# Patient Record
Sex: FEMALE | Race: AMERICAN INDIAN OR ALASKA NATIVE | Employment: UNEMPLOYED | URBAN - METROPOLITAN AREA
[De-identification: names, ages, dates, MRNs, and addresses within clinical notes are randomized per-mention and may not be internally consistent; named-entity substitution may affect disease eponyms.]

---

## 2019-04-05 ENCOUNTER — TRANSFERRED RECORDS (OUTPATIENT)
Dept: HEALTH INFORMATION MANAGEMENT | Facility: CLINIC | Age: 39
End: 2019-04-05

## 2019-04-05 LAB
ALT SERPL-CCNC: 27 U/L (ref 9–52)
AST SERPL-CCNC: 17 U/L (ref 14–36)
CREAT SERPL-MCNC: 0.4 MG/DL (ref 0.5–1)
GFR SERPL CREATININE-BSD FRML MDRD: >60 ML/MIN/1.73M2
GLUCOSE SERPL-MCNC: 198 MG/DL (ref 65–100)
POTASSIUM SERPL-SCNC: 4.8 MEQ/L (ref 3.6–5)
TSH SERPL-ACNC: 1.36 UIU/ML (ref 0.47–4.68)

## 2019-04-06 ENCOUNTER — HOSPITAL ENCOUNTER (INPATIENT)
Facility: HOSPITAL | Age: 39
LOS: 26 days | Discharge: HOME | DRG: 885 | End: 2019-05-02
Attending: PSYCHIATRY & NEUROLOGY | Admitting: PSYCHIATRY & NEUROLOGY
Payer: COMMERCIAL

## 2019-04-06 DIAGNOSIS — E11.9 TYPE 2 DIABETES MELLITUS WITHOUT COMPLICATION, WITHOUT LONG-TERM CURRENT USE OF INSULIN (H): Primary | ICD-10-CM

## 2019-04-06 DIAGNOSIS — E78.2 MIXED HYPERLIPIDEMIA: ICD-10-CM

## 2019-04-06 DIAGNOSIS — F20.1 DISORGANIZED SCHIZOPHRENIA (H): ICD-10-CM

## 2019-04-06 LAB
GLUCOSE BLDC GLUCOMTR-MCNC: 305 MG/DL (ref 70–99)
GLUCOSE BLDC GLUCOMTR-MCNC: 341 MG/DL (ref 70–99)

## 2019-04-06 PROCEDURE — 00000146 ZZHCL STATISTIC GLUCOSE BY METER IP

## 2019-04-06 PROCEDURE — 25000131 ZZH RX MED GY IP 250 OP 636 PS 637: Performed by: NURSE PRACTITIONER

## 2019-04-06 PROCEDURE — 25000132 ZZH RX MED GY IP 250 OP 250 PS 637: Performed by: NURSE PRACTITIONER

## 2019-04-06 PROCEDURE — 12400000 ZZH R&B MH

## 2019-04-06 PROCEDURE — 99223 1ST HOSP IP/OBS HIGH 75: CPT | Performed by: NURSE PRACTITIONER

## 2019-04-06 RX ORDER — DIVALPROEX SODIUM 500 MG/1
500 TABLET, EXTENDED RELEASE ORAL AT BEDTIME
Status: DISCONTINUED | OUTPATIENT
Start: 2019-04-06 | End: 2019-04-08

## 2019-04-06 RX ORDER — ACETAMINOPHEN 325 MG/1
650 TABLET ORAL EVERY 4 HOURS PRN
Status: DISCONTINUED | OUTPATIENT
Start: 2019-04-06 | End: 2019-04-07

## 2019-04-06 RX ORDER — BUSPIRONE HYDROCHLORIDE 10 MG/1
20 TABLET ORAL 3 TIMES DAILY
Status: ON HOLD | COMMUNITY
End: 2019-05-01

## 2019-04-06 RX ORDER — HYDROXYZINE HYDROCHLORIDE 25 MG/1
25 TABLET, FILM COATED ORAL EVERY 4 HOURS PRN
Status: DISCONTINUED | OUTPATIENT
Start: 2019-04-06 | End: 2019-04-06

## 2019-04-06 RX ORDER — PRAZOSIN HYDROCHLORIDE 1 MG/1
1 CAPSULE ORAL AT BEDTIME
Status: ON HOLD | COMMUNITY
Start: 2018-04-16 | End: 2019-04-06

## 2019-04-06 RX ORDER — LIRAGLUTIDE 6 MG/ML
1.8 INJECTION SUBCUTANEOUS DAILY
Status: ON HOLD | COMMUNITY
End: 2019-04-06

## 2019-04-06 RX ORDER — HYDROXYZINE HYDROCHLORIDE 25 MG/1
50 TABLET, FILM COATED ORAL 3 TIMES DAILY PRN
Status: DISCONTINUED | OUTPATIENT
Start: 2019-04-06 | End: 2019-05-02 | Stop reason: HOSPADM

## 2019-04-06 RX ORDER — RAMELTEON 8 MG/1
8 TABLET ORAL AT BEDTIME
Status: ON HOLD | COMMUNITY
End: 2019-04-06

## 2019-04-06 RX ORDER — DIVALPROEX SODIUM 500 MG/1
2000 TABLET, EXTENDED RELEASE ORAL AT BEDTIME
Status: ON HOLD | COMMUNITY
End: 2019-05-01

## 2019-04-06 RX ORDER — ESCITALOPRAM OXALATE 10 MG/1
20 TABLET ORAL DAILY
Status: DISCONTINUED | OUTPATIENT
Start: 2019-04-06 | End: 2019-04-11

## 2019-04-06 RX ORDER — OLANZAPINE 20 MG/1
10 TABLET ORAL AT BEDTIME
Status: ON HOLD | COMMUNITY
Start: 2016-05-18 | End: 2019-04-06

## 2019-04-06 RX ORDER — ASPIRIN 81 MG/1
81 TABLET ORAL DAILY
Status: ON HOLD | COMMUNITY
End: 2019-04-06

## 2019-04-06 RX ORDER — OLANZAPINE 10 MG/1
10 TABLET ORAL
Status: DISCONTINUED | OUTPATIENT
Start: 2019-04-06 | End: 2019-04-07

## 2019-04-06 RX ORDER — ATORVASTATIN CALCIUM 20 MG/1
20 TABLET, FILM COATED ORAL DAILY
Status: ON HOLD | COMMUNITY
Start: 2016-09-19 | End: 2019-04-06

## 2019-04-06 RX ORDER — OLANZAPINE 10 MG/1
10 TABLET ORAL AT BEDTIME
Status: ON HOLD | COMMUNITY
End: 2019-04-06

## 2019-04-06 RX ORDER — PRAZOSIN HYDROCHLORIDE 1 MG/1
1 CAPSULE ORAL AT BEDTIME
Status: ON HOLD | COMMUNITY
End: 2019-04-06

## 2019-04-06 RX ORDER — OLANZAPINE 10 MG/2ML
10 INJECTION, POWDER, FOR SOLUTION INTRAMUSCULAR
Status: DISCONTINUED | OUTPATIENT
Start: 2019-04-06 | End: 2019-04-07

## 2019-04-06 RX ORDER — ESCITALOPRAM OXALATE 20 MG/1
20 TABLET ORAL DAILY
Status: ON HOLD | COMMUNITY
End: 2019-05-01

## 2019-04-06 RX ORDER — ATORVASTATIN CALCIUM 20 MG/1
20 TABLET, FILM COATED ORAL DAILY
Status: ON HOLD | COMMUNITY
End: 2019-04-06

## 2019-04-06 RX ORDER — TRAZODONE HYDROCHLORIDE 50 MG/1
50 TABLET, FILM COATED ORAL
Status: DISCONTINUED | OUTPATIENT
Start: 2019-04-06 | End: 2019-04-07

## 2019-04-06 RX ORDER — BUSPIRONE HYDROCHLORIDE 10 MG/1
20 TABLET ORAL 3 TIMES DAILY
Status: DISCONTINUED | OUTPATIENT
Start: 2019-04-06 | End: 2019-04-29

## 2019-04-06 RX ORDER — METAXALONE 800 MG/1
800 TABLET ORAL 3 TIMES DAILY
Status: DISCONTINUED | OUTPATIENT
Start: 2019-04-06 | End: 2019-05-02 | Stop reason: HOSPADM

## 2019-04-06 RX ORDER — GABAPENTIN 400 MG/1
400 CAPSULE ORAL 3 TIMES DAILY
Status: DISCONTINUED | OUTPATIENT
Start: 2019-04-06 | End: 2019-04-07

## 2019-04-06 RX ORDER — ATORVASTATIN CALCIUM 20 MG/1
20 TABLET, FILM COATED ORAL AT BEDTIME
Status: DISCONTINUED | OUTPATIENT
Start: 2019-04-06 | End: 2019-05-02 | Stop reason: HOSPADM

## 2019-04-06 RX ORDER — DIVALPROEX SODIUM 500 MG/1
500 TABLET, EXTENDED RELEASE ORAL AT BEDTIME
Status: ON HOLD | COMMUNITY
End: 2019-04-06

## 2019-04-06 RX ORDER — DOCUSATE SODIUM 100 MG/1
100 CAPSULE, LIQUID FILLED ORAL 2 TIMES DAILY
Status: ON HOLD | COMMUNITY
End: 2019-04-06

## 2019-04-06 RX ORDER — HYDROXYZINE HYDROCHLORIDE 50 MG/1
100 TABLET, FILM COATED ORAL AT BEDTIME
Status: ON HOLD | COMMUNITY
End: 2019-05-01

## 2019-04-06 RX ORDER — BUSPIRONE HYDROCHLORIDE 10 MG/1
10 TABLET ORAL 3 TIMES DAILY
Status: ON HOLD | COMMUNITY
End: 2019-04-06

## 2019-04-06 RX ORDER — HYDROXYZINE HYDROCHLORIDE 50 MG/1
50 TABLET, FILM COATED ORAL 3 TIMES DAILY PRN
Status: ON HOLD | COMMUNITY
End: 2019-05-01

## 2019-04-06 RX ORDER — GABAPENTIN 300 MG/1
1200 CAPSULE ORAL 3 TIMES DAILY
Status: ON HOLD | COMMUNITY
End: 2019-05-01

## 2019-04-06 RX ORDER — ATORVASTATIN CALCIUM 20 MG/1
20 TABLET, FILM COATED ORAL AT BEDTIME
Status: ON HOLD | COMMUNITY
End: 2019-05-01

## 2019-04-06 RX ORDER — NICOTINE POLACRILEX 4 MG
15-30 LOZENGE BUCCAL
Status: DISCONTINUED | OUTPATIENT
Start: 2019-04-06 | End: 2019-05-02 | Stop reason: HOSPADM

## 2019-04-06 RX ORDER — DEXTROSE MONOHYDRATE 25 G/50ML
25-50 INJECTION, SOLUTION INTRAVENOUS
Status: DISCONTINUED | OUTPATIENT
Start: 2019-04-06 | End: 2019-05-02 | Stop reason: HOSPADM

## 2019-04-06 RX ORDER — HYDROXYZINE HYDROCHLORIDE 25 MG/1
100 TABLET, FILM COATED ORAL AT BEDTIME
Status: DISCONTINUED | OUTPATIENT
Start: 2019-04-06 | End: 2019-05-02 | Stop reason: HOSPADM

## 2019-04-06 RX ADMIN — TRAZODONE HYDROCHLORIDE 50 MG: 50 TABLET ORAL at 01:18

## 2019-04-06 RX ADMIN — BUSPIRONE HYDROCHLORIDE 20 MG: 10 TABLET ORAL at 20:26

## 2019-04-06 RX ADMIN — ATORVASTATIN CALCIUM 20 MG: 20 TABLET, FILM COATED ORAL at 20:26

## 2019-04-06 RX ADMIN — BUSPIRONE HYDROCHLORIDE 20 MG: 10 TABLET ORAL at 13:26

## 2019-04-06 RX ADMIN — DIVALPROEX SODIUM 500 MG: 500 TABLET, EXTENDED RELEASE ORAL at 20:26

## 2019-04-06 RX ADMIN — METAXALONE 800 MG: 800 TABLET ORAL at 13:26

## 2019-04-06 RX ADMIN — GABAPENTIN 400 MG: 400 CAPSULE ORAL at 20:26

## 2019-04-06 RX ADMIN — INSULIN ASPART 4 UNITS: 100 INJECTION, SOLUTION INTRAVENOUS; SUBCUTANEOUS at 16:54

## 2019-04-06 RX ADMIN — OLANZAPINE 10 MG: 10 TABLET, FILM COATED ORAL at 01:18

## 2019-04-06 RX ADMIN — TRAZODONE HYDROCHLORIDE 50 MG: 50 TABLET ORAL at 20:26

## 2019-04-06 RX ADMIN — METAXALONE 800 MG: 800 TABLET ORAL at 20:26

## 2019-04-06 RX ADMIN — HYDROXYZINE HYDROCHLORIDE 100 MG: 25 TABLET ORAL at 20:27

## 2019-04-06 RX ADMIN — ESCITALOPRAM OXALATE 20 MG: 10 TABLET ORAL at 13:26

## 2019-04-06 RX ADMIN — ACETAMINOPHEN 650 MG: 325 TABLET, FILM COATED ORAL at 16:55

## 2019-04-06 RX ADMIN — GABAPENTIN 400 MG: 400 CAPSULE ORAL at 13:26

## 2019-04-06 RX ADMIN — HYDROXYZINE HYDROCHLORIDE 50 MG: 25 TABLET ORAL at 16:55

## 2019-04-06 ASSESSMENT — ACTIVITIES OF DAILY LIVING (ADL)
COGNITION: 0 - NO COGNITION ISSUES REPORTED
RETIRED_EATING: 0-->INDEPENDENT
DRESS: SCRUBS (BEHAVIORAL HEALTH)
DRESS: 0-->INDEPENDENT
BATHING: 0-->INDEPENDENT
TRANSFERRING: 0-->INDEPENDENT
ORAL_HYGIENE: INDEPENDENT
AMBULATION: 0-->INDEPENDENT
LAUNDRY: UNABLE TO COMPLETE
RETIRED_COMMUNICATION: 0-->UNDERSTANDS/COMMUNICATES WITHOUT DIFFICULTY
FALL_HISTORY_WITHIN_LAST_SIX_MONTHS: NO
DRESS: SCRUBS (BEHAVIORAL HEALTH)
HYGIENE/GROOMING: PROMPTS
HYGIENE/GROOMING: INDEPENDENT
TOILETING: 0-->INDEPENDENT
SWALLOWING: 0-->SWALLOWS FOODS/LIQUIDS WITHOUT DIFFICULTY

## 2019-04-06 ASSESSMENT — MIFFLIN-ST. JEOR
SCORE: 1385.29
SCORE: 1389.37

## 2019-04-06 NOTE — PLAN OF CARE
"  Adult Behavioral Health Plan of Care  Patient-Specific Goal (Individualization)  Description  Patient will attend at least 50% of groups while on unit.   Patient will report at least 6-8 hours of sleep at night.  Patient will be compliant with treatment team recommendations.      Patient isolative and withdrawn to her bed, states she is tired and wants to sleep. Did come out to lounge for meals, then went back to bed. Affect is flat, mood is calm. Denies all criteria. States she has not slept in 5 days.   Spoke to patient after lunch regarding PTA medications, patient short and irritable, took list out of writer's hands, read list of medications and states \"I just need it all on there, whatever\". Reports she does not use insulin or monitor her blood sugars at home. Provider updated.   Patient given ordered medications, does appear somewhat calmer but still irritable, states she wants to sleep, and shut the door quickly behind writer leaving her room.   4/6/2019 1121 - No Change by Olivia Champagne RN     Suicide Risk  Absence of Self-Harm  Description  Patient will contract for safety if having thoughts of self harm while on unit.   Patient will report absence of suicide ideation prior to discharge.     Patient denies SI, has remained free from self harm.     4/6/2019 1121 - No Change by Olivia Champagne RN     "

## 2019-04-06 NOTE — PLAN OF CARE
Face to face end of shift report received from Jenny LO RN. Rounding completed. [place] in [facility].     Olivia Champagne  4/6/2019  8:05 AM
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
## 2019-04-06 NOTE — PROGRESS NOTES
04/06/19 0153   Patient Belongings   Did you bring any home meds/supplements to the hospital?  No   Patient Belongings locker   Patient Belongings Put in Hospital Secure Location (Security or Locker, etc.) other (see comments)   Belongings Search Yes   Clothing Search No clothing   Second Staff emily   Comment bag of tobacco box of filters lighter cig roller   List items sent to safe: N/A  All other belongings put in assigned cubby in belongings room.       I have reviewed my belongings list on admission and verify that it is correct.     Patient signature_______________________________    Second staff witness (if patient unable to sign) ______________________________       I have received all my belongings at discharge.    Patient signature________________________________    Hitesh   4/6/2019  1:56 AM

## 2019-04-07 LAB
GLUCOSE BLDC GLUCOMTR-MCNC: 168 MG/DL (ref 70–99)
GLUCOSE BLDC GLUCOMTR-MCNC: 193 MG/DL (ref 70–99)
GLUCOSE BLDC GLUCOMTR-MCNC: 227 MG/DL (ref 70–99)
GLUCOSE BLDC GLUCOMTR-MCNC: 242 MG/DL (ref 70–99)
GLUCOSE BLDC GLUCOMTR-MCNC: 249 MG/DL (ref 70–99)

## 2019-04-07 PROCEDURE — 25000125 ZZHC RX 250: Performed by: INTERNAL MEDICINE

## 2019-04-07 PROCEDURE — 00000146 ZZHCL STATISTIC GLUCOSE BY METER IP

## 2019-04-07 PROCEDURE — 99233 SBSQ HOSP IP/OBS HIGH 50: CPT | Performed by: NURSE PRACTITIONER

## 2019-04-07 PROCEDURE — 12400000 ZZH R&B MH

## 2019-04-07 PROCEDURE — 25000132 ZZH RX MED GY IP 250 OP 250 PS 637: Performed by: NURSE PRACTITIONER

## 2019-04-07 RX ORDER — OLANZAPINE 10 MG/1
10 TABLET ORAL AT BEDTIME
Status: DISCONTINUED | OUTPATIENT
Start: 2019-04-07 | End: 2019-04-08

## 2019-04-07 RX ORDER — GABAPENTIN 300 MG/1
600 CAPSULE ORAL 3 TIMES DAILY
Status: DISCONTINUED | OUTPATIENT
Start: 2019-04-07 | End: 2019-04-08

## 2019-04-07 RX ORDER — ACETAMINOPHEN 325 MG/1
975 TABLET ORAL 3 TIMES DAILY
Status: DISCONTINUED | OUTPATIENT
Start: 2019-04-07 | End: 2019-05-02 | Stop reason: HOSPADM

## 2019-04-07 RX ORDER — TRAZODONE HYDROCHLORIDE 100 MG/1
100 TABLET ORAL AT BEDTIME
Status: DISCONTINUED | OUTPATIENT
Start: 2019-04-07 | End: 2019-04-11

## 2019-04-07 RX ORDER — IBUPROFEN 600 MG/1
600 TABLET, FILM COATED ORAL 3 TIMES DAILY
Status: DISCONTINUED | OUTPATIENT
Start: 2019-04-07 | End: 2019-04-09

## 2019-04-07 RX ADMIN — ACETAMINOPHEN 975 MG: 325 TABLET, FILM COATED ORAL at 13:23

## 2019-04-07 RX ADMIN — INSULIN ASPART 3 UNITS: 100 INJECTION, SOLUTION INTRAVENOUS; SUBCUTANEOUS at 12:42

## 2019-04-07 RX ADMIN — BUSPIRONE HYDROCHLORIDE 20 MG: 10 TABLET ORAL at 08:35

## 2019-04-07 RX ADMIN — IBUPROFEN 600 MG: 600 TABLET ORAL at 20:22

## 2019-04-07 RX ADMIN — METFORMIN HYDROCHLORIDE 1000 MG: 1000 TABLET ORAL at 16:48

## 2019-04-07 RX ADMIN — ACETAMINOPHEN 975 MG: 325 TABLET, FILM COATED ORAL at 20:22

## 2019-04-07 RX ADMIN — IBUPROFEN 600 MG: 600 TABLET ORAL at 13:24

## 2019-04-07 RX ADMIN — ACETAMINOPHEN 650 MG: 325 TABLET, FILM COATED ORAL at 08:45

## 2019-04-07 RX ADMIN — METAXALONE 800 MG: 800 TABLET ORAL at 08:35

## 2019-04-07 RX ADMIN — GABAPENTIN 400 MG: 400 CAPSULE ORAL at 08:35

## 2019-04-07 RX ADMIN — BUSPIRONE HYDROCHLORIDE 20 MG: 10 TABLET ORAL at 20:21

## 2019-04-07 RX ADMIN — HYDROXYZINE HYDROCHLORIDE 100 MG: 25 TABLET ORAL at 20:21

## 2019-04-07 RX ADMIN — ATORVASTATIN CALCIUM 20 MG: 20 TABLET, FILM COATED ORAL at 20:21

## 2019-04-07 RX ADMIN — METAXALONE 800 MG: 800 TABLET ORAL at 13:22

## 2019-04-07 RX ADMIN — ESCITALOPRAM OXALATE 20 MG: 10 TABLET ORAL at 08:35

## 2019-04-07 RX ADMIN — GABAPENTIN 600 MG: 300 CAPSULE ORAL at 13:22

## 2019-04-07 RX ADMIN — DIVALPROEX SODIUM 500 MG: 500 TABLET, EXTENDED RELEASE ORAL at 20:21

## 2019-04-07 RX ADMIN — INSULIN ASPART 1 UNITS: 100 INJECTION, SOLUTION INTRAVENOUS; SUBCUTANEOUS at 08:36

## 2019-04-07 RX ADMIN — HYDROXYZINE HYDROCHLORIDE 50 MG: 25 TABLET ORAL at 16:48

## 2019-04-07 RX ADMIN — INSULIN ASPART 2 UNITS: 100 INJECTION, SOLUTION INTRAVENOUS; SUBCUTANEOUS at 16:46

## 2019-04-07 RX ADMIN — METFORMIN HYDROCHLORIDE 1000 MG: 1000 TABLET ORAL at 08:35

## 2019-04-07 RX ADMIN — METAXALONE 800 MG: 800 TABLET ORAL at 20:22

## 2019-04-07 RX ADMIN — TRAZODONE HYDROCHLORIDE 100 MG: 100 TABLET ORAL at 20:21

## 2019-04-07 RX ADMIN — BUSPIRONE HYDROCHLORIDE 20 MG: 10 TABLET ORAL at 13:23

## 2019-04-07 RX ADMIN — OLANZAPINE 10 MG: 10 TABLET, FILM COATED ORAL at 20:22

## 2019-04-07 RX ADMIN — GABAPENTIN 600 MG: 300 CAPSULE ORAL at 20:21

## 2019-04-07 ASSESSMENT — ACTIVITIES OF DAILY LIVING (ADL)
HYGIENE/GROOMING: PROMPTS;INDEPENDENT;SHOWER
DRESS: INDEPENDENT;SCRUBS (BEHAVIORAL HEALTH)

## 2019-04-07 NOTE — PLAN OF CARE
Face to face end of shift report received from Mandy CHOUDHURY RN. Rounding completed. Patient observed in the lounge.     Lorna Carrington  4/7/2019  9:16 AM
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
## 2019-04-07 NOTE — PLAN OF CARE
"  Adult Behavioral Health Plan of Care  Patient-Specific Goal (Individualization)  Description  Patient will attend at least 50% of groups while on unit.   Patient will report at least 6-8 hours of sleep at night.  Patient will be compliant with treatment team recommendations.    4/6/2019 2102 by Mary Bojorquez, RN  Note  Pt remains isolative to her room, refused group and only came out for meal. Does appear to be responding to internal stimuli, stated feeling as if she is \"tripping.\" Pt admitting to having AH without VH, is oriented to place, month and year. Denied SI and HI, is restless and disorganized, frequently up moving in her room. Disheveled and declined shower this evening. Pt has rambling speech, frequently discussing hopelessness of her situation and need to leave the [place] area due to dysfunctional life of residents and family. Pt taking medications and allowing accuchecks. Given PRN Tylenol for generalized pain and did sleep after taking.        "

## 2019-04-07 NOTE — PLAN OF CARE
Face to face end of shift report received from OBDULIO Baker. Rounding completed. Patient observed.     Mary Bojorquez  4/6/2019  9:36 PM
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
## 2019-04-07 NOTE — PLAN OF CARE
Face to face end of shift report received from OBDULIO Rothman. Rounding completed. Patient observed.     Mary Bojorquez  4/7/2019  4:59 PM

## 2019-04-07 NOTE — PLAN OF CARE
Face to face end of shift report received from OBDULIO Hernandez. Rounding completed. Patient observed resting in bed.     DAIANA COFFEY  4/7/2019  12:36 AM

## 2019-04-07 NOTE — PLAN OF CARE
Problem: Adult Behavioral Health Plan of Care  Goal: Patient-Specific Goal (Individualization)  Description  Patient will attend at least 50% of groups while on unit.   Patient will report at least 6-8 hours of sleep at night.  Patient will be compliant with treatment team recommendations.    4/7/2019 0624 by Mandy Montoya RN  Note:   0244- . No s/s of distress noted. Will continue to monitor.  Pt appears to be sleeping in bed with eyes closed, having regular respirations and position changes. 15 minutes and PRN safety checks completed with no noted complains. Will continue to monitor.        Problem: Suicide Risk  Goal: Absence of Self-Harm  Description  Patient will contract for safety if having thoughts of self harm while on unit.   Patient will report absence of suicide ideation prior to discharge.      Note:   No self harm noted or reported so far this shift.
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
## 2019-04-08 LAB
GLUCOSE BLDC GLUCOMTR-MCNC: 105 MG/DL (ref 70–99)
GLUCOSE BLDC GLUCOMTR-MCNC: 125 MG/DL (ref 70–99)
GLUCOSE BLDC GLUCOMTR-MCNC: 169 MG/DL (ref 70–99)
GLUCOSE BLDC GLUCOMTR-MCNC: 195 MG/DL (ref 70–99)
GLUCOSE BLDC GLUCOMTR-MCNC: 255 MG/DL (ref 70–99)

## 2019-04-08 PROCEDURE — 00000146 ZZHCL STATISTIC GLUCOSE BY METER IP

## 2019-04-08 PROCEDURE — 99233 SBSQ HOSP IP/OBS HIGH 50: CPT | Performed by: NURSE PRACTITIONER

## 2019-04-08 PROCEDURE — 25000125 ZZHC RX 250: Performed by: INTERNAL MEDICINE

## 2019-04-08 PROCEDURE — 25000132 ZZH RX MED GY IP 250 OP 250 PS 637: Performed by: NURSE PRACTITIONER

## 2019-04-08 PROCEDURE — 12400000 ZZH R&B MH

## 2019-04-08 RX ORDER — GABAPENTIN 400 MG/1
800 CAPSULE ORAL 3 TIMES DAILY
Status: DISCONTINUED | OUTPATIENT
Start: 2019-04-08 | End: 2019-04-12

## 2019-04-08 RX ORDER — OLANZAPINE 10 MG/1
20 TABLET ORAL AT BEDTIME
Status: DISCONTINUED | OUTPATIENT
Start: 2019-04-08 | End: 2019-04-09

## 2019-04-08 RX ADMIN — INSULIN ASPART 3 UNITS: 100 INJECTION, SOLUTION INTRAVENOUS; SUBCUTANEOUS at 11:33

## 2019-04-08 RX ADMIN — ESCITALOPRAM OXALATE 20 MG: 10 TABLET ORAL at 08:20

## 2019-04-08 RX ADMIN — GABAPENTIN 800 MG: 400 CAPSULE ORAL at 13:21

## 2019-04-08 RX ADMIN — HYDROXYZINE HYDROCHLORIDE 50 MG: 25 TABLET ORAL at 08:25

## 2019-04-08 RX ADMIN — BUSPIRONE HYDROCHLORIDE 20 MG: 10 TABLET ORAL at 08:20

## 2019-04-08 RX ADMIN — METAXALONE 800 MG: 800 TABLET ORAL at 08:20

## 2019-04-08 RX ADMIN — ACETAMINOPHEN 975 MG: 325 TABLET, FILM COATED ORAL at 20:19

## 2019-04-08 RX ADMIN — IBUPROFEN 600 MG: 600 TABLET ORAL at 20:19

## 2019-04-08 RX ADMIN — BUSPIRONE HYDROCHLORIDE 20 MG: 10 TABLET ORAL at 20:19

## 2019-04-08 RX ADMIN — GABAPENTIN 800 MG: 400 CAPSULE ORAL at 20:19

## 2019-04-08 RX ADMIN — METFORMIN HYDROCHLORIDE 1000 MG: 1000 TABLET ORAL at 16:50

## 2019-04-08 RX ADMIN — IBUPROFEN 600 MG: 600 TABLET ORAL at 13:18

## 2019-04-08 RX ADMIN — GABAPENTIN 600 MG: 300 CAPSULE ORAL at 08:20

## 2019-04-08 RX ADMIN — DIVALPROEX SODIUM 750 MG: 500 TABLET, EXTENDED RELEASE ORAL at 20:19

## 2019-04-08 RX ADMIN — INSULIN ASPART 1 UNITS: 100 INJECTION, SOLUTION INTRAVENOUS; SUBCUTANEOUS at 16:51

## 2019-04-08 RX ADMIN — METAXALONE 800 MG: 800 TABLET ORAL at 13:18

## 2019-04-08 RX ADMIN — IBUPROFEN 600 MG: 600 TABLET ORAL at 08:20

## 2019-04-08 RX ADMIN — HYDROXYZINE HYDROCHLORIDE 100 MG: 25 TABLET ORAL at 20:18

## 2019-04-08 RX ADMIN — METAXALONE 800 MG: 800 TABLET ORAL at 20:19

## 2019-04-08 RX ADMIN — OLANZAPINE 20 MG: 10 TABLET, FILM COATED ORAL at 20:19

## 2019-04-08 RX ADMIN — BUSPIRONE HYDROCHLORIDE 20 MG: 10 TABLET ORAL at 13:18

## 2019-04-08 RX ADMIN — METFORMIN HYDROCHLORIDE 1000 MG: 1000 TABLET ORAL at 08:20

## 2019-04-08 RX ADMIN — TRAZODONE HYDROCHLORIDE 100 MG: 100 TABLET ORAL at 20:19

## 2019-04-08 RX ADMIN — ACETAMINOPHEN 975 MG: 325 TABLET, FILM COATED ORAL at 08:20

## 2019-04-08 RX ADMIN — ACETAMINOPHEN 975 MG: 325 TABLET, FILM COATED ORAL at 13:18

## 2019-04-08 RX ADMIN — ATORVASTATIN CALCIUM 20 MG: 20 TABLET, FILM COATED ORAL at 20:19

## 2019-04-08 ASSESSMENT — ACTIVITIES OF DAILY LIVING (ADL)
ORAL_HYGIENE: INDEPENDENT
ORAL_HYGIENE: INDEPENDENT
HYGIENE/GROOMING: INDEPENDENT
DRESS: INDEPENDENT;SCRUBS (BEHAVIORAL HEALTH)
DRESS: INDEPENDENT;SCRUBS (BEHAVIORAL HEALTH)
HYGIENE/GROOMING: PROMPTS;INDEPENDENT
LAUNDRY: UNABLE TO COMPLETE
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
## 2019-04-08 NOTE — PLAN OF CARE
Face to face end of shift report received from OBDULIO Hernandez. Rounding completed. Patient observed resting in bed.     DAIANA COFFEY  4/8/2019  12:25 AM
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
## 2019-04-08 NOTE — PLAN OF CARE
Problem: Adult Behavioral Health Plan of Care  Goal: Patient-Specific Goal (Individualization)  Description  Patient will attend at least 50% of groups while on unit.   Patient will report at least 6-8 hours of sleep at night.  Patient will be compliant with treatment team recommendations.    4/8/2019 1110 by Ayah Matos, RN  Outcome: No Change   Pt. Is attending group therapy, slept 6 hours last night, is compliant with treatment team recommendations, taking prescribed medications, will continue to monitor.  Problem: Suicide Risk  Goal: Absence of Self-Harm  Description  Patient will contract for safety if having thoughts of self harm while on unit.   Patient will report absence of suicide ideation prior to discharge.      4/8/2019 1110 by Ayah Matos, RN  Outcome: Improving   Pt. Denies suicidal ideation, verbally contracts for safety.
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
## 2019-04-08 NOTE — PLAN OF CARE
Face to face end of shift report received from Ayah ZUNIGA RN. Rounding completed. Patient observed in group.     Lala Zapata  4/8/2019  4:30 PM

## 2019-04-08 NOTE — PLAN OF CARE
Problem: Adult Behavioral Health Plan of Care  Goal: Patient-Specific Goal (Individualization)  Description  Patient will attend at least 50% of groups while on unit.   Patient will report at least 6-8 hours of sleep at night.  Patient will be compliant with treatment team recommendations.      Pt is pleasant and cooperative with writer.  Pt is very disorganized in conversation and jumps from one topic to the next. Pt makes many delusional statements throughout conversation.  She dose deny pain and SI.  Pt has been attending groups this shift. Cooperative with all scheduled medications this shift.     Outcome: No Change     Problem: Suicide Risk  Goal: Absence of Self-Harm  Description  Patient will contract for safety if having thoughts of self harm while on unit.   Patient will report absence of suicide ideation prior to discharge.     Pt denies SI and agrees to be safe while on the unit.      Outcome: No Change

## 2019-04-08 NOTE — PLAN OF CARE
Problem: Adult Behavioral Health Plan of Care  Goal: Patient-Specific Goal (Individualization)  Description  Patient will attend at least 50% of groups while on unit.   Patient will report at least 6-8 hours of sleep at night.  Patient will be compliant with treatment team recommendations.    4/8/2019 0524 by Mandy Montoya RN  Note:   0212- pt sleeping. Woken up by staff to check BS without incident. . Will continue to monitor.  0525-Pt appears to be sleeping in bed with eyes closed, having regular respirations and position changes. 15 minutes and PRN safety checks completed with no noted complains. Will continue to monitor.   0643- .   Problem: Suicide Risk  Goal: Absence of Self-Harm  Description  Patient will contract for safety if having thoughts of self harm while on unit.   Patient will report absence of suicide ideation prior to discharge.      Note:   No self harm noted or reported so far this shift.

## 2019-04-08 NOTE — PLAN OF CARE
BEHAVIORAL TEAM DISCUSSION    Participants: Psychiatric Practitioner, Nursing, , Utilization Review, Pharmacy, Recreation Therapy, Medical Nurse Practitioner   Progress: New Patient  Continued Stay Criteria/Rationale: Psychotic and delusional  Medical/Physical: See problem list below. Diabetic  Precautions:   Behavioral Orders   Procedures    Code 1 - Restrict to Unit    Routine Programming     As clinically indicated    Status 15     Every 15 minutes.     Plan: To stabilize on medications, safe placement  Rationale for change in precautions or plan: NA      Current Facility-Administered Medications:     acetaminophen (TYLENOL) tablet 975 mg, 975 mg, Oral, TID, Alaspa, Loren L, NP, 975 mg at 04/08/19 0820    atorvastatin (LIPITOR) tablet 20 mg, 20 mg, Oral, At Bedtime, Alaspa, Loren L, NP, 20 mg at 04/07/19 2021    busPIRone (BUSPAR) tablet 20 mg, 20 mg, Oral, TID, Alaspa, Loren L, NP, 20 mg at 04/08/19 0820    glucose gel 15-30 g, 15-30 g, Oral, Q15 Min PRN **OR** dextrose 50 % injection 25-50 mL, 25-50 mL, Intravenous, Q15 Min PRN **OR** glucagon injection 1 mg, 1 mg, Subcutaneous, Q15 Min PRN, Alaspa, Loren L, NP    divalproex sodium extended-release (DEPAKOTE ER) 24 hr tablet 500 mg, 500 mg, Oral, At Bedtime, Alaspa, Loren L, NP, 500 mg at 04/07/19 2021    escitalopram (LEXAPRO) tablet 20 mg, 20 mg, Oral, Daily, Alaspa, Loren L, NP, 20 mg at 04/08/19 0820    gabapentin (NEURONTIN) capsule 600 mg, 600 mg, Oral, TID, Alaspa, Loren L, NP, 600 mg at 04/08/19 0820    hydrOXYzine (ATARAX) tablet 100 mg, 100 mg, Oral, At Bedtime, Alaspa, Loren L, NP, 100 mg at 04/07/19 2021    hydrOXYzine (ATARAX) tablet 50 mg, 50 mg, Oral, TID PRN, Alaspa, Loren L, NP, 50 mg at 04/08/19 0825    ibuprofen (ADVIL/MOTRIN) tablet 600 mg, 600 mg, Oral, TID, Alaspa, Loren L, NP, 600 mg at 04/08/19 0820    insulin aspart (NovoLOG) inj (RAPID ACTING), 1-7 Units, Subcutaneous, TID AC, Alaspa, Loren L, NP, 2 Units at  04/07/19 1646    insulin aspart (NovoLOG) inj (RAPID ACTING), 1-5 Units, Subcutaneous, At Bedtime, Loren Zuleta, NP, 1 Units at 04/07/19 2019    metaxalone (SKELAXIN) tablet 800 mg, 800 mg, Oral, TID, AlaspaLoren L, NP, 800 mg at 04/08/19 0820    metFORMIN (GLUCOPHAGE) tablet 1,000 mg, 1,000 mg, Oral, BID w/meals, Davi Owens MD, 1,000 mg at 04/08/19 0820    OLANZapine (zyPREXA) tablet 10 mg, 10 mg, Oral, At Bedtime, AlaspaLoren L, NP, 10 mg at 04/07/19 2022    traZODone (DESYREL) tablet 100 mg, 100 mg, Oral, At Bedtime, Loren Zuleta, NP, 100 mg at 04/07/19 2021  Active Problems:    Schizophrenia (H)

## 2019-04-08 NOTE — PLAN OF CARE
Face to face end of shift report received from Mandy SINGH RN. Rounding completed. Patient observed.     Ayah Matos  4/8/2019  7:52 AM

## 2019-04-09 LAB
GLUCOSE BLDC GLUCOMTR-MCNC: 121 MG/DL (ref 70–99)
GLUCOSE BLDC GLUCOMTR-MCNC: 130 MG/DL (ref 70–99)
GLUCOSE BLDC GLUCOMTR-MCNC: 161 MG/DL (ref 70–99)
GLUCOSE BLDC GLUCOMTR-MCNC: 181 MG/DL (ref 70–99)
GLUCOSE BLDC GLUCOMTR-MCNC: 181 MG/DL (ref 70–99)
GLUCOSE BLDC GLUCOMTR-MCNC: 195 MG/DL (ref 70–99)
GLUCOSE BLDC GLUCOMTR-MCNC: 84 MG/DL (ref 70–99)

## 2019-04-09 PROCEDURE — 99233 SBSQ HOSP IP/OBS HIGH 50: CPT | Performed by: NURSE PRACTITIONER

## 2019-04-09 PROCEDURE — 12400000 ZZH R&B MH

## 2019-04-09 PROCEDURE — 25000132 ZZH RX MED GY IP 250 OP 250 PS 637: Performed by: NURSE PRACTITIONER

## 2019-04-09 PROCEDURE — 00000146 ZZHCL STATISTIC GLUCOSE BY METER IP

## 2019-04-09 PROCEDURE — 25000125 ZZHC RX 250: Performed by: INTERNAL MEDICINE

## 2019-04-09 RX ORDER — OLANZAPINE 10 MG/1
30 TABLET ORAL AT BEDTIME
Status: DISCONTINUED | OUTPATIENT
Start: 2019-04-09 | End: 2019-04-18

## 2019-04-09 RX ORDER — IBUPROFEN 800 MG/1
800 TABLET, FILM COATED ORAL 3 TIMES DAILY
Status: DISCONTINUED | OUTPATIENT
Start: 2019-04-09 | End: 2019-04-30

## 2019-04-09 RX ADMIN — METAXALONE 800 MG: 800 TABLET ORAL at 14:03

## 2019-04-09 RX ADMIN — TRAZODONE HYDROCHLORIDE 100 MG: 100 TABLET ORAL at 20:22

## 2019-04-09 RX ADMIN — GABAPENTIN 800 MG: 400 CAPSULE ORAL at 08:17

## 2019-04-09 RX ADMIN — ACETAMINOPHEN 975 MG: 325 TABLET, FILM COATED ORAL at 20:22

## 2019-04-09 RX ADMIN — ACETAMINOPHEN 975 MG: 325 TABLET, FILM COATED ORAL at 14:03

## 2019-04-09 RX ADMIN — GABAPENTIN 800 MG: 400 CAPSULE ORAL at 20:22

## 2019-04-09 RX ADMIN — OLANZAPINE 30 MG: 10 TABLET, FILM COATED ORAL at 20:22

## 2019-04-09 RX ADMIN — GABAPENTIN 800 MG: 400 CAPSULE ORAL at 14:03

## 2019-04-09 RX ADMIN — INSULIN ASPART 1 UNITS: 100 INJECTION, SOLUTION INTRAVENOUS; SUBCUTANEOUS at 11:56

## 2019-04-09 RX ADMIN — DIVALPROEX SODIUM 750 MG: 500 TABLET, EXTENDED RELEASE ORAL at 20:21

## 2019-04-09 RX ADMIN — METAXALONE 800 MG: 800 TABLET ORAL at 08:18

## 2019-04-09 RX ADMIN — IBUPROFEN 800 MG: 800 TABLET ORAL at 14:03

## 2019-04-09 RX ADMIN — BUSPIRONE HYDROCHLORIDE 20 MG: 10 TABLET ORAL at 08:18

## 2019-04-09 RX ADMIN — METFORMIN HYDROCHLORIDE 1000 MG: 1000 TABLET ORAL at 16:52

## 2019-04-09 RX ADMIN — HYDROXYZINE HYDROCHLORIDE 100 MG: 25 TABLET ORAL at 20:22

## 2019-04-09 RX ADMIN — METFORMIN HYDROCHLORIDE 1000 MG: 1000 TABLET ORAL at 08:18

## 2019-04-09 RX ADMIN — BUSPIRONE HYDROCHLORIDE 20 MG: 10 TABLET ORAL at 20:22

## 2019-04-09 RX ADMIN — ACETAMINOPHEN 975 MG: 325 TABLET, FILM COATED ORAL at 08:17

## 2019-04-09 RX ADMIN — METAXALONE 800 MG: 800 TABLET ORAL at 20:22

## 2019-04-09 RX ADMIN — BUSPIRONE HYDROCHLORIDE 20 MG: 10 TABLET ORAL at 14:03

## 2019-04-09 RX ADMIN — ATORVASTATIN CALCIUM 20 MG: 20 TABLET, FILM COATED ORAL at 20:22

## 2019-04-09 RX ADMIN — IBUPROFEN 600 MG: 600 TABLET ORAL at 08:18

## 2019-04-09 RX ADMIN — INSULIN ASPART 1 UNITS: 100 INJECTION, SOLUTION INTRAVENOUS; SUBCUTANEOUS at 08:18

## 2019-04-09 RX ADMIN — IBUPROFEN 800 MG: 800 TABLET ORAL at 20:22

## 2019-04-09 RX ADMIN — ESCITALOPRAM OXALATE 20 MG: 10 TABLET ORAL at 08:17

## 2019-04-09 RX ADMIN — INSULIN ASPART 1 UNITS: 100 INJECTION, SOLUTION INTRAVENOUS; SUBCUTANEOUS at 16:52

## 2019-04-09 ASSESSMENT — ACTIVITIES OF DAILY LIVING (ADL)
DRESS: INDEPENDENT
DRESS: SCRUBS (BEHAVIORAL HEALTH);INDEPENDENT
HYGIENE/GROOMING: INDEPENDENT
ORAL_HYGIENE: INDEPENDENT
ORAL_HYGIENE: INDEPENDENT
HYGIENE/GROOMING: INDEPENDENT

## 2019-04-09 NOTE — PLAN OF CARE
Problem: Adult Behavioral Health Plan of Care  Goal: Patient-Specific Goal (Individualization)  Description  Patient will attend at least 50% of groups while on unit.   Patient will report at least 6-8 hours of sleep at night.  Patient will be compliant with treatment team recommendations.    4/9/2019 0108 by Ginette Sheffield, RN  Note:   0015 in bed with easy respirations, presenting sleeping. About 0145 patient awoke for accucheck. Returned to sleep.0500 continues to sleep at this time.0615 patient is awake and to the lobby. recognized this writer and asked how this writer has been. Patient cooperated  With blood sugar and she is not symptomatic, offered snack and she said she just wanted to put extra sugar in her coffee only.  She is steady on feet. Alert. 0638 wanted the blood sugar rechecked to make sure It not lowering, and it was 130. Patient said that it good.      Problem: Suicide Risk  Goal: Absence of Self-Harm  Description  Patient will contract for safety if having thoughts of self harm while on unit.   Patient will report absence of suicide ideation prior to discharge.      4/9/2019 0108 by Ginette Sheffield, RN  Note:   0015 in  bed with easy respirations presenting sleeping.
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
## 2019-04-09 NOTE — PLAN OF CARE
"  Problem: Adult Behavioral Health Plan of Care  Goal: Patient-Specific Goal (Individualization)  Description  Patient will attend at least 50% of groups while on unit.   Patient will report at least 6-8 hours of sleep at night.  Patient will be compliant with treatment team recommendations.    4/9/2019 1509 by Edgar Escalante, RN  Outcome: No Change  Pt has been up on the unit most of the shift. Kept mostly to herself. Did not attend groups. Cooperative, though guarded on assessment. Reported \"terrible\" pain \"all over.\" Scheduled Ibuprofen and Tylenol administered x 2 this shift. Denied AH and/or VH. Blood sugars 161 mg/dl and 181 mg/dl this shift. Compliant with medications as prescribed. Slept approximately 5 hours last night.  Problem: Suicide Risk  Goal: Absence of Self-Harm  Description  Patient will contract for safety if having thoughts of self harm while on unit.   Patient will report absence of suicide ideation prior to discharge.      4/9/2019 1509 by Edgar Escalante RN  Outcome: No Change  Pt denied SI. Remained free from self-harm and/or injury this shift.        "
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
## 2019-04-09 NOTE — PROGRESS NOTES
Face to face end of shift report received from lindsey bradley at 2300 report.. Rounding completed. Patient observed.     Ginette Sheffield  4/9/2019  1:10 AM

## 2019-04-09 NOTE — PLAN OF CARE
Face to face end of shift report received from PATTY Sheffield RN. Rounding completed. Patient observed, up on the unit.     Edgar Escalante  4/9/2019  7:47 AM

## 2019-04-09 NOTE — PLAN OF CARE
BEHAVIORAL TEAM DISCUSSION    Participants: [facility] Angie Laws LSW,  Becca Chery LSW, Beena Kumar SW, Opal Pillai RN, Ayah Matos RN, Ion Morales RN, Mady Holman OT, Natalia Costello OT, Gena Gomez OT  Progress: minimal   Continued Stay Criteria/Rationale: delusional, tangential, unable to discharge to lesser level of care due to current mentale state   Medical/Physical: diabetic - monitoring blood sugars, metformin, accuchecks and sliding scale   Precautions:   Behavioral Orders   Procedures    Code 1 - Restrict to Unit    Routine Programming     As clinically indicated    Status 15     Every 15 minutes.     Plan: medication adjustments and work on safe discharge plan   Rationale for change in precautions or plan: none     Active Problems:    Schizophrenia (H)      Current Facility-Administered Medications:     acetaminophen (TYLENOL) tablet 975 mg, 975 mg, Oral, TID, Loren Zuleta, NP, 975 mg at 04/09/19 0817    atorvastatin (LIPITOR) tablet 20 mg, 20 mg, Oral, At Bedtime, Loren Zuleta, NP, 20 mg at 04/08/19 2019    busPIRone (BUSPAR) tablet 20 mg, 20 mg, Oral, TID, PilaraLoren L, NP, 20 mg at 04/09/19 0818    glucose gel 15-30 g, 15-30 g, Oral, Q15 Min PRN **OR** dextrose 50 % injection 25-50 mL, 25-50 mL, Intravenous, Q15 Min PRN **OR** glucagon injection 1 mg, 1 mg, Subcutaneous, Q15 Min PRN, Loren Zuleta, NP    divalproex sodium extended-release (DEPAKOTE ER) 24 hr tablet 750 mg, 750 mg, Oral, At Bedtime, PilaraLorne L, NP, 750 mg at 04/08/19 2019    escitalopram (LEXAPRO) tablet 20 mg, 20 mg, Oral, Daily, PilaraLoren L, NP, 20 mg at 04/09/19 0817    gabapentin (NEURONTIN) capsule 800 mg, 800 mg, Oral, TID, Loren Zuleta L, NP, 800 mg at 04/09/19 0817    hydrOXYzine (ATARAX) tablet 100 mg, 100 mg, Oral, At Bedtime, Loren Zuleta NP, 100 mg at 04/08/19 2018    hydrOXYzine (ATARAX) tablet 50 mg, 50 mg, Oral, TID PRN, Loren Zuleta NP,  50 mg at 04/08/19 0825    ibuprofen (ADVIL/MOTRIN) tablet 600 mg, 600 mg, Oral, TID, Alaspa, Loren L, NP, 600 mg at 04/09/19 0818    insulin aspart (NovoLOG) inj (RAPID ACTING), 1-7 Units, Subcutaneous, TID AC, AlaspaRolandoLoren L, NP, 1 Units at 04/09/19 0818    insulin aspart (NovoLOG) inj (RAPID ACTING), 1-5 Units, Subcutaneous, At Bedtime, AlaspLoren cruz L, NP, 1 Units at 04/07/19 2019    metaxalone (SKELAXIN) tablet 800 mg, 800 mg, Oral, TID, AlaspaRolandoLoren L, NP, 800 mg at 04/09/19 0818    metFORMIN (GLUCOPHAGE) tablet 1,000 mg, 1,000 mg, Oral, BID w/meals, Davi Owens MD, 1,000 mg at 04/09/19 0818    OLANZapine (zyPREXA) tablet 20 mg, 20 mg, Oral, At Bedtime, AlaspLoren cruz L, NP, 20 mg at 04/08/19 2019    traZODone (DESYREL) tablet 100 mg, 100 mg, Oral, At Bedtime, Loren Zuleta, NP, 100 mg at 04/08/19 2019

## 2019-04-10 LAB
GLUCOSE BLDC GLUCOMTR-MCNC: 100 MG/DL (ref 70–99)
GLUCOSE BLDC GLUCOMTR-MCNC: 104 MG/DL (ref 70–99)
GLUCOSE BLDC GLUCOMTR-MCNC: 114 MG/DL (ref 70–99)
GLUCOSE BLDC GLUCOMTR-MCNC: 132 MG/DL (ref 70–99)
GLUCOSE BLDC GLUCOMTR-MCNC: 202 MG/DL (ref 70–99)

## 2019-04-10 PROCEDURE — 00000146 ZZHCL STATISTIC GLUCOSE BY METER IP

## 2019-04-10 PROCEDURE — 12400000 ZZH R&B MH

## 2019-04-10 PROCEDURE — 25000125 ZZHC RX 250: Performed by: INTERNAL MEDICINE

## 2019-04-10 PROCEDURE — 25000132 ZZH RX MED GY IP 250 OP 250 PS 637: Performed by: NURSE PRACTITIONER

## 2019-04-10 RX ADMIN — IBUPROFEN 800 MG: 800 TABLET ORAL at 13:27

## 2019-04-10 RX ADMIN — TRAZODONE HYDROCHLORIDE 100 MG: 100 TABLET ORAL at 20:25

## 2019-04-10 RX ADMIN — METAXALONE 800 MG: 800 TABLET ORAL at 08:23

## 2019-04-10 RX ADMIN — ACETAMINOPHEN 975 MG: 325 TABLET, FILM COATED ORAL at 20:26

## 2019-04-10 RX ADMIN — GABAPENTIN 800 MG: 400 CAPSULE ORAL at 08:23

## 2019-04-10 RX ADMIN — ATORVASTATIN CALCIUM 20 MG: 20 TABLET, FILM COATED ORAL at 20:26

## 2019-04-10 RX ADMIN — METAXALONE 800 MG: 800 TABLET ORAL at 20:26

## 2019-04-10 RX ADMIN — GABAPENTIN 800 MG: 400 CAPSULE ORAL at 13:26

## 2019-04-10 RX ADMIN — DIVALPROEX SODIUM 750 MG: 500 TABLET, EXTENDED RELEASE ORAL at 20:25

## 2019-04-10 RX ADMIN — ESCITALOPRAM OXALATE 20 MG: 10 TABLET ORAL at 08:23

## 2019-04-10 RX ADMIN — BUSPIRONE HYDROCHLORIDE 20 MG: 10 TABLET ORAL at 20:26

## 2019-04-10 RX ADMIN — ACETAMINOPHEN 975 MG: 325 TABLET, FILM COATED ORAL at 13:27

## 2019-04-10 RX ADMIN — METFORMIN HYDROCHLORIDE 1000 MG: 1000 TABLET ORAL at 16:52

## 2019-04-10 RX ADMIN — METAXALONE 800 MG: 800 TABLET ORAL at 13:27

## 2019-04-10 RX ADMIN — METFORMIN HYDROCHLORIDE 1000 MG: 1000 TABLET ORAL at 08:23

## 2019-04-10 RX ADMIN — OLANZAPINE 30 MG: 10 TABLET, FILM COATED ORAL at 20:26

## 2019-04-10 RX ADMIN — IBUPROFEN 800 MG: 800 TABLET ORAL at 08:23

## 2019-04-10 RX ADMIN — HYDROXYZINE HYDROCHLORIDE 100 MG: 25 TABLET ORAL at 20:25

## 2019-04-10 RX ADMIN — INSULIN ASPART 2 UNITS: 100 INJECTION, SOLUTION INTRAVENOUS; SUBCUTANEOUS at 12:10

## 2019-04-10 RX ADMIN — GABAPENTIN 800 MG: 400 CAPSULE ORAL at 20:26

## 2019-04-10 RX ADMIN — IBUPROFEN 800 MG: 800 TABLET ORAL at 20:26

## 2019-04-10 RX ADMIN — ACETAMINOPHEN 975 MG: 325 TABLET, FILM COATED ORAL at 08:23

## 2019-04-10 RX ADMIN — BUSPIRONE HYDROCHLORIDE 20 MG: 10 TABLET ORAL at 13:27

## 2019-04-10 RX ADMIN — BUSPIRONE HYDROCHLORIDE 20 MG: 10 TABLET ORAL at 08:23

## 2019-04-10 ASSESSMENT — ACTIVITIES OF DAILY LIVING (ADL)
HYGIENE/GROOMING: INDEPENDENT
ORAL_HYGIENE: INDEPENDENT
DRESS: SCRUBS (BEHAVIORAL HEALTH);INDEPENDENT
ORAL_HYGIENE: INDEPENDENT
DRESS: SCRUBS (BEHAVIORAL HEALTH)
HYGIENE/GROOMING: INDEPENDENT

## 2019-04-10 NOTE — PROGRESS NOTES
Face to face end of shift report received from thuy godoy rn at 2300 report.. Rounding completed. Patient observed.     Ginette Sheffield  4/10/2019  1:27 AM

## 2019-04-10 NOTE — PLAN OF CARE
BEHAVIORAL TEAM DISCUSSION    Participants: Airam Light NP, Dennise Herrera LICSW, Angie Laws LSW,  Becca Chery LSW, Beena Kumar LGSW, Opal Pillai RN, Mady Holman OT, Natalia Costello OT, Gena Gomez OT, Sanjuanita Arndt RN, Olivia Champagne RN  Progress: Minimal   Continued Stay Criteria/Rationale: Rationale: delusional, tangential, unable to discharge to lesser level of care   Medical/Physical: diabetic - monitoring blood sugars, metformin, accuchecks and sliding scale   Precautions:   Behavioral Orders   Procedures    Code 1 - Restrict to Unit    Routine Programming     As clinically indicated    Status 15     Every 15 minutes.     Plan: Stabilize and work on safe discharge plan   Rationale for change in precautions or plan: None     Active Problems:    Schizophrenia (H)      Current Facility-Administered Medications:     acetaminophen (TYLENOL) tablet 975 mg, 975 mg, Oral, TID, Alaspa, Loren L, NP, 975 mg at 04/10/19 0823    atorvastatin (LIPITOR) tablet 20 mg, 20 mg, Oral, At Bedtime, Alaspa, Loren L, NP, 20 mg at 04/09/19 2022    busPIRone (BUSPAR) tablet 20 mg, 20 mg, Oral, TID, Alaspa, Loren L, NP, 20 mg at 04/10/19 0823    glucose gel 15-30 g, 15-30 g, Oral, Q15 Min PRN **OR** dextrose 50 % injection 25-50 mL, 25-50 mL, Intravenous, Q15 Min PRN **OR** glucagon injection 1 mg, 1 mg, Subcutaneous, Q15 Min PRN, Alaspa, Loren L, NP    divalproex sodium extended-release (DEPAKOTE ER) 24 hr tablet 750 mg, 750 mg, Oral, At Bedtime, Alaspa, Loren L, NP, 750 mg at 04/09/19 2021    escitalopram (LEXAPRO) tablet 20 mg, 20 mg, Oral, Daily, Alaspa, Loren L, NP, 20 mg at 04/10/19 0823    gabapentin (NEURONTIN) capsule 800 mg, 800 mg, Oral, TID, Alaspa, Loren L, NP, 800 mg at 04/10/19 0823    hydrOXYzine (ATARAX) tablet 100 mg, 100 mg, Oral, At Bedtime, Loren Zuleta NP, 100 mg at 04/09/19 2022    hydrOXYzine (ATARAX) tablet 50 mg, 50 mg, Oral, TID PRN, Loren Zuleta, NP, 50 mg at 04/08/19  0825    ibuprofen (ADVIL/MOTRIN) tablet 800 mg, 800 mg, Oral, TID, Alaspa, Loren L, NP, 800 mg at 04/10/19 0823    insulin aspart (NovoLOG) inj (RAPID ACTING), 1-7 Units, Subcutaneous, TID AC, Alaspa, Loren L, NP, 1 Units at 04/09/19 1652    insulin aspart (NovoLOG) inj (RAPID ACTING), 1-5 Units, Subcutaneous, At Bedtime, Alaspa, Loren L, NP, 1 Units at 04/07/19 2019    metaxalone (SKELAXIN) tablet 800 mg, 800 mg, Oral, TID, Alaspa, Loren L, NP, 800 mg at 04/10/19 0823    metFORMIN (GLUCOPHAGE) tablet 1,000 mg, 1,000 mg, Oral, BID w/meals, Davi Owens MD, 1,000 mg at 04/10/19 0823    OLANZapine (zyPREXA) tablet 30 mg, 30 mg, Oral, At Bedtime, Alaspa, Loren L, NP, 30 mg at 04/09/19 2022    traZODone (DESYREL) tablet 100 mg, 100 mg, Oral, At Bedtime, Alaspa, Loren L, NP, 100 mg at 04/09/19 2022
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
## 2019-04-10 NOTE — PLAN OF CARE
Face to face end of shift report received from Edgar MARIA RN. Rounding completed. Patient observed.     Luz Amaya  4/10/2019  4:16 PM

## 2019-04-10 NOTE — PLAN OF CARE
Problem: Adult Behavioral Health Plan of Care  Goal: Patient-Specific Goal (Individualization)  Description  Patient will attend at least 50% of groups while on unit.   Patient will report at least 6-8 hours of sleep at night.  Patient will be compliant with treatment team recommendations.    4/10/2019 0126 by Ginette Sheffield, RN  Note:   0115 in bed with easy respirations, presenting sleeping. 0500 patient continues to sleep at this time.     Problem: Suicide Risk  Goal: Absence of Self-Harm  Description  Patient will contract for safety if having thoughts of self harm while on unit.   Patient will report absence of suicide ideation prior to discharge.      4/10/2019 0126 by Ginette Sheffield, RN  Note:   0115 in bed with easy respirations, presenting sleeping.

## 2019-04-11 LAB
GLUCOSE BLDC GLUCOMTR-MCNC: 151 MG/DL (ref 70–99)
GLUCOSE BLDC GLUCOMTR-MCNC: 204 MG/DL (ref 70–99)
GLUCOSE BLDC GLUCOMTR-MCNC: 87 MG/DL (ref 70–99)
GLUCOSE BLDC GLUCOMTR-MCNC: 91 MG/DL (ref 70–99)

## 2019-04-11 PROCEDURE — 25000132 ZZH RX MED GY IP 250 OP 250 PS 637: Performed by: NURSE PRACTITIONER

## 2019-04-11 PROCEDURE — 12400000 ZZH R&B MH

## 2019-04-11 PROCEDURE — 25000125 ZZHC RX 250: Performed by: INTERNAL MEDICINE

## 2019-04-11 PROCEDURE — 00000146 ZZHCL STATISTIC GLUCOSE BY METER IP

## 2019-04-11 PROCEDURE — 99233 SBSQ HOSP IP/OBS HIGH 50: CPT | Performed by: NURSE PRACTITIONER

## 2019-04-11 RX ORDER — DIVALPROEX SODIUM 500 MG/1
1000 TABLET, EXTENDED RELEASE ORAL AT BEDTIME
Status: DISCONTINUED | OUTPATIENT
Start: 2019-04-11 | End: 2019-04-14

## 2019-04-11 RX ORDER — CLONAZEPAM 0.5 MG/1
0.5 TABLET ORAL 2 TIMES DAILY
Status: COMPLETED | OUTPATIENT
Start: 2019-04-13 | End: 2019-04-14

## 2019-04-11 RX ORDER — CLONAZEPAM 1 MG/1
1 TABLET ORAL 2 TIMES DAILY
Status: COMPLETED | OUTPATIENT
Start: 2019-04-11 | End: 2019-04-12

## 2019-04-11 RX ADMIN — CLONAZEPAM 1 MG: 1 TABLET ORAL at 11:09

## 2019-04-11 RX ADMIN — GABAPENTIN 800 MG: 400 CAPSULE ORAL at 20:19

## 2019-04-11 RX ADMIN — IBUPROFEN 800 MG: 800 TABLET ORAL at 08:15

## 2019-04-11 RX ADMIN — HYDROXYZINE HYDROCHLORIDE 100 MG: 25 TABLET ORAL at 20:19

## 2019-04-11 RX ADMIN — METAXALONE 800 MG: 800 TABLET ORAL at 08:15

## 2019-04-11 RX ADMIN — BUSPIRONE HYDROCHLORIDE 20 MG: 10 TABLET ORAL at 13:56

## 2019-04-11 RX ADMIN — ACETAMINOPHEN 975 MG: 325 TABLET, FILM COATED ORAL at 20:19

## 2019-04-11 RX ADMIN — METFORMIN HYDROCHLORIDE 1000 MG: 1000 TABLET ORAL at 08:15

## 2019-04-11 RX ADMIN — GABAPENTIN 800 MG: 400 CAPSULE ORAL at 08:14

## 2019-04-11 RX ADMIN — ESCITALOPRAM OXALATE 20 MG: 10 TABLET ORAL at 08:14

## 2019-04-11 RX ADMIN — CLONAZEPAM 1 MG: 1 TABLET ORAL at 20:20

## 2019-04-11 RX ADMIN — IBUPROFEN 800 MG: 800 TABLET ORAL at 13:56

## 2019-04-11 RX ADMIN — METAXALONE 800 MG: 800 TABLET ORAL at 13:56

## 2019-04-11 RX ADMIN — GABAPENTIN 800 MG: 400 CAPSULE ORAL at 13:56

## 2019-04-11 RX ADMIN — BUSPIRONE HYDROCHLORIDE 20 MG: 10 TABLET ORAL at 08:14

## 2019-04-11 RX ADMIN — ACETAMINOPHEN 975 MG: 325 TABLET, FILM COATED ORAL at 13:56

## 2019-04-11 RX ADMIN — BUSPIRONE HYDROCHLORIDE 20 MG: 10 TABLET ORAL at 20:19

## 2019-04-11 RX ADMIN — ATORVASTATIN CALCIUM 20 MG: 20 TABLET, FILM COATED ORAL at 20:20

## 2019-04-11 RX ADMIN — OLANZAPINE 30 MG: 10 TABLET, FILM COATED ORAL at 20:19

## 2019-04-11 RX ADMIN — IBUPROFEN 800 MG: 800 TABLET ORAL at 20:20

## 2019-04-11 RX ADMIN — METAXALONE 800 MG: 800 TABLET ORAL at 20:20

## 2019-04-11 RX ADMIN — METFORMIN HYDROCHLORIDE 1000 MG: 1000 TABLET ORAL at 16:55

## 2019-04-11 RX ADMIN — INSULIN ASPART 2 UNITS: 100 INJECTION, SOLUTION INTRAVENOUS; SUBCUTANEOUS at 12:01

## 2019-04-11 RX ADMIN — ACETAMINOPHEN 975 MG: 325 TABLET, FILM COATED ORAL at 08:15

## 2019-04-11 RX ADMIN — DIVALPROEX SODIUM 1000 MG: 500 TABLET, EXTENDED RELEASE ORAL at 20:19

## 2019-04-11 ASSESSMENT — ACTIVITIES OF DAILY LIVING (ADL)
DRESS: SCRUBS (BEHAVIORAL HEALTH);INDEPENDENT
ORAL_HYGIENE: INDEPENDENT
DRESS: INDEPENDENT
HYGIENE/GROOMING: INDEPENDENT
HYGIENE/GROOMING: INDEPENDENT
ORAL_HYGIENE: INDEPENDENT

## 2019-04-11 NOTE — PLAN OF CARE
Problem: Adult Behavioral Health Plan of Care  Goal: Patient-Specific Goal (Individualization)  Description  Patient will attend at least 50% of groups while on unit.   Patient will report at least 6-8 hours of sleep at night.  Patient will be compliant with treatment team recommendations.    4/10/2019 1951 by Luz Amaya, RN  Outcome: Improving     Problem: Suicide Risk  Goal: Absence of Self-Harm  Description  Patient will contract for safety if having thoughts of self harm while on unit.   Patient will report absence of suicide ideation prior to discharge.      4/10/2019 1951 by Luz Amaya, RN  Outcome: Improving   Patient is up for supper meal. Patient states that her daughter is very popular. Denies having any thoughts of self harm, denies depression and current auditory and visual hallucinations but states that she does have some sometimes. Patient is cooperative with cares. Signed voluntary rights. Patient states she felt tired after dinner but able to participate in assessment. She has rambling speech that is difficult to understand. Complains of body pain.   2100 Patient states she felt her blood sugar was very low though it was just checked and was at 132. Reassessed at 104 and gave patient a small snack.

## 2019-04-11 NOTE — PLAN OF CARE
"  Problem: Adult Behavioral Health Plan of Care  Goal: Patient-Specific Goal (Individualization)  Description  Patient will attend at least 50% of groups while on unit.   Patient will report at least 6-8 hours of sleep at night.  Patient will be compliant with treatment team recommendations.    4/11/2019 1305 by Edgar Escalante RN  Outcome: No Change  Pt has been up on the unit most of the shift. Remains withdrawn and appears as if responding to internal stimuli. Minimal interaction with peers. Did attend groups this morning. Continued c/o \"all over\" pain. Scheduled Tylenol and Ibuprofen given this morning with breakfast meal. Ate 100% of both meals this shift. Blood sugar 204 mg/dl before lunch. Remains preoccupied. Reported that she slept \"okay.\" Charting indicates that pt slept approximately 5.5 hours last night. Remains compliant with treatment plan.  Problem: Suicide Risk  Goal: Absence of Self-Harm  Description  Patient will contract for safety if having thoughts of self harm while on unit.   Patient will report absence of suicide ideation prior to discharge.      4/11/2019 1305 by Edgar Escalante RN  Outcome: No Change   Denied SI. Has remained free from self-harm and/or injury this shift.     "

## 2019-04-11 NOTE — PROGRESS NOTES
"[facility]  Psychiatric Progress Note      Impression:   This is a 39 year old female with a significant history of schizoaffective disorder.     Ginette appears to have some improvement in comparison to what records from the past couple of days indicate.  Yesterday she was speaking with a gangster like accent and demeanor and today she is not.  Staff also tell me that she appears to be a bit calmer today and she reports she slept 6 hours yesterday.  She definitely has grandiose delusions and tells me that when she gave birth to her daughter here her daughter made a front page of the people [employer].  She is on Lexapro 20 mg and Depakote is currently only at 750.  I discussed stopping her Lexapro and possibly re-adding it when her Depakote level is therapeutic and she agrees to this.  I also discussed discontinuing trazodone due to increased risk of wendy.  She is quite pressured at times.  When asked if she is having hallucinations she tells me \"they are not hallucinations their past memories\" she does tell me \"I am voluntarily here this time and I do not want to go back anywhere in [facility]  I then asked her if she would consider an IRTS program if it was a possibility with her insurance and she said she would consider this though states \"I would like to get better on my medications here first\". Blood sugars are being monitored and are in control.       Educated regarding medication indications, risks, benefits, side effects, contraindications and possible interactions. Verbally expressed understanding.        DIagnoses:     Schizoaffective disorder  Marijuana use disorder-in remission  Diabetes type 2    Attestation:  Patient has been seen and evaluated by me,  Airam Light NP          Interim History:   The patient's care was discussed with the treatment team and chart notes were reviewed.          Medications:     Current " "Facility-Administered Medications Ordered in Epic   Medication Dose Route Frequency Last Rate Last Dose     acetaminophen (TYLENOL) tablet 975 mg  975 mg Oral TID   975 mg at 04/11/19 0815     atorvastatin (LIPITOR) tablet 20 mg  20 mg Oral At Bedtime   20 mg at 04/10/19 2026     busPIRone (BUSPAR) tablet 20 mg  20 mg Oral TID   20 mg at 04/11/19 0814     [START ON 4/13/2019] clonazePAM (klonoPIN) tablet 0.5 mg  0.5 mg Oral BID         clonazePAM (klonoPIN) tablet 1 mg  1 mg Oral BID         glucose gel 15-30 g  15-30 g Oral Q15 Min PRN        Or     dextrose 50 % injection 25-50 mL  25-50 mL Intravenous Q15 Min PRN        Or     glucagon injection 1 mg  1 mg Subcutaneous Q15 Min PRN         divalproex sodium extended-release (DEPAKOTE ER) 24 hr tablet 1,000 mg  1,000 mg Oral At Bedtime         gabapentin (NEURONTIN) capsule 800 mg  800 mg Oral TID   800 mg at 04/11/19 0814     hydrOXYzine (ATARAX) tablet 100 mg  100 mg Oral At Bedtime   100 mg at 04/10/19 2025     hydrOXYzine (ATARAX) tablet 50 mg  50 mg Oral TID PRN   50 mg at 04/08/19 0825     ibuprofen (ADVIL/MOTRIN) tablet 800 mg  800 mg Oral TID   800 mg at 04/11/19 0815     insulin aspart (NovoLOG) inj (RAPID ACTING)  1-7 Units Subcutaneous TID AC   2 Units at 04/10/19 1210     insulin aspart (NovoLOG) inj (RAPID ACTING)  1-5 Units Subcutaneous At Bedtime   1 Units at 04/07/19 2019     metaxalone (SKELAXIN) tablet 800 mg  800 mg Oral TID   800 mg at 04/11/19 0815     metFORMIN (GLUCOPHAGE) tablet 1,000 mg  1,000 mg Oral BID w/meals   1,000 mg at 04/11/19 0815     OLANZapine (zyPREXA) tablet 30 mg  30 mg Oral At Bedtime   30 mg at 04/10/19 2026     No current [facility]-ordered outpatient medications on file.              10 point ROS negative C/O back pain ad neuropathic pain.        Allergies:   No Known Allergies         Psychiatric Examination:   /85   Pulse 81   Temp 97.1  F (36.2  C) (Tympanic)   Resp 16   Ht 1.626 m (5' 4\")   Wt 73 kg (161 lb)   " SpO2 94%   BMI 27.64 kg/m    Weight is 161 lbs 0 oz  Body mass index is 27.64 kg/m .    Appearance:  awake, alert, adequately groomed and dressed in hospital scrubs  Attitude:  cooperative  Eye Contact:  good  Mood:  anxious  Affect:  mood congruent  Speech:  pressured speech and rambling  Psychomotor Behavior:  no evidence of tardive dyskinesia, dystonia, or tics and intact station, gait and muscle tone  Thought Process:  disorganized and tangental  Associations:  loosening of associations present  Thought Content:  no evidence of suicidal ideation or homicidal ideation and visual hallucinations present  Insight:  fair  Judgment:  fair  Oriented to:  time, person, and place  Attention Span and Concentration:  fair  Recent and Remote Memory:  fair  Fund of Knowledge: low-normal  Muscle Strength and Tone: normal  Gait and Station: Normal           Labs:     Results for orders placed or performed during the hospital encounter of 04/06/19   Glucose by meter   Result Value Ref Range    Glucose 305 (H) 70 - 99 mg/dL   Glucose by meter   Result Value Ref Range    Glucose 341 (H) 70 - 99 mg/dL   Glucose by meter   Result Value Ref Range    Glucose 193 (H) 70 - 99 mg/dL   Glucose by meter   Result Value Ref Range    Glucose 168 (H) 70 - 99 mg/dL   Glucose by meter   Result Value Ref Range    Glucose 249 (H) 70 - 99 mg/dL   Glucose by meter   Result Value Ref Range    Glucose 227 (H) 70 - 99 mg/dL   Glucose by meter   Result Value Ref Range    Glucose 242 (H) 70 - 99 mg/dL   Glucose by meter   Result Value Ref Range    Glucose 125 (H) 70 - 99 mg/dL   Glucose by meter   Result Value Ref Range    Glucose 105 (H) 70 - 99 mg/dL   Glucose by meter   Result Value Ref Range    Glucose 255 (H) 70 - 99 mg/dL   Glucose by meter   Result Value Ref Range    Glucose 169 (H) 70 - 99 mg/dL   Glucose by meter   Result Value Ref Range    Glucose 195 (H) 70 - 99 mg/dL   Glucose by meter   Result Value Ref Range    Glucose 121 (H) 70 - 99  mg/dL   Glucose by meter   Result Value Ref Range    Glucose 84 70 - 99 mg/dL   Glucose by meter   Result Value Ref Range    Glucose 130 (H) 70 - 99 mg/dL   Glucose by meter   Result Value Ref Range    Glucose 161 (H) 70 - 99 mg/dL   Glucose by meter   Result Value Ref Range    Glucose 181 (H) 70 - 99 mg/dL   Glucose by meter   Result Value Ref Range    Glucose 181 (H) 70 - 99 mg/dL   Glucose by meter   Result Value Ref Range    Glucose 195 (H) 70 - 99 mg/dL   Glucose by meter   Result Value Ref Range    Glucose 100 (H) 70 - 99 mg/dL   Glucose by meter   Result Value Ref Range    Glucose 202 (H) 70 - 99 mg/dL   Glucose by meter   Result Value Ref Range    Glucose 114 (H) 70 - 99 mg/dL   Glucose by meter   Result Value Ref Range    Glucose 132 (H) 70 - 99 mg/dL   Glucose by meter   Result Value Ref Range    Glucose 104 (H) 70 - 99 mg/dL   Glucose by meter   Result Value Ref Range    Glucose 91 70 - 99 mg/dL              Plan:   Discontinue lexapro until depakote level is therapeutico-due to wendy.  Was on 2000 mg in past   Discontinue trazadone-due to wendy.  .  Start klonopin 1 mg bid for 2 days then 0.5 mg bid for two days then stop.   Increase depakote.   Records indicate labs were unremarkable. Though will review lfts in paper chart.       ELOS: >5 days for stabilization and safe discharge planning

## 2019-04-11 NOTE — PROGRESS NOTES
Face to face end of shift report received from july castro rn at 2300 report.. Rounding completed. Patient observed.     Ginette Sheffield  4/11/2019  12:55 AM

## 2019-04-11 NOTE — PLAN OF CARE
Problem: Adult Behavioral Health Plan of Care  Goal: Patient-Specific Goal (Individualization)  Description  Patient will attend at least 50% of groups while on unit.   Patient will report at least 6-8 hours of sleep at night.  Patient will be compliant with treatment team recommendations.    4/11/2019 0054 by Ginette Sheffield, RN  Note:   0030 in bed with easy respirations, presenting sleeping. 0500 awake in bed since about 4 with  no complaints.     Problem: Suicide Risk  Goal: Absence of Self-Harm  Description  Patient will contract for safety if having thoughts of self harm while on unit.   Patient will report absence of suicide ideation prior to discharge.      4/11/2019 0054 by Ginette Sheffield, RN  Note:   0030 in bed with easy respirations, presenting sleeping.

## 2019-04-11 NOTE — PLAN OF CARE
Problem: Adult Behavioral Health Plan of Care  Goal: Patient-Specific Goal (Individualization)  Description  Patient will attend at least 50% of groups while on unit.   Patient will report at least 6-8 hours of sleep at night.  Patient will be compliant with treatment team recommendations.    4/11/2019 1759 by Radha Bills, RN  Outcome: Improving   Patient was up on the unit at the start of this shift.  Patient is cooperative with nursing assessment.  Patient states she is aware of medication changes and is feeling better.  Patient is grandiose and stating that she is famous from when she had her baby here in 2008.  Patient states that she was in [employer] [place] at that time.  Patient attending some groups; denies any issues with sleep and states her pain is well controlled.  BG 87 before evening meal.  Patient states she may want to go to CD treatment as she had been doing meth and heroin.  Patient can make her needs known to staff.    2030:  Patient awakened for HS snack and meds.  Reviewed ll medications with patient during administration.    Problem: Suicide Risk  Goal: Absence of Self-Harm  Description  Patient will contract for safety if having thoughts of self harm while on unit.   Patient will report absence of suicide ideation prior to discharge.      4/11/2019 1759 by Radha Bills, RN  Outcome: Improving   Denies suicidal ideation; no self injury.

## 2019-04-11 NOTE — PLAN OF CARE
Face to face end of shift report received from PATTY Real RN. Rounding completed. Patient observed, up on the unit.     Edgar Escalante  4/11/2019  7:41 AM
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
## 2019-04-11 NOTE — PLAN OF CARE
Face to face end of shift report received from Bianka MARIA RN. Rounding completed. Patient observed.     Radha Bills  4/11/2019  3:58 PM

## 2019-04-12 LAB
GLUCOSE BLDC GLUCOMTR-MCNC: 126 MG/DL (ref 70–99)
GLUCOSE BLDC GLUCOMTR-MCNC: 137 MG/DL (ref 70–99)
GLUCOSE BLDC GLUCOMTR-MCNC: 180 MG/DL (ref 70–99)
GLUCOSE BLDC GLUCOMTR-MCNC: 98 MG/DL (ref 70–99)

## 2019-04-12 PROCEDURE — 25000132 ZZH RX MED GY IP 250 OP 250 PS 637: Performed by: NURSE PRACTITIONER

## 2019-04-12 PROCEDURE — 12400000 ZZH R&B MH

## 2019-04-12 PROCEDURE — 25000125 ZZHC RX 250: Performed by: INTERNAL MEDICINE

## 2019-04-12 PROCEDURE — 99233 SBSQ HOSP IP/OBS HIGH 50: CPT | Performed by: NURSE PRACTITIONER

## 2019-04-12 PROCEDURE — 00000146 ZZHCL STATISTIC GLUCOSE BY METER IP

## 2019-04-12 RX ORDER — GABAPENTIN 300 MG/1
900 CAPSULE ORAL 3 TIMES DAILY
Status: DISCONTINUED | OUTPATIENT
Start: 2019-04-12 | End: 2019-04-14

## 2019-04-12 RX ORDER — PRAZOSIN HYDROCHLORIDE 1 MG/1
1 CAPSULE ORAL AT BEDTIME
Status: DISCONTINUED | OUTPATIENT
Start: 2019-04-12 | End: 2019-05-02 | Stop reason: HOSPADM

## 2019-04-12 RX ADMIN — GABAPENTIN 800 MG: 400 CAPSULE ORAL at 08:35

## 2019-04-12 RX ADMIN — BUSPIRONE HYDROCHLORIDE 20 MG: 10 TABLET ORAL at 13:53

## 2019-04-12 RX ADMIN — OLANZAPINE 30 MG: 10 TABLET, FILM COATED ORAL at 20:52

## 2019-04-12 RX ADMIN — METFORMIN HYDROCHLORIDE 1000 MG: 1000 TABLET ORAL at 16:53

## 2019-04-12 RX ADMIN — METAXALONE 800 MG: 800 TABLET ORAL at 13:54

## 2019-04-12 RX ADMIN — METAXALONE 800 MG: 800 TABLET ORAL at 20:51

## 2019-04-12 RX ADMIN — IBUPROFEN 800 MG: 800 TABLET ORAL at 08:01

## 2019-04-12 RX ADMIN — HYDROXYZINE HYDROCHLORIDE 100 MG: 25 TABLET ORAL at 20:50

## 2019-04-12 RX ADMIN — INSULIN ASPART 1 UNITS: 100 INJECTION, SOLUTION INTRAVENOUS; SUBCUTANEOUS at 11:39

## 2019-04-12 RX ADMIN — IBUPROFEN 800 MG: 800 TABLET ORAL at 13:54

## 2019-04-12 RX ADMIN — GABAPENTIN 900 MG: 300 CAPSULE ORAL at 20:52

## 2019-04-12 RX ADMIN — PRAZOSIN HYDROCHLORIDE 1 MG: 1 CAPSULE ORAL at 20:53

## 2019-04-12 RX ADMIN — METAXALONE 800 MG: 800 TABLET ORAL at 08:36

## 2019-04-12 RX ADMIN — BUSPIRONE HYDROCHLORIDE 20 MG: 10 TABLET ORAL at 08:35

## 2019-04-12 RX ADMIN — ACETAMINOPHEN 975 MG: 325 TABLET, FILM COATED ORAL at 13:54

## 2019-04-12 RX ADMIN — ACETAMINOPHEN 975 MG: 325 TABLET, FILM COATED ORAL at 08:36

## 2019-04-12 RX ADMIN — CLONAZEPAM 1 MG: 1 TABLET ORAL at 20:52

## 2019-04-12 RX ADMIN — CLONAZEPAM 1 MG: 1 TABLET ORAL at 08:02

## 2019-04-12 RX ADMIN — GABAPENTIN 900 MG: 300 CAPSULE ORAL at 13:54

## 2019-04-12 RX ADMIN — ATORVASTATIN CALCIUM 20 MG: 20 TABLET, FILM COATED ORAL at 20:52

## 2019-04-12 RX ADMIN — BUSPIRONE HYDROCHLORIDE 20 MG: 10 TABLET ORAL at 20:51

## 2019-04-12 RX ADMIN — DIVALPROEX SODIUM 1000 MG: 500 TABLET, EXTENDED RELEASE ORAL at 20:51

## 2019-04-12 RX ADMIN — IBUPROFEN 800 MG: 800 TABLET ORAL at 20:51

## 2019-04-12 RX ADMIN — METFORMIN HYDROCHLORIDE 1000 MG: 1000 TABLET ORAL at 08:01

## 2019-04-12 RX ADMIN — ACETAMINOPHEN 975 MG: 325 TABLET, FILM COATED ORAL at 20:50

## 2019-04-12 ASSESSMENT — ACTIVITIES OF DAILY LIVING (ADL)
ORAL_HYGIENE: INDEPENDENT
HYGIENE/GROOMING: INDEPENDENT
HYGIENE/GROOMING: INDEPENDENT
LAUNDRY: UNABLE TO COMPLETE
DRESS: SCRUBS (BEHAVIORAL HEALTH)
DRESS: SCRUBS (BEHAVIORAL HEALTH);INDEPENDENT
ORAL_HYGIENE: INDEPENDENT
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
## 2019-04-12 NOTE — PLAN OF CARE
Face to face end of shift report received from Shital BOWLES RN. Rounding completed. Patient observed.     Luz Amaya  4/12/2019  4:18 PM

## 2019-04-12 NOTE — PLAN OF CARE
Problem: Adult Behavioral Health Plan of Care  Goal: Patient-Specific Goal (Individualization)  Description  Patient will attend at least 50% of groups while on unit.   Patient will report at least 6-8 hours of sleep at night.  Patient will be compliant with treatment team recommendations.    4/12/2019 0051 by Ginette Sheffield, RN  Note:   0030 I n bed with easy respirations, presenting sleeping. 0551 patient continues to sleep at this time.     Problem: Suicide Risk  Goal: Absence of Self-Harm  Description  Patient will contract for safety if having thoughts of self harm while on unit.   Patient will report absence of suicide ideation prior to discharge.      4/12/2019 0051 by Ginette Sheffield, RN  Note:   0030 in bed with easy respirations, presenting sleeping.

## 2019-04-12 NOTE — PROGRESS NOTES
Face to face end of shift report received from thuy godoy rn at 2300 report.. Rounding completed. Patient observed.     Ginette Sheffield  4/12/2019  12:53 AM

## 2019-04-12 NOTE — PROGRESS NOTES
"[facility]  Psychiatric Progress Note      Impression:   This is a 39 year old female with a significant history of schizoaffective disorder.     She is about the same appearing as yesterday. She is organized at times and appears to have improvement in her insight to her mental health. She states she needs to go to a treatment program then an IRTS program though states \"I can not go until I am stable on my medications\". We discussed changes we are doing and is aware klonopin is not meant long term. She is sexually preoccupied at times talking about how many men are wanting her and she states \"I had to make a blowup doll of myself for them\"      Educated regarding medication indications, risks, benefits, side effects, contraindications and possible interactions. Verbally expressed understanding.        DIagnoses:     Schizoaffective disorder  Marijuana use disorder-in remission  Diabetes type 2    Attestation:  Patient has been seen and evaluated by me,  April Keturah Light NP          Interim History:   The patient's care was discussed with the treatment team and chart notes were reviewed.          Medications:     Current Facility-Administered Medications Ordered in Epic   Medication Dose Route Frequency Last Rate Last Dose     acetaminophen (TYLENOL) tablet 975 mg  975 mg Oral TID   975 mg at 04/12/19 0836     atorvastatin (LIPITOR) tablet 20 mg  20 mg Oral At Bedtime   20 mg at 04/11/19 2020     busPIRone (BUSPAR) tablet 20 mg  20 mg Oral TID   20 mg at 04/12/19 0835     [START ON 4/13/2019] clonazePAM (klonoPIN) tablet 0.5 mg  0.5 mg Oral BID         clonazePAM (klonoPIN) tablet 1 mg  1 mg Oral BID   1 mg at 04/12/19 0802     glucose gel 15-30 g  15-30 g Oral Q15 Min PRN        Or     dextrose 50 % injection 25-50 mL  25-50 mL Intravenous Q15 Min PRN        Or     glucagon injection 1 mg  1 mg Subcutaneous Q15 Min PRN         divalproex sodium extended-release (DEPAKOTE ER) 24 hr tablet 1,000 " "mg  1,000 mg Oral At Bedtime   1,000 mg at 04/11/19 2019     gabapentin (NEURONTIN) capsule 800 mg  800 mg Oral TID   800 mg at 04/12/19 0835     hydrOXYzine (ATARAX) tablet 100 mg  100 mg Oral At Bedtime   100 mg at 04/11/19 2019     hydrOXYzine (ATARAX) tablet 50 mg  50 mg Oral TID PRN   50 mg at 04/08/19 0825     ibuprofen (ADVIL/MOTRIN) tablet 800 mg  800 mg Oral TID   800 mg at 04/12/19 0801     insulin aspart (NovoLOG) inj (RAPID ACTING)  1-7 Units Subcutaneous TID AC   1 Units at 04/12/19 1139     insulin aspart (NovoLOG) inj (RAPID ACTING)  1-5 Units Subcutaneous At Bedtime   1 Units at 04/07/19 2019     metaxalone (SKELAXIN) tablet 800 mg  800 mg Oral TID   800 mg at 04/12/19 0836     metFORMIN (GLUCOPHAGE) tablet 1,000 mg  1,000 mg Oral BID w/meals   1,000 mg at 04/12/19 0801     OLANZapine (zyPREXA) tablet 30 mg  30 mg Oral At Bedtime   30 mg at 04/11/19 2019     No current Epic-ordered outpatient medications on file.              10 point ROS negative C/O back pain ad neuropathic pain.        Allergies:   No Known Allergies         Psychiatric Examination:   /85   Pulse 77   Temp 97.4  F (36.3  C) (Tympanic)   Resp 14   Ht 1.626 m (5' 4\")   Wt 73 kg (161 lb)   SpO2 98%   BMI 27.64 kg/m    Weight is 161 lbs 0 oz  Body mass index is 27.64 kg/m .    Appearance:  awake, alert, adequately groomed and dressed in hospital scrubs  Attitude:  cooperative  Eye Contact:  good  Mood:  anxious  Affect:  mood congruent  Speech:  pressured speech and rambling  Psychomotor Behavior:  no evidence of tardive dyskinesia, dystonia, or tics and intact station, gait and muscle tone  Thought Process:  disorganized and tangental  Associations:  loosening of associations present  Thought Content:  no evidence of suicidal ideation or homicidal ideation and visual hallucinations present  Insight:  fair  Judgment:  fair  Oriented to:  time, person, and place  Attention Span and Concentration:  fair  Recent and Remote " Memory:  fair  Fund of Knowledge: low-normal  Muscle Strength and Tone: normal  Gait and Station: Normal           Labs:     Results for orders placed or performed during the hospital encounter of 04/06/19   Glucose by meter   Result Value Ref Range    Glucose 305 (H) 70 - 99 mg/dL   Glucose by meter   Result Value Ref Range    Glucose 341 (H) 70 - 99 mg/dL   Glucose by meter   Result Value Ref Range    Glucose 193 (H) 70 - 99 mg/dL   Glucose by meter   Result Value Ref Range    Glucose 168 (H) 70 - 99 mg/dL   Glucose by meter   Result Value Ref Range    Glucose 249 (H) 70 - 99 mg/dL   Glucose by meter   Result Value Ref Range    Glucose 227 (H) 70 - 99 mg/dL   Glucose by meter   Result Value Ref Range    Glucose 242 (H) 70 - 99 mg/dL   Glucose by meter   Result Value Ref Range    Glucose 125 (H) 70 - 99 mg/dL   Glucose by meter   Result Value Ref Range    Glucose 105 (H) 70 - 99 mg/dL   Glucose by meter   Result Value Ref Range    Glucose 255 (H) 70 - 99 mg/dL   Glucose by meter   Result Value Ref Range    Glucose 169 (H) 70 - 99 mg/dL   Glucose by meter   Result Value Ref Range    Glucose 195 (H) 70 - 99 mg/dL   Glucose by meter   Result Value Ref Range    Glucose 121 (H) 70 - 99 mg/dL   Glucose by meter   Result Value Ref Range    Glucose 84 70 - 99 mg/dL   Glucose by meter   Result Value Ref Range    Glucose 130 (H) 70 - 99 mg/dL   Glucose by meter   Result Value Ref Range    Glucose 161 (H) 70 - 99 mg/dL   Glucose by meter   Result Value Ref Range    Glucose 181 (H) 70 - 99 mg/dL   Glucose by meter   Result Value Ref Range    Glucose 181 (H) 70 - 99 mg/dL   Glucose by meter   Result Value Ref Range    Glucose 195 (H) 70 - 99 mg/dL   Glucose by meter   Result Value Ref Range    Glucose 100 (H) 70 - 99 mg/dL   Glucose by meter   Result Value Ref Range    Glucose 202 (H) 70 - 99 mg/dL   Glucose by meter   Result Value Ref Range    Glucose 114 (H) 70 - 99 mg/dL   Glucose by meter   Result Value Ref Range    Glucose  132 (H) 70 - 99 mg/dL   Glucose by meter   Result Value Ref Range    Glucose 104 (H) 70 - 99 mg/dL   Glucose by meter   Result Value Ref Range    Glucose 91 70 - 99 mg/dL   Glucose by meter   Result Value Ref Range    Glucose 204 (H) 70 - 99 mg/dL   Glucose by meter   Result Value Ref Range    Glucose 87 70 - 99 mg/dL   Glucose by meter   Result Value Ref Range    Glucose 151 (H) 70 - 99 mg/dL   Glucose by meter   Result Value Ref Range    Glucose 98 70 - 99 mg/dL   Glucose by meter   Result Value Ref Range    Glucose 180 (H) 70 - 99 mg/dL              Plan:   She requested minipress for nightmares.   Will start minipress 1 mg hs  Will increase gabapentin to 900 mg tid.       ELOS: >5 days for stabilization and safe discharge planning
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
## 2019-04-12 NOTE — PLAN OF CARE
BEHAVIORAL TEAM DISCUSSION    Participants: Airam Light NP, [facility] Herrera [facility], [facility], Viridiana Foss RN, Ayah Matos RN, Evaristo Erazo RN,   Mady Holman OT  Progress: Poor  Continued Stay Criteria/Rationale: Unsafe to discharge to a lower level of care at this [place], delusional, psychotic, grandiose, rapid speech  Medical/Physical: diabetic - monitoring blood sugars, metformin, accuchecks and sliding scale   Precautions:   Behavioral Orders   Procedures    Code 1 - Restrict to Unit    Routine Programming     As clinically indicated    Status 15     Every 15 minutes.     Plan: Continue to stabilize and work on safe discharge plan   Rationale for change in precautions or plan: NONE    Active Problems:    Schizophrenia (H)       Current Facility-Administered Medications:     acetaminophen (TYLENOL) tablet 975 mg, 975 mg, Oral, TID, Loren Zuleta NP, 975 mg at 04/12/19 1354    atorvastatin (LIPITOR) tablet 20 mg, 20 mg, Oral, At Bedtime, Loren Zuleta NP, 20 mg at 04/11/19 2020    busPIRone (BUSPAR) tablet 20 mg, 20 mg, Oral, TID, Loren Zuleta NP, 20 mg at 04/12/19 1353    [START ON 4/13/2019] clonazePAM (klonoPIN) tablet 0.5 mg, 0.5 mg, Oral, BID, Airam Light NP    clonazePAM (klonoPIN) tablet 1 mg, 1 mg, Oral, BID, Airam Light NP, 1 mg at 04/12/19 0802    glucose gel 15-30 g, 15-30 g, Oral, Q15 Min PRN **OR** dextrose 50 % injection 25-50 mL, 25-50 mL, Intravenous, Q15 Min PRN **OR** glucagon injection 1 mg, 1 mg, Subcutaneous, Q15 Min PRN, Loren Zuleat NP    divalproex sodium extended-release (DEPAKOTE ER) 24 hr tablet 1,000 mg, 1,000 mg, Oral, At Bedtime, Airam Light NP, 1,000 mg at 04/11/19 2019    gabapentin (NEURONTIN) capsule 900 mg, 900 mg, Oral, TID, Airam Light NP, 900 mg at 04/12/19 1354    hydrOXYzine (ATARAX) tablet 100 mg, 100 mg, Oral, At Bedtime, Loren Zuleta NP, 100 mg at 04/11/19 2019    hydrOXYzine  (ATARAX) tablet 50 mg, 50 mg, Oral, TID PRN, Alaspa, Loren L, NP, 50 mg at 04/08/19 0825    ibuprofen (ADVIL/MOTRIN) tablet 800 mg, 800 mg, Oral, TID, Alaspa, Loren L, NP, 800 mg at 04/12/19 1354    insulin aspart (NovoLOG) inj (RAPID ACTING), 1-7 Units, Subcutaneous, TID AC, Alaspa, Loren L, NP, 1 Units at 04/12/19 1139    insulin aspart (NovoLOG) inj (RAPID ACTING), 1-5 Units, Subcutaneous, At Bedtime, Alaspa, Loren L, NP, 1 Units at 04/07/19 2019    metaxalone (SKELAXIN) tablet 800 mg, 800 mg, Oral, TID, Alaspa, Loren L, NP, 800 mg at 04/12/19 1354    metFORMIN (GLUCOPHAGE) tablet 1,000 mg, 1,000 mg, Oral, BID w/meals, Davi Owens MD, 1,000 mg at 04/12/19 0801    OLANZapine (zyPREXA) tablet 30 mg, 30 mg, Oral, At Bedtime, Alaspa, Loren L, NP, 30 mg at 04/11/19 2019    prazosin (MINIPRESS) capsule 1 mg, 1 mg, Oral, At Bedtime, Airam Light NP

## 2019-04-12 NOTE — PLAN OF CARE
Face to face end of shift report received from Genie MILLS RN. Rounding completed. Patient observed in bed with eyes closed.     Julia Franco  4/12/2019  7:49 AM

## 2019-04-13 LAB
GLUCOSE BLDC GLUCOMTR-MCNC: 108 MG/DL (ref 70–99)
GLUCOSE BLDC GLUCOMTR-MCNC: 115 MG/DL (ref 70–99)
GLUCOSE BLDC GLUCOMTR-MCNC: 123 MG/DL (ref 70–99)
GLUCOSE BLDC GLUCOMTR-MCNC: 189 MG/DL (ref 70–99)
GLUCOSE BLDC GLUCOMTR-MCNC: 97 MG/DL (ref 70–99)

## 2019-04-13 PROCEDURE — 25000132 ZZH RX MED GY IP 250 OP 250 PS 637: Performed by: NURSE PRACTITIONER

## 2019-04-13 PROCEDURE — 00000146 ZZHCL STATISTIC GLUCOSE BY METER IP

## 2019-04-13 PROCEDURE — 12400000 ZZH R&B MH

## 2019-04-13 PROCEDURE — 25000125 ZZHC RX 250: Performed by: INTERNAL MEDICINE

## 2019-04-13 RX ADMIN — ACETAMINOPHEN 975 MG: 325 TABLET, FILM COATED ORAL at 08:34

## 2019-04-13 RX ADMIN — HYDROXYZINE HYDROCHLORIDE 50 MG: 25 TABLET ORAL at 13:42

## 2019-04-13 RX ADMIN — IBUPROFEN 800 MG: 800 TABLET ORAL at 06:51

## 2019-04-13 RX ADMIN — IBUPROFEN 800 MG: 800 TABLET ORAL at 20:36

## 2019-04-13 RX ADMIN — IBUPROFEN 800 MG: 800 TABLET ORAL at 13:39

## 2019-04-13 RX ADMIN — GABAPENTIN 900 MG: 300 CAPSULE ORAL at 13:38

## 2019-04-13 RX ADMIN — METAXALONE 800 MG: 800 TABLET ORAL at 08:34

## 2019-04-13 RX ADMIN — HYDROXYZINE HYDROCHLORIDE 100 MG: 25 TABLET ORAL at 20:35

## 2019-04-13 RX ADMIN — METFORMIN HYDROCHLORIDE 1000 MG: 1000 TABLET ORAL at 08:35

## 2019-04-13 RX ADMIN — BUSPIRONE HYDROCHLORIDE 20 MG: 10 TABLET ORAL at 13:39

## 2019-04-13 RX ADMIN — ACETAMINOPHEN 975 MG: 325 TABLET, FILM COATED ORAL at 20:36

## 2019-04-13 RX ADMIN — BUSPIRONE HYDROCHLORIDE 20 MG: 10 TABLET ORAL at 20:34

## 2019-04-13 RX ADMIN — METAXALONE 800 MG: 800 TABLET ORAL at 13:38

## 2019-04-13 RX ADMIN — DIVALPROEX SODIUM 1000 MG: 500 TABLET, EXTENDED RELEASE ORAL at 20:35

## 2019-04-13 RX ADMIN — ACETAMINOPHEN 975 MG: 325 TABLET, FILM COATED ORAL at 13:38

## 2019-04-13 RX ADMIN — GABAPENTIN 900 MG: 300 CAPSULE ORAL at 08:34

## 2019-04-13 RX ADMIN — CLONAZEPAM 0.5 MG: 0.5 TABLET ORAL at 08:34

## 2019-04-13 RX ADMIN — ATORVASTATIN CALCIUM 20 MG: 20 TABLET, FILM COATED ORAL at 20:36

## 2019-04-13 RX ADMIN — INSULIN ASPART 1 UNITS: 100 INJECTION, SOLUTION INTRAVENOUS; SUBCUTANEOUS at 12:00

## 2019-04-13 RX ADMIN — METFORMIN HYDROCHLORIDE 1000 MG: 1000 TABLET ORAL at 17:02

## 2019-04-13 RX ADMIN — BUSPIRONE HYDROCHLORIDE 20 MG: 10 TABLET ORAL at 08:35

## 2019-04-13 RX ADMIN — GABAPENTIN 900 MG: 300 CAPSULE ORAL at 20:34

## 2019-04-13 RX ADMIN — PRAZOSIN HYDROCHLORIDE 1 MG: 1 CAPSULE ORAL at 20:35

## 2019-04-13 RX ADMIN — OLANZAPINE 30 MG: 10 TABLET, FILM COATED ORAL at 20:36

## 2019-04-13 RX ADMIN — METAXALONE 800 MG: 800 TABLET ORAL at 20:35

## 2019-04-13 RX ADMIN — CLONAZEPAM 0.5 MG: 0.5 TABLET ORAL at 20:35

## 2019-04-13 ASSESSMENT — ACTIVITIES OF DAILY LIVING (ADL)
HYGIENE/GROOMING: INDEPENDENT
DRESS: SCRUBS (BEHAVIORAL HEALTH)
ORAL_HYGIENE: INDEPENDENT
DRESS: SCRUBS (BEHAVIORAL HEALTH)
ORAL_HYGIENE: INDEPENDENT
HYGIENE/GROOMING: INDEPENDENT

## 2019-04-13 NOTE — PLAN OF CARE
Face to face end of shift report received from Luz SPRING Rn. Rounding completed. Patient observed.     Crow Leyva  4/13/2019  12:02 AM

## 2019-04-13 NOTE — PLAN OF CARE
Problem: Adult Behavioral Health Plan of Care  Goal: Patient-Specific Goal (Individualization)  Description  Patient will attend at least 50% of groups while on unit.   Patient will report at least 6-8 hours of sleep at night.  Patient will be compliant with treatment team recommendations.    4/13/2019 0558 by Crow Leyva RN  Outcome: No Change   Pt has been in bed with eyes closed and regular respirations observed all night. Will continue to monitor.  Pt did get up for the day at about 0500 and reported to staff that she felt well rested and asked if she could stay up in the lounge. Pt requested to have blood glucose checked at this time and it was 115. Will continue to monitor.

## 2019-04-13 NOTE — PLAN OF CARE
.Face to face end of shift report received from Luz MAK. Rounding completed. Patient observed in room.    Julia Covington  4/13/2019  3:59 PM

## 2019-04-13 NOTE — PLAN OF CARE
Problem: Adult Behavioral Health Plan of Care  Goal: Patient-Specific Goal (Individualization)  Description  Patient will attend at least 50% of groups while on unit.   Patient will report at least 6-8 hours of sleep at night.  Patient will be compliant with treatment team recommendations.    4/13/2019 1106 by Luz Amaya, RN  Outcome: Improving  4/13/2019 0558 by Crow Leyva RN  Outcome: No Change     Problem: Suicide Risk  Goal: Absence of Self-Harm  Description  Patient will contract for safety if having thoughts of self harm while on unit.   Patient will report absence of suicide ideation prior to discharge.      Outcome: Improving   Patient is up on the unit and is attending group. She is pleasant and cooperative but mumbles responses to questions. Appropriately questions and identifies medications prescribed and took them as prescribed. Denies current depression, suicidal thoughts, auditory and visual hallucinations. Patient endorses some body pain but states that it is managed by prescribed medications. Patient also endorses anxiety. Ate well for breakfast meal. Attending groups.    1342 Patient has been up on the unit, she requested and was given Hydroxyzine 50 mg po for complaints of anxiety.

## 2019-04-13 NOTE — PLAN OF CARE
"  Problem: Adult Behavioral Health Plan of Care  Goal: Patient-Specific Goal (Individualization)  Description  Patient will attend at least 50% of groups while on unit.   Patient will report at least 6-8 hours of sleep at night.  Patient will be compliant with treatment team recommendations.    4/13/2019 1722 by Julia Covington, RN  Outcome: No Change   Patient stated she went to a few groups this morning so did not want to attend any at this time. States she \"feels 1% better since getting here.\" conversation is tangential. States she is sleeping better and wants to go to treatment.   Problem: Suicide Risk  Goal: Absence of Self-Harm  Description  Patient will contract for safety if having thoughts of self harm while on unit.   Patient will report absence of suicide ideation prior to discharge.      4/13/2019 1722 by Julia Covington, RN  Outcome: Improving   Patient denies any suicidal thoughts.  "

## 2019-04-13 NOTE — PLAN OF CARE
Face to face end of shift report received from Crow MARIA RN. Rounding completed. Patient observed.     Luz Amaya  4/13/2019  9:14 AM

## 2019-04-13 NOTE — PLAN OF CARE
Problem: Adult Behavioral Health Plan of Care  Goal: Patient-Specific Goal (Individualization)  Description  Patient will attend at least 50% of groups while on unit.   Patient will report at least 6-8 hours of sleep at night.  Patient will be compliant with treatment team recommendations.    4/12/2019 1948 by Luz Amaya, RN  Outcome: Improving     Problem: Suicide Risk  Goal: Absence of Self-Harm  Description  Patient will contract for safety if having thoughts of self harm while on unit.   Patient will report absence of suicide ideation prior to discharge.      4/12/2019 1948 by Luz Amaya, RN  Outcome: Improving     Patient has been in room since dinner. She previously had been busy trying to call the social security office in [place]. Currently denies pain, depression and thoughts of suicide, denies auditory and visual hallucinations. Ate well for supper and is cooperative with cares. Patient states that she isn't tired but she is exhausted.

## 2019-04-14 LAB
GLUCOSE BLDC GLUCOMTR-MCNC: 100 MG/DL (ref 70–99)
GLUCOSE BLDC GLUCOMTR-MCNC: 134 MG/DL (ref 70–99)
GLUCOSE BLDC GLUCOMTR-MCNC: 173 MG/DL (ref 70–99)
GLUCOSE BLDC GLUCOMTR-MCNC: 240 MG/DL (ref 70–99)

## 2019-04-14 PROCEDURE — 00000146 ZZHCL STATISTIC GLUCOSE BY METER IP

## 2019-04-14 PROCEDURE — 12400000 ZZH R&B MH

## 2019-04-14 PROCEDURE — 99233 SBSQ HOSP IP/OBS HIGH 50: CPT | Performed by: NURSE PRACTITIONER

## 2019-04-14 PROCEDURE — 25000125 ZZHC RX 250: Performed by: INTERNAL MEDICINE

## 2019-04-14 PROCEDURE — 25000132 ZZH RX MED GY IP 250 OP 250 PS 637: Performed by: NURSE PRACTITIONER

## 2019-04-14 RX ORDER — GABAPENTIN 400 MG/1
1200 CAPSULE ORAL 3 TIMES DAILY
Status: DISCONTINUED | OUTPATIENT
Start: 2019-04-14 | End: 2019-05-02 | Stop reason: HOSPADM

## 2019-04-14 RX ORDER — DIVALPROEX SODIUM 500 MG/1
1500 TABLET, EXTENDED RELEASE ORAL AT BEDTIME
Status: DISCONTINUED | OUTPATIENT
Start: 2019-04-14 | End: 2019-04-20

## 2019-04-14 RX ORDER — BENZTROPINE MESYLATE 0.5 MG/1
0.5 TABLET ORAL 2 TIMES DAILY
Status: DISCONTINUED | OUTPATIENT
Start: 2019-04-14 | End: 2019-04-25

## 2019-04-14 RX ADMIN — BUSPIRONE HYDROCHLORIDE 20 MG: 10 TABLET ORAL at 13:51

## 2019-04-14 RX ADMIN — ACETAMINOPHEN 975 MG: 325 TABLET, FILM COATED ORAL at 08:49

## 2019-04-14 RX ADMIN — BUSPIRONE HYDROCHLORIDE 20 MG: 10 TABLET ORAL at 20:41

## 2019-04-14 RX ADMIN — INSULIN ASPART 3 UNITS: 100 INJECTION, SOLUTION INTRAVENOUS; SUBCUTANEOUS at 07:58

## 2019-04-14 RX ADMIN — BENZTROPINE MESYLATE 0.5 MG: 0.5 TABLET ORAL at 20:42

## 2019-04-14 RX ADMIN — CLONAZEPAM 0.5 MG: 0.5 TABLET ORAL at 08:49

## 2019-04-14 RX ADMIN — IBUPROFEN 800 MG: 800 TABLET ORAL at 20:42

## 2019-04-14 RX ADMIN — ATORVASTATIN CALCIUM 20 MG: 20 TABLET, FILM COATED ORAL at 20:41

## 2019-04-14 RX ADMIN — METFORMIN HYDROCHLORIDE 1000 MG: 1000 TABLET ORAL at 16:53

## 2019-04-14 RX ADMIN — ACETAMINOPHEN 975 MG: 325 TABLET, FILM COATED ORAL at 13:52

## 2019-04-14 RX ADMIN — ACETAMINOPHEN 975 MG: 325 TABLET, FILM COATED ORAL at 20:42

## 2019-04-14 RX ADMIN — IBUPROFEN 800 MG: 800 TABLET ORAL at 07:58

## 2019-04-14 RX ADMIN — METAXALONE 800 MG: 800 TABLET ORAL at 20:42

## 2019-04-14 RX ADMIN — PRAZOSIN HYDROCHLORIDE 1 MG: 1 CAPSULE ORAL at 20:40

## 2019-04-14 RX ADMIN — IBUPROFEN 800 MG: 800 TABLET ORAL at 13:51

## 2019-04-14 RX ADMIN — HYDROXYZINE HYDROCHLORIDE 100 MG: 25 TABLET ORAL at 20:41

## 2019-04-14 RX ADMIN — METFORMIN HYDROCHLORIDE 1000 MG: 1000 TABLET ORAL at 07:58

## 2019-04-14 RX ADMIN — DIVALPROEX SODIUM 1500 MG: 500 TABLET, EXTENDED RELEASE ORAL at 20:41

## 2019-04-14 RX ADMIN — CLONAZEPAM 0.5 MG: 0.5 TABLET ORAL at 20:42

## 2019-04-14 RX ADMIN — INSULIN ASPART 1 UNITS: 100 INJECTION, SOLUTION INTRAVENOUS; SUBCUTANEOUS at 16:50

## 2019-04-14 RX ADMIN — GABAPENTIN 1200 MG: 400 CAPSULE ORAL at 13:51

## 2019-04-14 RX ADMIN — GABAPENTIN 900 MG: 300 CAPSULE ORAL at 08:49

## 2019-04-14 RX ADMIN — BENZTROPINE MESYLATE 0.5 MG: 0.5 TABLET ORAL at 11:14

## 2019-04-14 RX ADMIN — METAXALONE 800 MG: 800 TABLET ORAL at 13:52

## 2019-04-14 RX ADMIN — OLANZAPINE 30 MG: 10 TABLET, FILM COATED ORAL at 20:41

## 2019-04-14 RX ADMIN — GABAPENTIN 1200 MG: 400 CAPSULE ORAL at 20:42

## 2019-04-14 RX ADMIN — METAXALONE 800 MG: 800 TABLET ORAL at 08:49

## 2019-04-14 RX ADMIN — BUSPIRONE HYDROCHLORIDE 20 MG: 10 TABLET ORAL at 08:48

## 2019-04-14 ASSESSMENT — MIFFLIN-ST. JEOR: SCORE: 1437.45

## 2019-04-14 ASSESSMENT — ACTIVITIES OF DAILY LIVING (ADL)
ORAL_HYGIENE: INDEPENDENT
DRESS: SCRUBS (BEHAVIORAL HEALTH)
HYGIENE/GROOMING: INDEPENDENT

## 2019-04-14 NOTE — PLAN OF CARE
Face to face end of shift report received from Julia PERDUE RN. Rounding completed. Patient observed in lounge area.    Julia Covington  4/14/2019  6:13 PM

## 2019-04-14 NOTE — PLAN OF CARE
Face to face end of shift report received from Julia DANIEL Rn. Rounding completed. Patient observed.     Crow Leyva  4/13/2019  11:35 PM

## 2019-04-14 NOTE — PLAN OF CARE
Problem: Adult Behavioral Health Plan of Care  Goal: Patient-Specific Goal (Individualization)  Description  Patient will attend at least 50% of groups while on unit.   Patient will report at least 6-8 hours of sleep at night.  Patient will be compliant with treatment team recommendations.    4/14/2019 0552 by Crow Leyva RN  Outcome: No Change   Pt has been in bed with eyes closed and regular respirations observed all night. Will continue to monitor.

## 2019-04-14 NOTE — PROGRESS NOTES
"[facility]  Psychiatric Progress Note      Impression:   This is a 39 year old female with a significant history of schizoaffective disorder.     Ginette is a bit less pressured today and her grandiose statements are not as pronounced as they were. She tells me that she slept very well. She states all of my symptoms are to a \"7 from a 10\". I asked what she meant by that and she stated \"I haven't heard a voice since we have been in here\". We had only been in the room talking for about 5 minutes and I did tell her this. She said \"that's better than what it was\". I asked her what the voices were saying earlier and she said \"they are memories and they are wanting to beat me up\". Denies command hallucinations. Affect is less intense. Denies suicidal thoughts. She has no noted side effects. We discussed increasing depakote and then when it is therapeutic, I will taper he klonopin and discontinue it.       Educated regarding medication indications, risks, benefits, side effects, contraindications and possible interactions. Verbally expressed understanding.        DIagnoses:     Schizoaffective disorder  Marijuana use disorder-in remission  Diabetes type 2    Attestation:  Patient has been seen and evaluated by me,  Airam Light NP          Interim History:   The patient's care was discussed with the treatment team and chart notes were reviewed.          Medications:     Current Facility-Administered Medications Ordered in Epic   Medication Dose Route Frequency Last Rate Last Dose     acetaminophen (TYLENOL) tablet 975 mg  975 mg Oral TID   975 mg at 04/14/19 0849     atorvastatin (LIPITOR) tablet 20 mg  20 mg Oral At Bedtime   20 mg at 04/13/19 2036     benztropine (COGENTIN) tablet 0.5 mg  0.5 mg Oral BID         busPIRone (BUSPAR) tablet 20 mg  20 mg Oral TID   20 mg at 04/14/19 0848     clonazePAM (klonoPIN) tablet 0.5 mg  0.5 mg Oral BID   0.5 mg at 04/14/19 0849     glucose gel 15-30 g  15-30 " "g Oral Q15 Min PRN        Or     dextrose 50 % injection 25-50 mL  25-50 mL Intravenous Q15 Min PRN        Or     glucagon injection 1 mg  1 mg Subcutaneous Q15 Min PRN         divalproex sodium extended-release (DEPAKOTE ER) 24 hr tablet 1,500 mg  1,500 mg Oral At Bedtime         gabapentin (NEURONTIN) capsule 1,200 mg  1,200 mg Oral TID         hydrOXYzine (ATARAX) tablet 100 mg  100 mg Oral At Bedtime   100 mg at 04/13/19 2035     hydrOXYzine (ATARAX) tablet 50 mg  50 mg Oral TID PRN   50 mg at 04/13/19 1342     ibuprofen (ADVIL/MOTRIN) tablet 800 mg  800 mg Oral TID   800 mg at 04/14/19 0758     insulin aspart (NovoLOG) inj (RAPID ACTING)  1-7 Units Subcutaneous TID AC   3 Units at 04/14/19 0758     insulin aspart (NovoLOG) inj (RAPID ACTING)  1-5 Units Subcutaneous At Bedtime   1 Units at 04/07/19 2019     metaxalone (SKELAXIN) tablet 800 mg  800 mg Oral TID   800 mg at 04/14/19 0849     metFORMIN (GLUCOPHAGE) tablet 1,000 mg  1,000 mg Oral BID w/meals   1,000 mg at 04/14/19 0758     OLANZapine (zyPREXA) tablet 30 mg  30 mg Oral At Bedtime   30 mg at 04/13/19 2036     prazosin (MINIPRESS) capsule 1 mg  1 mg Oral At Bedtime   1 mg at 04/13/19 2035     No current Epic-ordered outpatient medications on file.              10 point ROS negative C/O back pain ad neuropathic pain.        Allergies:   No Known Allergies         Psychiatric Examination:   /82   Pulse 96   Temp 97.7  F (36.5  C) (Tympanic)   Resp 16   Ht 1.626 m (5' 4\")   Wt 78.2 kg (172 lb 8 oz)   SpO2 98%   BMI 29.61 kg/m    Weight is 172 lbs 8 oz  Body mass index is 29.61 kg/m .    Appearance:  awake, alert, adequately groomed and dressed in hospital scrubs  Attitude:  cooperative  Eye Contact:  good  Mood:  anxious  Affect:  mood congruent  Speech:  pressured speech and rambling  Psychomotor Behavior:  no evidence of tardive dyskinesia, dystonia, or tics and intact station, gait and muscle tone  Thought Process:  disorganized and " tangental  Associations:  loosening of associations present  Thought Content:  no evidence of suicidal ideation or homicidal ideation and visual hallucinations present  Insight:  fair  Judgment:  fair  Oriented to:  time, person, and place  Attention Span and Concentration:  fair  Recent and Remote Memory:  fair  Fund of Knowledge: low-normal  Muscle Strength and Tone: normal  Gait and Station: Normal           Labs:     Results for orders placed or performed during the hospital encounter of 04/06/19   Glucose by meter   Result Value Ref Range    Glucose 305 (H) 70 - 99 mg/dL   Glucose by meter   Result Value Ref Range    Glucose 341 (H) 70 - 99 mg/dL   Glucose by meter   Result Value Ref Range    Glucose 193 (H) 70 - 99 mg/dL   Glucose by meter   Result Value Ref Range    Glucose 168 (H) 70 - 99 mg/dL   Glucose by meter   Result Value Ref Range    Glucose 249 (H) 70 - 99 mg/dL   Glucose by meter   Result Value Ref Range    Glucose 227 (H) 70 - 99 mg/dL   Glucose by meter   Result Value Ref Range    Glucose 242 (H) 70 - 99 mg/dL   Glucose by meter   Result Value Ref Range    Glucose 125 (H) 70 - 99 mg/dL   Glucose by meter   Result Value Ref Range    Glucose 105 (H) 70 - 99 mg/dL   Glucose by meter   Result Value Ref Range    Glucose 255 (H) 70 - 99 mg/dL   Glucose by meter   Result Value Ref Range    Glucose 169 (H) 70 - 99 mg/dL   Glucose by meter   Result Value Ref Range    Glucose 195 (H) 70 - 99 mg/dL   Glucose by meter   Result Value Ref Range    Glucose 121 (H) 70 - 99 mg/dL   Glucose by meter   Result Value Ref Range    Glucose 84 70 - 99 mg/dL   Glucose by meter   Result Value Ref Range    Glucose 130 (H) 70 - 99 mg/dL   Glucose by meter   Result Value Ref Range    Glucose 161 (H) 70 - 99 mg/dL   Glucose by meter   Result Value Ref Range    Glucose 181 (H) 70 - 99 mg/dL   Glucose by meter   Result Value Ref Range    Glucose 181 (H) 70 - 99 mg/dL   Glucose by meter   Result Value Ref Range    Glucose 195 (H)  70 - 99 mg/dL   Glucose by meter   Result Value Ref Range    Glucose 100 (H) 70 - 99 mg/dL   Glucose by meter   Result Value Ref Range    Glucose 202 (H) 70 - 99 mg/dL   Glucose by meter   Result Value Ref Range    Glucose 114 (H) 70 - 99 mg/dL   Glucose by meter   Result Value Ref Range    Glucose 132 (H) 70 - 99 mg/dL   Glucose by meter   Result Value Ref Range    Glucose 104 (H) 70 - 99 mg/dL   Glucose by meter   Result Value Ref Range    Glucose 91 70 - 99 mg/dL   Glucose by meter   Result Value Ref Range    Glucose 204 (H) 70 - 99 mg/dL   Glucose by meter   Result Value Ref Range    Glucose 87 70 - 99 mg/dL   Glucose by meter   Result Value Ref Range    Glucose 151 (H) 70 - 99 mg/dL   Glucose by meter   Result Value Ref Range    Glucose 98 70 - 99 mg/dL   Glucose by meter   Result Value Ref Range    Glucose 180 (H) 70 - 99 mg/dL   Glucose by meter   Result Value Ref Range    Glucose 126 (H) 70 - 99 mg/dL   Glucose by meter   Result Value Ref Range    Glucose 137 (H) 70 - 99 mg/dL   Glucose by meter   Result Value Ref Range    Glucose 115 (H) 70 - 99 mg/dL   Glucose by meter   Result Value Ref Range    Glucose 108 (H) 70 - 99 mg/dL   Glucose by meter   Result Value Ref Range    Glucose 189 (H) 70 - 99 mg/dL   Glucose by meter   Result Value Ref Range    Glucose 97 70 - 99 mg/dL   Glucose by meter   Result Value Ref Range    Glucose 123 (H) 70 - 99 mg/dL   Glucose by meter   Result Value Ref Range    Glucose 240 (H) 70 - 99 mg/dL              Plan:   Increase gabapentin to 1200 mg TID (previous dose was this for nerve pain)  Start cogentin for rls  Increase depakote       ELOS: >5 days for stabilization and safe discharge planning

## 2019-04-14 NOTE — PLAN OF CARE
Face to face end of shift report received from Crow MARIA RN. Rounding completed. Patient observed in bed with eyes closed.      Julia Franco  4/14/2019  7:45 AM

## 2019-04-14 NOTE — PLAN OF CARE
"  Problem: Adult Behavioral Health Plan of Care  Goal: Patient-Specific Goal (Individualization)  Description  Patient will attend at least 50% of groups while on unit.   Patient will report at least 6-8 hours of sleep at night.  Patient will be compliant with treatment team recommendations.      Pt reports pain at 8/10 all over her body especially arms and legs. Pt rates her anxiety at 7 and depression at 7 which she points out is better than it has been. Pt compliant with medications and cooperative with nursing assessment. Pt complains of foot pain in her left foot, several blisters that are healing and a \"bump\" on top of her foot. Pt toe nail on second toe of left foot is black and pt complains of pain but cant remember hurting the toe. Pt met with NP this AM, she thinks it is bruise on her toe. Med changes this shift include: adding cogentin for restless leg syndrome, increasing depakote and increasing gabapentin. [facility] and writing in her journal 4/14/2019 1205 by Julia Franco RN  Outcome: Improving     Problem: Suicide Risk  Goal: Absence of Self-Harm  Description  Patient will contract for safety if having thoughts of self harm while on unit.   Patient will report absence of suicide ideation prior to discharge.      Pt denies suicidal ideation.   Outcome: Improving     "

## 2019-04-15 LAB
GLUCOSE BLDC GLUCOMTR-MCNC: 115 MG/DL (ref 70–99)
GLUCOSE BLDC GLUCOMTR-MCNC: 143 MG/DL (ref 70–99)
GLUCOSE BLDC GLUCOMTR-MCNC: 197 MG/DL (ref 70–99)
GLUCOSE BLDC GLUCOMTR-MCNC: 230 MG/DL (ref 70–99)
HCG UR QL: NEGATIVE

## 2019-04-15 PROCEDURE — 12400000 ZZH R&B MH

## 2019-04-15 PROCEDURE — 25000132 ZZH RX MED GY IP 250 OP 250 PS 637: Performed by: NURSE PRACTITIONER

## 2019-04-15 PROCEDURE — 25000125 ZZHC RX 250: Performed by: INTERNAL MEDICINE

## 2019-04-15 PROCEDURE — 00000146 ZZHCL STATISTIC GLUCOSE BY METER IP

## 2019-04-15 PROCEDURE — 81025 URINE PREGNANCY TEST: CPT | Performed by: NURSE PRACTITIONER

## 2019-04-15 RX ORDER — CLONAZEPAM 0.5 MG/1
0.5 TABLET ORAL 2 TIMES DAILY
Status: DISCONTINUED | OUTPATIENT
Start: 2019-04-15 | End: 2019-04-16

## 2019-04-15 RX ADMIN — METAXALONE 800 MG: 800 TABLET ORAL at 14:01

## 2019-04-15 RX ADMIN — BUSPIRONE HYDROCHLORIDE 20 MG: 10 TABLET ORAL at 20:15

## 2019-04-15 RX ADMIN — HYDROXYZINE HYDROCHLORIDE 100 MG: 25 TABLET ORAL at 20:14

## 2019-04-15 RX ADMIN — IBUPROFEN 800 MG: 800 TABLET ORAL at 20:15

## 2019-04-15 RX ADMIN — METAXALONE 800 MG: 800 TABLET ORAL at 20:15

## 2019-04-15 RX ADMIN — DIVALPROEX SODIUM 1500 MG: 500 TABLET, EXTENDED RELEASE ORAL at 20:15

## 2019-04-15 RX ADMIN — IBUPROFEN 800 MG: 800 TABLET ORAL at 08:20

## 2019-04-15 RX ADMIN — GABAPENTIN 1200 MG: 400 CAPSULE ORAL at 20:14

## 2019-04-15 RX ADMIN — METFORMIN HYDROCHLORIDE 1000 MG: 1000 TABLET ORAL at 08:19

## 2019-04-15 RX ADMIN — ATORVASTATIN CALCIUM 20 MG: 20 TABLET, FILM COATED ORAL at 20:15

## 2019-04-15 RX ADMIN — INSULIN ASPART 1 UNITS: 100 INJECTION, SOLUTION INTRAVENOUS; SUBCUTANEOUS at 16:59

## 2019-04-15 RX ADMIN — CLONAZEPAM 0.5 MG: 0.5 TABLET ORAL at 20:15

## 2019-04-15 RX ADMIN — GABAPENTIN 1200 MG: 400 CAPSULE ORAL at 08:19

## 2019-04-15 RX ADMIN — BUSPIRONE HYDROCHLORIDE 20 MG: 10 TABLET ORAL at 08:20

## 2019-04-15 RX ADMIN — BUSPIRONE HYDROCHLORIDE 20 MG: 10 TABLET ORAL at 14:00

## 2019-04-15 RX ADMIN — METAXALONE 800 MG: 800 TABLET ORAL at 08:20

## 2019-04-15 RX ADMIN — HYDROXYZINE HYDROCHLORIDE 50 MG: 25 TABLET ORAL at 08:22

## 2019-04-15 RX ADMIN — IBUPROFEN 800 MG: 800 TABLET ORAL at 14:01

## 2019-04-15 RX ADMIN — BENZTROPINE MESYLATE 0.5 MG: 0.5 TABLET ORAL at 08:20

## 2019-04-15 RX ADMIN — BENZTROPINE MESYLATE 0.5 MG: 0.5 TABLET ORAL at 20:15

## 2019-04-15 RX ADMIN — ACETAMINOPHEN 975 MG: 325 TABLET, FILM COATED ORAL at 14:01

## 2019-04-15 RX ADMIN — ACETAMINOPHEN 975 MG: 325 TABLET, FILM COATED ORAL at 20:15

## 2019-04-15 RX ADMIN — ACETAMINOPHEN 975 MG: 325 TABLET, FILM COATED ORAL at 08:19

## 2019-04-15 RX ADMIN — OLANZAPINE 30 MG: 10 TABLET, FILM COATED ORAL at 20:15

## 2019-04-15 RX ADMIN — PRAZOSIN HYDROCHLORIDE 1 MG: 1 CAPSULE ORAL at 20:15

## 2019-04-15 RX ADMIN — METFORMIN HYDROCHLORIDE 1000 MG: 1000 TABLET ORAL at 16:59

## 2019-04-15 RX ADMIN — CLONAZEPAM 0.5 MG: 0.5 TABLET ORAL at 11:45

## 2019-04-15 RX ADMIN — GABAPENTIN 1200 MG: 400 CAPSULE ORAL at 14:01

## 2019-04-15 RX ADMIN — INSULIN ASPART 2 UNITS: 100 INJECTION, SOLUTION INTRAVENOUS; SUBCUTANEOUS at 11:46

## 2019-04-15 ASSESSMENT — ACTIVITIES OF DAILY LIVING (ADL)
HYGIENE/GROOMING: INDEPENDENT
DRESS: SCRUBS (BEHAVIORAL HEALTH)
ORAL_HYGIENE: INDEPENDENT
LAUNDRY: UNABLE TO COMPLETE
ORAL_HYGIENE: INDEPENDENT
DRESS: SCRUBS (BEHAVIORAL HEALTH)
HYGIENE/GROOMING: INDEPENDENT
LAUNDRY: UNABLE TO COMPLETE

## 2019-04-15 NOTE — PLAN OF CARE
"  Problem: Adult Behavioral Health Plan of Care  Goal: Patient-Specific Goal (Individualization)  Description  Patient will attend at least 50% of groups while on unit.   Patient will report at least 6-8 hours of sleep at night.  Patient will be compliant with treatment team recommendations.      Patient withdrawn, sitting in the lounge writing and listening to music via headphones. Affect is full range, mood is grandiose, speech is rambling. Denies all criteria. States several times \"why didn't they give me that fucking Klonopin sooner, I feel great!\". States she has 3 kids, gives writer 3 girl names, then states \"the rest of them are dead to me\". States she had a 16 lb and a 17 lb baby, and talks about how she called the [employer] on a staff member \"then they gave me that Ativan and I went to sleep\".  States she is \"deliciously good\". Is making a grid on her paper, like a spreadsheet, when asked what it is, states \"it's a list of things I need to buy\". States all of her friends \"like Brit, Lacie, and Wendy are dead, but at least we still have Snoop\". Also talks about having 1.5 million dollars and how she woke up one morning and it was all gone. Does have a bruise on her left second toe, states she must have bumped it on something but is not sure what, no open areas noted or signs of infection. Does have several scabs on her left foot, states they are from walking around in wet shoes, then tells a peer that she has Gangrene in her toe and it is going to fall off. States she wants an antibiotic for her toe, will continue to monitor.   Blood glucose was 143 before dinner, did receive 1 Unit of Novolog insulin per order parameters.   Blood glucose was 230 before bedtime and snack, did receive 1 Unit of Novolog insulin per order parameters. Patient rates toe pain 8/10, did receive scheduled Tylenol and  Ibuprofen.  4/15/2019 1822 by Olivia Champagne RN  Outcome: No Change     Problem: Suicide Risk  Goal: Absence of " Self-Harm  Description  Patient will contract for safety if having thoughts of self harm while on unit.   Patient will report absence of suicide ideation prior to discharge.     Patient denies SI/HI, has remained free from self harm/injury.   4/15/2019 1822 by Olivia Champagne RN  Outcome: No Change

## 2019-04-15 NOTE — PROGRESS NOTES
"Spoke with Pt today regarding CD issues. Pt was unable to stay on topic and was not redirectable with this writer. Will follow up w/Pt. Pt was speaking of being pregnant with \"100 of Trumps babies...\" and would also speak as though she was a gangster and motion as though 'spraying' people who were not there with an automatic handgun. Pt spoke of knowing \"All of the celebrities\" and was speaking as though she has gunned them down. Pt is very delusional and tangential in her conversations.   "

## 2019-04-15 NOTE — PLAN OF CARE
Problem: Adult Behavioral Health Plan of Care  Goal: Patient-Specific Goal (Individualization)  Description  Patient will attend at least 50% of groups while on unit.   Patient will report at least 6-8 hours of sleep at night.  Patient will be compliant with treatment team recommendations.    4/15/2019 0554 by Crow Leyva RN  Outcome: No Change   Pt has been in bed with eyes closed and regular respirations observed all night. Will continue to monitor.

## 2019-04-15 NOTE — PLAN OF CARE
Face to face end of shift report received from Nancy PERDUE RN. Rounding completed. Patient observed in [facility].     Olivia Champagne  4/15/2019  4:16 PM           airway patent/no rhonchi/respirations non-labored/normal/no chest wall tenderness/clear to auscultation bilaterally/breath sounds equal good air movement/clear to auscultation bilaterally/respirations non-labored/normal/airway patent/no rhonchi/breath sounds equal/no chest wall tenderness

## 2019-04-15 NOTE — PLAN OF CARE
Face to face end of shift report received from Crow HERBERT RN. Rounding completed. Patient observed.     Nancy Stern  4/15/2019  7:52 AM

## 2019-04-15 NOTE — PLAN OF CARE
Face to face end of shift report received from Julia DANIEL RN. Rounding completed. Patient observed.     Crow Leyva  4/14/2019  11:40 PM

## 2019-04-15 NOTE — PLAN OF CARE
BEHAVIORAL TEAM DISCUSSION    Participants: Airam Light NP, Dennise Herrera LICSW, Angie Laws LSW,  Becca Chery LSW, Beena Kumar SW, Viridiana Foss RN,  Ira Anaya, Karley Ron RN, Natalia Costello OT  Progress: Moderate   Continued Stay Criteria/Rationale: Delusional, grandiose, unable to discharge to a lower level of care due to high risk of relapse   Medical/Physical: diabetic - monitoring blood sugars, metformin, accuchecks and sliding scale  Precautions:   Behavioral Orders   Procedures    Code 1 - Restrict to Unit    Routine Programming     As clinically indicated    Status 15     Every 15 minutes.     Plan: Continue to stabilize, Treatment. ordering urine and blood test - ordering depo   Rationale for change in precautions or plan: NONE    Active Problems:    Schizophrenia (H)    Current Facility-Administered Medications:     acetaminophen (TYLENOL) tablet 975 mg, 975 mg, Oral, TID, Loren Zuleta NP, 975 mg at 04/15/19 0819    atorvastatin (LIPITOR) tablet 20 mg, 20 mg, Oral, At Bedtime, Loren Zuleta NP, 20 mg at 04/14/19 2041    benztropine (COGENTIN) tablet 0.5 mg, 0.5 mg, Oral, BID, Airam Light NP, 0.5 mg at 04/15/19 0820    busPIRone (BUSPAR) tablet 20 mg, 20 mg, Oral, TID, Loren Zuleta NP, 20 mg at 04/15/19 0820    glucose gel 15-30 g, 15-30 g, Oral, Q15 Min PRN **OR** dextrose 50 % injection 25-50 mL, 25-50 mL, Intravenous, Q15 Min PRN **OR** glucagon injection 1 mg, 1 mg, Subcutaneous, Q15 Min PRN, Loren Zuleta NP    divalproex sodium extended-release (DEPAKOTE ER) 24 hr tablet 1,500 mg, 1,500 mg, Oral, At Bedtime, Airam Light NP, 1,500 mg at 04/14/19 2041    gabapentin (NEURONTIN) capsule 1,200 mg, 1,200 mg, Oral, TID, Airam Light NP, 1,200 mg at 04/15/19 0819    hydrOXYzine (ATARAX) tablet 100 mg, 100 mg, Oral, At Bedtime, Loren Zuleta, NP, 100 mg at 04/14/19 2041    hydrOXYzine (ATARAX) tablet 50 mg, 50 mg, Oral, TID PRN,  Loren Zuleta, NP, 50 mg at 04/15/19 0822    ibuprofen (ADVIL/MOTRIN) tablet 800 mg, 800 mg, Oral, TID, Rolando Zuletaista L, NP, 800 mg at 04/15/19 0820    insulin aspart (NovoLOG) inj (RAPID ACTING), 1-7 Units, Subcutaneous, TID AC, PilaraRolandoLoren L, NP, 1 Units at 04/14/19 1650    insulin aspart (NovoLOG) inj (RAPID ACTING), 1-5 Units, Subcutaneous, At Bedtime, Loren Zuleta, NP, 1 Units at 04/07/19 2019    metaxalone (SKELAXIN) tablet 800 mg, 800 mg, Oral, TID, Loren Zuleta, NP, 800 mg at 04/15/19 0820    metFORMIN (GLUCOPHAGE) tablet 1,000 mg, 1,000 mg, Oral, BID w/meals, Davi Owens MD, 1,000 mg at 04/15/19 0819    OLANZapine (zyPREXA) tablet 30 mg, 30 mg, Oral, At Bedtime, Loren Zuleta NP, 30 mg at 04/14/19 2041    prazosin (MINIPRESS) capsule 1 mg, 1 mg, Oral, At Bedtime, Airam Light NP, 1 mg at 04/14/19 2040
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
## 2019-04-16 LAB
GLUCOSE BLDC GLUCOMTR-MCNC: 151 MG/DL (ref 70–99)
GLUCOSE BLDC GLUCOMTR-MCNC: 167 MG/DL (ref 70–99)
GLUCOSE BLDC GLUCOMTR-MCNC: 206 MG/DL (ref 70–99)

## 2019-04-16 PROCEDURE — 25000128 H RX IP 250 OP 636: Performed by: NURSE PRACTITIONER

## 2019-04-16 PROCEDURE — 25000132 ZZH RX MED GY IP 250 OP 250 PS 637: Performed by: NURSE PRACTITIONER

## 2019-04-16 PROCEDURE — 00000146 ZZHCL STATISTIC GLUCOSE BY METER IP

## 2019-04-16 PROCEDURE — 25000125 ZZHC RX 250: Performed by: INTERNAL MEDICINE

## 2019-04-16 PROCEDURE — 12400000 ZZH R&B MH

## 2019-04-16 PROCEDURE — 99233 SBSQ HOSP IP/OBS HIGH 50: CPT | Performed by: NURSE PRACTITIONER

## 2019-04-16 RX ORDER — MEDROXYPROGESTERONE ACETATE 150 MG/ML
150 INJECTION, SUSPENSION INTRAMUSCULAR
Status: DISCONTINUED | OUTPATIENT
Start: 2019-04-16 | End: 2019-05-02 | Stop reason: HOSPADM

## 2019-04-16 RX ORDER — CLONAZEPAM 1 MG/1
1 TABLET ORAL 2 TIMES DAILY
Status: DISCONTINUED | OUTPATIENT
Start: 2019-04-16 | End: 2019-05-01

## 2019-04-16 RX ORDER — GLIPIZIDE 5 MG/1
5 TABLET ORAL
Status: DISCONTINUED | OUTPATIENT
Start: 2019-04-17 | End: 2019-04-25

## 2019-04-16 RX ADMIN — INSULIN ASPART 1 UNITS: 100 INJECTION, SOLUTION INTRAVENOUS; SUBCUTANEOUS at 08:03

## 2019-04-16 RX ADMIN — BENZTROPINE MESYLATE 0.5 MG: 0.5 TABLET ORAL at 08:12

## 2019-04-16 RX ADMIN — GABAPENTIN 1200 MG: 400 CAPSULE ORAL at 08:12

## 2019-04-16 RX ADMIN — OLANZAPINE 30 MG: 10 TABLET, FILM COATED ORAL at 20:41

## 2019-04-16 RX ADMIN — IBUPROFEN 800 MG: 800 TABLET ORAL at 08:12

## 2019-04-16 RX ADMIN — METAXALONE 800 MG: 800 TABLET ORAL at 20:41

## 2019-04-16 RX ADMIN — HYDROXYZINE HYDROCHLORIDE 100 MG: 25 TABLET ORAL at 20:40

## 2019-04-16 RX ADMIN — PRAZOSIN HYDROCHLORIDE 1 MG: 1 CAPSULE ORAL at 20:41

## 2019-04-16 RX ADMIN — MEDROXYPROGESTERONE ACETATE 150 MG: 150 INJECTION, SUSPENSION INTRAMUSCULAR at 16:40

## 2019-04-16 RX ADMIN — IBUPROFEN 800 MG: 800 TABLET ORAL at 20:41

## 2019-04-16 RX ADMIN — METAXALONE 800 MG: 800 TABLET ORAL at 13:26

## 2019-04-16 RX ADMIN — ACETAMINOPHEN 975 MG: 325 TABLET, FILM COATED ORAL at 13:26

## 2019-04-16 RX ADMIN — GABAPENTIN 1200 MG: 400 CAPSULE ORAL at 13:26

## 2019-04-16 RX ADMIN — BENZTROPINE MESYLATE 0.5 MG: 0.5 TABLET ORAL at 20:41

## 2019-04-16 RX ADMIN — METAXALONE 800 MG: 800 TABLET ORAL at 08:12

## 2019-04-16 RX ADMIN — DIVALPROEX SODIUM 1500 MG: 500 TABLET, EXTENDED RELEASE ORAL at 20:40

## 2019-04-16 RX ADMIN — ACETAMINOPHEN 975 MG: 325 TABLET, FILM COATED ORAL at 20:39

## 2019-04-16 RX ADMIN — GABAPENTIN 1200 MG: 400 CAPSULE ORAL at 20:41

## 2019-04-16 RX ADMIN — CLONAZEPAM 0.5 MG: 0.5 TABLET ORAL at 08:12

## 2019-04-16 RX ADMIN — INSULIN ASPART 2 UNITS: 100 INJECTION, SOLUTION INTRAVENOUS; SUBCUTANEOUS at 16:56

## 2019-04-16 RX ADMIN — BUSPIRONE HYDROCHLORIDE 20 MG: 10 TABLET ORAL at 13:26

## 2019-04-16 RX ADMIN — METFORMIN HYDROCHLORIDE 1000 MG: 1000 TABLET ORAL at 08:12

## 2019-04-16 RX ADMIN — IBUPROFEN 800 MG: 800 TABLET ORAL at 13:26

## 2019-04-16 RX ADMIN — ATORVASTATIN CALCIUM 20 MG: 20 TABLET, FILM COATED ORAL at 20:40

## 2019-04-16 RX ADMIN — BUSPIRONE HYDROCHLORIDE 20 MG: 10 TABLET ORAL at 20:40

## 2019-04-16 RX ADMIN — BUSPIRONE HYDROCHLORIDE 20 MG: 10 TABLET ORAL at 08:11

## 2019-04-16 RX ADMIN — CLONAZEPAM 1 MG: 1 TABLET ORAL at 20:40

## 2019-04-16 RX ADMIN — METFORMIN HYDROCHLORIDE 1000 MG: 1000 TABLET ORAL at 16:41

## 2019-04-16 RX ADMIN — ACETAMINOPHEN 975 MG: 325 TABLET, FILM COATED ORAL at 08:11

## 2019-04-16 RX ADMIN — INSULIN ASPART 1 UNITS: 100 INJECTION, SOLUTION INTRAVENOUS; SUBCUTANEOUS at 11:55

## 2019-04-16 ASSESSMENT — ACTIVITIES OF DAILY LIVING (ADL)
HYGIENE/GROOMING: INDEPENDENT
ORAL_HYGIENE: INDEPENDENT
ORAL_HYGIENE: INDEPENDENT
LAUNDRY: UNABLE TO COMPLETE
DRESS: INDEPENDENT
HYGIENE/GROOMING: INDEPENDENT
DRESS: INDEPENDENT

## 2019-04-16 NOTE — PLAN OF CARE
Face to face end of shift report received from Eliza ALVAREZ RN. Rounding completed. Patient observed.     Radha Bills  4/16/2019  7:40 AM
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
## 2019-04-16 NOTE — PLAN OF CARE
Face to face end of shift report received from Radha RN Rounding completed. Patient observed.     Lisbet Donald  4/16/2019  1600 PM

## 2019-04-16 NOTE — PROGRESS NOTES
"[facility]  Psychiatric Progress Note      Impression:   This is a 39 year old female with a significant history of schizoaffective disorder.     The klonopin I had prescribed kemal was discontinued 2 days ago without my knowledge and she missed two dose. I believe I had unintentionally placed a stop date on it. I did restart it yesterday. Without the klonopin she was more irritable and disorganized. She was only on 0.5. She states \"my legs were going nuts when you took me off of it\". She is aware it is not long term. She states she feels much calmer now though she is still intense at times and is quite grandiose. She talks about having two babies that were 17 pounds because she had  Them with a man who is a . She then tells me that one of her children is now over 7 feet tall. She continues to hear voices though states \"they are memories but at least I am not having nightmares about them any more\".       Educated regarding medication indications, risks, benefits, side effects, contraindications and possible interactions. Verbally expressed understanding.        DIagnoses:     Schizoaffective disorder  Marijuana use disorder-in remission  Diabetes type 2    Attestation:  Patient has been seen and evaluated by me,  April Keturah Light NP          Interim History:   The patient's care was discussed with the treatment team and chart notes were reviewed.          Medications:     Current Facility-Administered Medications Ordered in Epic   Medication Dose Route Frequency Last Rate Last Dose     acetaminophen (TYLENOL) tablet 975 mg  975 mg Oral TID   975 mg at 04/16/19 0811     atorvastatin (LIPITOR) tablet 20 mg  20 mg Oral At Bedtime   20 mg at 04/15/19 2015     benztropine (COGENTIN) tablet 0.5 mg  0.5 mg Oral BID   0.5 mg at 04/16/19 0812     busPIRone (BUSPAR) tablet 20 mg  20 mg Oral TID   20 mg at 04/16/19 0811     clonazePAM (klonoPIN) tablet 0.5 mg  0.5 mg Oral BID   0.5 " "mg at 04/16/19 0812     glucose gel 15-30 g  15-30 g Oral Q15 Min PRN        Or     dextrose 50 % injection 25-50 mL  25-50 mL Intravenous Q15 Min PRN        Or     glucagon injection 1 mg  1 mg Subcutaneous Q15 Min PRN         divalproex sodium extended-release (DEPAKOTE ER) 24 hr tablet 1,500 mg  1,500 mg Oral At Bedtime   1,500 mg at 04/15/19 2015     gabapentin (NEURONTIN) capsule 1,200 mg  1,200 mg Oral TID   1,200 mg at 04/16/19 0812     hydrOXYzine (ATARAX) tablet 100 mg  100 mg Oral At Bedtime   100 mg at 04/15/19 2014     hydrOXYzine (ATARAX) tablet 50 mg  50 mg Oral TID PRN   50 mg at 04/15/19 0822     ibuprofen (ADVIL/MOTRIN) tablet 800 mg  800 mg Oral TID   800 mg at 04/16/19 0812     insulin aspart (NovoLOG) inj (RAPID ACTING)  1-7 Units Subcutaneous TID AC   1 Units at 04/16/19 0803     insulin aspart (NovoLOG) inj (RAPID ACTING)  1-5 Units Subcutaneous At Bedtime   1 Units at 04/15/19 2015     medroxyPROGESTERone (DEPO-PROVERA) injection 150 mg  150 mg Intramuscular Q90 Days         metaxalone (SKELAXIN) tablet 800 mg  800 mg Oral TID   800 mg at 04/16/19 0812     metFORMIN (GLUCOPHAGE) tablet 1,000 mg  1,000 mg Oral BID w/meals   1,000 mg at 04/16/19 0812     OLANZapine (zyPREXA) tablet 30 mg  30 mg Oral At Bedtime   30 mg at 04/15/19 2015     prazosin (MINIPRESS) capsule 1 mg  1 mg Oral At Bedtime   1 mg at 04/15/19 2015     No current Epic-ordered outpatient medications on file.              10 point ROS negative C/O back pain ad neuropathic pain.        Allergies:   No Known Allergies         Psychiatric Examination:   /75   Pulse 86   Temp 98.1  F (36.7  C) (Tympanic)   Resp 16   Ht 1.626 m (5' 4\")   Wt 78.2 kg (172 lb 8 oz)   SpO2 98%   BMI 29.61 kg/m    Weight is 172 lbs 8 oz  Body mass index is 29.61 kg/m .    Appearance:  awake, alert, adequately groomed and dressed in hospital scrubs  Attitude:  cooperative  Eye Contact:  good  Mood:  anxious  Affect:  mood congruent  Speech:  " pressured speech and rambling  Psychomotor Behavior:  no evidence of tardive dyskinesia, dystonia, or tics and intact station, gait and muscle tone  Thought Process:  disorganized and tangental  Associations:  loosening of associations present  Thought Content:  no evidence of suicidal ideation or homicidal ideation and visual hallucinations present  Insight:  fair  Judgment:  fair  Oriented to:  time, person, and place  Attention Span and Concentration:  fair  Recent and Remote Memory:  fair  Fund of Knowledge: low-normal  Muscle Strength and Tone: normal  Gait and Station: Normal           Labs:     Results for orders placed or performed during the hospital encounter of 04/06/19   Glucose by meter   Result Value Ref Range    Glucose 305 (H) 70 - 99 mg/dL   Glucose by meter   Result Value Ref Range    Glucose 341 (H) 70 - 99 mg/dL   Glucose by meter   Result Value Ref Range    Glucose 193 (H) 70 - 99 mg/dL   Glucose by meter   Result Value Ref Range    Glucose 168 (H) 70 - 99 mg/dL   Glucose by meter   Result Value Ref Range    Glucose 249 (H) 70 - 99 mg/dL   Glucose by meter   Result Value Ref Range    Glucose 227 (H) 70 - 99 mg/dL   Glucose by meter   Result Value Ref Range    Glucose 242 (H) 70 - 99 mg/dL   Glucose by meter   Result Value Ref Range    Glucose 125 (H) 70 - 99 mg/dL   Glucose by meter   Result Value Ref Range    Glucose 105 (H) 70 - 99 mg/dL   Glucose by meter   Result Value Ref Range    Glucose 255 (H) 70 - 99 mg/dL   Glucose by meter   Result Value Ref Range    Glucose 169 (H) 70 - 99 mg/dL   Glucose by meter   Result Value Ref Range    Glucose 195 (H) 70 - 99 mg/dL   Glucose by meter   Result Value Ref Range    Glucose 121 (H) 70 - 99 mg/dL   Glucose by meter   Result Value Ref Range    Glucose 84 70 - 99 mg/dL   Glucose by meter   Result Value Ref Range    Glucose 130 (H) 70 - 99 mg/dL   Glucose by meter   Result Value Ref Range    Glucose 161 (H) 70 - 99 mg/dL   Glucose by meter   Result  Value Ref Range    Glucose 181 (H) 70 - 99 mg/dL   Glucose by meter   Result Value Ref Range    Glucose 181 (H) 70 - 99 mg/dL   Glucose by meter   Result Value Ref Range    Glucose 195 (H) 70 - 99 mg/dL   Glucose by meter   Result Value Ref Range    Glucose 100 (H) 70 - 99 mg/dL   Glucose by meter   Result Value Ref Range    Glucose 202 (H) 70 - 99 mg/dL   Glucose by meter   Result Value Ref Range    Glucose 114 (H) 70 - 99 mg/dL   Glucose by meter   Result Value Ref Range    Glucose 132 (H) 70 - 99 mg/dL   Glucose by meter   Result Value Ref Range    Glucose 104 (H) 70 - 99 mg/dL   Glucose by meter   Result Value Ref Range    Glucose 91 70 - 99 mg/dL   Glucose by meter   Result Value Ref Range    Glucose 204 (H) 70 - 99 mg/dL   Glucose by meter   Result Value Ref Range    Glucose 87 70 - 99 mg/dL   Glucose by meter   Result Value Ref Range    Glucose 151 (H) 70 - 99 mg/dL   Glucose by meter   Result Value Ref Range    Glucose 98 70 - 99 mg/dL   Glucose by meter   Result Value Ref Range    Glucose 180 (H) 70 - 99 mg/dL   Glucose by meter   Result Value Ref Range    Glucose 126 (H) 70 - 99 mg/dL   Glucose by meter   Result Value Ref Range    Glucose 137 (H) 70 - 99 mg/dL   Glucose by meter   Result Value Ref Range    Glucose 115 (H) 70 - 99 mg/dL   Glucose by meter   Result Value Ref Range    Glucose 108 (H) 70 - 99 mg/dL   Glucose by meter   Result Value Ref Range    Glucose 189 (H) 70 - 99 mg/dL   Glucose by meter   Result Value Ref Range    Glucose 97 70 - 99 mg/dL   Glucose by meter   Result Value Ref Range    Glucose 123 (H) 70 - 99 mg/dL   Glucose by meter   Result Value Ref Range    Glucose 240 (H) 70 - 99 mg/dL   Glucose by meter   Result Value Ref Range    Glucose 134 (H) 70 - 99 mg/dL   Glucose by meter   Result Value Ref Range    Glucose 173 (H) 70 - 99 mg/dL   Glucose by meter   Result Value Ref Range    Glucose 100 (H) 70 - 99 mg/dL   Glucose by meter   Result Value Ref Range    Glucose 115 (H) 70 - 99  mg/dL   HCG qualitative urine   Result Value Ref Range    HCG Qual Urine Negative NEG^Negative   Glucose by meter   Result Value Ref Range    Glucose 197 (H) 70 - 99 mg/dL   Glucose by meter   Result Value Ref Range    Glucose 143 (H) 70 - 99 mg/dL   Glucose by meter   Result Value Ref Range    Glucose 230 (H) 70 - 99 mg/dL   Glucose by meter   Result Value Ref Range    Glucose 151 (H) 70 - 99 mg/dL              Plan:   Depakote level. Cbc. cmp were ordered for 4/18  Increase klonopin 1 mg BID- she is aware this is not long term.  Depo provera was started per request  hcg was negative  Plan is do go door to door to inpatient MI/cd treatment.   Did speak with hospitalist about blood sugars and recommended glyburide 5 mg daily and continuing sliding scale. Increase glyburide after one week if needed. Post hospitalist of blood sugars do not improve or if we need ot increase zyprexa as her sugars have historically been higher on it. .   She is voluntary   ELOS: >5 days for stabilization and safe discharge planning

## 2019-04-16 NOTE — PLAN OF CARE
Patient up and on the unit - continues with rapid and rambling speech - very grandiose in content - she would like a rule 25 so she can go to treatment, however she is not focused and stable enough to complete an assessment at this time.  Our LADC will attempt again in a couple days if she is able to maintain a logical conversation.

## 2019-04-16 NOTE — PLAN OF CARE
BEHAVIORAL TEAM DISCUSSION    Participants: Airam Light NP, Dennise Herrera LICSW, Angie Laws LSW,  Becca Chery LSW, Beena Kumar LGSW, Radha Bills RN, Karley Ron RN, [facility], Natalia Costello OT  Progress: Moderate   Continued Stay Criteria/Rationale: Delusional, grandiose, unable to discharge to a lower level of care due to high risk of relapse   Medical/Physical: diabetic - monitoring blood sugars, metformin, accuchecks and sliding scale  Precautions:        Behavioral Orders   Procedures    Code 1 - Restrict to Unit    Routine Programming       As clinically indicated    Status 15       Every 15 minutes.      Plan: Continue to stabilize, Treatment. ordering urine and blood test - ordering depo   Rationale for change in precautions or plan: NONE     Active Problems:    Schizophrenia (H)      Current Facility-Administered Medications:     acetaminophen (TYLENOL) tablet 975 mg, 975 mg, Oral, TID, Loren Zuleta NP, 975 mg at 04/16/19 0811    atorvastatin (LIPITOR) tablet 20 mg, 20 mg, Oral, At Bedtime, Loren Zuleta NP, 20 mg at 04/15/19 2015    benztropine (COGENTIN) tablet 0.5 mg, 0.5 mg, Oral, BID, Airam Light NP, 0.5 mg at 04/16/19 0812    busPIRone (BUSPAR) tablet 20 mg, 20 mg, Oral, TID, Loren Zuleta NP, 20 mg at 04/16/19 0811    clonazePAM (klonoPIN) tablet 0.5 mg, 0.5 mg, Oral, BID, Airam Light NP, 0.5 mg at 04/16/19 0812    glucose gel 15-30 g, 15-30 g, Oral, Q15 Min PRN **OR** dextrose 50 % injection 25-50 mL, 25-50 mL, Intravenous, Q15 Min PRN **OR** glucagon injection 1 mg, 1 mg, Subcutaneous, Q15 Min PRN, Loren Zuleta NP    divalproex sodium extended-release (DEPAKOTE ER) 24 hr tablet 1,500 mg, 1,500 mg, Oral, At Bedtime, Airam Light NP, 1,500 mg at 04/15/19 2015    gabapentin (NEURONTIN) capsule 1,200 mg, 1,200 mg, Oral, TID, Airam Light NP, 1,200 mg at 04/16/19 0812    hydrOXYzine (ATARAX)  tablet 100 mg, 100 mg, Oral, At Bedtime, Rolando Zuletaista L, NP, 100 mg at 04/15/19 2014    hydrOXYzine (ATARAX) tablet 50 mg, 50 mg, Oral, TID PRN, EllenspRolando cruzLoren L, NP, 50 mg at 04/15/19 0822    ibuprofen (ADVIL/MOTRIN) tablet 800 mg, 800 mg, Oral, TID, Alaspa, Loren L, NP, 800 mg at 04/16/19 0812    insulin aspart (NovoLOG) inj (RAPID ACTING), 1-7 Units, Subcutaneous, TID AC, AlaspaRolandoLoren L, NP, 1 Units at 04/16/19 0803    insulin aspart (NovoLOG) inj (RAPID ACTING), 1-5 Units, Subcutaneous, At Bedtime, Loren Zuleta L, NP, 1 Units at 04/15/19 2015    medroxyPROGESTERone (DEPO-PROVERA) injection 150 mg, 150 mg, Intramuscular, Q90 Days, Airam Light, NP    metaxalone (SKELAXIN) tablet 800 mg, 800 mg, Oral, TID, EllenspRolando cruzLoren L, NP, 800 mg at 04/16/19 0812    metFORMIN (GLUCOPHAGE) tablet 1,000 mg, 1,000 mg, Oral, BID w/meals, Davi Owens MD, 1,000 mg at 04/16/19 0812    OLANZapine (zyPREXA) tablet 30 mg, 30 mg, Oral, At Bedtime, Loren Zuleta, NP, 30 mg at 04/15/19 2015    prazosin (MINIPRESS) capsule 1 mg, 1 mg, Oral, At Bedtime, Airam Light NP, 1 mg at 04/15/19 2015

## 2019-04-16 NOTE — PROGRESS NOTES
"CLINICAL NUTRITION SERVICES  -  ASSESSMENT NOTE    Ginette Wood : follow up    39 yof admitted for schizophrenia. Pt has a hx of type 2 diabetes, hypercholesterolemia, hepatitis C, and is overweight. Most recent A1C 8.7 on 3/12/19. Pt's intake has been adequate since last review. Unsure if admit weight was accurate or if pt gained 11lbs in a week.     Diet Order: Consistent Carbohydrate  Intake: 100% of meals documented since last review    Height: 5' 4\"  Weight: 172 lbs 8 oz  Body mass index is 29.61 kg/m .  Weight Status:  Overweight BMI 25-29.9  IBWR: 108-144lb  Weight History:  161lb- 4/6/19  174lb - 3/12/19  169lb - 9/10/18  171lb - 8/14/18  185lb - 6/15/18  193lb - 3/22/18     Estimated Energy Needs: 1825 kcals (25 Kcal/Kg) - current weight  Estimated Protein Needs: 60-75 grams protein (0.8-1 g pro/Kg)- current weight     Malnutrition Diagnosis: None- If there was any degree of malnutrition it is improving/resolved      NUTRITION RECOMMENDATIONS  - Diet is appropriate  - Diabetes education as outpatient when mental status stabilizes     MONITORING AND EVALUATION:  RD will monitor intake, weight, labs, and appropriate time for diet education.         "

## 2019-04-16 NOTE — PLAN OF CARE
Problem: Adult Behavioral Health Plan of Care  Goal: Patient-Specific Goal (Individualization)  Description  Patient will attend at least 50% of groups while on unit.   Patient will report at least 6-8 hours of sleep at night.  Patient will be compliant with treatment team recommendations.      Pt has been in bed with eyes closed and regular respirations x 7 hours. 15 minute and PRN checks all night. No complaints offered. Will continue to monitor.      Eliza Arndt  4/16/2019  1:52 AM   4/16/2019 0151 by Eliza Arndt RN  Outcome: No Change

## 2019-04-16 NOTE — PLAN OF CARE
Face to face end of shift report received from Olivia MICHEL RN. Rounding completed. Patient observed in bed.     Eliza Arndt  4/16/2019  12:00 AM
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
## 2019-04-17 LAB
GLUCOSE BLDC GLUCOMTR-MCNC: 222 MG/DL (ref 70–99)
GLUCOSE BLDC GLUCOMTR-MCNC: 225 MG/DL (ref 70–99)
GLUCOSE BLDC GLUCOMTR-MCNC: 250 MG/DL (ref 70–99)
GLUCOSE BLDC GLUCOMTR-MCNC: 280 MG/DL (ref 70–99)

## 2019-04-17 PROCEDURE — 25000125 ZZHC RX 250: Performed by: INTERNAL MEDICINE

## 2019-04-17 PROCEDURE — 25000132 ZZH RX MED GY IP 250 OP 250 PS 637: Performed by: NURSE PRACTITIONER

## 2019-04-17 PROCEDURE — 00000146 ZZHCL STATISTIC GLUCOSE BY METER IP

## 2019-04-17 PROCEDURE — 99232 SBSQ HOSP IP/OBS MODERATE 35: CPT | Performed by: NURSE PRACTITIONER

## 2019-04-17 PROCEDURE — 12400000 ZZH R&B MH

## 2019-04-17 RX ADMIN — BENZTROPINE MESYLATE 0.5 MG: 0.5 TABLET ORAL at 21:07

## 2019-04-17 RX ADMIN — INSULIN ASPART 2 UNITS: 100 INJECTION, SOLUTION INTRAVENOUS; SUBCUTANEOUS at 12:00

## 2019-04-17 RX ADMIN — INSULIN ASPART 3 UNITS: 100 INJECTION, SOLUTION INTRAVENOUS; SUBCUTANEOUS at 16:43

## 2019-04-17 RX ADMIN — CLONAZEPAM 1 MG: 1 TABLET ORAL at 21:05

## 2019-04-17 RX ADMIN — METFORMIN HYDROCHLORIDE 1000 MG: 1000 TABLET ORAL at 16:39

## 2019-04-17 RX ADMIN — BENZTROPINE MESYLATE 0.5 MG: 0.5 TABLET ORAL at 08:24

## 2019-04-17 RX ADMIN — CLONAZEPAM 1 MG: 1 TABLET ORAL at 08:24

## 2019-04-17 RX ADMIN — GABAPENTIN 1200 MG: 400 CAPSULE ORAL at 21:08

## 2019-04-17 RX ADMIN — METAXALONE 800 MG: 800 TABLET ORAL at 08:24

## 2019-04-17 RX ADMIN — ACETAMINOPHEN 975 MG: 325 TABLET, FILM COATED ORAL at 08:24

## 2019-04-17 RX ADMIN — INSULIN ASPART 2 UNITS: 100 INJECTION, SOLUTION INTRAVENOUS; SUBCUTANEOUS at 08:06

## 2019-04-17 RX ADMIN — PRAZOSIN HYDROCHLORIDE 1 MG: 1 CAPSULE ORAL at 21:07

## 2019-04-17 RX ADMIN — BUSPIRONE HYDROCHLORIDE 20 MG: 10 TABLET ORAL at 08:23

## 2019-04-17 RX ADMIN — IBUPROFEN 800 MG: 800 TABLET ORAL at 13:15

## 2019-04-17 RX ADMIN — ACETAMINOPHEN 975 MG: 325 TABLET, FILM COATED ORAL at 21:05

## 2019-04-17 RX ADMIN — HYDROXYZINE HYDROCHLORIDE 100 MG: 25 TABLET ORAL at 21:06

## 2019-04-17 RX ADMIN — METAXALONE 800 MG: 800 TABLET ORAL at 13:15

## 2019-04-17 RX ADMIN — BUSPIRONE HYDROCHLORIDE 20 MG: 10 TABLET ORAL at 13:15

## 2019-04-17 RX ADMIN — GLIPIZIDE 5 MG: 5 TABLET ORAL at 08:08

## 2019-04-17 RX ADMIN — ATORVASTATIN CALCIUM 20 MG: 20 TABLET, FILM COATED ORAL at 21:08

## 2019-04-17 RX ADMIN — IBUPROFEN 800 MG: 800 TABLET ORAL at 08:24

## 2019-04-17 RX ADMIN — METAXALONE 800 MG: 800 TABLET ORAL at 21:05

## 2019-04-17 RX ADMIN — DIVALPROEX SODIUM 1500 MG: 500 TABLET, EXTENDED RELEASE ORAL at 21:07

## 2019-04-17 RX ADMIN — METFORMIN HYDROCHLORIDE 1000 MG: 1000 TABLET ORAL at 08:24

## 2019-04-17 RX ADMIN — ACETAMINOPHEN 975 MG: 325 TABLET, FILM COATED ORAL at 13:15

## 2019-04-17 RX ADMIN — BUSPIRONE HYDROCHLORIDE 20 MG: 10 TABLET ORAL at 21:08

## 2019-04-17 RX ADMIN — IBUPROFEN 800 MG: 800 TABLET ORAL at 21:07

## 2019-04-17 RX ADMIN — OLANZAPINE 30 MG: 10 TABLET, FILM COATED ORAL at 21:07

## 2019-04-17 RX ADMIN — GABAPENTIN 1200 MG: 400 CAPSULE ORAL at 13:15

## 2019-04-17 RX ADMIN — GABAPENTIN 1200 MG: 400 CAPSULE ORAL at 08:23

## 2019-04-17 ASSESSMENT — ACTIVITIES OF DAILY LIVING (ADL)
HYGIENE/GROOMING: INDEPENDENT
DRESS: INDEPENDENT
ORAL_HYGIENE: INDEPENDENT
HYGIENE/GROOMING: INDEPENDENT

## 2019-04-17 NOTE — PLAN OF CARE
Face to face end of shift report received from Eliza ALVAREZ RN. Rounding completed. Patient observed.     Radha Bills  4/17/2019  8:00 AM

## 2019-04-17 NOTE — PLAN OF CARE
"  Problem: Adult Behavioral Health Plan of Care  Goal: Patient-Specific Goal (Individualization)  Description  Patient will attend at least 50% of groups while on unit.   Patient will report at least 6-8 hours of sleep at night.  Patient will be compliant with treatment team recommendations.  Patient will  Have a decrease in delusional statements by discharge.  4/17/2019 0943 by Radha Bills RN  Outcome: Improving   Patient was up on the unit at the start of this shift.  Patient remains grandiose and delusional, telling staff about how famous she is but that she is willing to stay here for \"a couple weeks\".  Patient has sore feet/toes; taking scheduled pain medications.  Patient is medication compliant.  Appetite is good and denies issues with sleep, bladder or bowels.  Patient sits in the lounge and writes in between groups.  "

## 2019-04-17 NOTE — PLAN OF CARE
Face to face end of shift report received from Radha MAK. Rounding completed. Patient observed.     Jessica Bailey  4/17/2019  4:10 PM

## 2019-04-17 NOTE — PLAN OF CARE
BEHAVIORAL TEAM DISCUSSION    Participants: Loren Zuleta NP, Dennise Herrera [facility], Angie Laws LSW,  Becca Chery LSW, Beena Kumar SW, Opal Pillai RN, Viridiana Bills RN, Yaneth Ron RN, [facility], Mady Holman OT, Natalia Costello OT, Gena Gomez OT   Progress: Fair: attends some group programming.   Continued Stay Criteria/Rationale: continues with rapid and rambling speech - very grandiose in content. Delusional, unable to discharge to a lower level of care due to high risk of relapse.  Medical/Physical: diabetic - monitoring blood sugars, metformin, accuchecks and sliding scale  Precautions:   Behavioral Orders   Procedures    Code 1 - Restrict to Unit    Routine Programming     As clinically indicated    Status 15     Every 15 minutes.     Plan: Continue to stabilize and Look at treatment.   Rationale for change in precautions or plan: none    Current Facility-Administered Medications:     acetaminophen (TYLENOL) tablet 975 mg, 975 mg, Oral, TID, Loren Zuleta NP, 975 mg at 04/17/19 0824    atorvastatin (LIPITOR) tablet 20 mg, 20 mg, Oral, At Bedtime, Loren Zuleta NP, 20 mg at 04/16/19 2040    benztropine (COGENTIN) tablet 0.5 mg, 0.5 mg, Oral, BID, Kuldeep, April JACKIE Rebollar, 0.5 mg at 04/17/19 0824    busPIRone (BUSPAR) tablet 20 mg, 20 mg, Oral, TID, Loren Zuleta NP, 20 mg at 04/17/19 0823    clonazePAM (klonoPIN) tablet 1 mg, 1 mg, Oral, BID, Kuldeep April JACKIE Rebollar, 1 mg at 04/17/19 0824    glucose gel 15-30 g, 15-30 g, Oral, Q15 Min PRN **OR** dextrose 50 % injection 25-50 mL, 25-50 mL, Intravenous, Q15 Min PRN **OR** glucagon injection 1 mg, 1 mg, Subcutaneous, Q15 Min PRN, Loren Zuleta NP    divalproex sodium extended-release (DEPAKOTE ER) 24 hr tablet 1,500 mg, 1,500 mg, Oral, At Bedtime, Light, April Keturah, NP, 1,500 mg at 04/16/19 2040    gabapentin (NEURONTIN) capsule 1,200 mg, 1,200 mg, Oral, TID, Patterson, April  JACKIE Rebollar, 1,200 mg at 04/17/19 0823    glipiZIDE (GLUCOTROL) tablet 5 mg, 5 mg, Oral, QAM AC, Airam Light, NP, 5 mg at 04/17/19 0808    hydrOXYzine (ATARAX) tablet 100 mg, 100 mg, Oral, At Bedtime, Loren Zuleta, NP, 100 mg at 04/16/19 2040    hydrOXYzine (ATARAX) tablet 50 mg, 50 mg, Oral, TID PRN, Loren Zuleta, NP, 50 mg at 04/15/19 0822    ibuprofen (ADVIL/MOTRIN) tablet 800 mg, 800 mg, Oral, TID, Loren Zuleta, NP, 800 mg at 04/17/19 0824    insulin aspart (NovoLOG) inj (RAPID ACTING), 1-7 Units, Subcutaneous, TID AC, Loren Zuleta, NP, 2 Units at 04/17/19 0806    insulin aspart (NovoLOG) inj (RAPID ACTING), 1-5 Units, Subcutaneous, At Bedtime, Loren Zuleta, NP, 1 Units at 04/15/19 2015    medroxyPROGESTERone (DEPO-PROVERA) injection 150 mg, 150 mg, Intramuscular, Q90 Days, Airam Light NP, 150 mg at 04/16/19 1640    metaxalone (SKELAXIN) tablet 800 mg, 800 mg, Oral, TID, Loren Zuleta, NP, 800 mg at 04/17/19 0824    metFORMIN (GLUCOPHAGE) tablet 1,000 mg, 1,000 mg, Oral, BID w/meals, Davi Owens MD, 1,000 mg at 04/17/19 0824    OLANZapine (zyPREXA) tablet 30 mg, 30 mg, Oral, At Bedtime, Loren Zuleta NP, 30 mg at 04/16/19 2041    prazosin (MINIPRESS) capsule 1 mg, 1 mg, Oral, At Bedtime, Airam Light NP, 1 mg at 04/16/19 2041   Active Problems:    Schizophrenia (H)

## 2019-04-17 NOTE — PLAN OF CARE
"  Problem: Adult Behavioral Health Plan of Care  Goal: Patient-Specific Goal (Individualization)  Description  Patient will attend at least 50% of groups while on unit.   Patient will report at least 6-8 hours of sleep at night.  Patient will be compliant with treatment team recommendations.      Pt has been in bed with eyes closed and regular respirations x 6 hours. 15 minute and PRN checks all night. Pt. C/o painful feet at 2330. \"My feet are swollen and they hurt. I think they might fall off.\" No s/s of edema noted by this writer. Pt. 2nd toenail is black in color bilaterally. Feet have multiple scabs. Pt. Reporting scabs to bunion area of great toes hurt most. Pt. Updated she had been administered all her pain medications @ 2040 and no PRNs are available at this time. Pt. Offered cool pack and to keep feet elevated. Provider sticky noted. Will continue to monitor.      Eliza Arndt  4/17/2019  4:39 AM   4/17/2019 0438 by Eliza Arndt RN  Outcome: No Change     "

## 2019-04-17 NOTE — PROGRESS NOTES
[facility]  Psychiatric Progress Note      Impression:   This is a 39 year old female with a significant history of schizoaffective disorder.     Genie is still disorganized but does show some improvement. She is less pressured than she has been previously. However she is still grandiose and delusional. She talks about not wanting to go to any [facility] based facilities because of everything she did for her [employer] and all of the money she gave them. She also continues to have delusions about having many children. Will continue with current medications. May need to increase depakote. However, may also need to consider additional neuroleptic or alternate neuroleptic as she does not appear to be responding to heroic doses of olanzapine.       Educated regarding medication indications, risks, benefits, side effects, contraindications and possible interactions. Verbally expressed understanding.        DIagnoses:     Schizoaffective disorder  Marijuana use disorder-in remission  Diabetes type 2    Attestation:  Patient has been seen and evaluated by me,  Loren Zuleta NP          Interim History:   The patient's care was discussed with the treatment team and chart notes were reviewed.          Medications:     Current Facility-Administered Medications Ordered in Epic   Medication Dose Route Frequency Last Rate Last Dose     acetaminophen (TYLENOL) tablet 975 mg  975 mg Oral TID   975 mg at 04/17/19 0824     atorvastatin (LIPITOR) tablet 20 mg  20 mg Oral At Bedtime   20 mg at 04/16/19 2040     benztropine (COGENTIN) tablet 0.5 mg  0.5 mg Oral BID   0.5 mg at 04/17/19 0824     busPIRone (BUSPAR) tablet 20 mg  20 mg Oral TID   20 mg at 04/17/19 0823     clonazePAM (klonoPIN) tablet 1 mg  1 mg Oral BID   1 mg at 04/17/19 0824     glucose gel 15-30 g  15-30 g Oral Q15 Min PRN        Or     dextrose 50 % injection 25-50 mL  25-50 mL Intravenous Q15 Min PRN        Or     glucagon injection 1 mg  1 mg Subcutaneous  "Q15 Min PRN         divalproex sodium extended-release (DEPAKOTE ER) 24 hr tablet 1,500 mg  1,500 mg Oral At Bedtime   1,500 mg at 04/16/19 2040     gabapentin (NEURONTIN) capsule 1,200 mg  1,200 mg Oral TID   1,200 mg at 04/17/19 0823     glipiZIDE (GLUCOTROL) tablet 5 mg  5 mg Oral QAM AC   5 mg at 04/17/19 0808     hydrOXYzine (ATARAX) tablet 100 mg  100 mg Oral At Bedtime   100 mg at 04/16/19 2040     hydrOXYzine (ATARAX) tablet 50 mg  50 mg Oral TID PRN   50 mg at 04/15/19 0822     ibuprofen (ADVIL/MOTRIN) tablet 800 mg  800 mg Oral TID   800 mg at 04/17/19 0824     insulin aspart (NovoLOG) inj (RAPID ACTING)  1-7 Units Subcutaneous TID AC   2 Units at 04/17/19 0806     insulin aspart (NovoLOG) inj (RAPID ACTING)  1-5 Units Subcutaneous At Bedtime   1 Units at 04/15/19 2015     medroxyPROGESTERone (DEPO-PROVERA) injection 150 mg  150 mg Intramuscular Q90 Days   150 mg at 04/16/19 1640     metaxalone (SKELAXIN) tablet 800 mg  800 mg Oral TID   800 mg at 04/17/19 0824     metFORMIN (GLUCOPHAGE) tablet 1,000 mg  1,000 mg Oral BID w/meals   1,000 mg at 04/17/19 0824     OLANZapine (zyPREXA) tablet 30 mg  30 mg Oral At Bedtime   30 mg at 04/16/19 2041     prazosin (MINIPRESS) capsule 1 mg  1 mg Oral At Bedtime   1 mg at 04/16/19 2041     No current Epic-ordered outpatient medications on file.              10 point ROS negative C/O back pain ad neuropathic pain.        Allergies:   No Known Allergies         Psychiatric Examination:   /74   Pulse 99   Temp 98.5  F (36.9  C) (Tympanic)   Resp 12   Ht 1.626 m (5' 4\")   Wt 78.2 kg (172 lb 8 oz)   SpO2 99%   BMI 29.61 kg/m    Weight is 172 lbs 8 oz  Body mass index is 29.61 kg/m .    Appearance:  awake, alert, adequately groomed and dressed in hospital scrubs  Attitude:  cooperative  Eye Contact:  good  Mood:  anxious  Affect:  mood congruent  Speech:  pressured speech and rambling  Psychomotor Behavior:  no evidence of tardive dyskinesia, dystonia, or tics " and intact station, gait and muscle tone  Thought Process:  disorganized and tangental  Associations:  loosening of associations present  Thought Content:  no evidence of suicidal ideation or homicidal ideation and visual hallucinations present  Insight:  fair  Judgment:  fair  Oriented to:  time, person, and place  Attention Span and Concentration:  fair  Recent and Remote Memory:  fair  Fund of Knowledge: low-normal  Muscle Strength and Tone: normal  Gait and Station: Normal           Labs:     Results for orders placed or performed during the hospital encounter of 04/06/19   Glucose by meter   Result Value Ref Range    Glucose 305 (H) 70 - 99 mg/dL   Glucose by meter   Result Value Ref Range    Glucose 341 (H) 70 - 99 mg/dL   Glucose by meter   Result Value Ref Range    Glucose 193 (H) 70 - 99 mg/dL   Glucose by meter   Result Value Ref Range    Glucose 168 (H) 70 - 99 mg/dL   Glucose by meter   Result Value Ref Range    Glucose 249 (H) 70 - 99 mg/dL   Glucose by meter   Result Value Ref Range    Glucose 227 (H) 70 - 99 mg/dL   Glucose by meter   Result Value Ref Range    Glucose 242 (H) 70 - 99 mg/dL   Glucose by meter   Result Value Ref Range    Glucose 125 (H) 70 - 99 mg/dL   Glucose by meter   Result Value Ref Range    Glucose 105 (H) 70 - 99 mg/dL   Glucose by meter   Result Value Ref Range    Glucose 255 (H) 70 - 99 mg/dL   Glucose by meter   Result Value Ref Range    Glucose 169 (H) 70 - 99 mg/dL   Glucose by meter   Result Value Ref Range    Glucose 195 (H) 70 - 99 mg/dL   Glucose by meter   Result Value Ref Range    Glucose 121 (H) 70 - 99 mg/dL   Glucose by meter   Result Value Ref Range    Glucose 84 70 - 99 mg/dL   Glucose by meter   Result Value Ref Range    Glucose 130 (H) 70 - 99 mg/dL   Glucose by meter   Result Value Ref Range    Glucose 161 (H) 70 - 99 mg/dL   Glucose by meter   Result Value Ref Range    Glucose 181 (H) 70 - 99 mg/dL   Glucose by meter   Result Value Ref Range    Glucose 181 (H)  70 - 99 mg/dL   Glucose by meter   Result Value Ref Range    Glucose 195 (H) 70 - 99 mg/dL   Glucose by meter   Result Value Ref Range    Glucose 100 (H) 70 - 99 mg/dL   Glucose by meter   Result Value Ref Range    Glucose 202 (H) 70 - 99 mg/dL   Glucose by meter   Result Value Ref Range    Glucose 114 (H) 70 - 99 mg/dL   Glucose by meter   Result Value Ref Range    Glucose 132 (H) 70 - 99 mg/dL   Glucose by meter   Result Value Ref Range    Glucose 104 (H) 70 - 99 mg/dL   Glucose by meter   Result Value Ref Range    Glucose 91 70 - 99 mg/dL   Glucose by meter   Result Value Ref Range    Glucose 204 (H) 70 - 99 mg/dL   Glucose by meter   Result Value Ref Range    Glucose 87 70 - 99 mg/dL   Glucose by meter   Result Value Ref Range    Glucose 151 (H) 70 - 99 mg/dL   Glucose by meter   Result Value Ref Range    Glucose 98 70 - 99 mg/dL   Glucose by meter   Result Value Ref Range    Glucose 180 (H) 70 - 99 mg/dL   Glucose by meter   Result Value Ref Range    Glucose 126 (H) 70 - 99 mg/dL   Glucose by meter   Result Value Ref Range    Glucose 137 (H) 70 - 99 mg/dL   Glucose by meter   Result Value Ref Range    Glucose 115 (H) 70 - 99 mg/dL   Glucose by meter   Result Value Ref Range    Glucose 108 (H) 70 - 99 mg/dL   Glucose by meter   Result Value Ref Range    Glucose 189 (H) 70 - 99 mg/dL   Glucose by meter   Result Value Ref Range    Glucose 97 70 - 99 mg/dL   Glucose by meter   Result Value Ref Range    Glucose 123 (H) 70 - 99 mg/dL   Glucose by meter   Result Value Ref Range    Glucose 240 (H) 70 - 99 mg/dL   Glucose by meter   Result Value Ref Range    Glucose 134 (H) 70 - 99 mg/dL   Glucose by meter   Result Value Ref Range    Glucose 173 (H) 70 - 99 mg/dL   Glucose by meter   Result Value Ref Range    Glucose 100 (H) 70 - 99 mg/dL   Glucose by meter   Result Value Ref Range    Glucose 115 (H) 70 - 99 mg/dL   HCG qualitative urine   Result Value Ref Range    HCG Qual Urine Negative NEG^Negative   Glucose by meter    Result Value Ref Range    Glucose 197 (H) 70 - 99 mg/dL   Glucose by meter   Result Value Ref Range    Glucose 143 (H) 70 - 99 mg/dL   Glucose by meter   Result Value Ref Range    Glucose 230 (H) 70 - 99 mg/dL   Glucose by meter   Result Value Ref Range    Glucose 151 (H) 70 - 99 mg/dL   Glucose by meter   Result Value Ref Range    Glucose 167 (H) 70 - 99 mg/dL   Glucose by meter   Result Value Ref Range    Glucose 206 (H) 70 - 99 mg/dL   Glucose by meter   Result Value Ref Range    Glucose 222 (H) 70 - 99 mg/dL   Glucose by meter   Result Value Ref Range    Glucose 225 (H) 70 - 99 mg/dL              Plan:   Depakote level. Cbc. cmp were ordered for 4/18  Increase klonopin 1 mg BID- she is aware this is not long term.  Depo provera was started per request  hcg was negative  Plan is do go door to door to inpatient MI/cd treatment.   Did speak with hospitalist about blood sugars and recommended glyburide 5 mg daily and continuing sliding scale. Increase glyburide after one week if needed. Post hospitalist of blood sugars do not improve or if we need ot increase zyprexa as her sugars have historically been higher on it. .   She is voluntary   ELOS: >5 days for stabilization and safe discharge planning

## 2019-04-17 NOTE — PLAN OF CARE
Face to face end of shift report received from Lisbet ALVAREZ RN. Rounding completed. Patient observed in [facility].     Eliza Arndt  4/16/2019  11:58 PM

## 2019-04-18 ENCOUNTER — OFFICE VISIT (OUTPATIENT)
Dept: PODIATRY | Facility: OTHER | Age: 39
DRG: 885 | End: 2019-04-18
Attending: PODIATRIST
Payer: COMMERCIAL

## 2019-04-18 VITALS — SYSTOLIC BLOOD PRESSURE: 128 MMHG | TEMPERATURE: 98.6 F | DIASTOLIC BLOOD PRESSURE: 78 MMHG | HEART RATE: 93 BPM

## 2019-04-18 DIAGNOSIS — S90.222A SUBUNGUAL HEMATOMA OF FOOT, LEFT, INITIAL ENCOUNTER: ICD-10-CM

## 2019-04-18 DIAGNOSIS — E11.9 DIABETES MELLITUS TYPE 2, INSULIN DEPENDENT (H): ICD-10-CM

## 2019-04-18 DIAGNOSIS — S90.221A SUBUNGUAL HEMATOMA OF FOOT, RIGHT, INITIAL ENCOUNTER: Primary | ICD-10-CM

## 2019-04-18 DIAGNOSIS — Z79.4 DIABETES MELLITUS TYPE 2, INSULIN DEPENDENT (H): ICD-10-CM

## 2019-04-18 DIAGNOSIS — L60.3 ONYCHODYSTROPHY: ICD-10-CM

## 2019-04-18 LAB
ALBUMIN SERPL-MCNC: 3.5 G/DL (ref 3.4–5)
ALP SERPL-CCNC: 60 U/L (ref 40–150)
ALT SERPL W P-5'-P-CCNC: 17 U/L (ref 0–50)
ANION GAP SERPL CALCULATED.3IONS-SCNC: 7 MMOL/L (ref 3–14)
AST SERPL W P-5'-P-CCNC: 7 U/L (ref 0–45)
BASOPHILS # BLD AUTO: 0 10E9/L (ref 0–0.2)
BASOPHILS NFR BLD AUTO: 0.3 %
BILIRUB SERPL-MCNC: 0.1 MG/DL (ref 0.2–1.3)
BUN SERPL-MCNC: 14 MG/DL (ref 7–30)
CALCIUM SERPL-MCNC: 9.2 MG/DL (ref 8.5–10.1)
CHLORIDE SERPL-SCNC: 108 MMOL/L (ref 94–109)
CO2 SERPL-SCNC: 25 MMOL/L (ref 20–32)
CREAT SERPL-MCNC: 0.53 MG/DL (ref 0.52–1.04)
DIFFERENTIAL METHOD BLD: NORMAL
EOSINOPHIL # BLD AUTO: 0.1 10E9/L (ref 0–0.7)
EOSINOPHIL NFR BLD AUTO: 0.9 %
ERYTHROCYTE [DISTWIDTH] IN BLOOD BY AUTOMATED COUNT: 13.2 % (ref 10–15)
GFR SERPL CREATININE-BSD FRML MDRD: >90 ML/MIN/{1.73_M2}
GLUCOSE BLDC GLUCOMTR-MCNC: 245 MG/DL (ref 70–99)
GLUCOSE BLDC GLUCOMTR-MCNC: 264 MG/DL (ref 70–99)
GLUCOSE BLDC GLUCOMTR-MCNC: 271 MG/DL (ref 70–99)
GLUCOSE SERPL-MCNC: 289 MG/DL (ref 70–99)
HCT VFR BLD AUTO: 35.1 % (ref 35–47)
HGB BLD-MCNC: 11.7 G/DL (ref 11.7–15.7)
IMM GRANULOCYTES # BLD: 0.1 10E9/L (ref 0–0.4)
IMM GRANULOCYTES NFR BLD: 0.8 %
LYMPHOCYTES # BLD AUTO: 2.7 10E9/L (ref 0.8–5.3)
LYMPHOCYTES NFR BLD AUTO: 34.8 %
MCH RBC QN AUTO: 28.9 PG (ref 26.5–33)
MCHC RBC AUTO-ENTMCNC: 33.3 G/DL (ref 31.5–36.5)
MCV RBC AUTO: 87 FL (ref 78–100)
MONOCYTES # BLD AUTO: 0.6 10E9/L (ref 0–1.3)
MONOCYTES NFR BLD AUTO: 7.7 %
NEUTROPHILS # BLD AUTO: 4.4 10E9/L (ref 1.6–8.3)
NEUTROPHILS NFR BLD AUTO: 55.5 %
NRBC # BLD AUTO: 0 10*3/UL
NRBC BLD AUTO-RTO: 0 /100
PLATELET # BLD AUTO: 247 10E9/L (ref 150–450)
POTASSIUM SERPL-SCNC: 4.5 MMOL/L (ref 3.4–5.3)
PROT SERPL-MCNC: 7 G/DL (ref 6.8–8.8)
RBC # BLD AUTO: 4.05 10E12/L (ref 3.8–5.2)
SODIUM SERPL-SCNC: 140 MMOL/L (ref 133–144)
VALPROATE SERPL-MCNC: 45 MG/L (ref 50–100)
WBC # BLD AUTO: 7.8 10E9/L (ref 4–11)

## 2019-04-18 PROCEDURE — 00000146 ZZHCL STATISTIC GLUCOSE BY METER IP

## 2019-04-18 PROCEDURE — 12400000 ZZH R&B MH

## 2019-04-18 PROCEDURE — 80164 ASSAY DIPROPYLACETIC ACD TOT: CPT | Performed by: NURSE PRACTITIONER

## 2019-04-18 PROCEDURE — 36415 COLL VENOUS BLD VENIPUNCTURE: CPT | Performed by: NURSE PRACTITIONER

## 2019-04-18 PROCEDURE — 25000125 ZZHC RX 250: Performed by: INTERNAL MEDICINE

## 2019-04-18 PROCEDURE — 85025 COMPLETE CBC W/AUTO DIFF WBC: CPT | Performed by: NURSE PRACTITIONER

## 2019-04-18 PROCEDURE — 00000146 ZZHCL STATISTIC GLUCOSE BY METER IP: Performed by: PSYCHIATRY & NEUROLOGY

## 2019-04-18 PROCEDURE — 99232 SBSQ HOSP IP/OBS MODERATE 35: CPT | Performed by: NURSE PRACTITIONER

## 2019-04-18 PROCEDURE — 99222 1ST HOSP IP/OBS MODERATE 55: CPT | Performed by: PODIATRIST

## 2019-04-18 PROCEDURE — 80053 COMPREHEN METABOLIC PANEL: CPT | Performed by: NURSE PRACTITIONER

## 2019-04-18 PROCEDURE — 36415 COLL VENOUS BLD VENIPUNCTURE: CPT | Mod: ZL

## 2019-04-18 PROCEDURE — G0463 HOSPITAL OUTPT CLINIC VISIT: HCPCS

## 2019-04-18 PROCEDURE — 82962 GLUCOSE BLOOD TEST: CPT | Mod: ZL

## 2019-04-18 PROCEDURE — 25000132 ZZH RX MED GY IP 250 OP 250 PS 637: Performed by: NURSE PRACTITIONER

## 2019-04-18 RX ORDER — PALIPERIDONE 3 MG/1
3 TABLET, EXTENDED RELEASE ORAL DAILY
Status: DISCONTINUED | OUTPATIENT
Start: 2019-04-18 | End: 2019-04-22

## 2019-04-18 RX ADMIN — CLONAZEPAM 1 MG: 1 TABLET ORAL at 20:34

## 2019-04-18 RX ADMIN — METAXALONE 800 MG: 800 TABLET ORAL at 08:10

## 2019-04-18 RX ADMIN — GABAPENTIN 1200 MG: 400 CAPSULE ORAL at 20:34

## 2019-04-18 RX ADMIN — HYDROXYZINE HYDROCHLORIDE 100 MG: 25 TABLET ORAL at 20:33

## 2019-04-18 RX ADMIN — METFORMIN HYDROCHLORIDE 1000 MG: 1000 TABLET ORAL at 16:25

## 2019-04-18 RX ADMIN — METAXALONE 800 MG: 800 TABLET ORAL at 20:33

## 2019-04-18 RX ADMIN — ACETAMINOPHEN 975 MG: 325 TABLET, FILM COATED ORAL at 08:10

## 2019-04-18 RX ADMIN — GABAPENTIN 1200 MG: 400 CAPSULE ORAL at 08:13

## 2019-04-18 RX ADMIN — BENZTROPINE MESYLATE 0.5 MG: 0.5 TABLET ORAL at 08:11

## 2019-04-18 RX ADMIN — INSULIN ASPART 4 UNITS: 100 INJECTION, SOLUTION INTRAVENOUS; SUBCUTANEOUS at 11:48

## 2019-04-18 RX ADMIN — IBUPROFEN 800 MG: 800 TABLET ORAL at 08:09

## 2019-04-18 RX ADMIN — PALIPERIDONE 3 MG: 3 TABLET, EXTENDED RELEASE ORAL at 12:05

## 2019-04-18 RX ADMIN — GABAPENTIN 1200 MG: 400 CAPSULE ORAL at 13:29

## 2019-04-18 RX ADMIN — IBUPROFEN 800 MG: 800 TABLET ORAL at 13:28

## 2019-04-18 RX ADMIN — CLONAZEPAM 1 MG: 1 TABLET ORAL at 08:13

## 2019-04-18 RX ADMIN — BUSPIRONE HYDROCHLORIDE 20 MG: 10 TABLET ORAL at 13:27

## 2019-04-18 RX ADMIN — ACETAMINOPHEN 975 MG: 325 TABLET, FILM COATED ORAL at 20:33

## 2019-04-18 RX ADMIN — BENZTROPINE MESYLATE 0.5 MG: 0.5 TABLET ORAL at 20:34

## 2019-04-18 RX ADMIN — BUSPIRONE HYDROCHLORIDE 20 MG: 10 TABLET ORAL at 20:33

## 2019-04-18 RX ADMIN — METAXALONE 800 MG: 800 TABLET ORAL at 13:29

## 2019-04-18 RX ADMIN — PRAZOSIN HYDROCHLORIDE 1 MG: 1 CAPSULE ORAL at 20:34

## 2019-04-18 RX ADMIN — METFORMIN HYDROCHLORIDE 1000 MG: 1000 TABLET ORAL at 08:10

## 2019-04-18 RX ADMIN — GLIPIZIDE 5 MG: 5 TABLET ORAL at 08:08

## 2019-04-18 RX ADMIN — ACETAMINOPHEN 975 MG: 325 TABLET, FILM COATED ORAL at 13:27

## 2019-04-18 RX ADMIN — IBUPROFEN 800 MG: 800 TABLET ORAL at 20:33

## 2019-04-18 RX ADMIN — INSULIN ASPART 3 UNITS: 100 INJECTION, SOLUTION INTRAVENOUS; SUBCUTANEOUS at 16:30

## 2019-04-18 RX ADMIN — ATORVASTATIN CALCIUM 20 MG: 20 TABLET, FILM COATED ORAL at 20:34

## 2019-04-18 RX ADMIN — INSULIN ASPART 3 UNITS: 100 INJECTION, SOLUTION INTRAVENOUS; SUBCUTANEOUS at 08:06

## 2019-04-18 RX ADMIN — DIVALPROEX SODIUM 1500 MG: 500 TABLET, EXTENDED RELEASE ORAL at 20:34

## 2019-04-18 RX ADMIN — BUSPIRONE HYDROCHLORIDE 20 MG: 10 TABLET ORAL at 08:12

## 2019-04-18 ASSESSMENT — ACTIVITIES OF DAILY LIVING (ADL)
LAUNDRY: UNABLE TO COMPLETE
HYGIENE/GROOMING: INDEPENDENT
DRESS: INDEPENDENT
DRESS: INDEPENDENT;SCRUBS (BEHAVIORAL HEALTH)
LAUNDRY: UNABLE TO COMPLETE
HYGIENE/GROOMING: INDEPENDENT
ORAL_HYGIENE: INDEPENDENT
ORAL_HYGIENE: INDEPENDENT

## 2019-04-18 ASSESSMENT — PAIN SCALES - GENERAL: PAINLEVEL: EXTREME PAIN (8)

## 2019-04-18 NOTE — DISCHARGE INSTRUCTIONS
Behavioral Discharge Planning and Instructions    Summary: Genie came into the ED with increased hallucinations and has been off her meds.    Main Diagnosis: Schizoaffective disorder, Marijuana use disorder-in remission    Major Treatments, Procedures and Findings: Stabilize with medications, connect with community programs.    Symptoms to Report: feeling more aggressive, increased confusion, losing more sleep, mood getting worse or thoughts of suicide    Lifestyle Adjustment: Take all medications as prescribed, meet with doctor/ medication provider as needed. Abstain from alcohol or any unprescribed drugs.  We recommend you complete a chemical health assessment and follow the recommendations.     Psychiatry Follow-up:     [facility]   Medication Management- [address]  [address] [address]  Phone: [phone]  Fax: [phone]    [facility] [address]  [address]  [place]  [address]  Phone [phone]  Fax - [phone] or [phone]  Transportation - [phone]    Resources:   Crisis Intervention: 295.451.2025 or 864-040-1693 (TTY: 662.125.8916).  Call anytime for help.  National [place] on Mental Illness (www.[place].paramjit.org): 148.134.6085 or 447-683-7896.  Alcoholics Anonymous (www.alcoholics-anonymous.org): Check your phone book for your local chapter.  Suicide Awareness Voices of Education (SAVE) (www.save.org): 681-728-WBUO (5814)  National Suicide Prevention Line (www.mentalhealthmn.org): 687-781-RNAT (7058)  Mental Health Consumer/Survivor Network of [place] (www.mhcsn.net): 297.214.8505 or 387-034-0944  Mental Health Association of [place] (www.mentalhealth.org): 670.352.4378 or 020-269-1064    General Medication Instructions:   See your medication sheet(s) for instructions.   Take all medicines as directed.  Make no changes unless your doctor suggests them.   Go to all your doctor visits.  Be sure to have all your required lab tests. This way, your medicines can be  "refilled on time.  Do not use any drugs not prescribed by your doctor.  Avoid alcohol.    [place] Area:  [facility], Crisis stabilization [facility]- [phone]  [facility] Crisis Line: 1-695.852.3159  Advocates For Family Peace: 058-5096  Sexual Assault Program of [facility]: 682.717.2347 or 1-815.120.2437  [place] [facility]attered Women[facility] Program: 1-249.105.1563 Ext: 279       Calls answered Mon-Fri-8:00 am--4:30 pm    [place]:  Adv[facility]ates for Family Peace: 1-161.229.7876  [facility] first call for help: 9-854-449-8542  [facility] Crisis Center:  (593) [phone]      [place] Area:  Warm Line: 1-525.470.4544       Calls answered Tuesday--Saturday 4:00 pm--10:00 pm  [facility] Crisis Line - [phone]  [facility] Crisis Stabilization 108-437-5212    [place] Statewide:  [place] Crisis and Referral Services: 1-381.413.3194  National Suicide Prevention Lifeline: 9-428-098-TALK (8906)   - dig8ilpw- Text \"Life\" to 29758  First Call for Help: 2-1-1  [facility] Helpline- 5-309-KLJY-HELP      "

## 2019-04-18 NOTE — PLAN OF CARE
Face to face end of shift report received from Crow Hernández completed. Patient observed.     Sulma Hirsch  4/18/2019  10:25 AM

## 2019-04-18 NOTE — PLAN OF CARE
Problem: Adult Behavioral Health Plan of Care  Goal: Patient-Specific Goal (Individualization)  Description  Patient will attend at least 50% of groups while on unit.   Patient will report at least 6-8 hours of sleep at night.  Patient will be compliant with treatment team recommendations.    Patient will have a decrease in delusional statements by discharge.   4/18/2019 0555 by Crow Leyva RN  Outcome: No Change   Pt has been in bed with eyes closed and regular respirations observed all night. Will continue to monitor.

## 2019-04-18 NOTE — PLAN OF CARE
"Patient down to Wound Care via wheelchair with  for consult of 2nd digits of feet bilaterally r/t pain and discolored nails.  Patient became anxious and rambling delusional statements.  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  Patient rambled about being shot with 13 bullets and 12 arrows and she had lived.  Patient stated she has had multiple children with [employer], Obama, Trump, [place], and Denilson Escabar.  Patient states she is \"royalty\" and had once been skiing in [place] where she fed her pet Siberian Tiger named Tonie.  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  Patient eventually calmed, less cussing and less delusional when podiatrist started to examine and assure patient that there was no infection.  Toe nails were trimmed and patient stated relief from pain.  Podiatrist suggested epsom salt foot soaks at HS.  Informed Loren JENNINGS NP of recommendation for foot soaks at HS per telephone.  "

## 2019-04-18 NOTE — PLAN OF CARE
Problem: Adult Behavioral Health Plan of Care  Goal: Patient-Specific Goal (Individualization)  Description  Patient will attend at least 50% of groups while on unit.   Patient will report at least 6-8 hours of sleep at night.  Patient will be compliant with treatment team recommendations.    Patient will have a decrease in delusional statements by discharge.   4/17/2019 2244 by Jessica Bailey RN  Outcome: No Change     Problem: Adult Behavioral Health Plan of Care  Goal: Adheres to Safety Considerations for Self and Others  Outcome: Improving     Problem: Adult Behavioral Health Plan of Care  Goal: Optimized Coping Skills in Response to Life Stressors  Outcome: Improving   Patient is seen in lounge at start of shift, her affect is flat, she does answer questions, but is short with answers, does not give much detail. Patient is in lounge for the majority of the shift and writing in her journal. Patient does interact with others on the unit, but does not seek out others. Patient states she is still having trouble with her feet, states her feet are turning black, this was assessed and skin is intact, however the second toe on each foot has a thick black toenail, patient feels as though she may have worn shoes that were too small for her and caused the nails to be bruised. Medications are gone over with patient and she seems to know and understand her medication regime. With HS medication pass, patient is seen in her room she is much more talkative and joking with her room mate. Patient is compliant with medication regime.

## 2019-04-18 NOTE — PLAN OF CARE
Problem: Adult Behavioral Health Plan of Care  Goal: Patient-Specific Goal (Individualization)  Description  Patient will attend at least 50% of groups while on unit.   Patient will report at least 6-8 hours of sleep at night.  Patient will be compliant with treatment team recommendations.    Patient will have a decrease in delusional statements by discharge.   4/18/2019 1432 by Sulma Hirsch RN  Outcome: No Change  Patient denies suicidal, homicidal ideation, Denies hallucinations, depression, or anxiety. Delusional statements when allowed to speak and direct questions are not asked. Describes   bruised toenails as pain a 10 out of 10 with no change when asked later in day, then many delusional comments intertwined , not sure if can do accurate appraisal. Did have consult today regarding toes, and had nails assessed and trimmed. Initially cited relief than made a comment it made it worse, with bizarre cosmos comments. Attempted to teach regarding new medication, said she can speed read and grabbed papers.
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
## 2019-04-18 NOTE — PLAN OF CARE
Face to face end of shift report received from Sulma MAK. Rounding completed. Patient observed.     Jessica Bailey  4/18/2019  4:09 PM

## 2019-04-18 NOTE — PLAN OF CARE
Face to face end of shift report received from Jessica MICHEL RN. Rounding completed. Patient observed.     Crow Leyva  4/17/2019  11:54 PM

## 2019-04-18 NOTE — PROGRESS NOTES
Chief complaint: Patient presents with:  Foot Problems: right foot        History of Present Illness: This 39 year old IDDM II female with Schizoaffective disorder, bipolar type, is currently an inpatient on the fifth floor being seen for concerns regarding a bilateral black toe. She says she has been an inpatient for about a week. She has been wearing socks and she was also wearing socks when she was at home. She is very concerned she may lose her toes because she says they turned black. The bilateral 2nd digits are very painful and causing pain radiating up her midfoot. She says she previously had a fracture repair of her 1st and/or 2nd metatarsal of her LEFT foot with ORIF. She denies tingling, burning or numbness in her feet. No further pedal complaints today.       /78   Pulse 93   Temp 98.6  F (37  C)     Patient Active Problem List   Diagnosis     Mixed hyperlipidemia     Diabetes mellitus, type 2     Schizoaffective disorder, bipolar type (H)     Schizophrenia (H)     Depression     Schizoaffective disorder (H)       Past Surgical History:   Procedure Laterality Date      SECTION       x 3     FOOT SURGERY      Right and left foot fracture repair       No current facility-administered medications for this visit.      No current outpatient medications on file.     Facility-Administered Medications Ordered in Other Visits   Medication     acetaminophen (TYLENOL) tablet 975 mg     atorvastatin (LIPITOR) tablet 20 mg     benztropine (COGENTIN) tablet 0.5 mg     busPIRone (BUSPAR) tablet 20 mg     clonazePAM (klonoPIN) tablet 1 mg     glucose gel 15-30 g    Or     dextrose 50 % injection 25-50 mL    Or     glucagon injection 1 mg     divalproex sodium extended-release (DEPAKOTE ER) 24 hr tablet 1,500 mg     gabapentin (NEURONTIN) capsule 1,200 mg     glipiZIDE (GLUCOTROL) tablet 5 mg     hydrOXYzine (ATARAX) tablet 100 mg     hydrOXYzine (ATARAX) tablet 50 mg     ibuprofen (ADVIL/MOTRIN) tablet  800 mg     insulin aspart (NovoLOG) inj (RAPID ACTING)     insulin aspart (NovoLOG) inj (RAPID ACTING)     medroxyPROGESTERone (DEPO-PROVERA) injection 150 mg     metaxalone (SKELAXIN) tablet 800 mg     metFORMIN (GLUCOPHAGE) tablet 1,000 mg     paliperidone ER (INVEGA) 24 hr tablet 3 mg     prazosin (MINIPRESS) capsule 1 mg        No Known Allergies    Family History   Problem Relation Age of Onset     C.A.D. Mother      Diabetes Mother      C.A.D. Father      Diabetes Father        Social History     Socioeconomic History     Marital status:      Spouse name: None     Number of children: 3     Years of education: None     Highest education level: None   Occupational History     Employer: NONE    Social Needs     Financial resource strain: None     Food insecurity:     Worry: None     Inability: None     Transportation needs:     Medical: None     Non-medical: None   Tobacco Use     Smoking status: Current Every Day Smoker     Packs/day: 0.50     Years: 15.00     Pack years: 7.50     Types: Cigarettes     Smokeless tobacco: Never Used   Substance and Sexual Activity     Alcohol use: No     Drug use: Yes     Types: Marijuana     Sexual activity: Not Currently     Partners: Male     Comment: Depo injections   Lifestyle     Physical activity:     Days per week: None     Minutes per session: None     Stress: None   Relationships     Social connections:     Talks on phone: None     Gets together: None     Attends Oriental orthodox service: None     Active member of club or organization: None     Attends meetings of clubs or organizations: None     Relationship status: None     Intimate partner violence:     Fear of current or ex partner: None     Emotionally abused: None     Physically abused: None     Forced sexual activity: None   Other Topics Concern     None   Social History Narrative    November 14, 2014: Currently homeless. Living in her mother's car. Lost her children a month ago to foster care with supervision.         ROS: 10 point ROS neg other than the symptoms noted above in the HPI.  EXAM  Constitutional: Alert, mild distress, able to answer questions appropriately    Psychiatric: mentation appears anxious and agitated.     VASCULAR:  -Dorsalis pedis pulse +1/4 b/l  -Posterior tibial pulse +0/4 b/l  -Capillary refill time < 3 seconds to b/l hallux    NEURO:  -Light touch sensation intact to b/l plantar forefoot  DERM:  -Black toenail to the proximal 3/4 of the RIGHT 2nd digit nail bed and the LEFT proximal 2/3 of the digit nail bed  ---No discoloration of the roxanne-nail skin medially, laterally, proximally or distally (post debridement of the toenails)  ---Bilateral 2nd digit toenails well adhered where the blood is under the nail  ---No severe erythema, no ascending erythema, no calor, no purulence, no malodor, no other SOI.   -Mild incurvation to bilateral borders of bilateral hallux (medial > lateral)   ---No severe erythema, no ascending erythema, no calor, no purulence, no malodor, no other SOI.  -Toenails elongated, dystrophic and discolored x 10  -Skin temperature, texture and turgor WNL b/l  -Cicatirx to LEFT dorsal 1st ray  -Mild, diffuse hyperkeratotic lesion with minimal roxanne callus hyperkeratotic lesion to medial RIGHT 1st metatarsal head  MSK:  -Pain on palpation to bilateral dorsal 2nd digital toenails  -Muscle strength of ankles +5/5 for dorsiflexion, plantarflexion, ABDUction and ADDuction b/l  -Decreased ROM of 1st MTPJ with forefoot loading   -Ankle joint passive ROM within normal limits except for dorsiflexion:    Dorsiflexion, RIGHT Straight knee 0 degrees    Dorsiflexion, RIGHT Bent knee +5 degrees past vertical    Dorsiflexion, LEFT Straight knee 0 degrees    Dorsiflexion, LEFT Bent knee +5 degrees past vertical    ============================================================    ASSESSMENT:  (D37.221A) Subungual hematoma of foot, right, initial encounter  (primary encounter diagnosis)    (S91.887X)  Subungual hematoma of foot, left, initial encounter    (L60.3) Onychodystrophy    (E11.9,  Z79.4) Diabetes mellitus type 2, insulin dependent (H)      PLAN:  -Patient evaluated and examined. Treatment options discussed. Patient was assisted by a nurse and a  (only the nurse was in the exam room).  -Nails debrided x 10 without incident  ---Debrided distal end of bilateral 2nd digits to the level of the subungual hematoma. Patient had great pain relief post debridement of the toenails.  ---Mild slant back to bilateral hallux bilateral borders  -Assured patient she has no infection of her toes today. She is encouraged to do a daily 20-minute epsom salt soak in lukewarm water. Her nurse said this can be done.  -Patient in agreement with the above treatment plan and all of patient's questions were answered.      RTC as needed        Iris Shook DPM

## 2019-04-18 NOTE — PLAN OF CARE
BEHAVIORAL TEAM DISCUSSION    Participants: Loren Zuleta NP,Dennise Herrera [facility] Hospital[facility], Angie Laws LSW,  Becca Chery LSW, Beena Kumar SW,  Opal Pillai RN, Bianka Escalante RN, [facility], Natalia Costello OT, Gena Gomez OT    Progress: None  Continued Stay Criteria/Rationale: Patient cannot discharge to a lesser level of care.   Medical/Physical: Diabetes  Precautions:   Behavioral Orders   Procedures    Code 1 - Restrict to Unit    Routine Programming     As clinically indicated    Status 15     Every 15 minutes.     Plan: Switch neuroleptics if patient is agreeable.   Rationale for change in precautions or plan: NA       Current Facility-Administered Medications:     acetaminophen (TYLENOL) tablet 975 mg, 975 mg, Oral, TID, Loren Zuleta NP, 975 mg at 04/18/19 0810    atorvastatin (LIPITOR) tablet 20 mg, 20 mg, Oral, At Bedtime, Loren Zuleta NP, 20 mg at 04/17/19 2108    benztropine (COGENTIN) tablet 0.5 mg, 0.5 mg, Oral, BID, Airam Light NP, 0.5 mg at 04/18/19 0811    busPIRone (BUSPAR) tablet 20 mg, 20 mg, Oral, TID, Loren Zuleta NP, 20 mg at 04/18/19 0812    clonazePAM (klonoPIN) tablet 1 mg, 1 mg, Oral, BID, Airam Light NP, 1 mg at 04/18/19 0813    glucose gel 15-30 g, 15-30 g, Oral, Q15 Min PRN **OR** dextrose 50 % injection 25-50 mL, 25-50 mL, Intravenous, Q15 Min PRN **OR** glucagon injection 1 mg, 1 mg, Subcutaneous, Q15 Min PRN, Loren Zuleta NP    divalproex sodium extended-release (DEPAKOTE ER) 24 hr tablet 1,500 mg, 1,500 mg, Oral, At Bedtime, Airam Light NP, 1,500 mg at 04/17/19 2107    gabapentin (NEURONTIN) capsule 1,200 mg, 1,200 mg, Oral, TID, Airam Light NP, 1,200 mg at 04/18/19 0813    glipiZIDE (GLUCOTROL) tablet 5 mg, 5 mg, Oral, QAM AC, Airam Light NP, 5 mg at 04/18/19 0808    hydrOXYzine (ATARAX) tablet 100 mg, 100 mg, Oral, At Bedtime, Loren Zuleta NP, 100 mg at 04/17/19  2106    hydrOXYzine (ATARAX) tablet 50 mg, 50 mg, Oral, TID PRN, Alaspa, Loren L, NP, 50 mg at 04/15/19 0822    ibuprofen (ADVIL/MOTRIN) tablet 800 mg, 800 mg, Oral, TID, Alaspa, Loren L, NP, 800 mg at 04/18/19 0809    insulin aspart (NovoLOG) inj (RAPID ACTING), 1-7 Units, Subcutaneous, TID AC, Alaspa Loren L, NP, 3 Units at 04/18/19 0806    insulin aspart (NovoLOG) inj (RAPID ACTING), 1-5 Units, Subcutaneous, At Bedtime, Rolando Zuletaista L, NP, 2 Units at 04/17/19 2103    medroxyPROGESTERone (DEPO-PROVERA) injection 150 mg, 150 mg, Intramuscular, Q90 Days, Kuldeep April Keturah, NP, 150 mg at 04/16/19 1640    metaxalone (SKELAXIN) tablet 800 mg, 800 mg, Oral, TID, Alaspa Loren L, NP, 800 mg at 04/18/19 0810    metFORMIN (GLUCOPHAGE) tablet 1,000 mg, 1,000 mg, Oral, BID w/meals, Davi Owens MD, 1,000 mg at 04/18/19 0810    OLANZapine (zyPREXA) tablet 30 mg, 30 mg, Oral, At Bedtime, Rolando Zuletaista L, NP, 30 mg at 04/17/19 2107    prazosin (MINIPRESS) capsule 1 mg, 1 mg, Oral, At Bedtime, Airam Light, NP, 1 mg at 04/17/19 2107  Active Problems:    Schizophrenia (H)

## 2019-04-19 LAB
GLUCOSE BLDC GLUCOMTR-MCNC: 188 MG/DL (ref 70–99)
GLUCOSE BLDC GLUCOMTR-MCNC: 220 MG/DL (ref 70–99)
GLUCOSE BLDC GLUCOMTR-MCNC: 287 MG/DL (ref 70–99)
GLUCOSE BLDC GLUCOMTR-MCNC: 305 MG/DL (ref 70–99)

## 2019-04-19 PROCEDURE — 25000132 ZZH RX MED GY IP 250 OP 250 PS 637: Performed by: NURSE PRACTITIONER

## 2019-04-19 PROCEDURE — 12400000 ZZH R&B MH

## 2019-04-19 PROCEDURE — 36415 COLL VENOUS BLD VENIPUNCTURE: CPT | Mod: ZL

## 2019-04-19 PROCEDURE — 82962 GLUCOSE BLOOD TEST: CPT | Mod: ZL

## 2019-04-19 PROCEDURE — 00000146 ZZHCL STATISTIC GLUCOSE BY METER IP: Performed by: PSYCHIATRY & NEUROLOGY

## 2019-04-19 PROCEDURE — 25000125 ZZHC RX 250: Performed by: INTERNAL MEDICINE

## 2019-04-19 RX ADMIN — GABAPENTIN 1200 MG: 400 CAPSULE ORAL at 20:05

## 2019-04-19 RX ADMIN — ACETAMINOPHEN 975 MG: 325 TABLET, FILM COATED ORAL at 20:06

## 2019-04-19 RX ADMIN — IBUPROFEN 800 MG: 800 TABLET ORAL at 14:10

## 2019-04-19 RX ADMIN — BENZTROPINE MESYLATE 0.5 MG: 0.5 TABLET ORAL at 20:05

## 2019-04-19 RX ADMIN — IBUPROFEN 800 MG: 800 TABLET ORAL at 20:04

## 2019-04-19 RX ADMIN — PRAZOSIN HYDROCHLORIDE 1 MG: 1 CAPSULE ORAL at 20:05

## 2019-04-19 RX ADMIN — DIVALPROEX SODIUM 1500 MG: 500 TABLET, EXTENDED RELEASE ORAL at 20:06

## 2019-04-19 RX ADMIN — MAGNESIUM SULFATE 1 G: 1 GRANULE ORAL; TOPICAL at 19:22

## 2019-04-19 RX ADMIN — CLONAZEPAM 1 MG: 1 TABLET ORAL at 08:11

## 2019-04-19 RX ADMIN — ATORVASTATIN CALCIUM 20 MG: 20 TABLET, FILM COATED ORAL at 20:05

## 2019-04-19 RX ADMIN — BENZTROPINE MESYLATE 0.5 MG: 0.5 TABLET ORAL at 08:10

## 2019-04-19 RX ADMIN — ACETAMINOPHEN 975 MG: 325 TABLET, FILM COATED ORAL at 14:08

## 2019-04-19 RX ADMIN — HYDROXYZINE HYDROCHLORIDE 100 MG: 25 TABLET ORAL at 20:07

## 2019-04-19 RX ADMIN — GABAPENTIN 1200 MG: 400 CAPSULE ORAL at 14:09

## 2019-04-19 RX ADMIN — GABAPENTIN 1200 MG: 400 CAPSULE ORAL at 08:11

## 2019-04-19 RX ADMIN — INSULIN ASPART 3 UNITS: 100 INJECTION, SOLUTION INTRAVENOUS; SUBCUTANEOUS at 16:47

## 2019-04-19 RX ADMIN — METAXALONE 800 MG: 800 TABLET ORAL at 14:10

## 2019-04-19 RX ADMIN — PALIPERIDONE 3 MG: 3 TABLET, EXTENDED RELEASE ORAL at 08:15

## 2019-04-19 RX ADMIN — METFORMIN HYDROCHLORIDE 1000 MG: 1000 TABLET ORAL at 16:52

## 2019-04-19 RX ADMIN — GLIPIZIDE 5 MG: 5 TABLET ORAL at 08:12

## 2019-04-19 RX ADMIN — METAXALONE 800 MG: 800 TABLET ORAL at 08:14

## 2019-04-19 RX ADMIN — INSULIN ASPART 3 UNITS: 100 INJECTION, SOLUTION INTRAVENOUS; SUBCUTANEOUS at 11:30

## 2019-04-19 RX ADMIN — IBUPROFEN 800 MG: 800 TABLET ORAL at 08:13

## 2019-04-19 RX ADMIN — METFORMIN HYDROCHLORIDE 1000 MG: 1000 TABLET ORAL at 08:15

## 2019-04-19 RX ADMIN — METAXALONE 800 MG: 800 TABLET ORAL at 20:04

## 2019-04-19 RX ADMIN — BUSPIRONE HYDROCHLORIDE 20 MG: 10 TABLET ORAL at 20:05

## 2019-04-19 RX ADMIN — CLONAZEPAM 1 MG: 1 TABLET ORAL at 20:04

## 2019-04-19 RX ADMIN — BUSPIRONE HYDROCHLORIDE 20 MG: 10 TABLET ORAL at 14:08

## 2019-04-19 RX ADMIN — ACETAMINOPHEN 975 MG: 325 TABLET, FILM COATED ORAL at 08:09

## 2019-04-19 RX ADMIN — INSULIN ASPART 2 UNITS: 100 INJECTION, SOLUTION INTRAVENOUS; SUBCUTANEOUS at 08:13

## 2019-04-19 RX ADMIN — BUSPIRONE HYDROCHLORIDE 20 MG: 10 TABLET ORAL at 08:10

## 2019-04-19 ASSESSMENT — ACTIVITIES OF DAILY LIVING (ADL)
ORAL_HYGIENE: INDEPENDENT
DRESS: SCRUBS (BEHAVIORAL HEALTH);INDEPENDENT
HYGIENE/GROOMING: INDEPENDENT
HYGIENE/GROOMING: INDEPENDENT

## 2019-04-19 NOTE — PLAN OF CARE
"  Problem: Adult Behavioral Health Plan of Care  Goal: Patient-Specific Goal (Individualization)  Description  Patient will attend at least 50% of groups while on unit.   Patient will report at least 6-8 hours of sleep at night.  Patient will be compliant with treatment team recommendations.    Patient will have a decrease in delusional statements by discharge.   4/19/2019 1811 by Julia Covington RN  Outcome: No Change  Patient did not want to attend group this evening. States she sleeps ok at night. States she still has auditory hallucinations but they are less. Has no suicidal thoughts. States her feet still ache and hopes \"they will be better by Monday or she will gnaw them off.\"Is on scheduled pain medication.  2000 Patient enjoyed her foot soak. Stated it helped a lot with her feet discomfort. Did have ecchymotic area under second toenail of both Right and Left foot which she did not know how she got them.   "

## 2019-04-19 NOTE — PLAN OF CARE
Problem: Adult Behavioral Health Plan of Care  Goal: Patient-Specific Goal (Individualization)  Description  Patient will attend at least 50% of groups while on unit.   Patient will report at least 6-8 hours of sleep at night.  Patient will be compliant with treatment team recommendations.    Patient will have a decrease in delusional statements by discharge.   4/19/2019 0544 by Crow Leyva RN  Outcome: No Change   Pt has been in bed with eyes closed and regular respirations observed all night. Will continue to monitor.  Pt did get up at about 0430 and satin the lounge area. Pt reported to writer that she would like juice because she felt as though her blood sugar was low. This was checked and was 188. Pt sat in the lounge area through the morning. Pt continued to make request for food with carbs and pt was encouraged to have sugar free choices. Will continue to monitor.

## 2019-04-19 NOTE — PLAN OF CARE
Face to face end of shift report received from Crow Hernández completed. Patient observed.     Sulma Hrisch  4/19/2019  11:22 AM
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
## 2019-04-19 NOTE — PLAN OF CARE
Problem: Adult Behavioral Health Plan of Care  Goal: Patient-Specific Goal (Individualization)  Description  Patient will attend at least 50% of groups while on unit.   Patient will report at least 6-8 hours of sleep at night.  Patient will be compliant with treatment team recommendations.    Patient will have a decrease in delusional statements by discharge.   4/19/2019 0544 by Crow Leyva RN  Outcome: No Change   Pt has been in bed with eyes closed and regular respirations observed all night. Will continue to monitor.
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
## 2019-04-19 NOTE — PLAN OF CARE
Face to face end of shift report received from Sulma MAK. Rounding completed. Patient observed in lounge area.    Julia Covington  4/19/2019  3:58 PM

## 2019-04-19 NOTE — PLAN OF CARE
Face to face end of shift report received from Jessica MICHEL RN. Rounding completed. Patient observed.     Crow Leyva  4/18/2019  11:47 PM

## 2019-04-20 LAB
GLUCOSE BLDC GLUCOMTR-MCNC: 198 MG/DL (ref 70–99)
GLUCOSE BLDC GLUCOMTR-MCNC: 214 MG/DL (ref 70–99)
GLUCOSE BLDC GLUCOMTR-MCNC: 222 MG/DL (ref 70–99)

## 2019-04-20 PROCEDURE — 25000132 ZZH RX MED GY IP 250 OP 250 PS 637: Performed by: NURSE PRACTITIONER

## 2019-04-20 PROCEDURE — 00000146 ZZHCL STATISTIC GLUCOSE BY METER IP: Performed by: PSYCHIATRY & NEUROLOGY

## 2019-04-20 PROCEDURE — 82962 GLUCOSE BLOOD TEST: CPT | Mod: ZL

## 2019-04-20 PROCEDURE — 25000125 ZZHC RX 250: Performed by: INTERNAL MEDICINE

## 2019-04-20 PROCEDURE — 99232 SBSQ HOSP IP/OBS MODERATE 35: CPT | Performed by: NURSE PRACTITIONER

## 2019-04-20 PROCEDURE — 36415 COLL VENOUS BLD VENIPUNCTURE: CPT | Mod: ZL

## 2019-04-20 PROCEDURE — 12400000 ZZH R&B MH

## 2019-04-20 RX ORDER — TRAMADOL HYDROCHLORIDE 50 MG/1
50 TABLET ORAL EVERY 6 HOURS PRN
Status: DISCONTINUED | OUTPATIENT
Start: 2019-04-20 | End: 2019-04-22

## 2019-04-20 RX ADMIN — IBUPROFEN 800 MG: 800 TABLET ORAL at 14:13

## 2019-04-20 RX ADMIN — METAXALONE 800 MG: 800 TABLET ORAL at 14:13

## 2019-04-20 RX ADMIN — METAXALONE 800 MG: 800 TABLET ORAL at 08:18

## 2019-04-20 RX ADMIN — ACETAMINOPHEN 975 MG: 325 TABLET, FILM COATED ORAL at 08:12

## 2019-04-20 RX ADMIN — GABAPENTIN 1200 MG: 400 CAPSULE ORAL at 08:16

## 2019-04-20 RX ADMIN — INSULIN ASPART 2 UNITS: 100 INJECTION, SOLUTION INTRAVENOUS; SUBCUTANEOUS at 16:45

## 2019-04-20 RX ADMIN — METFORMIN HYDROCHLORIDE 1000 MG: 1000 TABLET ORAL at 08:18

## 2019-04-20 RX ADMIN — CLONAZEPAM 1 MG: 1 TABLET ORAL at 08:16

## 2019-04-20 RX ADMIN — IBUPROFEN 800 MG: 800 TABLET ORAL at 06:34

## 2019-04-20 RX ADMIN — BUSPIRONE HYDROCHLORIDE 20 MG: 10 TABLET ORAL at 14:12

## 2019-04-20 RX ADMIN — ACETAMINOPHEN 975 MG: 325 TABLET, FILM COATED ORAL at 14:13

## 2019-04-20 RX ADMIN — TRAMADOL HYDROCHLORIDE 50 MG: 50 TABLET, COATED ORAL at 12:54

## 2019-04-20 RX ADMIN — BENZTROPINE MESYLATE 0.5 MG: 0.5 TABLET ORAL at 08:13

## 2019-04-20 RX ADMIN — BUSPIRONE HYDROCHLORIDE 20 MG: 10 TABLET ORAL at 20:33

## 2019-04-20 RX ADMIN — INSULIN ASPART 2 UNITS: 100 INJECTION, SOLUTION INTRAVENOUS; SUBCUTANEOUS at 08:20

## 2019-04-20 RX ADMIN — BUSPIRONE HYDROCHLORIDE 20 MG: 10 TABLET ORAL at 08:13

## 2019-04-20 RX ADMIN — TRAMADOL HYDROCHLORIDE 50 MG: 50 TABLET, COATED ORAL at 20:45

## 2019-04-20 RX ADMIN — GABAPENTIN 1200 MG: 400 CAPSULE ORAL at 14:13

## 2019-04-20 RX ADMIN — CLONAZEPAM 1 MG: 1 TABLET ORAL at 20:33

## 2019-04-20 RX ADMIN — BENZTROPINE MESYLATE 0.5 MG: 0.5 TABLET ORAL at 20:35

## 2019-04-20 RX ADMIN — METAXALONE 800 MG: 800 TABLET ORAL at 20:35

## 2019-04-20 RX ADMIN — ACETAMINOPHEN 975 MG: 325 TABLET, FILM COATED ORAL at 20:33

## 2019-04-20 RX ADMIN — METFORMIN HYDROCHLORIDE 1000 MG: 1000 TABLET ORAL at 16:47

## 2019-04-20 RX ADMIN — GABAPENTIN 1200 MG: 400 CAPSULE ORAL at 20:35

## 2019-04-20 RX ADMIN — DIVALPROEX SODIUM 1750 MG: 500 TABLET, EXTENDED RELEASE ORAL at 20:34

## 2019-04-20 RX ADMIN — ATORVASTATIN CALCIUM 20 MG: 20 TABLET, FILM COATED ORAL at 20:35

## 2019-04-20 RX ADMIN — GLIPIZIDE 5 MG: 5 TABLET ORAL at 08:17

## 2019-04-20 RX ADMIN — IBUPROFEN 800 MG: 800 TABLET ORAL at 20:35

## 2019-04-20 RX ADMIN — PRAZOSIN HYDROCHLORIDE 1 MG: 1 CAPSULE ORAL at 20:35

## 2019-04-20 RX ADMIN — PALIPERIDONE 3 MG: 3 TABLET, EXTENDED RELEASE ORAL at 08:19

## 2019-04-20 RX ADMIN — HYDROXYZINE HYDROCHLORIDE 100 MG: 25 TABLET ORAL at 20:33

## 2019-04-20 RX ADMIN — INSULIN ASPART 2 UNITS: 100 INJECTION, SOLUTION INTRAVENOUS; SUBCUTANEOUS at 11:37

## 2019-04-20 ASSESSMENT — ACTIVITIES OF DAILY LIVING (ADL)
HYGIENE/GROOMING: INDEPENDENT
ORAL_HYGIENE: INDEPENDENT
HYGIENE/GROOMING: INDEPENDENT
DRESS: SCRUBS (BEHAVIORAL HEALTH)
LAUNDRY: UNABLE TO COMPLETE
DRESS: SCRUBS (BEHAVIORAL HEALTH)
ORAL_HYGIENE: INDEPENDENT

## 2019-04-20 ASSESSMENT — MIFFLIN-ST. JEOR: SCORE: 1499.14

## 2019-04-20 NOTE — PLAN OF CARE
"  Problem: Adult Behavioral Health Plan of Care  Goal: Patient-Specific Goal (Individualization)  Description  Patient will attend at least 50% of groups while on unit.   Patient will report at least 6-8 hours of sleep at night.  Patient will be compliant with treatment team recommendations.    Patient will have a decrease in delusional statements by discharge.   4/20/2019 1816 by Nancy Stern, RN  Outcome: Declining  Note:   Pain rated 6/10 to bilat feet-mainly toes. bilat second toes bruised under nails. States the Ultram has been helpful today. Denies SI, SIB, HI. Endorse Auditory hallucinations. States \"the headphones help the voices\". When asked what the voices are saying pt states \"all bryan things, sometimes about getting pregnant with fluids.\"   Remains delusional, flight of ideas, unable to have reality based conversation. Pt is isolative-remains in room with the exception of meals, Spending time making grid like calendars.   BG-214 at dinner and 214 at HS,  Cooperative, pleasant.      Problem: Thought Process Alteration  Goal: Optimal Thought Clarity  Description  Patient will hold a reality based conversation prior to discharge   4/20/2019 1816 by Nancy Stern, RN  Outcome: No Change  Note:   Unable to have linear or reality based conversation.     "

## 2019-04-20 NOTE — PLAN OF CARE
"Slept for kulwant of the noc - without issue - did wake up early as is her usual - does complain of \"both feet hurting\"  observed both feet - no open area noted - complains \"they hurt\" was unable to barely touch foot with hand and pt pulled back in pain - did administer ibuprofen 800 mg po (changed time to pts waking schedule) and hydroxozine 50 mg po at 0530  she rates the pain a \"9\" - pt remains delusional telling me a story that she had her first child by [facility] and how she was Dianas best friend - how she got pregnant to \"get big boobs\" was also speaking to  on floor how she is friends with celebrities.  Did relay she has nine children - but only sees one.  Was pleasant telling the whole tale.  Obvious she believes it herself.  "
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
## 2019-04-20 NOTE — PLAN OF CARE
Face to face end of shift report received from Selina Mccauleying completed. Patient observed.     Sulma Hirsch  4/20/2019  9:10 AM

## 2019-04-20 NOTE — PLAN OF CARE
Face to face end of shift report received from OBDULIO Dumont. Rounding completed. Patient observed. Lying in a prone position - eyes closed - audible soft snoring.     Selina Siegel  4/20/2019  3:15 AM

## 2019-04-20 NOTE — PROGRESS NOTES
[facility]  Psychiatric Progress Note      Impression:   This is a 39 year old female with a significant history of schizoaffective disorder.     Genie feels she is doing fairly well today. However her feet and legs hurt following foot cares yesterday. She does have an extensive history of neuropathy which could have been aggravated by foot cares. She is slightly tearful this morning. She is not as rambling or pressured this morning however. She does still have internal stimuli as she was noted to be talking to an unseen presence.       Educated regarding medication indications, risks, benefits, side effects, contraindications and possible interactions. Verbally expressed understanding.        DIagnoses:     Schizoaffective disorder  Marijuana use disorder-in remission  Diabetes type 2    Attestation:  Patient has been seen and evaluated by me,  Loren Zuleta NP          Interim History:   The patient's care was discussed with the treatment team and chart notes were reviewed.          Medications:     Current Facility-Administered Medications Ordered in Epic   Medication Dose Route Frequency Last Rate Last Dose     acetaminophen (TYLENOL) tablet 975 mg  975 mg Oral TID   975 mg at 04/20/19 0812     atorvastatin (LIPITOR) tablet 20 mg  20 mg Oral At Bedtime   20 mg at 04/19/19 2005     benztropine (COGENTIN) tablet 0.5 mg  0.5 mg Oral BID   0.5 mg at 04/20/19 0813     busPIRone (BUSPAR) tablet 20 mg  20 mg Oral TID   20 mg at 04/20/19 0813     clonazePAM (klonoPIN) tablet 1 mg  1 mg Oral BID   1 mg at 04/20/19 0816     glucose gel 15-30 g  15-30 g Oral Q15 Min PRN        Or     dextrose 50 % injection 25-50 mL  25-50 mL Intravenous Q15 Min PRN        Or     glucagon injection 1 mg  1 mg Subcutaneous Q15 Min PRN         divalproex sodium extended-release (DEPAKOTE ER) 24 hr tablet 1,500 mg  1,500 mg Oral At Bedtime   1,500 mg at 04/19/19 2006     gabapentin (NEURONTIN) capsule 1,200 mg  1,200 mg Oral  "TID   1,200 mg at 04/20/19 0816     glipiZIDE (GLUCOTROL) tablet 5 mg  5 mg Oral QAM AC   5 mg at 04/20/19 0817     hydrOXYzine (ATARAX) tablet 100 mg  100 mg Oral At Bedtime   100 mg at 04/19/19 2007     hydrOXYzine (ATARAX) tablet 50 mg  50 mg Oral TID PRN   50 mg at 04/15/19 0822     ibuprofen (ADVIL/MOTRIN) tablet 800 mg  800 mg Oral TID   800 mg at 04/20/19 0634     insulin aspart (NovoLOG) inj (RAPID ACTING)  1-7 Units Subcutaneous TID AC   2 Units at 04/20/19 0820     insulin aspart (NovoLOG) inj (RAPID ACTING)  1-5 Units Subcutaneous At Bedtime   3 Units at 04/19/19 2003     magnesium sulfate (EPSOM SALT) granules 1 g  1 g Topical Daily PRN   1 g at 04/19/19 1922     medroxyPROGESTERone (DEPO-PROVERA) injection 150 mg  150 mg Intramuscular Q90 Days   150 mg at 04/16/19 1640     metaxalone (SKELAXIN) tablet 800 mg  800 mg Oral TID   800 mg at 04/20/19 0818     metFORMIN (GLUCOPHAGE) tablet 1,000 mg  1,000 mg Oral BID w/meals   1,000 mg at 04/20/19 0818     paliperidone ER (INVEGA) 24 hr tablet 3 mg  3 mg Oral Daily   3 mg at 04/20/19 0819     prazosin (MINIPRESS) capsule 1 mg  1 mg Oral At Bedtime   1 mg at 04/19/19 2005     No current [facility]-ordered outpatient medications on file.              10 point ROS negative C/O back pain ad neuropathic pain.        Allergies:   No Known Allergies         Psychiatric Examination:   /67   Pulse 82   Temp 97.1  F (36.2  C) (Tympanic)   Resp 14   Ht 1.626 m (5' 4\")   Wt 78.2 kg (172 lb 8 oz)   SpO2 99%   BMI 29.61 kg/m    Weight is 172 lbs 8 oz  Body mass index is 29.61 kg/m .    Appearance:  awake, alert, adequately groomed and dressed in hospital scrubs  Attitude:  cooperative  Eye Contact:  good  Mood:  anxious  Affect:  mood congruent  Speech:  pressured speech and rambling  Psychomotor Behavior:  no evidence of tardive dyskinesia, dystonia, or tics and intact station, gait and muscle tone  Thought Process:  disorganized and tangental  Associations:  " loosening of associations present  Thought Content:  no evidence of suicidal ideation or homicidal ideation and visual hallucinations present  Insight:  fair  Judgment:  fair  Oriented to:  time, person, and place  Attention Span and Concentration:  fair  Recent and Remote Memory:  fair  Fund of Knowledge: low-normal  Muscle Strength and Tone: normal  Gait and Station: Normal           Labs:     Results for orders placed or performed during the hospital encounter of 04/06/19   Glucose by meter   Result Value Ref Range    Glucose 305 (H) 70 - 99 mg/dL   Glucose by meter   Result Value Ref Range    Glucose 341 (H) 70 - 99 mg/dL   Glucose by meter   Result Value Ref Range    Glucose 193 (H) 70 - 99 mg/dL   Glucose by meter   Result Value Ref Range    Glucose 168 (H) 70 - 99 mg/dL   Glucose by meter   Result Value Ref Range    Glucose 249 (H) 70 - 99 mg/dL   Glucose by meter   Result Value Ref Range    Glucose 227 (H) 70 - 99 mg/dL   Glucose by meter   Result Value Ref Range    Glucose 242 (H) 70 - 99 mg/dL   Glucose by meter   Result Value Ref Range    Glucose 125 (H) 70 - 99 mg/dL   Glucose by meter   Result Value Ref Range    Glucose 105 (H) 70 - 99 mg/dL   Glucose by meter   Result Value Ref Range    Glucose 255 (H) 70 - 99 mg/dL   Glucose by meter   Result Value Ref Range    Glucose 169 (H) 70 - 99 mg/dL   Glucose by meter   Result Value Ref Range    Glucose 195 (H) 70 - 99 mg/dL   Glucose by meter   Result Value Ref Range    Glucose 121 (H) 70 - 99 mg/dL   Glucose by meter   Result Value Ref Range    Glucose 84 70 - 99 mg/dL   Glucose by meter   Result Value Ref Range    Glucose 130 (H) 70 - 99 mg/dL   Glucose by meter   Result Value Ref Range    Glucose 161 (H) 70 - 99 mg/dL   Glucose by meter   Result Value Ref Range    Glucose 181 (H) 70 - 99 mg/dL   Glucose by meter   Result Value Ref Range    Glucose 181 (H) 70 - 99 mg/dL   Glucose by meter   Result Value Ref Range    Glucose 195 (H) 70 - 99 mg/dL   Glucose  by meter   Result Value Ref Range    Glucose 100 (H) 70 - 99 mg/dL   Glucose by meter   Result Value Ref Range    Glucose 202 (H) 70 - 99 mg/dL   Glucose by meter   Result Value Ref Range    Glucose 114 (H) 70 - 99 mg/dL   Glucose by meter   Result Value Ref Range    Glucose 132 (H) 70 - 99 mg/dL   Glucose by meter   Result Value Ref Range    Glucose 104 (H) 70 - 99 mg/dL   Glucose by meter   Result Value Ref Range    Glucose 91 70 - 99 mg/dL   Glucose by meter   Result Value Ref Range    Glucose 204 (H) 70 - 99 mg/dL   Glucose by meter   Result Value Ref Range    Glucose 87 70 - 99 mg/dL   Glucose by meter   Result Value Ref Range    Glucose 151 (H) 70 - 99 mg/dL   Glucose by meter   Result Value Ref Range    Glucose 98 70 - 99 mg/dL   Glucose by meter   Result Value Ref Range    Glucose 180 (H) 70 - 99 mg/dL   Glucose by meter   Result Value Ref Range    Glucose 126 (H) 70 - 99 mg/dL   Glucose by meter   Result Value Ref Range    Glucose 137 (H) 70 - 99 mg/dL   Glucose by meter   Result Value Ref Range    Glucose 115 (H) 70 - 99 mg/dL   Glucose by meter   Result Value Ref Range    Glucose 108 (H) 70 - 99 mg/dL   Glucose by meter   Result Value Ref Range    Glucose 189 (H) 70 - 99 mg/dL   Glucose by meter   Result Value Ref Range    Glucose 97 70 - 99 mg/dL   Glucose by meter   Result Value Ref Range    Glucose 123 (H) 70 - 99 mg/dL   Glucose by meter   Result Value Ref Range    Glucose 240 (H) 70 - 99 mg/dL   Glucose by meter   Result Value Ref Range    Glucose 134 (H) 70 - 99 mg/dL   Glucose by meter   Result Value Ref Range    Glucose 173 (H) 70 - 99 mg/dL   Glucose by meter   Result Value Ref Range    Glucose 100 (H) 70 - 99 mg/dL   Glucose by meter   Result Value Ref Range    Glucose 115 (H) 70 - 99 mg/dL   HCG qualitative urine   Result Value Ref Range    HCG Qual Urine Negative NEG^Negative   Glucose by meter   Result Value Ref Range    Glucose 197 (H) 70 - 99 mg/dL   Glucose by meter   Result Value Ref  Range    Glucose 143 (H) 70 - 99 mg/dL   Glucose by meter   Result Value Ref Range    Glucose 230 (H) 70 - 99 mg/dL   Glucose by meter   Result Value Ref Range    Glucose 151 (H) 70 - 99 mg/dL   Glucose by meter   Result Value Ref Range    Glucose 167 (H) 70 - 99 mg/dL   Glucose by meter   Result Value Ref Range    Glucose 206 (H) 70 - 99 mg/dL   Glucose by meter   Result Value Ref Range    Glucose 222 (H) 70 - 99 mg/dL   Glucose by meter   Result Value Ref Range    Glucose 225 (H) 70 - 99 mg/dL   Glucose by meter   Result Value Ref Range    Glucose 250 (H) 70 - 99 mg/dL   Glucose by meter   Result Value Ref Range    Glucose 280 (H) 70 - 99 mg/dL   Glucose by meter   Result Value Ref Range    Glucose 245 (H) 70 - 99 mg/dL   Valproic acid   Result Value Ref Range    Valproic Acid Level 45 (L) 50 - 100 mg/L   CBC with platelets differential   Result Value Ref Range    WBC 7.8 4.0 - 11.0 10e9/L    RBC Count 4.05 3.8 - 5.2 10e12/L    Hemoglobin 11.7 11.7 - 15.7 g/dL    Hematocrit 35.1 35.0 - 47.0 %    MCV 87 78 - 100 fl    MCH 28.9 26.5 - 33.0 pg    MCHC 33.3 31.5 - 36.5 g/dL    RDW 13.2 10.0 - 15.0 %    Platelet Count 247 150 - 450 10e9/L    Diff Method Automated Method     % Neutrophils 55.5 %    % Lymphocytes 34.8 %    % Monocytes 7.7 %    % Eosinophils 0.9 %    % Basophils 0.3 %    % Immature Granulocytes 0.8 %    Nucleated RBCs 0 0 /100    Absolute Neutrophil 4.4 1.6 - 8.3 10e9/L    Absolute Lymphocytes 2.7 0.8 - 5.3 10e9/L    Absolute Monocytes 0.6 0.0 - 1.3 10e9/L    Absolute Eosinophils 0.1 0.0 - 0.7 10e9/L    Absolute Basophils 0.0 0.0 - 0.2 10e9/L    Abs Immature Granulocytes 0.1 0 - 0.4 10e9/L    Absolute Nucleated RBC 0.0    Comprehensive metabolic panel   Result Value Ref Range    Sodium 140 133 - 144 mmol/L    Potassium 4.5 3.4 - 5.3 mmol/L    Chloride 108 94 - 109 mmol/L    Carbon Dioxide 25 20 - 32 mmol/L    Anion Gap 7 3 - 14 mmol/L    Glucose 289 (H) 70 - 99 mg/dL    Urea Nitrogen 14 7 - 30 mg/dL     Creatinine 0.53 0.52 - 1.04 mg/dL    GFR Estimate >90 >60 mL/min/[1.73_m2]    GFR Estimate If Black >90 >60 mL/min/[1.73_m2]    Calcium 9.2 8.5 - 10.1 mg/dL    Bilirubin Total 0.1 (L) 0.2 - 1.3 mg/dL    Albumin 3.5 3.4 - 5.0 g/dL    Protein Total 7.0 6.8 - 8.8 g/dL    Alkaline Phosphatase 60 40 - 150 U/L    ALT 17 0 - 50 U/L    AST 7 0 - 45 U/L              Plan:   Depakote increase to 1750 mg daily  Start Invega 3 mg at bedtime  Surgical/podiatry consult for toes  Plan is do go door to door to inpatient MI/cd treatment.     She is voluntary   ELOS: >5 days for stabilization and safe discharge planning

## 2019-04-20 NOTE — PLAN OF CARE
Face to face end of shift report received from Sulma CALDERA RN. Rounding completed. Patient observed.     Nancy Stern  4/20/2019  3:42 PM

## 2019-04-21 LAB
GLUCOSE BLDC GLUCOMTR-MCNC: 142 MG/DL (ref 70–99)
GLUCOSE BLDC GLUCOMTR-MCNC: 161 MG/DL (ref 70–99)
GLUCOSE BLDC GLUCOMTR-MCNC: 167 MG/DL (ref 70–99)
GLUCOSE BLDC GLUCOMTR-MCNC: 203 MG/DL (ref 70–99)

## 2019-04-21 PROCEDURE — 12400000 ZZH R&B MH

## 2019-04-21 PROCEDURE — 25000132 ZZH RX MED GY IP 250 OP 250 PS 637: Performed by: NURSE PRACTITIONER

## 2019-04-21 PROCEDURE — 00000146 ZZHCL STATISTIC GLUCOSE BY METER IP

## 2019-04-21 PROCEDURE — 25000125 ZZHC RX 250: Performed by: INTERNAL MEDICINE

## 2019-04-21 RX ADMIN — HYDROXYZINE HYDROCHLORIDE 50 MG: 25 TABLET ORAL at 11:51

## 2019-04-21 RX ADMIN — METAXALONE 800 MG: 800 TABLET ORAL at 08:24

## 2019-04-21 RX ADMIN — BENZTROPINE MESYLATE 0.5 MG: 0.5 TABLET ORAL at 20:49

## 2019-04-21 RX ADMIN — ACETAMINOPHEN 975 MG: 325 TABLET, FILM COATED ORAL at 20:48

## 2019-04-21 RX ADMIN — GABAPENTIN 1200 MG: 400 CAPSULE ORAL at 20:48

## 2019-04-21 RX ADMIN — DIVALPROEX SODIUM 1750 MG: 500 TABLET, EXTENDED RELEASE ORAL at 20:49

## 2019-04-21 RX ADMIN — METAXALONE 800 MG: 800 TABLET ORAL at 13:02

## 2019-04-21 RX ADMIN — TRAMADOL HYDROCHLORIDE 50 MG: 50 TABLET, COATED ORAL at 11:48

## 2019-04-21 RX ADMIN — CLONAZEPAM 1 MG: 1 TABLET ORAL at 08:25

## 2019-04-21 RX ADMIN — CLONAZEPAM 1 MG: 1 TABLET ORAL at 20:49

## 2019-04-21 RX ADMIN — ATORVASTATIN CALCIUM 20 MG: 20 TABLET, FILM COATED ORAL at 20:49

## 2019-04-21 RX ADMIN — PALIPERIDONE 3 MG: 3 TABLET, EXTENDED RELEASE ORAL at 08:24

## 2019-04-21 RX ADMIN — BUSPIRONE HYDROCHLORIDE 20 MG: 10 TABLET ORAL at 13:02

## 2019-04-21 RX ADMIN — BUSPIRONE HYDROCHLORIDE 20 MG: 10 TABLET ORAL at 08:24

## 2019-04-21 RX ADMIN — METAXALONE 800 MG: 800 TABLET ORAL at 20:48

## 2019-04-21 RX ADMIN — TRAMADOL HYDROCHLORIDE 50 MG: 50 TABLET, COATED ORAL at 18:14

## 2019-04-21 RX ADMIN — HYDROXYZINE HYDROCHLORIDE 100 MG: 25 TABLET ORAL at 20:48

## 2019-04-21 RX ADMIN — IBUPROFEN 800 MG: 800 TABLET ORAL at 20:49

## 2019-04-21 RX ADMIN — PRAZOSIN HYDROCHLORIDE 1 MG: 1 CAPSULE ORAL at 20:49

## 2019-04-21 RX ADMIN — GLIPIZIDE 5 MG: 5 TABLET ORAL at 08:24

## 2019-04-21 RX ADMIN — BENZTROPINE MESYLATE 0.5 MG: 0.5 TABLET ORAL at 08:25

## 2019-04-21 RX ADMIN — METFORMIN HYDROCHLORIDE 1000 MG: 1000 TABLET ORAL at 08:24

## 2019-04-21 RX ADMIN — ACETAMINOPHEN 975 MG: 325 TABLET, FILM COATED ORAL at 13:02

## 2019-04-21 RX ADMIN — GABAPENTIN 1200 MG: 400 CAPSULE ORAL at 08:24

## 2019-04-21 RX ADMIN — ACETAMINOPHEN 975 MG: 325 TABLET, FILM COATED ORAL at 08:28

## 2019-04-21 RX ADMIN — INSULIN ASPART 1 UNITS: 100 INJECTION, SOLUTION INTRAVENOUS; SUBCUTANEOUS at 16:56

## 2019-04-21 RX ADMIN — METFORMIN HYDROCHLORIDE 1000 MG: 1000 TABLET ORAL at 16:54

## 2019-04-21 RX ADMIN — IBUPROFEN 800 MG: 800 TABLET ORAL at 05:46

## 2019-04-21 RX ADMIN — BUSPIRONE HYDROCHLORIDE 20 MG: 10 TABLET ORAL at 20:48

## 2019-04-21 RX ADMIN — GABAPENTIN 1200 MG: 400 CAPSULE ORAL at 13:02

## 2019-04-21 RX ADMIN — IBUPROFEN 800 MG: 800 TABLET ORAL at 13:02

## 2019-04-21 RX ADMIN — INSULIN ASPART 2 UNITS: 100 INJECTION, SOLUTION INTRAVENOUS; SUBCUTANEOUS at 11:53

## 2019-04-21 RX ADMIN — HYDROXYZINE HYDROCHLORIDE 50 MG: 25 TABLET ORAL at 18:14

## 2019-04-21 RX ADMIN — INSULIN ASPART 1 UNITS: 100 INJECTION, SOLUTION INTRAVENOUS; SUBCUTANEOUS at 08:25

## 2019-04-21 ASSESSMENT — ACTIVITIES OF DAILY LIVING (ADL)
DRESS: SCRUBS (BEHAVIORAL HEALTH)
ORAL_HYGIENE: INDEPENDENT
HYGIENE/GROOMING: INDEPENDENT

## 2019-04-21 NOTE — PLAN OF CARE
"  Problem: Adult Behavioral Health Plan of Care  Goal: Patient-Specific Goal (Individualization)  Description  Patient will attend at least 50% of groups while on unit.   Patient will report at least 6-8 hours of sleep at night.  Patient will be compliant with treatment team recommendations.    Patient will have a decrease in delusional statements by discharge.   Note:   Patient out to [facility] for breakfast, laying in bed to rest/nap again for most of the morning. Compliant with scheduled medications. Up to nurse's station shortly before lunch. Blood sugar was 161 at this time. Patient has tense affect with pressured speech and no eye contact. Sitting in [facility], patient loudly stating \"I can't take this fucking shit anymore.\" Reports bilateral leg pain rated 10/10 stating, \"Just tell them I want the toenails cut off. They hurt so bad I could cut my legs off.\" Requesting this writer to check legs for any \"red lines\" which none are present.   1148- Received PRN Ultram 50 mg for bilateral leg pain rated 10/10.   1151- Received PRN Atarax 50 mg for reported anxiety.   Patient denies SI/HI and hallucinations this shift. Continues to have tangential speech with flight of ideas and difficulty tracking. Withdrawn to bedroom for remainder of shift, only coming to [facility] to make some phone calls. Continue to monitor at this time.      Problem: Thought Process Alteration  Goal: Optimal Thought Clarity  Description  Patient will hold a reality based conversation prior to discharge   Note:   Continue to monitor at this time.      "

## 2019-04-21 NOTE — PLAN OF CARE
Face to face end of shift report received from Selina CHEUNG RN. Rounding completed. Patient observed laying in bed with eyes closed, non-labored breathing.      Jenny Cardenas  4/21/2019  8:44 AM

## 2019-04-21 NOTE — PLAN OF CARE
Face to face end of shift report received from Jenny LO RN. Rounding completed. Patient observed.     Nancy Stern  4/21/2019  4:09 PM

## 2019-04-21 NOTE — PLAN OF CARE
Slept for majority of the noc - did have one cup of orange juice - blood glucose 142 this am - does report effectiveness of new pain med.

## 2019-04-21 NOTE — PLAN OF CARE
"Face to face end of shift report received from OBDULIO Cruz. Rounding completed. Patient observed. Awake in room - doing paper work - at bedside table - does relay \"oh my feet and my legs don't hurt as bad since that new pain pill\"      Selina Siegel  4/21/2019  2:07 AM          "

## 2019-04-22 LAB
GLUCOSE BLDC GLUCOMTR-MCNC: 184 MG/DL (ref 70–99)
GLUCOSE BLDC GLUCOMTR-MCNC: 216 MG/DL (ref 70–99)
GLUCOSE BLDC GLUCOMTR-MCNC: 249 MG/DL (ref 70–99)
GLUCOSE BLDC GLUCOMTR-MCNC: 300 MG/DL (ref 70–99)

## 2019-04-22 PROCEDURE — 99232 SBSQ HOSP IP/OBS MODERATE 35: CPT | Performed by: NURSE PRACTITIONER

## 2019-04-22 PROCEDURE — 12400000 ZZH R&B MH

## 2019-04-22 PROCEDURE — 25000132 ZZH RX MED GY IP 250 OP 250 PS 637: Performed by: NURSE PRACTITIONER

## 2019-04-22 PROCEDURE — 25000131 ZZH RX MED GY IP 250 OP 636 PS 637: Performed by: NURSE PRACTITIONER

## 2019-04-22 PROCEDURE — 00000146 ZZHCL STATISTIC GLUCOSE BY METER IP

## 2019-04-22 PROCEDURE — 25000125 ZZHC RX 250: Performed by: INTERNAL MEDICINE

## 2019-04-22 RX ORDER — PALIPERIDONE 6 MG/1
6 TABLET, EXTENDED RELEASE ORAL DAILY
Status: DISCONTINUED | OUTPATIENT
Start: 2019-04-22 | End: 2019-04-25

## 2019-04-22 RX ORDER — TRAMADOL HYDROCHLORIDE 50 MG/1
100 TABLET ORAL EVERY 6 HOURS PRN
Status: COMPLETED | OUTPATIENT
Start: 2019-04-22 | End: 2019-04-23

## 2019-04-22 RX ORDER — NALOXONE HYDROCHLORIDE 0.4 MG/ML
.1-.4 INJECTION, SOLUTION INTRAMUSCULAR; INTRAVENOUS; SUBCUTANEOUS
Status: DISCONTINUED | OUTPATIENT
Start: 2019-04-22 | End: 2019-05-02 | Stop reason: HOSPADM

## 2019-04-22 RX ADMIN — CLONAZEPAM 1 MG: 1 TABLET ORAL at 08:11

## 2019-04-22 RX ADMIN — ACETAMINOPHEN 975 MG: 325 TABLET, FILM COATED ORAL at 20:26

## 2019-04-22 RX ADMIN — CLONAZEPAM 1 MG: 1 TABLET ORAL at 20:27

## 2019-04-22 RX ADMIN — HYDROXYZINE HYDROCHLORIDE 50 MG: 25 TABLET ORAL at 08:11

## 2019-04-22 RX ADMIN — INSULIN ASPART 4 UNITS: 100 INJECTION, SOLUTION INTRAVENOUS; SUBCUTANEOUS at 08:12

## 2019-04-22 RX ADMIN — HYDROXYZINE HYDROCHLORIDE 100 MG: 25 TABLET ORAL at 20:28

## 2019-04-22 RX ADMIN — METFORMIN HYDROCHLORIDE 1000 MG: 1000 TABLET ORAL at 16:52

## 2019-04-22 RX ADMIN — GLIPIZIDE 5 MG: 5 TABLET ORAL at 08:10

## 2019-04-22 RX ADMIN — METAXALONE 800 MG: 800 TABLET ORAL at 13:40

## 2019-04-22 RX ADMIN — TRAMADOL HYDROCHLORIDE 50 MG: 50 TABLET, COATED ORAL at 08:11

## 2019-04-22 RX ADMIN — PRAZOSIN HYDROCHLORIDE 1 MG: 1 CAPSULE ORAL at 20:26

## 2019-04-22 RX ADMIN — IBUPROFEN 800 MG: 800 TABLET ORAL at 13:41

## 2019-04-22 RX ADMIN — ACETAMINOPHEN 975 MG: 325 TABLET, FILM COATED ORAL at 13:40

## 2019-04-22 RX ADMIN — BUSPIRONE HYDROCHLORIDE 20 MG: 10 TABLET ORAL at 13:40

## 2019-04-22 RX ADMIN — PALIPERIDONE 6 MG: 6 TABLET, FILM COATED, EXTENDED RELEASE ORAL at 08:34

## 2019-04-22 RX ADMIN — GABAPENTIN 1200 MG: 400 CAPSULE ORAL at 20:27

## 2019-04-22 RX ADMIN — METAXALONE 800 MG: 800 TABLET ORAL at 08:10

## 2019-04-22 RX ADMIN — IBUPROFEN 800 MG: 800 TABLET ORAL at 20:27

## 2019-04-22 RX ADMIN — METFORMIN HYDROCHLORIDE 1000 MG: 1000 TABLET ORAL at 08:11

## 2019-04-22 RX ADMIN — METAXALONE 800 MG: 800 TABLET ORAL at 20:27

## 2019-04-22 RX ADMIN — GABAPENTIN 1200 MG: 400 CAPSULE ORAL at 13:40

## 2019-04-22 RX ADMIN — BENZTROPINE MESYLATE 0.5 MG: 0.5 TABLET ORAL at 20:27

## 2019-04-22 RX ADMIN — TRAMADOL HYDROCHLORIDE 100 MG: 50 TABLET, COATED ORAL at 21:12

## 2019-04-22 RX ADMIN — TRAMADOL HYDROCHLORIDE 100 MG: 50 TABLET, COATED ORAL at 15:05

## 2019-04-22 RX ADMIN — BUSPIRONE HYDROCHLORIDE 20 MG: 10 TABLET ORAL at 20:27

## 2019-04-22 RX ADMIN — INSULIN ASPART 3 UNITS: 100 INJECTION, SOLUTION INTRAVENOUS; SUBCUTANEOUS at 16:51

## 2019-04-22 RX ADMIN — ATORVASTATIN CALCIUM 20 MG: 20 TABLET, FILM COATED ORAL at 20:27

## 2019-04-22 RX ADMIN — IBUPROFEN 800 MG: 800 TABLET ORAL at 06:58

## 2019-04-22 RX ADMIN — ACETAMINOPHEN 975 MG: 325 TABLET, FILM COATED ORAL at 08:10

## 2019-04-22 RX ADMIN — BENZTROPINE MESYLATE 0.5 MG: 0.5 TABLET ORAL at 08:11

## 2019-04-22 RX ADMIN — MAGNESIUM SULFATE 1 G: 1 GRANULE ORAL; TOPICAL at 13:58

## 2019-04-22 RX ADMIN — GABAPENTIN 1200 MG: 400 CAPSULE ORAL at 08:10

## 2019-04-22 RX ADMIN — BUSPIRONE HYDROCHLORIDE 20 MG: 10 TABLET ORAL at 08:10

## 2019-04-22 RX ADMIN — DIVALPROEX SODIUM 1750 MG: 500 TABLET, EXTENDED RELEASE ORAL at 20:27

## 2019-04-22 RX ADMIN — INSULIN ASPART 1 UNITS: 100 INJECTION, SOLUTION INTRAVENOUS; SUBCUTANEOUS at 11:53

## 2019-04-22 ASSESSMENT — ACTIVITIES OF DAILY LIVING (ADL)
ORAL_HYGIENE: INDEPENDENT
DRESS: SCRUBS (BEHAVIORAL HEALTH)
ORAL_HYGIENE: INDEPENDENT
HYGIENE/GROOMING: INDEPENDENT
HYGIENE/GROOMING: INDEPENDENT
DRESS: SCRUBS (BEHAVIORAL HEALTH);INDEPENDENT
LAUNDRY: UNABLE TO COMPLETE

## 2019-04-22 NOTE — PLAN OF CARE
BEHAVIORAL TEAM DISCUSSION    Participants: Loren Zuleta NP, Becca Cehry LSW, Keeley Richardson [facility], Opal Pillai RN, Viridiana Foss RN, Radha Broderick RN, Ion Morales RN,  Natalia Costello OT, Gena Gomez OT  Progress: Fair: attends some group programming.   Continued Stay Criteria/Rationale: continues with rapid and rambling speech, but less pressured - very grandiose in content. Delusional, unable to discharge to a lower level of care due to high risk of relapse.  Medical/Physical: diabetic - monitoring blood sugars, metformin, accuchecks and sliding scale, painful toenail - ordered ultram PRN as needed for pain -   Precautions:        Behavioral Orders   Procedures    Code 1 - Restrict to Unit    Routine Programming       As clinically indicated    Status 15       Every 15 minutes.      Plan: Continue to stabilize and Look at treatment.   Rationale for change in precautions or plan: none     Active Problems:    Schizophrenia (H)      Current Facility-Administered Medications:     acetaminophen (TYLENOL) tablet 975 mg, 975 mg, Oral, TID, Loren Zuleta NP, 975 mg at 04/22/19 0810    atorvastatin (LIPITOR) tablet 20 mg, 20 mg, Oral, At Bedtime, Loren Zuleta NP, 20 mg at 04/21/19 2049    benztropine (COGENTIN) tablet 0.5 mg, 0.5 mg, Oral, BID, Airam Light NP, 0.5 mg at 04/22/19 0811    busPIRone (BUSPAR) tablet 20 mg, 20 mg, Oral, TID, Loren Zuleta NP, 20 mg at 04/22/19 0810    clonazePAM (klonoPIN) tablet 1 mg, 1 mg, Oral, BID, Airam Light NP, 1 mg at 04/22/19 0811    glucose gel 15-30 g, 15-30 g, Oral, Q15 Min PRN **OR** dextrose 50 % injection 25-50 mL, 25-50 mL, Intravenous, Q15 Min PRN **OR** glucagon injection 1 mg, 1 mg, Subcutaneous, Q15 Min PRN, Loren Zuleta NP    divalproex sodium extended-release (DEPAKOTE ER) 24 hr tablet 1,750 mg, 1,750 mg, Oral, At Bedtime, Loren Zuleta NP, 1,750 mg at 04/21/19 2049    gabapentin (NEURONTIN) capsule 1,200 mg,  1,200 mg, Oral, TID, Airam Light, NP, 1,200 mg at 04/22/19 0810    glipiZIDE (GLUCOTROL) tablet 5 mg, 5 mg, Oral, QAM AC, Airam Light, NP, 5 mg at 04/22/19 0810    hydrOXYzine (ATARAX) tablet 100 mg, 100 mg, Oral, At Bedtime, Rolando Zuletaista L, NP, 100 mg at 04/21/19 2048    hydrOXYzine (ATARAX) tablet 50 mg, 50 mg, Oral, TID PRN, Loren Zuleta L, NP, 50 mg at 04/22/19 0811    ibuprofen (ADVIL/MOTRIN) tablet 800 mg, 800 mg, Oral, TID, AlaspaRolandoLoren L, NP, 800 mg at 04/22/19 0658    insulin aspart (NovoLOG) inj (RAPID ACTING), 1-7 Units, Subcutaneous, TID AC, Loren Zuleta, NP, 4 Units at 04/22/19 0812    insulin aspart (NovoLOG) inj (RAPID ACTING), 1-5 Units, Subcutaneous, At Bedtime, Loren Zuleta L, NP, 1 Units at 04/21/19 2049    magnesium sulfate (EPSOM SALT) granules 1 g, 1 g, Topical, Daily PRN, Rolando Zuletaista L, NP, 1 g at 04/19/19 1922    medroxyPROGESTERone (DEPO-PROVERA) injection 150 mg, 150 mg, Intramuscular, Q90 Days, Airam Light, JACKIE, 150 mg at 04/16/19 1640    metaxalone (SKELAXIN) tablet 800 mg, 800 mg, Oral, TID, AlaspaRolandoLoren L, NP, 800 mg at 04/22/19 0810    metFORMIN (GLUCOPHAGE) tablet 1,000 mg, 1,000 mg, Oral, BID w/meals, Davi Owens MD, 1,000 mg at 04/22/19 0811    naloxone (NARCAN) injection 0.1-0.4 mg, 0.1-0.4 mg, Intravenous, Q2 Min PRN, Narcisa Solomon APRN CNP    paliperidone ER (INVEGA) 24 hr tablet 6 mg, 6 mg, Oral, Daily, Loren Zuleta NP, 6 mg at 04/22/19 0834    prazosin (MINIPRESS) capsule 1 mg, 1 mg, Oral, At Bedtime, Kuldeep April JACKIE Rebollar, 1 mg at 04/21/19 2049    traMADol (ULTRAM) tablet 100 mg, 100 mg, Oral, Q6H PRN, Loren Zuleta, NP

## 2019-04-22 NOTE — PLAN OF CARE
Problem: Thought Process Alteration  Goal: Optimal Thought Clarity  Description  Patient will hold a reality based conversation prior to discharge   4/22/2019 1318 by Josh Walls, RN  Outcome: Improving  Pt did not make any delusional statements and does not appear to be hallucinating     Problem: Adult Behavioral Health Plan of Care  Goal: Patient-Specific Goal (Individualization)  Description  Patient will attend at least 50% of groups while on unit.   Patient will report at least 6-8 hours of sleep at night.  Patient will be compliant with treatment team recommendations.    Patient will have a decrease in delusional statements by discharge.   4/22/2019 1318 by Josh Walls, RN  Outcome: Improving  Pt was up this morning and complained of significant foot pain and anxiety. She received PRN Ultram and Atarax with her morning medications.  About an hour later Pt was very unsteady on her feet and using the wall railing to help her walk.  PT asked for a cane to help her.  I did get her a walker.  Pt was given teaching on the importance of getting up slow and taking her time walking. Pt's provider was notified about her unsteady gait following her AM medications. PT told me that she did not get much pain relief from her scheduled medications or the PRN Tramadol. She was offered ice or a foot soak as an alternative but did not want these.  She said that foot soaks and ice make the pain worse.  PT did not share any delusional content with me. She spent most of the day coloring while sitting at her bedside table in her room.  Pt was encouraged to lay down and elevate her feet but she declined stating that elevating her feet makes her feet throb.    At 1400 Pt agreed to the Epsom salt foot soak.  Pt continued to have 8 out of 10 pain in her feet.    1505 Pt given 100 mg of Ultram for her foot pain of 8. She reported that her pain was better from the foot soak.  Pt was more steady on her feet than she was this  morning and is using the walker appropriately

## 2019-04-22 NOTE — PLAN OF CARE
"accu check done this am result was 300 - pt did state \"it was from the ice cream we had last night\".  "

## 2019-04-22 NOTE — PLAN OF CARE
Face to face end of shift report received from Josh SINGH RN. Rounding completed. Patient observed.     Nancy Stern  4/22/2019  4:12 PM

## 2019-04-22 NOTE — PROGRESS NOTES
"CLINICAL NUTRITION SERVICES  -  ASSESSMENT NOTE    Ginette Wood : follow up    39 yof admitted for schizophrenia. Pt has a hx of type 2 diabetes, hypercholesterolemia, hepatitis C, and is overweight. Most recent A1C 8.7 on 3/12/19. Pt's intake has been adequate since last review. Pt has gained 25lbs since admission.    Diet Order: Consistent Carbohydrate  Intake: 100% of meals documented since last review    Height: 5' 4\"  Weight: 186 lbs 1.6 oz  Body mass index is 31.94 kg/m .  Weight Status:  Obesity Grade I BMI 30-34.9  IBWR: 108-144lb  Weight History:  172lb - 4/14/19  161lb- 4/6/19- admit  174lb - 3/12/19  169lb - 9/10/18  171lb - 8/14/18  185lb - 6/15/18  193lb - 3/22/18     Estimated Energy Needs: 1825 kcals (25 Kcal/Kg) - admit weight  Estimated Protein Needs: 60-75 grams protein (0.8-1 g pro/Kg)- admit weight     Malnutrition Diagnosis: None- If there was any degree of malnutrition it is improving/resolved      NUTRITION RECOMMENDATIONS  - Diet is appropriate  - Encourage activity   - Diabetes education as outpatient when mental status stabilizes     MONITORING AND EVALUATION:  RD will monitor intake, weight, labs, and appropriate time for diet education.         "

## 2019-04-22 NOTE — PLAN OF CARE
Face to face end of shift report received from Selina MAK. Rounding completed. Patient observed in dayroom and introduced to nursing for the shift    Josh Walls  4/22/2019  9170

## 2019-04-22 NOTE — PLAN OF CARE
Face to face end of shift report received from OBDULIO Cruz. Rounding completed. Patient observed. Ling in a supine position - eyes closed - non-labored breathing noted - does have head phones playing softly next to her on the bedside stand.    Selina Siegel  4/22/2019  2:05 AM

## 2019-04-22 NOTE — PROGRESS NOTES
[facility]  Psychiatric Progress Note      Impression:   This is a 39 year old female with a significant history of schizoaffective disorder.     Genie reports the Invega seems to be working well. She does feel better with a brighter mood on Invega. Genie does have decreased rapid and tangential speech. She is less pressured than she has been previously. She agrees to an increase in Invega at this time. Will also increase her tramadol as she has severe foot pain on her left foot from her second toe.       Educated regarding medication indications, risks, benefits, side effects, contraindications and possible interactions. Verbally expressed understanding.        DIagnoses:     Schizoaffective disorder  Marijuana use disorder-in remission  Diabetes type 2    Attestation:  Patient has been seen and evaluated by me,  Loren Zuleta NP          Interim History:   The patient's care was discussed with the treatment team and chart notes were reviewed.          Medications:     Current Facility-Administered Medications Ordered in Epic   Medication Dose Route Frequency Last Rate Last Dose     acetaminophen (TYLENOL) tablet 975 mg  975 mg Oral TID   975 mg at 04/22/19 0810     atorvastatin (LIPITOR) tablet 20 mg  20 mg Oral At Bedtime   20 mg at 04/21/19 2049     benztropine (COGENTIN) tablet 0.5 mg  0.5 mg Oral BID   0.5 mg at 04/22/19 0811     busPIRone (BUSPAR) tablet 20 mg  20 mg Oral TID   20 mg at 04/22/19 0810     clonazePAM (klonoPIN) tablet 1 mg  1 mg Oral BID   1 mg at 04/22/19 0811     glucose gel 15-30 g  15-30 g Oral Q15 Min PRN        Or     dextrose 50 % injection 25-50 mL  25-50 mL Intravenous Q15 Min PRN        Or     glucagon injection 1 mg  1 mg Subcutaneous Q15 Min PRN         divalproex sodium extended-release (DEPAKOTE ER) 24 hr tablet 1,750 mg  1,750 mg Oral At Bedtime   1,750 mg at 04/21/19 2049     gabapentin (NEURONTIN) capsule 1,200 mg  1,200 mg Oral TID   1,200 mg at 04/22/19 0810  "    glipiZIDE (GLUCOTROL) tablet 5 mg  5 mg Oral QAM AC   5 mg at 04/22/19 0810     hydrOXYzine (ATARAX) tablet 100 mg  100 mg Oral At Bedtime   100 mg at 04/21/19 2048     hydrOXYzine (ATARAX) tablet 50 mg  50 mg Oral TID PRN   50 mg at 04/22/19 0811     ibuprofen (ADVIL/MOTRIN) tablet 800 mg  800 mg Oral TID   800 mg at 04/22/19 0658     insulin aspart (NovoLOG) inj (RAPID ACTING)  1-7 Units Subcutaneous TID AC   4 Units at 04/22/19 0812     insulin aspart (NovoLOG) inj (RAPID ACTING)  1-5 Units Subcutaneous At Bedtime   1 Units at 04/21/19 2049     magnesium sulfate (EPSOM SALT) granules 1 g  1 g Topical Daily PRN   1 g at 04/19/19 1922     medroxyPROGESTERone (DEPO-PROVERA) injection 150 mg  150 mg Intramuscular Q90 Days   150 mg at 04/16/19 1640     metaxalone (SKELAXIN) tablet 800 mg  800 mg Oral TID   800 mg at 04/22/19 0810     metFORMIN (GLUCOPHAGE) tablet 1,000 mg  1,000 mg Oral BID w/meals   1,000 mg at 04/22/19 0811     paliperidone ER (INVEGA) 24 hr tablet 6 mg  6 mg Oral Daily   6 mg at 04/22/19 0834     prazosin (MINIPRESS) capsule 1 mg  1 mg Oral At Bedtime   1 mg at 04/21/19 2049     traMADol (ULTRAM) tablet 50 mg  50 mg Oral Q6H PRN   50 mg at 04/22/19 0811     No current [facility]-ordered outpatient medications on file.              10 point ROS negative C/O back pain ad neuropathic pain.        Allergies:   No Known Allergies         Psychiatric Examination:   /53   Pulse 82   Temp 99  F (37.2  C) (Tympanic)   Resp 12   Ht 1.626 m (5' 4\")   Wt 84.4 kg (186 lb 1.6 oz)   SpO2 98%   BMI 31.94 kg/m    Weight is 186 lbs 1.6 oz  Body mass index is 31.94 kg/m .    Appearance:  awake, alert, adequately groomed and dressed in hospital scrubs  Attitude:  cooperative  Eye Contact:  good  Mood:  anxious  Affect:  mood congruent  Speech: less pressured and rambling  Psychomotor Behavior:  no evidence of tardive dyskinesia, dystonia, or tics and intact station, gait and muscle tone  Thought Process:  " Slightly less disorganized  Associations: improving  Thought Content:  no evidence of suicidal ideation or homicidal ideation and visual hallucinations present  Insight:  fair  Judgment:  fair  Oriented to:  time, person, and place  Attention Span and Concentration:  fair  Recent and Remote Memory:  fair  Fund of Knowledge: low-normal  Muscle Strength and Tone: normal  Gait and Station: Normal           Labs:     Results for orders placed or performed during the hospital encounter of 04/06/19   Glucose by meter   Result Value Ref Range    Glucose 305 (H) 70 - 99 mg/dL   Glucose by meter   Result Value Ref Range    Glucose 341 (H) 70 - 99 mg/dL   Glucose by meter   Result Value Ref Range    Glucose 193 (H) 70 - 99 mg/dL   Glucose by meter   Result Value Ref Range    Glucose 168 (H) 70 - 99 mg/dL   Glucose by meter   Result Value Ref Range    Glucose 249 (H) 70 - 99 mg/dL   Glucose by meter   Result Value Ref Range    Glucose 227 (H) 70 - 99 mg/dL   Glucose by meter   Result Value Ref Range    Glucose 242 (H) 70 - 99 mg/dL   Glucose by meter   Result Value Ref Range    Glucose 125 (H) 70 - 99 mg/dL   Glucose by meter   Result Value Ref Range    Glucose 105 (H) 70 - 99 mg/dL   Glucose by meter   Result Value Ref Range    Glucose 255 (H) 70 - 99 mg/dL   Glucose by meter   Result Value Ref Range    Glucose 169 (H) 70 - 99 mg/dL   Glucose by meter   Result Value Ref Range    Glucose 195 (H) 70 - 99 mg/dL   Glucose by meter   Result Value Ref Range    Glucose 121 (H) 70 - 99 mg/dL   Glucose by meter   Result Value Ref Range    Glucose 84 70 - 99 mg/dL   Glucose by meter   Result Value Ref Range    Glucose 130 (H) 70 - 99 mg/dL   Glucose by meter   Result Value Ref Range    Glucose 161 (H) 70 - 99 mg/dL   Glucose by meter   Result Value Ref Range    Glucose 181 (H) 70 - 99 mg/dL   Glucose by meter   Result Value Ref Range    Glucose 181 (H) 70 - 99 mg/dL   Glucose by meter   Result Value Ref Range    Glucose 195 (H) 70 - 99  mg/dL   Glucose by meter   Result Value Ref Range    Glucose 100 (H) 70 - 99 mg/dL   Glucose by meter   Result Value Ref Range    Glucose 202 (H) 70 - 99 mg/dL   Glucose by meter   Result Value Ref Range    Glucose 114 (H) 70 - 99 mg/dL   Glucose by meter   Result Value Ref Range    Glucose 132 (H) 70 - 99 mg/dL   Glucose by meter   Result Value Ref Range    Glucose 104 (H) 70 - 99 mg/dL   Glucose by meter   Result Value Ref Range    Glucose 91 70 - 99 mg/dL   Glucose by meter   Result Value Ref Range    Glucose 204 (H) 70 - 99 mg/dL   Glucose by meter   Result Value Ref Range    Glucose 87 70 - 99 mg/dL   Glucose by meter   Result Value Ref Range    Glucose 151 (H) 70 - 99 mg/dL   Glucose by meter   Result Value Ref Range    Glucose 98 70 - 99 mg/dL   Glucose by meter   Result Value Ref Range    Glucose 180 (H) 70 - 99 mg/dL   Glucose by meter   Result Value Ref Range    Glucose 126 (H) 70 - 99 mg/dL   Glucose by meter   Result Value Ref Range    Glucose 137 (H) 70 - 99 mg/dL   Glucose by meter   Result Value Ref Range    Glucose 115 (H) 70 - 99 mg/dL   Glucose by meter   Result Value Ref Range    Glucose 108 (H) 70 - 99 mg/dL   Glucose by meter   Result Value Ref Range    Glucose 189 (H) 70 - 99 mg/dL   Glucose by meter   Result Value Ref Range    Glucose 97 70 - 99 mg/dL   Glucose by meter   Result Value Ref Range    Glucose 123 (H) 70 - 99 mg/dL   Glucose by meter   Result Value Ref Range    Glucose 240 (H) 70 - 99 mg/dL   Glucose by meter   Result Value Ref Range    Glucose 134 (H) 70 - 99 mg/dL   Glucose by meter   Result Value Ref Range    Glucose 173 (H) 70 - 99 mg/dL   Glucose by meter   Result Value Ref Range    Glucose 100 (H) 70 - 99 mg/dL   Glucose by meter   Result Value Ref Range    Glucose 115 (H) 70 - 99 mg/dL   HCG qualitative urine   Result Value Ref Range    HCG Qual Urine Negative NEG^Negative   Glucose by meter   Result Value Ref Range    Glucose 197 (H) 70 - 99 mg/dL   Glucose by meter    Result Value Ref Range    Glucose 143 (H) 70 - 99 mg/dL   Glucose by meter   Result Value Ref Range    Glucose 230 (H) 70 - 99 mg/dL   Glucose by meter   Result Value Ref Range    Glucose 151 (H) 70 - 99 mg/dL   Glucose by meter   Result Value Ref Range    Glucose 167 (H) 70 - 99 mg/dL   Glucose by meter   Result Value Ref Range    Glucose 206 (H) 70 - 99 mg/dL   Glucose by meter   Result Value Ref Range    Glucose 222 (H) 70 - 99 mg/dL   Glucose by meter   Result Value Ref Range    Glucose 225 (H) 70 - 99 mg/dL   Glucose by meter   Result Value Ref Range    Glucose 250 (H) 70 - 99 mg/dL   Glucose by meter   Result Value Ref Range    Glucose 280 (H) 70 - 99 mg/dL   Glucose by meter   Result Value Ref Range    Glucose 245 (H) 70 - 99 mg/dL   Valproic acid   Result Value Ref Range    Valproic Acid Level 45 (L) 50 - 100 mg/L   CBC with platelets differential   Result Value Ref Range    WBC 7.8 4.0 - 11.0 10e9/L    RBC Count 4.05 3.8 - 5.2 10e12/L    Hemoglobin 11.7 11.7 - 15.7 g/dL    Hematocrit 35.1 35.0 - 47.0 %    MCV 87 78 - 100 fl    MCH 28.9 26.5 - 33.0 pg    MCHC 33.3 31.5 - 36.5 g/dL    RDW 13.2 10.0 - 15.0 %    Platelet Count 247 150 - 450 10e9/L    Diff Method Automated Method     % Neutrophils 55.5 %    % Lymphocytes 34.8 %    % Monocytes 7.7 %    % Eosinophils 0.9 %    % Basophils 0.3 %    % Immature Granulocytes 0.8 %    Nucleated RBCs 0 0 /100    Absolute Neutrophil 4.4 1.6 - 8.3 10e9/L    Absolute Lymphocytes 2.7 0.8 - 5.3 10e9/L    Absolute Monocytes 0.6 0.0 - 1.3 10e9/L    Absolute Eosinophils 0.1 0.0 - 0.7 10e9/L    Absolute Basophils 0.0 0.0 - 0.2 10e9/L    Abs Immature Granulocytes 0.1 0 - 0.4 10e9/L    Absolute Nucleated RBC 0.0    Comprehensive metabolic panel   Result Value Ref Range    Sodium 140 133 - 144 mmol/L    Potassium 4.5 3.4 - 5.3 mmol/L    Chloride 108 94 - 109 mmol/L    Carbon Dioxide 25 20 - 32 mmol/L    Anion Gap 7 3 - 14 mmol/L    Glucose 289 (H) 70 - 99 mg/dL    Urea Nitrogen 14  7 - 30 mg/dL    Creatinine 0.53 0.52 - 1.04 mg/dL    GFR Estimate >90 >60 mL/min/[1.73_m2]    GFR Estimate If Black >90 >60 mL/min/[1.73_m2]    Calcium 9.2 8.5 - 10.1 mg/dL    Bilirubin Total 0.1 (L) 0.2 - 1.3 mg/dL    Albumin 3.5 3.4 - 5.0 g/dL    Protein Total 7.0 6.8 - 8.8 g/dL    Alkaline Phosphatase 60 40 - 150 U/L    ALT 17 0 - 50 U/L    AST 7 0 - 45 U/L   Glucose by meter   Result Value Ref Range    Glucose 142 (H) 70 - 99 mg/dL   Glucose by meter   Result Value Ref Range    Glucose 161 (H) 70 - 99 mg/dL   Glucose by meter   Result Value Ref Range    Glucose 167 (H) 70 - 99 mg/dL   Glucose by meter   Result Value Ref Range    Glucose 203 (H) 70 - 99 mg/dL   Glucose by meter   Result Value Ref Range    Glucose 300 (H) 70 - 99 mg/dL              Plan:   Depakote level on 4/26-scheduled  Increase Invega to 6 mg daily  Plan is do go door to door to inpatient MI/cd treatment.     She is voluntary   ELOS: >5 days for stabilization and safe discharge planning

## 2019-04-22 NOTE — PLAN OF CARE
"  Problem: Adult Behavioral Health Plan of Care  Goal: Patient-Specific Goal (Individualization)  Description  Patient will attend at least 50% of groups while on unit.   Patient will report at least 6-8 hours of sleep at night.  Patient will be compliant with treatment team recommendations.    Patient will have a decrease in delusional statements by discharge.   4/21/2019 2018 by Nancy Stern, RN  Outcome: No Change  Note:   VSS, pain to bilat ft 7/10-Ultram rec'd per request. Bilat second toes with bruises remain the same-no progression of bruising--skin intact.   Denies SI, SIB, HI. Endorses auditory hallucinations-\"wearing headphones helps me not hear them.\".   Withdrawn and isolative. Spending her time making graph like calendars.  Does not attend groups  Pt states she is giving her six year old up for adoption because she \"doesn't want [place] to get her--its best for her, I'll have more babies.\"  Cooperative. Remains unable to have a reality based conversation.      Problem: Thought Process Alteration  Goal: Optimal Thought Clarity  Description  Patient will hold a reality based conversation prior to discharge   4/21/2019 2018 by Nancy Stern, RN  Outcome: No Change  Note:   Unable have a reality based conversation.      "
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
## 2019-04-23 LAB
GLUCOSE BLDC GLUCOMTR-MCNC: 192 MG/DL (ref 70–99)
GLUCOSE BLDC GLUCOMTR-MCNC: 198 MG/DL (ref 70–99)
GLUCOSE BLDC GLUCOMTR-MCNC: 281 MG/DL (ref 70–99)
GLUCOSE BLDC GLUCOMTR-MCNC: 294 MG/DL (ref 70–99)

## 2019-04-23 PROCEDURE — 12400000 ZZH R&B MH

## 2019-04-23 PROCEDURE — 00000146 ZZHCL STATISTIC GLUCOSE BY METER IP

## 2019-04-23 PROCEDURE — 99233 SBSQ HOSP IP/OBS HIGH 50: CPT | Performed by: NURSE PRACTITIONER

## 2019-04-23 PROCEDURE — 25000125 ZZHC RX 250: Performed by: INTERNAL MEDICINE

## 2019-04-23 PROCEDURE — 25000132 ZZH RX MED GY IP 250 OP 250 PS 637: Performed by: NURSE PRACTITIONER

## 2019-04-23 RX ORDER — TRAMADOL HYDROCHLORIDE 50 MG/1
100 TABLET ORAL EVERY 6 HOURS PRN
Status: COMPLETED | OUTPATIENT
Start: 2019-04-23 | End: 2019-04-25

## 2019-04-23 RX ADMIN — IBUPROFEN 800 MG: 800 TABLET ORAL at 05:31

## 2019-04-23 RX ADMIN — METAXALONE 800 MG: 800 TABLET ORAL at 20:10

## 2019-04-23 RX ADMIN — ACETAMINOPHEN 975 MG: 325 TABLET, FILM COATED ORAL at 20:10

## 2019-04-23 RX ADMIN — GABAPENTIN 1200 MG: 400 CAPSULE ORAL at 08:00

## 2019-04-23 RX ADMIN — HYDROXYZINE HYDROCHLORIDE 100 MG: 25 TABLET ORAL at 20:10

## 2019-04-23 RX ADMIN — IBUPROFEN 800 MG: 800 TABLET ORAL at 20:10

## 2019-04-23 RX ADMIN — METAXALONE 800 MG: 800 TABLET ORAL at 08:00

## 2019-04-23 RX ADMIN — METFORMIN HYDROCHLORIDE 1000 MG: 1000 TABLET ORAL at 07:59

## 2019-04-23 RX ADMIN — TRAMADOL HYDROCHLORIDE 100 MG: 50 TABLET, COATED ORAL at 05:31

## 2019-04-23 RX ADMIN — GABAPENTIN 1200 MG: 400 CAPSULE ORAL at 13:40

## 2019-04-23 RX ADMIN — GLIPIZIDE 5 MG: 5 TABLET ORAL at 07:59

## 2019-04-23 RX ADMIN — INSULIN ASPART 2 UNITS: 100 INJECTION, SOLUTION INTRAVENOUS; SUBCUTANEOUS at 11:42

## 2019-04-23 RX ADMIN — INSULIN ASPART 3 UNITS: 100 INJECTION, SOLUTION INTRAVENOUS; SUBCUTANEOUS at 17:27

## 2019-04-23 RX ADMIN — IBUPROFEN 800 MG: 800 TABLET ORAL at 13:40

## 2019-04-23 RX ADMIN — GABAPENTIN 1200 MG: 400 CAPSULE ORAL at 20:10

## 2019-04-23 RX ADMIN — ACETAMINOPHEN 975 MG: 325 TABLET, FILM COATED ORAL at 13:40

## 2019-04-23 RX ADMIN — ACETAMINOPHEN 975 MG: 325 TABLET, FILM COATED ORAL at 08:06

## 2019-04-23 RX ADMIN — TRAMADOL HYDROCHLORIDE 100 MG: 50 TABLET, COATED ORAL at 18:44

## 2019-04-23 RX ADMIN — CLONAZEPAM 1 MG: 1 TABLET ORAL at 20:11

## 2019-04-23 RX ADMIN — PALIPERIDONE 6 MG: 6 TABLET, FILM COATED, EXTENDED RELEASE ORAL at 08:00

## 2019-04-23 RX ADMIN — BUSPIRONE HYDROCHLORIDE 20 MG: 10 TABLET ORAL at 08:00

## 2019-04-23 RX ADMIN — PRAZOSIN HYDROCHLORIDE 1 MG: 1 CAPSULE ORAL at 20:10

## 2019-04-23 RX ADMIN — BUSPIRONE HYDROCHLORIDE 20 MG: 10 TABLET ORAL at 13:40

## 2019-04-23 RX ADMIN — METFORMIN HYDROCHLORIDE 1000 MG: 1000 TABLET ORAL at 17:29

## 2019-04-23 RX ADMIN — TRAMADOL HYDROCHLORIDE 100 MG: 50 TABLET, COATED ORAL at 12:33

## 2019-04-23 RX ADMIN — BENZTROPINE MESYLATE 0.5 MG: 0.5 TABLET ORAL at 08:00

## 2019-04-23 RX ADMIN — BENZTROPINE MESYLATE 0.5 MG: 0.5 TABLET ORAL at 20:10

## 2019-04-23 RX ADMIN — MAGNESIUM SULFATE 1 G: 1 GRANULE ORAL; TOPICAL at 12:34

## 2019-04-23 RX ADMIN — ATORVASTATIN CALCIUM 20 MG: 20 TABLET, FILM COATED ORAL at 20:10

## 2019-04-23 RX ADMIN — DIVALPROEX SODIUM 1750 MG: 500 TABLET, EXTENDED RELEASE ORAL at 20:10

## 2019-04-23 RX ADMIN — INSULIN ASPART 2 UNITS: 100 INJECTION, SOLUTION INTRAVENOUS; SUBCUTANEOUS at 08:01

## 2019-04-23 RX ADMIN — METAXALONE 800 MG: 800 TABLET ORAL at 13:40

## 2019-04-23 RX ADMIN — CLONAZEPAM 1 MG: 1 TABLET ORAL at 08:00

## 2019-04-23 RX ADMIN — BUSPIRONE HYDROCHLORIDE 20 MG: 10 TABLET ORAL at 20:10

## 2019-04-23 ASSESSMENT — ACTIVITIES OF DAILY LIVING (ADL)
DRESS: SCRUBS (BEHAVIORAL HEALTH);INDEPENDENT
HYGIENE/GROOMING: INDEPENDENT
LAUNDRY: UNABLE TO COMPLETE
ORAL_HYGIENE: INDEPENDENT

## 2019-04-23 NOTE — PLAN OF CARE
Face to face end of shift report received from Ayah MAK. Rounding completed. Patient observed in dayroom and introduced to nursing for the shift    Josh Walls  4/23/2019  7:44 AM
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
## 2019-04-23 NOTE — PLAN OF CARE
Problem: Thought Process Alteration  Goal: Optimal Thought Clarity  Description  Patient will hold a reality based conversation prior to discharge   4/23/2019 1102 by Josh Walls, RN  Outcome: No Change  Pt is able to have reality based conversation about superficial topics.  When she communicates more about her life, she becomes tangential and shares delusional content.    Problem: Adult Behavioral Health Plan of Care  Goal: Patient-Specific Goal (Individualization)  Description  Patient will attend at least 50% of groups while on unit.   Patient will report at least 6-8 hours of sleep at night.  Patient will be compliant with treatment team recommendations.    Patient will have a decrease in delusional statements by discharge.   Pt will use a walker when up to ambulate due to her unsteady gait.   4/23/2019 1102 by Josh Walls, RN  Outcome: Improving    Pt has been up and out of her room more this shift and attended part of one group. She has sat in the dayroom coloring and listening to music most of the morning. She continues to struggle with significant foot pain that radiates up her legs bilaterally. She did have some pain relief from the increased dose of Tramadol and the Ibuprofen she received on the other shift.  She showered this morning with a shower chair and reported that this also helped her pain some.  Pt has been using the walker when ambulating and feels that it is helping her a great deal. Her gait is still unsteady but is better with the walker. Her affect is brighter today and she appears more relaxed.    1230 Pt soaked her feet in Epsom salts and had Tramadol 100 for her pain.  Pt was given teaching on diabetes disease process and modifiable risk factors.

## 2019-04-23 NOTE — PLAN OF CARE
Face to face end of shift report received from Josh CHOUDHURY RN. Rounding completed. Patient observed coloring in lounge. No requests at this time.     Jenny Cardenas  4/23/2019  4:24 PM

## 2019-04-23 NOTE — PLAN OF CARE
Problem: Adult Behavioral Health Plan of Care  Goal: Patient-Specific Goal (Individualization)  Description  Patient will attend at least 50% of groups while on unit.   Patient will report at least 6-8 hours of sleep at night.  Patient will be compliant with treatment team recommendations.    Patient will have a decrease in delusional statements by discharge.   Pt will use a walker when up to ambulate due to her unsteady gait.   4/23/2019 0016 by Ayah Rodriguez, RN  Note:   Pt has been in bed with eyes closed and regular respirations. 15 minute and PRN checks all night. No complaints offered. Will continue to monitor.      0531- Pt requested and was admin her PRN ultram 100 mg for 10/10 widespread pain worse in her right foot.     AM       Ayah Rodriguez  4/23/2019  12:16 AM

## 2019-04-23 NOTE — PLAN OF CARE
Face to face end of shift report received from Nancy Hernández completed. Patient observed sleeping in prone position with regular and unlabored respirations.     Ayah Rodriguez  4/23/2019  12:15 AM

## 2019-04-23 NOTE — PROGRESS NOTES
[facility]  Psychiatric Progress Note      Impression:   This is a 39 year old female with a significant history of schizoaffective disorder.     Patient is sitting in commons area, drawing what looks like graph paper, she explains this is going to be a calendar - although has many more squares than what a calendar should have.  She tells me about her 8 year old having another baby, along with a few more delusional statements.  Patient is able to reports she feels the Invega is working for her and she feels so much better.  She reports sleeping fine and overall doing good.      Recommendation from Podiatry is foot soaks.    No changes in medications today     Educated regarding medication indications, risks, benefits, side effects, contraindications and possible interactions. Verbally expressed understanding.        DIagnoses:     Schizoaffective disorder  Marijuana use disorder-in remission  Diabetes type 2    Attestation:  Patient has been seen and evaluated by me,  LATRICE Du CNP          Interim History:   The patient's care was discussed with the treatment team and chart notes were reviewed.          Medications:     Current Facility-Administered Medications Ordered in Epic   Medication Dose Route Frequency Last Rate Last Dose     acetaminophen (TYLENOL) tablet 975 mg  975 mg Oral TID   975 mg at 04/23/19 0806     atorvastatin (LIPITOR) tablet 20 mg  20 mg Oral At Bedtime   20 mg at 04/22/19 2027     benztropine (COGENTIN) tablet 0.5 mg  0.5 mg Oral BID   0.5 mg at 04/23/19 0800     busPIRone (BUSPAR) tablet 20 mg  20 mg Oral TID   20 mg at 04/23/19 0800     clonazePAM (klonoPIN) tablet 1 mg  1 mg Oral BID   1 mg at 04/23/19 0800     glucose gel 15-30 g  15-30 g Oral Q15 Min PRN        Or     dextrose 50 % injection 25-50 mL  25-50 mL Intravenous Q15 Min PRN        Or     glucagon injection 1 mg  1 mg Subcutaneous Q15 Min PRN         divalproex sodium extended-release (DEPAKOTE ER)  "24 hr tablet 1,750 mg  1,750 mg Oral At Bedtime   1,750 mg at 04/22/19 2027     gabapentin (NEURONTIN) capsule 1,200 mg  1,200 mg Oral TID   1,200 mg at 04/23/19 0800     glipiZIDE (GLUCOTROL) tablet 5 mg  5 mg Oral QAM AC   5 mg at 04/23/19 0759     hydrOXYzine (ATARAX) tablet 100 mg  100 mg Oral At Bedtime   100 mg at 04/22/19 2028     hydrOXYzine (ATARAX) tablet 50 mg  50 mg Oral TID PRN   50 mg at 04/22/19 0811     ibuprofen (ADVIL/MOTRIN) tablet 800 mg  800 mg Oral TID   800 mg at 04/23/19 0531     insulin aspart (NovoLOG) inj (RAPID ACTING)  1-7 Units Subcutaneous TID AC   2 Units at 04/23/19 1142     insulin aspart (NovoLOG) inj (RAPID ACTING)  1-5 Units Subcutaneous At Bedtime   1 Units at 04/22/19 2031     magnesium sulfate (EPSOM SALT) granules 1 g  1 g Topical Daily PRN   1 g at 04/22/19 1358     medroxyPROGESTERone (DEPO-PROVERA) injection 150 mg  150 mg Intramuscular Q90 Days   150 mg at 04/16/19 1640     metaxalone (SKELAXIN) tablet 800 mg  800 mg Oral TID   800 mg at 04/23/19 0800     metFORMIN (GLUCOPHAGE) tablet 1,000 mg  1,000 mg Oral BID w/meals   1,000 mg at 04/23/19 0759     naloxone (NARCAN) injection 0.1-0.4 mg  0.1-0.4 mg Intravenous Q2 Min PRN         paliperidone ER (INVEGA) 24 hr tablet 6 mg  6 mg Oral Daily   6 mg at 04/23/19 0800     prazosin (MINIPRESS) capsule 1 mg  1 mg Oral At Bedtime   1 mg at 04/22/19 2026     traMADol (ULTRAM) tablet 100 mg  100 mg Oral Q6H PRN         No current Epic-ordered outpatient medications on file.              10 point ROS negative C/O back pain ad neuropathic pain.        Allergies:   No Known Allergies         Psychiatric Examination:   /65   Pulse 88   Temp 98.5  F (36.9  C) (Tympanic)   Resp 14   Ht 1.626 m (5' 4\")   Wt 84.4 kg (186 lb 1.6 oz)   SpO2 99%   BMI 31.94 kg/m    Weight is 186 lbs 1.6 oz  Body mass index is 31.94 kg/m .    Appearance:  awake, alert, adequately groomed and dressed in hospital scrubs  Attitude:  cooperative  Eye " Contact:  good  Mood:  anxious  Affect:  mood congruent  Speech: less pressured and rambling  Psychomotor Behavior:  no evidence of tardive dyskinesia, dystonia, or tics and intact station, gait and muscle tone  Thought Process:  Slightly less disorganized  Associations: improving  Thought Content:  no evidence of suicidal ideation or homicidal ideation and visual hallucinations present  Insight:  fair  Judgment:  fair  Oriented to:  time, person, and place  Attention Span and Concentration:  fair  Recent and Remote Memory:  fair  Fund of Knowledge: low-normal  Muscle Strength and Tone: normal  Gait and Station: Normal           Labs:     Results for orders placed or performed during the hospital encounter of 04/06/19   Glucose by meter   Result Value Ref Range    Glucose 305 (H) 70 - 99 mg/dL   Glucose by meter   Result Value Ref Range    Glucose 341 (H) 70 - 99 mg/dL   Glucose by meter   Result Value Ref Range    Glucose 193 (H) 70 - 99 mg/dL   Glucose by meter   Result Value Ref Range    Glucose 168 (H) 70 - 99 mg/dL   Glucose by meter   Result Value Ref Range    Glucose 249 (H) 70 - 99 mg/dL   Glucose by meter   Result Value Ref Range    Glucose 227 (H) 70 - 99 mg/dL   Glucose by meter   Result Value Ref Range    Glucose 242 (H) 70 - 99 mg/dL   Glucose by meter   Result Value Ref Range    Glucose 125 (H) 70 - 99 mg/dL   Glucose by meter   Result Value Ref Range    Glucose 105 (H) 70 - 99 mg/dL   Glucose by meter   Result Value Ref Range    Glucose 255 (H) 70 - 99 mg/dL   Glucose by meter   Result Value Ref Range    Glucose 169 (H) 70 - 99 mg/dL   Glucose by meter   Result Value Ref Range    Glucose 195 (H) 70 - 99 mg/dL   Glucose by meter   Result Value Ref Range    Glucose 121 (H) 70 - 99 mg/dL   Glucose by meter   Result Value Ref Range    Glucose 84 70 - 99 mg/dL   Glucose by meter   Result Value Ref Range    Glucose 130 (H) 70 - 99 mg/dL   Glucose by meter   Result Value Ref Range    Glucose 161 (H) 70 - 99  mg/dL   Glucose by meter   Result Value Ref Range    Glucose 181 (H) 70 - 99 mg/dL   Glucose by meter   Result Value Ref Range    Glucose 181 (H) 70 - 99 mg/dL   Glucose by meter   Result Value Ref Range    Glucose 195 (H) 70 - 99 mg/dL   Glucose by meter   Result Value Ref Range    Glucose 100 (H) 70 - 99 mg/dL   Glucose by meter   Result Value Ref Range    Glucose 202 (H) 70 - 99 mg/dL   Glucose by meter   Result Value Ref Range    Glucose 114 (H) 70 - 99 mg/dL   Glucose by meter   Result Value Ref Range    Glucose 132 (H) 70 - 99 mg/dL   Glucose by meter   Result Value Ref Range    Glucose 104 (H) 70 - 99 mg/dL   Glucose by meter   Result Value Ref Range    Glucose 91 70 - 99 mg/dL   Glucose by meter   Result Value Ref Range    Glucose 204 (H) 70 - 99 mg/dL   Glucose by meter   Result Value Ref Range    Glucose 87 70 - 99 mg/dL   Glucose by meter   Result Value Ref Range    Glucose 151 (H) 70 - 99 mg/dL   Glucose by meter   Result Value Ref Range    Glucose 98 70 - 99 mg/dL   Glucose by meter   Result Value Ref Range    Glucose 180 (H) 70 - 99 mg/dL   Glucose by meter   Result Value Ref Range    Glucose 126 (H) 70 - 99 mg/dL   Glucose by meter   Result Value Ref Range    Glucose 137 (H) 70 - 99 mg/dL   Glucose by meter   Result Value Ref Range    Glucose 115 (H) 70 - 99 mg/dL   Glucose by meter   Result Value Ref Range    Glucose 108 (H) 70 - 99 mg/dL   Glucose by meter   Result Value Ref Range    Glucose 189 (H) 70 - 99 mg/dL   Glucose by meter   Result Value Ref Range    Glucose 97 70 - 99 mg/dL   Glucose by meter   Result Value Ref Range    Glucose 123 (H) 70 - 99 mg/dL   Glucose by meter   Result Value Ref Range    Glucose 240 (H) 70 - 99 mg/dL   Glucose by meter   Result Value Ref Range    Glucose 134 (H) 70 - 99 mg/dL   Glucose by meter   Result Value Ref Range    Glucose 173 (H) 70 - 99 mg/dL   Glucose by meter   Result Value Ref Range    Glucose 100 (H) 70 - 99 mg/dL   Glucose by meter   Result Value Ref  Range    Glucose 115 (H) 70 - 99 mg/dL   HCG qualitative urine   Result Value Ref Range    HCG Qual Urine Negative NEG^Negative   Glucose by meter   Result Value Ref Range    Glucose 197 (H) 70 - 99 mg/dL   Glucose by meter   Result Value Ref Range    Glucose 143 (H) 70 - 99 mg/dL   Glucose by meter   Result Value Ref Range    Glucose 230 (H) 70 - 99 mg/dL   Glucose by meter   Result Value Ref Range    Glucose 151 (H) 70 - 99 mg/dL   Glucose by meter   Result Value Ref Range    Glucose 167 (H) 70 - 99 mg/dL   Glucose by meter   Result Value Ref Range    Glucose 206 (H) 70 - 99 mg/dL   Glucose by meter   Result Value Ref Range    Glucose 222 (H) 70 - 99 mg/dL   Glucose by meter   Result Value Ref Range    Glucose 225 (H) 70 - 99 mg/dL   Glucose by meter   Result Value Ref Range    Glucose 250 (H) 70 - 99 mg/dL   Glucose by meter   Result Value Ref Range    Glucose 280 (H) 70 - 99 mg/dL   Glucose by meter   Result Value Ref Range    Glucose 245 (H) 70 - 99 mg/dL   Valproic acid   Result Value Ref Range    Valproic Acid Level 45 (L) 50 - 100 mg/L   CBC with platelets differential   Result Value Ref Range    WBC 7.8 4.0 - 11.0 10e9/L    RBC Count 4.05 3.8 - 5.2 10e12/L    Hemoglobin 11.7 11.7 - 15.7 g/dL    Hematocrit 35.1 35.0 - 47.0 %    MCV 87 78 - 100 fl    MCH 28.9 26.5 - 33.0 pg    MCHC 33.3 31.5 - 36.5 g/dL    RDW 13.2 10.0 - 15.0 %    Platelet Count 247 150 - 450 10e9/L    Diff Method Automated Method     % Neutrophils 55.5 %    % Lymphocytes 34.8 %    % Monocytes 7.7 %    % Eosinophils 0.9 %    % Basophils 0.3 %    % Immature Granulocytes 0.8 %    Nucleated RBCs 0 0 /100    Absolute Neutrophil 4.4 1.6 - 8.3 10e9/L    Absolute Lymphocytes 2.7 0.8 - 5.3 10e9/L    Absolute Monocytes 0.6 0.0 - 1.3 10e9/L    Absolute Eosinophils 0.1 0.0 - 0.7 10e9/L    Absolute Basophils 0.0 0.0 - 0.2 10e9/L    Abs Immature Granulocytes 0.1 0 - 0.4 10e9/L    Absolute Nucleated RBC 0.0    Comprehensive metabolic panel   Result Value  Ref Range    Sodium 140 133 - 144 mmol/L    Potassium 4.5 3.4 - 5.3 mmol/L    Chloride 108 94 - 109 mmol/L    Carbon Dioxide 25 20 - 32 mmol/L    Anion Gap 7 3 - 14 mmol/L    Glucose 289 (H) 70 - 99 mg/dL    Urea Nitrogen 14 7 - 30 mg/dL    Creatinine 0.53 0.52 - 1.04 mg/dL    GFR Estimate >90 >60 mL/min/[1.73_m2]    GFR Estimate If Black >90 >60 mL/min/[1.73_m2]    Calcium 9.2 8.5 - 10.1 mg/dL    Bilirubin Total 0.1 (L) 0.2 - 1.3 mg/dL    Albumin 3.5 3.4 - 5.0 g/dL    Protein Total 7.0 6.8 - 8.8 g/dL    Alkaline Phosphatase 60 40 - 150 U/L    ALT 17 0 - 50 U/L    AST 7 0 - 45 U/L   Glucose by meter   Result Value Ref Range    Glucose 142 (H) 70 - 99 mg/dL   Glucose by meter   Result Value Ref Range    Glucose 161 (H) 70 - 99 mg/dL   Glucose by meter   Result Value Ref Range    Glucose 167 (H) 70 - 99 mg/dL   Glucose by meter   Result Value Ref Range    Glucose 203 (H) 70 - 99 mg/dL   Glucose by meter   Result Value Ref Range    Glucose 300 (H) 70 - 99 mg/dL   Glucose by meter   Result Value Ref Range    Glucose 184 (H) 70 - 99 mg/dL   Glucose by meter   Result Value Ref Range    Glucose 249 (H) 70 - 99 mg/dL   Glucose by meter   Result Value Ref Range    Glucose 216 (H) 70 - 99 mg/dL   Glucose by meter   Result Value Ref Range    Glucose 198 (H) 70 - 99 mg/dL   Glucose by meter   Result Value Ref Range    Glucose 192 (H) 70 - 99 mg/dL              Plan:   Depakote level on 4/26-scheduled  Continue Invega to 6 mg daily  Plan is do go door to door to inpatient MI/cd treatment.     She is voluntary   ELOS: >5 days for stabilization and safe discharge planning

## 2019-04-23 NOTE — PLAN OF CARE
BEHAVIORAL TEAM DISCUSSION    Participants: Natalia Fabian NP, Dennise Herrera [facility], Angie Laws LSW,  Becca Chery LSW, Beena Kumar SW, Opal Pillai RN, Bianka Escalante RN, Evaristo WAGNER RN. [facility], Natalia Costello OT, Gena Gomez OT  Progress: Fair: attends some group programming.   Continued Stay Criteria/Rationale: continues with rapid and rambling speech, but less pressured - very grandiose in content. Delusional, unable to discharge to a lower level of care due to high risk of relapse.  Medical/Physical: diabetic - monitoring blood sugars, metformin, accuchecks and sliding scale, painful toenail - ordered ultram PRN as needed for pain -   Precautions:           Behavioral Orders   Procedures    Code 1 - Restrict to Unit    Routine Programming       As clinically indicated    Status 15       Every 15 minutes.      Plan: Continue to stabilize and Look at treatment.   Rationale for change in precautions or plan: none     Active Problems:    Schizophrenia (H)         Current Facility-Administered Medications:     acetaminophen (TYLENOL) tablet 975 mg, 975 mg, Oral, TID, Loren Zuleta NP, 975 mg at 04/23/19 0806    atorvastatin (LIPITOR) tablet 20 mg, 20 mg, Oral, At Bedtime, Loren Zuleta NP, 20 mg at 04/22/19 2027    benztropine (COGENTIN) tablet 0.5 mg, 0.5 mg, Oral, BID, Airam Light NP, 0.5 mg at 04/23/19 0800    busPIRone (BUSPAR) tablet 20 mg, 20 mg, Oral, TID, Loren Zuleta NP, 20 mg at 04/23/19 0800    clonazePAM (klonoPIN) tablet 1 mg, 1 mg, Oral, BID, Airam Light NP, 1 mg at 04/23/19 0800    glucose gel 15-30 g, 15-30 g, Oral, Q15 Min PRN **OR** dextrose 50 % injection 25-50 mL, 25-50 mL, Intravenous, Q15 Min PRN **OR** glucagon injection 1 mg, 1 mg, Subcutaneous, Q15 Min PRN, Alaspa, Loren L, NP    divalproex sodium extended-release (DEPAKOTE ER) 24 hr tablet 1,750 mg, 1,750 mg, Oral, At Bedtime, Loren Zuleta NP, 1,750 mg at  04/22/19 2027    gabapentin (NEURONTIN) capsule 1,200 mg, 1,200 mg, Oral, TID, Airam Light, JACKIE, 1,200 mg at 04/23/19 0800    glipiZIDE (GLUCOTROL) tablet 5 mg, 5 mg, Oral, QAM AC, Airam Light, NP, 5 mg at 04/23/19 0759    hydrOXYzine (ATARAX) tablet 100 mg, 100 mg, Oral, At Bedtime, Loren Zuleta L, NP, 100 mg at 04/22/19 2028    hydrOXYzine (ATARAX) tablet 50 mg, 50 mg, Oral, TID PRN, EllenspaLoren L, NP, 50 mg at 04/22/19 0811    ibuprofen (ADVIL/MOTRIN) tablet 800 mg, 800 mg, Oral, TID, AlaspaRolandoLoren L, NP, 800 mg at 04/23/19 0531    insulin aspart (NovoLOG) inj (RAPID ACTING), 1-7 Units, Subcutaneous, TID AC, Loren Zuleta, NP, 2 Units at 04/23/19 0801    insulin aspart (NovoLOG) inj (RAPID ACTING), 1-5 Units, Subcutaneous, At Bedtime, Loren Zuleta, NP, 1 Units at 04/22/19 2031    magnesium sulfate (EPSOM SALT) granules 1 g, 1 g, Topical, Daily PRN, Loren Zuleta L, NP, 1 g at 04/22/19 1358    medroxyPROGESTERone (DEPO-PROVERA) injection 150 mg, 150 mg, Intramuscular, Q90 Days, Airam Light, JACKIE, 150 mg at 04/16/19 1640    metaxalone (SKELAXIN) tablet 800 mg, 800 mg, Oral, TID, AlaspaLoren L, NP, 800 mg at 04/23/19 0800    metFORMIN (GLUCOPHAGE) tablet 1,000 mg, 1,000 mg, Oral, BID w/meals, Davi Owens MD, 1,000 mg at 04/23/19 0759    naloxone (NARCAN) injection 0.1-0.4 mg, 0.1-0.4 mg, Intravenous, Q2 Min PRN, Narcisa Solomon, APRN CNP    paliperidone ER (INVEGA) 24 hr tablet 6 mg, 6 mg, Oral, Daily, Loren Zuleta NP, 6 mg at 04/23/19 0800    prazosin (MINIPRESS) capsule 1 mg, 1 mg, Oral, At Bedtime, Airam Light NP, 1 mg at 04/22/19 2026

## 2019-04-23 NOTE — PLAN OF CARE
Patient requested a new legal pad. This was given to her. Patient expressed gratitude for the notepad.

## 2019-04-23 NOTE — PLAN OF CARE
"  Problem: Adult Behavioral Health Plan of Care  Goal: Patient-Specific Goal (Individualization)  Description  Patient will attend at least 50% of groups while on unit.   Patient will report at least 6-8 hours of sleep at night.  Patient will be compliant with treatment team recommendations.    Patient will have a decrease in delusional statements by discharge.   Pt will use a walker when up to ambulate due to her unsteady gait.   4/23/2019 1823 by Jenny Cardenas, RN  Note:   Patient sitting in lounge coloring and listening to headphones in beginning of shift. States to this writer, \"what's crackalackin doll?\" Blood sugar was 281 before dinner. Withdrawn/isolative to bedroom after dinner for majority of shift. Flat/tense affect and speech is slightly clearer during conversation with this writer tonight. Continues to be distractible with some poor tracking but appropriate reality based responses. Showered this shift and states with a big smile, \"I made my bed, organized my papers and put lotion on my feet too\".   1844- Received PRN Ultram 100 mg for bilateral foot pain rated 7/10. Reports some relief within the hour.  Blood sugar this evening was 294. Compliant with all scheduled medications. Sitting in lounge towards end of shift. Continue to monitor at this time.      Problem: Thought Process Alteration  Goal: Optimal Thought Clarity  Description  Patient will hold a reality based conversation prior to discharge   4/23/2019 1823 by Jenny Cardenas, RN  Note:   Continue to monitor at this time.      "

## 2019-04-24 LAB
GLUCOSE BLDC GLUCOMTR-MCNC: 231 MG/DL (ref 70–99)
GLUCOSE BLDC GLUCOMTR-MCNC: 255 MG/DL (ref 70–99)
GLUCOSE BLDC GLUCOMTR-MCNC: 282 MG/DL (ref 70–99)
GLUCOSE BLDC GLUCOMTR-MCNC: 285 MG/DL (ref 70–99)

## 2019-04-24 PROCEDURE — 00000146 ZZHCL STATISTIC GLUCOSE BY METER IP

## 2019-04-24 PROCEDURE — 25000125 ZZHC RX 250: Performed by: INTERNAL MEDICINE

## 2019-04-24 PROCEDURE — 25000132 ZZH RX MED GY IP 250 OP 250 PS 637: Performed by: NURSE PRACTITIONER

## 2019-04-24 PROCEDURE — 12400000 ZZH R&B MH

## 2019-04-24 RX ADMIN — METAXALONE 800 MG: 800 TABLET ORAL at 14:05

## 2019-04-24 RX ADMIN — PALIPERIDONE 6 MG: 6 TABLET, FILM COATED, EXTENDED RELEASE ORAL at 08:45

## 2019-04-24 RX ADMIN — MAGNESIUM SULFATE 1 G: 1 GRANULE ORAL; TOPICAL at 11:46

## 2019-04-24 RX ADMIN — PRAZOSIN HYDROCHLORIDE 1 MG: 1 CAPSULE ORAL at 20:17

## 2019-04-24 RX ADMIN — METAXALONE 800 MG: 800 TABLET ORAL at 20:17

## 2019-04-24 RX ADMIN — GABAPENTIN 1200 MG: 400 CAPSULE ORAL at 20:17

## 2019-04-24 RX ADMIN — METFORMIN HYDROCHLORIDE 1000 MG: 1000 TABLET ORAL at 16:55

## 2019-04-24 RX ADMIN — CLONAZEPAM 1 MG: 1 TABLET ORAL at 08:45

## 2019-04-24 RX ADMIN — TRAMADOL HYDROCHLORIDE 100 MG: 50 TABLET, COATED ORAL at 14:50

## 2019-04-24 RX ADMIN — GABAPENTIN 1200 MG: 400 CAPSULE ORAL at 08:44

## 2019-04-24 RX ADMIN — ACETAMINOPHEN 975 MG: 325 TABLET, FILM COATED ORAL at 20:17

## 2019-04-24 RX ADMIN — HYDROXYZINE HYDROCHLORIDE 50 MG: 25 TABLET ORAL at 06:40

## 2019-04-24 RX ADMIN — TRAMADOL HYDROCHLORIDE 100 MG: 50 TABLET, COATED ORAL at 06:40

## 2019-04-24 RX ADMIN — ACETAMINOPHEN 975 MG: 325 TABLET, FILM COATED ORAL at 08:45

## 2019-04-24 RX ADMIN — INSULIN ASPART 3 UNITS: 100 INJECTION, SOLUTION INTRAVENOUS; SUBCUTANEOUS at 16:55

## 2019-04-24 RX ADMIN — INSULIN ASPART 3 UNITS: 100 INJECTION, SOLUTION INTRAVENOUS; SUBCUTANEOUS at 12:18

## 2019-04-24 RX ADMIN — INSULIN ASPART 3 UNITS: 100 INJECTION, SOLUTION INTRAVENOUS; SUBCUTANEOUS at 08:46

## 2019-04-24 RX ADMIN — CLONAZEPAM 1 MG: 1 TABLET ORAL at 20:17

## 2019-04-24 RX ADMIN — ACETAMINOPHEN 975 MG: 325 TABLET, FILM COATED ORAL at 14:05

## 2019-04-24 RX ADMIN — IBUPROFEN 800 MG: 800 TABLET ORAL at 20:17

## 2019-04-24 RX ADMIN — IBUPROFEN 800 MG: 800 TABLET ORAL at 06:23

## 2019-04-24 RX ADMIN — GLIPIZIDE 5 MG: 5 TABLET ORAL at 08:45

## 2019-04-24 RX ADMIN — METFORMIN HYDROCHLORIDE 1000 MG: 1000 TABLET ORAL at 08:45

## 2019-04-24 RX ADMIN — BENZTROPINE MESYLATE 0.5 MG: 0.5 TABLET ORAL at 20:17

## 2019-04-24 RX ADMIN — BUSPIRONE HYDROCHLORIDE 20 MG: 10 TABLET ORAL at 14:05

## 2019-04-24 RX ADMIN — ATORVASTATIN CALCIUM 20 MG: 20 TABLET, FILM COATED ORAL at 20:17

## 2019-04-24 RX ADMIN — BUSPIRONE HYDROCHLORIDE 20 MG: 10 TABLET ORAL at 08:45

## 2019-04-24 RX ADMIN — DIVALPROEX SODIUM 1750 MG: 500 TABLET, EXTENDED RELEASE ORAL at 20:16

## 2019-04-24 RX ADMIN — IBUPROFEN 800 MG: 800 TABLET ORAL at 14:05

## 2019-04-24 RX ADMIN — HYDROXYZINE HYDROCHLORIDE 100 MG: 25 TABLET ORAL at 20:16

## 2019-04-24 RX ADMIN — BENZTROPINE MESYLATE 0.5 MG: 0.5 TABLET ORAL at 08:45

## 2019-04-24 RX ADMIN — METAXALONE 800 MG: 800 TABLET ORAL at 08:45

## 2019-04-24 RX ADMIN — BUSPIRONE HYDROCHLORIDE 20 MG: 10 TABLET ORAL at 20:16

## 2019-04-24 RX ADMIN — GABAPENTIN 1200 MG: 400 CAPSULE ORAL at 14:05

## 2019-04-24 ASSESSMENT — ACTIVITIES OF DAILY LIVING (ADL)
HYGIENE/GROOMING: INDEPENDENT
ORAL_HYGIENE: INDEPENDENT
DRESS: SCRUBS (BEHAVIORAL HEALTH);INDEPENDENT
LAUNDRY: UNABLE TO COMPLETE
ORAL_HYGIENE: INDEPENDENT
HYGIENE/GROOMING: INDEPENDENT
DRESS: INDEPENDENT;SCRUBS (BEHAVIORAL HEALTH)

## 2019-04-24 NOTE — PLAN OF CARE
Face to face end of shift report received from Josh SINGH RN. Rounding completed. Patient observed.     Nancy Stern  4/24/2019  3:50 PM

## 2019-04-24 NOTE — PLAN OF CARE
Problem: Adult Behavioral Health Plan of Care  Goal: Patient-Specific Goal (Individualization)  Description  Patient will attend at least 50% of groups while on unit.   Patient will report at least 6-8 hours of sleep at night.  Patient will be compliant with treatment team recommendations.    Patient will have a decrease in delusional statements by discharge.   Pt will use a walker when up to ambulate due to her unsteady gait.     0125-patient in bed with eyes closed and regular respirations noted.   0610-patient slept through night with no issue.  Blood glucose is 285 at 0626.  0640-requested and accepted Atarax 50 mg and Tramadol 100 mg for 10/10 pain in her right shoulder and left foot. Patient is steady on her feet with use of her walker, is out in the lounge socializing with peers. Speech is mumbling and garbled at times.     4/24/2019 0125 by Olivia Champagne RN  Outcome: No Change

## 2019-04-24 NOTE — PLAN OF CARE
Face to face end of shift report received from Olivia MAK. Rounding completed. Patient observed in dayroom and introduced to nursing for the shift    Josh Walls  4/24/2019  2491

## 2019-04-24 NOTE — PLAN OF CARE
BEHAVIORAL TEAM DISCUSSION    Participants: Airam Light NP, Dennise Herrera LICSW, Angie Laws LSW,  Becca Chery LSW, Beena Kumar SW, Opal Pillai RN, Viridiana Foss RN, Evaristo WAGNER RN, Ion Morales RN. [facility], Mady Holman OT, Natalia Costello OT, Gena Gomez OT  Progress: Fair: attends some group programming.   Continued Stay Criteria/Rationale: continues with rapid and rambling speech, but less pressured - very grandiose in content. Delusional, unable to discharge to a lower level of care due to high risk of relapse.  Medical/Physical: diabetic - monitoring blood sugars, metformin, accuchecks and sliding scale, painful toenail - ordered ultram PRN as needed for pain -   Precautions:           Behavioral Orders   Procedures     Code 1 - Restrict to Unit     Routine Programming       As clinically indicated     Status 15       Every 15 minutes.      Plan: Continue to stabilize and Look at treatment.   Rationale for change in precautions or plan: none     Active Problems:    Schizophrenia (H)      Current Facility-Administered Medications:      acetaminophen (TYLENOL) tablet 975 mg, 975 mg, Oral, TID, Loren Zuleta NP, 975 mg at 04/24/19 0845     atorvastatin (LIPITOR) tablet 20 mg, 20 mg, Oral, At Bedtime, Loren Zuleta NP, 20 mg at 04/23/19 2010     benztropine (COGENTIN) tablet 0.5 mg, 0.5 mg, Oral, BID, Airam Light NP, 0.5 mg at 04/24/19 0845     busPIRone (BUSPAR) tablet 20 mg, 20 mg, Oral, TID, Loren Zuleta NP, 20 mg at 04/24/19 0845     clonazePAM (klonoPIN) tablet 1 mg, 1 mg, Oral, BID, Airam Light NP, 1 mg at 04/24/19 0845     glucose gel 15-30 g, 15-30 g, Oral, Q15 Min PRN **OR** dextrose 50 % injection 25-50 mL, 25-50 mL, Intravenous, Q15 Min PRN **OR** glucagon injection 1 mg, 1 mg, Subcutaneous, Q15 Min PRN, Alaspa, Loren L, NP     divalproex sodium extended-release (DEPAKOTE ER) 24 hr tablet 1,750 mg, 1,750 mg, Oral, At Bedtime,  Loren Zuleta, NP, 1,750 mg at 04/23/19 2010     gabapentin (NEURONTIN) capsule 1,200 mg, 1,200 mg, Oral, TID, Airam Light, NP, 1,200 mg at 04/24/19 0844     glipiZIDE (GLUCOTROL) tablet 5 mg, 5 mg, Oral, QAM AC, Airam Light, NP, 5 mg at 04/24/19 0845     hydrOXYzine (ATARAX) tablet 100 mg, 100 mg, Oral, At Bedtime, Loren Zuleta, NP, 100 mg at 04/23/19 2010     hydrOXYzine (ATARAX) tablet 50 mg, 50 mg, Oral, TID PRN, Loren Zuleta, NP, 50 mg at 04/24/19 0640     ibuprofen (ADVIL/MOTRIN) tablet 800 mg, 800 mg, Oral, TID, Loren Zuleta L, NP, 800 mg at 04/24/19 0623     insulin aspart (NovoLOG) inj (RAPID ACTING), 1-7 Units, Subcutaneous, TID AC, Loren Zuleta, NP, 3 Units at 04/24/19 0846     insulin aspart (NovoLOG) inj (RAPID ACTING), 1-5 Units, Subcutaneous, At Bedtime, Loren Zuleta NP, 2 Units at 04/23/19 2011     magnesium sulfate (EPSOM SALT) granules 1 g, 1 g, Topical, Daily PRN, Loren Zuleta, NP, 1 g at 04/23/19 1234     medroxyPROGESTERone (DEPO-PROVERA) injection 150 mg, 150 mg, Intramuscular, Q90 Days, Airam Light, JACKIE, 150 mg at 04/16/19 1640     metaxalone (SKELAXIN) tablet 800 mg, 800 mg, Oral, TID, Loren Zuleta, NP, 800 mg at 04/24/19 0845     metFORMIN (GLUCOPHAGE) tablet 1,000 mg, 1,000 mg, Oral, BID w/meals, Davi Owens MD, 1,000 mg at 04/24/19 0845     naloxone (NARCAN) injection 0.1-0.4 mg, 0.1-0.4 mg, Intravenous, Q2 Min PRN, Narcisa Solomon APRN CNP     paliperidone ER (INVEGA) 24 hr tablet 6 mg, 6 mg, Oral, Daily, Loren Zuleta NP, 6 mg at 04/24/19 0845     prazosin (MINIPRESS) capsule 1 mg, 1 mg, Oral, At Bedtime, Airam Light NP, 1 mg at 04/23/19 2010     traMADol (ULTRAM) tablet 100 mg, 100 mg, Oral, Q6H PRN, Natalia Fabian, LATRICE CNP, 100 mg at 04/24/19 0640

## 2019-04-24 NOTE — PLAN OF CARE
Face to face end of shift report received from Jenny LO RN. Rounding completed. Patient observed in room.     Olivia Champagne  4/23/2019  11:52 PM

## 2019-04-24 NOTE — PLAN OF CARE
Problem: Thought Process Alteration  Goal: Optimal Thought Clarity  Description  Patient will hold a reality based conversation prior to discharge   4/24/2019 1505 by Josh Walls, RN  Outcome: Improving  Pt is able to talk reality based about superficial topics but shares delusional content when discussing mental health topics     Problem: Adult Behavioral Health Plan of Care  Goal: Patient-Specific Goal (Individualization)  Description  Patient will attend at least 50% of groups while on unit.   Patient will report at least 6-8 hours of sleep at night.  Patient will be compliant with treatment team recommendations.    Patient will have a decrease in delusional statements by discharge.   Pt will use a walker when up to ambulate due to her unsteady gait.   4/24/2019 1505 by Josh Walls, RN  Outcome: Improving   Pt has been up and did attend some of the available groups. She reported to me that she slept very well last night and feels that her medications are working well. She denies having hallucinations though does have grandiose delusional thinking. She is social with peers in the dayroom.  Pt did have a very unsteady gait at times and wanted Tramadol right away at 1230 even though she had slurred speech and a stumbling gait even with the walker. She was given Tramadol at 1430 after she elevated her feet in bed for about an hour.  PT did her foot soak after lunch today. The toe nail on second toe of her right foot is black today with some swelling and redness.  Pt's provider was notified and possibly WOC could look at the wound,

## 2019-04-25 LAB
GLUCOSE BLDC GLUCOMTR-MCNC: 242 MG/DL (ref 70–99)
GLUCOSE BLDC GLUCOMTR-MCNC: 285 MG/DL (ref 70–99)
GLUCOSE BLDC GLUCOMTR-MCNC: 286 MG/DL (ref 70–99)
GLUCOSE BLDC GLUCOMTR-MCNC: 287 MG/DL (ref 70–99)

## 2019-04-25 PROCEDURE — 25000132 ZZH RX MED GY IP 250 OP 250 PS 637: Performed by: NURSE PRACTITIONER

## 2019-04-25 PROCEDURE — 12400000 ZZH R&B MH

## 2019-04-25 PROCEDURE — 99233 SBSQ HOSP IP/OBS HIGH 50: CPT | Performed by: NURSE PRACTITIONER

## 2019-04-25 PROCEDURE — 25000125 ZZHC RX 250: Performed by: INTERNAL MEDICINE

## 2019-04-25 PROCEDURE — 00000146 ZZHCL STATISTIC GLUCOSE BY METER IP

## 2019-04-25 RX ORDER — TRAMADOL HYDROCHLORIDE 50 MG/1
50 TABLET ORAL 3 TIMES DAILY PRN
Status: DISCONTINUED | OUTPATIENT
Start: 2019-04-25 | End: 2019-05-02 | Stop reason: HOSPADM

## 2019-04-25 RX ORDER — GLIPIZIDE 5 MG/1
10 TABLET ORAL
Status: DISCONTINUED | OUTPATIENT
Start: 2019-04-26 | End: 2019-05-02 | Stop reason: HOSPADM

## 2019-04-25 RX ORDER — BENZTROPINE MESYLATE 1 MG/1
1 TABLET ORAL 2 TIMES DAILY
Status: DISCONTINUED | OUTPATIENT
Start: 2019-04-25 | End: 2019-05-02 | Stop reason: HOSPADM

## 2019-04-25 RX ORDER — BENZTROPINE MESYLATE 1 MG/1
1 TABLET ORAL ONCE
Status: DISCONTINUED | OUTPATIENT
Start: 2019-04-25 | End: 2019-05-02 | Stop reason: HOSPADM

## 2019-04-25 RX ADMIN — INSULIN ASPART 3 UNITS: 100 INJECTION, SOLUTION INTRAVENOUS; SUBCUTANEOUS at 12:14

## 2019-04-25 RX ADMIN — IBUPROFEN 800 MG: 800 TABLET ORAL at 13:52

## 2019-04-25 RX ADMIN — CLONAZEPAM 1 MG: 1 TABLET ORAL at 08:28

## 2019-04-25 RX ADMIN — DIVALPROEX SODIUM 1750 MG: 500 TABLET, EXTENDED RELEASE ORAL at 20:22

## 2019-04-25 RX ADMIN — TRAMADOL HYDROCHLORIDE 100 MG: 50 TABLET, COATED ORAL at 01:59

## 2019-04-25 RX ADMIN — METFORMIN HYDROCHLORIDE 1000 MG: 1000 TABLET ORAL at 08:29

## 2019-04-25 RX ADMIN — ACETAMINOPHEN 975 MG: 325 TABLET, FILM COATED ORAL at 08:28

## 2019-04-25 RX ADMIN — TRAMADOL HYDROCHLORIDE 50 MG: 50 TABLET, COATED ORAL at 17:01

## 2019-04-25 RX ADMIN — ACETAMINOPHEN 975 MG: 325 TABLET, FILM COATED ORAL at 13:52

## 2019-04-25 RX ADMIN — IBUPROFEN 800 MG: 800 TABLET ORAL at 20:22

## 2019-04-25 RX ADMIN — ACETAMINOPHEN 975 MG: 325 TABLET, FILM COATED ORAL at 20:22

## 2019-04-25 RX ADMIN — METAXALONE 800 MG: 800 TABLET ORAL at 13:52

## 2019-04-25 RX ADMIN — PRAZOSIN HYDROCHLORIDE 1 MG: 1 CAPSULE ORAL at 20:22

## 2019-04-25 RX ADMIN — MAGNESIUM SULFATE 1 G: 1 GRANULE ORAL; TOPICAL at 13:13

## 2019-04-25 RX ADMIN — BENZTROPINE MESYLATE 1 MG: 1 TABLET ORAL at 20:22

## 2019-04-25 RX ADMIN — GABAPENTIN 1200 MG: 400 CAPSULE ORAL at 08:28

## 2019-04-25 RX ADMIN — CLONAZEPAM 1 MG: 1 TABLET ORAL at 20:23

## 2019-04-25 RX ADMIN — BUSPIRONE HYDROCHLORIDE 20 MG: 10 TABLET ORAL at 20:22

## 2019-04-25 RX ADMIN — INSULIN ASPART 3 UNITS: 100 INJECTION, SOLUTION INTRAVENOUS; SUBCUTANEOUS at 08:29

## 2019-04-25 RX ADMIN — GABAPENTIN 1200 MG: 400 CAPSULE ORAL at 13:52

## 2019-04-25 RX ADMIN — BENZTROPINE MESYLATE 0.5 MG: 0.5 TABLET ORAL at 08:29

## 2019-04-25 RX ADMIN — BUSPIRONE HYDROCHLORIDE 20 MG: 10 TABLET ORAL at 08:28

## 2019-04-25 RX ADMIN — IBUPROFEN 800 MG: 800 TABLET ORAL at 05:39

## 2019-04-25 RX ADMIN — INSULIN ASPART 3 UNITS: 100 INJECTION, SOLUTION INTRAVENOUS; SUBCUTANEOUS at 17:01

## 2019-04-25 RX ADMIN — GABAPENTIN 1200 MG: 400 CAPSULE ORAL at 20:22

## 2019-04-25 RX ADMIN — METFORMIN HYDROCHLORIDE 1000 MG: 1000 TABLET ORAL at 17:01

## 2019-04-25 RX ADMIN — GLIPIZIDE 5 MG: 5 TABLET ORAL at 08:28

## 2019-04-25 RX ADMIN — METAXALONE 800 MG: 800 TABLET ORAL at 08:28

## 2019-04-25 RX ADMIN — ATORVASTATIN CALCIUM 20 MG: 20 TABLET, FILM COATED ORAL at 20:23

## 2019-04-25 RX ADMIN — METAXALONE 800 MG: 800 TABLET ORAL at 20:22

## 2019-04-25 RX ADMIN — PALIPERIDONE 6 MG: 6 TABLET, FILM COATED, EXTENDED RELEASE ORAL at 08:29

## 2019-04-25 RX ADMIN — BUSPIRONE HYDROCHLORIDE 20 MG: 10 TABLET ORAL at 13:52

## 2019-04-25 RX ADMIN — HYDROXYZINE HYDROCHLORIDE 100 MG: 25 TABLET ORAL at 20:22

## 2019-04-25 ASSESSMENT — ACTIVITIES OF DAILY LIVING (ADL)
LAUNDRY: UNABLE TO COMPLETE
ORAL_HYGIENE: INDEPENDENT
HYGIENE/GROOMING: INDEPENDENT
ORAL_HYGIENE: INDEPENDENT
DRESS: SCRUBS (BEHAVIORAL HEALTH)
LAUNDRY: UNABLE TO COMPLETE
DRESS: SCRUBS (BEHAVIORAL HEALTH)
HYGIENE/GROOMING: INDEPENDENT

## 2019-04-25 NOTE — PLAN OF CARE
Problem: Adult Behavioral Health Plan of Care  Goal: Patient-Specific Goal (Individualization)  Description  Patient will attend at least 50% of groups while on unit.   Patient will report at least 6-8 hours of sleep at night.  Patient will be compliant with treatment team recommendations.    Patient will have a decrease in delusional statements by discharge.   Pt will use a walker when up to ambulate due to her unsteady gait.   4/24/2019 2141 by Nancy Stern, RN  Outcome: Improving  Note:   VSS, pain to bilat feet rated a 3/10 after Ultram from day shift. Pt later denies pain. Denies SI, SIB, HI or hallucinations.   Speech non-pressured, rambling at times, making less delusional statements. Continues making graph like calendars/agendas. Less isolative and withdrawn-more talkative with peers.   Bilat feet assessed-R second toe now dusky purple surrounding, left remains the same (bruised under toenail). Cool to touch. Complaints of pain to balls of feet and top of feet with touch. Had pt close her eyes while toes were assessed-light pressure applied. Pt does not respond to pain to  on R-second toe, but does give pain response when light pressure put to L-second toe. Gait steady with walker.   Cooperative and calm demeanor. Smiling at times.      Problem: Thought Process Alteration  Goal: Optimal Thought Clarity  Description  Patient will hold a reality based conversation prior to discharge   4/24/2019 2141 by Nancy Stern, RN  Outcome: Improving  Note:   Clearing-still some delusional statements at times.

## 2019-04-25 NOTE — PLAN OF CARE
Problem: Adult Behavioral Health Plan of Care  Goal: Patient-Specific Goal (Individualization)  Description  Patient will attend at least 50% of groups while on unit.   Patient will report at least 6-8 hours of sleep at night.  Patient will be compliant with treatment team recommendations.    Patient will have a decrease in delusional statements by discharge.   Pt will use a walker when up to ambulate due to her unsteady gait.     Pt in room laying on bed with eyes closed with regular respirations and position changes for most of the night. Pt was up at 0200 when her new room mate came, pt complained of pain 10/10. Pt requested ultram 100mg at this time. Pt up at 0539 and was given ibuprofen 800mg for pain 10/10.   4/25/2019 0615 by Julia Franco RN  Outcome: No Change

## 2019-04-25 NOTE — PLAN OF CARE
Face to face end of shift report received from Sally JOHNSON RN. Rounding completed. Patient observed in [facility].     Olivia Champagne  4/25/2019  3:55 PM

## 2019-04-25 NOTE — PLAN OF CARE
BEHAVIORAL TEAM DISCUSSION    Participants: April Kuldeep NP, Angie Laws LSW,  Becca Chery LSW, Beena Kumar LGSW, Opal Pillai RN, Viridiana Foss RN, Evaristo WAGNER RN, Karley Ron RN, . [facility], [facility] OT   Progress: Fair: attends some group programming.   Continued Stay Criteria/Rationale: less pressured, Delusional, had better insight today than yesterday, still very grandiose -  unable to discharge to a lower level of care due to high risk of relapse.  Medical/Physical: diabetic - monitoring blood sugars, metformin, accuchecks and sliding scale, painful toenail - ultram 50mg to be added - podiatry saw patient on April 18th   Precautions:           Behavioral Orders   Procedures    Code 1 - Restrict to Unit    Routine Programming       As clinically indicated    Status 15       Every 15 minutes.      Plan: Continue to stabilize and Look at treatment.   Rationale for change in precautions or plan: none     Active Problems:    Schizophrenia (H)         Current Facility-Administered Medications:     acetaminophen (TYLENOL) tablet 975 mg, 975 mg, Oral, TID, Loren Zuleta NP, 975 mg at 04/25/19 0828    atorvastatin (LIPITOR) tablet 20 mg, 20 mg, Oral, At Bedtime, Loren Zuleta NP, 20 mg at 04/24/19 2017    benztropine (COGENTIN) tablet 0.5 mg, 0.5 mg, Oral, BID, Kuldeep, April JACKIE Rebollar, 0.5 mg at 04/25/19 0829    busPIRone (BUSPAR) tablet 20 mg, 20 mg, Oral, TID, Loren Zuleta NP, 20 mg at 04/25/19 0828    clonazePAM (klonoPIN) tablet 1 mg, 1 mg, Oral, BID, Kuldeep, April JACKIE Rebollar, 1 mg at 04/25/19 0828    glucose gel 15-30 g, 15-30 g, Oral, Q15 Min PRN **OR** dextrose 50 % injection 25-50 mL, 25-50 mL, Intravenous, Q15 Min PRN **OR** glucagon injection 1 mg, 1 mg, Subcutaneous, Q15 Min PRN, Loren Zuleta NP    divalproex sodium extended-release (DEPAKOTE ER) 24 hr tablet 1,750 mg, 1,750 mg, Oral, At Bedtime, Loren Zuleta NP, 1,750 mg at 04/24/19 2016     gabapentin (NEURONTIN) capsule 1,200 mg, 1,200 mg, Oral, TID, Airam Light, NP, 1,200 mg at 04/25/19 0828    glipiZIDE (GLUCOTROL) tablet 5 mg, 5 mg, Oral, QAM AC, Airam Light, NP, 5 mg at 04/25/19 0828    hydrOXYzine (ATARAX) tablet 100 mg, 100 mg, Oral, At Bedtime, Loren Zuleta L, NP, 100 mg at 04/24/19 2016    hydrOXYzine (ATARAX) tablet 50 mg, 50 mg, Oral, TID PRN, EllenspLoren cruz L, NP, 50 mg at 04/24/19 0640    ibuprofen (ADVIL/MOTRIN) tablet 800 mg, 800 mg, Oral, TID, AlaspaRolandoLoren L, NP, 800 mg at 04/25/19 0539    insulin aspart (NovoLOG) inj (RAPID ACTING), 1-7 Units, Subcutaneous, TID AC, Loren Zuleta, NP, 3 Units at 04/25/19 0829    insulin aspart (NovoLOG) inj (RAPID ACTING), 1-5 Units, Subcutaneous, At Bedtime, Loren Zuleta, NP, 1 Units at 04/24/19 2020    magnesium sulfate (EPSOM SALT) granules 1 g, 1 g, Topical, Daily PRN, Loren Zuleta L, NP, 1 g at 04/24/19 1146    medroxyPROGESTERone (DEPO-PROVERA) injection 150 mg, 150 mg, Intramuscular, Q90 Days, Airam Light NP, 150 mg at 04/16/19 1640    metaxalone (SKELAXIN) tablet 800 mg, 800 mg, Oral, TID, AlaspRolando cruzLoren L, NP, 800 mg at 04/25/19 0828    metFORMIN (GLUCOPHAGE) tablet 1,000 mg, 1,000 mg, Oral, BID w/meals, Davi Owens MD, 1,000 mg at 04/25/19 0829    naloxone (NARCAN) injection 0.1-0.4 mg, 0.1-0.4 mg, Intravenous, Q2 Min PRN, Narcisa Solomon APRN CNP    paliperidone ER (INVEGA) 24 hr tablet 6 mg, 6 mg, Oral, Daily, Loren Zuleta NP, 6 mg at 04/25/19 0829    prazosin (MINIPRESS) capsule 1 mg, 1 mg, Oral, At Bedtime, Airam Light NP, 1 mg at 04/24/19 2017

## 2019-04-25 NOTE — PLAN OF CARE
Face to face end of shift report received from Nancy BOWLES RN. Rounding completed. Patient observed in her bed with eyes closed.     Julia Franco  4/24/2019  11:41 PM
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
## 2019-04-25 NOTE — PROGRESS NOTES
Acknowledgement of Current Assessment       I have reviewed my assessment with my  on ______________. I agree with the plan as it is written in the electronic health record.    Name Signature   Ginette Wood      Name of     Jabari Triana [facility]

## 2019-04-25 NOTE — PLAN OF CARE
Face to face end of shift report received from Shital WILBUR Rounding completed. Patient observed up in [facility].     Sally Sumner  4/25/2019  7:42 AM

## 2019-04-25 NOTE — CONSULTS
Spoke with OBDULIO Zavala regarding wound consult placed by Airam Light CNP. She evaluated pts feet and states there is no open wound. Explained that her toenails are black. These were evaluated by Dr. Shook last week. At this time I do not see a need for a wound consult being there is no open area.

## 2019-04-25 NOTE — PROGRESS NOTES
"[facility]  Psychiatric Progress Note      Impression:   This is a 39 year old female with a significant history of schizoaffective disorder.     Ginette has improved quite a bit since I saw her last week. Her thoughts are not as disorganized and her delusions have decreased in intensity. She reproted she has not heard voices for 2 days though she does have a history of minimizing her symptoms. She does appear to have some improvement. She states she feels much better on the invega and is very happy her blood sugars are more controlled. I asked her if she recalls some of her delusional thoughts and I asked her if she knew what the word grandiose meant and then explained how she appeared very grandiose up until at least last week. She then stated \"oh, those are delusions, that is the truth. I know I am famous I just dont let people treat me like I am.\" she then goes on to tell me how she has a child with fernando Tolbert. She also tells em that one of her sisters killed 5 of her children. She was able to maintain logical none delusional conversation for a longer period of time today than last week though would benefit from having invega increased.   Educated regarding medication indications, risks, benefits, side effects, contraindications and possible interactions. Verbally expressed understanding.        DIagnoses:     Schizoaffective disorder  Marijuana use disorder-in remission  Diabetes type 2    Attestation:  Patient has been seen and evaluated by me,  Airam Light NP          Interim History:   The patient's care was discussed with the treatment team and chart notes were reviewed.          Medications:     Current Facility-Administered Medications Ordered in Epic   Medication Dose Route Frequency Last Rate Last Dose     acetaminophen (TYLENOL) tablet 975 mg  975 mg Oral TID   975 mg at 04/25/19 0828     atorvastatin (LIPITOR) tablet 20 mg  20 mg Oral At Bedtime   20 mg at 04/24/19 2017     " benztropine (COGENTIN) tablet 0.5 mg  0.5 mg Oral BID   0.5 mg at 04/25/19 0829     busPIRone (BUSPAR) tablet 20 mg  20 mg Oral TID   20 mg at 04/25/19 0828     clonazePAM (klonoPIN) tablet 1 mg  1 mg Oral BID   1 mg at 04/25/19 0828     glucose gel 15-30 g  15-30 g Oral Q15 Min PRN        Or     dextrose 50 % injection 25-50 mL  25-50 mL Intravenous Q15 Min PRN        Or     glucagon injection 1 mg  1 mg Subcutaneous Q15 Min PRN         divalproex sodium extended-release (DEPAKOTE ER) 24 hr tablet 1,750 mg  1,750 mg Oral At Bedtime   1,750 mg at 04/24/19 2016     gabapentin (NEURONTIN) capsule 1,200 mg  1,200 mg Oral TID   1,200 mg at 04/25/19 0828     glipiZIDE (GLUCOTROL) tablet 5 mg  5 mg Oral QAM AC   5 mg at 04/25/19 0828     hydrOXYzine (ATARAX) tablet 100 mg  100 mg Oral At Bedtime   100 mg at 04/24/19 2016     hydrOXYzine (ATARAX) tablet 50 mg  50 mg Oral TID PRN   50 mg at 04/24/19 0640     ibuprofen (ADVIL/MOTRIN) tablet 800 mg  800 mg Oral TID   800 mg at 04/25/19 0539     insulin aspart (NovoLOG) inj (RAPID ACTING)  1-7 Units Subcutaneous TID AC   3 Units at 04/25/19 0829     insulin aspart (NovoLOG) inj (RAPID ACTING)  1-5 Units Subcutaneous At Bedtime   1 Units at 04/24/19 2020     magnesium sulfate (EPSOM SALT) granules 1 g  1 g Topical Daily PRN   1 g at 04/24/19 1146     medroxyPROGESTERone (DEPO-PROVERA) injection 150 mg  150 mg Intramuscular Q90 Days   150 mg at 04/16/19 1640     metaxalone (SKELAXIN) tablet 800 mg  800 mg Oral TID   800 mg at 04/25/19 0828     metFORMIN (GLUCOPHAGE) tablet 1,000 mg  1,000 mg Oral BID w/meals   1,000 mg at 04/25/19 0829     naloxone (NARCAN) injection 0.1-0.4 mg  0.1-0.4 mg Intravenous Q2 Min PRN         [START ON 4/26/2019] paliperidone ER (INVEGA) 24 hr tablet 9 mg  9 mg Oral Daily         prazosin (MINIPRESS) capsule 1 mg  1 mg Oral At Bedtime   1 mg at 04/24/19 2017     traMADol (ULTRAM) tablet 50 mg  50 mg Oral TID PRN         No current Epic-ordered  "outpatient medications on file.              10 point ROS negative C/O back pain ad neuropathic pain.        Allergies:   No Known Allergies         Psychiatric Examination:   /55   Pulse 82   Temp 97.7  F (36.5  C) (Tympanic)   Resp 14   Ht 1.626 m (5' 4\")   Wt 84.4 kg (186 lb 1.6 oz)   SpO2 97%   BMI 31.94 kg/m    Weight is 186 lbs 1.6 oz  Body mass index is 31.94 kg/m .    Appearance:  awake, alert, adequately groomed and dressed in hospital scrubs  Attitude:  cooperative  Eye Contact:  good  Mood:  anxious  Affect:  mood congruent  Speech: less pressured and rambling  Psychomotor Behavior:  no evidence of tardive dyskinesia, dystonia, or tics and intact station, gait and muscle tone  Thought Process:  Slightly less disorganized  Associations: improving  Thought Content:  no evidence of suicidal ideation or homicidal ideation and visual hallucinations present  Insight:  fair  Judgment:  fair  Oriented to:  time, person, and place  Attention Span and Concentration:  fair  Recent and Remote Memory:  fair  Fund of Knowledge: low-normal  Muscle Strength and Tone: normal  Gait and Station: Normal           Labs:     Results for orders placed or performed during the hospital encounter of 04/06/19   Glucose by meter   Result Value Ref Range    Glucose 305 (H) 70 - 99 mg/dL   Glucose by meter   Result Value Ref Range    Glucose 341 (H) 70 - 99 mg/dL   Glucose by meter   Result Value Ref Range    Glucose 193 (H) 70 - 99 mg/dL   Glucose by meter   Result Value Ref Range    Glucose 168 (H) 70 - 99 mg/dL   Glucose by meter   Result Value Ref Range    Glucose 249 (H) 70 - 99 mg/dL   Glucose by meter   Result Value Ref Range    Glucose 227 (H) 70 - 99 mg/dL   Glucose by meter   Result Value Ref Range    Glucose 242 (H) 70 - 99 mg/dL   Glucose by meter   Result Value Ref Range    Glucose 125 (H) 70 - 99 mg/dL   Glucose by meter   Result Value Ref Range    Glucose 105 (H) 70 - 99 mg/dL   Glucose by meter   Result " Value Ref Range    Glucose 255 (H) 70 - 99 mg/dL   Glucose by meter   Result Value Ref Range    Glucose 169 (H) 70 - 99 mg/dL   Glucose by meter   Result Value Ref Range    Glucose 195 (H) 70 - 99 mg/dL   Glucose by meter   Result Value Ref Range    Glucose 121 (H) 70 - 99 mg/dL   Glucose by meter   Result Value Ref Range    Glucose 84 70 - 99 mg/dL   Glucose by meter   Result Value Ref Range    Glucose 130 (H) 70 - 99 mg/dL   Glucose by meter   Result Value Ref Range    Glucose 161 (H) 70 - 99 mg/dL   Glucose by meter   Result Value Ref Range    Glucose 181 (H) 70 - 99 mg/dL   Glucose by meter   Result Value Ref Range    Glucose 181 (H) 70 - 99 mg/dL   Glucose by meter   Result Value Ref Range    Glucose 195 (H) 70 - 99 mg/dL   Glucose by meter   Result Value Ref Range    Glucose 100 (H) 70 - 99 mg/dL   Glucose by meter   Result Value Ref Range    Glucose 202 (H) 70 - 99 mg/dL   Glucose by meter   Result Value Ref Range    Glucose 114 (H) 70 - 99 mg/dL   Glucose by meter   Result Value Ref Range    Glucose 132 (H) 70 - 99 mg/dL   Glucose by meter   Result Value Ref Range    Glucose 104 (H) 70 - 99 mg/dL   Glucose by meter   Result Value Ref Range    Glucose 91 70 - 99 mg/dL   Glucose by meter   Result Value Ref Range    Glucose 204 (H) 70 - 99 mg/dL   Glucose by meter   Result Value Ref Range    Glucose 87 70 - 99 mg/dL   Glucose by meter   Result Value Ref Range    Glucose 151 (H) 70 - 99 mg/dL   Glucose by meter   Result Value Ref Range    Glucose 98 70 - 99 mg/dL   Glucose by meter   Result Value Ref Range    Glucose 180 (H) 70 - 99 mg/dL   Glucose by meter   Result Value Ref Range    Glucose 126 (H) 70 - 99 mg/dL   Glucose by meter   Result Value Ref Range    Glucose 137 (H) 70 - 99 mg/dL   Glucose by meter   Result Value Ref Range    Glucose 115 (H) 70 - 99 mg/dL   Glucose by meter   Result Value Ref Range    Glucose 108 (H) 70 - 99 mg/dL   Glucose by meter   Result Value Ref Range    Glucose 189 (H) 70 - 99  mg/dL   Glucose by meter   Result Value Ref Range    Glucose 97 70 - 99 mg/dL   Glucose by meter   Result Value Ref Range    Glucose 123 (H) 70 - 99 mg/dL   Glucose by meter   Result Value Ref Range    Glucose 240 (H) 70 - 99 mg/dL   Glucose by meter   Result Value Ref Range    Glucose 134 (H) 70 - 99 mg/dL   Glucose by meter   Result Value Ref Range    Glucose 173 (H) 70 - 99 mg/dL   Glucose by meter   Result Value Ref Range    Glucose 100 (H) 70 - 99 mg/dL   Glucose by meter   Result Value Ref Range    Glucose 115 (H) 70 - 99 mg/dL   HCG qualitative urine   Result Value Ref Range    HCG Qual Urine Negative NEG^Negative   Glucose by meter   Result Value Ref Range    Glucose 197 (H) 70 - 99 mg/dL   Glucose by meter   Result Value Ref Range    Glucose 143 (H) 70 - 99 mg/dL   Glucose by meter   Result Value Ref Range    Glucose 230 (H) 70 - 99 mg/dL   Glucose by meter   Result Value Ref Range    Glucose 151 (H) 70 - 99 mg/dL   Glucose by meter   Result Value Ref Range    Glucose 167 (H) 70 - 99 mg/dL   Glucose by meter   Result Value Ref Range    Glucose 206 (H) 70 - 99 mg/dL   Glucose by meter   Result Value Ref Range    Glucose 222 (H) 70 - 99 mg/dL   Glucose by meter   Result Value Ref Range    Glucose 225 (H) 70 - 99 mg/dL   Glucose by meter   Result Value Ref Range    Glucose 250 (H) 70 - 99 mg/dL   Glucose by meter   Result Value Ref Range    Glucose 280 (H) 70 - 99 mg/dL   Glucose by meter   Result Value Ref Range    Glucose 245 (H) 70 - 99 mg/dL   Valproic acid   Result Value Ref Range    Valproic Acid Level 45 (L) 50 - 100 mg/L   CBC with platelets differential   Result Value Ref Range    WBC 7.8 4.0 - 11.0 10e9/L    RBC Count 4.05 3.8 - 5.2 10e12/L    Hemoglobin 11.7 11.7 - 15.7 g/dL    Hematocrit 35.1 35.0 - 47.0 %    MCV 87 78 - 100 fl    MCH 28.9 26.5 - 33.0 pg    MCHC 33.3 31.5 - 36.5 g/dL    RDW 13.2 10.0 - 15.0 %    Platelet Count 247 150 - 450 10e9/L    Diff Method Automated Method     % Neutrophils  55.5 %    % Lymphocytes 34.8 %    % Monocytes 7.7 %    % Eosinophils 0.9 %    % Basophils 0.3 %    % Immature Granulocytes 0.8 %    Nucleated RBCs 0 0 /100    Absolute Neutrophil 4.4 1.6 - 8.3 10e9/L    Absolute Lymphocytes 2.7 0.8 - 5.3 10e9/L    Absolute Monocytes 0.6 0.0 - 1.3 10e9/L    Absolute Eosinophils 0.1 0.0 - 0.7 10e9/L    Absolute Basophils 0.0 0.0 - 0.2 10e9/L    Abs Immature Granulocytes 0.1 0 - 0.4 10e9/L    Absolute Nucleated RBC 0.0    Comprehensive metabolic panel   Result Value Ref Range    Sodium 140 133 - 144 mmol/L    Potassium 4.5 3.4 - 5.3 mmol/L    Chloride 108 94 - 109 mmol/L    Carbon Dioxide 25 20 - 32 mmol/L    Anion Gap 7 3 - 14 mmol/L    Glucose 289 (H) 70 - 99 mg/dL    Urea Nitrogen 14 7 - 30 mg/dL    Creatinine 0.53 0.52 - 1.04 mg/dL    GFR Estimate >90 >60 mL/min/[1.73_m2]    GFR Estimate If Black >90 >60 mL/min/[1.73_m2]    Calcium 9.2 8.5 - 10.1 mg/dL    Bilirubin Total 0.1 (L) 0.2 - 1.3 mg/dL    Albumin 3.5 3.4 - 5.0 g/dL    Protein Total 7.0 6.8 - 8.8 g/dL    Alkaline Phosphatase 60 40 - 150 U/L    ALT 17 0 - 50 U/L    AST 7 0 - 45 U/L   Glucose by meter   Result Value Ref Range    Glucose 142 (H) 70 - 99 mg/dL   Glucose by meter   Result Value Ref Range    Glucose 161 (H) 70 - 99 mg/dL   Glucose by meter   Result Value Ref Range    Glucose 167 (H) 70 - 99 mg/dL   Glucose by meter   Result Value Ref Range    Glucose 203 (H) 70 - 99 mg/dL   Glucose by meter   Result Value Ref Range    Glucose 300 (H) 70 - 99 mg/dL   Glucose by meter   Result Value Ref Range    Glucose 184 (H) 70 - 99 mg/dL   Glucose by meter   Result Value Ref Range    Glucose 249 (H) 70 - 99 mg/dL   Glucose by meter   Result Value Ref Range    Glucose 216 (H) 70 - 99 mg/dL   Glucose by meter   Result Value Ref Range    Glucose 198 (H) 70 - 99 mg/dL   Glucose by meter   Result Value Ref Range    Glucose 192 (H) 70 - 99 mg/dL   Glucose by meter   Result Value Ref Range    Glucose 281 (H) 70 - 99 mg/dL   Glucose by  meter   Result Value Ref Range    Glucose 294 (H) 70 - 99 mg/dL   Glucose by meter   Result Value Ref Range    Glucose 285 (H) 70 - 99 mg/dL   Glucose by meter   Result Value Ref Range    Glucose 255 (H) 70 - 99 mg/dL   Glucose by meter   Result Value Ref Range    Glucose 282 (H) 70 - 99 mg/dL   Glucose by meter   Result Value Ref Range    Glucose 231 (H) 70 - 99 mg/dL   Glucose by meter   Result Value Ref Range    Glucose 242 (H) 70 - 99 mg/dL   Wound Ostomy Continence Nurse IP Consult    Narrative    Smita Lizama RN     4/25/2019 11:11 AM  Spoke with OBDULIO Zavala regarding wound consult placed by Airam Light CNP. She evaluated pts feet and states there is no   open wound. Explained that her toenails are black. These were   evaluated by Dr. Shook last week. At this time I do not see a   need for a wound consult being there is no open area.               Plan:   Increase invega to 9 mg daily   She is voluntary. Door to door to mi/cd when stable.   Increase glucotrol to 10 mg   Will check on podiatrys availability  Vpa level ordered for tomorrow AM    ELOS: >5 days for stabilization and safe discharge planning

## 2019-04-26 LAB
GLUCOSE BLDC GLUCOMTR-MCNC: 206 MG/DL (ref 70–99)
GLUCOSE BLDC GLUCOMTR-MCNC: 244 MG/DL (ref 70–99)
GLUCOSE BLDC GLUCOMTR-MCNC: 257 MG/DL (ref 70–99)
GLUCOSE BLDC GLUCOMTR-MCNC: 273 MG/DL (ref 70–99)
VALPROATE SERPL-MCNC: 64 MG/L (ref 50–100)

## 2019-04-26 PROCEDURE — 80164 ASSAY DIPROPYLACETIC ACD TOT: CPT | Performed by: NURSE PRACTITIONER

## 2019-04-26 PROCEDURE — 25000132 ZZH RX MED GY IP 250 OP 250 PS 637: Performed by: NURSE PRACTITIONER

## 2019-04-26 PROCEDURE — 12400000 ZZH R&B MH

## 2019-04-26 PROCEDURE — 36415 COLL VENOUS BLD VENIPUNCTURE: CPT | Performed by: NURSE PRACTITIONER

## 2019-04-26 PROCEDURE — 25000131 ZZH RX MED GY IP 250 OP 636 PS 637: Performed by: INTERNAL MEDICINE

## 2019-04-26 PROCEDURE — 25000125 ZZHC RX 250: Performed by: INTERNAL MEDICINE

## 2019-04-26 PROCEDURE — 99233 SBSQ HOSP IP/OBS HIGH 50: CPT | Performed by: NURSE PRACTITIONER

## 2019-04-26 PROCEDURE — 00000146 ZZHCL STATISTIC GLUCOSE BY METER IP

## 2019-04-26 RX ORDER — DIVALPROEX SODIUM 500 MG/1
2000 TABLET, EXTENDED RELEASE ORAL AT BEDTIME
Status: DISCONTINUED | OUTPATIENT
Start: 2019-04-26 | End: 2019-05-02 | Stop reason: HOSPADM

## 2019-04-26 RX ADMIN — ACETAMINOPHEN 975 MG: 325 TABLET, FILM COATED ORAL at 20:28

## 2019-04-26 RX ADMIN — HYDROXYZINE HYDROCHLORIDE 100 MG: 25 TABLET ORAL at 20:31

## 2019-04-26 RX ADMIN — METFORMIN HYDROCHLORIDE 1000 MG: 1000 TABLET ORAL at 16:37

## 2019-04-26 RX ADMIN — GABAPENTIN 1200 MG: 400 CAPSULE ORAL at 08:09

## 2019-04-26 RX ADMIN — INSULIN DETEMIR 3 UNITS: 100 INJECTION, SOLUTION SUBCUTANEOUS at 20:21

## 2019-04-26 RX ADMIN — IBUPROFEN 800 MG: 800 TABLET ORAL at 07:01

## 2019-04-26 RX ADMIN — METAXALONE 800 MG: 800 TABLET ORAL at 14:01

## 2019-04-26 RX ADMIN — INSULIN ASPART 3 UNITS: 100 INJECTION, SOLUTION INTRAVENOUS; SUBCUTANEOUS at 16:37

## 2019-04-26 RX ADMIN — BENZTROPINE MESYLATE 1 MG: 1 TABLET ORAL at 20:28

## 2019-04-26 RX ADMIN — METFORMIN HYDROCHLORIDE 1000 MG: 1000 TABLET ORAL at 08:12

## 2019-04-26 RX ADMIN — TRAMADOL HYDROCHLORIDE 50 MG: 50 TABLET, COATED ORAL at 03:22

## 2019-04-26 RX ADMIN — ACETAMINOPHEN 975 MG: 325 TABLET, FILM COATED ORAL at 14:01

## 2019-04-26 RX ADMIN — PALIPERIDONE 9 MG: 3 TABLET, EXTENDED RELEASE ORAL at 08:26

## 2019-04-26 RX ADMIN — INSULIN ASPART 3 UNITS: 100 INJECTION, SOLUTION INTRAVENOUS; SUBCUTANEOUS at 08:07

## 2019-04-26 RX ADMIN — TRAMADOL HYDROCHLORIDE 50 MG: 50 TABLET, COATED ORAL at 16:06

## 2019-04-26 RX ADMIN — GABAPENTIN 1200 MG: 400 CAPSULE ORAL at 20:28

## 2019-04-26 RX ADMIN — BUSPIRONE HYDROCHLORIDE 20 MG: 10 TABLET ORAL at 08:09

## 2019-04-26 RX ADMIN — BUSPIRONE HYDROCHLORIDE 20 MG: 10 TABLET ORAL at 20:28

## 2019-04-26 RX ADMIN — PRAZOSIN HYDROCHLORIDE 1 MG: 1 CAPSULE ORAL at 20:29

## 2019-04-26 RX ADMIN — METAXALONE 800 MG: 800 TABLET ORAL at 08:08

## 2019-04-26 RX ADMIN — INSULIN ASPART 3 UNITS: 100 INJECTION, SOLUTION INTRAVENOUS; SUBCUTANEOUS at 12:45

## 2019-04-26 RX ADMIN — ATORVASTATIN CALCIUM 20 MG: 20 TABLET, FILM COATED ORAL at 20:29

## 2019-04-26 RX ADMIN — DIVALPROEX SODIUM 2000 MG: 500 TABLET, EXTENDED RELEASE ORAL at 20:27

## 2019-04-26 RX ADMIN — IBUPROFEN 800 MG: 800 TABLET ORAL at 20:28

## 2019-04-26 RX ADMIN — GABAPENTIN 1200 MG: 400 CAPSULE ORAL at 14:01

## 2019-04-26 RX ADMIN — MAGNESIUM SULFATE 1 G: 1 GRANULE ORAL; TOPICAL at 09:09

## 2019-04-26 RX ADMIN — CLONAZEPAM 1 MG: 1 TABLET ORAL at 20:29

## 2019-04-26 RX ADMIN — ACETAMINOPHEN 975 MG: 325 TABLET, FILM COATED ORAL at 08:09

## 2019-04-26 RX ADMIN — BENZTROPINE MESYLATE 1 MG: 1 TABLET ORAL at 08:08

## 2019-04-26 RX ADMIN — CLONAZEPAM 1 MG: 1 TABLET ORAL at 08:08

## 2019-04-26 RX ADMIN — INSULIN DETEMIR 3 UNITS: 100 INJECTION, SOLUTION SUBCUTANEOUS at 12:46

## 2019-04-26 RX ADMIN — IBUPROFEN 800 MG: 800 TABLET ORAL at 14:01

## 2019-04-26 RX ADMIN — BUSPIRONE HYDROCHLORIDE 20 MG: 10 TABLET ORAL at 14:00

## 2019-04-26 RX ADMIN — GLIPIZIDE 10 MG: 5 TABLET ORAL at 08:09

## 2019-04-26 RX ADMIN — METAXALONE 800 MG: 800 TABLET ORAL at 20:28

## 2019-04-26 ASSESSMENT — ACTIVITIES OF DAILY LIVING (ADL)
DRESS: SCRUBS (BEHAVIORAL HEALTH)
HYGIENE/GROOMING: INDEPENDENT
ORAL_HYGIENE: INDEPENDENT
HYGIENE/GROOMING: INDEPENDENT
DRESS: SCRUBS (BEHAVIORAL HEALTH)
ORAL_HYGIENE: INDEPENDENT

## 2019-04-26 NOTE — PLAN OF CARE
"  Problem: Adult Behavioral Health Plan of Care  Goal: Patient-Specific Goal (Individualization)  Description  Patient will attend at least 50% of groups while on unit.   Patient will report at least 6-8 hours of sleep at night.  Patient will be compliant with treatment team recommendations.    Patient will have a decrease in delusional statements by discharge.   Pt will use a walker when up to ambulate due to her unsteady gait.   Outcome: Improving     Problem: Thought Process Alteration  Goal: Optimal Thought Clarity  Description  Patient will hold a reality based conversation prior to discharge   Outcome: Improving   Patient is has been up on the unit throughout the day. Patient soaked her feet in epsom salt. Patient is tense but cooperative. She showered and dressed in clean clothes. Denies problems with skin. Feet appear softer after soak. Denies having any current physical concerns except pain which she describes as \"Horrendous.\" Patient eating well, slept well last night.  "

## 2019-04-26 NOTE — PLAN OF CARE
Face to face end of shift report received from Olivia MICHEL RN. Rounding completed. Patient observed awake on her bed writing in her notebook.     Julia Franco  4/25/2019  11:46 PM

## 2019-04-26 NOTE — PLAN OF CARE
Face to face end of shift report received from Luz HINOJOSA RN. Rounding completed. Patient observed.     Nancy Stern  4/26/2019  3:50 PM
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
## 2019-04-26 NOTE — PLAN OF CARE
BEHAVIORAL TEAM DISCUSSION    Participants: Airam Light NP, Natalia Fabian NP,  Raven Stout NP, Angie Laws LSW,  Becca Chery LSW, Opal Pillai RN, Viridiana Foss RN, Radha Negron RN, Josh Walls RN, Natalia Costello OT  Progress: fair  Continued Stay Criteria/Rationale: continued psychosis symptoms  Medical/Physical: diabetic, monitoring blood sugars, metformin, accuchecks and sliding scale, painful toenail - ultram 50mg to be added - podiatry saw patient on April 18th- has appointment on Monday to go  Precautions:   Behavioral Orders   Procedures    Code 1 - Restrict to Unit    Routine Programming     As clinically indicated    Status 15     Every 15 minutes.     Plan: continue to stabilize psychosis, increase depakote today  Rationale for change in precautions or plan:   Active Problems:    Schizophrenia (H)      Current Facility-Administered Medications:     acetaminophen (TYLENOL) tablet 975 mg, 975 mg, Oral, TID, Loren Zuleta NP, 975 mg at 04/26/19 0809    atorvastatin (LIPITOR) tablet 20 mg, 20 mg, Oral, At Bedtime, Loren Zuleta NP, 20 mg at 04/25/19 2023    benztropine (COGENTIN) tablet 1 mg, 1 mg, Oral, BID, Airam Light NP, 1 mg at 04/26/19 0808    benztropine (COGENTIN) tablet 1 mg, 1 mg, Oral, Once, Airam Light NP    busPIRone (BUSPAR) tablet 20 mg, 20 mg, Oral, TID, Loren Zuleta NP, 20 mg at 04/26/19 0809    clonazePAM (klonoPIN) tablet 1 mg, 1 mg, Oral, BID, Airam Light NP, 1 mg at 04/26/19 0808    glucose gel 15-30 g, 15-30 g, Oral, Q15 Min PRN **OR** dextrose 50 % injection 25-50 mL, 25-50 mL, Intravenous, Q15 Min PRN **OR** glucagon injection 1 mg, 1 mg, Subcutaneous, Q15 Min PRN, Loren Zuleta NP    divalproex sodium extended-release (DEPAKOTE ER) 24 hr tablet 1,750 mg, 1,750 mg, Oral, At Bedtime, Loren Zuleta NP, 1,750 mg at 04/25/19 2022    gabapentin (NEURONTIN) capsule 1,200 mg, 1,200 mg, Oral, TID, Patterson,  Airam Rebollar NP, 1,200 mg at 04/26/19 0809    glipiZIDE (GLUCOTROL) tablet 10 mg, 10 mg, Oral, QAM AC, Airam Light NP, 10 mg at 04/26/19 0809    hydrOXYzine (ATARAX) tablet 100 mg, 100 mg, Oral, At Bedtime, Loren Zuleta L, NP, 100 mg at 04/25/19 2022    hydrOXYzine (ATARAX) tablet 50 mg, 50 mg, Oral, TID PRN, Loren Zuleta, NP, 50 mg at 04/24/19 0640    ibuprofen (ADVIL/MOTRIN) tablet 800 mg, 800 mg, Oral, TID, Loren Zuleta L, NP, 800 mg at 04/26/19 0701    insulin aspart (NovoLOG) inj (RAPID ACTING), 1-7 Units, Subcutaneous, TID AC, Loren Zuleta, NP, 3 Units at 04/26/19 0807    insulin aspart (NovoLOG) inj (RAPID ACTING), 1-5 Units, Subcutaneous, At Bedtime, Loren Zuleta, NP, 2 Units at 04/25/19 2021    magnesium sulfate (EPSOM SALT) granules 1 g, 1 g, Topical, Daily PRN, Loren Zuleta, NP, 1 g at 04/26/19 0909    medroxyPROGESTERone (DEPO-PROVERA) injection 150 mg, 150 mg, Intramuscular, Q90 Days, Airam Light NP, 150 mg at 04/16/19 1640    metaxalone (SKELAXIN) tablet 800 mg, 800 mg, Oral, TID, Loren Zuleta, NP, 800 mg at 04/26/19 0808    metFORMIN (GLUCOPHAGE) tablet 1,000 mg, 1,000 mg, Oral, BID w/meals, Davi Owens MD, 1,000 mg at 04/26/19 0812    naloxone (NARCAN) injection 0.1-0.4 mg, 0.1-0.4 mg, Intravenous, Q2 Min PRN, Narcisa Solomon, APRN CNP    paliperidone ER (INVEGA) 24 hr tablet 9 mg, 9 mg, Oral, Daily, Airam Light NP, 9 mg at 04/26/19 0826    prazosin (MINIPRESS) capsule 1 mg, 1 mg, Oral, At Bedtime, Airam Light NP, 1 mg at 04/25/19 2022    traMADol (ULTRAM) tablet 50 mg, 50 mg, Oral, TID PRN, Airam Light NP, 50 mg at 04/26/19 0322

## 2019-04-26 NOTE — PROGRESS NOTES
[facility]  Psychiatric Progress Note      Impression:   This is a 39 year old female with a significant history of schizoaffective disorder.           Ginette is still pressured and tangential. Invega was just increased yesterday. She does state that the calf cramps were relieved with the increase in cogentin. She states she slept a bit better. She is writing in a notebook and her thoughts are still delusional and tangential.       Educated regarding medication indications, risks, benefits, side effects, contraindications and possible interactions. Verbally expressed understanding.        DIagnoses:     Schizoaffective disorder  Marijuana use disorder-in remission  Diabetes type 2    Attestation:  Patient has been seen and evaluated by me,  April Keturah Light NP          Interim History:   The patient's care was discussed with the treatment team and chart notes were reviewed.          Medications:     Current Facility-Administered Medications Ordered in Epic   Medication Dose Route Frequency Last Rate Last Dose     acetaminophen (TYLENOL) tablet 975 mg  975 mg Oral TID   975 mg at 04/26/19 1401     atorvastatin (LIPITOR) tablet 20 mg  20 mg Oral At Bedtime   20 mg at 04/25/19 2023     benztropine (COGENTIN) tablet 1 mg  1 mg Oral BID   1 mg at 04/26/19 0808     benztropine (COGENTIN) tablet 1 mg  1 mg Oral Once         busPIRone (BUSPAR) tablet 20 mg  20 mg Oral TID   20 mg at 04/26/19 1400     clonazePAM (klonoPIN) tablet 1 mg  1 mg Oral BID   1 mg at 04/26/19 0808     glucose gel 15-30 g  15-30 g Oral Q15 Min PRN        Or     dextrose 50 % injection 25-50 mL  25-50 mL Intravenous Q15 Min PRN        Or     glucagon injection 1 mg  1 mg Subcutaneous Q15 Min PRN         divalproex sodium extended-release (DEPAKOTE ER) 24 hr tablet 2,000 mg  2,000 mg Oral At Bedtime         gabapentin (NEURONTIN) capsule 1,200 mg  1,200 mg Oral TID   1,200 mg at 04/26/19 1401     glipiZIDE (GLUCOTROL) tablet 10 mg  " 10 mg Oral QAM AC   10 mg at 04/26/19 0809     hydrOXYzine (ATARAX) tablet 100 mg  100 mg Oral At Bedtime   100 mg at 04/25/19 2022     hydrOXYzine (ATARAX) tablet 50 mg  50 mg Oral TID PRN   50 mg at 04/24/19 0640     ibuprofen (ADVIL/MOTRIN) tablet 800 mg  800 mg Oral TID   800 mg at 04/26/19 1401     insulin aspart (NovoLOG) inj (RAPID ACTING)  1-7 Units Subcutaneous TID AC   3 Units at 04/26/19 1245     insulin aspart (NovoLOG) inj (RAPID ACTING)  1-5 Units Subcutaneous At Bedtime   2 Units at 04/25/19 2021     insulin detemir (LEVEMIR PEN) injection 3 Units  3 Units Subcutaneous BID   3 Units at 04/26/19 1246     magnesium sulfate (EPSOM SALT) granules 1 g  1 g Topical Daily PRN   1 g at 04/26/19 0909     medroxyPROGESTERone (DEPO-PROVERA) injection 150 mg  150 mg Intramuscular Q90 Days   150 mg at 04/16/19 1640     metaxalone (SKELAXIN) tablet 800 mg  800 mg Oral TID   800 mg at 04/26/19 1401     metFORMIN (GLUCOPHAGE) tablet 1,000 mg  1,000 mg Oral BID w/meals   1,000 mg at 04/26/19 0812     naloxone (NARCAN) injection 0.1-0.4 mg  0.1-0.4 mg Intravenous Q2 Min PRN         paliperidone ER (INVEGA) 24 hr tablet 9 mg  9 mg Oral Daily   9 mg at 04/26/19 0826     prazosin (MINIPRESS) capsule 1 mg  1 mg Oral At Bedtime   1 mg at 04/25/19 2022     traMADol (ULTRAM) tablet 50 mg  50 mg Oral TID PRN   50 mg at 04/26/19 0322     No current Epic-ordered outpatient medications on file.              10 point ROS negative C/O back pain ad neuropathic pain.        Allergies:   No Known Allergies         Psychiatric Examination:   /61   Pulse 83   Temp 98.9  F (37.2  C) (Tympanic)   Resp 14   Ht 1.626 m (5' 4\")   Wt 84.4 kg (186 lb 1.6 oz)   SpO2 99%   BMI 31.94 kg/m    Weight is 186 lbs 1.6 oz  Body mass index is 31.94 kg/m .    Appearance:  awake, alert, adequately groomed and dressed in hospital scrubs  Attitude:  cooperative  Eye Contact:  good  Mood:  anxious  Affect:  mood congruent  Speech: pressurred " still.   Psychomotor Behavior:  no evidence of tardive dyskinesia, dystonia, or tics and intact station, gait and muscle tone  Thought Process:  Slightly less disorganized  Associations: is still disorganized though a bit improved  Thought Content:  no evidence of suicidal ideation or homicidal ideation and visual hallucinations present  Insight:  fair  Judgment:  fair  Oriented to:  time, person, and place  Attention Span and Concentration:  fair  Recent and Remote Memory:  fair  Fund of Knowledge: low-normal  Muscle Strength and Tone: normal  Gait and Station: Normal           Labs:     Results for orders placed or performed during the hospital encounter of 04/06/19   Glucose by meter   Result Value Ref Range    Glucose 305 (H) 70 - 99 mg/dL   Glucose by meter   Result Value Ref Range    Glucose 341 (H) 70 - 99 mg/dL   Glucose by meter   Result Value Ref Range    Glucose 193 (H) 70 - 99 mg/dL   Glucose by meter   Result Value Ref Range    Glucose 168 (H) 70 - 99 mg/dL   Glucose by meter   Result Value Ref Range    Glucose 249 (H) 70 - 99 mg/dL   Glucose by meter   Result Value Ref Range    Glucose 227 (H) 70 - 99 mg/dL   Glucose by meter   Result Value Ref Range    Glucose 242 (H) 70 - 99 mg/dL   Glucose by meter   Result Value Ref Range    Glucose 125 (H) 70 - 99 mg/dL   Glucose by meter   Result Value Ref Range    Glucose 105 (H) 70 - 99 mg/dL   Glucose by meter   Result Value Ref Range    Glucose 255 (H) 70 - 99 mg/dL   Glucose by meter   Result Value Ref Range    Glucose 169 (H) 70 - 99 mg/dL   Glucose by meter   Result Value Ref Range    Glucose 195 (H) 70 - 99 mg/dL   Glucose by meter   Result Value Ref Range    Glucose 121 (H) 70 - 99 mg/dL   Glucose by meter   Result Value Ref Range    Glucose 84 70 - 99 mg/dL   Glucose by meter   Result Value Ref Range    Glucose 130 (H) 70 - 99 mg/dL   Glucose by meter   Result Value Ref Range    Glucose 161 (H) 70 - 99 mg/dL   Glucose by meter   Result Value Ref Range     Glucose 181 (H) 70 - 99 mg/dL   Glucose by meter   Result Value Ref Range    Glucose 181 (H) 70 - 99 mg/dL   Glucose by meter   Result Value Ref Range    Glucose 195 (H) 70 - 99 mg/dL   Glucose by meter   Result Value Ref Range    Glucose 100 (H) 70 - 99 mg/dL   Glucose by meter   Result Value Ref Range    Glucose 202 (H) 70 - 99 mg/dL   Glucose by meter   Result Value Ref Range    Glucose 114 (H) 70 - 99 mg/dL   Glucose by meter   Result Value Ref Range    Glucose 132 (H) 70 - 99 mg/dL   Glucose by meter   Result Value Ref Range    Glucose 104 (H) 70 - 99 mg/dL   Glucose by meter   Result Value Ref Range    Glucose 91 70 - 99 mg/dL   Glucose by meter   Result Value Ref Range    Glucose 204 (H) 70 - 99 mg/dL   Glucose by meter   Result Value Ref Range    Glucose 87 70 - 99 mg/dL   Glucose by meter   Result Value Ref Range    Glucose 151 (H) 70 - 99 mg/dL   Glucose by meter   Result Value Ref Range    Glucose 98 70 - 99 mg/dL   Glucose by meter   Result Value Ref Range    Glucose 180 (H) 70 - 99 mg/dL   Glucose by meter   Result Value Ref Range    Glucose 126 (H) 70 - 99 mg/dL   Glucose by meter   Result Value Ref Range    Glucose 137 (H) 70 - 99 mg/dL   Glucose by meter   Result Value Ref Range    Glucose 115 (H) 70 - 99 mg/dL   Glucose by meter   Result Value Ref Range    Glucose 108 (H) 70 - 99 mg/dL   Glucose by meter   Result Value Ref Range    Glucose 189 (H) 70 - 99 mg/dL   Glucose by meter   Result Value Ref Range    Glucose 97 70 - 99 mg/dL   Glucose by meter   Result Value Ref Range    Glucose 123 (H) 70 - 99 mg/dL   Glucose by meter   Result Value Ref Range    Glucose 240 (H) 70 - 99 mg/dL   Glucose by meter   Result Value Ref Range    Glucose 134 (H) 70 - 99 mg/dL   Glucose by meter   Result Value Ref Range    Glucose 173 (H) 70 - 99 mg/dL   Glucose by meter   Result Value Ref Range    Glucose 100 (H) 70 - 99 mg/dL   Glucose by meter   Result Value Ref Range    Glucose 115 (H) 70 - 99 mg/dL   HCG  qualitative urine   Result Value Ref Range    HCG Qual Urine Negative NEG^Negative   Glucose by meter   Result Value Ref Range    Glucose 197 (H) 70 - 99 mg/dL   Glucose by meter   Result Value Ref Range    Glucose 143 (H) 70 - 99 mg/dL   Glucose by meter   Result Value Ref Range    Glucose 230 (H) 70 - 99 mg/dL   Glucose by meter   Result Value Ref Range    Glucose 151 (H) 70 - 99 mg/dL   Glucose by meter   Result Value Ref Range    Glucose 167 (H) 70 - 99 mg/dL   Glucose by meter   Result Value Ref Range    Glucose 206 (H) 70 - 99 mg/dL   Glucose by meter   Result Value Ref Range    Glucose 222 (H) 70 - 99 mg/dL   Glucose by meter   Result Value Ref Range    Glucose 225 (H) 70 - 99 mg/dL   Glucose by meter   Result Value Ref Range    Glucose 250 (H) 70 - 99 mg/dL   Glucose by meter   Result Value Ref Range    Glucose 280 (H) 70 - 99 mg/dL   Glucose by meter   Result Value Ref Range    Glucose 245 (H) 70 - 99 mg/dL   Valproic acid   Result Value Ref Range    Valproic Acid Level 45 (L) 50 - 100 mg/L   CBC with platelets differential   Result Value Ref Range    WBC 7.8 4.0 - 11.0 10e9/L    RBC Count 4.05 3.8 - 5.2 10e12/L    Hemoglobin 11.7 11.7 - 15.7 g/dL    Hematocrit 35.1 35.0 - 47.0 %    MCV 87 78 - 100 fl    MCH 28.9 26.5 - 33.0 pg    MCHC 33.3 31.5 - 36.5 g/dL    RDW 13.2 10.0 - 15.0 %    Platelet Count 247 150 - 450 10e9/L    Diff Method Automated Method     % Neutrophils 55.5 %    % Lymphocytes 34.8 %    % Monocytes 7.7 %    % Eosinophils 0.9 %    % Basophils 0.3 %    % Immature Granulocytes 0.8 %    Nucleated RBCs 0 0 /100    Absolute Neutrophil 4.4 1.6 - 8.3 10e9/L    Absolute Lymphocytes 2.7 0.8 - 5.3 10e9/L    Absolute Monocytes 0.6 0.0 - 1.3 10e9/L    Absolute Eosinophils 0.1 0.0 - 0.7 10e9/L    Absolute Basophils 0.0 0.0 - 0.2 10e9/L    Abs Immature Granulocytes 0.1 0 - 0.4 10e9/L    Absolute Nucleated RBC 0.0    Comprehensive metabolic panel   Result Value Ref Range    Sodium 140 133 - 144 mmol/L     Potassium 4.5 3.4 - 5.3 mmol/L    Chloride 108 94 - 109 mmol/L    Carbon Dioxide 25 20 - 32 mmol/L    Anion Gap 7 3 - 14 mmol/L    Glucose 289 (H) 70 - 99 mg/dL    Urea Nitrogen 14 7 - 30 mg/dL    Creatinine 0.53 0.52 - 1.04 mg/dL    GFR Estimate >90 >60 mL/min/[1.73_m2]    GFR Estimate If Black >90 >60 mL/min/[1.73_m2]    Calcium 9.2 8.5 - 10.1 mg/dL    Bilirubin Total 0.1 (L) 0.2 - 1.3 mg/dL    Albumin 3.5 3.4 - 5.0 g/dL    Protein Total 7.0 6.8 - 8.8 g/dL    Alkaline Phosphatase 60 40 - 150 U/L    ALT 17 0 - 50 U/L    AST 7 0 - 45 U/L   Glucose by meter   Result Value Ref Range    Glucose 142 (H) 70 - 99 mg/dL   Glucose by meter   Result Value Ref Range    Glucose 161 (H) 70 - 99 mg/dL   Glucose by meter   Result Value Ref Range    Glucose 167 (H) 70 - 99 mg/dL   Glucose by meter   Result Value Ref Range    Glucose 203 (H) 70 - 99 mg/dL   Glucose by meter   Result Value Ref Range    Glucose 300 (H) 70 - 99 mg/dL   Glucose by meter   Result Value Ref Range    Glucose 184 (H) 70 - 99 mg/dL   Glucose by meter   Result Value Ref Range    Glucose 249 (H) 70 - 99 mg/dL   Glucose by meter   Result Value Ref Range    Glucose 216 (H) 70 - 99 mg/dL   Glucose by meter   Result Value Ref Range    Glucose 198 (H) 70 - 99 mg/dL   Glucose by meter   Result Value Ref Range    Glucose 192 (H) 70 - 99 mg/dL   Glucose by meter   Result Value Ref Range    Glucose 281 (H) 70 - 99 mg/dL   Glucose by meter   Result Value Ref Range    Glucose 294 (H) 70 - 99 mg/dL   Glucose by meter   Result Value Ref Range    Glucose 285 (H) 70 - 99 mg/dL   Glucose by meter   Result Value Ref Range    Glucose 255 (H) 70 - 99 mg/dL   Glucose by meter   Result Value Ref Range    Glucose 282 (H) 70 - 99 mg/dL   Glucose by meter   Result Value Ref Range    Glucose 231 (H) 70 - 99 mg/dL   Glucose by meter   Result Value Ref Range    Glucose 242 (H) 70 - 99 mg/dL   Glucose by meter   Result Value Ref Range    Glucose 286 (H) 70 - 99 mg/dL   Glucose by meter    Result Value Ref Range    Glucose 287 (H) 70 - 99 mg/dL   Glucose by meter   Result Value Ref Range    Glucose 285 (H) 70 - 99 mg/dL   Valproic acid   Result Value Ref Range    Valproic Acid Level 64 50 - 100 mg/L   Glucose by meter   Result Value Ref Range    Glucose 244 (H) 70 - 99 mg/dL   Glucose by meter   Result Value Ref Range    Glucose 273 (H) 70 - 99 mg/dL   Wound Ostomy Continence Nurse IP Consult    Narrative    Smita Lizama RN     4/25/2019 11:11 AM  Spoke with OBDULIO Zavala regarding wound consult placed by Airam Light CNP. She evaluated pts feet and states there is no   open wound. Explained that her toenails are black. These were   evaluated by Dr. Shook last week. At this time I do not see a   need for a wound consult being there is no open area.               Plan:   No changes in medications.     ELOS: >5 days for stabilization and safe discharge planning

## 2019-04-26 NOTE — PLAN OF CARE
Face to face end of shift report received from Shital BOWLES RN. Rounding completed. Patient observed.     Luz Amaya  4/26/2019  10:18 AM

## 2019-04-27 LAB
GLUCOSE BLDC GLUCOMTR-MCNC: 224 MG/DL (ref 70–99)
GLUCOSE BLDC GLUCOMTR-MCNC: 238 MG/DL (ref 70–99)
GLUCOSE BLDC GLUCOMTR-MCNC: 276 MG/DL (ref 70–99)
GLUCOSE BLDC GLUCOMTR-MCNC: 312 MG/DL (ref 70–99)

## 2019-04-27 PROCEDURE — 99233 SBSQ HOSP IP/OBS HIGH 50: CPT | Performed by: NURSE PRACTITIONER

## 2019-04-27 PROCEDURE — 25000132 ZZH RX MED GY IP 250 OP 250 PS 637: Performed by: NURSE PRACTITIONER

## 2019-04-27 PROCEDURE — 12400000 ZZH R&B MH

## 2019-04-27 PROCEDURE — 25000125 ZZHC RX 250: Performed by: INTERNAL MEDICINE

## 2019-04-27 PROCEDURE — 00000146 ZZHCL STATISTIC GLUCOSE BY METER IP

## 2019-04-27 RX ADMIN — TRAMADOL HYDROCHLORIDE 50 MG: 50 TABLET, COATED ORAL at 02:30

## 2019-04-27 RX ADMIN — INSULIN ASPART 4 UNITS: 100 INJECTION, SOLUTION INTRAVENOUS; SUBCUTANEOUS at 17:09

## 2019-04-27 RX ADMIN — GLIPIZIDE 10 MG: 5 TABLET ORAL at 08:13

## 2019-04-27 RX ADMIN — BUSPIRONE HYDROCHLORIDE 20 MG: 10 TABLET ORAL at 13:44

## 2019-04-27 RX ADMIN — GABAPENTIN 1200 MG: 400 CAPSULE ORAL at 20:45

## 2019-04-27 RX ADMIN — METFORMIN HYDROCHLORIDE 1000 MG: 1000 TABLET ORAL at 17:10

## 2019-04-27 RX ADMIN — METFORMIN HYDROCHLORIDE 1000 MG: 1000 TABLET ORAL at 08:04

## 2019-04-27 RX ADMIN — DIVALPROEX SODIUM 2000 MG: 500 TABLET, EXTENDED RELEASE ORAL at 20:44

## 2019-04-27 RX ADMIN — INSULIN ASPART 2 UNITS: 100 INJECTION, SOLUTION INTRAVENOUS; SUBCUTANEOUS at 11:44

## 2019-04-27 RX ADMIN — PALIPERIDONE 9 MG: 3 TABLET, EXTENDED RELEASE ORAL at 08:03

## 2019-04-27 RX ADMIN — BUSPIRONE HYDROCHLORIDE 20 MG: 10 TABLET ORAL at 20:47

## 2019-04-27 RX ADMIN — BENZTROPINE MESYLATE 1 MG: 1 TABLET ORAL at 20:47

## 2019-04-27 RX ADMIN — GABAPENTIN 1200 MG: 400 CAPSULE ORAL at 08:04

## 2019-04-27 RX ADMIN — MAGNESIUM SULFATE 1 G: 1 GRANULE ORAL; TOPICAL at 08:33

## 2019-04-27 RX ADMIN — METAXALONE 800 MG: 800 TABLET ORAL at 08:04

## 2019-04-27 RX ADMIN — BENZTROPINE MESYLATE 1 MG: 1 TABLET ORAL at 08:03

## 2019-04-27 RX ADMIN — CLONAZEPAM 1 MG: 1 TABLET ORAL at 08:03

## 2019-04-27 RX ADMIN — ACETAMINOPHEN 975 MG: 325 TABLET, FILM COATED ORAL at 08:04

## 2019-04-27 RX ADMIN — CLONAZEPAM 1 MG: 1 TABLET ORAL at 20:48

## 2019-04-27 RX ADMIN — ATORVASTATIN CALCIUM 20 MG: 20 TABLET, FILM COATED ORAL at 20:46

## 2019-04-27 RX ADMIN — ACETAMINOPHEN 975 MG: 325 TABLET, FILM COATED ORAL at 13:44

## 2019-04-27 RX ADMIN — INSULIN ASPART 2 UNITS: 100 INJECTION, SOLUTION INTRAVENOUS; SUBCUTANEOUS at 08:03

## 2019-04-27 RX ADMIN — IBUPROFEN 800 MG: 800 TABLET ORAL at 06:36

## 2019-04-27 RX ADMIN — BUSPIRONE HYDROCHLORIDE 20 MG: 10 TABLET ORAL at 08:04

## 2019-04-27 RX ADMIN — IBUPROFEN 800 MG: 800 TABLET ORAL at 13:44

## 2019-04-27 RX ADMIN — HYDROXYZINE HYDROCHLORIDE 100 MG: 25 TABLET ORAL at 20:45

## 2019-04-27 RX ADMIN — METAXALONE 800 MG: 800 TABLET ORAL at 20:46

## 2019-04-27 RX ADMIN — ACETAMINOPHEN 975 MG: 325 TABLET, FILM COATED ORAL at 20:45

## 2019-04-27 RX ADMIN — INSULIN DETEMIR 3 UNITS: 100 INJECTION, SOLUTION SUBCUTANEOUS at 08:03

## 2019-04-27 RX ADMIN — METAXALONE 800 MG: 800 TABLET ORAL at 13:44

## 2019-04-27 RX ADMIN — GABAPENTIN 1200 MG: 400 CAPSULE ORAL at 13:44

## 2019-04-27 RX ADMIN — TRAMADOL HYDROCHLORIDE 50 MG: 50 TABLET, COATED ORAL at 08:33

## 2019-04-27 RX ADMIN — PRAZOSIN HYDROCHLORIDE 1 MG: 1 CAPSULE ORAL at 20:47

## 2019-04-27 RX ADMIN — IBUPROFEN 800 MG: 800 TABLET ORAL at 20:47

## 2019-04-27 ASSESSMENT — ACTIVITIES OF DAILY LIVING (ADL)
HYGIENE/GROOMING: INDEPENDENT
ORAL_HYGIENE: INDEPENDENT
DRESS: SCRUBS (BEHAVIORAL HEALTH)
HYGIENE/GROOMING: INDEPENDENT

## 2019-04-27 NOTE — PLAN OF CARE
Face to face end of shift report received from Nancy BOWLES RN. Rounding completed. Patient observed.     Crow Leyva  4/26/2019  11:42 PM

## 2019-04-27 NOTE — PROGRESS NOTES
"[facility]  Psychiatric Progress Note      Impression:   This is a 39 year old female with a significant history of schizoaffective disorder.         Ginette has been up in the lounge and social with others for the shift.  She states she is sleeping very well and that it has improved greatly since she has came to the hospital.  She is still euphoric and a bit pressured.  She states the voices have improved significantly and is feeling much better.  She mentions knowing that she needs to be tapered off of the Klonopin and does not want to be on something \"that is addictive\" for a long period of time.  We have discussed this multiple times and likely will start tapering her Klonopin once her next Depakote level is drawn and higher than it was this past drop.  Her Depakote was just increased.  Her blood sugars remain high.  Dr. Stinson has been working on monitoring stabilizing blood sugars.  I will contact Dr. Stinson tomorrow if her blood sugars do not start decreasing more.    Educated regarding medication indications, risks, benefits, side effects, contraindications and possible interactions. Verbally expressed understanding.        DIagnoses:     Schizoaffective disorder  Marijuana use disorder-in remission  Diabetes type 2    Attestation:  Patient has been seen and evaluated by me,  Airam Light NP          Interim History:   The patient's care was discussed with the treatment team and chart notes were reviewed.          Medications:     Current Facility-Administered Medications Ordered in Epic   Medication Dose Route Frequency Last Rate Last Dose     acetaminophen (TYLENOL) tablet 975 mg  975 mg Oral TID   975 mg at 04/27/19 0804     atorvastatin (LIPITOR) tablet 20 mg  20 mg Oral At Bedtime   20 mg at 04/26/19 2029     benztropine (COGENTIN) tablet 1 mg  1 mg Oral BID   1 mg at 04/27/19 0803     benztropine (COGENTIN) tablet 1 mg  1 mg Oral Once         busPIRone (BUSPAR) tablet 20 mg  20 " mg Oral TID   20 mg at 04/27/19 0804     clonazePAM (klonoPIN) tablet 1 mg  1 mg Oral BID   1 mg at 04/27/19 0803     glucose gel 15-30 g  15-30 g Oral Q15 Min PRN        Or     dextrose 50 % injection 25-50 mL  25-50 mL Intravenous Q15 Min PRN        Or     glucagon injection 1 mg  1 mg Subcutaneous Q15 Min PRN         divalproex sodium extended-release (DEPAKOTE ER) 24 hr tablet 2,000 mg  2,000 mg Oral At Bedtime   2,000 mg at 04/26/19 2027     gabapentin (NEURONTIN) capsule 1,200 mg  1,200 mg Oral TID   1,200 mg at 04/27/19 0804     glipiZIDE (GLUCOTROL) tablet 10 mg  10 mg Oral QAM AC   10 mg at 04/27/19 0813     hydrOXYzine (ATARAX) tablet 100 mg  100 mg Oral At Bedtime   100 mg at 04/26/19 2031     hydrOXYzine (ATARAX) tablet 50 mg  50 mg Oral TID PRN   50 mg at 04/24/19 0640     ibuprofen (ADVIL/MOTRIN) tablet 800 mg  800 mg Oral TID   800 mg at 04/27/19 0636     insulin aspart (NovoLOG) inj (RAPID ACTING)  1-7 Units Subcutaneous TID AC   2 Units at 04/27/19 1144     insulin aspart (NovoLOG) inj (RAPID ACTING)  1-5 Units Subcutaneous At Bedtime   1 Units at 04/26/19 2021     insulin detemir (LEVEMIR PEN) injection 3 Units  3 Units Subcutaneous BID   3 Units at 04/27/19 0803     magnesium sulfate (EPSOM SALT) granules 1 g  1 g Topical Daily PRN   1 g at 04/27/19 0833     medroxyPROGESTERone (DEPO-PROVERA) injection 150 mg  150 mg Intramuscular Q90 Days   150 mg at 04/16/19 1640     metaxalone (SKELAXIN) tablet 800 mg  800 mg Oral TID   800 mg at 04/27/19 0804     metFORMIN (GLUCOPHAGE) tablet 1,000 mg  1,000 mg Oral BID w/meals   1,000 mg at 04/27/19 0804     naloxone (NARCAN) injection 0.1-0.4 mg  0.1-0.4 mg Intravenous Q2 Min PRN         paliperidone ER (INVEGA) 24 hr tablet 9 mg  9 mg Oral Daily   9 mg at 04/27/19 0803     prazosin (MINIPRESS) capsule 1 mg  1 mg Oral At Bedtime   1 mg at 04/26/19 2029     traMADol (ULTRAM) tablet 50 mg  50 mg Oral TID PRN   50 mg at 04/27/19 0833     No current Epic-ordered  "outpatient medications on file.              10 point ROS negative C/O back pain ad neuropathic pain.        Allergies:   No Known Allergies         Psychiatric Examination:   /58   Pulse 72   Temp 99.1  F (37.3  C) (Tympanic)   Resp 16   Ht 1.626 m (5' 4\")   Wt 84.4 kg (186 lb 1.6 oz)   SpO2 97%   BMI 31.94 kg/m    Weight is 186 lbs 1.6 oz  Body mass index is 31.94 kg/m .    Appearance:  awake, alert, adequately groomed and dressed in hospital scrubs  Attitude:  cooperative  Eye Contact:  good  Mood:  anxious  Affect:  mood congruent  Speech: pressurred still.   Psychomotor Behavior:  no evidence of tardive dyskinesia, dystonia, or tics and intact station, gait and muscle tone  Thought Process:  Slightly less disorganized  Associations: is still disorganized though a bit improved  Thought Content:  no evidence of suicidal ideation or homicidal ideation and visual hallucinations present  Insight:  fair  Judgment:  fair  Oriented to:  time, person, and place  Attention Span and Concentration:  fair  Recent and Remote Memory:  fair  Fund of Knowledge: low-normal  Muscle Strength and Tone: normal  Gait and Station: Normal           Labs:     Results for orders placed or performed during the hospital encounter of 04/06/19   Glucose by meter   Result Value Ref Range    Glucose 305 (H) 70 - 99 mg/dL   Glucose by meter   Result Value Ref Range    Glucose 341 (H) 70 - 99 mg/dL   Glucose by meter   Result Value Ref Range    Glucose 193 (H) 70 - 99 mg/dL   Glucose by meter   Result Value Ref Range    Glucose 168 (H) 70 - 99 mg/dL   Glucose by meter   Result Value Ref Range    Glucose 249 (H) 70 - 99 mg/dL   Glucose by meter   Result Value Ref Range    Glucose 227 (H) 70 - 99 mg/dL   Glucose by meter   Result Value Ref Range    Glucose 242 (H) 70 - 99 mg/dL   Glucose by meter   Result Value Ref Range    Glucose 125 (H) 70 - 99 mg/dL   Glucose by meter   Result Value Ref Range    Glucose 105 (H) 70 - 99 mg/dL "   Glucose by meter   Result Value Ref Range    Glucose 255 (H) 70 - 99 mg/dL   Glucose by meter   Result Value Ref Range    Glucose 169 (H) 70 - 99 mg/dL   Glucose by meter   Result Value Ref Range    Glucose 195 (H) 70 - 99 mg/dL   Glucose by meter   Result Value Ref Range    Glucose 121 (H) 70 - 99 mg/dL   Glucose by meter   Result Value Ref Range    Glucose 84 70 - 99 mg/dL   Glucose by meter   Result Value Ref Range    Glucose 130 (H) 70 - 99 mg/dL   Glucose by meter   Result Value Ref Range    Glucose 161 (H) 70 - 99 mg/dL   Glucose by meter   Result Value Ref Range    Glucose 181 (H) 70 - 99 mg/dL   Glucose by meter   Result Value Ref Range    Glucose 181 (H) 70 - 99 mg/dL   Glucose by meter   Result Value Ref Range    Glucose 195 (H) 70 - 99 mg/dL   Glucose by meter   Result Value Ref Range    Glucose 100 (H) 70 - 99 mg/dL   Glucose by meter   Result Value Ref Range    Glucose 202 (H) 70 - 99 mg/dL   Glucose by meter   Result Value Ref Range    Glucose 114 (H) 70 - 99 mg/dL   Glucose by meter   Result Value Ref Range    Glucose 132 (H) 70 - 99 mg/dL   Glucose by meter   Result Value Ref Range    Glucose 104 (H) 70 - 99 mg/dL   Glucose by meter   Result Value Ref Range    Glucose 91 70 - 99 mg/dL   Glucose by meter   Result Value Ref Range    Glucose 204 (H) 70 - 99 mg/dL   Glucose by meter   Result Value Ref Range    Glucose 87 70 - 99 mg/dL   Glucose by meter   Result Value Ref Range    Glucose 151 (H) 70 - 99 mg/dL   Glucose by meter   Result Value Ref Range    Glucose 98 70 - 99 mg/dL   Glucose by meter   Result Value Ref Range    Glucose 180 (H) 70 - 99 mg/dL   Glucose by meter   Result Value Ref Range    Glucose 126 (H) 70 - 99 mg/dL   Glucose by meter   Result Value Ref Range    Glucose 137 (H) 70 - 99 mg/dL   Glucose by meter   Result Value Ref Range    Glucose 115 (H) 70 - 99 mg/dL   Glucose by meter   Result Value Ref Range    Glucose 108 (H) 70 - 99 mg/dL   Glucose by meter   Result Value Ref Range     Glucose 189 (H) 70 - 99 mg/dL   Glucose by meter   Result Value Ref Range    Glucose 97 70 - 99 mg/dL   Glucose by meter   Result Value Ref Range    Glucose 123 (H) 70 - 99 mg/dL   Glucose by meter   Result Value Ref Range    Glucose 240 (H) 70 - 99 mg/dL   Glucose by meter   Result Value Ref Range    Glucose 134 (H) 70 - 99 mg/dL   Glucose by meter   Result Value Ref Range    Glucose 173 (H) 70 - 99 mg/dL   Glucose by meter   Result Value Ref Range    Glucose 100 (H) 70 - 99 mg/dL   Glucose by meter   Result Value Ref Range    Glucose 115 (H) 70 - 99 mg/dL   HCG qualitative urine   Result Value Ref Range    HCG Qual Urine Negative NEG^Negative   Glucose by meter   Result Value Ref Range    Glucose 197 (H) 70 - 99 mg/dL   Glucose by meter   Result Value Ref Range    Glucose 143 (H) 70 - 99 mg/dL   Glucose by meter   Result Value Ref Range    Glucose 230 (H) 70 - 99 mg/dL   Glucose by meter   Result Value Ref Range    Glucose 151 (H) 70 - 99 mg/dL   Glucose by meter   Result Value Ref Range    Glucose 167 (H) 70 - 99 mg/dL   Glucose by meter   Result Value Ref Range    Glucose 206 (H) 70 - 99 mg/dL   Glucose by meter   Result Value Ref Range    Glucose 222 (H) 70 - 99 mg/dL   Glucose by meter   Result Value Ref Range    Glucose 225 (H) 70 - 99 mg/dL   Glucose by meter   Result Value Ref Range    Glucose 250 (H) 70 - 99 mg/dL   Glucose by meter   Result Value Ref Range    Glucose 280 (H) 70 - 99 mg/dL   Glucose by meter   Result Value Ref Range    Glucose 245 (H) 70 - 99 mg/dL   Valproic acid   Result Value Ref Range    Valproic Acid Level 45 (L) 50 - 100 mg/L   CBC with platelets differential   Result Value Ref Range    WBC 7.8 4.0 - 11.0 10e9/L    RBC Count 4.05 3.8 - 5.2 10e12/L    Hemoglobin 11.7 11.7 - 15.7 g/dL    Hematocrit 35.1 35.0 - 47.0 %    MCV 87 78 - 100 fl    MCH 28.9 26.5 - 33.0 pg    MCHC 33.3 31.5 - 36.5 g/dL    RDW 13.2 10.0 - 15.0 %    Platelet Count 247 150 - 450 10e9/L    Diff Method Automated  Method     % Neutrophils 55.5 %    % Lymphocytes 34.8 %    % Monocytes 7.7 %    % Eosinophils 0.9 %    % Basophils 0.3 %    % Immature Granulocytes 0.8 %    Nucleated RBCs 0 0 /100    Absolute Neutrophil 4.4 1.6 - 8.3 10e9/L    Absolute Lymphocytes 2.7 0.8 - 5.3 10e9/L    Absolute Monocytes 0.6 0.0 - 1.3 10e9/L    Absolute Eosinophils 0.1 0.0 - 0.7 10e9/L    Absolute Basophils 0.0 0.0 - 0.2 10e9/L    Abs Immature Granulocytes 0.1 0 - 0.4 10e9/L    Absolute Nucleated RBC 0.0    Comprehensive metabolic panel   Result Value Ref Range    Sodium 140 133 - 144 mmol/L    Potassium 4.5 3.4 - 5.3 mmol/L    Chloride 108 94 - 109 mmol/L    Carbon Dioxide 25 20 - 32 mmol/L    Anion Gap 7 3 - 14 mmol/L    Glucose 289 (H) 70 - 99 mg/dL    Urea Nitrogen 14 7 - 30 mg/dL    Creatinine 0.53 0.52 - 1.04 mg/dL    GFR Estimate >90 >60 mL/min/[1.73_m2]    GFR Estimate If Black >90 >60 mL/min/[1.73_m2]    Calcium 9.2 8.5 - 10.1 mg/dL    Bilirubin Total 0.1 (L) 0.2 - 1.3 mg/dL    Albumin 3.5 3.4 - 5.0 g/dL    Protein Total 7.0 6.8 - 8.8 g/dL    Alkaline Phosphatase 60 40 - 150 U/L    ALT 17 0 - 50 U/L    AST 7 0 - 45 U/L   Glucose by meter   Result Value Ref Range    Glucose 142 (H) 70 - 99 mg/dL   Glucose by meter   Result Value Ref Range    Glucose 161 (H) 70 - 99 mg/dL   Glucose by meter   Result Value Ref Range    Glucose 167 (H) 70 - 99 mg/dL   Glucose by meter   Result Value Ref Range    Glucose 203 (H) 70 - 99 mg/dL   Glucose by meter   Result Value Ref Range    Glucose 300 (H) 70 - 99 mg/dL   Glucose by meter   Result Value Ref Range    Glucose 184 (H) 70 - 99 mg/dL   Glucose by meter   Result Value Ref Range    Glucose 249 (H) 70 - 99 mg/dL   Glucose by meter   Result Value Ref Range    Glucose 216 (H) 70 - 99 mg/dL   Glucose by meter   Result Value Ref Range    Glucose 198 (H) 70 - 99 mg/dL   Glucose by meter   Result Value Ref Range    Glucose 192 (H) 70 - 99 mg/dL   Glucose by meter   Result Value Ref Range    Glucose 281 (H)  70 - 99 mg/dL   Glucose by meter   Result Value Ref Range    Glucose 294 (H) 70 - 99 mg/dL   Glucose by meter   Result Value Ref Range    Glucose 285 (H) 70 - 99 mg/dL   Glucose by meter   Result Value Ref Range    Glucose 255 (H) 70 - 99 mg/dL   Glucose by meter   Result Value Ref Range    Glucose 282 (H) 70 - 99 mg/dL   Glucose by meter   Result Value Ref Range    Glucose 231 (H) 70 - 99 mg/dL   Glucose by meter   Result Value Ref Range    Glucose 242 (H) 70 - 99 mg/dL   Glucose by meter   Result Value Ref Range    Glucose 286 (H) 70 - 99 mg/dL   Glucose by meter   Result Value Ref Range    Glucose 287 (H) 70 - 99 mg/dL   Glucose by meter   Result Value Ref Range    Glucose 285 (H) 70 - 99 mg/dL   Valproic acid   Result Value Ref Range    Valproic Acid Level 64 50 - 100 mg/L   Glucose by meter   Result Value Ref Range    Glucose 244 (H) 70 - 99 mg/dL   Glucose by meter   Result Value Ref Range    Glucose 273 (H) 70 - 99 mg/dL   Glucose by meter   Result Value Ref Range    Glucose 257 (H) 70 - 99 mg/dL   Glucose by meter   Result Value Ref Range    Glucose 206 (H) 70 - 99 mg/dL   Glucose by meter   Result Value Ref Range    Glucose 238 (H) 70 - 99 mg/dL   Glucose by meter   Result Value Ref Range    Glucose 224 (H) 70 - 99 mg/dL   Wound Ostomy Continence Nurse IP Consult    Narrative    Smita Lizama RN     4/25/2019 11:11 AM  Spoke with OBDULIO Zavala regarding wound consult placed by Airam Light CNP. She evaluated pts feet and states there is no   open wound. Explained that her toenails are black. These were   evaluated by Dr. Shook last week. At this time I do not see a   need for a wound consult being there is no open area.               Plan:   No changes in medications.     ELOS: >5 days for stabilization and safe discharge planning

## 2019-04-27 NOTE — PLAN OF CARE
Problem: Adult Behavioral Health Plan of Care  Goal: Patient-Specific Goal (Individualization)  Description  Patient will attend at least 50% of groups while on unit.   Patient will report at least 6-8 hours of sleep at night.  Patient will be compliant with treatment team recommendations.    Patient will have a decrease in delusional statements by discharge.   Pt will use a walker when up to ambulate due to her unsteady gait.   4/27/2019 1741 by Mitra Devlin RN  Outcome: No Change   Goes to group but become disruptive and has to be redirected. Hypertalkative about being here before and having her daughter whom is a elza because her father is royalty.  Problem: Thought Process Alteration  Goal: Optimal Thought Clarity  Description  Patient will hold a reality based conversation prior to discharge   4/27/2019 1741 by Mitra Devlin RN  Outcome: No Change   Rambles and euphoric. Does direct back to conversation. Answers questions but then is back to her delusions.

## 2019-04-27 NOTE — PLAN OF CARE
"  Problem: Adult Behavioral Health Plan of Care  Goal: Patient-Specific Goal (Individualization)  Description  Patient will attend at least 50% of groups while on unit.   Patient will report at least 6-8 hours of sleep at night.  Patient will be compliant with treatment team recommendations.    Patient will have a decrease in delusional statements by discharge.   Pt will use a walker when up to ambulate due to her unsteady gait.   4/26/2019 2227 by Nancy Stern, RN  Outcome: No Change  Note:   VSS, pain rated 9/10 to bilat feet/toes at start of shift-requests Ultram--rec'd 50 mg-with no further complaints.  Bilat second toes purple/black under nail-R second toe purple/dusky surrounding. Pt gait is steady-\"when my pain is good.\" states when her pain is high she needs walker to stabilize herself. Pt states the \" new long acting insulin (Levemir) has already made her legs feel much stronger.   Denies SI, SIB, HI or hallucinations. States her anxiety and depression are 6/10.   Speech pressured this evening, rambling, with flight of ideas.   Cooperative, pleasant.     Problem: Thought Process Alteration  Goal: Optimal Thought Clarity  Description  Patient will hold a reality based conversation prior to discharge   4/26/2019 2227 by Nancy Stern, RN  Outcome: No Change     "

## 2019-04-27 NOTE — PLAN OF CARE
Problem: Adult Behavioral Health Plan of Care  Goal: Patient-Specific Goal (Individualization)  Description  Patient will attend at least 50% of groups while on unit.   Patient will report at least 6-8 hours of sleep at night.  Patient will be compliant with treatment team recommendations.    Patient will have a decrease in delusional statements by discharge.   Pt will use a walker when up to ambulate due to her unsteady gait.   4/27/2019 0610 by Crow Leyva RN  Outcome: No Change   Pt got up at about 0230 and stated that she slept on her right arm and now she believed that it was broken. Informed pt that she may have just slept wrong on her arm and encouraged pt to move arm around. Pt did this and stated that it felt better. She also requested/received PRN ultram for 6 out of 10 arm and foot pain. Pt sat in the lounge for a short time after this then returned to bed where she has appeared to be sleeping with eyes closed and respirations regular through the remainder of the night.

## 2019-04-27 NOTE — PLAN OF CARE
Problem: Adult Behavioral Health Plan of Care  Goal: Patient-Specific Goal (Individualization)  Description  Patient will attend at least 50% of groups while on unit.   Patient will report at least 6-8 hours of sleep at night.  Patient will be compliant with treatment team recommendations.    Patient will have a decrease in delusional statements by discharge.   Pt will use a walker when up to ambulate due to her unsteady gait.   4/27/2019 1013 by Luz Amaya, RN  Outcome: Improving     Problem: Thought Process Alteration  Goal: Optimal Thought Clarity  Description  Patient will hold a reality based conversation prior to discharge   4/27/2019 1013 by Luz Amaya, RN  Outcome: Improving   Patient has been up and had her breakfast. Ate well, states she got full a lot sooner and feels that is from the Levemir. Patient is pleasant, she states she has voices all the time but they are much better than before. Patient also states that she is not depressed but feels great about where she is now. Patient soaked her feet. The second toes of each foot are dark or black and there is a small area of blue about 1/4 of a inch long and 1-2 mm wide on either side of the left second toe. Patient states that the pain is horrendous when she is walking but when it is soaking it is about a 4 on 0/10 scale. At 0833 requested and given Tramadol 50 mg po for pain she had rated an 8 on 0/10 scale. Speech is rambling and at times difficult to understand. She talked about not needing nicotine or drugs but she is craving meth and pot. She said there comes a time when you just have to stop.

## 2019-04-27 NOTE — PLAN OF CARE
Face to face end of shift report received from Luz MAK. Rounding completed. Patient observed in [facility]. Voices no requests..     Mitra Devlin  4/27/2019  3:54 PM

## 2019-04-27 NOTE — PLAN OF CARE
Face to face end of shift report received from Crow MARIA RN. Rounding completed. Patient observed.     Luz Amaya  4/27/2019  9:00 AM

## 2019-04-28 LAB
GLUCOSE BLDC GLUCOMTR-MCNC: 155 MG/DL (ref 70–99)
GLUCOSE BLDC GLUCOMTR-MCNC: 184 MG/DL (ref 70–99)
GLUCOSE BLDC GLUCOMTR-MCNC: 189 MG/DL (ref 70–99)
GLUCOSE BLDC GLUCOMTR-MCNC: 199 MG/DL (ref 70–99)

## 2019-04-28 PROCEDURE — 25000132 ZZH RX MED GY IP 250 OP 250 PS 637: Performed by: NURSE PRACTITIONER

## 2019-04-28 PROCEDURE — 25000125 ZZHC RX 250: Performed by: INTERNAL MEDICINE

## 2019-04-28 PROCEDURE — 00000146 ZZHCL STATISTIC GLUCOSE BY METER IP

## 2019-04-28 PROCEDURE — 12400000 ZZH R&B MH

## 2019-04-28 RX ADMIN — GABAPENTIN 1200 MG: 400 CAPSULE ORAL at 13:47

## 2019-04-28 RX ADMIN — PRAZOSIN HYDROCHLORIDE 1 MG: 1 CAPSULE ORAL at 20:28

## 2019-04-28 RX ADMIN — TRAMADOL HYDROCHLORIDE 50 MG: 50 TABLET, COATED ORAL at 05:15

## 2019-04-28 RX ADMIN — ACETAMINOPHEN 975 MG: 325 TABLET, FILM COATED ORAL at 08:54

## 2019-04-28 RX ADMIN — METAXALONE 800 MG: 800 TABLET ORAL at 20:29

## 2019-04-28 RX ADMIN — GLIPIZIDE 10 MG: 5 TABLET ORAL at 08:51

## 2019-04-28 RX ADMIN — CLONAZEPAM 1 MG: 1 TABLET ORAL at 20:28

## 2019-04-28 RX ADMIN — BENZTROPINE MESYLATE 1 MG: 1 TABLET ORAL at 08:54

## 2019-04-28 RX ADMIN — CLONAZEPAM 1 MG: 1 TABLET ORAL at 08:53

## 2019-04-28 RX ADMIN — METFORMIN HYDROCHLORIDE 1000 MG: 1000 TABLET ORAL at 16:47

## 2019-04-28 RX ADMIN — IBUPROFEN 800 MG: 800 TABLET ORAL at 05:15

## 2019-04-28 RX ADMIN — MAGNESIUM SULFATE 1 G: 1 GRANULE ORAL; TOPICAL at 13:51

## 2019-04-28 RX ADMIN — METFORMIN HYDROCHLORIDE 1000 MG: 1000 TABLET ORAL at 08:54

## 2019-04-28 RX ADMIN — INSULIN ASPART 1 UNITS: 100 INJECTION, SOLUTION INTRAVENOUS; SUBCUTANEOUS at 08:49

## 2019-04-28 RX ADMIN — METAXALONE 800 MG: 800 TABLET ORAL at 13:48

## 2019-04-28 RX ADMIN — GABAPENTIN 1200 MG: 400 CAPSULE ORAL at 08:52

## 2019-04-28 RX ADMIN — HYDROXYZINE HYDROCHLORIDE 100 MG: 25 TABLET ORAL at 20:27

## 2019-04-28 RX ADMIN — BUSPIRONE HYDROCHLORIDE 20 MG: 10 TABLET ORAL at 13:48

## 2019-04-28 RX ADMIN — INSULIN ASPART 1 UNITS: 100 INJECTION, SOLUTION INTRAVENOUS; SUBCUTANEOUS at 16:46

## 2019-04-28 RX ADMIN — METAXALONE 800 MG: 800 TABLET ORAL at 08:53

## 2019-04-28 RX ADMIN — PALIPERIDONE 9 MG: 3 TABLET, EXTENDED RELEASE ORAL at 08:52

## 2019-04-28 RX ADMIN — BUSPIRONE HYDROCHLORIDE 20 MG: 10 TABLET ORAL at 20:29

## 2019-04-28 RX ADMIN — INSULIN ASPART 2 UNITS: 100 INJECTION, SOLUTION INTRAVENOUS; SUBCUTANEOUS at 11:51

## 2019-04-28 RX ADMIN — ACETAMINOPHEN 975 MG: 325 TABLET, FILM COATED ORAL at 13:48

## 2019-04-28 RX ADMIN — IBUPROFEN 800 MG: 800 TABLET ORAL at 20:28

## 2019-04-28 RX ADMIN — BUSPIRONE HYDROCHLORIDE 20 MG: 10 TABLET ORAL at 08:52

## 2019-04-28 RX ADMIN — TRAMADOL HYDROCHLORIDE 50 MG: 50 TABLET, COATED ORAL at 16:22

## 2019-04-28 RX ADMIN — GABAPENTIN 1200 MG: 400 CAPSULE ORAL at 20:26

## 2019-04-28 RX ADMIN — BENZTROPINE MESYLATE 1 MG: 1 TABLET ORAL at 20:28

## 2019-04-28 RX ADMIN — ATORVASTATIN CALCIUM 20 MG: 20 TABLET, FILM COATED ORAL at 20:28

## 2019-04-28 RX ADMIN — DIVALPROEX SODIUM 2000 MG: 500 TABLET, EXTENDED RELEASE ORAL at 20:28

## 2019-04-28 RX ADMIN — ACETAMINOPHEN 975 MG: 325 TABLET, FILM COATED ORAL at 20:28

## 2019-04-28 RX ADMIN — IBUPROFEN 800 MG: 800 TABLET ORAL at 13:48

## 2019-04-28 ASSESSMENT — ACTIVITIES OF DAILY LIVING (ADL)
ORAL_HYGIENE: INDEPENDENT
HYGIENE/GROOMING: INDEPENDENT
HYGIENE/GROOMING: INDEPENDENT
DRESS: INDEPENDENT

## 2019-04-28 NOTE — PLAN OF CARE
Face to face end of shift report received from Crow MARIA RN. Rounding completed. Patient observed.     Luz Amaya  4/28/2019  9:09 AM

## 2019-04-28 NOTE — PLAN OF CARE
Face to face end of shift report received from Mitra ALVAREZ RN. Rounding completed. Patient observed.     Crow Leyva  4/27/2019  11:37 PM

## 2019-04-28 NOTE — PLAN OF CARE
Problem: Adult Behavioral Health Plan of Care  Goal: Patient-Specific Goal (Individualization)  Description  Patient will attend at least 50% of groups while on unit.   Patient will report at least 6-8 hours of sleep at night.  Patient will be compliant with treatment team recommendations.    Patient will have a decrease in delusional statements by discharge.   Pt will use a walker when up to ambulate due to her unsteady gait.   4/28/2019 1247 by Luz Amaya RN  Outcome: No Change     Problem: Thought Process Alteration  Goal: Optimal Thought Clarity  Description  Patient will hold a reality based conversation prior to discharge   Outcome: No Change   Patient is up on the unit. Speech is rapid and pressured. Patient cooperative with cares. She feels pleased that her blood sugars are down some now. Patient conversation is difficult to understand and she talks about her children and how popular they are. Patient ate well for meals. Spends time in the lounge area. Does not attend groups.   1400 Patient given 2 pm medications but also requested tramadol. Patient states that if she had her foot soak she may not need it. Set up foot soak at this time and will check back to see if she needs more pain medications later.

## 2019-04-28 NOTE — PLAN OF CARE
Face to face end of shift report received from Michela. Rounding completed. Patient observed in [facility] with peers.     Mitra Beasleyroxanne  4/28/2019  3:52 PM    Assisted to her room by staff. Requested ultram but seemed goggy and vitals 104/52 pulse 80. Rated her pain in her toe 10/10 and became angry when told she had to get up for dinner and to have the ultram. Got into her wheelchair and went down to the lounge. Ultram 50 mg given at 1622 po. At present [facility].

## 2019-04-28 NOTE — PLAN OF CARE
Problem: Adult Behavioral Health Plan of Care  Goal: Patient-Specific Goal (Individualization)  Description  Patient will attend at least 50% of groups while on unit.   Patient will report at least 6-8 hours of sleep at night.  Patient will be compliant with treatment team recommendations.    Patient will have a decrease in delusional statements by discharge.   Pt will use a walker when up to ambulate due to her unsteady gait.   4/28/2019 1707 by Mitra Devlin RN  Outcome: No Change   Attends groups but needs redirections and conversation rambling, nonsensical and euphoric at times. Complains of pain in right toe nail that makes it difficult to walk. Wheelchair given and patient using at present.  Problem: Thought Process Alteration  Goal: Optimal Thought Clarity  Description  Patient will hold a reality based conversation prior to discharge   4/28/2019 1707 by Mitra Devlin, RN  Outcome: No Change   Denies hallucinations but conversation remains rambling and nonsensical.

## 2019-04-28 NOTE — PLAN OF CARE
Problem: Adult Behavioral Health Plan of Care  Goal: Patient-Specific Goal (Individualization)  Description  Patient will attend at least 50% of groups while on unit.   Patient will report at least 6-8 hours of sleep at night.  Patient will be compliant with treatment team recommendations.    Patient will have a decrease in delusional statements by discharge.   Pt will use a walker when up to ambulate due to her unsteady gait.   4/28/2019 0545 by Crow Leyva RN  Outcome: No Change   Pt has been in bed with eyes closed and regular respirations observed all night. Will continue to monitor.    0515-Pt got up and requested PRN Ultram for 10 out of 10 foot pain. Pt returned to bed after this.

## 2019-04-29 ENCOUNTER — APPOINTMENT (OUTPATIENT)
Dept: GENERAL RADIOLOGY | Facility: HOSPITAL | Age: 39
DRG: 885 | End: 2019-04-29
Attending: PODIATRIST
Payer: COMMERCIAL

## 2019-04-29 ENCOUNTER — TELEPHONE (OUTPATIENT)
Dept: PODIATRY | Facility: OTHER | Age: 39
End: 2019-04-29

## 2019-04-29 ENCOUNTER — OFFICE VISIT (OUTPATIENT)
Dept: PODIATRY | Facility: OTHER | Age: 39
DRG: 885 | End: 2019-04-29
Attending: PODIATRIST
Payer: COMMERCIAL

## 2019-04-29 VITALS
OXYGEN SATURATION: 99 % | HEART RATE: 82 BPM | DIASTOLIC BLOOD PRESSURE: 68 MMHG | WEIGHT: 180 LBS | TEMPERATURE: 98.6 F | HEIGHT: 64 IN | SYSTOLIC BLOOD PRESSURE: 118 MMHG | BODY MASS INDEX: 30.73 KG/M2

## 2019-04-29 DIAGNOSIS — S90.221A SUBUNGUAL HEMATOMA OF FOOT, RIGHT, INITIAL ENCOUNTER: Primary | ICD-10-CM

## 2019-04-29 DIAGNOSIS — S90.222A SUBUNGUAL HEMATOMA OF FOOT, LEFT, INITIAL ENCOUNTER: ICD-10-CM

## 2019-04-29 LAB
GLUCOSE BLDC GLUCOMTR-MCNC: 128 MG/DL (ref 70–99)
GLUCOSE BLDC GLUCOMTR-MCNC: 164 MG/DL (ref 70–99)
GLUCOSE BLDC GLUCOMTR-MCNC: 232 MG/DL (ref 70–99)
GLUCOSE BLDC GLUCOMTR-MCNC: 305 MG/DL (ref 70–99)

## 2019-04-29 PROCEDURE — 2894A VOIDCORRECT: CPT | Performed by: PODIATRIST

## 2019-04-29 PROCEDURE — 25000132 ZZH RX MED GY IP 250 OP 250 PS 637: Performed by: NURSE PRACTITIONER

## 2019-04-29 PROCEDURE — 36415 COLL VENOUS BLD VENIPUNCTURE: CPT | Mod: ZL

## 2019-04-29 PROCEDURE — 82962 GLUCOSE BLOOD TEST: CPT | Mod: ZL

## 2019-04-29 PROCEDURE — 99232 SBSQ HOSP IP/OBS MODERATE 35: CPT | Performed by: PODIATRIST

## 2019-04-29 PROCEDURE — G0463 HOSPITAL OUTPT CLINIC VISIT: HCPCS

## 2019-04-29 PROCEDURE — 25000125 ZZHC RX 250: Performed by: INTERNAL MEDICINE

## 2019-04-29 PROCEDURE — 99233 SBSQ HOSP IP/OBS HIGH 50: CPT | Performed by: NURSE PRACTITIONER

## 2019-04-29 PROCEDURE — 12400000 ZZH R&B MH

## 2019-04-29 PROCEDURE — 73630 X-RAY EXAM OF FOOT: CPT | Mod: TC,LT

## 2019-04-29 PROCEDURE — 00000146 ZZHCL STATISTIC GLUCOSE BY METER IP

## 2019-04-29 RX ORDER — BUSPIRONE HYDROCHLORIDE 7.5 MG/1
7.5 TABLET ORAL 3 TIMES DAILY
Status: DISCONTINUED | OUTPATIENT
Start: 2019-04-29 | End: 2019-04-29

## 2019-04-29 RX ORDER — BUSPIRONE HYDROCHLORIDE 7.5 MG/1
7.5 TABLET ORAL 3 TIMES DAILY
Status: DISCONTINUED | OUTPATIENT
Start: 2019-04-29 | End: 2019-05-01

## 2019-04-29 RX ADMIN — METFORMIN HYDROCHLORIDE 1000 MG: 1000 TABLET ORAL at 17:07

## 2019-04-29 RX ADMIN — CLONAZEPAM 1 MG: 1 TABLET ORAL at 20:53

## 2019-04-29 RX ADMIN — BUSPIRONE HYDROCHLORIDE 20 MG: 10 TABLET ORAL at 08:40

## 2019-04-29 RX ADMIN — CLONAZEPAM 1 MG: 1 TABLET ORAL at 08:40

## 2019-04-29 RX ADMIN — GABAPENTIN 1200 MG: 400 CAPSULE ORAL at 08:39

## 2019-04-29 RX ADMIN — PRAZOSIN HYDROCHLORIDE 1 MG: 1 CAPSULE ORAL at 20:53

## 2019-04-29 RX ADMIN — METAXALONE 800 MG: 800 TABLET ORAL at 08:39

## 2019-04-29 RX ADMIN — HYDROXYZINE HYDROCHLORIDE 50 MG: 25 TABLET ORAL at 18:52

## 2019-04-29 RX ADMIN — ACETAMINOPHEN 975 MG: 325 TABLET, FILM COATED ORAL at 20:53

## 2019-04-29 RX ADMIN — INSULIN ASPART 2 UNITS: 100 INJECTION, SOLUTION INTRAVENOUS; SUBCUTANEOUS at 17:06

## 2019-04-29 RX ADMIN — METAXALONE 800 MG: 800 TABLET ORAL at 20:53

## 2019-04-29 RX ADMIN — BUSPIRONE HYDROCHLORIDE 7.5 MG: 7.5 TABLET ORAL at 20:53

## 2019-04-29 RX ADMIN — TRAMADOL HYDROCHLORIDE 50 MG: 50 TABLET, COATED ORAL at 18:51

## 2019-04-29 RX ADMIN — GLIPIZIDE 10 MG: 5 TABLET ORAL at 07:24

## 2019-04-29 RX ADMIN — METAXALONE 800 MG: 800 TABLET ORAL at 14:03

## 2019-04-29 RX ADMIN — GABAPENTIN 1200 MG: 400 CAPSULE ORAL at 14:04

## 2019-04-29 RX ADMIN — ATORVASTATIN CALCIUM 20 MG: 20 TABLET, FILM COATED ORAL at 20:53

## 2019-04-29 RX ADMIN — METFORMIN HYDROCHLORIDE 1000 MG: 1000 TABLET ORAL at 08:40

## 2019-04-29 RX ADMIN — IBUPROFEN 800 MG: 800 TABLET ORAL at 20:53

## 2019-04-29 RX ADMIN — DIVALPROEX SODIUM 2000 MG: 500 TABLET, EXTENDED RELEASE ORAL at 20:53

## 2019-04-29 RX ADMIN — BENZTROPINE MESYLATE 1 MG: 1 TABLET ORAL at 20:53

## 2019-04-29 RX ADMIN — GABAPENTIN 1200 MG: 400 CAPSULE ORAL at 20:53

## 2019-04-29 RX ADMIN — BENZTROPINE MESYLATE 1 MG: 1 TABLET ORAL at 08:40

## 2019-04-29 RX ADMIN — IBUPROFEN 800 MG: 800 TABLET ORAL at 07:24

## 2019-04-29 RX ADMIN — HYDROXYZINE HYDROCHLORIDE 100 MG: 25 TABLET ORAL at 20:52

## 2019-04-29 RX ADMIN — MAGNESIUM SULFATE 1 G: 1 GRANULE ORAL; TOPICAL at 14:32

## 2019-04-29 RX ADMIN — PALIPERIDONE 9 MG: 3 TABLET, EXTENDED RELEASE ORAL at 08:50

## 2019-04-29 RX ADMIN — ACETAMINOPHEN 975 MG: 325 TABLET, FILM COATED ORAL at 08:40

## 2019-04-29 RX ADMIN — IBUPROFEN 800 MG: 800 TABLET ORAL at 14:04

## 2019-04-29 RX ADMIN — ACETAMINOPHEN 975 MG: 325 TABLET, FILM COATED ORAL at 14:04

## 2019-04-29 RX ADMIN — BUSPIRONE HYDROCHLORIDE 20 MG: 10 TABLET ORAL at 14:04

## 2019-04-29 RX ADMIN — INSULIN ASPART 1 UNITS: 100 INJECTION, SOLUTION INTRAVENOUS; SUBCUTANEOUS at 11:38

## 2019-04-29 RX ADMIN — TRAMADOL HYDROCHLORIDE 50 MG: 50 TABLET, COATED ORAL at 08:50

## 2019-04-29 ASSESSMENT — ACTIVITIES OF DAILY LIVING (ADL)
DRESS: SCRUBS (BEHAVIORAL HEALTH);INDEPENDENT
ORAL_HYGIENE: INDEPENDENT
LAUNDRY: UNABLE TO COMPLETE
HYGIENE/GROOMING: INDEPENDENT

## 2019-04-29 ASSESSMENT — MIFFLIN-ST. JEOR: SCORE: 1471.47

## 2019-04-29 ASSESSMENT — PAIN SCALES - GENERAL: PAINLEVEL: SEVERE PAIN (6)

## 2019-04-29 NOTE — PLAN OF CARE
Face to face end of shift report received from Corey Hernández completed. Patient observed in [facility] requesting medication before podiatry appointment.    Luz Elena Chaudhary  4/29/2019  9:59 AM

## 2019-04-29 NOTE — PROGRESS NOTES
"Chief complaint: Patient presents with:  Toenail: TURNED BLACK      History of Present Illness: This 39 year old IDDM II female with Schizoaffective disorder, bipolar type, is currently an inpatient on the fifth floor being seen for concerns regarding a bilateral black toe. She was last here on 2019 with the same complaint. She says she has been soaking her feet in epsom salt soaks and it has not decreased her pain in her bilateral 2nd digit toenails. She is wondering if she will lose her toes. She presents with a nurse who says the patient's blood sugars have been high up until today. Her last HbA1C was in the 9's in 2016. No further pedal complaints today.     Historically, patient says she had a fracture repair of her 1st and/or 2nd metatarsal of her LEFT foot with ORIF. She denies tingling, burning or numbness in her feet. No further pedal complaints today.      /68 (BP Location: Left arm, Patient Position: Sitting, Cuff Size: Adult Regular)   Pulse 82   Temp 98.6  F (37  C) (Tympanic)   Ht 1.626 m (5' 4\")   Wt 81.6 kg (180 lb)   SpO2 99%   BMI 30.90 kg/m      Patient Active Problem List   Diagnosis     Mixed hyperlipidemia     Diabetes mellitus, type 2     Schizoaffective disorder, bipolar type (H)     Schizophrenia (H)     Depression     Schizoaffective disorder (H)       Past Surgical History:   Procedure Laterality Date      SECTION       x 3     FOOT SURGERY      Right and left foot fracture repair       No current facility-administered medications for this visit.      No current outpatient medications on file.     Facility-Administered Medications Ordered in Other Visits   Medication     acetaminophen (TYLENOL) tablet 975 mg     atorvastatin (LIPITOR) tablet 20 mg     benztropine (COGENTIN) tablet 1 mg     benztropine (COGENTIN) tablet 1 mg     busPIRone (BUSPAR) tablet 20 mg     clonazePAM (klonoPIN) tablet 1 mg     glucose gel 15-30 g    Or     dextrose 50 % injection 25-50 mL " "   Or     glucagon injection 1 mg     divalproex sodium extended-release (DEPAKOTE ER) 24 hr tablet 2,000 mg     gabapentin (NEURONTIN) capsule 1,200 mg     glipiZIDE (GLUCOTROL) tablet 10 mg     hydrOXYzine (ATARAX) tablet 100 mg     hydrOXYzine (ATARAX) tablet 50 mg     ibuprofen (ADVIL/MOTRIN) tablet 800 mg     insulin aspart (NovoLOG) inj (RAPID ACTING)     insulin aspart (NovoLOG) inj (RAPID ACTING)     insulin detemir (LEVEMIR PEN) injection 5 Units     magnesium sulfate (EPSOM SALT) granules 1 g     medroxyPROGESTERone (DEPO-PROVERA) injection 150 mg     metaxalone (SKELAXIN) tablet 800 mg     metFORMIN (GLUCOPHAGE) tablet 1,000 mg     naloxone (NARCAN) injection 0.1-0.4 mg     paliperidone ER (INVEGA) 24 hr tablet 9 mg     prazosin (MINIPRESS) capsule 1 mg     traMADol (ULTRAM) tablet 50 mg          Allergies   Allergen Reactions     Haloperidol Other (See Comments)     Other reaction(s): Seizures  Patient states, \"I fell down and wasn't able to get up.\"  Patient states, \"I fell down and wasn't able to get up.\"         Family History   Problem Relation Age of Onset     C.A.D. Mother      Diabetes Mother      C.A.D. Father      Diabetes Father        Social History     Socioeconomic History     Marital status:      Spouse name: Not on file     Number of children: 3     Years of education: Not on file     Highest education level: Not on file   Occupational History     Employer: NONE    Social Needs     Financial resource strain: Not on file     Food insecurity:     Worry: Not on file     Inability: Not on file     Transportation needs:     Medical: Not on file     Non-medical: Not on file   Tobacco Use     Smoking status: Current Every Day Smoker     Packs/day: 0.50     Years: 15.00     Pack years: 7.50     Types: Cigarettes     Smokeless tobacco: Never Used   Substance and Sexual Activity     Alcohol use: No     Drug use: Yes     Types: Marijuana     Sexual activity: Not Currently     Partners: Male    "  Comment: Depo injections   Lifestyle     Physical activity:     Days per week: Not on file     Minutes per session: Not on file     Stress: Not on file   Relationships     Social connections:     Talks on phone: Not on file     Gets together: Not on file     Attends [employer] service: Not on file     Active member of club or organization: Not on file     Attends meetings of clubs or organizations: Not on file     Relationship status: Not on file     Intimate partner violence:     Fear of current or ex partner: Not on file     Emotionally abused: Not on file     Physically abused: Not on file     Forced sexual activity: Not on file   Other Topics Concern     Not on file   Social History Narrative    November 14, 2014: Currently homeless. Living in her mother's car. Lost her children a month ago to foster care with supervision.        ROS: 10 point ROS neg other than the symptoms noted above in the HPI.  EXAM  Constitutional: Alert, mild distress, able to answer questions appropriately     Psychiatric: mentation appears anxious and agitated.      VASCULAR:  -Dorsalis pedis pulse +1/4 b/l  -Posterior tibial pulse +1/4 b/l  -Capillary refill time < 3 seconds to b/l hallux     NEURO:  -Light touch sensation intact to b/l plantar forefoot    DERM:  -Black toenail to the RIGHT 2nd digit nail bed and the LEFT proximal 2/3 of the digit nail bed  ---No discoloration of the roxanne-nail skin medially, laterally, proximally or distally   ---RIGHT 2nd digit toenail is loose from the skin edge proximally, but the remaining toenail is well adhered  ---Bilateral 2nd digits have no severe erythema, no ascending erythema, no calor, no purulence, no malodor, no other SOI.     -Toenailss thickened, dystrophic and discolored x 10  -Skin temperature, texture and turgor WNL b/l  -Cicatrix to LEFT dorsal 1st ray  -Mild, diffuse hyperkeratotic lesion with minimal roxanne callus hyperkeratotic lesion to medial RIGHT 1st metatarsal  head    MSK:  -Pain on palpation to bilateral dorsal 2nd digital toenails  -Muscle strength of ankles +5/5 for dorsiflexion, plantarflexion, ABDUction and ADDuction b/l     RIGHT FOOT NWB RADIOGRAPH 04/06/2019  IMPRESSION: Mild degenerative arthritis in the first TMT joint. Small  plantar and Achilles calcaneal spurs. Right foot otherwise normal.     GRIS ABARCA MD    LEFT FOOT NWB RADIOGRAPH 04/06/2019  IMPRESSION: Degenerative arthritis in the first TMT and MTP joints.  Deformity of the neck of fifth metatarsal likely due to old trauma.  Tiny plantar and Achilles calcaneal spurs. Left foot otherwise normal.     GRIS ABARCA MD  ============================================================     ASSESSMENT:  (S90.221A) Subungual hematoma of foot, right, initial encounter  (primary encounter diagnosis)     (S90.222A) Subungual hematoma of foot, left, initial encounter     (L60.3) Onychodystrophy     (E11.9,  Z79.4) Diabetes mellitus type 2, insulin dependent (H)       PLAN:  -Patient evaluated and examined. Treatment options discussed. Patient was assisted by a nurse and a  (only the nurse was in the exam room).  -Bilateral foot x-rays ordered-- no underlying fracture noted  -Patient still has a subungual  Hematoma to the bilateral 2nd digits. The nail is loose from the RIGHT 2nd digit proximal nail bed, but the remaining nail is well adhered. The pain from the toe is due to the pressure from the blood under her toenail.  ---Patient may let the nail fall off on its own or she may have the toenail removed in clinic. This would involve an injection at the base of the toe in order to be able to remove the toenail since it is still well adhered to the underlying nail bed.   ---If patient decides to have the toenail removed, she will need an updated HbA1C to determine her healing potential. A nail avulsion will not be performed until this is done. She would also need to do daily dressing changes for the  toenail.  ---At this time, patient has opted for conservative management.  ---Patient's bilateral 2nd digit toes are not infected and there are currently no open wounds.  ----Patient was instructed to look for SOI (redness, swelling, pain, purulence, fever, chills, nausea, vomiting) and to return to podiatry or the emergency department immediately if there are any SOI.    -Patient in agreement with the above treatment plan and all of patient's questions were answered.        RTC if patient would like her toenail removed      Iris Shook DPM

## 2019-04-29 NOTE — NURSING NOTE
"Chief Complaint   Patient presents with     Toenail     TURNED BLACK       Initial /68 (BP Location: Left arm, Patient Position: Sitting, Cuff Size: Adult Regular)   Pulse 82   Temp 98.6  F (37  C) (Tympanic)   Ht 1.626 m (5' 4\")   Wt 81.6 kg (180 lb)   SpO2 99%   BMI 30.90 kg/m   Estimated body mass index is 30.9 kg/m  as calculated from the following:    Height as of this encounter: 1.626 m (5' 4\").    Weight as of this encounter: 81.6 kg (180 lb).  Medication Reconciliation: complete    Latoya Avalos LPN  "

## 2019-04-29 NOTE — PROGRESS NOTES
"[facility]  Psychiatric Progress Note      Impression:   This is a 39 year old female with a significant history of schizoaffective disorder.     Ginette initially appeared to be doing a bit better today though the longer we talked more grandiose she became.  Initially she was telling me how happy she was that her blood sugars are more controlled and she told me about her appointment with podiatry this morning which went well.  She then began to tell me about the time in 2008 when she had her baby at the hospital and stated \"after that she was on people magazine\" I asked her why her child would be in people magazine and she told me because she herself was famous making her daughter famous.  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  When she was speaking about this her speech became much more pressured.  She told me that she is sleeping very well which I am unsure of how accurate this is.  She is still very pressured and is not stable enough to participate in any type of residential treatment setting.       Educated regarding medication indications, risks, benefits, side effects, contraindications and possible interactions. Verbally expressed understanding.        DIagnoses:     Schizoaffective disorder  Marijuana use disorder-in remission  Diabetes type 2    Attestation:  Patient has been seen and evaluated by me,  Airam Light NP          Interim History:   The patient's care was discussed with the treatment team and chart notes were reviewed.          Medications:     Current Facility-Administered Medications Ordered in Epic   Medication Dose Route Frequency Last Rate Last Dose     acetaminophen (TYLENOL) tablet 975 mg  975 mg Oral TID   975 mg at 04/29/19 0840     atorvastatin (LIPITOR) tablet 20 mg  20 mg Oral At Bedtime   20 mg at 04/28/19 2028     benztropine (COGENTIN) tablet 1 mg  1 mg Oral BID   " 1 mg at 04/29/19 0840     benztropine (COGENTIN) tablet 1 mg  1 mg Oral Once         busPIRone (BUSPAR) tablet 20 mg  20 mg Oral TID   20 mg at 04/29/19 0840     clonazePAM (klonoPIN) tablet 1 mg  1 mg Oral BID   1 mg at 04/29/19 0840     glucose gel 15-30 g  15-30 g Oral Q15 Min PRN        Or     dextrose 50 % injection 25-50 mL  25-50 mL Intravenous Q15 Min PRN        Or     glucagon injection 1 mg  1 mg Subcutaneous Q15 Min PRN         divalproex sodium extended-release (DEPAKOTE ER) 24 hr tablet 2,000 mg  2,000 mg Oral At Bedtime   2,000 mg at 04/28/19 2028     gabapentin (NEURONTIN) capsule 1,200 mg  1,200 mg Oral TID   1,200 mg at 04/29/19 0839     glipiZIDE (GLUCOTROL) tablet 10 mg  10 mg Oral QAM AC   10 mg at 04/29/19 0724     hydrOXYzine (ATARAX) tablet 100 mg  100 mg Oral At Bedtime   100 mg at 04/28/19 2027     hydrOXYzine (ATARAX) tablet 50 mg  50 mg Oral TID PRN   50 mg at 04/24/19 0640     ibuprofen (ADVIL/MOTRIN) tablet 800 mg  800 mg Oral TID   800 mg at 04/29/19 0724     insulin aspart (NovoLOG) inj (RAPID ACTING)  1-7 Units Subcutaneous TID AC   1 Units at 04/28/19 1646     insulin aspart (NovoLOG) inj (RAPID ACTING)  1-5 Units Subcutaneous At Bedtime   2 Units at 04/27/19 2055     insulin detemir (LEVEMIR PEN) injection 5 Units  5 Units Subcutaneous BID   5 Units at 04/29/19 0858     magnesium sulfate (EPSOM SALT) granules 1 g  1 g Topical Daily PRN   1 g at 04/28/19 1351     medroxyPROGESTERone (DEPO-PROVERA) injection 150 mg  150 mg Intramuscular Q90 Days   150 mg at 04/16/19 1640     metaxalone (SKELAXIN) tablet 800 mg  800 mg Oral TID   800 mg at 04/29/19 0839     metFORMIN (GLUCOPHAGE) tablet 1,000 mg  1,000 mg Oral BID w/meals   1,000 mg at 04/29/19 0840     naloxone (NARCAN) injection 0.1-0.4 mg  0.1-0.4 mg Intravenous Q2 Min PRN         paliperidone ER (INVEGA) 24 hr tablet 9 mg  9 mg Oral Daily   9 mg at 04/29/19 0850     prazosin (MINIPRESS) capsule 1 mg  1 mg Oral At Bedtime   1 mg at  "04/28/19 2028     traMADol (ULTRAM) tablet 50 mg  50 mg Oral TID PRN   50 mg at 04/29/19 0850     No current Epic-ordered outpatient medications on file.              10 point ROS negative C/O back pain ad neuropathic pain.        Allergies:     Allergies   Allergen Reactions     Haloperidol Other (See Comments)     Other reaction(s): Seizures  Patient states, \"I fell down and wasn't able to get up.\"  Patient states, \"I fell down and wasn't able to get up.\"              Psychiatric Examination:   /63   Pulse 69   Temp 97.9  F (36.6  C) (Tympanic)   Resp 16   Ht 1.626 m (5' 4\")   Wt 84.4 kg (186 lb 1.6 oz)   SpO2 98%   BMI 31.94 kg/m    Weight is 186 lbs 1.6 oz  Body mass index is 31.94 kg/m .    Appearance:  awake, alert, adequately groomed and dressed in hospital scrubs  Attitude:  cooperative  Eye Contact:  good  Mood:  anxious  Affect:  mood congruent  Speech: pressurred still.   Psychomotor Behavior:  no evidence of tardive dyskinesia, dystonia, or tics and intact station, gait and muscle tone  Thought Process:  Slightly less disorganized  Associations: is still disorganized though a bit improved  Thought Content:  no evidence of suicidal ideation or homicidal ideation and visual hallucinations present  Insight:  fair  Judgment:  fair  Oriented to:  time, person, and place  Attention Span and Concentration:  fair  Recent and Remote Memory:  fair  Fund of Knowledge: low-normal  Muscle Strength and Tone: normal  Gait and Station: Normal           Labs:     Results for orders placed or performed during the hospital encounter of 04/06/19   XR Foot Left G/E 3 Views    Narrative    EXAM:XR FOOT LT G/E 3 VW     CLINICAL HISTORY: Patient Age  39 years Additional clinical info:  Inpatient - left 2nd toe pain and subungual hematoma. 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  Deformity of " the neck of fifth metatarsal likely due to old trauma.  Tiny plantar and Achilles calcaneal spurs. Left foot otherwise normal.    GRIS ABARCA MD   XR Foot Right G/E 3 Views    Narrative    EXAM:XR FOOT RT G/E 3 VW     CLINICAL HISTORY: Patient Age  39 years Additional clinical info:  Inpatient - painful Rt 2nd toe with subungual hematoma and painful  DIPJ. Please evaluate underlying bone     COMPARISON: None      TECHNIQUE: 3 views      Impression    IMPRESSION: Mild degenerative arthritis in the first TMT joint. Small  plantar and Achilles calcaneal spurs. Right foot otherwise normal.    GRIS ABARCA MD   Glucose by meter   Result Value Ref Range    Glucose 305 (H) 70 - 99 mg/dL   Glucose by meter   Result Value Ref Range    Glucose 341 (H) 70 - 99 mg/dL   Glucose by meter   Result Value Ref Range    Glucose 193 (H) 70 - 99 mg/dL   Glucose by meter   Result Value Ref Range    Glucose 168 (H) 70 - 99 mg/dL   Glucose by meter   Result Value Ref Range    Glucose 249 (H) 70 - 99 mg/dL   Glucose by meter   Result Value Ref Range    Glucose 227 (H) 70 - 99 mg/dL   Glucose by meter   Result Value Ref Range    Glucose 242 (H) 70 - 99 mg/dL   Glucose by meter   Result Value Ref Range    Glucose 125 (H) 70 - 99 mg/dL   Glucose by meter   Result Value Ref Range    Glucose 105 (H) 70 - 99 mg/dL   Glucose by meter   Result Value Ref Range    Glucose 255 (H) 70 - 99 mg/dL   Glucose by meter   Result Value Ref Range    Glucose 169 (H) 70 - 99 mg/dL   Glucose by meter   Result Value Ref Range    Glucose 195 (H) 70 - 99 mg/dL   Glucose by meter   Result Value Ref Range    Glucose 121 (H) 70 - 99 mg/dL   Glucose by meter   Result Value Ref Range    Glucose 84 70 - 99 mg/dL   Glucose by meter   Result Value Ref Range    Glucose 130 (H) 70 - 99 mg/dL   Glucose by meter   Result Value Ref Range    Glucose 161 (H) 70 - 99 mg/dL   Glucose by meter   Result Value Ref Range    Glucose 181 (H) 70 - 99 mg/dL   Glucose by meter   Result  Value Ref Range    Glucose 181 (H) 70 - 99 mg/dL   Glucose by meter   Result Value Ref Range    Glucose 195 (H) 70 - 99 mg/dL   Glucose by meter   Result Value Ref Range    Glucose 100 (H) 70 - 99 mg/dL   Glucose by meter   Result Value Ref Range    Glucose 202 (H) 70 - 99 mg/dL   Glucose by meter   Result Value Ref Range    Glucose 114 (H) 70 - 99 mg/dL   Glucose by meter   Result Value Ref Range    Glucose 132 (H) 70 - 99 mg/dL   Glucose by meter   Result Value Ref Range    Glucose 104 (H) 70 - 99 mg/dL   Glucose by meter   Result Value Ref Range    Glucose 91 70 - 99 mg/dL   Glucose by meter   Result Value Ref Range    Glucose 204 (H) 70 - 99 mg/dL   Glucose by meter   Result Value Ref Range    Glucose 87 70 - 99 mg/dL   Glucose by meter   Result Value Ref Range    Glucose 151 (H) 70 - 99 mg/dL   Glucose by meter   Result Value Ref Range    Glucose 98 70 - 99 mg/dL   Glucose by meter   Result Value Ref Range    Glucose 180 (H) 70 - 99 mg/dL   Glucose by meter   Result Value Ref Range    Glucose 126 (H) 70 - 99 mg/dL   Glucose by meter   Result Value Ref Range    Glucose 137 (H) 70 - 99 mg/dL   Glucose by meter   Result Value Ref Range    Glucose 115 (H) 70 - 99 mg/dL   Glucose by meter   Result Value Ref Range    Glucose 108 (H) 70 - 99 mg/dL   Glucose by meter   Result Value Ref Range    Glucose 189 (H) 70 - 99 mg/dL   Glucose by meter   Result Value Ref Range    Glucose 97 70 - 99 mg/dL   Glucose by meter   Result Value Ref Range    Glucose 123 (H) 70 - 99 mg/dL   Glucose by meter   Result Value Ref Range    Glucose 240 (H) 70 - 99 mg/dL   Glucose by meter   Result Value Ref Range    Glucose 134 (H) 70 - 99 mg/dL   Glucose by meter   Result Value Ref Range    Glucose 173 (H) 70 - 99 mg/dL   Glucose by meter   Result Value Ref Range    Glucose 100 (H) 70 - 99 mg/dL   Glucose by meter   Result Value Ref Range    Glucose 115 (H) 70 - 99 mg/dL   HCG qualitative urine   Result Value Ref Range    HCG Qual Urine  Negative NEG^Negative   Glucose by meter   Result Value Ref Range    Glucose 197 (H) 70 - 99 mg/dL   Glucose by meter   Result Value Ref Range    Glucose 143 (H) 70 - 99 mg/dL   Glucose by meter   Result Value Ref Range    Glucose 230 (H) 70 - 99 mg/dL   Glucose by meter   Result Value Ref Range    Glucose 151 (H) 70 - 99 mg/dL   Glucose by meter   Result Value Ref Range    Glucose 167 (H) 70 - 99 mg/dL   Glucose by meter   Result Value Ref Range    Glucose 206 (H) 70 - 99 mg/dL   Glucose by meter   Result Value Ref Range    Glucose 222 (H) 70 - 99 mg/dL   Glucose by meter   Result Value Ref Range    Glucose 225 (H) 70 - 99 mg/dL   Glucose by meter   Result Value Ref Range    Glucose 250 (H) 70 - 99 mg/dL   Glucose by meter   Result Value Ref Range    Glucose 280 (H) 70 - 99 mg/dL   Glucose by meter   Result Value Ref Range    Glucose 245 (H) 70 - 99 mg/dL   Valproic acid   Result Value Ref Range    Valproic Acid Level 45 (L) 50 - 100 mg/L   CBC with platelets differential   Result Value Ref Range    WBC 7.8 4.0 - 11.0 10e9/L    RBC Count 4.05 3.8 - 5.2 10e12/L    Hemoglobin 11.7 11.7 - 15.7 g/dL    Hematocrit 35.1 35.0 - 47.0 %    MCV 87 78 - 100 fl    MCH 28.9 26.5 - 33.0 pg    MCHC 33.3 31.5 - 36.5 g/dL    RDW 13.2 10.0 - 15.0 %    Platelet Count 247 150 - 450 10e9/L    Diff Method Automated Method     % Neutrophils 55.5 %    % Lymphocytes 34.8 %    % Monocytes 7.7 %    % Eosinophils 0.9 %    % Basophils 0.3 %    % Immature Granulocytes 0.8 %    Nucleated RBCs 0 0 /100    Absolute Neutrophil 4.4 1.6 - 8.3 10e9/L    Absolute Lymphocytes 2.7 0.8 - 5.3 10e9/L    Absolute Monocytes 0.6 0.0 - 1.3 10e9/L    Absolute Eosinophils 0.1 0.0 - 0.7 10e9/L    Absolute Basophils 0.0 0.0 - 0.2 10e9/L    Abs Immature Granulocytes 0.1 0 - 0.4 10e9/L    Absolute Nucleated RBC 0.0    Comprehensive metabolic panel   Result Value Ref Range    Sodium 140 133 - 144 mmol/L    Potassium 4.5 3.4 - 5.3 mmol/L    Chloride 108 94 - 109 mmol/L     Carbon Dioxide 25 20 - 32 mmol/L    Anion Gap 7 3 - 14 mmol/L    Glucose 289 (H) 70 - 99 mg/dL    Urea Nitrogen 14 7 - 30 mg/dL    Creatinine 0.53 0.52 - 1.04 mg/dL    GFR Estimate >90 >60 mL/min/[1.73_m2]    GFR Estimate If Black >90 >60 mL/min/[1.73_m2]    Calcium 9.2 8.5 - 10.1 mg/dL    Bilirubin Total 0.1 (L) 0.2 - 1.3 mg/dL    Albumin 3.5 3.4 - 5.0 g/dL    Protein Total 7.0 6.8 - 8.8 g/dL    Alkaline Phosphatase 60 40 - 150 U/L    ALT 17 0 - 50 U/L    AST 7 0 - 45 U/L   Glucose by meter   Result Value Ref Range    Glucose 142 (H) 70 - 99 mg/dL   Glucose by meter   Result Value Ref Range    Glucose 161 (H) 70 - 99 mg/dL   Glucose by meter   Result Value Ref Range    Glucose 167 (H) 70 - 99 mg/dL   Glucose by meter   Result Value Ref Range    Glucose 203 (H) 70 - 99 mg/dL   Glucose by meter   Result Value Ref Range    Glucose 300 (H) 70 - 99 mg/dL   Glucose by meter   Result Value Ref Range    Glucose 184 (H) 70 - 99 mg/dL   Glucose by meter   Result Value Ref Range    Glucose 249 (H) 70 - 99 mg/dL   Glucose by meter   Result Value Ref Range    Glucose 216 (H) 70 - 99 mg/dL   Glucose by meter   Result Value Ref Range    Glucose 198 (H) 70 - 99 mg/dL   Glucose by meter   Result Value Ref Range    Glucose 192 (H) 70 - 99 mg/dL   Glucose by meter   Result Value Ref Range    Glucose 281 (H) 70 - 99 mg/dL   Glucose by meter   Result Value Ref Range    Glucose 294 (H) 70 - 99 mg/dL   Glucose by meter   Result Value Ref Range    Glucose 285 (H) 70 - 99 mg/dL   Glucose by meter   Result Value Ref Range    Glucose 255 (H) 70 - 99 mg/dL   Glucose by meter   Result Value Ref Range    Glucose 282 (H) 70 - 99 mg/dL   Glucose by meter   Result Value Ref Range    Glucose 231 (H) 70 - 99 mg/dL   Glucose by meter   Result Value Ref Range    Glucose 242 (H) 70 - 99 mg/dL   Glucose by meter   Result Value Ref Range    Glucose 286 (H) 70 - 99 mg/dL   Glucose by meter   Result Value Ref Range    Glucose 287 (H) 70 - 99 mg/dL    Glucose by meter   Result Value Ref Range    Glucose 285 (H) 70 - 99 mg/dL   Valproic acid   Result Value Ref Range    Valproic Acid Level 64 50 - 100 mg/L   Glucose by meter   Result Value Ref Range    Glucose 244 (H) 70 - 99 mg/dL   Glucose by meter   Result Value Ref Range    Glucose 273 (H) 70 - 99 mg/dL   Glucose by meter   Result Value Ref Range    Glucose 257 (H) 70 - 99 mg/dL   Glucose by meter   Result Value Ref Range    Glucose 206 (H) 70 - 99 mg/dL   Glucose by meter   Result Value Ref Range    Glucose 238 (H) 70 - 99 mg/dL   Glucose by meter   Result Value Ref Range    Glucose 224 (H) 70 - 99 mg/dL   Glucose by meter   Result Value Ref Range    Glucose 312 (H) 70 - 99 mg/dL   Glucose by meter   Result Value Ref Range    Glucose 276 (H) 70 - 99 mg/dL   Glucose by meter   Result Value Ref Range    Glucose 155 (H) 70 - 99 mg/dL   Glucose by meter   Result Value Ref Range    Glucose 199 (H) 70 - 99 mg/dL   Glucose by meter   Result Value Ref Range    Glucose 189 (H) 70 - 99 mg/dL   Glucose by meter   Result Value Ref Range    Glucose 184 (H) 70 - 99 mg/dL   Glucose by meter   Result Value Ref Range    Glucose 128 (H) 70 - 99 mg/dL   Wound Ostomy Continence Nurse IP Consult    Narrative    Smita Lizama RN     4/25/2019 11:11 AM  Spoke with OBDULIO Zavala regarding wound consult placed by Airam Light CNP. She evaluated pts feet and states there is no   open wound. Explained that her toenails are black. These were   evaluated by Dr. Shook last week. At this time I do not see a   need for a wound consult being there is no open area.               Plan:   I am going to speak with her about starting lithium or Tegretol.    Cbc, cmp and depakote level ordered for tomorrow night  I will order hemoglobin A1c if it has not been done.    Contact hospitalist regarding her blood sugars if they do not stabilize.  Dr. Stinson has been adjusting her insulin.      ELOS: >5 days for stabilization and safe discharge  planning

## 2019-04-29 NOTE — PLAN OF CARE
Problem: Adult Behavioral Health Plan of Care  Goal: Plan of Care Review  4/29/2019 1308 by Luz Elena Chaudhary, RN  Outcome: No Change      PT denies SI,HI and hallucinations. PT mood has been calm and cooperative. PT had Tramadol at 08:50 before her podiatry appointment with a reduction in pain from 8/10 to 4/10 on pain scale. PT podiatry appointment resulted in no broken toes and will leave nails on until they fall off. PT continues to having rambling, delusional stories throughout shift. PT to have A1C draw at 06:00 on 4/30/19. Pt participated and was appropriate in groups today.     Problem: Adult Behavioral Health Plan of Care  Goal: Patient-Specific Goal (Individualization)  Description  Patient will attend at least 50% of groups while on unit.   Patient will report at least 6-8 hours of sleep at night.  Patient will be compliant with treatment team recommendations.    Patient will have a decrease in delusional statements by discharge.   Pt will use a walker when up to ambulate due to her unsteady gait.   4/29/2019 1307 by Luz Elena Chaudhary, RN  Outcome: No Change     Problem: Adult Behavioral Health Plan of Care  Goal: Adheres to Safety Considerations for Self and Others  4/29/2019 1308 by Luz Elena Chaudhary, RN  Outcome: No Change

## 2019-04-29 NOTE — PROGRESS NOTES
"[facility]  Psychiatric Progress Note      Impression:   This is a 39 year old female with a significant history of schizoaffective disorder.     Ginette initially appeared to be doing a bit better today though the longer we talked more grandiose she became.  Initially she was telling me how happy she was that her blood sugars are more controlled and she told me about her appointment with podiatry this morning which went well.  She then began to tell me about the time in 2008 when she had her baby at the hospital and stated \"after that she was on people magazine\" I asked her why her child would be in people magazine and she told me because she herself was famous making her daughter famous.  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  When she was speaking about this her speech became much more pressured.  She told me that she is sleeping very well which I am unsure of how accurate this is.  She is still very pressured and is not stable enough to participate in any type of residential treatment setting.       Educated regarding medication indications, risks, benefits, side effects, contraindications and possible interactions. Verbally expressed understanding.        DIagnoses:     Schizoaffective disorder  Marijuana use disorder-in remission  Diabetes type 2    Attestation:  Patient has been seen and evaluated by me,  Airam Light NP          Interim History:   The patient's care was discussed with the treatment team and chart notes were reviewed.          Medications:     Current Facility-Administered Medications Ordered in Epic   Medication Dose Route Frequency Last Rate Last Dose     acetaminophen (TYLENOL) tablet 975 mg  975 mg Oral TID   975 mg at 04/29/19 0840     atorvastatin (LIPITOR) tablet 20 mg  20 mg Oral At Bedtime   20 mg at 04/28/19 2028     benztropine (COGENTIN) tablet 1 mg  1 mg Oral BID   " 1 mg at 04/29/19 0840     benztropine (COGENTIN) tablet 1 mg  1 mg Oral Once         busPIRone (BUSPAR) tablet 20 mg  20 mg Oral TID   20 mg at 04/29/19 0840     clonazePAM (klonoPIN) tablet 1 mg  1 mg Oral BID   1 mg at 04/29/19 0840     glucose gel 15-30 g  15-30 g Oral Q15 Min PRN        Or     dextrose 50 % injection 25-50 mL  25-50 mL Intravenous Q15 Min PRN        Or     glucagon injection 1 mg  1 mg Subcutaneous Q15 Min PRN         divalproex sodium extended-release (DEPAKOTE ER) 24 hr tablet 2,000 mg  2,000 mg Oral At Bedtime   2,000 mg at 04/28/19 2028     gabapentin (NEURONTIN) capsule 1,200 mg  1,200 mg Oral TID   1,200 mg at 04/29/19 0839     glipiZIDE (GLUCOTROL) tablet 10 mg  10 mg Oral QAM AC   10 mg at 04/29/19 0724     hydrOXYzine (ATARAX) tablet 100 mg  100 mg Oral At Bedtime   100 mg at 04/28/19 2027     hydrOXYzine (ATARAX) tablet 50 mg  50 mg Oral TID PRN   50 mg at 04/24/19 0640     ibuprofen (ADVIL/MOTRIN) tablet 800 mg  800 mg Oral TID   800 mg at 04/29/19 0724     insulin aspart (NovoLOG) inj (RAPID ACTING)  1-7 Units Subcutaneous TID AC   1 Units at 04/28/19 1646     insulin aspart (NovoLOG) inj (RAPID ACTING)  1-5 Units Subcutaneous At Bedtime   2 Units at 04/27/19 2055     insulin detemir (LEVEMIR PEN) injection 5 Units  5 Units Subcutaneous BID   5 Units at 04/29/19 0858     magnesium sulfate (EPSOM SALT) granules 1 g  1 g Topical Daily PRN   1 g at 04/28/19 1351     medroxyPROGESTERone (DEPO-PROVERA) injection 150 mg  150 mg Intramuscular Q90 Days   150 mg at 04/16/19 1640     metaxalone (SKELAXIN) tablet 800 mg  800 mg Oral TID   800 mg at 04/29/19 0839     metFORMIN (GLUCOPHAGE) tablet 1,000 mg  1,000 mg Oral BID w/meals   1,000 mg at 04/29/19 0840     naloxone (NARCAN) injection 0.1-0.4 mg  0.1-0.4 mg Intravenous Q2 Min PRN         paliperidone ER (INVEGA) 24 hr tablet 9 mg  9 mg Oral Daily   9 mg at 04/29/19 0850     prazosin (MINIPRESS) capsule 1 mg  1 mg Oral At Bedtime   1 mg at  "04/28/19 2028     traMADol (ULTRAM) tablet 50 mg  50 mg Oral TID PRN   50 mg at 04/29/19 0850     No current Epic-ordered outpatient medications on file.              10 point ROS negative C/O back pain ad neuropathic pain.        Allergies:     Allergies   Allergen Reactions     Haloperidol Other (See Comments)     Other reaction(s): Seizures  Patient states, \"I fell down and wasn't able to get up.\"  Patient states, \"I fell down and wasn't able to get up.\"              Psychiatric Examination:   /63   Pulse 69   Temp 97.9  F (36.6  C) (Tympanic)   Resp 16   Ht 1.626 m (5' 4\")   Wt 84.4 kg (186 lb 1.6 oz)   SpO2 98%   BMI 31.94 kg/m    Weight is 186 lbs 1.6 oz  Body mass index is 31.94 kg/m .    Appearance:  awake, alert, adequately groomed and dressed in hospital scrubs  Attitude:  cooperative  Eye Contact:  good  Mood:  anxious  Affect:  mood congruent  Speech: pressurred still.   Psychomotor Behavior:  no evidence of tardive dyskinesia, dystonia, or tics and intact station, gait and muscle tone  Thought Process:  Slightly less disorganized  Associations: is still disorganized though a bit improved  Thought Content:  no evidence of suicidal ideation or homicidal ideation and visual hallucinations present  Insight:  fair  Judgment:  fair  Oriented to:  time, person, and place  Attention Span and Concentration:  fair  Recent and Remote Memory:  fair  Fund of Knowledge: low-normal  Muscle Strength and Tone: normal  Gait and Station: Normal           Labs:     Results for orders placed or performed during the hospital encounter of 04/06/19   XR Foot Left G/E 3 Views    Narrative    EXAM:XR FOOT LT G/E 3 VW     CLINICAL HISTORY: Patient Age  39 years Additional clinical info:  Inpatient - left 2nd toe pain and subungual hematoma. 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  Deformity of " the neck of fifth metatarsal likely due to old trauma.  Tiny plantar and Achilles calcaneal spurs. Left foot otherwise normal.    GRIS ABARCA MD   XR Foot Right G/E 3 Views    Narrative    EXAM:XR FOOT RT G/E 3 VW     CLINICAL HISTORY: Patient Age  39 years Additional clinical info:  Inpatient - painful Rt 2nd toe with subungual hematoma and painful  DIPJ. Please evaluate underlying bone     COMPARISON: None      TECHNIQUE: 3 views      Impression    IMPRESSION: Mild degenerative arthritis in the first TMT joint. Small  plantar and Achilles calcaneal spurs. Right foot otherwise normal.    GRIS ABARCA MD   Glucose by meter   Result Value Ref Range    Glucose 305 (H) 70 - 99 mg/dL   Glucose by meter   Result Value Ref Range    Glucose 341 (H) 70 - 99 mg/dL   Glucose by meter   Result Value Ref Range    Glucose 193 (H) 70 - 99 mg/dL   Glucose by meter   Result Value Ref Range    Glucose 168 (H) 70 - 99 mg/dL   Glucose by meter   Result Value Ref Range    Glucose 249 (H) 70 - 99 mg/dL   Glucose by meter   Result Value Ref Range    Glucose 227 (H) 70 - 99 mg/dL   Glucose by meter   Result Value Ref Range    Glucose 242 (H) 70 - 99 mg/dL   Glucose by meter   Result Value Ref Range    Glucose 125 (H) 70 - 99 mg/dL   Glucose by meter   Result Value Ref Range    Glucose 105 (H) 70 - 99 mg/dL   Glucose by meter   Result Value Ref Range    Glucose 255 (H) 70 - 99 mg/dL   Glucose by meter   Result Value Ref Range    Glucose 169 (H) 70 - 99 mg/dL   Glucose by meter   Result Value Ref Range    Glucose 195 (H) 70 - 99 mg/dL   Glucose by meter   Result Value Ref Range    Glucose 121 (H) 70 - 99 mg/dL   Glucose by meter   Result Value Ref Range    Glucose 84 70 - 99 mg/dL   Glucose by meter   Result Value Ref Range    Glucose 130 (H) 70 - 99 mg/dL   Glucose by meter   Result Value Ref Range    Glucose 161 (H) 70 - 99 mg/dL   Glucose by meter   Result Value Ref Range    Glucose 181 (H) 70 - 99 mg/dL   Glucose by meter   Result  Value Ref Range    Glucose 181 (H) 70 - 99 mg/dL   Glucose by meter   Result Value Ref Range    Glucose 195 (H) 70 - 99 mg/dL   Glucose by meter   Result Value Ref Range    Glucose 100 (H) 70 - 99 mg/dL   Glucose by meter   Result Value Ref Range    Glucose 202 (H) 70 - 99 mg/dL   Glucose by meter   Result Value Ref Range    Glucose 114 (H) 70 - 99 mg/dL   Glucose by meter   Result Value Ref Range    Glucose 132 (H) 70 - 99 mg/dL   Glucose by meter   Result Value Ref Range    Glucose 104 (H) 70 - 99 mg/dL   Glucose by meter   Result Value Ref Range    Glucose 91 70 - 99 mg/dL   Glucose by meter   Result Value Ref Range    Glucose 204 (H) 70 - 99 mg/dL   Glucose by meter   Result Value Ref Range    Glucose 87 70 - 99 mg/dL   Glucose by meter   Result Value Ref Range    Glucose 151 (H) 70 - 99 mg/dL   Glucose by meter   Result Value Ref Range    Glucose 98 70 - 99 mg/dL   Glucose by meter   Result Value Ref Range    Glucose 180 (H) 70 - 99 mg/dL   Glucose by meter   Result Value Ref Range    Glucose 126 (H) 70 - 99 mg/dL   Glucose by meter   Result Value Ref Range    Glucose 137 (H) 70 - 99 mg/dL   Glucose by meter   Result Value Ref Range    Glucose 115 (H) 70 - 99 mg/dL   Glucose by meter   Result Value Ref Range    Glucose 108 (H) 70 - 99 mg/dL   Glucose by meter   Result Value Ref Range    Glucose 189 (H) 70 - 99 mg/dL   Glucose by meter   Result Value Ref Range    Glucose 97 70 - 99 mg/dL   Glucose by meter   Result Value Ref Range    Glucose 123 (H) 70 - 99 mg/dL   Glucose by meter   Result Value Ref Range    Glucose 240 (H) 70 - 99 mg/dL   Glucose by meter   Result Value Ref Range    Glucose 134 (H) 70 - 99 mg/dL   Glucose by meter   Result Value Ref Range    Glucose 173 (H) 70 - 99 mg/dL   Glucose by meter   Result Value Ref Range    Glucose 100 (H) 70 - 99 mg/dL   Glucose by meter   Result Value Ref Range    Glucose 115 (H) 70 - 99 mg/dL   HCG qualitative urine   Result Value Ref Range    HCG Qual Urine  Negative NEG^Negative   Glucose by meter   Result Value Ref Range    Glucose 197 (H) 70 - 99 mg/dL   Glucose by meter   Result Value Ref Range    Glucose 143 (H) 70 - 99 mg/dL   Glucose by meter   Result Value Ref Range    Glucose 230 (H) 70 - 99 mg/dL   Glucose by meter   Result Value Ref Range    Glucose 151 (H) 70 - 99 mg/dL   Glucose by meter   Result Value Ref Range    Glucose 167 (H) 70 - 99 mg/dL   Glucose by meter   Result Value Ref Range    Glucose 206 (H) 70 - 99 mg/dL   Glucose by meter   Result Value Ref Range    Glucose 222 (H) 70 - 99 mg/dL   Glucose by meter   Result Value Ref Range    Glucose 225 (H) 70 - 99 mg/dL   Glucose by meter   Result Value Ref Range    Glucose 250 (H) 70 - 99 mg/dL   Glucose by meter   Result Value Ref Range    Glucose 280 (H) 70 - 99 mg/dL   Glucose by meter   Result Value Ref Range    Glucose 245 (H) 70 - 99 mg/dL   Valproic acid   Result Value Ref Range    Valproic Acid Level 45 (L) 50 - 100 mg/L   CBC with platelets differential   Result Value Ref Range    WBC 7.8 4.0 - 11.0 10e9/L    RBC Count 4.05 3.8 - 5.2 10e12/L    Hemoglobin 11.7 11.7 - 15.7 g/dL    Hematocrit 35.1 35.0 - 47.0 %    MCV 87 78 - 100 fl    MCH 28.9 26.5 - 33.0 pg    MCHC 33.3 31.5 - 36.5 g/dL    RDW 13.2 10.0 - 15.0 %    Platelet Count 247 150 - 450 10e9/L    Diff Method Automated Method     % Neutrophils 55.5 %    % Lymphocytes 34.8 %    % Monocytes 7.7 %    % Eosinophils 0.9 %    % Basophils 0.3 %    % Immature Granulocytes 0.8 %    Nucleated RBCs 0 0 /100    Absolute Neutrophil 4.4 1.6 - 8.3 10e9/L    Absolute Lymphocytes 2.7 0.8 - 5.3 10e9/L    Absolute Monocytes 0.6 0.0 - 1.3 10e9/L    Absolute Eosinophils 0.1 0.0 - 0.7 10e9/L    Absolute Basophils 0.0 0.0 - 0.2 10e9/L    Abs Immature Granulocytes 0.1 0 - 0.4 10e9/L    Absolute Nucleated RBC 0.0    Comprehensive metabolic panel   Result Value Ref Range    Sodium 140 133 - 144 mmol/L    Potassium 4.5 3.4 - 5.3 mmol/L    Chloride 108 94 - 109 mmol/L     Carbon Dioxide 25 20 - 32 mmol/L    Anion Gap 7 3 - 14 mmol/L    Glucose 289 (H) 70 - 99 mg/dL    Urea Nitrogen 14 7 - 30 mg/dL    Creatinine 0.53 0.52 - 1.04 mg/dL    GFR Estimate >90 >60 mL/min/[1.73_m2]    GFR Estimate If Black >90 >60 mL/min/[1.73_m2]    Calcium 9.2 8.5 - 10.1 mg/dL    Bilirubin Total 0.1 (L) 0.2 - 1.3 mg/dL    Albumin 3.5 3.4 - 5.0 g/dL    Protein Total 7.0 6.8 - 8.8 g/dL    Alkaline Phosphatase 60 40 - 150 U/L    ALT 17 0 - 50 U/L    AST 7 0 - 45 U/L   Glucose by meter   Result Value Ref Range    Glucose 142 (H) 70 - 99 mg/dL   Glucose by meter   Result Value Ref Range    Glucose 161 (H) 70 - 99 mg/dL   Glucose by meter   Result Value Ref Range    Glucose 167 (H) 70 - 99 mg/dL   Glucose by meter   Result Value Ref Range    Glucose 203 (H) 70 - 99 mg/dL   Glucose by meter   Result Value Ref Range    Glucose 300 (H) 70 - 99 mg/dL   Glucose by meter   Result Value Ref Range    Glucose 184 (H) 70 - 99 mg/dL   Glucose by meter   Result Value Ref Range    Glucose 249 (H) 70 - 99 mg/dL   Glucose by meter   Result Value Ref Range    Glucose 216 (H) 70 - 99 mg/dL   Glucose by meter   Result Value Ref Range    Glucose 198 (H) 70 - 99 mg/dL   Glucose by meter   Result Value Ref Range    Glucose 192 (H) 70 - 99 mg/dL   Glucose by meter   Result Value Ref Range    Glucose 281 (H) 70 - 99 mg/dL   Glucose by meter   Result Value Ref Range    Glucose 294 (H) 70 - 99 mg/dL   Glucose by meter   Result Value Ref Range    Glucose 285 (H) 70 - 99 mg/dL   Glucose by meter   Result Value Ref Range    Glucose 255 (H) 70 - 99 mg/dL   Glucose by meter   Result Value Ref Range    Glucose 282 (H) 70 - 99 mg/dL   Glucose by meter   Result Value Ref Range    Glucose 231 (H) 70 - 99 mg/dL   Glucose by meter   Result Value Ref Range    Glucose 242 (H) 70 - 99 mg/dL   Glucose by meter   Result Value Ref Range    Glucose 286 (H) 70 - 99 mg/dL   Glucose by meter   Result Value Ref Range    Glucose 287 (H) 70 - 99 mg/dL    Glucose by meter   Result Value Ref Range    Glucose 285 (H) 70 - 99 mg/dL   Valproic acid   Result Value Ref Range    Valproic Acid Level 64 50 - 100 mg/L   Glucose by meter   Result Value Ref Range    Glucose 244 (H) 70 - 99 mg/dL   Glucose by meter   Result Value Ref Range    Glucose 273 (H) 70 - 99 mg/dL   Glucose by meter   Result Value Ref Range    Glucose 257 (H) 70 - 99 mg/dL   Glucose by meter   Result Value Ref Range    Glucose 206 (H) 70 - 99 mg/dL   Glucose by meter   Result Value Ref Range    Glucose 238 (H) 70 - 99 mg/dL   Glucose by meter   Result Value Ref Range    Glucose 224 (H) 70 - 99 mg/dL   Glucose by meter   Result Value Ref Range    Glucose 312 (H) 70 - 99 mg/dL   Glucose by meter   Result Value Ref Range    Glucose 276 (H) 70 - 99 mg/dL   Glucose by meter   Result Value Ref Range    Glucose 155 (H) 70 - 99 mg/dL   Glucose by meter   Result Value Ref Range    Glucose 199 (H) 70 - 99 mg/dL   Glucose by meter   Result Value Ref Range    Glucose 189 (H) 70 - 99 mg/dL   Glucose by meter   Result Value Ref Range    Glucose 184 (H) 70 - 99 mg/dL   Glucose by meter   Result Value Ref Range    Glucose 128 (H) 70 - 99 mg/dL   Wound Ostomy Continence Nurse IP Consult    Narrative    Smita Lizama RN     4/25/2019 11:11 AM  Spoke with OBDULIO Zavala regarding wound consult placed by Airam Light CNP. She evaluated pts feet and states there is no   open wound. Explained that her toenails are black. These were   evaluated by Dr. Shook last week. At this time I do not see a   need for a wound consult being there is no open area.               Plan:   I am going to speak with her about starting lithium or Tegretol.  I will order hemoglobin A1c if it has not been done.  Contact hospitalist regarding her blood sugars if they do not stabilize.  Dr. Stinson has been adjusting her insulin.      ELOS: >5 days for stabilization and safe discharge planning

## 2019-04-29 NOTE — TELEPHONE ENCOUNTER
----- Message from Iris Shook DPM sent at 4/29/2019  1:31 PM CDT -----  Please call patient on the floor and inform her that her x-rays did not show any fractures. She should continue to treat her toes with the epsom salt soaks. Thank you.

## 2019-04-29 NOTE — PLAN OF CARE
BEHAVIORAL TEAM DISCUSSION    Participants: Airam Light NP, Angie Laws LSW,  Becca Chery LSW, Beena Kumar SW, Viridiana Foss RN, Lala Zapata RN, Olivia Champagne RN, [facility], Mady Holman OT, Natalia Costello OT  Progress: fair  Continued Stay Criteria/Rationale: delusional, continued psychotic symptoms   Medical/Physical: diabetic, monitoring blood sugars, metformin, accuchecks and sliding scale, painful toenail - ultram 50mg to be added - podiatry saw patient on April 18th   Precautions:        Behavioral Orders   Procedures    Code 1 - Restrict to Unit    Routine Programming       As clinically indicated    Status 15       Every 15 minutes.      Plan: continue to stabilize psychosis, increase depakote today  Rationale for change in precautions or plan:   Active Problems:    Schizophrenia (H)      Current Facility-Administered Medications:     acetaminophen (TYLENOL) tablet 975 mg, 975 mg, Oral, TID, Loren Zuleta NP, 975 mg at 04/29/19 0840    atorvastatin (LIPITOR) tablet 20 mg, 20 mg, Oral, At Bedtime, Loren Zuleta NP, 20 mg at 04/28/19 2028    benztropine (COGENTIN) tablet 1 mg, 1 mg, Oral, BID, Airam Light NP, 1 mg at 04/29/19 0840    benztropine (COGENTIN) tablet 1 mg, 1 mg, Oral, Once, Airam Light NP    busPIRone (BUSPAR) tablet 20 mg, 20 mg, Oral, TID, Loren Zuleta NP, 20 mg at 04/29/19 0840    clonazePAM (klonoPIN) tablet 1 mg, 1 mg, Oral, BID, Airam Light NP, 1 mg at 04/29/19 0840    glucose gel 15-30 g, 15-30 g, Oral, Q15 Min PRN **OR** dextrose 50 % injection 25-50 mL, 25-50 mL, Intravenous, Q15 Min PRN **OR** glucagon injection 1 mg, 1 mg, Subcutaneous, Q15 Min PRN, Loren Zuleta NP    divalproex sodium extended-release (DEPAKOTE ER) 24 hr tablet 2,000 mg, 2,000 mg, Oral, At Bedtime, Kuldeep April Keturah, NP, 2,000 mg at 04/28/19 2028    gabapentin (NEURONTIN) capsule 1,200 mg, 1,200 mg, Oral,  TID, Airam Light, JACKIE, 1,200 mg at 04/29/19 0839    glipiZIDE (GLUCOTROL) tablet 10 mg, 10 mg, Oral, QAM AC, Airam Light, JACKIE, 10 mg at 04/29/19 0724    hydrOXYzine (ATARAX) tablet 100 mg, 100 mg, Oral, At Bedtime, Loren Zuleta, NP, 100 mg at 04/28/19 2027    hydrOXYzine (ATARAX) tablet 50 mg, 50 mg, Oral, TID PRN, Loren Zuleta, NP, 50 mg at 04/24/19 0640    ibuprofen (ADVIL/MOTRIN) tablet 800 mg, 800 mg, Oral, TID, Loren Zuleta L, NP, 800 mg at 04/29/19 0724    insulin aspart (NovoLOG) inj (RAPID ACTING), 1-7 Units, Subcutaneous, TID AC, Loren Zuleta NP, 1 Units at 04/28/19 1646    insulin aspart (NovoLOG) inj (RAPID ACTING), 1-5 Units, Subcutaneous, At Bedtime, Loren Zuleta, NP, 2 Units at 04/27/19 2055    insulin detemir (LEVEMIR PEN) injection 5 Units, 5 Units, Subcutaneous, BID, Baljit Stinson MD, 5 Units at 04/29/19 0858    magnesium sulfate (EPSOM SALT) granules 1 g, 1 g, Topical, Daily PRN, Loren Zuleta, NP, 1 g at 04/28/19 1351    medroxyPROGESTERone (DEPO-PROVERA) injection 150 mg, 150 mg, Intramuscular, Q90 Days, Airam Light NP, 150 mg at 04/16/19 1640    metaxalone (SKELAXIN) tablet 800 mg, 800 mg, Oral, TID, Loren Zuleta NP, 800 mg at 04/29/19 0839    metFORMIN (GLUCOPHAGE) tablet 1,000 mg, 1,000 mg, Oral, BID w/meals, Davi Owens MD, 1,000 mg at 04/29/19 0840    naloxone (NARCAN) injection 0.1-0.4 mg, 0.1-0.4 mg, Intravenous, Q2 Min PRN, Narcisa Solomon, APRN CNP    paliperidone ER (INVEGA) 24 hr tablet 9 mg, 9 mg, Oral, Daily, Airam Light NP, 9 mg at 04/29/19 0850    prazosin (MINIPRESS) capsule 1 mg, 1 mg, Oral, At Bedtime, Airam Light NP, 1 mg at 04/28/19 2028    traMADol (ULTRAM) tablet 50 mg, 50 mg, Oral, TID PRN, Airam Light NP, 50 mg at 04/29/19 0850

## 2019-04-29 NOTE — PLAN OF CARE
Face to face end of shift report received from Luz Elena ZUNIGA RN. Rounding completed. Patient observed in her room awake.     Lorna Carrington  4/29/2019  4:23 PM

## 2019-04-29 NOTE — PLAN OF CARE
Face to face end of shift report received from PM RN. Rounding completed. Patient observed.     Corey Llanes RN

## 2019-04-29 NOTE — PLAN OF CARE
Problem: Adult Behavioral Health Plan of Care  Goal: Patient-Specific Goal (Individualization)  Description  Patient will attend at least 50% of groups while on unit.   Patient will report at least 6-8 hours of sleep at night.  Patient will be compliant with treatment team recommendations.    Patient will have a decrease in delusional statements by discharge.   Pt will use a walker when up to ambulate due to her unsteady gait.   4/29/2019 0120 by Corey Llanes RN  Outcome: No Change    0026 - Patient observed resting in his room. Respirations equal and non-labored.     0530 - pt appeared to have slept.

## 2019-04-30 LAB
ALBUMIN SERPL-MCNC: 3.7 G/DL (ref 3.4–5)
ALP SERPL-CCNC: 56 U/L (ref 40–150)
ALT SERPL W P-5'-P-CCNC: 16 U/L (ref 0–50)
ANION GAP SERPL CALCULATED.3IONS-SCNC: 8 MMOL/L (ref 3–14)
AST SERPL W P-5'-P-CCNC: 6 U/L (ref 0–45)
BASOPHILS # BLD AUTO: 0 10E9/L (ref 0–0.2)
BASOPHILS NFR BLD AUTO: 0.4 %
BILIRUB SERPL-MCNC: 0.1 MG/DL (ref 0.2–1.3)
BUN SERPL-MCNC: 14 MG/DL (ref 7–30)
CALCIUM SERPL-MCNC: 8.7 MG/DL (ref 8.5–10.1)
CHLORIDE SERPL-SCNC: 105 MMOL/L (ref 94–109)
CO2 SERPL-SCNC: 26 MMOL/L (ref 20–32)
CREAT SERPL-MCNC: 0.49 MG/DL (ref 0.52–1.04)
DIFFERENTIAL METHOD BLD: NORMAL
EOSINOPHIL # BLD AUTO: 0.1 10E9/L (ref 0–0.7)
EOSINOPHIL NFR BLD AUTO: 1.1 %
ERYTHROCYTE [DISTWIDTH] IN BLOOD BY AUTOMATED COUNT: 13.2 % (ref 10–15)
EST. AVERAGE GLUCOSE BLD GHB EST-MCNC: 197 MG/DL
GFR SERPL CREATININE-BSD FRML MDRD: >90 ML/MIN/{1.73_M2}
GLUCOSE BLDC GLUCOMTR-MCNC: 137 MG/DL (ref 70–99)
GLUCOSE BLDC GLUCOMTR-MCNC: 174 MG/DL (ref 70–99)
GLUCOSE BLDC GLUCOMTR-MCNC: 199 MG/DL (ref 70–99)
GLUCOSE BLDC GLUCOMTR-MCNC: 202 MG/DL (ref 70–99)
GLUCOSE SERPL-MCNC: 196 MG/DL (ref 70–99)
HBA1C MFR BLD: 8.5 % (ref 0–5.6)
HCT VFR BLD AUTO: 36.5 % (ref 35–47)
HGB BLD-MCNC: 12 G/DL (ref 11.7–15.7)
IMM GRANULOCYTES # BLD: 0.1 10E9/L (ref 0–0.4)
IMM GRANULOCYTES NFR BLD: 1.1 %
LYMPHOCYTES # BLD AUTO: 2.7 10E9/L (ref 0.8–5.3)
LYMPHOCYTES NFR BLD AUTO: 35.6 %
MCH RBC QN AUTO: 28.8 PG (ref 26.5–33)
MCHC RBC AUTO-ENTMCNC: 32.9 G/DL (ref 31.5–36.5)
MCV RBC AUTO: 88 FL (ref 78–100)
MONOCYTES # BLD AUTO: 0.6 10E9/L (ref 0–1.3)
MONOCYTES NFR BLD AUTO: 7.3 %
NEUTROPHILS # BLD AUTO: 4.1 10E9/L (ref 1.6–8.3)
NEUTROPHILS NFR BLD AUTO: 54.5 %
NRBC # BLD AUTO: 0 10*3/UL
NRBC BLD AUTO-RTO: 0 /100
PLATELET # BLD AUTO: 258 10E9/L (ref 150–450)
POTASSIUM SERPL-SCNC: 4.3 MMOL/L (ref 3.4–5.3)
PROT SERPL-MCNC: 7.5 G/DL (ref 6.8–8.8)
RBC # BLD AUTO: 4.17 10E12/L (ref 3.8–5.2)
SODIUM SERPL-SCNC: 139 MMOL/L (ref 133–144)
VALPROATE SERPL-MCNC: 55 MG/L (ref 50–100)
WBC # BLD AUTO: 7.6 10E9/L (ref 4–11)

## 2019-04-30 PROCEDURE — 36415 COLL VENOUS BLD VENIPUNCTURE: CPT | Mod: ZL

## 2019-04-30 PROCEDURE — 25000132 ZZH RX MED GY IP 250 OP 250 PS 637: Performed by: NURSE PRACTITIONER

## 2019-04-30 PROCEDURE — 40000788 ZZHCL STATISTIC ESTIMATED AVERAGE GLUCOSE: Performed by: NURSE PRACTITIONER

## 2019-04-30 PROCEDURE — 99232 SBSQ HOSP IP/OBS MODERATE 35: CPT | Performed by: NURSE PRACTITIONER

## 2019-04-30 PROCEDURE — 12400000 ZZH R&B MH

## 2019-04-30 PROCEDURE — 36415 COLL VENOUS BLD VENIPUNCTURE: CPT | Performed by: NURSE PRACTITIONER

## 2019-04-30 PROCEDURE — 25000125 ZZHC RX 250: Performed by: INTERNAL MEDICINE

## 2019-04-30 PROCEDURE — 80164 ASSAY DIPROPYLACETIC ACD TOT: CPT | Performed by: NURSE PRACTITIONER

## 2019-04-30 PROCEDURE — 85025 COMPLETE CBC W/AUTO DIFF WBC: CPT | Performed by: NURSE PRACTITIONER

## 2019-04-30 PROCEDURE — 80053 COMPREHEN METABOLIC PANEL: CPT | Performed by: NURSE PRACTITIONER

## 2019-04-30 PROCEDURE — 83036 HEMOGLOBIN GLYCOSYLATED A1C: CPT | Performed by: NURSE PRACTITIONER

## 2019-04-30 PROCEDURE — 82962 GLUCOSE BLOOD TEST: CPT | Mod: ZL

## 2019-04-30 RX ORDER — LITHIUM CARBONATE 300 MG/1
300 TABLET, FILM COATED, EXTENDED RELEASE ORAL AT BEDTIME
Status: COMPLETED | OUTPATIENT
Start: 2019-04-30 | End: 2019-04-30

## 2019-04-30 RX ORDER — LITHIUM CARBONATE 300 MG/1
600 TABLET, FILM COATED, EXTENDED RELEASE ORAL AT BEDTIME
Status: DISCONTINUED | OUTPATIENT
Start: 2019-05-01 | End: 2019-05-01

## 2019-04-30 RX ADMIN — GABAPENTIN 1200 MG: 400 CAPSULE ORAL at 20:48

## 2019-04-30 RX ADMIN — CLONAZEPAM 1 MG: 1 TABLET ORAL at 08:13

## 2019-04-30 RX ADMIN — MAGNESIUM SULFATE 1 G: 1 GRANULE ORAL; TOPICAL at 13:21

## 2019-04-30 RX ADMIN — BENZTROPINE MESYLATE 1 MG: 1 TABLET ORAL at 20:48

## 2019-04-30 RX ADMIN — TRAMADOL HYDROCHLORIDE 50 MG: 50 TABLET, COATED ORAL at 20:47

## 2019-04-30 RX ADMIN — BUSPIRONE HYDROCHLORIDE 7.5 MG: 7.5 TABLET ORAL at 20:48

## 2019-04-30 RX ADMIN — METAXALONE 800 MG: 800 TABLET ORAL at 08:13

## 2019-04-30 RX ADMIN — ATORVASTATIN CALCIUM 20 MG: 20 TABLET, FILM COATED ORAL at 20:48

## 2019-04-30 RX ADMIN — BUSPIRONE HYDROCHLORIDE 7.5 MG: 7.5 TABLET ORAL at 13:21

## 2019-04-30 RX ADMIN — LITHIUM CARBONATE 300 MG: 300 TABLET, EXTENDED RELEASE ORAL at 20:47

## 2019-04-30 RX ADMIN — PRAZOSIN HYDROCHLORIDE 1 MG: 1 CAPSULE ORAL at 20:47

## 2019-04-30 RX ADMIN — ACETAMINOPHEN 975 MG: 325 TABLET, FILM COATED ORAL at 13:20

## 2019-04-30 RX ADMIN — ACETAMINOPHEN 975 MG: 325 TABLET, FILM COATED ORAL at 20:47

## 2019-04-30 RX ADMIN — GABAPENTIN 1200 MG: 400 CAPSULE ORAL at 08:11

## 2019-04-30 RX ADMIN — IBUPROFEN 800 MG: 800 TABLET ORAL at 05:53

## 2019-04-30 RX ADMIN — ACETAMINOPHEN 975 MG: 325 TABLET, FILM COATED ORAL at 08:11

## 2019-04-30 RX ADMIN — CLONAZEPAM 1 MG: 1 TABLET ORAL at 20:48

## 2019-04-30 RX ADMIN — BENZTROPINE MESYLATE 1 MG: 1 TABLET ORAL at 08:13

## 2019-04-30 RX ADMIN — METAXALONE 800 MG: 800 TABLET ORAL at 13:21

## 2019-04-30 RX ADMIN — PALIPERIDONE 9 MG: 3 TABLET, EXTENDED RELEASE ORAL at 08:11

## 2019-04-30 RX ADMIN — BUSPIRONE HYDROCHLORIDE 7.5 MG: 7.5 TABLET ORAL at 08:13

## 2019-04-30 RX ADMIN — TRAMADOL HYDROCHLORIDE 50 MG: 50 TABLET, COATED ORAL at 03:03

## 2019-04-30 RX ADMIN — GLIPIZIDE 10 MG: 5 TABLET ORAL at 08:44

## 2019-04-30 RX ADMIN — GABAPENTIN 1200 MG: 400 CAPSULE ORAL at 13:21

## 2019-04-30 RX ADMIN — HYDROXYZINE HYDROCHLORIDE 50 MG: 25 TABLET ORAL at 03:03

## 2019-04-30 RX ADMIN — HYDROXYZINE HYDROCHLORIDE 100 MG: 25 TABLET ORAL at 20:46

## 2019-04-30 RX ADMIN — INSULIN ASPART 2 UNITS: 100 INJECTION, SOLUTION INTRAVENOUS; SUBCUTANEOUS at 16:55

## 2019-04-30 RX ADMIN — DIVALPROEX SODIUM 2000 MG: 500 TABLET, EXTENDED RELEASE ORAL at 20:46

## 2019-04-30 RX ADMIN — METAXALONE 800 MG: 800 TABLET ORAL at 20:47

## 2019-04-30 RX ADMIN — TRAMADOL HYDROCHLORIDE 50 MG: 50 TABLET, COATED ORAL at 13:28

## 2019-04-30 RX ADMIN — INSULIN ASPART 1 UNITS: 100 INJECTION, SOLUTION INTRAVENOUS; SUBCUTANEOUS at 11:37

## 2019-04-30 RX ADMIN — METFORMIN HYDROCHLORIDE 1000 MG: 1000 TABLET ORAL at 08:13

## 2019-04-30 RX ADMIN — METFORMIN HYDROCHLORIDE 1000 MG: 1000 TABLET ORAL at 16:55

## 2019-04-30 ASSESSMENT — ACTIVITIES OF DAILY LIVING (ADL)
LAUNDRY: UNABLE TO COMPLETE
ORAL_HYGIENE: INDEPENDENT
DRESS: SCRUBS (BEHAVIORAL HEALTH)
HYGIENE/GROOMING: INDEPENDENT

## 2019-04-30 NOTE — PLAN OF CARE
Problem: Adult Behavioral Health Plan of Care  Goal: Patient-Specific Goal (Individualization)  Description  Patient will attend at least 50% of groups while on unit.   Patient will report at least 6-8 hours of sleep at night.  Patient will be compliant with treatment team recommendations.    Patient will have a decrease in delusional statements by discharge.   Pt will use a walker when up to ambulate due to her unsteady gait.   4/30/2019 0313 by Ayah Rodriguez, RN  Note:   Pt has been in bed with eyes closed and regular respirations. 15 minute and PRN checks all night. No complaints offered. Will continue to monitor.    0303-Pt requested and was admin 50 mg Ultram and 50 mg atarax for c/o 100/10 anxiety and foot pain. VS were within parameters and medications admin. Pt returned to bed immediately after.       Ayah Rodriguez  4/30/2019  3:13 AM

## 2019-04-30 NOTE — PLAN OF CARE
Patient endorsed some anxiety and depression. She denied HI/SI and hallucinations. She rambled and made grandiose delusional statements. 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 She told many other similar stories. Patient rated her pain at 10 of 10 and received 50mg Ultram and also 50mg Atarax at 17:39. She reported high anxiety at that time. Patient did not have any observable signs of pain at that time. She has not used her walker this shift. She spends most of her time coloring and writing in a notebook. She filled up an entire notepad and asked for another this shift.       Problem: Adult Behavioral Health Plan of Care  Goal: Patient-Specific Goal (Individualization)  Description  Patient will attend at least 50% of groups while on unit.   Patient will report at least 6-8 hours of sleep at night.  Patient will be compliant with treatment team recommendations.    Patient will have a decrease in delusional statements by discharge.   Pt will use a walker when up to ambulate due to her unsteady gait.   4/29/2019 2235 by Lorna Carrington, RN  Outcome: No Change    Problem: Thought Process Alteration  Goal: Optimal Thought Clarity  Description  Patient will hold a reality based conversation prior to discharge   4/29/2019 2235 by Lorna Carrington, RN  Outcome: No Change

## 2019-04-30 NOTE — PLAN OF CARE
"  Problem: Adult Behavioral Health Plan of Care  Goal: Plan of Care Review  4/30/2019 1058 by Luz Elena Chaudhary RN  Outcome: No Change     Problem: Adult Behavioral Health Plan of Care  Goal: Patient-Specific Goal (Individualization)  Description  Patient will attend at least 50% of groups while on unit.   Patient will report at least 6-8 hours of sleep at night.  Patient will be compliant with treatment team recommendations.    Patient will have a decrease in delusional statements by discharge.   Pt will use a walker when up to ambulate due to her unsteady gait.   4/30/2019 1058 by Luz Elena Chaudhary, RN  Outcome: No Change    PT does not have a decrease in delusional statements. PT reports having 5-6 hours of sleep last night. PT attends groups but tends to have confabulated responses.      Problem: Adult Behavioral Health Plan of Care  Goal: Adheres to Safety Considerations for Self and Others  4/30/2019 1058 by Luz Elena Chaudhary RN  Outcome: No Change     Problem: Thought Process Alteration  Goal: Optimal Thought Clarity  Description  Patient will hold a reality based conversation prior to discharge   4/30/2019 1058 by Luz Elena Chaudhary, RN  Outcome: No Change   PT will start with a reality based conversation and then goes into confabulating stories that are not reality based.       PT denies SI/HI and hallucinations. PT says she has some depression today but \"I am working on doing stuff for myself and trying not to be so dependent'.  PT reported pain 6/10 during 0800 assessment, to bilateral feet, more while walking. PT given scheduled medications at rated her pain 5/10 at 0900. PT requested and given paper education on Lithium. PT ambulating without the use of walker with steady balance, but \"do not take it away from me, I might need it on the days my feet hurt really bad.\"  1358: PT given PRN tramadol 50mg by mouth for 7/10 foot pain. PT was also given daily foot soak.  1420: PT reported 3/10 pain decrease and some " relief from foot soak.

## 2019-04-30 NOTE — PLAN OF CARE
BEHAVIORAL TEAM DISCUSSION    Participants: Airam Light NP, Natalia Fabian NP,  Raven Stout NP, Dennise Herrera LICSW, Angie Laws LSW,  Becca Chery LSW, Beena Kumar SW, Viridiana Foss RN, Ayah Matos RN, Lala Zapata RN, [facility], Natalia Costello OT, Gena Gomez OT  Progress: fair  Continued Stay Criteria/Rationale: delusional, continued psychotic symptoms   Medical/Physical: diabetic, monitoring blood sugars, metformin, accuchecks and sliding scale, painful toenail - ultram 50mg to be added - podiatry saw patient on April 18th   Precautions:           Behavioral Orders   Procedures    Code 1 - Restrict to Unit    Routine Programming       As clinically indicated    Status 15       Every 15 minutes.      Plan: continue to stabilize psychosis, increase depakote today  Rationale for change in precautions or plan:   Active Problems:    Schizophrenia (H)         Current Facility-Administered Medications:     acetaminophen (TYLENOL) tablet 975 mg, 975 mg, Oral, TID, Loren Zuleta NP, 975 mg at 04/30/19 0811    atorvastatin (LIPITOR) tablet 20 mg, 20 mg, Oral, At Bedtime, Loren Zuleta NP, 20 mg at 04/29/19 2053    benztropine (COGENTIN) tablet 1 mg, 1 mg, Oral, BID, Airam Light NP, 1 mg at 04/30/19 0813    benztropine (COGENTIN) tablet 1 mg, 1 mg, Oral, Once, Airam Light NP    busPIRone (BUSPAR) tablet 7.5 mg, 7.5 mg, Oral, TID, Airam Light NP, 7.5 mg at 04/30/19 0813    clonazePAM (klonoPIN) tablet 1 mg, 1 mg, Oral, BID, Airam Light NP, 1 mg at 04/30/19 0813    glucose gel 15-30 g, 15-30 g, Oral, Q15 Min PRN **OR** dextrose 50 % injection 25-50 mL, 25-50 mL, Intravenous, Q15 Min PRN **OR** glucagon injection 1 mg, 1 mg, Subcutaneous, Q15 Min PRN, Loren Zuleta, NP    divalproex sodium extended-release (DEPAKOTE ER) 24 hr tablet 2,000 mg, 2,000 mg, Oral, At Bedtime, Airam Light NP, 2,000 mg at  04/29/19 2053    gabapentin (NEURONTIN) capsule 1,200 mg, 1,200 mg, Oral, TID, Kuldeep April JACKIE Rebollar, 1,200 mg at 04/30/19 0811    glipiZIDE (GLUCOTROL) tablet 10 mg, 10 mg, Oral, QAM AC, Airam Light, NP, 10 mg at 04/30/19 0844    hydrOXYzine (ATARAX) tablet 100 mg, 100 mg, Oral, At Bedtime, Loren Zuleta, NP, 100 mg at 04/29/19 2052    hydrOXYzine (ATARAX) tablet 50 mg, 50 mg, Oral, TID PRN, Loren Zuleta NP, 50 mg at 04/30/19 0303    ibuprofen (ADVIL/MOTRIN) tablet 800 mg, 800 mg, Oral, TID, Loren Zuleta, NP, 800 mg at 04/30/19 0553    insulin aspart (NovoLOG) inj (RAPID ACTING), 1-7 Units, Subcutaneous, TID AC, Loren Zuleta NP, 2 Units at 04/29/19 1706    insulin aspart (NovoLOG) inj (RAPID ACTING), 1-5 Units, Subcutaneous, At Bedtime, Loren Zuleta NP, 3 Units at 04/29/19 2055    insulin detemir (LEVEMIR PEN) injection 5 Units, 5 Units, Subcutaneous, BID, Baljit Stinson MD, 5 Units at 04/30/19 0817    magnesium sulfate (EPSOM SALT) granules 1 g, 1 g, Topical, Daily PRN, Loren Zuleta NP, 1 g at 04/29/19 1432    medroxyPROGESTERone (DEPO-PROVERA) injection 150 mg, 150 mg, Intramuscular, Q90 Days, Kuldeep April JACKIE Rebollar, 150 mg at 04/16/19 1640    metaxalone (SKELAXIN) tablet 800 mg, 800 mg, Oral, TID, Loren Zuleta, NP, 800 mg at 04/30/19 0813    metFORMIN (GLUCOPHAGE) tablet 1,000 mg, 1,000 mg, Oral, BID w/meals, Davi Owens MD, 1,000 mg at 04/30/19 0813    naloxone (NARCAN) injection 0.1-0.4 mg, 0.1-0.4 mg, Intravenous, Q2 Min PRN, Narcisa Solomon APRN CNP    paliperidone ER (INVEGA) 24 hr tablet 9 mg, 9 mg, Oral, Daily, Airam Light NP, 9 mg at 04/30/19 0811    prazosin (MINIPRESS) capsule 1 mg, 1 mg, Oral, At Bedtime, Airam Light NP, 1 mg at 04/29/19 2053    traMADol (ULTRAM) tablet 50 mg, 50 mg, Oral, TID PRN, Airam Light NP, 50 mg at 04/30/19 6288

## 2019-04-30 NOTE — PROGRESS NOTES
[facility]  Psychiatric Progress Note      Impression:   This is a 39 year old female with a significant history of schizoaffective disorder.     Ginette remains very delusional still.  She is still pressured and grandiose.  She agreed to try lithium and states she has never been on it.  She does not believe she is ever been on Tegretol or Trileptal in the past either.  Her sleep is still a bit disrupted at times though it has improved greatly since her admission.  At this time she still is somewhat disruptive in group settings and becomes very grandiose at times and would not be able to complete a residential program until she has improved further.    Educated regarding medication indications, risks, benefits, side effects, contraindications and possible interactions. Verbally expressed understanding.        DIagnoses:     Schizoaffective disorder  Marijuana use disorder-in remission  Diabetes type 2    Attestation:  Patient has been seen and evaluated by me,  Airam Light NP          Interim History:   The patient's care was discussed with the treatment team and chart notes were reviewed.          Medications:     Current Facility-Administered Medications Ordered in Epic   Medication Dose Route Frequency Last Rate Last Dose     acetaminophen (TYLENOL) tablet 975 mg  975 mg Oral TID   975 mg at 04/30/19 0811     atorvastatin (LIPITOR) tablet 20 mg  20 mg Oral At Bedtime   20 mg at 04/29/19 2053     benztropine (COGENTIN) tablet 1 mg  1 mg Oral BID   1 mg at 04/30/19 0813     benztropine (COGENTIN) tablet 1 mg  1 mg Oral Once         busPIRone (BUSPAR) tablet 7.5 mg  7.5 mg Oral TID   7.5 mg at 04/30/19 0813     clonazePAM (klonoPIN) tablet 1 mg  1 mg Oral BID   1 mg at 04/30/19 0813     glucose gel 15-30 g  15-30 g Oral Q15 Min PRN        Or     dextrose 50 % injection 25-50 mL  25-50 mL Intravenous Q15 Min PRN        Or     glucagon injection 1 mg  1 mg Subcutaneous Q15 Min PRN          "divalproex sodium extended-release (DEPAKOTE ER) 24 hr tablet 2,000 mg  2,000 mg Oral At Bedtime   2,000 mg at 04/29/19 2053     gabapentin (NEURONTIN) capsule 1,200 mg  1,200 mg Oral TID   1,200 mg at 04/30/19 0811     glipiZIDE (GLUCOTROL) tablet 10 mg  10 mg Oral QAM AC   10 mg at 04/30/19 0844     hydrOXYzine (ATARAX) tablet 100 mg  100 mg Oral At Bedtime   100 mg at 04/29/19 2052     hydrOXYzine (ATARAX) tablet 50 mg  50 mg Oral TID PRN   50 mg at 04/30/19 0303     insulin aspart (NovoLOG) inj (RAPID ACTING)  1-7 Units Subcutaneous TID AC   1 Units at 04/30/19 1137     insulin aspart (NovoLOG) inj (RAPID ACTING)  1-5 Units Subcutaneous At Bedtime   3 Units at 04/29/19 2055     insulin detemir (LEVEMIR PEN) injection 5 Units  5 Units Subcutaneous BID   5 Units at 04/30/19 0817     lithium ER (LITHOBID) CR tablet 300 mg  300 mg Oral At Bedtime         [START ON 5/1/2019] lithium ER (LITHOBID) CR tablet 600 mg  600 mg Oral At Bedtime         magnesium sulfate (EPSOM SALT) granules 1 g  1 g Topical Daily PRN   1 g at 04/29/19 1432     medroxyPROGESTERone (DEPO-PROVERA) injection 150 mg  150 mg Intramuscular Q90 Days   150 mg at 04/16/19 1640     metaxalone (SKELAXIN) tablet 800 mg  800 mg Oral TID   800 mg at 04/30/19 0813     metFORMIN (GLUCOPHAGE) tablet 1,000 mg  1,000 mg Oral BID w/meals   1,000 mg at 04/30/19 0813     naloxone (NARCAN) injection 0.1-0.4 mg  0.1-0.4 mg Intravenous Q2 Min PRN         paliperidone ER (INVEGA) 24 hr tablet 9 mg  9 mg Oral Daily   9 mg at 04/30/19 0811     prazosin (MINIPRESS) capsule 1 mg  1 mg Oral At Bedtime   1 mg at 04/29/19 2053     traMADol (ULTRAM) tablet 50 mg  50 mg Oral TID PRN   50 mg at 04/30/19 0303     No current Epic-ordered outpatient medications on file.              10 point ROS negative C/O back pain ad neuropathic pain.        Allergies:     Allergies   Allergen Reactions     Haloperidol Other (See Comments)     Other reaction(s): Seizures  Patient states, \"I " "fell down and wasn't able to get up.\"  Patient states, \"I fell down and wasn't able to get up.\"              Psychiatric Examination:   /65   Pulse 93   Temp 98.3  F (36.8  C) (Tympanic)   Resp 16   Ht 1.626 m (5' 4\")   Wt 84.4 kg (186 lb 1.6 oz)   SpO2 98%   BMI 31.94 kg/m    Weight is 186 lbs 1.6 oz  Body mass index is 31.94 kg/m .    Appearance:  awake, alert, adequately groomed and dressed in hospital scrubs  Attitude:  cooperative  Eye Contact:  good  Mood:  anxious  Affect:  mood congruent  Speech: pressurred still.   Psychomotor Behavior:  no evidence of tardive dyskinesia, dystonia, or tics and intact station, gait and muscle tone  Thought Process:  Slightly less disorganized  Associations: is still disorganized though a bit improved  Thought Content:  no evidence of suicidal ideation or homicidal ideation and visual hallucinations present  Insight:  fair  Judgment:  fair  Oriented to:  time, person, and place  Attention Span and Concentration:  fair  Recent and Remote Memory:  fair  Fund of Knowledge: low-normal  Muscle Strength and Tone: normal  Gait and Station: Normal           Labs:     Results for orders placed or performed during the hospital encounter of 04/06/19   XR Foot Left G/E 3 Views    Narrative    EXAM:XR FOOT LT G/E 3 VW     CLINICAL HISTORY: Patient Age  39 years Additional clinical info:  Inpatient - left 2nd toe pain and subungual hematoma. 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  Deformity of the neck of fifth metatarsal likely due to old trauma.  Tiny plantar and Achilles calcaneal spurs. Left foot otherwise normal.    GRIS ABARCA MD   XR Foot Right G/E 3 Views    Narrative    EXAM:XR FOOT RT G/E 3 VW     CLINICAL HISTORY: Patient Age  39 years Additional clinical info:  Inpatient - painful Rt 2nd toe with subungual hematoma and painful  DIPJ. " Please evaluate underlying bone     COMPARISON: None      TECHNIQUE: 3 views      Impression    IMPRESSION: Mild degenerative arthritis in the first TMT joint. Small  plantar and Achilles calcaneal spurs. Right foot otherwise normal.    GRIS ABARCA MD   Glucose by meter   Result Value Ref Range    Glucose 305 (H) 70 - 99 mg/dL   Glucose by meter   Result Value Ref Range    Glucose 341 (H) 70 - 99 mg/dL   Glucose by meter   Result Value Ref Range    Glucose 193 (H) 70 - 99 mg/dL   Glucose by meter   Result Value Ref Range    Glucose 168 (H) 70 - 99 mg/dL   Glucose by meter   Result Value Ref Range    Glucose 249 (H) 70 - 99 mg/dL   Glucose by meter   Result Value Ref Range    Glucose 227 (H) 70 - 99 mg/dL   Glucose by meter   Result Value Ref Range    Glucose 242 (H) 70 - 99 mg/dL   Glucose by meter   Result Value Ref Range    Glucose 125 (H) 70 - 99 mg/dL   Glucose by meter   Result Value Ref Range    Glucose 105 (H) 70 - 99 mg/dL   Glucose by meter   Result Value Ref Range    Glucose 255 (H) 70 - 99 mg/dL   Glucose by meter   Result Value Ref Range    Glucose 169 (H) 70 - 99 mg/dL   Glucose by meter   Result Value Ref Range    Glucose 195 (H) 70 - 99 mg/dL   Glucose by meter   Result Value Ref Range    Glucose 121 (H) 70 - 99 mg/dL   Glucose by meter   Result Value Ref Range    Glucose 84 70 - 99 mg/dL   Glucose by meter   Result Value Ref Range    Glucose 130 (H) 70 - 99 mg/dL   Glucose by meter   Result Value Ref Range    Glucose 161 (H) 70 - 99 mg/dL   Glucose by meter   Result Value Ref Range    Glucose 181 (H) 70 - 99 mg/dL   Glucose by meter   Result Value Ref Range    Glucose 181 (H) 70 - 99 mg/dL   Glucose by meter   Result Value Ref Range    Glucose 195 (H) 70 - 99 mg/dL   Glucose by meter   Result Value Ref Range    Glucose 100 (H) 70 - 99 mg/dL   Glucose by meter   Result Value Ref Range    Glucose 202 (H) 70 - 99 mg/dL   Glucose by meter   Result Value Ref Range    Glucose 114 (H) 70 - 99 mg/dL    Glucose by meter   Result Value Ref Range    Glucose 132 (H) 70 - 99 mg/dL   Glucose by meter   Result Value Ref Range    Glucose 104 (H) 70 - 99 mg/dL   Glucose by meter   Result Value Ref Range    Glucose 91 70 - 99 mg/dL   Glucose by meter   Result Value Ref Range    Glucose 204 (H) 70 - 99 mg/dL   Glucose by meter   Result Value Ref Range    Glucose 87 70 - 99 mg/dL   Glucose by meter   Result Value Ref Range    Glucose 151 (H) 70 - 99 mg/dL   Glucose by meter   Result Value Ref Range    Glucose 98 70 - 99 mg/dL   Glucose by meter   Result Value Ref Range    Glucose 180 (H) 70 - 99 mg/dL   Glucose by meter   Result Value Ref Range    Glucose 126 (H) 70 - 99 mg/dL   Glucose by meter   Result Value Ref Range    Glucose 137 (H) 70 - 99 mg/dL   Glucose by meter   Result Value Ref Range    Glucose 115 (H) 70 - 99 mg/dL   Glucose by meter   Result Value Ref Range    Glucose 108 (H) 70 - 99 mg/dL   Glucose by meter   Result Value Ref Range    Glucose 189 (H) 70 - 99 mg/dL   Glucose by meter   Result Value Ref Range    Glucose 97 70 - 99 mg/dL   Glucose by meter   Result Value Ref Range    Glucose 123 (H) 70 - 99 mg/dL   Glucose by meter   Result Value Ref Range    Glucose 240 (H) 70 - 99 mg/dL   Glucose by meter   Result Value Ref Range    Glucose 134 (H) 70 - 99 mg/dL   Glucose by meter   Result Value Ref Range    Glucose 173 (H) 70 - 99 mg/dL   Glucose by meter   Result Value Ref Range    Glucose 100 (H) 70 - 99 mg/dL   Glucose by meter   Result Value Ref Range    Glucose 115 (H) 70 - 99 mg/dL   HCG qualitative urine   Result Value Ref Range    HCG Qual Urine Negative NEG^Negative   Glucose by meter   Result Value Ref Range    Glucose 197 (H) 70 - 99 mg/dL   Glucose by meter   Result Value Ref Range    Glucose 143 (H) 70 - 99 mg/dL   Glucose by meter   Result Value Ref Range    Glucose 230 (H) 70 - 99 mg/dL   Glucose by meter   Result Value Ref Range    Glucose 151 (H) 70 - 99 mg/dL   Glucose by meter   Result  Value Ref Range    Glucose 167 (H) 70 - 99 mg/dL   Glucose by meter   Result Value Ref Range    Glucose 206 (H) 70 - 99 mg/dL   Glucose by meter   Result Value Ref Range    Glucose 222 (H) 70 - 99 mg/dL   Glucose by meter   Result Value Ref Range    Glucose 225 (H) 70 - 99 mg/dL   Glucose by meter   Result Value Ref Range    Glucose 250 (H) 70 - 99 mg/dL   Glucose by meter   Result Value Ref Range    Glucose 280 (H) 70 - 99 mg/dL   Glucose by meter   Result Value Ref Range    Glucose 245 (H) 70 - 99 mg/dL   Valproic acid   Result Value Ref Range    Valproic Acid Level 45 (L) 50 - 100 mg/L   CBC with platelets differential   Result Value Ref Range    WBC 7.8 4.0 - 11.0 10e9/L    RBC Count 4.05 3.8 - 5.2 10e12/L    Hemoglobin 11.7 11.7 - 15.7 g/dL    Hematocrit 35.1 35.0 - 47.0 %    MCV 87 78 - 100 fl    MCH 28.9 26.5 - 33.0 pg    MCHC 33.3 31.5 - 36.5 g/dL    RDW 13.2 10.0 - 15.0 %    Platelet Count 247 150 - 450 10e9/L    Diff Method Automated Method     % Neutrophils 55.5 %    % Lymphocytes 34.8 %    % Monocytes 7.7 %    % Eosinophils 0.9 %    % Basophils 0.3 %    % Immature Granulocytes 0.8 %    Nucleated RBCs 0 0 /100    Absolute Neutrophil 4.4 1.6 - 8.3 10e9/L    Absolute Lymphocytes 2.7 0.8 - 5.3 10e9/L    Absolute Monocytes 0.6 0.0 - 1.3 10e9/L    Absolute Eosinophils 0.1 0.0 - 0.7 10e9/L    Absolute Basophils 0.0 0.0 - 0.2 10e9/L    Abs Immature Granulocytes 0.1 0 - 0.4 10e9/L    Absolute Nucleated RBC 0.0    Comprehensive metabolic panel   Result Value Ref Range    Sodium 140 133 - 144 mmol/L    Potassium 4.5 3.4 - 5.3 mmol/L    Chloride 108 94 - 109 mmol/L    Carbon Dioxide 25 20 - 32 mmol/L    Anion Gap 7 3 - 14 mmol/L    Glucose 289 (H) 70 - 99 mg/dL    Urea Nitrogen 14 7 - 30 mg/dL    Creatinine 0.53 0.52 - 1.04 mg/dL    GFR Estimate >90 >60 mL/min/[1.73_m2]    GFR Estimate If Black >90 >60 mL/min/[1.73_m2]    Calcium 9.2 8.5 - 10.1 mg/dL    Bilirubin Total 0.1 (L) 0.2 - 1.3 mg/dL    Albumin 3.5 3.4 -  5.0 g/dL    Protein Total 7.0 6.8 - 8.8 g/dL    Alkaline Phosphatase 60 40 - 150 U/L    ALT 17 0 - 50 U/L    AST 7 0 - 45 U/L   Glucose by meter   Result Value Ref Range    Glucose 142 (H) 70 - 99 mg/dL   Glucose by meter   Result Value Ref Range    Glucose 161 (H) 70 - 99 mg/dL   Glucose by meter   Result Value Ref Range    Glucose 167 (H) 70 - 99 mg/dL   Glucose by meter   Result Value Ref Range    Glucose 203 (H) 70 - 99 mg/dL   Glucose by meter   Result Value Ref Range    Glucose 300 (H) 70 - 99 mg/dL   Glucose by meter   Result Value Ref Range    Glucose 184 (H) 70 - 99 mg/dL   Glucose by meter   Result Value Ref Range    Glucose 249 (H) 70 - 99 mg/dL   Glucose by meter   Result Value Ref Range    Glucose 216 (H) 70 - 99 mg/dL   Glucose by meter   Result Value Ref Range    Glucose 198 (H) 70 - 99 mg/dL   Glucose by meter   Result Value Ref Range    Glucose 192 (H) 70 - 99 mg/dL   Glucose by meter   Result Value Ref Range    Glucose 281 (H) 70 - 99 mg/dL   Glucose by meter   Result Value Ref Range    Glucose 294 (H) 70 - 99 mg/dL   Glucose by meter   Result Value Ref Range    Glucose 285 (H) 70 - 99 mg/dL   Glucose by meter   Result Value Ref Range    Glucose 255 (H) 70 - 99 mg/dL   Glucose by meter   Result Value Ref Range    Glucose 282 (H) 70 - 99 mg/dL   Glucose by meter   Result Value Ref Range    Glucose 231 (H) 70 - 99 mg/dL   Glucose by meter   Result Value Ref Range    Glucose 242 (H) 70 - 99 mg/dL   Glucose by meter   Result Value Ref Range    Glucose 286 (H) 70 - 99 mg/dL   Glucose by meter   Result Value Ref Range    Glucose 287 (H) 70 - 99 mg/dL   Glucose by meter   Result Value Ref Range    Glucose 285 (H) 70 - 99 mg/dL   Valproic acid   Result Value Ref Range    Valproic Acid Level 64 50 - 100 mg/L   Glucose by meter   Result Value Ref Range    Glucose 244 (H) 70 - 99 mg/dL   Glucose by meter   Result Value Ref Range    Glucose 273 (H) 70 - 99 mg/dL   Glucose by meter   Result Value Ref Range     Glucose 257 (H) 70 - 99 mg/dL   Glucose by meter   Result Value Ref Range    Glucose 206 (H) 70 - 99 mg/dL   Glucose by meter   Result Value Ref Range    Glucose 238 (H) 70 - 99 mg/dL   Glucose by meter   Result Value Ref Range    Glucose 224 (H) 70 - 99 mg/dL   Glucose by meter   Result Value Ref Range    Glucose 312 (H) 70 - 99 mg/dL   Glucose by meter   Result Value Ref Range    Glucose 276 (H) 70 - 99 mg/dL   Glucose by meter   Result Value Ref Range    Glucose 155 (H) 70 - 99 mg/dL   Glucose by meter   Result Value Ref Range    Glucose 199 (H) 70 - 99 mg/dL   Glucose by meter   Result Value Ref Range    Glucose 189 (H) 70 - 99 mg/dL   Glucose by meter   Result Value Ref Range    Glucose 184 (H) 70 - 99 mg/dL   Glucose by meter   Result Value Ref Range    Glucose 128 (H) 70 - 99 mg/dL   Hemoglobin A1c   Result Value Ref Range    Hemoglobin A1C 8.5 (H) 0 - 5.6 %   Estimated Average Glucose   Result Value Ref Range    Estimated Average Glucose 197 mg/dL   Wound Ostomy Continence Nurse IP Consult    Narrative    Smita Lizama RN     4/25/2019 11:11 AM  Spoke with OBDULIO Zavala regarding wound consult placed by Airam Light CNP. She evaluated pts feet and states there is no   open wound. Explained that her toenails are black. These were   evaluated by Dr. Shook last week. At this time I do not see a   need for a wound consult being there is no open area.               Plan:   Start lithium 300 mg today  BuSpar was decreased due to national shortage in case we do need to taper her.  Cbc, cmp and depakote level ordered for  [facility]  I will order hemoglobin A1c if it has not been done.    Contact hospitalist regarding her blood sugars if they do not stabilize.  Dr. Stinson has been adjusting her insulin.      ELOS: >5 days for stabilization and safe discharge planning

## 2019-04-30 NOTE — PROGRESS NOTES
"CLINICAL NUTRITION SERVICES  -  ASSESSMENT NOTE     Ginette Wood : follow up     39 yof admitted for schizophrenia. Pt has a hx of type 2 diabetes, hypercholesterolemia, hepatitis C, and is overweight. Most recent A1C 8.5 on 4/30/19, down a little since last A1C 8.7 on 3/12/19. Blood sugars yesterday ranged from 128 to 305. Noted hospitalist adjusting DM meds. Pt's intake has been adequate since last review. Pt possibly lost 6 lbs since last review, unsure if most recent weight was estimated or measured.     Diet Order: Consistent Carbohydrate  Intake: 100% of meals documented since last review     Height: 5' 4\"  Weight: 180lbs- 4/29/19  Body mass index is 31.94 kg/m .  Weight Status:  Obesity Grade I BMI 30-34.9  IBWR: 108-144lb  Weight History:  186lb- 4/20/19  172lb - 4/14/19  161lb- 4/6/19- admit  174lb - 3/12/19  169lb - 9/10/18  171lb - 8/14/18  185lb - 6/15/18  193lb - 3/22/18     Estimated Energy Needs: 1825 kcals (25 Kcal/Kg) - admit weight  Estimated Protein Needs: 60-75 grams protein (0.8-1 g pro/Kg)- admit weight     Malnutrition Diagnosis: None- If there was any degree of malnutrition it is improving/resolved      NUTRITION RECOMMENDATIONS  - Diet is appropriate  - Encourage activity   - Diabetes education as outpatient when mental status stabilizes     MONITORING AND EVALUATION:  RD will monitor as needed         "
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
## 2019-04-30 NOTE — PLAN OF CARE
Face to face end of shift report received from Luz Elena ZUNIGA RN. Rounding completed. Patient observed in the lounge.     Lorna Carrington  4/30/2019  5:10 PM

## 2019-04-30 NOTE — PLAN OF CARE
Face to face end of shift report received from Lorna. Rounding completed. Patient observed sleeping in prone position with regular and unlabored respirations.     Ayah Rodriguez  4/30/2019  12:15 AM

## 2019-04-30 NOTE — PLAN OF CARE
Face to face end of shift report received from Ayah LANGSTON RN. Rounding completed. Patient observed in [facility].    Luz Elena Chaudhary  4/30/2019  08:00

## 2019-05-01 VITALS
HEART RATE: 95 BPM | RESPIRATION RATE: 15 BRPM | SYSTOLIC BLOOD PRESSURE: 110 MMHG | TEMPERATURE: 98 F | OXYGEN SATURATION: 99 % | HEIGHT: 64 IN | BODY MASS INDEX: 31.77 KG/M2 | DIASTOLIC BLOOD PRESSURE: 58 MMHG | WEIGHT: 186.1 LBS

## 2019-05-01 LAB
GLUCOSE BLDC GLUCOMTR-MCNC: 130 MG/DL (ref 70–99)
GLUCOSE BLDC GLUCOMTR-MCNC: 193 MG/DL (ref 70–99)
GLUCOSE BLDC GLUCOMTR-MCNC: 200 MG/DL (ref 70–99)
GLUCOSE BLDC GLUCOMTR-MCNC: 252 MG/DL (ref 70–99)

## 2019-05-01 PROCEDURE — 25000132 ZZH RX MED GY IP 250 OP 250 PS 637: Performed by: NURSE PRACTITIONER

## 2019-05-01 PROCEDURE — 36415 COLL VENOUS BLD VENIPUNCTURE: CPT | Mod: ZL

## 2019-05-01 PROCEDURE — 99239 HOSP IP/OBS DSCHRG MGMT >30: CPT | Performed by: NURSE PRACTITIONER

## 2019-05-01 PROCEDURE — 82962 GLUCOSE BLOOD TEST: CPT | Mod: ZL

## 2019-05-01 PROCEDURE — 12400000 ZZH R&B MH

## 2019-05-01 PROCEDURE — 25000125 ZZHC RX 250: Performed by: INTERNAL MEDICINE

## 2019-05-01 RX ORDER — ATORVASTATIN CALCIUM 20 MG/1
20 TABLET, FILM COATED ORAL AT BEDTIME
Qty: 30 TABLET | Refills: 0 | Status: ON HOLD | OUTPATIENT
Start: 2019-05-01 | End: 2019-07-03

## 2019-05-01 RX ORDER — BUSPIRONE HYDROCHLORIDE 5 MG/1
5 TABLET ORAL 3 TIMES DAILY
Qty: 90 TABLET | Refills: 0 | Status: CANCELLED | OUTPATIENT
Start: 2019-05-01

## 2019-05-01 RX ORDER — HYDROXYZINE HYDROCHLORIDE 50 MG/1
50 TABLET, FILM COATED ORAL 3 TIMES DAILY PRN
Qty: 90 TABLET | Refills: 0 | Status: ON HOLD | OUTPATIENT
Start: 2019-05-01 | End: 2019-07-01

## 2019-05-01 RX ORDER — BENZTROPINE MESYLATE 1 MG/1
1 TABLET ORAL 2 TIMES DAILY
Qty: 60 TABLET | Refills: 0 | Status: ON HOLD | OUTPATIENT
Start: 2019-05-01 | End: 2019-07-03

## 2019-05-01 RX ORDER — PALIPERIDONE 9 MG/1
9 TABLET, EXTENDED RELEASE ORAL DAILY
Qty: 30 TABLET | Refills: 0 | Status: ON HOLD | OUTPATIENT
Start: 2019-05-02 | End: 2019-07-03

## 2019-05-01 RX ORDER — HYDROXYZINE HYDROCHLORIDE 50 MG/1
100 TABLET, FILM COATED ORAL AT BEDTIME
Qty: 60 TABLET | Refills: 0 | Status: ON HOLD | OUTPATIENT
Start: 2019-05-01 | End: 2019-07-01

## 2019-05-01 RX ORDER — CLONAZEPAM 0.5 MG/1
0.5 TABLET ORAL 2 TIMES DAILY
Qty: 60 TABLET | Refills: 0 | Status: ON HOLD | OUTPATIENT
Start: 2019-05-01 | End: 2019-07-01

## 2019-05-01 RX ORDER — BUSPIRONE HYDROCHLORIDE 5 MG/1
5 TABLET ORAL 3 TIMES DAILY
Status: DISCONTINUED | OUTPATIENT
Start: 2019-05-01 | End: 2019-05-02 | Stop reason: HOSPADM

## 2019-05-01 RX ORDER — PRAZOSIN HYDROCHLORIDE 1 MG/1
1 CAPSULE ORAL AT BEDTIME
Qty: 30 CAPSULE | Refills: 0 | Status: ON HOLD | OUTPATIENT
Start: 2019-05-01 | End: 2019-07-01

## 2019-05-01 RX ORDER — GABAPENTIN 400 MG/1
1200 CAPSULE ORAL 3 TIMES DAILY
Qty: 270 CAPSULE | Refills: 0 | Status: ON HOLD | OUTPATIENT
Start: 2019-05-01 | End: 2019-07-01

## 2019-05-01 RX ORDER — DIVALPROEX SODIUM 500 MG/1
2000 TABLET, EXTENDED RELEASE ORAL AT BEDTIME
Qty: 120 TABLET | Refills: 0 | Status: ON HOLD | OUTPATIENT
Start: 2019-05-01 | End: 2019-07-01

## 2019-05-01 RX ORDER — GLIPIZIDE 10 MG/1
10 TABLET ORAL
Qty: 30 TABLET | Refills: 0 | Status: ON HOLD | OUTPATIENT
Start: 2019-05-02 | End: 2019-06-21

## 2019-05-01 RX ORDER — CLONAZEPAM 0.5 MG/1
0.5 TABLET ORAL 2 TIMES DAILY
Status: DISCONTINUED | OUTPATIENT
Start: 2019-05-01 | End: 2019-05-02 | Stop reason: HOSPADM

## 2019-05-01 RX ORDER — BUSPIRONE HYDROCHLORIDE 5 MG/1
5 TABLET ORAL 3 TIMES DAILY
Qty: 90 TABLET | Refills: 0 | Status: ON HOLD | OUTPATIENT
Start: 2019-05-01 | End: 2019-07-01

## 2019-05-01 RX ADMIN — PRAZOSIN HYDROCHLORIDE 1 MG: 1 CAPSULE ORAL at 20:42

## 2019-05-01 RX ADMIN — CLONAZEPAM 0.5 MG: 0.5 TABLET ORAL at 20:42

## 2019-05-01 RX ADMIN — GABAPENTIN 1200 MG: 400 CAPSULE ORAL at 13:21

## 2019-05-01 RX ADMIN — BUSPIRONE HYDROCHLORIDE 7.5 MG: 7.5 TABLET ORAL at 08:39

## 2019-05-01 RX ADMIN — TRAMADOL HYDROCHLORIDE 50 MG: 50 TABLET, COATED ORAL at 18:36

## 2019-05-01 RX ADMIN — INSULIN ASPART 2 UNITS: 100 INJECTION, SOLUTION INTRAVENOUS; SUBCUTANEOUS at 12:03

## 2019-05-01 RX ADMIN — DIVALPROEX SODIUM 2000 MG: 500 TABLET, EXTENDED RELEASE ORAL at 20:41

## 2019-05-01 RX ADMIN — BUSPIRONE HYDROCHLORIDE 5 MG: 5 TABLET ORAL at 20:40

## 2019-05-01 RX ADMIN — GABAPENTIN 1200 MG: 400 CAPSULE ORAL at 08:39

## 2019-05-01 RX ADMIN — METAXALONE 800 MG: 800 TABLET ORAL at 08:40

## 2019-05-01 RX ADMIN — INSULIN ASPART 2 UNITS: 100 INJECTION, SOLUTION INTRAVENOUS; SUBCUTANEOUS at 16:45

## 2019-05-01 RX ADMIN — METAXALONE 800 MG: 800 TABLET ORAL at 20:42

## 2019-05-01 RX ADMIN — GABAPENTIN 1200 MG: 400 CAPSULE ORAL at 20:41

## 2019-05-01 RX ADMIN — METFORMIN HYDROCHLORIDE 1000 MG: 1000 TABLET ORAL at 16:47

## 2019-05-01 RX ADMIN — METFORMIN HYDROCHLORIDE 1000 MG: 1000 TABLET ORAL at 08:40

## 2019-05-01 RX ADMIN — ATORVASTATIN CALCIUM 20 MG: 20 TABLET, FILM COATED ORAL at 20:40

## 2019-05-01 RX ADMIN — HYDROXYZINE HYDROCHLORIDE 50 MG: 25 TABLET ORAL at 18:36

## 2019-05-01 RX ADMIN — HYDROXYZINE HYDROCHLORIDE 100 MG: 25 TABLET ORAL at 20:41

## 2019-05-01 RX ADMIN — ACETAMINOPHEN 975 MG: 325 TABLET, FILM COATED ORAL at 20:40

## 2019-05-01 RX ADMIN — BUSPIRONE HYDROCHLORIDE 5 MG: 5 TABLET ORAL at 13:21

## 2019-05-01 RX ADMIN — PALIPERIDONE 9 MG: 3 TABLET, EXTENDED RELEASE ORAL at 08:40

## 2019-05-01 RX ADMIN — GLIPIZIDE 10 MG: 5 TABLET ORAL at 08:39

## 2019-05-01 RX ADMIN — METAXALONE 800 MG: 800 TABLET ORAL at 13:21

## 2019-05-01 RX ADMIN — CLONAZEPAM 1 MG: 1 TABLET ORAL at 08:40

## 2019-05-01 RX ADMIN — ACETAMINOPHEN 975 MG: 325 TABLET, FILM COATED ORAL at 08:38

## 2019-05-01 RX ADMIN — ACETAMINOPHEN 975 MG: 325 TABLET, FILM COATED ORAL at 13:21

## 2019-05-01 RX ADMIN — BENZTROPINE MESYLATE 1 MG: 1 TABLET ORAL at 08:40

## 2019-05-01 RX ADMIN — INSULIN ASPART 3 UNITS: 100 INJECTION, SOLUTION INTRAVENOUS; SUBCUTANEOUS at 08:42

## 2019-05-01 RX ADMIN — BENZTROPINE MESYLATE 1 MG: 1 TABLET ORAL at 20:42

## 2019-05-01 ASSESSMENT — ACTIVITIES OF DAILY LIVING (ADL)
ORAL_HYGIENE: INDEPENDENT
DRESS: SCRUBS (BEHAVIORAL HEALTH);INDEPENDENT
LAUNDRY: UNABLE TO COMPLETE
HYGIENE/GROOMING: INDEPENDENT
DRESS: SCRUBS (BEHAVIORAL HEALTH);INDEPENDENT
HYGIENE/GROOMING: INDEPENDENT
ORAL_HYGIENE: INDEPENDENT

## 2019-05-01 NOTE — PLAN OF CARE
Face to face end of shift report received from Ayah MAK. Rounding completed. Patient observed.     Melissa Flowers  5/1/2019  7:54 AM
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
## 2019-05-01 NOTE — PLAN OF CARE
Face to face end of shift report received from OBDULIO Torres. Rounding completed. Patient observed sitting in lounge area.     Arlyn Lind  5/1/2019  3:49 PM
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
## 2019-05-01 NOTE — PLAN OF CARE
BEHAVIORAL TEAM DISCUSSION    Participants: Gokul Hathaway NP, Dennise Herrera LICSW, Angie Laws LSW,  Becca Chery LSW, Opal Pillai RN, Viridiana Foss RN, [facility], Mady Holman OT, Natalia Costello OT  Progress: Minimal  Continued Stay Criteria/Rationale: Delusional  Medical/Physical: Diabetes  Precautions:   Behavioral Orders   Procedures    Code 1 - Restrict to Unit    Routine Programming     As clinically indicated    Status 15     Every 15 minutes.     Plan: Patient voluntary, wishes to discharge today  Rationale for change in precautions or plan NA      Current Facility-Administered Medications:     acetaminophen (TYLENOL) tablet 975 mg, 975 mg, Oral, TID, Loren Zuleta NP, 975 mg at 05/01/19 0838    atorvastatin (LIPITOR) tablet 20 mg, 20 mg, Oral, At Bedtime, Loren Zuleta NP, 20 mg at 04/30/19 2048    benztropine (COGENTIN) tablet 1 mg, 1 mg, Oral, BID, Airam Light NP, 1 mg at 05/01/19 0840    benztropine (COGENTIN) tablet 1 mg, 1 mg, Oral, Once, Airam Light NP    busPIRone (BUSPAR) tablet 7.5 mg, 7.5 mg, Oral, TID, Airam Light NP, 7.5 mg at 05/01/19 0839    clonazePAM (klonoPIN) tablet 1 mg, 1 mg, Oral, BID, Airam Light NP, 1 mg at 05/01/19 0840    glucose gel 15-30 g, 15-30 g, Oral, Q15 Min PRN **OR** dextrose 50 % injection 25-50 mL, 25-50 mL, Intravenous, Q15 Min PRN **OR** glucagon injection 1 mg, 1 mg, Subcutaneous, Q15 Min PRN, Loren Zuleta NP    divalproex sodium extended-release (DEPAKOTE ER) 24 hr tablet 2,000 mg, 2,000 mg, Oral, At Bedtime, Airam Light NP, 2,000 mg at 04/30/19 2046    gabapentin (NEURONTIN) capsule 1,200 mg, 1,200 mg, Oral, TID, Airam Light NP, 1,200 mg at 05/01/19 0839    glipiZIDE (GLUCOTROL) tablet 10 mg, 10 mg, Oral, QAM AC, Airam Light NP, 10 mg at 05/01/19 0839    hydrOXYzine (ATARAX) tablet 100 mg, 100 mg, Oral, At Bedtime, Merlyn,  Loren VAZQUEZ NP, 100 mg at 04/30/19 2046    hydrOXYzine (ATARAX) tablet 50 mg, 50 mg, Oral, TID PRN, Loren Zuleta NP, 50 mg at 04/30/19 0303    insulin aspart (NovoLOG) inj (RAPID ACTING), 1-7 Units, Subcutaneous, TID AC, Loren Zuleta NP, 3 Units at 05/01/19 0842    insulin aspart (NovoLOG) inj (RAPID ACTING), 1-5 Units, Subcutaneous, At Bedtime, Loren Zuleta NP, 1 Units at 04/30/19 2048    insulin detemir (LEVEMIR PEN) injection 5 Units, 5 Units, Subcutaneous, BID, Baljit Stinson MD, 5 Units at 05/01/19 0842    lithium ER (LITHOBID) CR tablet 600 mg, 600 mg, Oral, At Bedtime, Airam Light, JACKIE    magnesium sulfate (EPSOM SALT) granules 1 g, 1 g, Topical, Daily PRN, Loren Zuleta NP, 1 g at 04/30/19 1321    medroxyPROGESTERone (DEPO-PROVERA) injection 150 mg, 150 mg, Intramuscular, Q90 Days, Airam Light NP, 150 mg at 04/16/19 1640    metaxalone (SKELAXIN) tablet 800 mg, 800 mg, Oral, TID, Loren Zuleta NP, 800 mg at 05/01/19 0840    metFORMIN (GLUCOPHAGE) tablet 1,000 mg, 1,000 mg, Oral, BID w/meals, Davi Owens MD, 1,000 mg at 05/01/19 0840    naloxone (NARCAN) injection 0.1-0.4 mg, 0.1-0.4 mg, Intravenous, Q2 Min PRN, Narcisa Solomon APRN CNP    paliperidone ER (INVEGA) 24 hr tablet 9 mg, 9 mg, Oral, Daily, Airam Light NP, 9 mg at 05/01/19 0840    prazosin (MINIPRESS) capsule 1 mg, 1 mg, Oral, At Bedtime, Airam Light NP, 1 mg at 04/30/19 2047    traMADol (ULTRAM) tablet 50 mg, 50 mg, Oral, TID PRN, Kuldeep, April JACKIE Rebollar, 50 mg at 04/30/19 2047      Active Problems:    Schizophrenia (H)

## 2019-05-01 NOTE — PROGRESS NOTES
Spoke with Pt today and was able to have a linear conversation for about 5mn before she became slightly delusional and was no longer able to stay on topic.

## 2019-05-01 NOTE — PLAN OF CARE
Problem: Adult Behavioral Health Plan of Care  Goal: Patient-Specific Goal (Individualization)  Description  Patient will attend at least 50% of groups while on unit.   Patient will report at least 6-8 hours of sleep at night.  Patient will be compliant with treatment team recommendations.    Patient will have a decrease in delusional statements by discharge.   Pt will use a walker when up to ambulate due to her unsteady gait.   5/1/2019 0058 by Ayah Rodriguez, RN  Note:   Pt has been in bed with eyes closed and regular respirations. 15 minute and PRN checks all night. No complaints offered. Will continue to monitor.    Pt had some c/o pain in her feet and anxiety and requested ultram and atarax. Due to her VS not meeting parameters pt agreed to use diversion therapy and deep breathing until her morning scheduled meds as she had no other PRNs available for pain as they are all currently scheduled.    Ayah Rodriguez  5/1/2019  12:58 AM
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
## 2019-05-01 NOTE — PROGRESS NOTES
[facility]  Psychiatric Progress Note      Impression:   This is a 39 year old female with a significant history of schizoaffective disorder.     Still a bit delusional but is able to follow and track a linear conversation a bit better. She is able to verbalize a reason to discontinue the lithium due to several family members having been on it in the past and having negative side effects including kidney issues. Genie does say she will be staying with her brother Pernell who is sober and will be her PCA as he has been in the past. Genie reports he lives in [place] and he is willing to let her stay with him for a short term until she can find housing in [place]. Genie reports she needs to get her ID and other financial matters taken care of.     Educated regarding medication indications, risks, benefits, side effects, contraindications and possible interactions. Verbally expressed understanding.        DIagnoses:     Schizoaffective disorder  Marijuana use disorder-in remission  Diabetes type 2    Attestation:  Patient has been seen and evaluated by me,  Loren Zuleta NP          Interim History:   The patient's care was discussed with the treatment team and chart notes were reviewed.          Medications:     Current Facility-Administered Medications Ordered in Epic   Medication Dose Route Frequency Last Rate Last Dose     acetaminophen (TYLENOL) tablet 975 mg  975 mg Oral TID   975 mg at 05/01/19 0838     atorvastatin (LIPITOR) tablet 20 mg  20 mg Oral At Bedtime   20 mg at 04/30/19 2048     benztropine (COGENTIN) tablet 1 mg  1 mg Oral BID   1 mg at 05/01/19 0840     benztropine (COGENTIN) tablet 1 mg  1 mg Oral Once         busPIRone (BUSPAR) tablet 5 mg  5 mg Oral TID         clonazePAM (klonoPIN) tablet 0.5 mg  0.5 mg Oral BID         glucose gel 15-30 g  15-30 g Oral Q15 Min PRN        Or     dextrose 50 % injection 25-50 mL  25-50 mL Intravenous Q15 Min PRN        Or     glucagon injection 1 mg   1 mg Subcutaneous Q15 Min PRN         divalproex sodium extended-release (DEPAKOTE ER) 24 hr tablet 2,000 mg  2,000 mg Oral At Bedtime   2,000 mg at 04/30/19 2046     gabapentin (NEURONTIN) capsule 1,200 mg  1,200 mg Oral TID   1,200 mg at 05/01/19 0839     glipiZIDE (GLUCOTROL) tablet 10 mg  10 mg Oral QAM AC   10 mg at 05/01/19 0839     hydrOXYzine (ATARAX) tablet 100 mg  100 mg Oral At Bedtime   100 mg at 04/30/19 2046     hydrOXYzine (ATARAX) tablet 50 mg  50 mg Oral TID PRN   50 mg at 04/30/19 0303     insulin aspart (NovoLOG) inj (RAPID ACTING)  1-7 Units Subcutaneous TID AC   2 Units at 05/01/19 1203     insulin aspart (NovoLOG) inj (RAPID ACTING)  1-5 Units Subcutaneous At Bedtime   1 Units at 04/30/19 2048     insulin detemir (LEVEMIR PEN) injection 5 Units  5 Units Subcutaneous BID   5 Units at 05/01/19 0842     magnesium sulfate (EPSOM SALT) granules 1 g  1 g Topical Daily PRN   1 g at 04/30/19 1321     medroxyPROGESTERone (DEPO-PROVERA) injection 150 mg  150 mg Intramuscular Q90 Days   150 mg at 04/16/19 1640     metaxalone (SKELAXIN) tablet 800 mg  800 mg Oral TID   800 mg at 05/01/19 0840     metFORMIN (GLUCOPHAGE) tablet 1,000 mg  1,000 mg Oral BID w/meals   1,000 mg at 05/01/19 0840     naloxone (NARCAN) injection 0.1-0.4 mg  0.1-0.4 mg Intravenous Q2 Min PRN         paliperidone ER (INVEGA) 24 hr tablet 9 mg  9 mg Oral Daily   9 mg at 05/01/19 0840     prazosin (MINIPRESS) capsule 1 mg  1 mg Oral At Bedtime   1 mg at 04/30/19 2047     traMADol (ULTRAM) tablet 50 mg  50 mg Oral TID PRN   50 mg at 04/30/19 2047     Current Outpatient Medications Ordered in Epic   Medication     atorvastatin (LIPITOR) 20 MG tablet     benztropine (COGENTIN) 1 MG tablet     busPIRone (BUSPAR) 5 MG tablet     clonazePAM (KLONOPIN) 0.5 MG tablet     divalproex sodium extended-release (DEPAKOTE ER) 500 MG 24 hr tablet     gabapentin (NEURONTIN) 400 MG capsule     [START ON 5/2/2019] glipiZIDE (GLUCOTROL) 10 MG tablet      "hydrOXYzine (ATARAX) 50 MG tablet     hydrOXYzine (ATARAX) 50 MG tablet     metFORMIN (GLUCOPHAGE) 1000 MG tablet     [START ON 5/2/2019] paliperidone (INVEGA) 9 MG 24 hr tablet     prazosin (MINIPRESS) 1 MG capsule     busPIRone (BUSPAR) 5 MG tablet              10 point ROS negative C/O back pain ad neuropathic pain.        Allergies:     Allergies   Allergen Reactions     Haloperidol Other (See Comments)     Other reaction(s): Seizures  Patient states, \"I fell down and wasn't able to get up.\"  Patient states, \"I fell down and wasn't able to get up.\"              Psychiatric Examination:   BP 98/58   Pulse 82   Temp 98.1  F (36.7  C) (Tympanic)   Resp 14   Ht 1.626 m (5' 4\")   Wt 84.4 kg (186 lb 1.6 oz)   SpO2 98%   BMI 31.94 kg/m    Weight is 186 lbs 1.6 oz  Body mass index is 31.94 kg/m .    Appearance:  awake, alert, adequately groomed and dressed in hospital scrubs  Attitude:  cooperative  Eye Contact:  good  Mood:  anxious  Affect:  mood congruent  Speech: pressurred still.   Psychomotor Behavior:  no evidence of tardive dyskinesia, dystonia, or tics and intact station, gait and muscle tone  Thought Process:  Slightly less disorganized  Associations: is still disorganized though a bit improved  Thought Content:  no evidence of suicidal ideation or homicidal ideation and visual hallucinations present  Insight:  fair  Judgment:  fair  Oriented to:  time, person, and place  Attention Span and Concentration:  fair  Recent and Remote Memory:  fair  Fund of Knowledge: low-normal  Muscle Strength and Tone: normal  Gait and Station: Normal           Labs:     Results for orders placed or performed during the hospital encounter of 04/06/19   XR Foot Left G/E 3 Views    Narrative    EXAM:XR FOOT LT G/E 3 VW     CLINICAL HISTORY: Patient Age  39 years Additional clinical info:  Inpatient - left 2nd toe pain and subungual hematoma. Please evaluate  underlying bone     COMPARISON: None      TECHNIQUE: 3 views      " Impression    IMPRESSION: Degenerative arthritis in the first TMT and MTP joints.  Deformity of the neck of fifth metatarsal likely due to old trauma.  Tiny plantar and Achilles calcaneal spurs. Left foot otherwise normal.    GRIS ABARCA MD   XR Foot Right G/E 3 Views    Narrative    EXAM:XR FOOT RT G/E 3 VW     CLINICAL HISTORY: Patient Age  39 years Additional clinical info:  Inpatient - painful Rt 2nd toe with subungual hematoma and painful  DIPJ. Please evaluate underlying bone     COMPARISON: None      TECHNIQUE: 3 views      Impression    IMPRESSION: Mild degenerative arthritis in the first TMT joint. Small  plantar and Achilles calcaneal spurs. Right foot otherwise normal.    GRIS ABARCA MD   Glucose by meter   Result Value Ref Range    Glucose 305 (H) 70 - 99 mg/dL   Glucose by meter   Result Value Ref Range    Glucose 341 (H) 70 - 99 mg/dL   Glucose by meter   Result Value Ref Range    Glucose 193 (H) 70 - 99 mg/dL   Glucose by meter   Result Value Ref Range    Glucose 168 (H) 70 - 99 mg/dL   Glucose by meter   Result Value Ref Range    Glucose 249 (H) 70 - 99 mg/dL   Glucose by meter   Result Value Ref Range    Glucose 227 (H) 70 - 99 mg/dL   Glucose by meter   Result Value Ref Range    Glucose 242 (H) 70 - 99 mg/dL   Glucose by meter   Result Value Ref Range    Glucose 125 (H) 70 - 99 mg/dL   Glucose by meter   Result Value Ref Range    Glucose 105 (H) 70 - 99 mg/dL   Glucose by meter   Result Value Ref Range    Glucose 255 (H) 70 - 99 mg/dL   Glucose by meter   Result Value Ref Range    Glucose 169 (H) 70 - 99 mg/dL   Glucose by meter   Result Value Ref Range    Glucose 195 (H) 70 - 99 mg/dL   Glucose by meter   Result Value Ref Range    Glucose 121 (H) 70 - 99 mg/dL   Glucose by meter   Result Value Ref Range    Glucose 84 70 - 99 mg/dL   Glucose by meter   Result Value Ref Range    Glucose 130 (H) 70 - 99 mg/dL   Glucose by meter   Result Value Ref Range    Glucose 161 (H) 70 - 99 mg/dL   Glucose  by meter   Result Value Ref Range    Glucose 181 (H) 70 - 99 mg/dL   Glucose by meter   Result Value Ref Range    Glucose 181 (H) 70 - 99 mg/dL   Glucose by meter   Result Value Ref Range    Glucose 195 (H) 70 - 99 mg/dL   Glucose by meter   Result Value Ref Range    Glucose 100 (H) 70 - 99 mg/dL   Glucose by meter   Result Value Ref Range    Glucose 202 (H) 70 - 99 mg/dL   Glucose by meter   Result Value Ref Range    Glucose 114 (H) 70 - 99 mg/dL   Glucose by meter   Result Value Ref Range    Glucose 132 (H) 70 - 99 mg/dL   Glucose by meter   Result Value Ref Range    Glucose 104 (H) 70 - 99 mg/dL   Glucose by meter   Result Value Ref Range    Glucose 91 70 - 99 mg/dL   Glucose by meter   Result Value Ref Range    Glucose 204 (H) 70 - 99 mg/dL   Glucose by meter   Result Value Ref Range    Glucose 87 70 - 99 mg/dL   Glucose by meter   Result Value Ref Range    Glucose 151 (H) 70 - 99 mg/dL   Glucose by meter   Result Value Ref Range    Glucose 98 70 - 99 mg/dL   Glucose by meter   Result Value Ref Range    Glucose 180 (H) 70 - 99 mg/dL   Glucose by meter   Result Value Ref Range    Glucose 126 (H) 70 - 99 mg/dL   Glucose by meter   Result Value Ref Range    Glucose 137 (H) 70 - 99 mg/dL   Glucose by meter   Result Value Ref Range    Glucose 115 (H) 70 - 99 mg/dL   Glucose by meter   Result Value Ref Range    Glucose 108 (H) 70 - 99 mg/dL   Glucose by meter   Result Value Ref Range    Glucose 189 (H) 70 - 99 mg/dL   Glucose by meter   Result Value Ref Range    Glucose 97 70 - 99 mg/dL   Glucose by meter   Result Value Ref Range    Glucose 123 (H) 70 - 99 mg/dL   Glucose by meter   Result Value Ref Range    Glucose 240 (H) 70 - 99 mg/dL   Glucose by meter   Result Value Ref Range    Glucose 134 (H) 70 - 99 mg/dL   Glucose by meter   Result Value Ref Range    Glucose 173 (H) 70 - 99 mg/dL   Glucose by meter   Result Value Ref Range    Glucose 100 (H) 70 - 99 mg/dL   Glucose by meter   Result Value Ref Range    Glucose  115 (H) 70 - 99 mg/dL   HCG qualitative urine   Result Value Ref Range    HCG Qual Urine Negative NEG^Negative   Glucose by meter   Result Value Ref Range    Glucose 197 (H) 70 - 99 mg/dL   Glucose by meter   Result Value Ref Range    Glucose 143 (H) 70 - 99 mg/dL   Glucose by meter   Result Value Ref Range    Glucose 230 (H) 70 - 99 mg/dL   Glucose by meter   Result Value Ref Range    Glucose 151 (H) 70 - 99 mg/dL   Glucose by meter   Result Value Ref Range    Glucose 167 (H) 70 - 99 mg/dL   Glucose by meter   Result Value Ref Range    Glucose 206 (H) 70 - 99 mg/dL   Glucose by meter   Result Value Ref Range    Glucose 222 (H) 70 - 99 mg/dL   Glucose by meter   Result Value Ref Range    Glucose 225 (H) 70 - 99 mg/dL   Glucose by meter   Result Value Ref Range    Glucose 250 (H) 70 - 99 mg/dL   Glucose by meter   Result Value Ref Range    Glucose 280 (H) 70 - 99 mg/dL   Glucose by meter   Result Value Ref Range    Glucose 245 (H) 70 - 99 mg/dL   Valproic acid   Result Value Ref Range    Valproic Acid Level 45 (L) 50 - 100 mg/L   CBC with platelets differential   Result Value Ref Range    WBC 7.8 4.0 - 11.0 10e9/L    RBC Count 4.05 3.8 - 5.2 10e12/L    Hemoglobin 11.7 11.7 - 15.7 g/dL    Hematocrit 35.1 35.0 - 47.0 %    MCV 87 78 - 100 fl    MCH 28.9 26.5 - 33.0 pg    MCHC 33.3 31.5 - 36.5 g/dL    RDW 13.2 10.0 - 15.0 %    Platelet Count 247 150 - 450 10e9/L    Diff Method Automated Method     % Neutrophils 55.5 %    % Lymphocytes 34.8 %    % Monocytes 7.7 %    % Eosinophils 0.9 %    % Basophils 0.3 %    % Immature Granulocytes 0.8 %    Nucleated RBCs 0 0 /100    Absolute Neutrophil 4.4 1.6 - 8.3 10e9/L    Absolute Lymphocytes 2.7 0.8 - 5.3 10e9/L    Absolute Monocytes 0.6 0.0 - 1.3 10e9/L    Absolute Eosinophils 0.1 0.0 - 0.7 10e9/L    Absolute Basophils 0.0 0.0 - 0.2 10e9/L    Abs Immature Granulocytes 0.1 0 - 0.4 10e9/L    Absolute Nucleated RBC 0.0    Comprehensive metabolic panel   Result Value Ref Range     Sodium 140 133 - 144 mmol/L    Potassium 4.5 3.4 - 5.3 mmol/L    Chloride 108 94 - 109 mmol/L    Carbon Dioxide 25 20 - 32 mmol/L    Anion Gap 7 3 - 14 mmol/L    Glucose 289 (H) 70 - 99 mg/dL    Urea Nitrogen 14 7 - 30 mg/dL    Creatinine 0.53 0.52 - 1.04 mg/dL    GFR Estimate >90 >60 mL/min/[1.73_m2]    GFR Estimate If Black >90 >60 mL/min/[1.73_m2]    Calcium 9.2 8.5 - 10.1 mg/dL    Bilirubin Total 0.1 (L) 0.2 - 1.3 mg/dL    Albumin 3.5 3.4 - 5.0 g/dL    Protein Total 7.0 6.8 - 8.8 g/dL    Alkaline Phosphatase 60 40 - 150 U/L    ALT 17 0 - 50 U/L    AST 7 0 - 45 U/L   Glucose by meter   Result Value Ref Range    Glucose 142 (H) 70 - 99 mg/dL   Glucose by meter   Result Value Ref Range    Glucose 161 (H) 70 - 99 mg/dL   Glucose by meter   Result Value Ref Range    Glucose 167 (H) 70 - 99 mg/dL   Glucose by meter   Result Value Ref Range    Glucose 203 (H) 70 - 99 mg/dL   Glucose by meter   Result Value Ref Range    Glucose 300 (H) 70 - 99 mg/dL   Glucose by meter   Result Value Ref Range    Glucose 184 (H) 70 - 99 mg/dL   Glucose by meter   Result Value Ref Range    Glucose 249 (H) 70 - 99 mg/dL   Glucose by meter   Result Value Ref Range    Glucose 216 (H) 70 - 99 mg/dL   Glucose by meter   Result Value Ref Range    Glucose 198 (H) 70 - 99 mg/dL   Glucose by meter   Result Value Ref Range    Glucose 192 (H) 70 - 99 mg/dL   Glucose by meter   Result Value Ref Range    Glucose 281 (H) 70 - 99 mg/dL   Glucose by meter   Result Value Ref Range    Glucose 294 (H) 70 - 99 mg/dL   Glucose by meter   Result Value Ref Range    Glucose 285 (H) 70 - 99 mg/dL   Glucose by meter   Result Value Ref Range    Glucose 255 (H) 70 - 99 mg/dL   Glucose by meter   Result Value Ref Range    Glucose 282 (H) 70 - 99 mg/dL   Glucose by meter   Result Value Ref Range    Glucose 231 (H) 70 - 99 mg/dL   Glucose by meter   Result Value Ref Range    Glucose 242 (H) 70 - 99 mg/dL   Glucose by meter   Result Value Ref Range    Glucose 286 (H) 70  - 99 mg/dL   Glucose by meter   Result Value Ref Range    Glucose 287 (H) 70 - 99 mg/dL   Glucose by meter   Result Value Ref Range    Glucose 285 (H) 70 - 99 mg/dL   Valproic acid   Result Value Ref Range    Valproic Acid Level 64 50 - 100 mg/L   Glucose by meter   Result Value Ref Range    Glucose 244 (H) 70 - 99 mg/dL   Glucose by meter   Result Value Ref Range    Glucose 273 (H) 70 - 99 mg/dL   Glucose by meter   Result Value Ref Range    Glucose 257 (H) 70 - 99 mg/dL   Glucose by meter   Result Value Ref Range    Glucose 206 (H) 70 - 99 mg/dL   Glucose by meter   Result Value Ref Range    Glucose 238 (H) 70 - 99 mg/dL   Glucose by meter   Result Value Ref Range    Glucose 224 (H) 70 - 99 mg/dL   Glucose by meter   Result Value Ref Range    Glucose 312 (H) 70 - 99 mg/dL   Glucose by meter   Result Value Ref Range    Glucose 276 (H) 70 - 99 mg/dL   Glucose by meter   Result Value Ref Range    Glucose 155 (H) 70 - 99 mg/dL   Glucose by meter   Result Value Ref Range    Glucose 199 (H) 70 - 99 mg/dL   Glucose by meter   Result Value Ref Range    Glucose 189 (H) 70 - 99 mg/dL   Glucose by meter   Result Value Ref Range    Glucose 184 (H) 70 - 99 mg/dL   Glucose by meter   Result Value Ref Range    Glucose 128 (H) 70 - 99 mg/dL   Hemoglobin A1c   Result Value Ref Range    Hemoglobin A1C 8.5 (H) 0 - 5.6 %   Estimated Average Glucose   Result Value Ref Range    Estimated Average Glucose 197 mg/dL   Valproic acid   Result Value Ref Range    Valproic Acid Level 55 50 - 100 mg/L   CBC with platelets differential   Result Value Ref Range    WBC 7.6 4.0 - 11.0 10e9/L    RBC Count 4.17 3.8 - 5.2 10e12/L    Hemoglobin 12.0 11.7 - 15.7 g/dL    Hematocrit 36.5 35.0 - 47.0 %    MCV 88 78 - 100 fl    MCH 28.8 26.5 - 33.0 pg    MCHC 32.9 31.5 - 36.5 g/dL    RDW 13.2 10.0 - 15.0 %    Platelet Count 258 150 - 450 10e9/L    Diff Method Automated Method     % Neutrophils 54.5 %    % Lymphocytes 35.6 %    % Monocytes 7.3 %    %  Eosinophils 1.1 %    % Basophils 0.4 %    % Immature Granulocytes 1.1 %    Nucleated RBCs 0 0 /100    Absolute Neutrophil 4.1 1.6 - 8.3 10e9/L    Absolute Lymphocytes 2.7 0.8 - 5.3 10e9/L    Absolute Monocytes 0.6 0.0 - 1.3 10e9/L    Absolute Eosinophils 0.1 0.0 - 0.7 10e9/L    Absolute Basophils 0.0 0.0 - 0.2 10e9/L    Abs Immature Granulocytes 0.1 0 - 0.4 10e9/L    Absolute Nucleated RBC 0.0    Comprehensive metabolic panel   Result Value Ref Range    Sodium 139 133 - 144 mmol/L    Potassium 4.3 3.4 - 5.3 mmol/L    Chloride 105 94 - 109 mmol/L    Carbon Dioxide 26 20 - 32 mmol/L    Anion Gap 8 3 - 14 mmol/L    Glucose 196 (H) 70 - 99 mg/dL    Urea Nitrogen 14 7 - 30 mg/dL    Creatinine 0.49 (L) 0.52 - 1.04 mg/dL    GFR Estimate >90 >60 mL/min/[1.73_m2]    GFR Estimate If Black >90 >60 mL/min/[1.73_m2]    Calcium 8.7 8.5 - 10.1 mg/dL    Bilirubin Total 0.1 (L) 0.2 - 1.3 mg/dL    Albumin 3.7 3.4 - 5.0 g/dL    Protein Total 7.5 6.8 - 8.8 g/dL    Alkaline Phosphatase 56 40 - 150 U/L    ALT 16 0 - 50 U/L    AST 6 0 - 45 U/L     *Note: Due to a large number of results and/or encounters for the requested time period, some results have not been displayed. A complete set of results can be found in Results Review.              Plan:   Decrease buspar to 5 mg three times a day  Discontinue lithium    Contact hospitalist regarding her blood sugars if they do not stabilize.  Dr. Stinson has been adjusting her insulin.      ELOS: >5 days for stabilization and safe discharge planning

## 2019-05-01 NOTE — PLAN OF CARE
Face to face end of shift report received from Lorna. Rounding completed. Patient observed in the hallway with request for regular gum, which was provided and she returned to bed.     Ayah Rodriguez  5/1/2019  12:54 AM

## 2019-05-01 NOTE — PLAN OF CARE
Problem: Adult Behavioral Health Plan of Care  Goal: Patient-Specific Goal (Individualization)  Description  Patient will attend at least 50% of groups while on unit.   Patient will report at least 6-8 hours of sleep at night.  Patient will be compliant with treatment team recommendations.    Patient will have a decrease in delusional statements by discharge.   Pt will use a walker when up to ambulate due to her unsteady gait.   5/1/2019 0913 by Melissa Flowers RN  Outcome: No Change   Pt up and out in lobby for meals. Pt attends groups. Pt compliant with treatment team recommendations and medications. Pt continues with delusional statements. Pt talking about how Prince Smith and her have a relationship but she feels he is jealous of her and will eventually have her murdered. Pt says her father is the richest man in [place] and sent her to school for fencing and tree climbing. Pt says she is getting a large sum of money today and wants to send it to her family to help them out since they are struggling. Pt has not had any falls this shift. Pt is steady but slow on feet.   Pt discusses wanting to discharge today, trying to set up a ride with family or [place] transportation. Pt reports [place] transport is unable to pick her up until Friday. Pt continues to try and get in contact with a family member who would be able to come and pick her up. Phone call received from pt relative Collette, states she gets off work at 3 pm and will be here to get pt around 6 pm, provider updated.       Problem: Thought Process Alteration  Goal: Optimal Thought Clarity  Description  Patient will hold a reality based conversation prior to discharge   Outcome: No Change   Pt still delusional and not having reality based conversations.

## 2019-05-02 NOTE — PLAN OF CARE
Problem: Adult Behavioral Health Plan of Care  Goal: Patient-Specific Goal (Individualization)  Description  Patient will attend at least 50% of groups while on unit.   Patient will report at least 6-8 hours of sleep at night.  Patient will be compliant with treatment team recommendations.    Patient will have a decrease in delusional statements by discharge.   Pt will use a walker when up to ambulate due to her unsteady gait.   5/2/2019 0043 by Ayah Rodriguez, RN  Note:   Discharge Note    Patient Discharged to home on 5/2/2019 12:44 AM via Private Car accompanied by [facility] and 1 [employer] security at ER entrance.     Patient informed of discharge instructions in AVS. patient verbalizes understanding and denies having any questions pertaining to AVS. Patient stable at time of discharge. Patient denies SI, HI, and thoughts of self harm at time of discharge. All personal belongings returned to patient. Discharge prescriptions sent to Pt [address] [place] [id number] ( ride showed up after midnight after meds had been returned to pharmacy due to ride initially being a no call no show) via electronic communication. Psych evaluation, history and physical, AVS, and discharge summary faxed to next level of care- Pt follow up services scheduled by .     Pt was initially supposed to leave on day shift 05/01/19. Admitting called up to the unit at approx 1205 AM stating pt ride had just arrived stating they had car trouble. Pt meds had already been sent back down to the pharmacy due to her ride not showing up or calling to let us know that they would be arriving late. Meds from med room including pt epsom salt for foot soaks and her insulin pens were sent home with the pt in her belongings. Writer called provider and she updated discharge for today and it was decided we can and will have to mail pt her meds which pt is agreeable to. Pt provided her family [address] [place] [id number]. Writer will give this info to charge  to contact pharmacy in the morning to have her meds mailed to her. Pt denies suicidality and access to guns. Pt stated she has positive supports in her mother, sisters, and kids/grandkids, which are also who she relies on for coping. Pt also stated she uses smoking for coping. Pt is excited to be leaving and voiced being hopeful for the future and happy because she has another grand child due to be born.     Ayah Rodriguez  5/2/2019  12:36 AM

## 2019-05-02 NOTE — PROGRESS NOTES
05/01/19 5103   Patient Belongings   Did you bring any home meds/supplements to the hospital?  Yes  (meds given to nurse)   Disposition of meds  Other (see comment)  (meds given to nurse)   Patient Belongings locker   Patient Belongings Put in Hospital Secure Location (Security or Locker, etc.) shoes;clothing   Belongings Search Yes   Clothing Search Yes   Second Staff Phuong STUART   Comment blue sandles, pink scarf, brown pants, bag of tobacco & cig tubes, 79 cents, tan pants, pink pants, grey sweater, green shirt   List items sent to safe: N/A  All other belongings put in assigned cubby in belongings room.       I have reviewed my belongings list on admission and verify that it is correct.     Patient signature_______________________________    Second staff witness (if patient unable to sign) ______________________________       I have received all my belongings at discharge.    Patient signature________________________________    Karen Odell  5/1/2019  10:14 PM

## 2019-05-02 NOTE — PLAN OF CARE
Face to face end of shift report received from Arlyn Hernández completed. Patient observed sleeping in prone position with regular and unlabored respirations.     Ayah Rodriguez  5/2/2019  12:00 AM

## 2019-05-07 LAB
GLUCOSE BLDC GLUCOMTR-MCNC: 214 MG/DL (ref 70–99)
GLUCOSE BLDC GLUCOMTR-MCNC: 267 MG/DL (ref 70–99)
GLUCOSE BLDC GLUCOMTR-MCNC: 295 MG/DL (ref 70–99)

## 2019-05-10 ENCOUNTER — HOSPITAL ENCOUNTER (INPATIENT)
Facility: HOSPITAL | Age: 39
LOS: 54 days | Discharge: NURSING, REHAB OR LONG-TERM CARE | DRG: 885 | End: 2019-07-03
Attending: PSYCHIATRY & NEUROLOGY | Admitting: PSYCHIATRY & NEUROLOGY
Payer: COMMERCIAL

## 2019-05-10 ENCOUNTER — TRANSFERRED RECORDS (OUTPATIENT)
Dept: HEALTH INFORMATION MANAGEMENT | Facility: CLINIC | Age: 39
End: 2019-05-10

## 2019-05-10 DIAGNOSIS — F25.0 SCHIZOAFFECTIVE DISORDER, BIPOLAR TYPE (H): ICD-10-CM

## 2019-05-10 DIAGNOSIS — E11.22 TYPE 2 DIABETES MELLITUS WITH DIABETIC CHRONIC KIDNEY DISEASE, UNSPECIFIED CKD STAGE, UNSPECIFIED WHETHER LONG TERM INSULIN USE (H): Primary | Chronic | ICD-10-CM

## 2019-05-10 DIAGNOSIS — F20.1 DISORGANIZED SCHIZOPHRENIA (H): ICD-10-CM

## 2019-05-10 DIAGNOSIS — H66.90 ACUTE OTITIS MEDIA, UNSPECIFIED OTITIS MEDIA TYPE: ICD-10-CM

## 2019-05-10 PROBLEM — F29 PSYCHOSIS (H): Status: ACTIVE | Noted: 2019-05-10

## 2019-05-10 LAB
ALT SERPL-CCNC: 11 U/L (ref 9–52)
AST SERPL-CCNC: 23 U/L (ref 14–36)
CREAT SERPL-MCNC: 0.4 MG/DL (ref 0.5–1)
GFR SERPL CREATININE-BSD FRML MDRD: >60 ML/MIN/1.73M2
GLUCOSE BLDC GLUCOMTR-MCNC: 352 MG/DL (ref 70–99)
GLUCOSE SERPL-MCNC: 278 MG/DL (ref 65–100)
POTASSIUM SERPL-SCNC: 3.7 MEQ/L (ref 3.6–5)

## 2019-05-10 PROCEDURE — 12400000 ZZH R&B MH

## 2019-05-10 PROCEDURE — 25000132 ZZH RX MED GY IP 250 OP 250 PS 637: Performed by: NURSE PRACTITIONER

## 2019-05-10 PROCEDURE — 00000146 ZZHCL STATISTIC GLUCOSE BY METER IP

## 2019-05-10 RX ORDER — BENZTROPINE MESYLATE 1 MG/1
1 TABLET ORAL 2 TIMES DAILY
Status: DISCONTINUED | OUTPATIENT
Start: 2019-05-11 | End: 2019-05-16

## 2019-05-10 RX ORDER — NICOTINE POLACRILEX 4 MG
15-30 LOZENGE BUCCAL
Status: DISCONTINUED | OUTPATIENT
Start: 2019-05-10 | End: 2019-05-27

## 2019-05-10 RX ORDER — BUSPIRONE HYDROCHLORIDE 5 MG/1
5 TABLET ORAL 3 TIMES DAILY
Status: DISCONTINUED | OUTPATIENT
Start: 2019-05-11 | End: 2019-05-14 | Stop reason: ALTCHOICE

## 2019-05-10 RX ORDER — NICOTINE POLACRILEX 4 MG
15-30 LOZENGE BUCCAL
Status: DISCONTINUED | OUTPATIENT
Start: 2019-05-10 | End: 2019-07-03 | Stop reason: HOSPADM

## 2019-05-10 RX ORDER — DIVALPROEX SODIUM 500 MG/1
2000 TABLET, EXTENDED RELEASE ORAL AT BEDTIME
Status: DISCONTINUED | OUTPATIENT
Start: 2019-05-11 | End: 2019-05-20

## 2019-05-10 RX ORDER — CLONAZEPAM 0.5 MG/1
0.5 TABLET ORAL 2 TIMES DAILY
Status: DISCONTINUED | OUTPATIENT
Start: 2019-05-11 | End: 2019-05-15

## 2019-05-10 RX ORDER — DEXTROSE MONOHYDRATE 25 G/50ML
25-50 INJECTION, SOLUTION INTRAVENOUS
Status: DISCONTINUED | OUTPATIENT
Start: 2019-05-10 | End: 2019-05-27

## 2019-05-10 RX ORDER — OLANZAPINE 10 MG/2ML
10 INJECTION, POWDER, FOR SOLUTION INTRAMUSCULAR 3 TIMES DAILY PRN
Status: DISCONTINUED | OUTPATIENT
Start: 2019-05-10 | End: 2019-05-19

## 2019-05-10 RX ORDER — ACETAMINOPHEN 325 MG/1
325-650 TABLET ORAL EVERY 6 HOURS PRN
Status: DISCONTINUED | OUTPATIENT
Start: 2019-05-10 | End: 2019-05-11

## 2019-05-10 RX ORDER — PRAZOSIN HYDROCHLORIDE 1 MG/1
1 CAPSULE ORAL AT BEDTIME
Status: DISCONTINUED | OUTPATIENT
Start: 2019-05-11 | End: 2019-07-03 | Stop reason: HOSPADM

## 2019-05-10 RX ORDER — OLANZAPINE 10 MG/1
10 TABLET ORAL 3 TIMES DAILY PRN
Status: DISCONTINUED | OUTPATIENT
Start: 2019-05-10 | End: 2019-05-19

## 2019-05-10 RX ORDER — GABAPENTIN 400 MG/1
1200 CAPSULE ORAL 3 TIMES DAILY
Status: DISCONTINUED | OUTPATIENT
Start: 2019-05-11 | End: 2019-05-16

## 2019-05-10 RX ORDER — GLIPIZIDE 5 MG/1
10 TABLET ORAL
Status: DISCONTINUED | OUTPATIENT
Start: 2019-05-11 | End: 2019-05-11

## 2019-05-10 RX ORDER — DEXTROSE MONOHYDRATE 25 G/50ML
25-50 INJECTION, SOLUTION INTRAVENOUS
Status: DISCONTINUED | OUTPATIENT
Start: 2019-05-10 | End: 2019-07-03 | Stop reason: HOSPADM

## 2019-05-10 RX ORDER — ATORVASTATIN CALCIUM 20 MG/1
20 TABLET, FILM COATED ORAL AT BEDTIME
Status: DISCONTINUED | OUTPATIENT
Start: 2019-05-11 | End: 2019-06-08

## 2019-05-10 RX ORDER — HYDROXYZINE HYDROCHLORIDE 25 MG/1
25-50 TABLET, FILM COATED ORAL EVERY 4 HOURS PRN
Status: DISCONTINUED | OUTPATIENT
Start: 2019-05-10 | End: 2019-05-19

## 2019-05-10 RX ADMIN — HYDROXYZINE HYDROCHLORIDE 50 MG: 25 TABLET ORAL at 23:18

## 2019-05-10 RX ADMIN — OLANZAPINE 10 MG: 10 TABLET, FILM COATED ORAL at 23:18

## 2019-05-10 ASSESSMENT — ACTIVITIES OF DAILY LIVING (ADL)
FALL_HISTORY_WITHIN_LAST_SIX_MONTHS: NO
BATHING: 0-->INDEPENDENT
TOILETING: 0-->INDEPENDENT
RETIRED_EATING: 0-->INDEPENDENT
COGNITION: 0 - NO COGNITION ISSUES REPORTED
TRANSFERRING: 0-->INDEPENDENT
RETIRED_COMMUNICATION: 0-->UNDERSTANDS/COMMUNICATES WITHOUT DIFFICULTY
DRESS: 0-->INDEPENDENT
SWALLOWING: 0-->SWALLOWS FOODS/LIQUIDS WITHOUT DIFFICULTY
AMBULATION: 0-->INDEPENDENT

## 2019-05-10 ASSESSMENT — MIFFLIN-ST. JEOR: SCORE: 1425.21

## 2019-05-11 PROBLEM — F33.3 MAJOR DEPRESSIVE DISORDER, RECURRENT EPISODE, SEVERE WITH MOOD-CONGRUENT PSYCHOTIC FEATURES (H): Status: ACTIVE | Noted: 2019-05-11

## 2019-05-11 LAB
AMMONIA PLAS-SCNC: 39 UMOL/L (ref 10–50)
GLUCOSE BLDC GLUCOMTR-MCNC: 276 MG/DL (ref 70–99)
GLUCOSE BLDC GLUCOMTR-MCNC: 298 MG/DL (ref 70–99)
GLUCOSE BLDC GLUCOMTR-MCNC: 308 MG/DL (ref 70–99)
GLUCOSE BLDC GLUCOMTR-MCNC: 391 MG/DL (ref 70–99)

## 2019-05-11 PROCEDURE — 99232 SBSQ HOSP IP/OBS MODERATE 35: CPT | Performed by: NURSE PRACTITIONER

## 2019-05-11 PROCEDURE — 25000132 ZZH RX MED GY IP 250 OP 250 PS 637: Performed by: NURSE PRACTITIONER

## 2019-05-11 PROCEDURE — 00000146 ZZHCL STATISTIC GLUCOSE BY METER IP

## 2019-05-11 PROCEDURE — 25000131 ZZH RX MED GY IP 250 OP 636 PS 637

## 2019-05-11 PROCEDURE — 12400000 ZZH R&B MH

## 2019-05-11 PROCEDURE — 82140 ASSAY OF AMMONIA: CPT | Performed by: NURSE PRACTITIONER

## 2019-05-11 PROCEDURE — 25000131 ZZH RX MED GY IP 250 OP 636 PS 637: Performed by: NURSE PRACTITIONER

## 2019-05-11 PROCEDURE — 25000125 ZZHC RX 250: Performed by: NURSE PRACTITIONER

## 2019-05-11 PROCEDURE — 36415 COLL VENOUS BLD VENIPUNCTURE: CPT | Performed by: NURSE PRACTITIONER

## 2019-05-11 PROCEDURE — 99223 1ST HOSP IP/OBS HIGH 75: CPT | Performed by: NURSE PRACTITIONER

## 2019-05-11 RX ORDER — IBUPROFEN 800 MG/1
800 TABLET, FILM COATED ORAL EVERY 6 HOURS PRN
Status: DISCONTINUED | OUTPATIENT
Start: 2019-05-11 | End: 2019-05-13

## 2019-05-11 RX ORDER — ACETAMINOPHEN 325 MG/1
975 TABLET ORAL EVERY 6 HOURS PRN
Status: DISCONTINUED | OUTPATIENT
Start: 2019-05-11 | End: 2019-06-08

## 2019-05-11 RX ADMIN — OLANZAPINE 10 MG: 10 TABLET, FILM COATED ORAL at 18:00

## 2019-05-11 RX ADMIN — METFORMIN HYDROCHLORIDE 1000 MG: 1000 TABLET ORAL at 08:17

## 2019-05-11 RX ADMIN — DIVALPROEX SODIUM 2000 MG: 500 TABLET, EXTENDED RELEASE ORAL at 21:09

## 2019-05-11 RX ADMIN — HYDROXYZINE HYDROCHLORIDE 50 MG: 25 TABLET ORAL at 19:27

## 2019-05-11 RX ADMIN — INSULIN ASPART 5 UNITS: 100 INJECTION, SOLUTION INTRAVENOUS; SUBCUTANEOUS at 08:22

## 2019-05-11 RX ADMIN — CLONAZEPAM 0.5 MG: 0.5 TABLET ORAL at 08:17

## 2019-05-11 RX ADMIN — NICOTINE POLACRILEX 4 MG: 2 GUM, CHEWING ORAL at 19:27

## 2019-05-11 RX ADMIN — METFORMIN HYDROCHLORIDE 1000 MG: 1000 TABLET ORAL at 17:33

## 2019-05-11 RX ADMIN — CLONAZEPAM 0.5 MG: 0.5 TABLET ORAL at 21:08

## 2019-05-11 RX ADMIN — GABAPENTIN 1200 MG: 400 CAPSULE ORAL at 13:53

## 2019-05-11 RX ADMIN — BENZTROPINE MESYLATE 1 MG: 1 TABLET ORAL at 08:17

## 2019-05-11 RX ADMIN — INSULIN ASPART 4 UNITS: 100 INJECTION, SOLUTION INTRAVENOUS; SUBCUTANEOUS at 11:52

## 2019-05-11 RX ADMIN — INSULIN ASPART 4 UNITS: 100 INJECTION, SOLUTION INTRAVENOUS; SUBCUTANEOUS at 17:05

## 2019-05-11 RX ADMIN — PRAZOSIN HYDROCHLORIDE 1 MG: 1 CAPSULE ORAL at 00:10

## 2019-05-11 RX ADMIN — PRAZOSIN HYDROCHLORIDE 1 MG: 1 CAPSULE ORAL at 21:08

## 2019-05-11 RX ADMIN — GABAPENTIN 1200 MG: 400 CAPSULE ORAL at 08:17

## 2019-05-11 RX ADMIN — CLONAZEPAM 0.5 MG: 0.5 TABLET ORAL at 00:10

## 2019-05-11 RX ADMIN — INSULIN ASPART: 100 INJECTION, SOLUTION INTRAVENOUS; SUBCUTANEOUS at 00:21

## 2019-05-11 RX ADMIN — ATORVASTATIN CALCIUM 20 MG: 20 TABLET, FILM COATED ORAL at 21:08

## 2019-05-11 RX ADMIN — BUSPIRONE HYDROCHLORIDE 5 MG: 5 TABLET ORAL at 13:53

## 2019-05-11 RX ADMIN — BUSPIRONE HYDROCHLORIDE 5 MG: 5 TABLET ORAL at 21:08

## 2019-05-11 RX ADMIN — INSULIN DETEMIR 2 UNITS: 100 INJECTION, SOLUTION SUBCUTANEOUS at 08:20

## 2019-05-11 RX ADMIN — INSULIN GLARGINE 10 UNITS: 100 INJECTION, SOLUTION SUBCUTANEOUS at 21:11

## 2019-05-11 RX ADMIN — ACETAMINOPHEN 650 MG: 325 TABLET, FILM COATED ORAL at 18:00

## 2019-05-11 RX ADMIN — GLIPIZIDE 10 MG: 5 TABLET ORAL at 06:48

## 2019-05-11 RX ADMIN — PALIPERIDONE 9 MG: 3 TABLET, EXTENDED RELEASE ORAL at 08:17

## 2019-05-11 RX ADMIN — DIVALPROEX SODIUM 2000 MG: 500 TABLET, EXTENDED RELEASE ORAL at 00:10

## 2019-05-11 RX ADMIN — GABAPENTIN 1200 MG: 400 CAPSULE ORAL at 21:08

## 2019-05-11 RX ADMIN — BENZTROPINE MESYLATE 1 MG: 1 TABLET ORAL at 21:08

## 2019-05-11 RX ADMIN — BENZTROPINE MESYLATE 1 MG: 1 TABLET ORAL at 00:10

## 2019-05-11 RX ADMIN — BUSPIRONE HYDROCHLORIDE 5 MG: 5 TABLET ORAL at 08:17

## 2019-05-11 ASSESSMENT — ACTIVITIES OF DAILY LIVING (ADL)
DRESS: SCRUBS (BEHAVIORAL HEALTH)
ORAL_HYGIENE: INDEPENDENT
HYGIENE/GROOMING: INDEPENDENT
LAUNDRY: UNABLE TO COMPLETE
DRESS: SCRUBS (BEHAVIORAL HEALTH);INDEPENDENT
HYGIENE/GROOMING: INDEPENDENT
ORAL_HYGIENE: INDEPENDENT

## 2019-05-11 NOTE — CONSULTS
[facility]    Hospitalist Progress Note    Date of Service (when I saw the patient): 05/11/2019    Assessment & Plan     Medical Problems:    Diabetes mellitus, type 2: Not well controlled. Hemoglobin A1c is 8.5, questionable whether she is compliant as she has been noncompliant with medications in the past. During last hospitalization she was on 5 units of Levemir BID in addition to metformin and glyburide. She was not discharged on the Levemir. She has been on Levemir in the past, as far back as 2014-/2015 at much higher doses. However, in the interem she has been on Lantus/Basaglar. She had a routine visit 3/2019 and was continued on 20 units Lantus BID by her PCP. She has not been on glyburide per her PCP for some time now.  Will stop glyburide to reduce pill load and risk of hypoglycemia with combination of the two drugs.Restart Lantus but once daily to hopefully increase compliance. Risk factors for hypoglycemia would be present on glyburide or insulin and she does not have any recorded history of refusing to eat and has a mildly elevated BMI. She tolerated 10 units/day of the Levemir at last stay plus glyburide, will start 10 units Lantus bedtime tonight and titrate up as needed-likely to her previous dose of 40 units/day. She has also been on prandial insulin in the past, may need to add that as well.     Hyperlipidemia: Continue Lipitor.         Behavioral health:      Psychosis (H)    Schizoaffective disorder, bipolar type      Denisse Malin, CNP      -Data reviewed today: I reviewed all new labs and imaging results over the last 24 hours.     Subjective:  Sleeps unless aroused, does arouse to voice but takes several tries to be able to wake her up enough to converse. Able to answer simple short questions but falls asleep quickly. She denies pain, shortness of breath or chest pain. Denies nausea or abdominal pain.     Physical Exam   Temp: 98.8  F (37.1  C) Temp src: Tympanic BP: 120/55  Pulse: 87   Resp: 16 SpO2: 98 % O2 Device: None (Room air)    Vitals:    05/10/19 2000   Weight: 77 kg (169 lb 12.8 oz)     Vital Signs with Ranges  Temp:  [96.7  F (35.9  C)-99.2  F (37.3  C)] 98.8  F (37.1  C)  Pulse:  [76-92] 87  Resp:  [14-16] 16  BP: (120-153)/(55-93) 120/55  SpO2:  [98 %] 98 %  No intake/output data recorded.     EXAM:  Cardiovascular: negative, PMI normal. No lifts, heaves, or thrills. RRR. No murmurs, clicks gallops or rub, No edema or JVD.  Respiratory: Percussion normal. Good diaphragmatic excursion. Lungs clear, respiratory rate stable and regular.   Head: Normocephalic. No masses, lesions, tenderness or abnormalities  Abdomen: Abdomen soft, non-tender. BS normal. No masses, organomegaly  NEURO: positive findings: disoriented, follows commands easily when awake enough. Moves all extremities equally. She is quite sedated but wakens to voice though for short periods.  SKIN: no suspicious lesions or rashes    Wound Anterior;Right;Distal Toe (Comment  which one) Other (comment) Bruised second toenail is blackish blue (Active)   Number of days:        Wound 04/22/19 Anterior;Left;Distal Toe (Comment  which one) Other (comment) second toe nail is blackish blue (Active)   Number of days: 19     Line/device assessment(s) completed for medical necessity         Medications       atorvastatin  20 mg Oral At Bedtime     benztropine  1 mg Oral BID     busPIRone  5 mg Oral TID     clonazePAM  0.5 mg Oral BID     divalproex sodium extended-release  2,000 mg Oral At Bedtime     gabapentin  1,200 mg Oral TID     insulin aspart  1-7 Units Subcutaneous TID AC     insulin aspart  1-5 Units Subcutaneous At Bedtime     insulin detemir  5 Units Subcutaneous At Bedtime     metFORMIN  1,000 mg Oral BID w/meals     paliperidone ER  9 mg Oral Daily     prazosin  1 mg Oral At Bedtime       Data   No lab results found in last 7 days.    No results found for this or any previous visit (from the past 24 hour(s)).

## 2019-05-11 NOTE — PROGRESS NOTES
05/10/19 2047   Patient Belongings   Did you bring any home meds/supplements to the hospital?  No   Patient Belongings locker;sent to security per site process   Patient Belongings Put in Hospital Secure Location (Security or Locker, etc.) clothing;shoes;other (see comments)   Belongings Search Yes   Clothing Search Yes   Second Staff Rowena   Comment Shoaib running shoes, blue tshirt, green tshirt, black jeans, note, 2 2 cent cashout vouchers, [employer] card, [place] drivers license     List items sent to safe: [employer] card, [place] drivers license     All other belongings put in assigned cubby in belongings room.       I have reviewed my belongings list on admission and verify that it is correct.     Patient signature_______________________________    Second staff witness (if patient unable to sign) ______________________________       I have received all my belongings at discharge.    Patient signature________________________________    Miles  5/10/2019  8:49 PM

## 2019-05-11 NOTE — H&P
"Psychiatric Eval/H&P    Patient Name: Kemal Wood   YOB: 1980  Age: 39 year old  [id number]    Primary Physician: Sydney Mujica   Completed by ARYAN Lamb            CC: \"just let me sleep\"    HPI   Kemal Wood is a 39 year old  female who was just discharged from our hospital 9 days ago.  She has decompensated significantly with agitation, disorganization and is much more delusional she was upon her discharge.  At the last hospitalization she was very eager and interested in completing another type of programs such as an MI CD program then intensive residential treatment facility.    Upon arrival kemal was a bit agitated though was willing to restart her medications. She has been very sedated through the day. I had a difficult time waking her up in the monring to speak iwt her and when I tried to speak with her when she was sitting up and eating lunch she was eating though appeared very sedated and somewhat confused. Could possibly be secondary to possibility of being off of her medications for the past few days vs possibility of elevated ammonia. lfts are normal though will check ammonia. I tried to ask questions and she mumbled incoherently \"let me sleep\". She was very difficult to understand and I have not seen her this sedated in the past. I ask her if she went to her families house after discharge and she shook her head yes. I asked her if she stopped taking her medications at discharge and she shook her head no. Difficult to assess psychosis due to her level of sedation though likely it is persisting.            Past Psychiatric History: many hospitalizations and commitments. Last discharged from here 9 days ago and at that time [facility] would not support a commitment.        Social History:     Born and raised in [facility]. She was  and her  passed away. She has at least 4 children. None are in her custody. She is frequently homeless and was most " "recently staing with her brother or sister on [facility].     Chemical Use History: does have history of meth and opiate abuse. Drug screen was negative on this admission.      Family Psychiatric History: none known     Medical History and ROS    Prior to Admission medications    Medication Sig Start Date End Date Taking? Authorizing Provider   atorvastatin (LIPITOR) 20 MG tablet Take 1 tablet (20 mg) by mouth At Bedtime 5/1/19   Loren Zuleta NP   benztropine (COGENTIN) 1 MG tablet Take 1 tablet (1 mg) by mouth 2 times daily 5/1/19   Loren Zuleta NP   busPIRone (BUSPAR) 5 MG tablet Take 1 tablet (5 mg) by mouth 3 times daily 5/1/19   Loren Zuleta NP   clonazePAM (KLONOPIN) 0.5 MG tablet Take 1 tablet (0.5 mg) by mouth 2 times daily 5/1/19   Loren Zuleta NP   divalproex sodium extended-release (DEPAKOTE ER) 500 MG 24 hr tablet Take 4 tablets (2,000 mg) by mouth At Bedtime 5/1/19   Loren Zuleta NP   gabapentin (NEURONTIN) 400 MG capsule Take 3 capsules (1,200 mg) by mouth 3 times daily 5/1/19   Loren Zuleta NP   glipiZIDE (GLUCOTROL) 10 MG tablet Take 1 tablet (10 mg) by mouth every morning (before breakfast) 5/2/19   Rocio Tompkins MD   hydrOXYzine (ATARAX) 50 MG tablet Take 1 tablet (50 mg) by mouth 3 times daily as needed for anxiety 5/1/19   Loren Zuleta NP   hydrOXYzine (ATARAX) 50 MG tablet Take 2 tablets (100 mg) by mouth At Bedtime 5/1/19   Loren Zuleta NP   metFORMIN (GLUCOPHAGE) 1000 MG tablet Take 1 tablet (1,000 mg) by mouth 2 times daily (with meals) 5/1/19   Rocio Tompkins MD   paliperidone (INVEGA) 9 MG 24 hr tablet Take 1 tablet (9 mg) by mouth daily 5/2/19   Loren Zuleta NP   prazosin (MINIPRESS) 1 MG capsule Take 1 capsule (1 mg) by mouth At Bedtime 5/1/19   Loren Zuleta, NP     Allergies   Allergen Reactions     Haloperidol Other (See Comments)     Other reaction(s): Seizures  Patient states, \"I fell down and wasn't able to get " "up.\"  Patient states, \"I fell down and wasn't able to get up.\"       Past Medical History:   Diagnosis Date     ADD (attention deficit disorder)      Arthritis of left foot      Diabetes mellitus, type 2 (H) 2013     Hepatitis C      History of psychiatric hospitalization     Last hosp. 5 mos ago at [facility] for 13 days     Hx of substance abuse      Hyperlipidemia      Marijuana dependence (H)      Obesity      Paranoid schizophrenia (H)      PTSD (post-traumatic stress disorder)      Restless leg syndrome      Treatment 2014    CD treatment for THC x 2 months this summer     Past Surgical History:   Procedure Laterality Date      SECTION       x 3     FOOT SURGERY      Right and left foot fracture repair              Past Psychiatric Medications Tried lithium: recently quit taking after a few days. She has been on most neuroleptics with minimal benefit. She has stabilized best on zyprexa though blood sugars elevate significantly on it. She was prescribed up to 60-70 mg of zyprexa which is far beyond the max dose.     Physical Exam    Constitutional: oriented to person, place, and time.  appears well-developed and well-nourished.   HENT:   Head: Normocephalic and atraumatic.   Right Ear: External ear normal.   Left Ear: External ear normal.   Nose: Nose normal.   Mouth/Throat: Oropharynx is clear and moist. No oropharyngeal exudate.   Eyes: Conjunctivae and EOM are normal. Pupils are equal, round, and reactive to light. Right eye exhibits no discharge. Left eye exhibits no discharge. No scleral icterus.   Neck: Normal range of motion. Neck supple. No JVD present. No tracheal deviation present. No thyromegaly present.   Cardiovascular: Normal rate, regular rhythm, normal heart sounds and intact distal pulses. Exam reveals no gallop and no friction rub.   No murmur heard.  Pulmonary/Chest: Effort normal and breath sounds normal. No stridor. No respiratory distress.  no wheezes. no rales. no " "tenderness.   Abdominal: Soft. Bowel sounds are normal.  no distension and no mass. There is no tenderness. There is no rebound and no guarding.  Skin: Dry, intact, no open areas, rashes, moles of concern    Review of Systems:  Constitution: No weight loss, fever, night sweats  Skin: No rashes, pruritus or open wounds  Neuro: No headaches or seizure activity.  Psych:  See HPI  Eyes: No vision changes.  ENT: No problems chewing or swallowing.   Musculoskeletal: No muscle pain, joint pain or swelling   Respiratory: No cough or dyspnea  Cardiovascular:  No chest pain,  palpitations or fainting  Gastrointestinal:  No abdominal pain, nausea, vomiting or change in bowel habits         MSE/PSYCH  PSYCHIATRIC EXAM  /93   Pulse 92   Temp 96.7  F (35.9  C) (Tympanic)   Resp 16   Ht 1.626 m (5' 4\")   Wt 77 kg (169 lb 12.8 oz)   SpO2 98%   BMI 29.15 kg/m    -Appearance/Behavior: sedated,     -Motor: lethargic and sedated  -Gait: intact  -Abnormal involuntary movements: none  -Mood: flat  -Affect: blunted  -Speech: slurred  -Thought process/associations: difficult to assess due to sedation  -Thought content: paranoid delusions upon admission though very sedated  -Perceptual disturbances: Frequent hallucinations..              -Suicidal/Homicidal Ideation: difficult to asses  -Judgment: Poor.  -Insight: Poor.  *Orientation: difficult to assess  *Memory: likely intact other than sedation  *Attention:poor due ot sedation  *Language: fluent, no aphasias, able to repeat phrases and name objects. Vocab intact.  *Fund of information: appropriate for education  *Cognitive functioning estimate: 2 - very impaired.     Labs: cbc unremarkable, cmp elevated glucose though rest normal. UDS and UA negative, will have ammonia drawn.      Assessment/Impression: This is a 39 year old yo female with schizoaffective disorder who was just discharged 9 days ago from our unit with some, but not improvement in her mood lability and " delusions, returns in decompensated condition with agitation and disorganization.  At her last hospitalization Lauro Lauren did not feel the need to petition for commitment though I do believe we will need to address this again if she is unwilling to go to an irts program  Educated regarding medication indications, risks, benefits, side effects, contraindications and possible interactions. Verbally expressed understanding.     DX:      Schizoaffective dsiorder, bipolar type   Methamphetamine use disorder, moderate to severe      Plan:     Labs Needed:    None at this time.      Med Changes:    Will discuss tegretol vs trileptal. Tried ot discuss this today though she is very sedated and I will address it again tomorrow.   Will contact hospitalist regarding following blood glucose    DC Planning:        Anticipated length of stay greater than one week for stabilization and intensive mi/cd treatment such as irts if she agrees. May need to contact [place] again for possibility of commitment.

## 2019-05-11 NOTE — PLAN OF CARE
Face to face end of shift report received from Bianca FRANCIS RN. Rounding completed. Patient observed in her room.     Luz Verma  5/11/2019  6:07 PM
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
## 2019-05-11 NOTE — PLAN OF CARE
Face to face end of shift report received from pm RN. Rounding completed. Patient observed.     Shiraz Vallejo  5/11/2019  12:24 AM

## 2019-05-11 NOTE — PLAN OF CARE
Problem: Adult Behavioral Health Plan of Care  Goal: Patient-Specific Goal (Individualization)  Description  Patient will attend at least 50% of groups while on unit.  Patient will report at least 6-8 hours of sleep at night.  Patient will be compliant with treatment team recommendations.  Patient will be medication compliant while hospitalized.  Patient will have decrease in delusional statements by discharge.     5/11/2019 0925 by Bianca Mckeon, RN  Outcome: No Change   Patient denies SI and HI but contracts for safety. She endorses hallucinations. She isn't sure what they are saying and believes that they are just memories at this time. She states that she has anxiety and depression. She also endorses generalized pain. She declines any other medications at this time. When talking with the patient, it has been noted that she appears to be somewhat preoccupied. This writer has to ask her the same question multiple times to get an answer. She states that she is tired and that she wants to sleep this morning. Will re-approach later.  1400: Patient has been asleep much of this shift. She continues to mumble when talking. Lab came to draw blood and she was cooperative but fell asleep during the process. She woke and sat up relatively quickly when this writer brought scheduled medications in to her room. She then requested, and received a snack. She came out of her room at this time.    End of shift report handed off to next shift.     Bianca Mckeon  5/11/2019  3:22 PM        Problem: Thought Process Alteration  Goal: Optimal Thought Clarity  Description  Patient will hold a reality based conversation by discharge.    Outcome: No Change   Patient does hold reality based conversation with this writer. She is somewhat preoccupied when talking though.

## 2019-05-11 NOTE — PLAN OF CARE
Face to face end of shift report received from Shiraz AQUINO RN. Rounding completed. Patient observed.     Bianca Mckeon  5/11/2019  8:03 AM

## 2019-05-12 LAB
GLUCOSE BLDC GLUCOMTR-MCNC: 218 MG/DL (ref 70–99)
GLUCOSE BLDC GLUCOMTR-MCNC: 251 MG/DL (ref 70–99)
GLUCOSE BLDC GLUCOMTR-MCNC: 265 MG/DL (ref 70–99)
GLUCOSE BLDC GLUCOMTR-MCNC: 330 MG/DL (ref 70–99)

## 2019-05-12 PROCEDURE — 25000132 ZZH RX MED GY IP 250 OP 250 PS 637: Performed by: NURSE PRACTITIONER

## 2019-05-12 PROCEDURE — 25000125 ZZHC RX 250: Performed by: NURSE PRACTITIONER

## 2019-05-12 PROCEDURE — 99233 SBSQ HOSP IP/OBS HIGH 50: CPT | Performed by: NURSE PRACTITIONER

## 2019-05-12 PROCEDURE — 00000146 ZZHCL STATISTIC GLUCOSE BY METER IP

## 2019-05-12 PROCEDURE — 12400000 ZZH R&B MH

## 2019-05-12 RX ORDER — CARBAMAZEPINE 100 MG/1
100 TABLET, EXTENDED RELEASE ORAL 2 TIMES DAILY
Status: DISCONTINUED | OUTPATIENT
Start: 2019-05-12 | End: 2019-05-13

## 2019-05-12 RX ORDER — PALIPERIDONE 6 MG/1
6 TABLET, EXTENDED RELEASE ORAL DAILY
Status: DISCONTINUED | OUTPATIENT
Start: 2019-05-13 | End: 2019-05-17

## 2019-05-12 RX ADMIN — NICOTINE POLACRILEX 4 MG: 2 GUM, CHEWING ORAL at 10:52

## 2019-05-12 RX ADMIN — GABAPENTIN 1200 MG: 400 CAPSULE ORAL at 13:16

## 2019-05-12 RX ADMIN — BENZTROPINE MESYLATE 1 MG: 1 TABLET ORAL at 20:10

## 2019-05-12 RX ADMIN — CLONAZEPAM 0.5 MG: 0.5 TABLET ORAL at 08:11

## 2019-05-12 RX ADMIN — BUSPIRONE HYDROCHLORIDE 5 MG: 5 TABLET ORAL at 13:16

## 2019-05-12 RX ADMIN — OLANZAPINE 10 MG: 10 TABLET, FILM COATED ORAL at 15:49

## 2019-05-12 RX ADMIN — HYDROXYZINE HYDROCHLORIDE 50 MG: 25 TABLET ORAL at 21:04

## 2019-05-12 RX ADMIN — BUSPIRONE HYDROCHLORIDE 5 MG: 5 TABLET ORAL at 20:10

## 2019-05-12 RX ADMIN — GABAPENTIN 1200 MG: 400 CAPSULE ORAL at 20:10

## 2019-05-12 RX ADMIN — METFORMIN HYDROCHLORIDE 1000 MG: 1000 TABLET ORAL at 08:10

## 2019-05-12 RX ADMIN — BUSPIRONE HYDROCHLORIDE 5 MG: 5 TABLET ORAL at 08:11

## 2019-05-12 RX ADMIN — INSULIN ASPART 3 UNITS: 100 INJECTION, SOLUTION INTRAVENOUS; SUBCUTANEOUS at 11:51

## 2019-05-12 RX ADMIN — NICOTINE POLACRILEX 4 MG: 2 GUM, CHEWING ORAL at 17:31

## 2019-05-12 RX ADMIN — PALIPERIDONE 9 MG: 3 TABLET, EXTENDED RELEASE ORAL at 08:10

## 2019-05-12 RX ADMIN — GABAPENTIN 1200 MG: 400 CAPSULE ORAL at 08:11

## 2019-05-12 RX ADMIN — CARBAMAZEPINE 100 MG: 100 TABLET, EXTENDED RELEASE ORAL at 20:10

## 2019-05-12 RX ADMIN — INSULIN ASPART 4 UNITS: 100 INJECTION, SOLUTION INTRAVENOUS; SUBCUTANEOUS at 16:58

## 2019-05-12 RX ADMIN — BENZTROPINE MESYLATE 1 MG: 1 TABLET ORAL at 08:12

## 2019-05-12 RX ADMIN — ATORVASTATIN CALCIUM 20 MG: 20 TABLET, FILM COATED ORAL at 20:09

## 2019-05-12 RX ADMIN — METFORMIN HYDROCHLORIDE 1000 MG: 1000 TABLET ORAL at 16:58

## 2019-05-12 RX ADMIN — HYDROXYZINE HYDROCHLORIDE 50 MG: 25 TABLET ORAL at 11:51

## 2019-05-12 RX ADMIN — PRAZOSIN HYDROCHLORIDE 1 MG: 1 CAPSULE ORAL at 20:10

## 2019-05-12 RX ADMIN — DIVALPROEX SODIUM 2000 MG: 500 TABLET, EXTENDED RELEASE ORAL at 20:10

## 2019-05-12 RX ADMIN — CLONAZEPAM 0.5 MG: 0.5 TABLET ORAL at 20:09

## 2019-05-12 RX ADMIN — INSULIN ASPART 2 UNITS: 100 INJECTION, SOLUTION INTRAVENOUS; SUBCUTANEOUS at 08:14

## 2019-05-12 ASSESSMENT — MIFFLIN-ST. JEOR: SCORE: 1452.88

## 2019-05-12 ASSESSMENT — ACTIVITIES OF DAILY LIVING (ADL)
HYGIENE/GROOMING: INDEPENDENT
DRESS: SCRUBS (BEHAVIORAL HEALTH);INDEPENDENT
ORAL_HYGIENE: INDEPENDENT
ORAL_HYGIENE: INDEPENDENT
DRESS: SCRUBS (BEHAVIORAL HEALTH);INDEPENDENT
HYGIENE/GROOMING: INDEPENDENT

## 2019-05-12 NOTE — PROGRESS NOTES
"Pt referred via nutrition indicator list r/t uncontrolled diabetes.  39 yof admitted with schizophrenia, hepatitis C, depression.   Ht-64\", Wt-176#.  IBWR is 108-132#.   BMI is 30.   Review of weight records notes pt was recently hospitalized here and current weight is within usual as documented during recent stay.    Low CCHO diet ordered.  Pt is consuming 100% meals.    Labs/meds reviewed.  Glucose levels 218-391 since admission.  Last noted A1c was 8.5%.  Current diabetes medications are being adjusted by hospitalist - Metformin 1000 mg bid, Novolog per sliding scale and Lantus 10 units daily with plans to increase to 15 units.  Element of non - compliance present per hospitalist note.    Malnutrition is not present.   Per chart review pt not appropriate for diet education at this time.  If desired could be provided at a later date.  Compliance to diabetes medication regimen would offer pt largest benefit.    RD available as needed.    "

## 2019-05-12 NOTE — PLAN OF CARE
Face to face end of shift report received from PATTY Vallejo RN. Rounding completed. Patient observed, up on the unit for breakfast.     Edgar Escalante  5/12/2019  9:09 AM

## 2019-05-12 NOTE — PLAN OF CARE
"Problem: Adult Behavioral Health Plan of Care  Goal: Patient-Specific Goal (Individualization)  Description  Patient will attend at least 50% of groups while on unit.  Patient will report at least 6-8 hours of sleep at night.  Patient will be compliant with treatment team recommendations.  Patient will be medication compliant while hospitalized.  Patient will have decrease in delusional statements by discharge.     Outcome: Improving  Pt has been up on the unit more this shift. More alert. Attended groups this morning. Remains delusional with rambling, tangential and mumbled speech. Mumbling about \"killing those motherfuckers.\" Making statements regarding medications being sent to \"my daughters PO box\" and \"that bitch better not have taken them.\" When asked if feeling anxious, pt replied \"What do you think, bitch? I'm homicidal as fuck!\" Requested and received PRN Atarax 50 mg PO at approximately 1200. Ate 100% of both meals this shift. Slept approximately 7 hours last night. Blood sugar 265 mg/dl before lunch. Novolog 3 units subcutaneous given per sliding scale instructions.    Problem: Thought Process Alteration  Goal: Optimal Thought Clarity  Description  Patient will hold a reality based conversation by discharge.    Outcome: No Change            "

## 2019-05-12 NOTE — PLAN OF CARE
"  Problem: Adult Behavioral Health Plan of Care  Goal: Patient-Specific Goal (Individualization)  Description  Patient will attend at least 50% of groups while on unit.  Patient will report at least 6-8 hours of sleep at night.  Patient will be compliant with treatment team recommendations.  Patient will be medication compliant while hospitalized.  Patient will have decrease in delusional statements by discharge.     5/12/2019 1622 by Luz Verma RN  Outcome: No Change  Note:   Patient irritable at start of the shift and stated \"I need something to calm me down. Orval, my mom and Samona are fucking my world up. I want the three of them fucking dead.\" Pt then started to ramble with mumbled speech, which was difficult to decipher. She was given 10 mg of PRN Zyprexa at 1549 for agitation. Pt did calm down shortly following administration. She denied depression and suicidal ideation. Her blood sugar prior to supper was 330 and she was given 4 units of Novolog. Her bedtime blood sugar was 251 and she was given 2 units of Novolog along with scheduled Lantus.  2100 - Pt up at nurses station and stated \"I want every PRN I have.\" Informed pt that this is not a feasible request. She was given 50 mg of PRN Atarax at 2104 for moderate anxiety.      Face to face end of shift report given to OBDULIO Maria.     Luz Verma  5/12/2019  11:23 PM  Problem: Thought Process Alteration  Goal: Optimal Thought Clarity  Description  Patient will hold a reality based conversation by discharge.    5/12/2019 1622 by Luz Verma RN  Outcome: No Change  Note:   Pt unable to have reality based conversation at this time. Thought process is delusional.      "

## 2019-05-12 NOTE — PLAN OF CARE
Face to face end of shift report received from Edgar LANGSTON RN. Rounding completed. Patient observed in her room.     Luz Verma  5/12/2019  3:48 PM

## 2019-05-12 NOTE — PROGRESS NOTES
Sugars trending down nicely, though still consistently >200. Will plan on increasing lantus dose again at bedtime from 10 unit(s) to 15 unit(s). Continue sliding scale. Once Lantus is up to 20 units if blood sugars still >200 during the day will add prandial insulin. Big factor here is her poor compliance so would like to avoid prandial insulin if possible while still keeping sugars under decent control.

## 2019-05-12 NOTE — PROGRESS NOTES
"[facility]  Psychiatric Progress Note      Impression:     Ginette is less sedated today than she was.  She is still having a hard time keeping her eyes open though she is responding more to questions.  His speech is still a bit slurred.  She is able to tell me today that she was not able to get her medications from the pharmacy upon discharge.  She told me that \"they send them to my PO Box and they were not in my PO Box\" I am unsure if she called the pharmacy to look into this.  Apparently they mail them to her that I do not think she was likely proactive and call the pharmacy to find out more information.  She was off of her medications the entire time she was out of the hospital which led to this level of decompensation.  I did restart her medications as she initially told me she was taking them and she is still quite sedated so I will be lowering her Invega.  I have discussed mood stabilizers with her in the past and if in the past we had tried lithium though she only took for 2 days and decided not to due to a \"family history of not doing well on it\" she was willing to try Tegretol or Trileptal though we discussed Tegretol having better efficacy for wendy and she did agree to this.    Educated regarding medication indications, risks, benefits, side effects, contraindications and possible interactions. Verbally expressed understanding.        DIagnoses:        Schizoaffective dsiorder, bipolar type   Methamphetamine use disorder, moderate to severe                Attestation:  Patient has been seen and evaluated by me,  April Keturah Light NP          Interim History:   The patient's care was discussed with the treatment team and chart notes were reviewed.          Medications:     Current Facility-Administered Medications Ordered in Epic   Medication Dose Route Frequency Last Rate Last Dose     acetaminophen (TYLENOL) tablet 975 mg  975 mg Oral Q6H PRN         atorvastatin (LIPITOR) tablet 20 " mg  20 mg Oral At Bedtime   20 mg at 05/11/19 2108     benztropine (COGENTIN) tablet 1 mg  1 mg Oral BID   1 mg at 05/12/19 0812     busPIRone (BUSPAR) tablet 5 mg  5 mg Oral TID   5 mg at 05/12/19 1316     carBAMazepine (TEGretol XR) 12 hr tablet 100 mg  100 mg Oral BID         clonazePAM (klonoPIN) tablet 0.5 mg  0.5 mg Oral BID   0.5 mg at 05/12/19 0811     glucose gel 15-30 g  15-30 g Oral Q15 Min PRN        Or     dextrose 50 % injection 25-50 mL  25-50 mL Intravenous Q15 Min PRN        Or     glucagon injection 1 mg  1 mg Subcutaneous Q15 Min PRN         glucose gel 15-30 g  15-30 g Oral Q15 Min PRN        Or     dextrose 50 % injection 25-50 mL  25-50 mL Intravenous Q15 Min PRN        Or     glucagon injection 1 mg  1 mg Subcutaneous Q15 Min PRN         divalproex sodium extended-release (DEPAKOTE ER) 24 hr tablet 2,000 mg  2,000 mg Oral At Bedtime   2,000 mg at 05/11/19 2109     gabapentin (NEURONTIN) capsule 1,200 mg  1,200 mg Oral TID   1,200 mg at 05/12/19 1316     hydrOXYzine (ATARAX) tablet 25-50 mg  25-50 mg Oral Q4H PRN   50 mg at 05/12/19 1151     ibuprofen (ADVIL/MOTRIN) tablet 800 mg  800 mg Oral Q6H PRN         insulin aspart (NovoLOG) inj (RAPID ACTING)  1-7 Units Subcutaneous TID AC   3 Units at 05/12/19 1151     insulin aspart (NovoLOG) inj (RAPID ACTING)  1-5 Units Subcutaneous At Bedtime   2 Units at 05/11/19 2116     insulin glargine (LANTUS PEN) injection 10 Units  10 Units Subcutaneous At Bedtime   10 Units at 05/11/19 2111     metFORMIN (GLUCOPHAGE) tablet 1,000 mg  1,000 mg Oral BID w/meals   1,000 mg at 05/12/19 0810     nicotine (NICORETTE) gum 2-4 mg  2-4 mg Buccal Q1H PRN   4 mg at 05/12/19 1052     OLANZapine (zyPREXA) tablet 10 mg  10 mg Oral TID PRN   10 mg at 05/11/19 1800    Or     OLANZapine (zyPREXA) injection 10 mg  10 mg Intramuscular TID PRN         [START ON 5/13/2019] paliperidone ER (INVEGA) 24 hr tablet 6 mg  6 mg Oral Daily         prazosin (MINIPRESS) capsule 1 mg  1  "mg Oral At Bedtime   1 mg at 05/11/19 2108     No current Epic-ordered outpatient medications on file.              10 point ROS negative \"pain everywhere\"       Allergies:     Allergies   Allergen Reactions     Haloperidol Other (See Comments)     Other reaction(s): Seizures  Patient states, \"I fell down and wasn't able to get up.\"  Patient states, \"I fell down and wasn't able to get up.\"              Psychiatric Examination:   /75   Pulse 83   Temp 99  F (37.2  C) (Tympanic)   Resp 16   Ht 1.626 m (5' 4\")   Wt 79.8 kg (175 lb 14.4 oz)   SpO2 97%   BMI 30.19 kg/m    Weight is 175 lbs 14.4 oz  Body mass index is 30.19 kg/m .    Appearance:  awake, alert still mildly sedated  Attitude:  cooperative  Eye Contact:  fair  Mood:  anxious  Affect:  intensity is flat  Speech:  pressured speech  Psychomotor Behavior:  no evidence of tardive dyskinesia, dystonia, or tics  Thought Process:  disorganized  Associations:  loosening of associations present  Thought Content:  auditory hallucinations present  Insight:  limited  Judgment:  poor  Oriented to:  time, person, and place  Attention Span and Concentration:  limited  Recent and Remote Memory:  fair  Fund of Knowledge: low-normal  Muscle Strength and Tone: normal  Gait and Station: Normal           Labs:     None needed at this time.   Hospitalist following diabetes         Plan:     Start tegretol  mg bid  Decrease invega to 6 mg due to sedation.   Aware tegretol decreases invega plasma concentration and will monitor her symptoms close.       "

## 2019-05-12 NOTE — PLAN OF CARE
Face to face end of shift report received from pm RN. Rounding completed. Patient observed.     Shiraz Vallejo  5/11/2019  11:46 PM
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
## 2019-05-13 LAB
GLUCOSE BLDC GLUCOMTR-MCNC: 234 MG/DL (ref 70–99)
GLUCOSE BLDC GLUCOMTR-MCNC: 290 MG/DL (ref 70–99)
GLUCOSE BLDC GLUCOMTR-MCNC: 339 MG/DL (ref 70–99)
GLUCOSE BLDC GLUCOMTR-MCNC: 341 MG/DL (ref 70–99)

## 2019-05-13 PROCEDURE — 25000132 ZZH RX MED GY IP 250 OP 250 PS 637: Performed by: NURSE PRACTITIONER

## 2019-05-13 PROCEDURE — 12400000 ZZH R&B MH

## 2019-05-13 PROCEDURE — 99233 SBSQ HOSP IP/OBS HIGH 50: CPT | Performed by: NURSE PRACTITIONER

## 2019-05-13 PROCEDURE — 25000125 ZZHC RX 250: Performed by: NURSE PRACTITIONER

## 2019-05-13 PROCEDURE — 00000146 ZZHCL STATISTIC GLUCOSE BY METER IP

## 2019-05-13 RX ORDER — LITHIUM CARBONATE 300 MG/1
300 TABLET, FILM COATED, EXTENDED RELEASE ORAL EVERY 12 HOURS SCHEDULED
Status: DISCONTINUED | OUTPATIENT
Start: 2019-05-13 | End: 2019-05-16

## 2019-05-13 RX ADMIN — IBUPROFEN 800 MG: 800 TABLET ORAL at 13:49

## 2019-05-13 RX ADMIN — CLONAZEPAM 0.5 MG: 0.5 TABLET ORAL at 20:16

## 2019-05-13 RX ADMIN — DIVALPROEX SODIUM 2000 MG: 500 TABLET, EXTENDED RELEASE ORAL at 20:17

## 2019-05-13 RX ADMIN — GABAPENTIN 1200 MG: 400 CAPSULE ORAL at 13:50

## 2019-05-13 RX ADMIN — ATORVASTATIN CALCIUM 20 MG: 20 TABLET, FILM COATED ORAL at 20:17

## 2019-05-13 RX ADMIN — INSULIN ASPART 4 UNITS: 100 INJECTION, SOLUTION INTRAVENOUS; SUBCUTANEOUS at 12:22

## 2019-05-13 RX ADMIN — BUSPIRONE HYDROCHLORIDE 5 MG: 5 TABLET ORAL at 13:50

## 2019-05-13 RX ADMIN — CARBAMAZEPINE 100 MG: 100 TABLET, EXTENDED RELEASE ORAL at 08:14

## 2019-05-13 RX ADMIN — BENZTROPINE MESYLATE 1 MG: 1 TABLET ORAL at 08:13

## 2019-05-13 RX ADMIN — PRAZOSIN HYDROCHLORIDE 1 MG: 1 CAPSULE ORAL at 20:16

## 2019-05-13 RX ADMIN — BENZTROPINE MESYLATE 1 MG: 1 TABLET ORAL at 20:17

## 2019-05-13 RX ADMIN — GABAPENTIN 1200 MG: 400 CAPSULE ORAL at 20:17

## 2019-05-13 RX ADMIN — GABAPENTIN 1200 MG: 400 CAPSULE ORAL at 08:13

## 2019-05-13 RX ADMIN — BUSPIRONE HYDROCHLORIDE 5 MG: 5 TABLET ORAL at 08:13

## 2019-05-13 RX ADMIN — PALIPERIDONE 6 MG: 6 TABLET, FILM COATED, EXTENDED RELEASE ORAL at 08:14

## 2019-05-13 RX ADMIN — INSULIN ASPART 4 UNITS: 100 INJECTION, SOLUTION INTRAVENOUS; SUBCUTANEOUS at 17:00

## 2019-05-13 RX ADMIN — METFORMIN HYDROCHLORIDE 1000 MG: 1000 TABLET ORAL at 17:06

## 2019-05-13 RX ADMIN — INSULIN ASPART 2 UNITS: 100 INJECTION, SOLUTION INTRAVENOUS; SUBCUTANEOUS at 08:10

## 2019-05-13 RX ADMIN — HYDROXYZINE HYDROCHLORIDE 50 MG: 25 TABLET ORAL at 13:50

## 2019-05-13 RX ADMIN — LITHIUM CARBONATE 300 MG: 300 TABLET, EXTENDED RELEASE ORAL at 20:17

## 2019-05-13 RX ADMIN — CLONAZEPAM 0.5 MG: 0.5 TABLET ORAL at 08:13

## 2019-05-13 RX ADMIN — METFORMIN HYDROCHLORIDE 1000 MG: 1000 TABLET ORAL at 08:13

## 2019-05-13 RX ADMIN — NICOTINE POLACRILEX 4 MG: 2 GUM, CHEWING ORAL at 10:54

## 2019-05-13 RX ADMIN — BUSPIRONE HYDROCHLORIDE 5 MG: 5 TABLET ORAL at 20:22

## 2019-05-13 ASSESSMENT — ACTIVITIES OF DAILY LIVING (ADL)
HYGIENE/GROOMING: INDEPENDENT
DRESS: SCRUBS (BEHAVIORAL HEALTH);INDEPENDENT
ORAL_HYGIENE: INDEPENDENT
HYGIENE/GROOMING: INDEPENDENT
ORAL_HYGIENE: INDEPENDENT
LAUNDRY: UNABLE TO COMPLETE
DRESS: INDEPENDENT;SCRUBS (BEHAVIORAL HEALTH)

## 2019-05-13 NOTE — PROGRESS NOTES
Sugars trending down nicely, though still consistently >200. Will plan on increasing lantus dose again at bedtime from 15 unit(s) to 20 unit(s). Continue sliding scale. Once Lantus is up to 20 units if blood sugars still >200 during the day will add prandial insulin. Big factor here is her poor compliance so would like to avoid prandial insulin if possible while still keeping sugars under decent control.

## 2019-05-13 NOTE — PLAN OF CARE
Face to face end of shift report received from Crow FIGUEROA. Rounding completed. [place] [facility]     Eliza Arndt  5/13/2019  7:40 AM

## 2019-05-13 NOTE — PLAN OF CARE
BEHAVIORAL TEAM DISCUSSION    Participants: Airam Light NP, Dennise Herrera LICSW, Angie Laws LSW,  Becca Chery LSW, Beena Kumar LGSW, Opal Pillai RN, Radha Bills RN, Karley Ron RN, Mady Holman OT, Natalia Costello OT  Progress: None  Continued Stay Criteria/Rationale: restarted meds, consider starting Lithium, pressured, manic, grandiose, delusional, anxiety  Medical/Physical: Diabetes - consulting with hospitalist, Hep c positive  Precautions:   Behavioral Orders   Procedures    Code 1 - Restrict to Unit    Routine Programming     As clinically indicated    Status 15     Every 15 minutes.     Plan: Lauro Lauren is filing for commitment and rep payee  Rationale for change in precautions or plan: None    Active Problems:    Mixed hyperlipidemia    Diabetes mellitus, type 2    Schizoaffective disorder, bipolar type (H)    Psychosis (H)      Current Facility-Administered Medications:     acetaminophen (TYLENOL) tablet 975 mg, 975 mg, Oral, Q6H PRN, Raven Stout NP    atorvastatin (LIPITOR) tablet 20 mg, 20 mg, Oral, At Bedtime, Airam Light NP, 20 mg at 05/12/19 2009    benztropine (COGENTIN) tablet 1 mg, 1 mg, Oral, BID, Airam Light NP, 1 mg at 05/13/19 0813    busPIRone (BUSPAR) tablet 5 mg, 5 mg, Oral, TID, Airam Light NP, 5 mg at 05/13/19 0813    carBAMazepine (TEGretol XR) 12 hr tablet 100 mg, 100 mg, Oral, BID, Airam Light NP, 100 mg at 05/13/19 0814    clonazePAM (klonoPIN) tablet 0.5 mg, 0.5 mg, Oral, BID, Airam Light NP, 0.5 mg at 05/13/19 0813    glucose gel 15-30 g, 15-30 g, Oral, Q15 Min PRN **OR** dextrose 50 % injection 25-50 mL, 25-50 mL, Intravenous, Q15 Min PRN **OR** glucagon injection 1 mg, 1 mg, Subcutaneous, Q15 Min PRN, Shawn Wylie MD    glucose gel 15-30 g, 15-30 g, Oral, Q15 Min PRN **OR** dextrose 50 % injection 25-50 mL, 25-50 mL, Intravenous, Q15 Min PRN **OR** glucagon injection 1 mg, 1 mg,  Subcutaneous, Q15 Min PRN, Airam Light NP    divalproex sodium extended-release (DEPAKOTE ER) 24 hr tablet 2,000 mg, 2,000 mg, Oral, At Bedtime, Airam Light NP, 2,000 mg at 05/12/19 2010    gabapentin (NEURONTIN) capsule 1,200 mg, 1,200 mg, Oral, TID, Airam Light NP, 1,200 mg at 05/13/19 0813    hydrOXYzine (ATARAX) tablet 25-50 mg, 25-50 mg, Oral, Q4H PRN, Raven Stout NP, 50 mg at 05/12/19 2104    ibuprofen (ADVIL/MOTRIN) tablet 800 mg, 800 mg, Oral, Q6H PRN, Raven Stout NP    insulin aspart (NovoLOG) inj (RAPID ACTING), 1-7 Units, Subcutaneous, TID AC, Airam Light NP, 2 Units at 05/13/19 0810    insulin aspart (NovoLOG) inj (RAPID ACTING), 1-5 Units, Subcutaneous, At Bedtime, Airam Light NP, 2 Units at 05/12/19 2016    insulin glargine (LANTUS PEN) injection 15 Units, 15 Units, Subcutaneous, At Bedtime, Denisse Malin NP, 15 Units at 05/12/19 2016    metFORMIN (GLUCOPHAGE) tablet 1,000 mg, 1,000 mg, Oral, BID w/meals, Kuldeep April Keturah, NP, 1,000 mg at 05/13/19 0813    nicotine (NICORETTE) gum 2-4 mg, 2-4 mg, Buccal, Q1H PRN, Raven Stout NP, 4 mg at 05/12/19 1731    OLANZapine (zyPREXA) tablet 10 mg, 10 mg, Oral, TID PRN, 10 mg at 05/12/19 1549 **OR** OLANZapine (zyPREXA) injection 10 mg, 10 mg, Intramuscular, TID PRN, Raven Stout NP    paliperidone ER (INVEGA) 24 hr tablet 6 mg, 6 mg, Oral, Daily, Airam Light NP, 6 mg at 05/13/19 0814    prazosin (MINIPRESS) capsule 1 mg, 1 mg, Oral, At Bedtime, Airam Light NP, 1 mg at 05/12/19 2010
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
## 2019-05-13 NOTE — PROGRESS NOTES
"[facility]  Psychiatric Progress Note      Impression:       Ginette is no longer sedated.  She is extremely pressured in her speech and at times is so pressured she slurs and fumbles over her words.  I did point this out to her and she said \"yeah I know I feel high like somebody got me high and  I do not even know how\".     I told her that she did not look either looked very pressured.  She made multiple delusional statements talking about her sister stealing multiple cars which may indeed not be delusional though other delusional and grandiose statements were made.  I did tell her that I do believe lithium would be a better option for her then the  Tegretol.  I did find out today that she quit taking the lithium because she demanded to leave and staff are uncomfortable discharging her on it which I would be as well with an AMA discharge and poor follow-through.  Today she states \"I need to go to treatment I love meth\" she then ask as though she is smoking methamphetamines and again tells me how much she enjoys it.    Educated regarding medication indications, risks, benefits, side effects, contraindications and possible interactions. Verbally expressed understanding.        DIagnoses:        Schizoaffective dsiorder, bipolar type   Methamphetamine use disorder, moderate to severe                Attestation:  Patient has been seen and evaluated by me,  Airam Light NP          Interim History:   The patient's care was discussed with the treatment team and chart notes were reviewed.          Medications:     Current Facility-Administered Medications Ordered in Epic   Medication Dose Route Frequency Last Rate Last Dose     acetaminophen (TYLENOL) tablet 975 mg  975 mg Oral Q6H PRN         atorvastatin (LIPITOR) tablet 20 mg  20 mg Oral At Bedtime   20 mg at 05/11/19 2108     benztropine (COGENTIN) tablet 1 mg  1 mg Oral BID   1 mg at 05/12/19 0812     busPIRone (BUSPAR) tablet 5 mg  5 mg Oral " "TID   5 mg at 05/12/19 1316     carBAMazepine (TEGretol XR) 12 hr tablet 100 mg  100 mg Oral BID         clonazePAM (klonoPIN) tablet 0.5 mg  0.5 mg Oral BID   0.5 mg at 05/12/19 0811     glucose gel 15-30 g  15-30 g Oral Q15 Min PRN        Or     dextrose 50 % injection 25-50 mL  25-50 mL Intravenous Q15 Min PRN        Or     glucagon injection 1 mg  1 mg Subcutaneous Q15 Min PRN         glucose gel 15-30 g  15-30 g Oral Q15 Min PRN        Or     dextrose 50 % injection 25-50 mL  25-50 mL Intravenous Q15 Min PRN        Or     glucagon injection 1 mg  1 mg Subcutaneous Q15 Min PRN         divalproex sodium extended-release (DEPAKOTE ER) 24 hr tablet 2,000 mg  2,000 mg Oral At Bedtime   2,000 mg at 05/11/19 2109     gabapentin (NEURONTIN) capsule 1,200 mg  1,200 mg Oral TID   1,200 mg at 05/12/19 1316     hydrOXYzine (ATARAX) tablet 25-50 mg  25-50 mg Oral Q4H PRN   50 mg at 05/12/19 1151     ibuprofen (ADVIL/MOTRIN) tablet 800 mg  800 mg Oral Q6H PRN         insulin aspart (NovoLOG) inj (RAPID ACTING)  1-7 Units Subcutaneous TID AC   3 Units at 05/12/19 1151     insulin aspart (NovoLOG) inj (RAPID ACTING)  1-5 Units Subcutaneous At Bedtime   2 Units at 05/11/19 2116     insulin glargine (LANTUS PEN) injection 10 Units  10 Units Subcutaneous At Bedtime   10 Units at 05/11/19 2111     metFORMIN (GLUCOPHAGE) tablet 1,000 mg  1,000 mg Oral BID w/meals   1,000 mg at 05/12/19 0810     nicotine (NICORETTE) gum 2-4 mg  2-4 mg Buccal Q1H PRN   4 mg at 05/12/19 1052     OLANZapine (zyPREXA) tablet 10 mg  10 mg Oral TID PRN   10 mg at 05/11/19 1800    Or     OLANZapine (zyPREXA) injection 10 mg  10 mg Intramuscular TID PRN         [START ON 5/13/2019] paliperidone ER (INVEGA) 24 hr tablet 6 mg  6 mg Oral Daily         prazosin (MINIPRESS) capsule 1 mg  1 mg Oral At Bedtime   1 mg at 05/11/19 7248     No current [facility]-ordered outpatient medications on file.              10 point ROS negative \"pain everywhere\"       Allergies: " "    Allergies   Allergen Reactions     Haloperidol Other (See Comments)     Other reaction(s): Seizures  Patient states, \"I fell down and wasn't able to get up.\"  Patient states, \"I fell down and wasn't able to get up.\"              Psychiatric Examination:   /85   Pulse 94   Temp 97.6  F (36.4  C) (Tympanic)   Resp 16   Ht 1.626 m (5' 4\")   Wt 79.8 kg (175 lb 14.4 oz)   SpO2 98%   BMI 30.19 kg/m    Weight is 175 lbs 14.4 oz  Body mass index is 30.19 kg/m .    Appearance:  awake, alert still mildly sedated  Attitude:  cooperative  Eye Contact:  fair  Mood:  anxious  Affect:  intensity is flat  Speech:  pressured speech  Psychomotor Behavior:  no evidence of tardive dyskinesia, dystonia, or tics  Thought Process:  disorganized  Associations:  loosening of associations present  Thought Content:  auditory hallucinations present  Insight:  limited  Judgment:  poor  Oriented to:  time, person, and place  Attention Span and Concentration:  limited  Recent and Remote Memory:  fair  Fund of Knowledge: low-normal  Muscle Strength and Tone: normal  Gait and Station: Normal           Labs:     None needed at this time.   Hospitalist following diabetes         Plan:   Discontinue tegretol   Start lithium  [place] is going forward with petition for commitment.      "

## 2019-05-13 NOTE — PLAN OF CARE
Spoke with Denis from [place] - he states he is already working on the commitment stuff when the patient came back in this time - he contacted her daughter and she is willing to sign for the commitment - he is working on getting this paperwork in and already contacted the  board to let them know what was going on.  He also is requesting that we have the provider sign for a rep payee for her so she is not so vulnerable with her SSDI payments.

## 2019-05-13 NOTE — PLAN OF CARE
"  Problem: Adult Behavioral Health Plan of Care  Goal: Patient-Specific Goal (Individualization)  Description  Patient will attend at least 50% of groups while on unit.  Patient will report at least 6-8 hours of sleep at night.  Patient will be compliant with treatment team recommendations.  Patient will be medication compliant while hospitalized.  Patient will have decrease in delusional statements by discharge.     5/13/2019 0851 by Eliza Arndt RN  Outcome: No Change     Pt. Denies HI, SI, depression, and hallucinations. Pt. Reports anxiety due to \"They gave me some pills in [place] that made me not be able to walk. They are trying to kill me. I'm glad I'm here they know how to treat me here.\" Pt. Continues with delusional statements. \"My hand won't open up because I blew it up. I need  medications that will give me energy because I'm 495 fat.\" Pt. Cooperative with medications and nursing assessment. Pt. Eating WDL. Pt. In agreement to update staff to thoughts feelings of wanting to harm self or others. Pt. Offers no c/o issues with bowel and bladder.     1350 Pt. Administered PRN Ibuprofen 800 mg po for increasing back pain 10/10 and Atrax 50 mg po for increasing anxiety.     Problem: Thought Process Alteration  Goal: Optimal Thought Clarity  Description  Patient will hold a reality based conversation by discharge.    Outcome: No Change     Pt. Not able to hold reality based conversation. Pt. Will continue to work on goal of reality based conversation by discharge. Pt. In agreement to attend groups.   "

## 2019-05-13 NOTE — PLAN OF CARE
Problem: Adult Behavioral Health Plan of Care  Goal: Patient-Specific Goal (Individualization)  Description  Patient will attend at least 50% of groups while on unit.  Patient will report at least 6-8 hours of sleep at night.  Patient will be compliant with treatment team recommendations.  Patient will be medication compliant while hospitalized.  Patient will have decrease in delusional statements by discharge.     5/13/2019 0543 by Crow Leyva RN  Outcome: No Change   Pt has been in bed with eyes closed and regular respirations observed all night. Will continue to monitor.    Face to face end of shift report given to Eliza ALVAREZ RN.     Crow Leyva  5/13/2019  7:21 AM

## 2019-05-14 LAB
GLUCOSE BLDC GLUCOMTR-MCNC: 233 MG/DL (ref 70–99)
GLUCOSE BLDC GLUCOMTR-MCNC: 275 MG/DL (ref 70–99)
GLUCOSE BLDC GLUCOMTR-MCNC: 312 MG/DL (ref 70–99)
GLUCOSE BLDC GLUCOMTR-MCNC: 340 MG/DL (ref 70–99)

## 2019-05-14 PROCEDURE — 00000146 ZZHCL STATISTIC GLUCOSE BY METER IP

## 2019-05-14 PROCEDURE — 25000125 ZZHC RX 250: Performed by: NURSE PRACTITIONER

## 2019-05-14 PROCEDURE — 25000131 ZZH RX MED GY IP 250 OP 636 PS 637: Performed by: NURSE PRACTITIONER

## 2019-05-14 PROCEDURE — 25000132 ZZH RX MED GY IP 250 OP 250 PS 637: Performed by: NURSE PRACTITIONER

## 2019-05-14 PROCEDURE — 99232 SBSQ HOSP IP/OBS MODERATE 35: CPT | Performed by: NURSE PRACTITIONER

## 2019-05-14 PROCEDURE — 12400000 ZZH R&B MH

## 2019-05-14 RX ADMIN — INSULIN GLARGINE 25 UNITS: 100 INJECTION, SOLUTION SUBCUTANEOUS at 20:29

## 2019-05-14 RX ADMIN — CLONAZEPAM 0.5 MG: 0.5 TABLET ORAL at 08:23

## 2019-05-14 RX ADMIN — LITHIUM CARBONATE 300 MG: 300 TABLET, EXTENDED RELEASE ORAL at 20:31

## 2019-05-14 RX ADMIN — GABAPENTIN 1200 MG: 400 CAPSULE ORAL at 20:30

## 2019-05-14 RX ADMIN — BUSPIRONE HYDROCHLORIDE 5 MG: 5 TABLET ORAL at 13:20

## 2019-05-14 RX ADMIN — GABAPENTIN 1200 MG: 400 CAPSULE ORAL at 13:20

## 2019-05-14 RX ADMIN — CLONAZEPAM 0.5 MG: 0.5 TABLET ORAL at 20:31

## 2019-05-14 RX ADMIN — BENZTROPINE MESYLATE 1 MG: 1 TABLET ORAL at 20:30

## 2019-05-14 RX ADMIN — LITHIUM CARBONATE 300 MG: 300 TABLET, EXTENDED RELEASE ORAL at 08:22

## 2019-05-14 RX ADMIN — METFORMIN HYDROCHLORIDE 1000 MG: 1000 TABLET ORAL at 17:06

## 2019-05-14 RX ADMIN — NICOTINE POLACRILEX 2 MG: 2 GUM, CHEWING ORAL at 08:40

## 2019-05-14 RX ADMIN — PALIPERIDONE 6 MG: 6 TABLET, FILM COATED, EXTENDED RELEASE ORAL at 08:22

## 2019-05-14 RX ADMIN — INSULIN ASPART 5 UNITS: 100 INJECTION, SOLUTION INTRAVENOUS; SUBCUTANEOUS at 08:54

## 2019-05-14 RX ADMIN — BENZTROPINE MESYLATE 1 MG: 1 TABLET ORAL at 08:23

## 2019-05-14 RX ADMIN — GABAPENTIN 1200 MG: 400 CAPSULE ORAL at 08:22

## 2019-05-14 RX ADMIN — DIVALPROEX SODIUM 2000 MG: 500 TABLET, EXTENDED RELEASE ORAL at 20:30

## 2019-05-14 RX ADMIN — INSULIN ASPART 3 UNITS: 100 INJECTION, SOLUTION INTRAVENOUS; SUBCUTANEOUS at 11:39

## 2019-05-14 RX ADMIN — PRAZOSIN HYDROCHLORIDE 1 MG: 1 CAPSULE ORAL at 20:30

## 2019-05-14 RX ADMIN — ATORVASTATIN CALCIUM 20 MG: 20 TABLET, FILM COATED ORAL at 20:30

## 2019-05-14 RX ADMIN — INSULIN ASPART 10 UNITS: 100 INJECTION, SOLUTION INTRAVENOUS; SUBCUTANEOUS at 17:07

## 2019-05-14 RX ADMIN — BUSPIRONE HYDROCHLORIDE 5 MG: 5 TABLET ORAL at 08:23

## 2019-05-14 RX ADMIN — HYDROXYZINE HYDROCHLORIDE 50 MG: 25 TABLET ORAL at 08:40

## 2019-05-14 RX ADMIN — INSULIN ASPART 4 UNITS: 100 INJECTION, SOLUTION INTRAVENOUS; SUBCUTANEOUS at 17:07

## 2019-05-14 RX ADMIN — METFORMIN HYDROCHLORIDE 1000 MG: 1000 TABLET ORAL at 08:23

## 2019-05-14 ASSESSMENT — ACTIVITIES OF DAILY LIVING (ADL)
DRESS: SCRUBS (BEHAVIORAL HEALTH)
ORAL_HYGIENE: INDEPENDENT
DRESS: SCRUBS (BEHAVIORAL HEALTH);INDEPENDENT
HYGIENE/GROOMING: INDEPENDENT
ORAL_HYGIENE: INDEPENDENT
HYGIENE/GROOMING: INDEPENDENT

## 2019-05-14 NOTE — PROGRESS NOTES
Sugars trending down nicely, though still consistently >200. Will plan on increasing lantus dose again at bedtime from 20 unit(s) to 25 unit(s). Continue sliding scale. Big factor here is her poor compliance so would have been nice to avoid prandial insulin if possible while still keeping sugars under decent control. However, she is consistently running high so started prandial insulin at 5u/meal, still over 300 this evening so will increase to 10u. In the past she has tolerated that in addition to her long acting insulin.

## 2019-05-14 NOTE — PLAN OF CARE
Face to face end of shift report communicated to Selina RN Rounding completed and patient observed.    Lisbet Donald  5/14/2019  800 AM

## 2019-05-14 NOTE — PLAN OF CARE
Pt in lounge and ate well for breakfast  States is feeling very depressed but denies SI  Denies hearing voices  Pt took meds without issue  Attending some groups this am  No c/o pain  Journaling  5214 Atarax 50 mg given for c/o anxiety  Speech a bit pressured

## 2019-05-14 NOTE — PLAN OF CARE
Face to face end of shift report communicated to BELKYS Donald RN. Rounding completed and patient observed, up on the unit.    Edgar Escalante  5/14/2019  3:58 PM

## 2019-05-14 NOTE — PLAN OF CARE
BEHAVIORAL TEAM DISCUSSION    Participants:  Khushi Reynolds NP, Angie Laws LSW, Becca Chery LSW, Beena Kumar LGSW, Radha Bills RN, Karley Ron RN, Chey[facility], Natalia Costello OT  Progress: None  Continued Stay Criteria/Rationale: pressured, grandiose, reports depression  Medical/Physical: Hep C positive, Diabetes - working with hospitalist  Precautions:   Behavioral Orders   Procedures     Code 1 - Restrict to Unit     Routine Programming     As clinically indicated     Status 15     Every 15 minutes.     Plan: Lauro sharpe is filing for committment  Rationale for change in precautions or plan: None    Active Problems:    Mixed hyperlipidemia    Diabetes mellitus, type 2    Schizoaffective disorder, bipolar type (H)    Psychosis (H)      Current Facility-Administered Medications:      acetaminophen (TYLENOL) tablet 975 mg, 975 mg, Oral, Q6H PRN, Raven Stout NP     atorvastatin (LIPITOR) tablet 20 mg, 20 mg, Oral, At Bedtime, Airam Light NP, 20 mg at 05/13/19 2017     benztropine (COGENTIN) tablet 1 mg, 1 mg, Oral, BID, Airam Light NP, 1 mg at 05/14/19 0823     busPIRone (BUSPAR) tablet 5 mg, 5 mg, Oral, TID, Airam Light NP, 5 mg at 05/14/19 0823     clonazePAM (klonoPIN) tablet 0.5 mg, 0.5 mg, Oral, BID, Airam Light NP, 0.5 mg at 05/14/19 0823     glucose gel 15-30 g, 15-30 g, Oral, Q15 Min PRN **OR** dextrose 50 % injection 25-50 mL, 25-50 mL, Intravenous, Q15 Min PRN **OR** glucagon injection 1 mg, 1 mg, Subcutaneous, Q15 Min PRN, Shawn Wylie MD     glucose gel 15-30 g, 15-30 g, Oral, Q15 Min PRN **OR** dextrose 50 % injection 25-50 mL, 25-50 mL, Intravenous, Q15 Min PRN **OR** glucagon injection 1 mg, 1 mg, Subcutaneous, Q15 Min PRN, Light, April Keturah, NP     divalproex sodium extended-release (DEPAKOTE ER) 24 hr tablet 2,000 mg, 2,000 mg, Oral, At Bedtime, Light, April Keturah, NP, 2,000 mg at 05/13/19 2017      gabapentin (NEURONTIN) capsule 1,200 mg, 1,200 mg, Oral, TID, Airam Light NP, 1,200 mg at 05/14/19 0822     hydrOXYzine (ATARAX) tablet 25-50 mg, 25-50 mg, Oral, Q4H PRN, Raven Stout NP, 50 mg at 05/14/19 0840     insulin aspart (NovoLOG) inj (RAPID ACTING), 5 Units, Subcutaneous, TID w/meals, Denisse Malin NP, 5 Units at 05/14/19 0853     insulin aspart (NovoLOG) inj (RAPID ACTING), 1-7 Units, Subcutaneous, TID AC, Airam Light NP, 5 Units at 05/14/19 0854     insulin aspart (NovoLOG) inj (RAPID ACTING), 1-5 Units, Subcutaneous, At Bedtime, Airam Light NP, 3 Units at 05/13/19 2010     insulin glargine (LANTUS PEN) injection 20 Units, 20 Units, Subcutaneous, At Bedtime, Denisse Malin NP, 20 Units at 05/13/19 2016     lithium ER (LITHOBID) CR tablet 300 mg, 300 mg, Oral, Q12H SAMANTHA, Kuldeep April Keturah, NP, 300 mg at 05/14/19 0822     metFORMIN (GLUCOPHAGE) tablet 1,000 mg, 1,000 mg, Oral, BID w/meals, Light, April Keturah, NP, 1,000 mg at 05/14/19 0823     nicotine (NICORETTE) gum 2-4 mg, 2-4 mg, Buccal, Q1H PRN, Raven Stout NP, 2 mg at 05/14/19 0840     OLANZapine (zyPREXA) tablet 10 mg, 10 mg, Oral, TID PRN, 10 mg at 05/12/19 1549 **OR** OLANZapine (zyPREXA) injection 10 mg, 10 mg, Intramuscular, TID PRN, Raven Stout NP     paliperidone ER (INVEGA) 24 hr tablet 6 mg, 6 mg, Oral, Daily, Kuldeep April Keturah, NP, 6 mg at 05/14/19 0822     prazosin (MINIPRESS) capsule 1 mg, 1 mg, Oral, At Bedtime, Kuldeep, April JACKIE Rebollar, 1 mg at 05/13/19 2016

## 2019-05-14 NOTE — PLAN OF CARE
"Problem: Adult Behavioral Health Plan of Care  Goal: Patient-Specific Goal (Individualization)  Description  Patient will attend at least 50% of groups while on unit.  Patient will report at least 6-8 hours of sleep at night.  Patient will be compliant with treatment team recommendations.  Patient will be medication compliant while hospitalized.  Patient will have decrease in delusional statements by discharge.     5/13/2019 2125 by Edgar Escalante RN  Outcome: No Change  Pt has spent much of the shift in bed, resting. Reports feeling \"like a zombie.\" Continues with rambling, mumbled speech. Difficult to understand much of the time. Denied pain. Told this nurse \"I'm angry\" and \"I'm mad as hell.\" Pt then began to ramble about \"pulling their eyeballs out\" and asked this nurse if I had ever witnessed this being done. Pt stated \"I have.\" Does remain oriented to person, place, and situation--occasionally confused as to the time. Denied SI. Has remained free from self-harm and/or injury this shift. Ate 100% of supper meal and 100% of snack. Blood sugars 339 mg/dl and 341 mg/dl this evening. Remains compliant with medications as prescribed. Did not request any PRN medications this shift.     Problem: Thought Process Alteration  Goal: Optimal Thought Clarity  Description  Patient will hold a reality based conversation by discharge.    5/13/2019 2125 by Edgar Escalante, RN  Outcome: No Change        "

## 2019-05-14 NOTE — PROGRESS NOTES
Spoke with Pt about treatment options and how she was feeling. Pt was agreeable to go to an IRTS program after her medications were straightened out and she was feeling better.

## 2019-05-14 NOTE — PLAN OF CARE
Face to face end of shift report communicated from - OBDULIO Carlton. Rounding completed and patient observed. Lying on right side eyes closed - non-labored breathing noted.      Selina Siegel  5/14/2019  3:02 AM

## 2019-05-14 NOTE — PROGRESS NOTES
"[facility]  Psychiatric Progress Note      Impression:   Ginette Wood is a 39 year old female re-admitted on 5/10/19, nine days after discharge from this hospital, with decompensation and increased agitation, disorganization and increase in delusions.    Patient interviewed in her room.  She is tangential in her thoughts stating she needs Ambien to sleep \"used to take two 10 mg twice a day..I need it\". Patient then requests ativan and tylenol #3, all controlled substances that are abusable.  She expressed concern about her toes with blackened nails and had painted all other toenails red, with much polish on skin by nails, except the blackened nails. Agreeable to have foot soaks ordered for comfort. Patient rambling with illogical thoughts regarding medication she needs.  Patient's medications were reviewed and no changes are made today.     Educated regarding medication indications, risks, benefits, side effects, contraindications and possible interactions. Verbally expressed understanding.        Diagnoses:        Schizoaffective disorder, bipolar type   Methamphetamine use disorder, moderate to severe                Attestation:  Patient has been seen and evaluated by me,  LATRICE Gallegos CNP          Interim History:   The patient's care was discussed with the treatment team and chart notes were reviewed.          Medications:     Current Facility-Administered Medications Ordered in Epic   Medication Dose Route Frequency Last Rate Last Dose     acetaminophen (TYLENOL) tablet 975 mg  975 mg Oral Q6H PRN         atorvastatin (LIPITOR) tablet 20 mg  20 mg Oral At Bedtime   20 mg at 05/11/19 2108     benztropine (COGENTIN) tablet 1 mg  1 mg Oral BID   1 mg at 05/12/19 0812     busPIRone (BUSPAR) tablet 5 mg  5 mg Oral TID   5 mg at 05/12/19 1316     carBAMazepine (TEGretol XR) 12 hr tablet 100 mg  100 mg Oral BID         clonazePAM (klonoPIN) tablet 0.5 mg  0.5 mg Oral BID   0.5 mg at 05/12/19 0811 " "    glucose gel 15-30 g  15-30 g Oral Q15 Min PRN        Or     dextrose 50 % injection 25-50 mL  25-50 mL Intravenous Q15 Min PRN        Or     glucagon injection 1 mg  1 mg Subcutaneous Q15 Min PRN         glucose gel 15-30 g  15-30 g Oral Q15 Min PRN        Or     dextrose 50 % injection 25-50 mL  25-50 mL Intravenous Q15 Min PRN        Or     glucagon injection 1 mg  1 mg Subcutaneous Q15 Min PRN         divalproex sodium extended-release (DEPAKOTE ER) 24 hr tablet 2,000 mg  2,000 mg Oral At Bedtime   2,000 mg at 05/11/19 2109     gabapentin (NEURONTIN) capsule 1,200 mg  1,200 mg Oral TID   1,200 mg at 05/12/19 1316     hydrOXYzine (ATARAX) tablet 25-50 mg  25-50 mg Oral Q4H PRN   50 mg at 05/12/19 1151     ibuprofen (ADVIL/MOTRIN) tablet 800 mg  800 mg Oral Q6H PRN         insulin aspart (NovoLOG) inj (RAPID ACTING)  1-7 Units Subcutaneous TID AC   3 Units at 05/12/19 1151     insulin aspart (NovoLOG) inj (RAPID ACTING)  1-5 Units Subcutaneous At Bedtime   2 Units at 05/11/19 2116     insulin glargine (LANTUS PEN) injection 10 Units  10 Units Subcutaneous At Bedtime   10 Units at 05/11/19 2111     metFORMIN (GLUCOPHAGE) tablet 1,000 mg  1,000 mg Oral BID w/meals   1,000 mg at 05/12/19 0810     nicotine (NICORETTE) gum 2-4 mg  2-4 mg Buccal Q1H PRN   4 mg at 05/12/19 1052     OLANZapine (zyPREXA) tablet 10 mg  10 mg Oral TID PRN   10 mg at 05/11/19 1800    Or     OLANZapine (zyPREXA) injection 10 mg  10 mg Intramuscular TID PRN         [START ON 5/13/2019] paliperidone ER (INVEGA) 24 hr tablet 6 mg  6 mg Oral Daily         prazosin (MINIPRESS) capsule 1 mg  1 mg Oral At Bedtime   1 mg at 05/11/19 2108     No current Epic-ordered outpatient medications on file.              10 point ROS negative \"pain everywhere\"       Allergies:     Allergies   Allergen Reactions     Haloperidol Other (See Comments)     Other reaction(s): Seizures  Patient states, \"I fell down and wasn't able to get up.\"  Patient states, \"I fell " "down and wasn't able to get up.\"              Psychiatric Examination:   /73   Pulse 96   Temp 98.6  F (37  C) (Tympanic)   Resp 14   Ht 1.626 m (5' 4\")   Wt 79.8 kg (175 lb 14.4 oz)   SpO2 98%   BMI 30.19 kg/m    Weight is 175 lbs 14.4 oz  Body mass index is 30.19 kg/m .    Appearance:  awake, alert still mildly sedated  Attitude:  cooperative  Eye Contact:  fair  Mood:  anxious  Affect:  intensity is flat  Speech:  pressured speech  Psychomotor Behavior:  no evidence of tardive dyskinesia, dystonia, or tics  Thought Process:  disorganized  Associations:  loosening of associations present  Thought Content:  auditory hallucinations present  Insight:  limited  Judgment:  poor  Oriented to:  time, person, and place  Attention Span and Concentration:  limited  Recent and Remote Memory:  fair  Fund of Knowledge: low-normal  Muscle Strength and Tone: normal  Gait and Station: Normal         Labs:     Results for orders placed or performed during the hospital encounter of 05/10/19 (from the past 24 hour(s))   Glucose by meter   Result Value Ref Range    Glucose 339 (H) 70 - 99 mg/dL   Glucose by meter   Result Value Ref Range    Glucose 341 (H) 70 - 99 mg/dL   Glucose by meter   Result Value Ref Range    Glucose 340 (H) 70 - 99 mg/dL   Glucose by meter   Result Value Ref Range    Glucose 275 (H) 70 - 99 mg/dL          Plan:   Continue Inpatient Hospitalization  Discontinue Buspar, patient on other medications for anxiety  Hospitalist following diabetes  [facility] is going forward with petition for commitment.  ELOS:  > 5 days due to symptoms of psychosis and delusions along with Civil Commitment process        "

## 2019-05-14 NOTE — PLAN OF CARE
"Slept approximately 6 hours was up early around 0300 for a snack - returned to bed for approx an hour and then has  been up in her room or in lounge - is doing her calendar type work - I.e. Numbers and  lines - had another snack  of non - sugar pudding and  vj crackers   - blood glucose  level  is 340 this am.  Pt somewhat on  edge - wants to \"get my daughter after treatment and leave to go somewhere\"  "

## 2019-05-15 LAB
GLUCOSE BLDC GLUCOMTR-MCNC: 273 MG/DL (ref 70–99)
GLUCOSE BLDC GLUCOMTR-MCNC: 293 MG/DL (ref 70–99)
GLUCOSE BLDC GLUCOMTR-MCNC: 296 MG/DL (ref 70–99)
GLUCOSE BLDC GLUCOMTR-MCNC: 357 MG/DL (ref 70–99)
GLUCOSE BLDC GLUCOMTR-MCNC: 374 MG/DL (ref 70–99)
GLUCOSE BLDC GLUCOMTR-MCNC: 390 MG/DL (ref 70–99)

## 2019-05-15 PROCEDURE — 25000132 ZZH RX MED GY IP 250 OP 250 PS 637: Performed by: NURSE PRACTITIONER

## 2019-05-15 PROCEDURE — 99232 SBSQ HOSP IP/OBS MODERATE 35: CPT | Performed by: NURSE PRACTITIONER

## 2019-05-15 PROCEDURE — 00000146 ZZHCL STATISTIC GLUCOSE BY METER IP

## 2019-05-15 PROCEDURE — 12400000 ZZH R&B MH

## 2019-05-15 PROCEDURE — 25000125 ZZHC RX 250: Performed by: NURSE PRACTITIONER

## 2019-05-15 RX ORDER — CLONAZEPAM 0.5 MG/1
0.5 TABLET ORAL 3 TIMES DAILY
Status: DISCONTINUED | OUTPATIENT
Start: 2019-05-15 | End: 2019-05-17

## 2019-05-15 RX ADMIN — INSULIN ASPART 3 UNITS: 100 INJECTION, SOLUTION INTRAVENOUS; SUBCUTANEOUS at 07:51

## 2019-05-15 RX ADMIN — PALIPERIDONE 6 MG: 6 TABLET, FILM COATED, EXTENDED RELEASE ORAL at 07:54

## 2019-05-15 RX ADMIN — INSULIN ASPART 10 UNITS: 100 INJECTION, SOLUTION INTRAVENOUS; SUBCUTANEOUS at 17:12

## 2019-05-15 RX ADMIN — LITHIUM CARBONATE 300 MG: 300 TABLET, EXTENDED RELEASE ORAL at 07:55

## 2019-05-15 RX ADMIN — INSULIN ASPART 5 UNITS: 100 INJECTION, SOLUTION INTRAVENOUS; SUBCUTANEOUS at 11:47

## 2019-05-15 RX ADMIN — BENZTROPINE MESYLATE 1 MG: 1 TABLET ORAL at 07:55

## 2019-05-15 RX ADMIN — HYDROXYZINE HYDROCHLORIDE 50 MG: 25 TABLET ORAL at 03:41

## 2019-05-15 RX ADMIN — INSULIN GLARGINE 25 UNITS: 100 INJECTION, SOLUTION SUBCUTANEOUS at 20:18

## 2019-05-15 RX ADMIN — BENZTROPINE MESYLATE 1 MG: 1 TABLET ORAL at 21:05

## 2019-05-15 RX ADMIN — ATORVASTATIN CALCIUM 20 MG: 20 TABLET, FILM COATED ORAL at 21:05

## 2019-05-15 RX ADMIN — INSULIN ASPART 5 UNITS: 100 INJECTION, SOLUTION INTRAVENOUS; SUBCUTANEOUS at 17:12

## 2019-05-15 RX ADMIN — CLONAZEPAM 0.5 MG: 0.5 TABLET ORAL at 07:55

## 2019-05-15 RX ADMIN — METFORMIN HYDROCHLORIDE 1000 MG: 1000 TABLET ORAL at 07:54

## 2019-05-15 RX ADMIN — GABAPENTIN 1200 MG: 400 CAPSULE ORAL at 21:04

## 2019-05-15 RX ADMIN — ACETAMINOPHEN 975 MG: 325 TABLET, FILM COATED ORAL at 11:09

## 2019-05-15 RX ADMIN — CLONAZEPAM 0.5 MG: 0.5 TABLET ORAL at 13:33

## 2019-05-15 RX ADMIN — DIVALPROEX SODIUM 2000 MG: 500 TABLET, EXTENDED RELEASE ORAL at 21:05

## 2019-05-15 RX ADMIN — GABAPENTIN 1200 MG: 400 CAPSULE ORAL at 13:33

## 2019-05-15 RX ADMIN — NICOTINE POLACRILEX 4 MG: 2 GUM, CHEWING ORAL at 12:42

## 2019-05-15 RX ADMIN — GABAPENTIN 1200 MG: 400 CAPSULE ORAL at 07:54

## 2019-05-15 RX ADMIN — INSULIN ASPART 10 UNITS: 100 INJECTION, SOLUTION INTRAVENOUS; SUBCUTANEOUS at 11:47

## 2019-05-15 RX ADMIN — METFORMIN HYDROCHLORIDE 1000 MG: 1000 TABLET ORAL at 17:13

## 2019-05-15 RX ADMIN — INSULIN ASPART 10 UNITS: 100 INJECTION, SOLUTION INTRAVENOUS; SUBCUTANEOUS at 07:58

## 2019-05-15 RX ADMIN — PRAZOSIN HYDROCHLORIDE 1 MG: 1 CAPSULE ORAL at 21:05

## 2019-05-15 ASSESSMENT — ACTIVITIES OF DAILY LIVING (ADL)
HYGIENE/GROOMING: INDEPENDENT
ORAL_HYGIENE: INDEPENDENT
ORAL_HYGIENE: INDEPENDENT
DRESS: SCRUBS (BEHAVIORAL HEALTH);INDEPENDENT
HYGIENE/GROOMING: INDEPENDENT
DRESS: SCRUBS (BEHAVIORAL HEALTH)

## 2019-05-15 NOTE — PLAN OF CARE
Problem: Adult Behavioral Health Plan of Care  Goal: Patient-Specific Goal (Individualization)  Description  Patient will attend at least 50% of groups while on unit.  Patient will report at least 6-8 hours of sleep at night.  Patient will be compliant with treatment team recommendations.  Patient will be medication compliant while hospitalized.  Patient will have decrease in delusional statements by discharge.     5/14/2019 1958 by Edgar Escalante, RN  Outcome: No Change  Pt was up on the unit at the beginning of the shift, sitting in the lounge with headphones on. Continues to keep mostly to herself. Did not attend any groups this shift. Blood sugar before supper 312 mg/dl. Prandial insulin increased by hospitalist (to 10 units). Sliding scale coverage (4 units) also administered. Ate 100% of supper meal. Slept approximately 6 hours last night. Pt has been in bed, resting, since after supper.     2245 Blood sugar this evening (before HS snack) 233 mg/dl. Sliding scale coverage (1 unit) given. HS Lantus 25 units also given. Pt soaked her feet in warm water with Epsom salt. Toenail on the second toe of both feet black in color.    2300 Face to face end of shift report communicated to OBDULIO Marks.     Edgar Escalante  5/14/2019  11:05 PM             Problem: Thought Process Alteration  Goal: Optimal Thought Clarity  Description  Patient will hold a reality based conversation by discharge.    5/14/2019 1958 by Edgar Escalante, RN  Outcome: No Change

## 2019-05-15 NOTE — PLAN OF CARE
Poor sleep - in fact none is noted - she was up all night coloring her calendar type material and listening to head phones - did accept  the fact that she wasn't able to snack did have two cups of  decaf tea to sooth throat - was pleasant, polite and appropriate is currently in the [facility] area watching news - did praise  for not eating - and being compliant as  we try to get  a hold  on  these blood  sugars.  Advised her to try to sleep at least some time during the day  or afternoon,

## 2019-05-15 NOTE — PLAN OF CARE
"Face to face end of shift report communicated from: OBDULIO Hernández. Rounding completed and patient observed.is awake in her room - coloring in  Her calender type papers - was asking for \"something to eat\" advised her about the new order for 0200 glucose  Check and we needed to watch her levels more closely - so no  Snack at   This time,  She did ask me observe her toes on both feet which I did both fourth toenail bed are dusky grey/black toe body itself is a color that is wnl pt does say the toe joints on each foot. Did get pt some ice water she had requested and some warm decaffinated tea without sweetner she requested for her \"dry sore throat\"             Selina Siegel  5/15/2019  2:27 AM          "

## 2019-05-15 NOTE — PLAN OF CARE
BEHAVIORAL TEAM DISCUSSION    Participants: Loren Zuleta NP, Dennise Herrera LICSW, Angie Laws LSW, Becca Chery LSW, Beena Kumar LGSW, Opal Pillai RN, Radha Bills RN, Karley Ron RN, [facility] [facility], Mady Holman OT, Natalia Costello OT  Progress: minimal, no sleep last night  Continued Stay Criteria/Rationale: irrational, delusional, grandiose  Medical/Physical: Hep C positive, diabetes - working with hospitalist, black and blue toetail - foot soaks ordered  Precautions:   Falls precaution?: No  Behavioral Orders   Procedures    Code 1 - Restrict to Unit    Routine Programming     As clinically indicated    Status 15     Every 15 minutes.     Plan: Lauro Lauren is filing for comittment, continue to stabilize, restarted Lithium  Rationale for change in precautions or plan: None    Active Problems:    Mixed hyperlipidemia    Diabetes mellitus, type 2    Schizoaffective disorder, bipolar type (H)    Psychosis (H)      Current Facility-Administered Medications:     acetaminophen (TYLENOL) tablet 975 mg, 975 mg, Oral, Q6H PRN, Raven Stout NP    atorvastatin (LIPITOR) tablet 20 mg, 20 mg, Oral, At Bedtime, Airam Light NP, 20 mg at 05/14/19 2030    benztropine (COGENTIN) tablet 1 mg, 1 mg, Oral, BID, Airam Light NP, 1 mg at 05/15/19 0755    clonazePAM (klonoPIN) tablet 0.5 mg, 0.5 mg, Oral, BID, Airam Light NP, 0.5 mg at 05/15/19 0755    glucose gel 15-30 g, 15-30 g, Oral, Q15 Min PRN **OR** dextrose 50 % injection 25-50 mL, 25-50 mL, Intravenous, Q15 Min PRN **OR** glucagon injection 1 mg, 1 mg, Subcutaneous, Q15 Min PRN, Shawn Wylie MD    glucose gel 15-30 g, 15-30 g, Oral, Q15 Min PRN **OR** dextrose 50 % injection 25-50 mL, 25-50 mL, Intravenous, Q15 Min PRN **OR** glucagon injection 1 mg, 1 mg, Subcutaneous, Q15 Min PRN, Airam Light, NP    divalproex sodium extended-release (DEPAKOTE ER) 24 hr tablet 2,000 mg, 2,000 mg, Oral, At  Bedtime, Airam Light NP, 2,000 mg at 05/14/19 2030    gabapentin (NEURONTIN) capsule 1,200 mg, 1,200 mg, Oral, TID, Airam Light NP, 1,200 mg at 05/15/19 0754    hydrOXYzine (ATARAX) tablet 25-50 mg, 25-50 mg, Oral, Q4H PRN, Raven Stout NP, 50 mg at 05/15/19 0341    insulin aspart (NovoLOG) inj (RAPID ACTING), 10 Units, Subcutaneous, TID w/meals, Denisse Malin NP, 10 Units at 05/15/19 0758    insulin aspart (NovoLOG) inj (RAPID ACTING), 1-7 Units, Subcutaneous, TID AC, Airam Light NP, 3 Units at 05/15/19 0751    insulin aspart (NovoLOG) inj (RAPID ACTING), 1-5 Units, Subcutaneous, At Bedtime, Airam Light NP, 1 Units at 05/14/19 2028    insulin glargine (LANTUS PEN) injection 25 Units, 25 Units, Subcutaneous, At Bedtime, Denisse Malin NP, 25 Units at 05/14/19 2029    lithium ER (LITHOBID) CR tablet 300 mg, 300 mg, Oral, Q12H SAMANTHA, Airam Light NP, 300 mg at 05/15/19 0755    magnesium sulfate (EPSOM SALT) granules 5 g, 5 g, Other, BID PRN **AND** Patient care order, , , Per Unit Routine, Khushi Reynolds Mc, APRN CNP    metFORMIN (GLUCOPHAGE) tablet 1,000 mg, 1,000 mg, Oral, BID w/meals, Airam Light NP, 1,000 mg at 05/15/19 0754    nicotine (NICORETTE) gum 2-4 mg, 2-4 mg, Buccal, Q1H PRN, Raven Stout NP, 2 mg at 05/14/19 0840    OLANZapine (zyPREXA) tablet 10 mg, 10 mg, Oral, TID PRN, 10 mg at 05/12/19 1549 **OR** OLANZapine (zyPREXA) injection 10 mg, 10 mg, Intramuscular, TID PRN, Raven Stout, NP    paliperidone ER (INVEGA) 24 hr tablet 6 mg, 6 mg, Oral, Daily, Airam Light NP, 6 mg at 05/15/19 0754    prazosin (MINIPRESS) capsule 1 mg, 1 mg, Oral, At Bedtime, Airam Light NP, 1 mg at 05/14/19 2030

## 2019-05-15 NOTE — PLAN OF CARE
"  Problem: Adult Behavioral Health Plan of Care  Goal: Patient-Specific Goal (Individualization)  Description  Patient will attend at least 50% of groups while on unit.  Patient will report at least 6-8 hours of sleep at night.  Patient will be compliant with treatment team recommendations.  Patient will be medication compliant while hospitalized.  Patient will have decrease in delusional statements by discharge.     Outcome: No Change  Note:   VSS-temp slightly elevated at 99.4, pain rated 8/10 to bilat ft. Tylenol 975 mg rec'd. Denies SI, SIB, HI or hallucinations.  Isolative/withdrawn. Up I lounge making \"calendars\" this AM. Spends the rest of day in room with exception of meals. BG remain elevated (see flowsheet) Novolg rec'd as ordered.   Speech  is slightly slurred-pt appears very tired. Answers Y/N questions then speech becomes tangential. States \"I never got my meds, they never came to the post office box, later states someone kept them from her states \"I'll cut them--bring it on bitches!\" very disorganized.      Face to face end of shift report communicated to Avis LANGSTON RN. Rounding completed and patient observed.    Nancy Stern  5/15/2019  2:35 PM          Problem: Thought Process Alteration  Goal: Optimal Thought Clarity  Description  Patient will hold a reality based conversation by discharge.    Outcome: No Change  Note:   Unable to have linear conversation at this time.      Problem: Fall Injury Risk  Goal: Absence of Fall and Fall-Related Injury  Outcome: No Change     "

## 2019-05-15 NOTE — PLAN OF CARE
Spoke with Denis from [place] - he was out yesterday but had sent someone to the daughter's place on Monday to get the petition signed and is not sure if it got signed - he will track on it and let us know today and will fax over paperwork.  He is also faxing over Rep Payee paperwork for her for the provider to sign.

## 2019-05-15 NOTE — PROGRESS NOTES
[facility]  Psychiatric Progress Note      Impression:   Ginette Wood is a 39 year old female re-admitted on 5/10/19, nine days after discharge from this hospital, with decompensation and increased agitation, disorganization and increase in delusions.  Genie reports she is not able to sleep at night. Nursing also reports she is sleeping poorly. Genie did well with a one mg dose of Klonopin at bedtime last time she was here. She is asking about medications for depression. Did discuss that she is on Lithium for depression and mood stabilization. Genie does agree to give this medication time to work. Genie is somewhat difficult to understand as her speech is slightly delayed and slurred. May decrease the gabapentin and will check a depakote level as well as an ammonia level.   Educated regarding medication indications, risks, benefits, side effects, contraindications and possible interactions. Verbally expressed understanding.        Diagnoses:        Schizoaffective disorder, bipolar type   Methamphetamine use disorder, moderate to severe       Attestation:  Patient has been seen and evaluated by me,  Loren Zuleta NP          Interim History:   The patient's care was discussed with the treatment team and chart notes were reviewed.          Medications:     Current Facility-Administered Medications Ordered in Epic   Medication Dose Route Frequency Last Rate Last Dose     acetaminophen (TYLENOL) tablet 975 mg  975 mg Oral Q6H PRN         atorvastatin (LIPITOR) tablet 20 mg  20 mg Oral At Bedtime   20 mg at 05/11/19 2108     benztropine (COGENTIN) tablet 1 mg  1 mg Oral BID   1 mg at 05/12/19 0812     busPIRone (BUSPAR) tablet 5 mg  5 mg Oral TID   5 mg at 05/12/19 1316     carBAMazepine (TEGretol XR) 12 hr tablet 100 mg  100 mg Oral BID         clonazePAM (klonoPIN) tablet 0.5 mg  0.5 mg Oral BID   0.5 mg at 05/12/19 0811     glucose gel 15-30 g  15-30 g Oral Q15 Min PRN        Or     dextrose 50 %  "injection 25-50 mL  25-50 mL Intravenous Q15 Min PRN        Or     glucagon injection 1 mg  1 mg Subcutaneous Q15 Min PRN         glucose gel 15-30 g  15-30 g Oral Q15 Min PRN        Or     dextrose 50 % injection 25-50 mL  25-50 mL Intravenous Q15 Min PRN        Or     glucagon injection 1 mg  1 mg Subcutaneous Q15 Min PRN         divalproex sodium extended-release (DEPAKOTE ER) 24 hr tablet 2,000 mg  2,000 mg Oral At Bedtime   2,000 mg at 05/11/19 2109     gabapentin (NEURONTIN) capsule 1,200 mg  1,200 mg Oral TID   1,200 mg at 05/12/19 1316     hydrOXYzine (ATARAX) tablet 25-50 mg  25-50 mg Oral Q4H PRN   50 mg at 05/12/19 1151     ibuprofen (ADVIL/MOTRIN) tablet 800 mg  800 mg Oral Q6H PRN         insulin aspart (NovoLOG) inj (RAPID ACTING)  1-7 Units Subcutaneous TID AC   3 Units at 05/12/19 1151     insulin aspart (NovoLOG) inj (RAPID ACTING)  1-5 Units Subcutaneous At Bedtime   2 Units at 05/11/19 2116     insulin glargine (LANTUS PEN) injection 10 Units  10 Units Subcutaneous At Bedtime   10 Units at 05/11/19 2111     metFORMIN (GLUCOPHAGE) tablet 1,000 mg  1,000 mg Oral BID w/meals   1,000 mg at 05/12/19 0810     nicotine (NICORETTE) gum 2-4 mg  2-4 mg Buccal Q1H PRN   4 mg at 05/12/19 1052     OLANZapine (zyPREXA) tablet 10 mg  10 mg Oral TID PRN   10 mg at 05/11/19 1800    Or     OLANZapine (zyPREXA) injection 10 mg  10 mg Intramuscular TID PRN         [START ON 5/13/2019] paliperidone ER (INVEGA) 24 hr tablet 6 mg  6 mg Oral Daily         prazosin (MINIPRESS) capsule 1 mg  1 mg Oral At Bedtime   1 mg at 05/11/19 2108     No current Epic-ordered outpatient medications on file.              10 point ROS negative \"pain everywhere\"       Allergies:     Allergies   Allergen Reactions     Haloperidol Other (See Comments)     Other reaction(s): Seizures  Patient states, \"I fell down and wasn't able to get up.\"  Patient states, \"I fell down and wasn't able to get up.\"              Psychiatric Examination:   BP " "109/65   Pulse 90   Temp 99.4  F (37.4  C) (Tympanic)   Resp 16   Ht 1.626 m (5' 4\")   Wt 79.8 kg (175 lb 14.4 oz)   SpO2 99%   BMI 30.19 kg/m    Weight is 175 lbs 14.4 oz  Body mass index is 30.19 kg/m .    Appearance:  awake, alert still mildly sedated  Attitude:  cooperative  Eye Contact:  fair  Mood:  anxious  Affect:  intensity is flat  Speech:  pressured speech  Psychomotor Behavior:  no evidence of tardive dyskinesia, dystonia, or tics  Thought Process:  disorganized  Associations:  loosening of associations present  Thought Content:  auditory hallucinations present  Insight:  limited  Judgment:  poor  Oriented to:  time, person, and place  Attention Span and Concentration:  limited  Recent and Remote Memory:  fair  Fund of Knowledge: low-normal  Muscle Strength and Tone: normal  Gait and Station: Normal         Labs:     Results for orders placed or performed during the hospital encounter of 05/10/19 (from the past 24 hour(s))   Glucose by meter   Result Value Ref Range    Glucose 312 (H) 70 - 99 mg/dL   Glucose by meter   Result Value Ref Range    Glucose 233 (H) 70 - 99 mg/dL   Glucose by meter   Result Value Ref Range    Glucose 390 (H) 70 - 99 mg/dL   Glucose by meter   Result Value Ref Range    Glucose 273 (H) 70 - 99 mg/dL   Glucose by meter   Result Value Ref Range    Glucose 357 (H) 70 - 99 mg/dL          Plan:   depakote level  Ammonia Level  Increase klonpin to 1 mg twice a day  Hospitalist following diabetes  [facility] is going forward with petition for commitment.  ELOS:  > 5 days due to symptoms of psychosis and delusions along with Civil Commitment process        "

## 2019-05-16 LAB
ALBUMIN SERPL-MCNC: 3.6 G/DL (ref 3.4–5)
ALP SERPL-CCNC: 63 U/L (ref 40–150)
ALT SERPL W P-5'-P-CCNC: 15 U/L (ref 0–50)
AMMONIA PLAS-SCNC: <10 UMOL/L (ref 10–50)
ANION GAP SERPL CALCULATED.3IONS-SCNC: 5 MMOL/L (ref 3–14)
AST SERPL W P-5'-P-CCNC: 3 U/L (ref 0–45)
BASOPHILS # BLD AUTO: 0.1 10E9/L (ref 0–0.2)
BASOPHILS NFR BLD AUTO: 0.5 %
BILIRUB SERPL-MCNC: 0.2 MG/DL (ref 0.2–1.3)
BUN SERPL-MCNC: 10 MG/DL (ref 7–30)
CALCIUM SERPL-MCNC: 9.3 MG/DL (ref 8.5–10.1)
CHLORIDE SERPL-SCNC: 106 MMOL/L (ref 94–109)
CO2 SERPL-SCNC: 27 MMOL/L (ref 20–32)
CREAT SERPL-MCNC: 0.49 MG/DL (ref 0.52–1.04)
DIFFERENTIAL METHOD BLD: NORMAL
EOSINOPHIL # BLD AUTO: 0.1 10E9/L (ref 0–0.7)
EOSINOPHIL NFR BLD AUTO: 0.9 %
ERYTHROCYTE [DISTWIDTH] IN BLOOD BY AUTOMATED COUNT: 13.1 % (ref 10–15)
GFR SERPL CREATININE-BSD FRML MDRD: >90 ML/MIN/{1.73_M2}
GLUCOSE BLDC GLUCOMTR-MCNC: 199 MG/DL (ref 70–99)
GLUCOSE BLDC GLUCOMTR-MCNC: 201 MG/DL (ref 70–99)
GLUCOSE BLDC GLUCOMTR-MCNC: 255 MG/DL (ref 70–99)
GLUCOSE BLDC GLUCOMTR-MCNC: 270 MG/DL (ref 70–99)
GLUCOSE BLDC GLUCOMTR-MCNC: 302 MG/DL (ref 70–99)
GLUCOSE BLDC GLUCOMTR-MCNC: 302 MG/DL (ref 70–99)
GLUCOSE SERPL-MCNC: 193 MG/DL (ref 70–99)
HCT VFR BLD AUTO: 41.3 % (ref 35–47)
HGB BLD-MCNC: 13.3 G/DL (ref 11.7–15.7)
IMM GRANULOCYTES # BLD: 0.2 10E9/L (ref 0–0.4)
IMM GRANULOCYTES NFR BLD: 1.6 %
LYMPHOCYTES # BLD AUTO: 3 10E9/L (ref 0.8–5.3)
LYMPHOCYTES NFR BLD AUTO: 29.9 %
MCH RBC QN AUTO: 28.2 PG (ref 26.5–33)
MCHC RBC AUTO-ENTMCNC: 32.2 G/DL (ref 31.5–36.5)
MCV RBC AUTO: 88 FL (ref 78–100)
MONOCYTES # BLD AUTO: 0.8 10E9/L (ref 0–1.3)
MONOCYTES NFR BLD AUTO: 7.8 %
NEUTROPHILS # BLD AUTO: 5.9 10E9/L (ref 1.6–8.3)
NEUTROPHILS NFR BLD AUTO: 59.3 %
NRBC # BLD AUTO: 0 10*3/UL
NRBC BLD AUTO-RTO: 0 /100
PLATELET # BLD AUTO: 195 10E9/L (ref 150–450)
POTASSIUM SERPL-SCNC: 4.9 MMOL/L (ref 3.4–5.3)
PROT SERPL-MCNC: 7.4 G/DL (ref 6.8–8.8)
RBC # BLD AUTO: 4.71 10E12/L (ref 3.8–5.2)
SODIUM SERPL-SCNC: 138 MMOL/L (ref 133–144)
VALPROATE SERPL-MCNC: 84 MG/L (ref 50–100)
WBC # BLD AUTO: 9.9 10E9/L (ref 4–11)

## 2019-05-16 PROCEDURE — 12400000 ZZH R&B MH

## 2019-05-16 PROCEDURE — 00000146 ZZHCL STATISTIC GLUCOSE BY METER IP

## 2019-05-16 PROCEDURE — 80164 ASSAY DIPROPYLACETIC ACD TOT: CPT | Performed by: NURSE PRACTITIONER

## 2019-05-16 PROCEDURE — 25000132 ZZH RX MED GY IP 250 OP 250 PS 637: Performed by: NURSE PRACTITIONER

## 2019-05-16 PROCEDURE — 25000125 ZZHC RX 250: Performed by: NURSE PRACTITIONER

## 2019-05-16 PROCEDURE — 36415 COLL VENOUS BLD VENIPUNCTURE: CPT | Performed by: NURSE PRACTITIONER

## 2019-05-16 PROCEDURE — 99232 SBSQ HOSP IP/OBS MODERATE 35: CPT | Performed by: NURSE PRACTITIONER

## 2019-05-16 PROCEDURE — 85025 COMPLETE CBC W/AUTO DIFF WBC: CPT | Performed by: NURSE PRACTITIONER

## 2019-05-16 PROCEDURE — 82140 ASSAY OF AMMONIA: CPT | Performed by: NURSE PRACTITIONER

## 2019-05-16 PROCEDURE — 80053 COMPREHEN METABOLIC PANEL: CPT | Performed by: NURSE PRACTITIONER

## 2019-05-16 RX ORDER — BENZTROPINE MESYLATE 0.5 MG/1
0.5 TABLET ORAL 2 TIMES DAILY
Status: DISCONTINUED | OUTPATIENT
Start: 2019-05-16 | End: 2019-05-22

## 2019-05-16 RX ADMIN — ATORVASTATIN CALCIUM 20 MG: 20 TABLET, FILM COATED ORAL at 20:04

## 2019-05-16 RX ADMIN — GABAPENTIN 1000 MG: 400 CAPSULE ORAL at 08:07

## 2019-05-16 RX ADMIN — INSULIN ASPART 4 UNITS: 100 INJECTION, SOLUTION INTRAVENOUS; SUBCUTANEOUS at 16:58

## 2019-05-16 RX ADMIN — ACETAMINOPHEN 975 MG: 325 TABLET, FILM COATED ORAL at 20:04

## 2019-05-16 RX ADMIN — GABAPENTIN 1000 MG: 400 CAPSULE ORAL at 13:47

## 2019-05-16 RX ADMIN — INSULIN ASPART 10 UNITS: 100 INJECTION, SOLUTION INTRAVENOUS; SUBCUTANEOUS at 16:58

## 2019-05-16 RX ADMIN — PRAZOSIN HYDROCHLORIDE 1 MG: 1 CAPSULE ORAL at 20:04

## 2019-05-16 RX ADMIN — BENZTROPINE MESYLATE 0.5 MG: 0.5 TABLET ORAL at 08:08

## 2019-05-16 RX ADMIN — CLONAZEPAM 0.5 MG: 0.5 TABLET ORAL at 08:08

## 2019-05-16 RX ADMIN — INSULIN GLARGINE 25 UNITS: 100 INJECTION, SOLUTION SUBCUTANEOUS at 20:03

## 2019-05-16 RX ADMIN — BENZTROPINE MESYLATE 0.5 MG: 0.5 TABLET ORAL at 20:04

## 2019-05-16 RX ADMIN — PALIPERIDONE 6 MG: 6 TABLET, FILM COATED, EXTENDED RELEASE ORAL at 08:08

## 2019-05-16 RX ADMIN — HYDROXYZINE HYDROCHLORIDE 50 MG: 25 TABLET ORAL at 02:21

## 2019-05-16 RX ADMIN — INSULIN ASPART 10 UNITS: 100 INJECTION, SOLUTION INTRAVENOUS; SUBCUTANEOUS at 12:02

## 2019-05-16 RX ADMIN — GABAPENTIN 1000 MG: 400 CAPSULE ORAL at 20:10

## 2019-05-16 RX ADMIN — CLONAZEPAM 0.5 MG: 0.5 TABLET ORAL at 13:48

## 2019-05-16 RX ADMIN — INSULIN ASPART 3 UNITS: 100 INJECTION, SOLUTION INTRAVENOUS; SUBCUTANEOUS at 12:03

## 2019-05-16 RX ADMIN — CLONAZEPAM 0.5 MG: 0.5 TABLET ORAL at 20:10

## 2019-05-16 RX ADMIN — EPSOM SALT 5 G: 1 GRANULE ORAL; TOPICAL at 13:20

## 2019-05-16 RX ADMIN — INSULIN ASPART 2 UNITS: 100 INJECTION, SOLUTION INTRAVENOUS; SUBCUTANEOUS at 08:08

## 2019-05-16 RX ADMIN — METFORMIN HYDROCHLORIDE 1000 MG: 1000 TABLET ORAL at 16:58

## 2019-05-16 RX ADMIN — METFORMIN HYDROCHLORIDE 1000 MG: 1000 TABLET ORAL at 08:07

## 2019-05-16 RX ADMIN — INSULIN ASPART 10 UNITS: 100 INJECTION, SOLUTION INTRAVENOUS; SUBCUTANEOUS at 08:08

## 2019-05-16 RX ADMIN — DIVALPROEX SODIUM 2000 MG: 500 TABLET, EXTENDED RELEASE ORAL at 20:04

## 2019-05-16 ASSESSMENT — ACTIVITIES OF DAILY LIVING (ADL)
ORAL_HYGIENE: INDEPENDENT
ORAL_HYGIENE: INDEPENDENT
HYGIENE/GROOMING: INDEPENDENT
HYGIENE/GROOMING: INDEPENDENT
DRESS: SCRUBS (BEHAVIORAL HEALTH)
DRESS: SCRUBS (BEHAVIORAL HEALTH);INDEPENDENT
LAUNDRY: UNABLE TO COMPLETE

## 2019-05-16 NOTE — PLAN OF CARE
Asleep in her room - in a supine position - on top of her covers - eyes closed - non-labored breathing noted.

## 2019-05-16 NOTE — PLAN OF CARE
BEHAVIORAL TEAM DISCUSSION    Participants: Loren Zuleta NP, Angie Laws LSW, Becca Chery LSW, Beena Kumar [facility], Opal Pillai RN, Krys Rojo RN, Radha Bills RN, Ayah Matos RN, Natalia Costello OT, Gena Gomez OT  Progress: fair  Continued Stay Criteria/Rationale: irritable, psychosis, grandiose  Medical/Physical: Hep C positive, diabetes - working with hospitalist, black and blue toetail - foot soaks ordered  Precautions:   Falls precaution?: No  Behavioral Orders   Procedures    Code 1 - Restrict to Unit    Routine Programming     As clinically indicated    Status 15     Every 15 minutes.     Plan: discontinued Lithium, start Clozaril, [facility] is filing for committment  Rationale for change in precautions or plan: None    Active Problems:    Mixed hyperlipidemia    Diabetes mellitus, type 2    Schizoaffective disorder, bipolar type (H)    Psychosis (H)      Current Facility-Administered Medications:     acetaminophen (TYLENOL) tablet 975 mg, 975 mg, Oral, Q6H PRN, Raven Stout NP, 975 mg at 05/15/19 1109    atorvastatin (LIPITOR) tablet 20 mg, 20 mg, Oral, At Bedtime, Airam Light NP, 20 mg at 05/15/19 2105    benztropine (COGENTIN) tablet 0.5 mg, 0.5 mg, Oral, BID, Loren Zuleta NP, 0.5 mg at 05/16/19 0808    clonazePAM (klonoPIN) tablet 0.5 mg, 0.5 mg, Oral, TID, Loren Zuleta NP, 0.5 mg at 05/16/19 0808    glucose gel 15-30 g, 15-30 g, Oral, Q15 Min PRN **OR** dextrose 50 % injection 25-50 mL, 25-50 mL, Intravenous, Q15 Min PRN **OR** glucagon injection 1 mg, 1 mg, Subcutaneous, Q15 Min PRN, Shawn Wylie MD    glucose gel 15-30 g, 15-30 g, Oral, Q15 Min PRN **OR** dextrose 50 % injection 25-50 mL, 25-50 mL, Intravenous, Q15 Min PRN **OR** glucagon injection 1 mg, 1 mg, Subcutaneous, Q15 Min PRN, Airam Light NP    divalproex sodium extended-release (DEPAKOTE ER) 24 hr tablet 2,000 mg, 2,000 mg, Oral, At Bedtime, Airam Light NP, 2,000 mg at  05/15/19 2105    gabapentin (NEURONTIN) capsule 1,000 mg, 1,000 mg, Oral, TID, Loren Zuleta NP, 1,000 mg at 05/16/19 0807    hydrOXYzine (ATARAX) tablet 25-50 mg, 25-50 mg, Oral, Q4H PRN, Raven Stout NP, 50 mg at 05/16/19 0221    insulin aspart (NovoLOG) inj (RAPID ACTING), 10 Units, Subcutaneous, TID w/meals, Denisse Malin NP, 10 Units at 05/16/19 0808    insulin aspart (NovoLOG) inj (RAPID ACTING), 1-7 Units, Subcutaneous, TID AC, Airam Light NP, 2 Units at 05/16/19 0808    insulin aspart (NovoLOG) inj (RAPID ACTING), 1-5 Units, Subcutaneous, At Bedtime, Airam Light NP, 2 Units at 05/15/19 2018    insulin glargine (LANTUS PEN) injection 25 Units, 25 Units, Subcutaneous, At Bedtime, Denisse Malin NP, 25 Units at 05/15/19 2018    magnesium sulfate (EPSOM SALT) granules 5 g, 5 g, Other, BID PRN **AND** Patient care order, , , Per Unit Routine, Khushi Reynolds Mc, APRN CNP    metFORMIN (GLUCOPHAGE) tablet 1,000 mg, 1,000 mg, Oral, BID w/meals, Kuldeep, April Keturah, NP, 1,000 mg at 05/16/19 0807    nicotine (NICORETTE) gum 2-4 mg, 2-4 mg, Buccal, Q1H PRN, Raven Stout NP, 4 mg at 05/15/19 1242    OLANZapine (zyPREXA) tablet 10 mg, 10 mg, Oral, TID PRN, 10 mg at 05/12/19 1549 **OR** OLANZapine (zyPREXA) injection 10 mg, 10 mg, Intramuscular, TID PRN, Raven Stout NP    paliperidone ER (INVEGA) 24 hr tablet 6 mg, 6 mg, Oral, Daily, Kuldeep, April Keturah, NP, 6 mg at 05/16/19 0808    prazosin (MINIPRESS) capsule 1 mg, 1 mg, Oral, At Bedtime, Light, April JACKIE Rebollar, 1 mg at 05/15/19 1912

## 2019-05-16 NOTE — PLAN OF CARE
"Slept better this noc - approx 5 hours - [facility] doing her calendar work - \"making a list of items she wants when discharged.  Accuchecks done on schedule and at pts request - 170 - 787 - 201 - did have diet soda twice - and one no sugar pudding - remains upset with her family is worried about her dtr and her brothers.  Face to face end of shift report communicated to OBDULIO Zavala. Rounding completed and patient observed.    Selina Siegel  5/16/2019  6:56 AM          "

## 2019-05-16 NOTE — PLAN OF CARE
"Problem: Adult Behavioral Health Plan of Care  Goal: Patient-Specific Goal (Individualization)  Description  Patient will attend at least 50% of groups while on unit.  Patient will report at least 6-8 hours of sleep at night.  Patient will be compliant with treatment team recommendations.  Patient will be medication compliant while hospitalized.  Patient will have decrease in delusional statements by discharge.    [facility] at start of shift. Pt stated slept well, according to charting pt slept approx 5 hours last night. Pt compliant with medications. Pt stated \"I'm just mad, give me my meds.\" and would not answer any other questions at this time. BP rechecked prior to medications due to low BP upon waking. /77 Pulse 98. Pt ate 100% of breakfast. Pt then returned to room after breakfast. Will continue to monitor.    OT tried cheater glasses with pt this am. None of them worked well enough. Pt wanting to switch prescription over to [facility] to be able to get glasses while here. [facility] stated they do take MA but have not heard of [facility]. Informed pt of this and that she can call and switch her prescription if desired. Pt irritable with staff and did not appear to comprehend information.     1130   1200 Pt ate 100% of lunch.  1300 Foot soak with epsom salt completed. Second toenails black.    Outcome: No Change     Problem: Thought Process Alteration  Goal: Optimal Thought Clarity  Description  Patient will hold a reality based conversation by discharge.      Outcome: No Change    Face to face end of shift report communicated to Avis. Patient observed up in [facility].    Sally Sumner  5/16/2019  2:20 PM           "

## 2019-05-16 NOTE — PLAN OF CARE
Problem: Adult Behavioral Health Plan of Care  Goal: Patient-Specific Goal (Individualization)  Description  Patient will attend at least 50% of groups while on unit.  Patient will report at least 6-8 hours of sleep at night.  Patient will be compliant with treatment team recommendations.  Patient will be medication compliant while hospitalized.  Patient will have decrease in delusional statements by discharge.     5/15/2019 2237 by Edgar Escalante RN  Outcome: No Change  Pt was lethargic much of the shift. Spent the beginning of the shift in bed, resting/sleeping. Speech remains garbled and mumbled much of the time. Blood sugar 374 mg/dl before supper. Scheduled Novolog insulin (10 units) and Novolog sliding scale coverage (5 units) given. Pt did get up for supper meal and ate 100%. Pt also requested and received a snack (sugar free pudding and one pack of vj crackers) shortly after. Blood sugar recheck 293 mg/dl at approximately 1825. Pt spent the rest of the evening in bed, resting/sleeping. At approximately 1930, pt was up in her room and extremely lethargic with mumbled, garbled speech. Pt noted to be extremely diaphoretic. Denied chest pain. Denied nausea/vomiting. VSS: T 98.8F-P 98-R 16-/68-SaO2 99% on room air. Blood sugar 296 mg/dl. Pt ate 100% of HS snack. On-call provider (Gokul Hathaway NP) updated regarding pt condition. Per provider, hold HS Lithium and Klonipin. Approximately one hour later, pt up in the lounge and appeared alert and no longer diaphoretic. Pt soaked her feet in warm water with Epsom salt. Toenails on second toe of (L) and (R) foot remain purplish-black in color.     Problem: Thought Process Alteration  Goal: Optimal Thought Clarity  Description  Patient will hold a reality based conversation by discharge.    5/15/2019 2237 by Edgar Escalante RN  Outcome: No Change  Pt remains oriented to person, place and situation. Occasionally confused as to time. Continues  "to ramble about \"those bitches\" and \"I'm going to fuck them up,\" etc. Difficult to follow.     Problem: Fall Injury Risk  Goal: Absence of Fall and Fall-Related Injury  5/15/2019 2237 by Edgar Escalante, RN  Outcome: No Change  Gait unsteady, at times-when pt is lethargic. Gait steady and balanced, otherwise. Remains in a room close to the nurses' station. Pt educated on \"call, don't fall\"--but will require repeated reminders. Remained free from fall and/or injury this shift.    1780  Face to face end of shift report communicated to LINA Siegel RN.    Edgar Escalante  5/15/2019  11:16 PM                   "

## 2019-05-16 NOTE — PROGRESS NOTES
[facility]  Psychiatric Progress Note      Impression:   Ginette Wood is a 39 year old female re-admitted on 5/10/19, nine days after discharge from this hospital, with decompensation and increased agitation, disorganization and increase in delusions.  Genie is more alert and less sedate this morning. She is able to speak in clearer sentences. However she remains somewhat tangential. She has some delusional statements but this is decreased from previously. She continues to have mood lability and irritability. Discussed treatment options with her, did discuss clozaril with her for management of symptoms of psychosis and mood lability. She is in agreement to try this medication. Discussed that she may be able to taper off of depakote if it works well. Also discussed that she does not have a tolerance for lithium as it does appear to sedate her.        Diagnoses:        Schizoaffective disorder, bipolar type   Methamphetamine use disorder, moderate to severe       Attestation:  Patient has been seen and evaluated by me,  Loren Zuleta, JACKIE          Interim History:   The patient's care was discussed with the treatment team and chart notes were reviewed.          Medications:     Current Facility-Administered Medications Ordered in Epic   Medication Dose Route Frequency Last Rate Last Dose     acetaminophen (TYLENOL) tablet 975 mg  975 mg Oral Q6H PRN         atorvastatin (LIPITOR) tablet 20 mg  20 mg Oral At Bedtime   20 mg at 05/11/19 2108     benztropine (COGENTIN) tablet 1 mg  1 mg Oral BID   1 mg at 05/12/19 0812     busPIRone (BUSPAR) tablet 5 mg  5 mg Oral TID   5 mg at 05/12/19 1316     carBAMazepine (TEGretol XR) 12 hr tablet 100 mg  100 mg Oral BID         clonazePAM (klonoPIN) tablet 0.5 mg  0.5 mg Oral BID   0.5 mg at 05/12/19 0811     glucose gel 15-30 g  15-30 g Oral Q15 Min PRN        Or     dextrose 50 % injection 25-50 mL  25-50 mL Intravenous Q15 Min PRN        Or     glucagon injection  "1 mg  1 mg Subcutaneous Q15 Min PRN         glucose gel 15-30 g  15-30 g Oral Q15 Min PRN        Or     dextrose 50 % injection 25-50 mL  25-50 mL Intravenous Q15 Min PRN        Or     glucagon injection 1 mg  1 mg Subcutaneous Q15 Min PRN         divalproex sodium extended-release (DEPAKOTE ER) 24 hr tablet 2,000 mg  2,000 mg Oral At Bedtime   2,000 mg at 05/11/19 2109     gabapentin (NEURONTIN) capsule 1,200 mg  1,200 mg Oral TID   1,200 mg at 05/12/19 1316     hydrOXYzine (ATARAX) tablet 25-50 mg  25-50 mg Oral Q4H PRN   50 mg at 05/12/19 1151     ibuprofen (ADVIL/MOTRIN) tablet 800 mg  800 mg Oral Q6H PRN         insulin aspart (NovoLOG) inj (RAPID ACTING)  1-7 Units Subcutaneous TID AC   3 Units at 05/12/19 1151     insulin aspart (NovoLOG) inj (RAPID ACTING)  1-5 Units Subcutaneous At Bedtime   2 Units at 05/11/19 2116     insulin glargine (LANTUS PEN) injection 10 Units  10 Units Subcutaneous At Bedtime   10 Units at 05/11/19 2111     metFORMIN (GLUCOPHAGE) tablet 1,000 mg  1,000 mg Oral BID w/meals   1,000 mg at 05/12/19 0810     nicotine (NICORETTE) gum 2-4 mg  2-4 mg Buccal Q1H PRN   4 mg at 05/12/19 1052     OLANZapine (zyPREXA) tablet 10 mg  10 mg Oral TID PRN   10 mg at 05/11/19 1800    Or     OLANZapine (zyPREXA) injection 10 mg  10 mg Intramuscular TID PRN         [START ON 5/13/2019] paliperidone ER (INVEGA) 24 hr tablet 6 mg  6 mg Oral Daily         prazosin (MINIPRESS) capsule 1 mg  1 mg Oral At Bedtime   1 mg at 05/11/19 2108     No current Epic-ordered outpatient medications on file.              10 point ROS negative \"pain everywhere\"       Allergies:     Allergies   Allergen Reactions     Haloperidol Other (See Comments)     Other reaction(s): Seizures  Patient states, \"I fell down and wasn't able to get up.\"  Patient states, \"I fell down and wasn't able to get up.\"              Psychiatric Examination:   /77 (BP Location: Left arm)   Pulse 98   Temp 98.2  F (36.8  C) (Tympanic)   Resp " "16    1.626 m (5' 4\")   Wt 79.8 kg (175 lb 14.4 oz)   SpO2 99%   BMI 30.19 kg/m    Weight is 175 lbs 14.4 oz  Body mass index is 30.19 kg/m .    Appearance:  awake, alert   Attitude:  cooperative  Eye Contact:  fair  Mood:  Slightly irritable  Affect: mood congruent  Speech:  pressured speech  Psychomotor Behavior:  no evidence of tardive dyskinesia, dystonia, or tics  Thought Process:  disorganized  Associations:  loosening of associations present  Thought Content:  auditory hallucinations present  Insight:  limited  Judgment:  poor  Oriented to:  time, person, and place  Attention Span and Concentration:  limited  Recent and Remote Memory:  fair  Fund of Knowledge: low-normal  Muscle Strength and Tone: normal  Gait and Station: Normal         Labs:     Results for orders placed or performed during the hospital encounter of 05/10/19 (from the past 24 hour(s))   Glucose by meter   Result Value Ref Range    Glucose 357 (H) 70 - 99 mg/dL   Glucose by meter   Result Value Ref Range    Glucose 374 (H) 70 - 99 mg/dL   Glucose by meter   Result Value Ref Range    Glucose 293 (H) 70 - 99 mg/dL   Glucose by meter   Result Value Ref Range    Glucose 296 (H) 70 - 99 mg/dL   Glucose by meter   Result Value Ref Range    Glucose 255 (H) 70 - 99 mg/dL   Glucose by meter   Result Value Ref Range    Glucose 199 (H) 70 - 99 mg/dL   Valproic acid   Result Value Ref Range    Valproic Acid Level 84 50 - 100 mg/L   Ammonia   Result Value Ref Range    Ammonia <10 (L) 10 - 50 umol/L   Comprehensive metabolic panel   Result Value Ref Range    Sodium 138 133 - 144 mmol/L    Potassium 4.9 3.4 - 5.3 mmol/L    Chloride 106 94 - 109 mmol/L    Carbon Dioxide 27 20 - 32 mmol/L    Anion Gap 5 3 - 14 mmol/L    Glucose 193 (H) 70 - 99 mg/dL    Urea Nitrogen 10 7 - 30 mg/dL    Creatinine 0.49 (L) 0.52 - 1.04 mg/dL    GFR Estimate >90 >60 mL/min/[1.73_m2]    GFR Estimate If Black >90 >60 mL/min/[1.73_m2]    Calcium 9.3 8.5 - 10.1 mg/dL    " Bilirubin Total 0.2 0.2 - 1.3 mg/dL    Albumin 3.6 3.4 - 5.0 g/dL    Protein Total 7.4 6.8 - 8.8 g/dL    Alkaline Phosphatase 63 40 - 150 U/L    ALT 15 0 - 50 U/L    AST 3 0 - 45 U/L   CBC with platelets differential   Result Value Ref Range    WBC 9.9 4.0 - 11.0 10e9/L    RBC Count 4.71 3.8 - 5.2 10e12/L    Hemoglobin 13.3 11.7 - 15.7 g/dL    Hematocrit 41.3 35.0 - 47.0 %    MCV 88 78 - 100 fl    MCH 28.2 26.5 - 33.0 pg    MCHC 32.2 31.5 - 36.5 g/dL    RDW 13.1 10.0 - 15.0 %    Platelet Count 195 150 - 450 10e9/L    Diff Method Automated Method     % Neutrophils 59.3 %    % Lymphocytes 29.9 %    % Monocytes 7.8 %    % Eosinophils 0.9 %    % Basophils 0.5 %    % Immature Granulocytes 1.6 %    Nucleated RBCs 0 0 /100    Absolute Neutrophil 5.9 1.6 - 8.3 10e9/L    Absolute Lymphocytes 3.0 0.8 - 5.3 10e9/L    Absolute Monocytes 0.8 0.0 - 1.3 10e9/L    Absolute Eosinophils 0.1 0.0 - 0.7 10e9/L    Absolute Basophils 0.1 0.0 - 0.2 10e9/L    Abs Immature Granulocytes 0.2 0 - 0.4 10e9/L    Absolute Nucleated RBC 0.0    Glucose by meter   Result Value Ref Range    Glucose 201 (H) 70 - 99 mg/dL          Plan:   Chem panel  CBC with diff  Discontinue lithium due to poor tolerance  Consider starting clozaril.  Hospitalist following diabetes  [facility] is going forward with petition for commitment.  ELOS:  > 5 days due to symptoms of psychosis and delusions along with Civil Commitment process

## 2019-05-17 LAB
GLUCOSE BLDC GLUCOMTR-MCNC: 254 MG/DL (ref 70–99)
GLUCOSE BLDC GLUCOMTR-MCNC: 264 MG/DL (ref 70–99)
GLUCOSE BLDC GLUCOMTR-MCNC: 265 MG/DL (ref 70–99)
GLUCOSE BLDC GLUCOMTR-MCNC: 282 MG/DL (ref 70–99)
GLUCOSE BLDC GLUCOMTR-MCNC: 297 MG/DL (ref 70–99)

## 2019-05-17 PROCEDURE — 12400000 ZZH R&B MH

## 2019-05-17 PROCEDURE — 25000125 ZZHC RX 250: Performed by: NURSE PRACTITIONER

## 2019-05-17 PROCEDURE — 00000146 ZZHCL STATISTIC GLUCOSE BY METER IP

## 2019-05-17 PROCEDURE — 99232 SBSQ HOSP IP/OBS MODERATE 35: CPT | Performed by: NURSE PRACTITIONER

## 2019-05-17 PROCEDURE — 25000132 ZZH RX MED GY IP 250 OP 250 PS 637: Performed by: NURSE PRACTITIONER

## 2019-05-17 RX ORDER — CLONAZEPAM 0.5 MG/1
0.5 TABLET ORAL 3 TIMES DAILY
Status: DISPENSED | OUTPATIENT
Start: 2019-05-17 | End: 2019-05-18

## 2019-05-17 RX ORDER — CLONAZEPAM 0.25 MG/1
0.25 TABLET, ORALLY DISINTEGRATING ORAL DAILY
Status: COMPLETED | OUTPATIENT
Start: 2019-05-21 | End: 2019-05-23

## 2019-05-17 RX ORDER — CLONAZEPAM 0.25 MG/1
0.25 TABLET, ORALLY DISINTEGRATING ORAL 3 TIMES DAILY
Status: COMPLETED | OUTPATIENT
Start: 2019-05-18 | End: 2019-05-18

## 2019-05-17 RX ORDER — CLONAZEPAM 0.25 MG/1
0.25 TABLET, ORALLY DISINTEGRATING ORAL 2 TIMES DAILY
Status: COMPLETED | OUTPATIENT
Start: 2019-05-19 | End: 2019-05-20

## 2019-05-17 RX ORDER — CLOZAPINE 25 MG/1
25 TABLET ORAL AT BEDTIME
Status: DISCONTINUED | OUTPATIENT
Start: 2019-05-17 | End: 2019-05-18

## 2019-05-17 RX ADMIN — BENZTROPINE MESYLATE 0.5 MG: 0.5 TABLET ORAL at 20:15

## 2019-05-17 RX ADMIN — INSULIN ASPART 10 UNITS: 100 INJECTION, SOLUTION INTRAVENOUS; SUBCUTANEOUS at 08:08

## 2019-05-17 RX ADMIN — BENZTROPINE MESYLATE 0.5 MG: 0.5 TABLET ORAL at 08:07

## 2019-05-17 RX ADMIN — GABAPENTIN 1000 MG: 400 CAPSULE ORAL at 08:07

## 2019-05-17 RX ADMIN — GABAPENTIN 1000 MG: 400 CAPSULE ORAL at 13:40

## 2019-05-17 RX ADMIN — ATORVASTATIN CALCIUM 20 MG: 20 TABLET, FILM COATED ORAL at 20:15

## 2019-05-17 RX ADMIN — METFORMIN HYDROCHLORIDE 1000 MG: 1000 TABLET ORAL at 08:07

## 2019-05-17 RX ADMIN — HYDROXYZINE HYDROCHLORIDE 50 MG: 25 TABLET ORAL at 08:14

## 2019-05-17 RX ADMIN — INSULIN ASPART 3 UNITS: 100 INJECTION, SOLUTION INTRAVENOUS; SUBCUTANEOUS at 16:51

## 2019-05-17 RX ADMIN — CLONAZEPAM 0.5 MG: 0.5 TABLET ORAL at 08:08

## 2019-05-17 RX ADMIN — PRAZOSIN HYDROCHLORIDE 1 MG: 1 CAPSULE ORAL at 20:15

## 2019-05-17 RX ADMIN — INSULIN ASPART 3 UNITS: 100 INJECTION, SOLUTION INTRAVENOUS; SUBCUTANEOUS at 12:17

## 2019-05-17 RX ADMIN — INSULIN ASPART 10 UNITS: 100 INJECTION, SOLUTION INTRAVENOUS; SUBCUTANEOUS at 16:50

## 2019-05-17 RX ADMIN — METFORMIN HYDROCHLORIDE 1000 MG: 1000 TABLET ORAL at 16:50

## 2019-05-17 RX ADMIN — INSULIN ASPART 4 UNITS: 100 INJECTION, SOLUTION INTRAVENOUS; SUBCUTANEOUS at 08:08

## 2019-05-17 RX ADMIN — CLONAZEPAM 0.5 MG: 0.5 TABLET ORAL at 20:15

## 2019-05-17 RX ADMIN — CLONAZEPAM 0.5 MG: 0.5 TABLET ORAL at 13:40

## 2019-05-17 RX ADMIN — INSULIN ASPART 10 UNITS: 100 INJECTION, SOLUTION INTRAVENOUS; SUBCUTANEOUS at 12:17

## 2019-05-17 RX ADMIN — PALIPERIDONE 6 MG: 6 TABLET, FILM COATED, EXTENDED RELEASE ORAL at 08:08

## 2019-05-17 RX ADMIN — NICOTINE POLACRILEX 4 MG: 2 GUM, CHEWING ORAL at 11:12

## 2019-05-17 RX ADMIN — GABAPENTIN 1000 MG: 400 CAPSULE ORAL at 20:15

## 2019-05-17 RX ADMIN — DIVALPROEX SODIUM 2000 MG: 500 TABLET, EXTENDED RELEASE ORAL at 20:15

## 2019-05-17 ASSESSMENT — ACTIVITIES OF DAILY LIVING (ADL)
ORAL_HYGIENE: INDEPENDENT
DRESS: SCRUBS (BEHAVIORAL HEALTH)
HYGIENE/GROOMING: INDEPENDENT
DRESS: SCRUBS (BEHAVIORAL HEALTH);INDEPENDENT
LAUNDRY: UNABLE TO COMPLETE
ORAL_HYGIENE: INDEPENDENT
HYGIENE/GROOMING: INDEPENDENT

## 2019-05-17 NOTE — PROGRESS NOTES
[facility]  Psychiatric Progress Note      Impression:   Gniette Wood is a 39 year old female re-admitted on 5/10/19, nine days after discharge from this hospital, with decompensation and increased agitation, disorganization and increase in delusions.  Genie is awake and alert in her room this morning. She is somewhat irritable with medications and [facility], however she does say she will agree to the petition for commitment but never wants to return to [facility]. Genie agrees to try clozaril for her symptom control. She does complain about excessive hunger and thirst. The medical providers are in the process of adjusting her diabetic medications. This may decrease with proper blood sugar control. May also consider adding Topamax to help curb hunger and for additional mood stabilization.        Diagnoses:        Schizoaffective disorder, bipolar type   Methamphetamine use disorder, moderate to severe       Attestation:  Patient has been seen and evaluated by me,  Loren Zuleta NP          Interim History:   The patient's care was discussed with the treatment team and chart notes were reviewed.          Medications:     Current Facility-Administered Medications Ordered in Epic   Medication Dose Route Frequency Last Rate Last Dose     acetaminophen (TYLENOL) tablet 975 mg  975 mg Oral Q6H PRN         atorvastatin (LIPITOR) tablet 20 mg  20 mg Oral At Bedtime   20 mg at 05/11/19 2108     benztropine (COGENTIN) tablet 1 mg  1 mg Oral BID   1 mg at 05/12/19 0812     busPIRone (BUSPAR) tablet 5 mg  5 mg Oral TID   5 mg at 05/12/19 1316     carBAMazepine (TEGretol XR) 12 hr tablet 100 mg  100 mg Oral BID         clonazePAM (klonoPIN) tablet 0.5 mg  0.5 mg Oral BID   0.5 mg at 05/12/19 0811     glucose gel 15-30 g  15-30 g Oral Q15 Min PRN        Or     dextrose 50 % injection 25-50 mL  25-50 mL Intravenous Q15 Min PRN        Or     glucagon injection 1 mg  1 mg Subcutaneous Q15 Min PRN         glucose gel  "15-30 g  15-30 g Oral Q15 Min PRN        Or     dextrose 50 % injection 25-50 mL  25-50 mL Intravenous Q15 Min PRN        Or     glucagon injection 1 mg  1 mg Subcutaneous Q15 Min PRN         divalproex sodium extended-release (DEPAKOTE ER) 24 hr tablet 2,000 mg  2,000 mg Oral At Bedtime   2,000 mg at 05/11/19 2109     gabapentin (NEURONTIN) capsule 1,200 mg  1,200 mg Oral TID   1,200 mg at 05/12/19 1316     hydrOXYzine (ATARAX) tablet 25-50 mg  25-50 mg Oral Q4H PRN   50 mg at 05/12/19 1151     ibuprofen (ADVIL/MOTRIN) tablet 800 mg  800 mg Oral Q6H PRN         insulin aspart (NovoLOG) inj (RAPID ACTING)  1-7 Units Subcutaneous TID AC   3 Units at 05/12/19 1151     insulin aspart (NovoLOG) inj (RAPID ACTING)  1-5 Units Subcutaneous At Bedtime   2 Units at 05/11/19 2116     insulin glargine (LANTUS PEN) injection 10 Units  10 Units Subcutaneous At Bedtime   10 Units at 05/11/19 2111     metFORMIN (GLUCOPHAGE) tablet 1,000 mg  1,000 mg Oral BID w/meals   1,000 mg at 05/12/19 0810     nicotine (NICORETTE) gum 2-4 mg  2-4 mg Buccal Q1H PRN   4 mg at 05/12/19 1052     OLANZapine (zyPREXA) tablet 10 mg  10 mg Oral TID PRN   10 mg at 05/11/19 1800    Or     OLANZapine (zyPREXA) injection 10 mg  10 mg Intramuscular TID PRN         [START ON 5/13/2019] paliperidone ER (INVEGA) 24 hr tablet 6 mg  6 mg Oral Daily         prazosin (MINIPRESS) capsule 1 mg  1 mg Oral At Bedtime   1 mg at 05/11/19 2108     No current Epic-ordered outpatient medications on file.              10 point ROS negative \"pain everywhere\"       Allergies:     Allergies   Allergen Reactions     Haloperidol Other (See Comments)     Other reaction(s): Seizures  Patient states, \"I fell down and wasn't able to get up.\"  Patient states, \"I fell down and wasn't able to get up.\"              Psychiatric Examination:   /63   Pulse 87   Temp 98.9  F (37.2  C) (Tympanic)   Resp 16   Ht 1.626 m (5' 4\")   Wt 79.8 kg (175 lb 14.4 oz)   SpO2 98%   BMI " 30.19 kg/m    Weight is 175 lbs 14.4 oz  Body mass index is 30.19 kg/m .    Appearance:  awake, alert   Attitude:  cooperative  Eye Contact:  fair  Mood:  Slightly irritable  Affect: mood congruent  Speech:  pressured speech  Psychomotor Behavior:  no evidence of tardive dyskinesia, dystonia, or tics  Thought Process: less disorganized  Associations:  loosening of associations present but this is showing some slight improvement  Thought Content:  auditory hallucinations present  Insight:  limited  Judgment:  poor  Oriented to:  time, person, and place  Attention Span and Concentration:  limited  Recent and Remote Memory:  fair  Fund of Knowledge: low-normal  Muscle Strength and Tone: normal  Gait and Station: Normal         Labs:     Results for orders placed or performed during the hospital encounter of 05/10/19 (from the past 24 hour(s))   Glucose by meter   Result Value Ref Range    Glucose 270 (H) 70 - 99 mg/dL   Glucose by meter   Result Value Ref Range    Glucose 302 (H) 70 - 99 mg/dL   Glucose by meter   Result Value Ref Range    Glucose 302 (H) 70 - 99 mg/dL   Glucose by meter   Result Value Ref Range    Glucose 264 (H) 70 - 99 mg/dL   Glucose by meter   Result Value Ref Range    Glucose 297 (H) 70 - 99 mg/dL          Plan:   Start clozaril 25 mg at bedtime  Taper klonopin  Hospitalist following diabetes  [facility] is going forward with petition for commitment.  ELOS:  > 5 days due to symptoms of psychosis and delusions along with Civil Commitment process

## 2019-05-17 NOTE — PLAN OF CARE
"Problem: Adult Behavioral Health Plan of Care  Goal: Patient-Specific Goal (Individualization)  Description  Patient will attend at least 50% of groups while on unit.  Patient will report at least 6-8 hours of sleep at night.  Patient will be compliant with treatment team recommendations.  Patient will be medication compliant while hospitalized.  Patient will have decrease in delusional statements by discharge.     5/16/2019 1920 by Edgar Escalante RN  Outcome: No Change  Pt resting in bed at the beginning of the shift. Extremely irritable on assessment. Speech more clear--less mumbled. Blood sugar 302 mg/dl before supper meal. Prandial insulin and sliding scale coverage administered. Ate 100% of supper meal. Slept approximately 5 hours last night. Pt has been resting in bed since after supper.    2005 PRN Tylenol 975 mg PO given for c/o bilateral feet \"burning.\" Blood sugar 302 mg/dl before HS snack. Sliding scale coverage and Lantus insulin administered. Pt ate two sandwiches for an HS snack.    Problem: Thought Process Alteration  Goal: Optimal Thought Clarity  Description  Patient will hold a reality based conversation by discharge.    5/16/2019 1920 by Edgar Escalante RN  Outcome: No Change  Problem: Fall Injury Risk  Goal: Absence of Fall and Fall-Related Injury  Outcome: No Change    Face to face end of shift report communicated to OBDULIO Marks.    Edgar Escalante  5/16/2019  11:16 PM                     "

## 2019-05-17 NOTE — PLAN OF CARE
"Problem: Adult Behavioral Health Plan of Care  Goal: Patient-Specific Goal (Individualization)  Description  Patient will attend at least 50% of groups while on unit.  Patient will report at least 6-8 hours of sleep at night.  Patient will be compliant with treatment team recommendations.  Patient will be medication compliant while hospitalized.  Patient will have decrease in delusional statements by discharge.    Pt up in lounge at start of shift. Pt compliant with medication and assessment this am. When asked how she is, pt stated, \"I'm pissed off.\" Pt states she just wants this commitment process to go faster so her sister doesn't keep her kids from her. Pt does endorse anxiety. Prn atarax given at 0814. According to charting, pt slept approx. 5 hours last night. Pt ate 100% of breakfast. Pt then returned to bed. Will continue to monitor.    1200 Pt ate 100% of lunch.   1230 Pt currently sleeping in bed.    Outcome: No Change     Problem: Thought Process Alteration  Goal: Optimal Thought Clarity  Description  Patient will hold a reality based conversation by discharge.      Outcome: No Change     Problem: Fall Injury Risk  Goal: Absence of Fall and Fall-Related Injury    Outcome: Improving    Pt steady on her feet. No falls. Pt wearing appropriate footwear.     Face to face end of shift report communicated to oncoming RN. Patient observed lying in bed.    Sally Sumner  5/17/2019  3:10 PM           "

## 2019-05-17 NOTE — PLAN OF CARE
BEHAVIORAL TEAM DISCUSSION    Participants: Loren Zuleta NP, Dennise Herrera LICSW, Angie Laws LSW, Becca Chery LSW, Beena Kumar LGSW, Opal Pillai RN, Krys Rojo RN, Radha Bills RN, Ayah Matos RN, Mady Holman OT, Natalia Costello OT  Progress: minimal  Continued Stay Criteria/Rationale: grandiose, irritable, delusional  Medical/Physical: Hep C positive, diabetes - working with hospitalist, black and blue toetail - foot soaks ordered  Precautions:   Falls precaution?: No  Behavioral Orders   Procedures    Code 1 - Restrict to Unit    Routine Programming     As clinically indicated    Status 15     Every 15 minutes.     Plan: taper Clonapin, start Clozaril, [place] is filing for commitment  Rationale for change in precautions or plan: None    Active Problems:    Mixed hyperlipidemia    Diabetes mellitus, type 2    Schizoaffective disorder, bipolar type (H)    Psychosis (H)      Current Facility-Administered Medications:     acetaminophen (TYLENOL) tablet 975 mg, 975 mg, Oral, Q6H PRN, Raven Stout NP, 975 mg at 05/16/19 2004    atorvastatin (LIPITOR) tablet 20 mg, 20 mg, Oral, At Bedtime, Airam Light NP, 20 mg at 05/16/19 2004    benztropine (COGENTIN) tablet 0.5 mg, 0.5 mg, Oral, BID, Loren Zuleta, NP, 0.5 mg at 05/17/19 0807    [START ON 5/18/2019] clonazePAM (klonoPIN) ODT tab 0.25 mg, 0.25 mg, Oral, TID **FOLLOWED BY** [START ON 5/19/2019] clonazePAM (klonoPIN) ODT tab 0.25 mg, 0.25 mg, Oral, BID **FOLLOWED BY** [START ON 5/21/2019] clonazePAM (klonoPIN) ODT tab 0.25 mg, 0.25 mg, Oral, Daily, Loren Zuleta, NP    clonazePAM (klonoPIN) tablet 0.5 mg, 0.5 mg, Oral, TID, PilaraLoren, NP    cloZAPine (CLOZARIL) tablet 25 mg, 25 mg, Oral, At Bedtime, AlaspaRolandoLoren L, NP    glucose gel 15-30 g, 15-30 g, Oral, Q15 Min PRN **OR** dextrose 50 % injection 25-50 mL, 25-50 mL, Intravenous, Q15 Min PRN **OR** glucagon injection 1 mg, 1 mg, Subcutaneous, Q15 Min PRN, Dejan  Shawn FLORES MD    glucose gel 15-30 g, 15-30 g, Oral, Q15 Min PRN **OR** dextrose 50 % injection 25-50 mL, 25-50 mL, Intravenous, Q15 Min PRN **OR** glucagon injection 1 mg, 1 mg, Subcutaneous, Q15 Min PRN, Airam Light NP    divalproex sodium extended-release (DEPAKOTE ER) 24 hr tablet 2,000 mg, 2,000 mg, Oral, At Bedtime, Airam Light NP, 2,000 mg at 05/16/19 2004    gabapentin (NEURONTIN) capsule 1,000 mg, 1,000 mg, Oral, TID, Loren Zuleta NP, 1,000 mg at 05/17/19 0807    hydrOXYzine (ATARAX) tablet 25-50 mg, 25-50 mg, Oral, Q4H PRN, Raven Stout NP, 50 mg at 05/17/19 0814    insulin aspart (NovoLOG) inj (RAPID ACTING), 10 Units, Subcutaneous, TID w/meals, Denisse Malin NP, 10 Units at 05/17/19 0808    insulin aspart (NovoLOG) inj (RAPID ACTING), 1-7 Units, Subcutaneous, TID AC, Airam Light NP, 4 Units at 05/17/19 0808    insulin aspart (NovoLOG) inj (RAPID ACTING), 1-5 Units, Subcutaneous, At Bedtime, Airam Light NP, 3 Units at 05/16/19 2003    insulin glargine (LANTUS PEN) injection 30 Units, 30 Units, Subcutaneous, At Bedtime, Davi Owens MD    magnesium sulfate (EPSOM SALT) granules 5 g, 5 g, Other, BID PRN, 5 g at 05/16/19 1320 **AND** Patient care order, , , Per Unit Routine, Khushi Reynolds Mc, APRN CNP    metFORMIN (GLUCOPHAGE) tablet 1,000 mg, 1,000 mg, Oral, BID w/meals, Airam Light NP, 1,000 mg at 05/17/19 0807    nicotine (NICORETTE) gum 2-4 mg, 2-4 mg, Buccal, Q1H PRN, Raven Stout NP, 4 mg at 05/15/19 1242    OLANZapine (zyPREXA) tablet 10 mg, 10 mg, Oral, TID PRN, 10 mg at 05/12/19 1549 **OR** OLANZapine (zyPREXA) injection 10 mg, 10 mg, Intramuscular, TID PRN, Raven Stout, NP    prazosin (MINIPRESS) capsule 1 mg, 1 mg, Oral, At Bedtime, Airam Light NP, 1 mg at 05/16/19 2004

## 2019-05-17 NOTE — PLAN OF CARE
Left another message for Lauro Lauren about commitment paperwork.  Patient out in the day room - states she is more down and sad today - states she wants to go to treatment first and then IRTS because she used again -let her know that we may look at doing IRTS first because her MH issues are pretty significant.

## 2019-05-17 NOTE — PLAN OF CARE
"Face to face end of shift report c  ommunicated to next shift. Rounding completed and patient observed.    Selina Siegel  5/17/2019  7:17 AM          Observed patient in her room - she is awake and sitting up on the bed - denies pain or discomfort - asked for \"something to drink\" given diet 7up type soda - advised pt I will be in in 30 to 45 min.to check her accucheck - pt pleasant, polite and appropriate.    accu check at 0600 297 - pt has only had two cups of sugar free soda - is currently eating four saltine crackers.    "

## 2019-05-17 NOTE — PROGRESS NOTES
Blood sugars remain in the 2-300 range.  Will increase her Lantus dose up to 30 units continue with sliding scale and oral metformin.  Has been no evidence of any hypoglycemia.

## 2019-05-18 LAB
ALBUMIN UR-MCNC: NEGATIVE MG/DL
APPEARANCE UR: CLEAR
BILIRUB UR QL STRIP: NEGATIVE
COLOR UR AUTO: ABNORMAL
DEPRECATED S PYO AG THROAT QL EIA: NORMAL
GLUCOSE BLDC GLUCOMTR-MCNC: 190 MG/DL (ref 70–99)
GLUCOSE BLDC GLUCOMTR-MCNC: 263 MG/DL (ref 70–99)
GLUCOSE BLDC GLUCOMTR-MCNC: 264 MG/DL (ref 70–99)
GLUCOSE BLDC GLUCOMTR-MCNC: 278 MG/DL (ref 70–99)
GLUCOSE BLDC GLUCOMTR-MCNC: 291 MG/DL (ref 70–99)
GLUCOSE BLDC GLUCOMTR-MCNC: 341 MG/DL (ref 70–99)
GLUCOSE UR STRIP-MCNC: >1000 MG/DL
HGB UR QL STRIP: ABNORMAL
KETONES UR STRIP-MCNC: 5 MG/DL
LEUKOCYTE ESTERASE UR QL STRIP: NEGATIVE
NITRATE UR QL: NEGATIVE
PH UR STRIP: 6 PH (ref 4.7–8)
SOURCE: ABNORMAL
SP GR UR STRIP: 1.02 (ref 1–1.03)
SPECIMEN SOURCE: NORMAL
UROBILINOGEN UR STRIP-MCNC: NORMAL MG/DL (ref 0–2)

## 2019-05-18 PROCEDURE — 87081 CULTURE SCREEN ONLY: CPT | Performed by: NURSE PRACTITIONER

## 2019-05-18 PROCEDURE — 25000132 ZZH RX MED GY IP 250 OP 250 PS 637: Performed by: NURSE PRACTITIONER

## 2019-05-18 PROCEDURE — 25000125 ZZHC RX 250: Performed by: NURSE PRACTITIONER

## 2019-05-18 PROCEDURE — 87880 STREP A ASSAY W/OPTIC: CPT | Performed by: NURSE PRACTITIONER

## 2019-05-18 PROCEDURE — 81003 URINALYSIS AUTO W/O SCOPE: CPT | Performed by: INTERNAL MEDICINE

## 2019-05-18 PROCEDURE — 99232 SBSQ HOSP IP/OBS MODERATE 35: CPT | Performed by: INTERNAL MEDICINE

## 2019-05-18 PROCEDURE — 00000146 ZZHCL STATISTIC GLUCOSE BY METER IP

## 2019-05-18 PROCEDURE — 99232 SBSQ HOSP IP/OBS MODERATE 35: CPT | Performed by: NURSE PRACTITIONER

## 2019-05-18 PROCEDURE — 12400000 ZZH R&B MH

## 2019-05-18 PROCEDURE — 25000131 ZZH RX MED GY IP 250 OP 636 PS 637: Performed by: NURSE PRACTITIONER

## 2019-05-18 RX ORDER — TOPIRAMATE 25 MG/1
25 TABLET, FILM COATED ORAL DAILY
Status: DISCONTINUED | OUTPATIENT
Start: 2019-05-18 | End: 2019-05-24

## 2019-05-18 RX ORDER — QUETIAPINE FUMARATE 25 MG/1
25-50 TABLET, FILM COATED ORAL
Status: DISCONTINUED | OUTPATIENT
Start: 2019-05-18 | End: 2019-05-19

## 2019-05-18 RX ORDER — CLOZAPINE 25 MG/1
25 TABLET ORAL AT BEDTIME
Status: DISCONTINUED | OUTPATIENT
Start: 2019-05-18 | End: 2019-05-19

## 2019-05-18 RX ADMIN — METFORMIN HYDROCHLORIDE 1000 MG: 1000 TABLET ORAL at 16:59

## 2019-05-18 RX ADMIN — ACETAMINOPHEN 975 MG: 325 TABLET, FILM COATED ORAL at 13:31

## 2019-05-18 RX ADMIN — INSULIN ASPART 10 UNITS: 100 INJECTION, SOLUTION INTRAVENOUS; SUBCUTANEOUS at 08:14

## 2019-05-18 RX ADMIN — BENZTROPINE MESYLATE 0.5 MG: 0.5 TABLET ORAL at 20:39

## 2019-05-18 RX ADMIN — CLONAZEPAM 0.25 MG: 0.25 TABLET, ORALLY DISINTEGRATING ORAL at 20:47

## 2019-05-18 RX ADMIN — INSULIN ASPART 3 UNITS: 100 INJECTION, SOLUTION INTRAVENOUS; SUBCUTANEOUS at 08:14

## 2019-05-18 RX ADMIN — ACETAMINOPHEN 975 MG: 325 TABLET, FILM COATED ORAL at 04:54

## 2019-05-18 RX ADMIN — GABAPENTIN 1000 MG: 400 CAPSULE ORAL at 20:38

## 2019-05-18 RX ADMIN — HYDROXYZINE HYDROCHLORIDE 50 MG: 25 TABLET ORAL at 13:31

## 2019-05-18 RX ADMIN — CLONAZEPAM 0.25 MG: 0.25 TABLET, ORALLY DISINTEGRATING ORAL at 08:13

## 2019-05-18 RX ADMIN — CLOZAPINE 25 MG: 25 TABLET ORAL at 20:39

## 2019-05-18 RX ADMIN — ACETAMINOPHEN 975 MG: 325 TABLET, FILM COATED ORAL at 20:46

## 2019-05-18 RX ADMIN — PRAZOSIN HYDROCHLORIDE 1 MG: 1 CAPSULE ORAL at 20:39

## 2019-05-18 RX ADMIN — ATORVASTATIN CALCIUM 20 MG: 20 TABLET, FILM COATED ORAL at 20:39

## 2019-05-18 RX ADMIN — TOPIRAMATE 25 MG: 25 TABLET, FILM COATED ORAL at 10:58

## 2019-05-18 RX ADMIN — GABAPENTIN 1000 MG: 400 CAPSULE ORAL at 13:25

## 2019-05-18 RX ADMIN — HYDROXYZINE HYDROCHLORIDE 50 MG: 25 TABLET ORAL at 01:58

## 2019-05-18 RX ADMIN — EPSOM SALT 5 G: 1 GRANULE ORAL; TOPICAL at 13:31

## 2019-05-18 RX ADMIN — INSULIN ASPART 10 UNITS: 100 INJECTION, SOLUTION INTRAVENOUS; SUBCUTANEOUS at 16:52

## 2019-05-18 RX ADMIN — METFORMIN HYDROCHLORIDE 1000 MG: 1000 TABLET ORAL at 08:14

## 2019-05-18 RX ADMIN — INSULIN ASPART 3 UNITS: 100 INJECTION, SOLUTION INTRAVENOUS; SUBCUTANEOUS at 11:56

## 2019-05-18 RX ADMIN — DIVALPROEX SODIUM 2000 MG: 500 TABLET, EXTENDED RELEASE ORAL at 20:38

## 2019-05-18 RX ADMIN — GABAPENTIN 1000 MG: 400 CAPSULE ORAL at 08:13

## 2019-05-18 RX ADMIN — BENZTROPINE MESYLATE 0.5 MG: 0.5 TABLET ORAL at 08:13

## 2019-05-18 RX ADMIN — BENZOCAINE AND MENTHOL 1 LOZENGE: 15; 3.6 LOZENGE ORAL at 10:58

## 2019-05-18 RX ADMIN — INSULIN ASPART 10 UNITS: 100 INJECTION, SOLUTION INTRAVENOUS; SUBCUTANEOUS at 11:56

## 2019-05-18 RX ADMIN — CLONAZEPAM 0.25 MG: 0.25 TABLET, ORALLY DISINTEGRATING ORAL at 13:25

## 2019-05-18 RX ADMIN — INSULIN ASPART 4 UNITS: 100 INJECTION, SOLUTION INTRAVENOUS; SUBCUTANEOUS at 16:52

## 2019-05-18 RX ADMIN — BENZOCAINE AND MENTHOL 1 LOZENGE: 15; 3.6 LOZENGE ORAL at 12:31

## 2019-05-18 ASSESSMENT — ACTIVITIES OF DAILY LIVING (ADL)
LAUNDRY: UNABLE TO COMPLETE
HYGIENE/GROOMING: INDEPENDENT
DRESS: SCRUBS (BEHAVIORAL HEALTH)
ORAL_HYGIENE: INDEPENDENT
DRESS: SCRUBS (BEHAVIORAL HEALTH)
ORAL_HYGIENE: INDEPENDENT
HYGIENE/GROOMING: INDEPENDENT

## 2019-05-18 NOTE — PLAN OF CARE
"Observed pt lying on her right side on top of covers - eyes closed - non-labored breathing noted - radio on head phones playing softly on her night stand.    Poor sleep this noc - maybe one and a half hours - has been in the [employer] area working on her [employer] occasionally - spent some one to one time with the nurse - talking about diabetes - continues to want to eat \"something\" has been drinking tea with one pckt of yellow no sugar sugar - after 0630 accucheck has been eating saltines  - six pkgs approx.  Has also had tylenol 975 mg po for bilateral toe pain she rates a \"ten\"at 0500 - by 0530 pain down to an \"eight\"  - has been appropriate - a bit edgy as she is in pain and hungry - but redirectable and does well if \"someone listens\".  Face to face end of shift report communicated to next shift Rounding completed and patient observed.  Pt having no issues with fall status.    Selina Siegel  5/18/2019  6:51 AM        "

## 2019-05-18 NOTE — PLAN OF CARE
"Problem: Adult Behavioral Health Plan of Care  Goal: Patient-Specific Goal (Individualization)  Description  Patient will attend at least 50% of groups while on unit.  Patient will report at least 6-8 hours of sleep at night.  Patient will be compliant with treatment team recommendations.  Patient will be medication compliant while hospitalized.  Patient will have decrease in delusional statements by discharge.    Pt up in lounge at start of shift. Pt cooperative with assessment and medications. Pt reports being tired. According to charting, pt slept approx. 1.5 hours last night. Pt denies criteria at this time. Pt appeared to be in a good mood this am. Pt ate 100% of breakfast. BP 96/79 this am. Upon recheck, /78. UA collected per ordered. See results. Hospitalist notified of results. No complaints at this time. Will continue to monitor.    1050 Strep test sent down to lab. Awaiting results.  1058 Prn cepacol lozenge given at this time for c/o sore throat. Cough noted.  1100 Blood sugar 264  1200 Pt ate 100% of lunch.  1231 Cepacol Lozenge given.  1331 Pt stating \"I need prns what prns do I have\". Pt irritable at this time and states she needs to sleep. Prn atarax given. Prn tylenol given for 10/10 generalized pain. Foot soak with epsom salt done at this time as well. Pt then rested in bed.  1430 Pt resting in bed    Outcome: Improving     Problem: Thought Process Alteration  Goal: Optimal Thought Clarity  Description  Patient will hold a reality based conversation by discharge.      Outcome: No Change    Face to face end of shift report communicated to oncoming RN. Patient observed in room.     Sally Sumner  5/18/2019  1:54 PM              "

## 2019-05-18 NOTE — PLAN OF CARE
"Problem: Adult Behavioral Health Plan of Care  Goal: Patient-Specific Goal (Individualization)  Description  Patient will attend at least 50% of groups while on unit.  Patient will report at least 6-8 hours of sleep at night.  Patient will be compliant with treatment team recommendations.  Patient will be medication compliant while hospitalized.  Patient will have decrease in delusional statements by discharge.     5/17/2019 2055 by Edgar Escalante, RN  Outcome: No Change  Pt has spent the entire shift in bed, resting. Up on the unit for supper meal and snack only. Ate 100% of supper meal and 100% of HS snack. Blood sugars 282 mg/dl and 265 mg/dl this shift. Reports that she is \"getting a cold\" and c/o sore throat. Continues with c/o bilateral feet pain-\"burning.\" Continues to receive scheduled Gabapentin. Remains compliant with medications as ordered. HS Clozaril not given. Spoke with provider (Loren Zuleta NP) at the beginning of the shift, who indicated that she will enroll pt in the Clozaril program tomorrow--and pt can start the Clozaril tomorrow evening. Slept approximately 5 hours last night. Talks about how she spoke with her daughter, Lena, yesterday and how happy she was to speak with her--then starts rambling about \"I'm gonna kick that bitch's ass\" and \"I don't give a shit about her.\"    Problem: Thought Process Alteration  Goal: Optimal Thought Clarity  Description  Patient will hold a reality based conversation by discharge.    5/17/2019 2055 by Edgar Escalante RN  Outcome: No Change     Problem: Fall Injury Risk  Goal: Absence of Fall and Fall-Related Injury  5/17/2019 2055 by Edgar Escalante, RN  Outcome: No Change  Gait steady and balanced. Has remained free from falls and/or injury this shift.     Face to face end of shift report communicated to OBDULIO Marks.     Edgar Escalante  5/17/2019  11:06 PM                "

## 2019-05-18 NOTE — PROGRESS NOTES
[facility]  Psychiatric Progress Note      Impression:   Ginette Wood is a 39 year old female re-admitted on 5/10/19, nine days after discharge from this hospital, with decompensation and increased agitation, disorganization and increase in delusions.  [facility] [place] [facility] [place] [facility] [place] She is making calendars. Genie is concerned about weight gain in relation to her medications. Suspect Depakote is partially responsible for weight gain. Discussed a low dose of Topamax to help reduce food cravings, she is in agreement to this. Will start Clozaril tonight. Genie does have some rambling and nonsensical speech at times. Her mood remains somewhat labile but she is otherwise cooperative and pleasant.        Diagnoses:        Schizoaffective disorder, bipolar type   Methamphetamine use disorder, moderate to severe       Attestation:  Patient has been seen and evaluated by me,  Loren Zuleta, JACKIE          Interim History:   The patient's care was discussed with the treatment team and chart notes were reviewed.          Medications:     Current Facility-Administered Medications Ordered in Epic   Medication Dose Route Frequency Last Rate Last Dose     acetaminophen (TYLENOL) tablet 975 mg  975 mg Oral Q6H PRN         atorvastatin (LIPITOR) tablet 20 mg  20 mg Oral At Bedtime   20 mg at 05/11/19 2108     benztropine (COGENTIN) tablet 1 mg  1 mg Oral BID   1 mg at 05/12/19 0812     busPIRone (BUSPAR) tablet 5 mg  5 mg Oral TID   5 mg at 05/12/19 1316     carBAMazepine (TEGretol XR) 12 hr tablet 100 mg  100 mg Oral BID         clonazePAM (klonoPIN) tablet 0.5 mg  0.5 mg Oral BID   0.5 mg at 05/12/19 0811     glucose gel 15-30 g  15-30 g Oral Q15 Min PRN        Or     dextrose 50 % injection 25-50 mL  25-50 mL Intravenous Q15 Min PRN        Or     glucagon injection 1 mg  1 mg Subcutaneous Q15 Min PRN         glucose gel 15-30 g  15-30 g Oral Q15 Min PRN        Or     dextrose 50 % injection 25-50 mL  25-50  "mL Intravenous Q15 Min PRN        Or     glucagon injection 1 mg  1 mg Subcutaneous Q15 Min PRN         divalproex sodium extended-release (DEPAKOTE ER) 24 hr tablet 2,000 mg  2,000 mg Oral At Bedtime   2,000 mg at 05/11/19 2109     gabapentin (NEURONTIN) capsule 1,200 mg  1,200 mg Oral TID   1,200 mg at 05/12/19 1316     hydrOXYzine (ATARAX) tablet 25-50 mg  25-50 mg Oral Q4H PRN   50 mg at 05/12/19 1151     ibuprofen (ADVIL/MOTRIN) tablet 800 mg  800 mg Oral Q6H PRN         insulin aspart (NovoLOG) inj (RAPID ACTING)  1-7 Units Subcutaneous TID AC   3 Units at 05/12/19 1151     insulin aspart (NovoLOG) inj (RAPID ACTING)  1-5 Units Subcutaneous At Bedtime   2 Units at 05/11/19 2116     insulin glargine (LANTUS PEN) injection 10 Units  10 Units Subcutaneous At Bedtime   10 Units at 05/11/19 2111     metFORMIN (GLUCOPHAGE) tablet 1,000 mg  1,000 mg Oral BID w/meals   1,000 mg at 05/12/19 0810     nicotine (NICORETTE) gum 2-4 mg  2-4 mg Buccal Q1H PRN   4 mg at 05/12/19 1052     OLANZapine (zyPREXA) tablet 10 mg  10 mg Oral TID PRN   10 mg at 05/11/19 1800    Or     OLANZapine (zyPREXA) injection 10 mg  10 mg Intramuscular TID PRN         [START ON 5/13/2019] paliperidone ER (INVEGA) 24 hr tablet 6 mg  6 mg Oral Daily         prazosin (MINIPRESS) capsule 1 mg  1 mg Oral At Bedtime   1 mg at 05/11/19 2108     No current Epic-ordered outpatient medications on file.              10 point ROS negative \"pain everywhere\"       Allergies:     Allergies   Allergen Reactions     Haloperidol Other (See Comments)     Other reaction(s): Seizures  Patient states, \"I fell down and wasn't able to get up.\"  Patient states, \"I fell down and wasn't able to get up.\"              Psychiatric Examination:   /70   Pulse 88   Temp 97.8  F (36.6  C) (Tympanic)   Resp 16   Ht 1.626 m (5' 4\")   Wt 79.8 kg (175 lb 14.4 oz)   SpO2 98%   BMI 30.19 kg/m    Weight is 175 lbs 14.4 oz  Body mass index is 30.19 kg/m .    Appearance:  " awake, alert   Attitude:  cooperative  Eye Contact:  fair  Mood:  Slightly irritable  Affect: mood congruent  Speech:  pressured speech  Psychomotor Behavior:  no evidence of tardive dyskinesia, dystonia, or tics  Thought Process: less disorganized  Associations:  loosening of associations present but this is showing some slight improvement  Thought Content:  auditory hallucinations present  Insight:  limited  Judgment:  poor  Oriented to:  time, person, and place  Attention Span and Concentration:  limited  Recent and Remote Memory:  fair  Fund of Knowledge: low-normal  Muscle Strength and Tone: normal  Gait and Station: Normal         Labs:     Results for orders placed or performed during the hospital encounter of 05/10/19 (from the past 24 hour(s))   Glucose by meter   Result Value Ref Range    Glucose 254 (H) 70 - 99 mg/dL   Glucose by meter   Result Value Ref Range    Glucose 282 (H) 70 - 99 mg/dL   Glucose by meter   Result Value Ref Range    Glucose 265 (H) 70 - 99 mg/dL   Glucose by meter   Result Value Ref Range    Glucose 341 (H) 70 - 99 mg/dL   Glucose by meter   Result Value Ref Range    Glucose 263 (H) 70 - 99 mg/dL   Glucose by meter   Result Value Ref Range    Glucose 278 (H) 70 - 99 mg/dL   UA without Microscopic   Result Value Ref Range    Color Urine Straw     Appearance Urine Clear     Glucose Urine >1000 (A) NEG^Negative mg/dL    Bilirubin Urine Negative NEG^Negative    Ketones Urine 5 (A) NEG^Negative mg/dL    Specific Gravity Urine 1.017 1.003 - 1.035    Blood Urine Small (A) NEG^Negative    pH Urine 6.0 4.7 - 8.0 pH    Protein Albumin Urine Negative NEG^Negative mg/dL    Urobilinogen mg/dL Normal 0.0 - 2.0 mg/dL    Nitrite Urine Negative NEG^Negative    Leukocyte Esterase Urine Negative NEG^Negative    Source Midstream Urine           Plan:   Start topamax daily  Seroquel as needed at bedtime for sleep  Taper [facility]  Hospitalist following diabetes  [place] is going forward with petition  for commitment.  ELOS:  > 5 days due to symptoms of psychosis and delusions along with Civil Commitment process

## 2019-05-18 NOTE — PROGRESS NOTES
[facility]    Medicine Progress Note - Hospitalist Service       Date of Admission:  5/10/2019  Assessment & Plan   Ms. Ginette Wood is a pleasant 39 year old lady admitted on 5/10/2019 for agitation, delusions and worsening of her schizoaffective disorder. Hospitalist service is following her for her diabetes management.    1. Type 2 diabetes mellitus (uncontrolled) with neuropathy   Last A1c was 8.5%.  Patient does not want to make changes in her diet and is only requesting that a physician should adjust her insulin.  Patient was educated on avoiding carbs such as pizza, rice, pasta, potatoes and breads.  Given that morning blood glucose is still more than 200 mg increase patient's Lantus to 35 units.  We will continue NovoLog 10 units with meals along with sliding scale insulin.  Patient is also on metformin 1000 mg twice a day.  She is on a diabetic diet.  Will check urinalysis to assess proteinuria.  Patient will continue Neurontin for diabetic neuropathy.    #2 Dyslipidemia  Continue Lipitor.    3. Schizoaffective disorder  Management as per psychiatry.  Atypical antipsychotics likely contributing to patient's diabetes mellitus as well.    Diet: Consistent Carbohydrate Diet 1719-8707 Calories: Moderate Consistent CHO (4-6 CHO units/meal)    DVT Prophylaxis: Low Risk/Ambulatory with no VTE prophylaxis indicated  Aguirre Catheter: not present  Code Status: Full Code      Disposition Plan   Expected discharge: per psychiatry, recommended to prior living arrangement once per psychiatry..  Entered: Lashawn Olivera MD 05/18/2019, 7:41 AM       The patient's care was discussed with the Bedside Nurse.    Lashawn Olivera MD  Hospitalist Service  [facility]    ______________________________________________________________________    Interval History   Patient has been complaining of leg pain.    Data reviewed today: I reviewed all medications, new labs and imaging results over the last 24  hours. I personally reviewed no images or EKG's today.    Physical Exam   Vital Signs: Temp: 97.8  F (36.6  C) Temp src: Tympanic BP: 96/79 Pulse: 93   Resp: 16 SpO2: 98 % O2 Device: None (Room air)    Weight: 175 lbs 14.4 oz    General: Patient is sitting in bed in no acute distress.     HEAD: Normocephalic, atraumatic.     EYES: EOMI, PERRLA. No scleral icterus.     ENT: Oral cavity: Moist, no ulcers.     NECK: Supple, no carotid bruits or elevated JVP. Thyroid not palpable. No cervical     lymphadenopathy.     LUNGS: Clear to auscultation bilaterally. No wheezes, rales or rhonchi. Bilateral equal air entry.     CARDIOVASCULAR EXAM: S1, S2 heard normal. No murmurs, gallops or rubs. All peripheral     pulses palpable and symmetric.     ABDOMEN: Soft, non-distended, non-tender. Obese. No peritoneal signs. No organomegaly. No shifting     dullness or ascites. Bowel sounds are normal.     EXTREMITIES: No cyanosis, clubbing or edema. Left foot has surgical scar from previous surgery.     SKIN: No rash, decubitus ulcers.     NEUROLOGIC EXAM: Alert and oriented x 3. No focal neurologic deficits. Cranial nerves II-XII     grossly intact. Strength 5/5 in flexors and extensors of upper and lower extremities. Sensation     symmetric and equal in all limbs and on trunk. Reflexes 2+ in elbows, knees and ankles.     LYMPHATIC EXAM: No axillary, cervical or inguinal lymphadenopathy.     MUSCULOSKELETAL: No joint effusions.     Data   Recent Labs   Lab 05/16/19  0622   WBC 9.9   HGB 13.3   MCV 88         POTASSIUM 4.9   CHLORIDE 106   CO2 27   BUN 10   CR 0.49*   ANIONGAP 5   MARIE 9.3   *   ALBUMIN 3.6   PROTTOTAL 7.4   BILITOTAL 0.2   ALKPHOS 63   ALT 15   AST 3

## 2019-05-19 LAB
GLUCOSE BLDC GLUCOMTR-MCNC: 273 MG/DL (ref 70–99)
GLUCOSE BLDC GLUCOMTR-MCNC: 281 MG/DL (ref 70–99)
GLUCOSE BLDC GLUCOMTR-MCNC: 306 MG/DL (ref 70–99)
GLUCOSE BLDC GLUCOMTR-MCNC: 319 MG/DL (ref 70–99)

## 2019-05-19 PROCEDURE — 00000146 ZZHCL STATISTIC GLUCOSE BY METER IP

## 2019-05-19 PROCEDURE — 99232 SBSQ HOSP IP/OBS MODERATE 35: CPT | Performed by: HOSPITALIST

## 2019-05-19 PROCEDURE — 25000132 ZZH RX MED GY IP 250 OP 250 PS 637: Performed by: NURSE PRACTITIONER

## 2019-05-19 PROCEDURE — 25000125 ZZHC RX 250: Performed by: NURSE PRACTITIONER

## 2019-05-19 PROCEDURE — 12400000 ZZH R&B MH

## 2019-05-19 RX ORDER — QUETIAPINE FUMARATE 25 MG/1
25 TABLET, FILM COATED ORAL 3 TIMES DAILY PRN
Status: DISCONTINUED | OUTPATIENT
Start: 2019-05-19 | End: 2019-05-20

## 2019-05-19 RX ORDER — HYDROXYZINE HYDROCHLORIDE 25 MG/1
50 TABLET, FILM COATED ORAL EVERY 6 HOURS PRN
Status: DISCONTINUED | OUTPATIENT
Start: 2019-05-19 | End: 2019-05-22

## 2019-05-19 RX ORDER — CLOZAPINE 50 MG/1
50 TABLET ORAL AT BEDTIME
Status: DISCONTINUED | OUTPATIENT
Start: 2019-05-19 | End: 2019-05-20

## 2019-05-19 RX ADMIN — TOPIRAMATE 25 MG: 25 TABLET, FILM COATED ORAL at 08:09

## 2019-05-19 RX ADMIN — DIVALPROEX SODIUM 2000 MG: 500 TABLET, EXTENDED RELEASE ORAL at 20:21

## 2019-05-19 RX ADMIN — BENZOCAINE AND MENTHOL 1 LOZENGE: 15; 3.6 LOZENGE ORAL at 05:53

## 2019-05-19 RX ADMIN — INSULIN ASPART 3 UNITS: 100 INJECTION, SOLUTION INTRAVENOUS; SUBCUTANEOUS at 08:09

## 2019-05-19 RX ADMIN — METFORMIN HYDROCHLORIDE 1000 MG: 1000 TABLET ORAL at 08:08

## 2019-05-19 RX ADMIN — GABAPENTIN 1000 MG: 400 CAPSULE ORAL at 14:16

## 2019-05-19 RX ADMIN — INSULIN ASPART 3 UNITS: 100 INJECTION, SOLUTION INTRAVENOUS; SUBCUTANEOUS at 12:07

## 2019-05-19 RX ADMIN — GABAPENTIN 1000 MG: 400 CAPSULE ORAL at 20:21

## 2019-05-19 RX ADMIN — METFORMIN HYDROCHLORIDE 1000 MG: 1000 TABLET ORAL at 17:09

## 2019-05-19 RX ADMIN — OLANZAPINE 10 MG: 10 TABLET, FILM COATED ORAL at 08:09

## 2019-05-19 RX ADMIN — ATORVASTATIN CALCIUM 20 MG: 20 TABLET, FILM COATED ORAL at 20:21

## 2019-05-19 RX ADMIN — BENZTROPINE MESYLATE 0.5 MG: 0.5 TABLET ORAL at 08:09

## 2019-05-19 RX ADMIN — INSULIN ASPART 4 UNITS: 100 INJECTION, SOLUTION INTRAVENOUS; SUBCUTANEOUS at 17:09

## 2019-05-19 RX ADMIN — CLOZAPINE 50 MG: 50 TABLET ORAL at 20:21

## 2019-05-19 RX ADMIN — PRAZOSIN HYDROCHLORIDE 1 MG: 1 CAPSULE ORAL at 20:21

## 2019-05-19 RX ADMIN — ACETAMINOPHEN 975 MG: 325 TABLET, FILM COATED ORAL at 19:37

## 2019-05-19 RX ADMIN — QUETIAPINE FUMARATE 25 MG: 25 TABLET ORAL at 19:37

## 2019-05-19 RX ADMIN — CLONAZEPAM 0.25 MG: 0.25 TABLET, ORALLY DISINTEGRATING ORAL at 08:11

## 2019-05-19 RX ADMIN — GABAPENTIN 1000 MG: 400 CAPSULE ORAL at 08:08

## 2019-05-19 RX ADMIN — BENZTROPINE MESYLATE 0.5 MG: 0.5 TABLET ORAL at 20:22

## 2019-05-19 RX ADMIN — CLONAZEPAM 0.25 MG: 0.25 TABLET, ORALLY DISINTEGRATING ORAL at 20:26

## 2019-05-19 ASSESSMENT — ACTIVITIES OF DAILY LIVING (ADL)
HYGIENE/GROOMING: INDEPENDENT
LAUNDRY: UNABLE TO COMPLETE
ORAL_HYGIENE: INDEPENDENT
HYGIENE/GROOMING: INDEPENDENT
DRESS: INDEPENDENT;SCRUBS (BEHAVIORAL HEALTH)
ORAL_HYGIENE: INDEPENDENT
DRESS: SCRUBS (BEHAVIORAL HEALTH);INDEPENDENT

## 2019-05-19 ASSESSMENT — MIFFLIN-ST. JEOR: SCORE: 1479.64
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
## 2019-05-19 NOTE — PLAN OF CARE
"Observed patient on bed/breath checks pt was in bathroom - no c/o pain or discomfort. Sleep off and on this noc - does say \"that new pill is making me tired\" accu check - 319 at 0200 possible 5 hrs sleep - administered a cepacol sadaf per pt request at 0553 for c/o \"sore' A bit irritable when at 0200 check she was unable to have any food - was redirectable.,  Face to face end of shift report communicated to next shift. Rounding completed and patient observed.    Selina Siegel  5/19/2019  6:56 AM        "

## 2019-05-19 NOTE — PLAN OF CARE
"  Problem: Adult Behavioral Health Plan of Care  Goal: Plan of Care Review  Outcome: No Change  Note:   Bp low per trend. Pt denies feeling light-headed with ambulation. Gait steady. Denies SI, SIB, HI or hallucinations. Pt states she is \"pissed\" because \"those fuckers did'nt give me my meds.\" Appears physically tired, sleeps much of shift. Speech rambling, tangential, garbled.    at dinner.      Problem: Thought Process Alteration  Goal: Optimal Thought Clarity  Description  Patient will hold a reality based conversation by discharge.    5/18/2019 1915 by Nancy Stern, RN  Outcome: No Change  Note:   Unable to have clear, linear conversation this shift.      Problem: Fall Injury Risk  Goal: Absence of Fall and Fall-Related Injury  Description  Pt will not experience a fall while hospitalized.     Outcome: Improving  Note:   Giat steady.      "

## 2019-05-19 NOTE — PHARMACY
Pharmacy Clozapine Initial Note    Date of Service: 2019  Patient's : 1980  39 year old, female    Recent ANC Value(s):  Recent Labs   Lab Test 19  0603   ANEU 5.9 4.1 4.4 4.6 6.0       Is the patient enrolled in the clozapine REMS program? Yes  Ordering prescriber: Loren Zuleta  Is this provider certified in the clozapine REMS program? Yes  Is the ANC within recommended limits? Yes  Does the patient have any signs or symptoms of infection, including fever or sore throat? No    Plan:  1. Initiate clozapine therapy at 50 mg PO at bedtime.  2. A WBC with differential will be ordered at least weekly.  ANC values will be entered into the REMS program.  3. Signs/symptoms of infection will be monitored daily.    Bushra Cheung [facility]  Phone / Pager: [phone]

## 2019-05-19 NOTE — PROGRESS NOTES
[facility]    Medicine Progress Note - Hospitalist Service       Date of Admission:  5/10/2019  Assessment & Plan   Ms. Ginette Wood is a pleasant 39 year old lady admitted on 5/10/2019 for agitation, delusions and worsening of her schizoaffective disorder. Hospitalist service is following her for her diabetes management. Her issues are the following:     # Type 2 diabetes mellitus (uncontrolled) with neuropathy   Last A1c was 8.5%.  She remains hyperglycemic. Increase the dose of lantus to 40units and premeal insulin to 12 units tid. Continue with correction scale insulin along with metformin.  Patient does not want to make changes in her diet and is only requesting that a physician should adjust her insulin.  Patient was educated on avoiding carbs such as pizza, rice, pasta, potatoes and breads.  Patient will continue Neurontin for diabetic neuropathy.    # Dyslipidemia  Continue Lipitor.    # Schizoaffective disorder  Management as per psychiatry.  Atypical antipsychotics likely contributing to patient's diabetes mellitus as well.    # Foot Pain  Has DJD of the foot but no fractures on the x-rays done during this admission.     Diet: Consistent Carbohydrate Diet 8858-8655 Calories: Moderate Consistent CHO (4-6 CHO units/meal)    DVT Prophylaxis: Low Risk/Ambulatory with no VTE prophylaxis indicated  Aguirre Catheter: not present  Code Status: Full Code      Disposition Plan   Expected discharge: per psychiatry, recommended to prior living arrangement once per psychiatry..  Entered: Judy Grant MD 05/19/2019, 8:52 AM       The patient's care was discussed with the Bedside Nurse.    Judy Grant MD  Hospitalist Service  [facility]    ______________________________________________________________________    Interval History   PT. Seen and examined. Hospital course reviewed. Discussed with the nursing staff. C/o left foot pain, tells me that she has a fracture of the left foot. She also  feels hungry.     Data reviewed today: I reviewed all medications, new labs and imaging results over the last 24 hours. I personally reviewed no images or EKG's today.    Physical Exam   Vital Signs: Temp: 98.2  F (36.8  C) Temp src: Tympanic BP: 117/75 Pulse: 80   Resp: 16 SpO2: 98 % O2 Device: None (Room air)    Weight: 175 lbs 14.4 oz    General: Patient is sitting in bed, looks frustrated  HEENT: PERRLA, EOMI  Neck: No JVD   LUNGS: Clear to auscultation bilaterally  CARDIOVASCULAR EXAM: S1, S2 heard normal.  ABD: Soft, non-distended, non-tender. BS +ve   EXT: No cyanosis, clubbing or edema. Left foot has surgical scar from previous surgery.  SKIN: No rash, decubitus ulcers.  NEURO: Alert and oriented x 3. No focal neurologic deficits.         Data   Recent Labs   Lab 05/16/19  0622   WBC 9.9   HGB 13.3   MCV 88         POTASSIUM 4.9   CHLORIDE 106   CO2 27   BUN 10   CR 0.49*   ANIONGAP 5   MARIE 9.3   *   ALBUMIN 3.6   PROTTOTAL 7.4   BILITOTAL 0.2   ALKPHOS 63   ALT 15   AST 3

## 2019-05-20 ENCOUNTER — APPOINTMENT (OUTPATIENT)
Dept: ULTRASOUND IMAGING | Facility: HOSPITAL | Age: 39
DRG: 885 | End: 2019-05-20
Attending: INTERNAL MEDICINE
Payer: COMMERCIAL

## 2019-05-20 LAB
BACTERIA SPEC CULT: ABNORMAL
BACTERIA SPEC CULT: ABNORMAL
GLUCOSE BLDC GLUCOMTR-MCNC: 210 MG/DL (ref 70–99)
GLUCOSE BLDC GLUCOMTR-MCNC: 290 MG/DL (ref 70–99)
GLUCOSE BLDC GLUCOMTR-MCNC: 296 MG/DL (ref 70–99)
GLUCOSE BLDC GLUCOMTR-MCNC: 330 MG/DL (ref 70–99)
GLUCOSE BLDC GLUCOMTR-MCNC: 333 MG/DL (ref 70–99)
SPECIMEN SOURCE: ABNORMAL

## 2019-05-20 PROCEDURE — 25000132 ZZH RX MED GY IP 250 OP 250 PS 637: Performed by: NURSE PRACTITIONER

## 2019-05-20 PROCEDURE — 99232 SBSQ HOSP IP/OBS MODERATE 35: CPT | Performed by: NURSE PRACTITIONER

## 2019-05-20 PROCEDURE — 25000125 ZZHC RX 250: Performed by: NURSE PRACTITIONER

## 2019-05-20 PROCEDURE — 00000146 ZZHCL STATISTIC GLUCOSE BY METER IP

## 2019-05-20 PROCEDURE — 93925 LOWER EXTREMITY STUDY: CPT | Mod: TC

## 2019-05-20 PROCEDURE — 99232 SBSQ HOSP IP/OBS MODERATE 35: CPT | Performed by: INTERNAL MEDICINE

## 2019-05-20 PROCEDURE — 12400000 ZZH R&B MH

## 2019-05-20 RX ORDER — QUETIAPINE FUMARATE 25 MG/1
25 TABLET, FILM COATED ORAL 4 TIMES DAILY PRN
Status: DISCONTINUED | OUTPATIENT
Start: 2019-05-20 | End: 2019-05-25

## 2019-05-20 RX ORDER — IBUPROFEN 600 MG/1
600 TABLET, FILM COATED ORAL EVERY 6 HOURS PRN
Status: DISCONTINUED | OUTPATIENT
Start: 2019-05-20 | End: 2019-05-21

## 2019-05-20 RX ADMIN — INSULIN ASPART 4 UNITS: 100 INJECTION, SOLUTION INTRAVENOUS; SUBCUTANEOUS at 16:46

## 2019-05-20 RX ADMIN — GABAPENTIN 1000 MG: 400 CAPSULE ORAL at 20:23

## 2019-05-20 RX ADMIN — GABAPENTIN 1000 MG: 400 CAPSULE ORAL at 08:11

## 2019-05-20 RX ADMIN — BENZTROPINE MESYLATE 0.5 MG: 0.5 TABLET ORAL at 08:11

## 2019-05-20 RX ADMIN — CLONAZEPAM 0.25 MG: 0.25 TABLET, ORALLY DISINTEGRATING ORAL at 08:11

## 2019-05-20 RX ADMIN — QUETIAPINE FUMARATE 25 MG: 25 TABLET ORAL at 16:45

## 2019-05-20 RX ADMIN — INSULIN ASPART 15 UNITS: 100 INJECTION, SOLUTION INTRAVENOUS; SUBCUTANEOUS at 08:17

## 2019-05-20 RX ADMIN — DIVALPROEX SODIUM 1750 MG: 500 TABLET, EXTENDED RELEASE ORAL at 20:23

## 2019-05-20 RX ADMIN — TOPIRAMATE 25 MG: 25 TABLET, FILM COATED ORAL at 08:11

## 2019-05-20 RX ADMIN — INSULIN ASPART 15 UNITS: 100 INJECTION, SOLUTION INTRAVENOUS; SUBCUTANEOUS at 16:47

## 2019-05-20 RX ADMIN — IBUPROFEN 600 MG: 600 TABLET ORAL at 16:45

## 2019-05-20 RX ADMIN — QUETIAPINE FUMARATE 25 MG: 25 TABLET ORAL at 10:24

## 2019-05-20 RX ADMIN — METFORMIN HYDROCHLORIDE 1000 MG: 1000 TABLET ORAL at 08:11

## 2019-05-20 RX ADMIN — BENZTROPINE MESYLATE 0.5 MG: 0.5 TABLET ORAL at 20:22

## 2019-05-20 RX ADMIN — INSULIN ASPART 15 UNITS: 100 INJECTION, SOLUTION INTRAVENOUS; SUBCUTANEOUS at 11:46

## 2019-05-20 RX ADMIN — INSULIN ASPART 4 UNITS: 100 INJECTION, SOLUTION INTRAVENOUS; SUBCUTANEOUS at 11:46

## 2019-05-20 RX ADMIN — METFORMIN HYDROCHLORIDE 1000 MG: 1000 TABLET ORAL at 16:45

## 2019-05-20 RX ADMIN — INSULIN ASPART 4 UNITS: 100 INJECTION, SOLUTION INTRAVENOUS; SUBCUTANEOUS at 08:17

## 2019-05-20 RX ADMIN — ATORVASTATIN CALCIUM 20 MG: 20 TABLET, FILM COATED ORAL at 20:23

## 2019-05-20 RX ADMIN — ACETAMINOPHEN 975 MG: 325 TABLET, FILM COATED ORAL at 20:30

## 2019-05-20 RX ADMIN — CLOZAPINE 75 MG: 50 TABLET ORAL at 20:22

## 2019-05-20 RX ADMIN — PRAZOSIN HYDROCHLORIDE 1 MG: 1 CAPSULE ORAL at 20:22

## 2019-05-20 RX ADMIN — GABAPENTIN 1000 MG: 400 CAPSULE ORAL at 13:50

## 2019-05-20 RX ADMIN — HYDROXYZINE HYDROCHLORIDE 50 MG: 25 TABLET ORAL at 18:16

## 2019-05-20 ASSESSMENT — ACTIVITIES OF DAILY LIVING (ADL)
ORAL_HYGIENE: INDEPENDENT
HYGIENE/GROOMING: INDEPENDENT
ORAL_HYGIENE: INDEPENDENT
DRESS: SCRUBS (BEHAVIORAL HEALTH);INDEPENDENT
HYGIENE/GROOMING: INDEPENDENT
DRESS: SCRUBS (BEHAVIORAL HEALTH)
LAUNDRY: UNABLE TO COMPLETE

## 2019-05-20 NOTE — PLAN OF CARE
BEHAVIORAL TEAM DISCUSSION    Participants:  Loren Zuleta NP, Dennise Herrera LICSW, Angie Laws LSW, Becca Chery LSW, Beena Kumar LGSW, Ion Morales RN, Avis Escalante RN, [facility], Mady Holman OT, Natalia Costello OT, Gena Gomez OT  Progress: minimal  Continued Stay Criteria/Rationale: grandiose, irritable, delusional statements  Medical/Physical: Hep C positive, diabetes - working with hospitalist, black and blue toetail - foot soaks ordered  Precautions:   Falls precaution?: YES, care plan initiated   Behavioral Orders   Procedures    Code 1 - Restrict to Unit    Routine Programming     As clinically indicated    Status 15     Every 15 minutes.     Plan: Lauro Lauren is filing for commitment, increase Clozaril, decrease Depakote, started Topomax, nutrition consult  Rationale for change in precautions or plan: None    Active Problems:    Mixed hyperlipidemia    Diabetes mellitus, type 2    Schizoaffective disorder, bipolar type (H)    Psychosis (H)      Current Facility-Administered Medications:     acetaminophen (TYLENOL) tablet 975 mg, 975 mg, Oral, Q6H PRN, Raven Stout NP, 975 mg at 05/19/19 1937    atorvastatin (LIPITOR) tablet 20 mg, 20 mg, Oral, At Bedtime, Airam Light NP, 20 mg at 05/19/19 2021    benzocaine-menthol (CEPACOL) 15-3.6 MG lozenge 1 lozenge, 1 lozenge, Buccal, Q1H PRN, Loren Zuleta NP, 1 lozenge at 05/19/19 0553    benztropine (COGENTIN) tablet 0.5 mg, 0.5 mg, Oral, BID, Loren Zuleta NP, 0.5 mg at 05/20/19 0811    [COMPLETED] clonazePAM (klonoPIN) ODT tab 0.25 mg, 0.25 mg, Oral, TID, 0.25 mg at 05/18/19 2047 **FOLLOWED BY** [COMPLETED] clonazePAM (klonoPIN) ODT tab 0.25 mg, 0.25 mg, Oral, BID, 0.25 mg at 05/20/19 0811 **FOLLOWED BY** [START ON 5/21/2019] clonazePAM (klonoPIN) ODT tab 0.25 mg, 0.25 mg, Oral, Daily, Loren Zuleta, JACKIE    cloZAPine (CLOZARIL) tablet 75 mg, 75 mg, Oral, At Bedtime, Loren Zuleta, NP    glucose gel 15-30  g, 15-30 g, Oral, Q15 Min PRN **OR** dextrose 50 % injection 25-50 mL, 25-50 mL, Intravenous, Q15 Min PRN **OR** glucagon injection 1 mg, 1 mg, Subcutaneous, Q15 Min PRN, Shawn Wylie MD    glucose gel 15-30 g, 15-30 g, Oral, Q15 Min PRN **OR** dextrose 50 % injection 25-50 mL, 25-50 mL, Intravenous, Q15 Min PRN **OR** glucagon injection 1 mg, 1 mg, Subcutaneous, Q15 Min PRN, Airam Light NP    divalproex sodium extended-release (DEPAKOTE ER) 24 hr tablet 1,750 mg, 1,750 mg, Oral, At Bedtime, Loren Zuleta NP    gabapentin (NEURONTIN) capsule 1,000 mg, 1,000 mg, Oral, TID, Loren Zuleta NP, 1,000 mg at 05/20/19 0811    hydrOXYzine (ATARAX) tablet 50 mg, 50 mg, Oral, Q6H PRN, Loren Zuleta NP    insulin aspart (NovoLOG) inj (RAPID ACTING), 15 Units, Subcutaneous, TID w/meals, Claudia Lozoya MD, 15 Units at 05/20/19 0817    insulin aspart (NovoLOG) inj (RAPID ACTING), 1-7 Units, Subcutaneous, TID AC, Airam Light NP, 4 Units at 05/20/19 0817    insulin aspart (NovoLOG) inj (RAPID ACTING), 1-5 Units, Subcutaneous, At Bedtime, Airam Light NP, 3 Units at 05/19/19 2027    insulin glargine (LANTUS PEN) injection 40 Units, 40 Units, Subcutaneous, At Bedtime, Judy Grant MD, 40 Units at 05/19/19 2027    magnesium sulfate (EPSOM SALT) granules 5 g, 5 g, Other, BID PRN, 5 g at 05/18/19 1331 **AND** Patient care order, , , Per Unit Routine, Khushi Reynolds Mc, APRN CNP    metFORMIN (GLUCOPHAGE) tablet 1,000 mg, 1,000 mg, Oral, BID w/meals, Kuldeep April Keturah, NP, 1,000 mg at 05/20/19 0811    nicotine (NICORETTE) gum 2-4 mg, 2-4 mg, Buccal, Q1H PRN, Raven Stout NP, 4 mg at 05/17/19 1112    prazosin (MINIPRESS) capsule 1 mg, 1 mg, Oral, At Bedtime, Kuldeep April JACKIE Rebollar, 1 mg at 05/19/19 2021    QUEtiapine (SEROquel) tablet 25 mg, 25 mg, Oral, 4x Daily PRN, Loren Zuleta, NP    topiramate (TOPAMAX) tablet 25 mg, 25 mg, Oral, Daily, Alaspa, Loren L,  NP, 25 mg at 05/20/19 0811
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
## 2019-05-20 NOTE — PLAN OF CARE
Observed pt lying in a supine position in bed - eyes closed - non-labored breathing noted.     Poor sleep this noc - 3 hours at best - is extremely  Irritable as cannot have what she wants to eat - did give her sugar free hot jason.twice and saltine crackers - 0200 level was 296 - will be doing am in a few minutes.

## 2019-05-20 NOTE — PLAN OF CARE
Face to face end of shift report communicated to next shift. Rounding completed and patient observed.    Selina Siegel  5/20/2019  6:46 AM
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
## 2019-05-20 NOTE — PROGRESS NOTES
[facility]    Medicine Progress Note - Hospitalist Service       Date of Admission:  5/10/2019  Assessment & Plan   Ms. Ginette Wood is a pleasant 39 year old lady admitted on 5/10/2019 for agitation, delusions and worsening of her schizoaffective disorder. Hospitalist service is following her for her diabetes management. Her issues are the following:     # Type 2 diabetes mellitus (uncontrolled) with neuropathy   Last A1c was 8.5%.  She remains hyperglycemic with current insulin regimen of 40 units Lantus, 12 units prandial NovoLog 3 times daily. Per PCP note in February 2019-20 units of Basaglar twice a day.  Today increased Lantus 50 unit(s) and prandial NovoLog 15 units 3 times a day with meals.  Continue with correction scale insulin along with metformin.    Patient has good insight into high carb food-together with patient's RN he reviewed daily menu options, and patient pointed which he has been selecting, to add up 3-4 carbs per meal.   Collateral information from nurses- patient orders brownies, ice cream, and constantly asking for vj crackers.  She was educated on diabetic diet and agreeable noted she is above-mentioned items from the menu.  Patient was educated on avoiding carbs such as pizza, rice, pasta, potatoes and breads.    Continue Neurontin for diabetic neuropathy.    # Dyslipidemia  Continue Lipitor.  Most recent lipid panel was checked in November 2016-we will repeat lipid panel with a.m. labs    # Schizoaffective disorder  Management as per psychiatry.  Atypical antipsychotics likely contributing to patient's diabetes mellitus as well.    # Foot Pain  Has DJD of the foot but no fractures on the x-rays done during this admission.   Black nail discoloration on bilateral second toes.  Some toe coolness.  No history of PVD, but she is at risk for PVD given HLD, uncontrolled diabetes.  Obtain arterial US of both legs.  Continues needed Tylenol, Advil.  On scheduled  Neurontin.    Diet: Consistent Carbohydrate Diet 3770-4324 Calories: Moderate Consistent CHO (4-6 CHO units/meal)    DVT Prophylaxis: Low Risk/Ambulatory with no VTE prophylaxis indicated  Aguirre Catheter: not present  Code Status: Full Code      Disposition Plan   Expected discharge: per psychiatry, recommended to prior living arrangement once per psychiatry..  Entered: Claudia Lozoya MD 05/20/2019, 10:31 AM       The patient's care was discussed with the Bedside Nurse.    Claudia Lozoya MD  Hospitalist Service  [facility]    ______________________________________________________________________    Interval History   Patient is new to me.  Chart reviewed.  Patient was seen and examined.  Ongoing hyperglycemia, despite increased dose of prandial NovoLog and Lantus.  A.m. , at 3 AM was 296.  Per nursing, patient complaining all day long of feeling hungry and frequently asking for snacks, getting sugar-free pudding, and she requires vj crackers.  Patient is quite aggressive/demanding demeanor, thus quite often her request for high carb snacks getting granted.  She is complaining of bilateral second toes nail discoloration.  Patient denies intermittent claudication.  Pt was seen and examined. Hospital course reviewed. Discussed with the nursing staff.   C/o left foot pain, tells me that she has a fracture of the left foot.    Data reviewed today: I reviewed all medications, new labs and imaging results over the last 24 hours. I personally reviewed no images or EKG's today.    Physical Exam   Vital Signs: Temp: 98.6  F (37  C) Temp src: Tympanic BP: 110/82 Pulse: 85   Resp: 16 SpO2: 99 % O2 Device: None (Room air)    Weight: 181 lbs 12.8 oz    General: Patient is sitting in bed, looks frustrated  HEENT: PERRLA, EOMI  Neck: No JVD   LUNGS: Clear to auscultation bilaterally  CARDIOVASCULAR EXAM: S1, S2 heard normal.  ABD: Soft, non-distended, non-tender. BS +ve   EXT: No cyanosis, clubbing or  edema. Left foot has surgical scar from previous surgery.  Nail polish on all toenails, except bilateral second toenails, with blackish toenail discoloration.  SKIN: No rash, decubitus ulcers.  NEURO: Alert and oriented x 3. No focal neurologic deficits.         Data   Recent Labs   Lab 05/16/19  0622   WBC 9.9   HGB 13.3   MCV 88         POTASSIUM 4.9   CHLORIDE 106   CO2 27   BUN 10   CR 0.49*   ANIONGAP 5   MARIE 9.3   *   ALBUMIN 3.6   PROTTOTAL 7.4   BILITOTAL 0.2   ALKPHOS 63   ALT 15   AST 3

## 2019-05-20 NOTE — PLAN OF CARE
Problem: Adult Behavioral Health Plan of Care  Goal: Patient-Specific Goal (Individualization)  Description  Patient will attend at least 50% of groups while on unit.  Patient will report at least 6-8 hours of sleep at night.  Patient will be compliant with treatment team recommendations.  Patient will be medication compliant while hospitalized.  Patient will have decrease in delusional statements by discharge.     5/20/2019 1731 by Nancy Stern, RN  Outcome: No Change  Note:   VSS, pain 8/10 to bilat ft-Ibuprofen rec'd. Bilat second toes remain unchanged. CMS intact.  Denies SI, SIB, HI or hallucinations. Mood is irritable with tangential, pressured speech. Isolates in room most of shift. Disorganized, unable to have linear conversation.    at dinner and 210 at snack time tonight. Continue to educate pt on making good food choices.   Remains isolative, not active on milieu. Out for meals then back to room.    Pharmacy called in regards to pt Klonopin order. Two orders in that are linked, one is BID and the other is daily. Neither are available to pull for pt. They are not profiled in [facility].      Problem: Thought Process Alteration  Goal: Optimal Thought Clarity  Description  Patient will hold a reality based conversation by discharge.    5/20/2019 1731 by Nancy Stern, RN  Outcome: No Change  Note:   Answers assessment questions--unable to have a clear, linear conversation.      Problem: Fall Injury Risk  Goal: Absence of Fall and Fall-Related Injury  Description  Pt will not experience a fall while hospitalized.     5/20/2019 1731 by Nancy Stern, RN  Outcome: Improving  Note:   Gait steady, denies light headedness.    Face to face end of shift report communicated to OBDULIO Torres. Rounding completed and patient observed.    Nancy Stern  5/20/2019  10:29 PM

## 2019-05-20 NOTE — PLAN OF CARE
Spoke with Lauro Lauren - Denis is out today and they are not able to find the paperwork needed for the court commitment - he is back tomorrow so they will make sure he gets the message and follows up with the hospital.
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
## 2019-05-20 NOTE — PROGRESS NOTES
"CLINICAL NUTRITION SERVICES  -  ASSESSMENT NOTE    Ginette Wood : follow up    39yof admitted for schizophrenia. Pt has a hx of type 2 diabetes, hypercholesterolemia, hepatitis C, and is overweight. Most recent A1C 8.5 on 4/30/19, down a little since last A1C 8.7 on 3/12/19. Being followed by hospitalist for adjustments in DM medication. Has a hx of non-compliance. Weight trending up    Diet Order: Consistent Carbohydrate  Intake: 100% of meals documented since last review      Height: 5' 4\"  Weight: 181 lbs 12.8 oz  Body mass index is 31.21 kg/m .  Weight Status:  Obesity Grade I BMI 30-34.9  IBWR:   Weight History:   175lb - 5/12/19  169lb - 5/10/19  186lb- 4/20/19  172lb - 4/14/19  161lb- 4/6/19- admit  174lb - 3/12/19  169lb - 9/10/18  171lb - 8/14/18  185lb - 6/15/18    Estimated Energy Needs: 1925 kcals (25 Kcal/Kg)   Estimated Protein Needs: 60 grams protein (0.8 g pro/Kg)    Malnutrition Diagnosis: None noted    NUTRITION DIAGNOSIS:  Altered nutrition-related lab value related to endocrine dysfunction as evidenced by A1C 8.5      NUTRITION RECOMMENDATIONS  - Outpatient DM education when pt's mental status stabalizes    MONITORING AND EVALUATION:  RD will monitor intake, weight, labs      "

## 2019-05-21 LAB
CHOLEST SERPL-MCNC: 164 MG/DL
GLUCOSE BLDC GLUCOMTR-MCNC: 233 MG/DL (ref 70–99)
GLUCOSE BLDC GLUCOMTR-MCNC: 251 MG/DL (ref 70–99)
GLUCOSE BLDC GLUCOMTR-MCNC: 262 MG/DL (ref 70–99)
GLUCOSE BLDC GLUCOMTR-MCNC: 285 MG/DL (ref 70–99)
GLUCOSE BLDC GLUCOMTR-MCNC: 323 MG/DL (ref 70–99)
HDLC SERPL-MCNC: 39 MG/DL
LDLC SERPL CALC-MCNC: 98 MG/DL
NONHDLC SERPL-MCNC: 125 MG/DL
TRIGL SERPL-MCNC: 137 MG/DL

## 2019-05-21 PROCEDURE — 25000132 ZZH RX MED GY IP 250 OP 250 PS 637: Performed by: NURSE PRACTITIONER

## 2019-05-21 PROCEDURE — 36415 COLL VENOUS BLD VENIPUNCTURE: CPT | Performed by: INTERNAL MEDICINE

## 2019-05-21 PROCEDURE — 00000146 ZZHCL STATISTIC GLUCOSE BY METER IP

## 2019-05-21 PROCEDURE — 25000132 ZZH RX MED GY IP 250 OP 250 PS 637: Performed by: HOSPITALIST

## 2019-05-21 PROCEDURE — 12400000 ZZH R&B MH

## 2019-05-21 PROCEDURE — 25000131 ZZH RX MED GY IP 250 OP 636 PS 637: Performed by: HOSPITALIST

## 2019-05-21 PROCEDURE — 80061 LIPID PANEL: CPT | Performed by: INTERNAL MEDICINE

## 2019-05-21 PROCEDURE — 25000125 ZZHC RX 250: Performed by: NURSE PRACTITIONER

## 2019-05-21 PROCEDURE — 99232 SBSQ HOSP IP/OBS MODERATE 35: CPT | Performed by: HOSPITALIST

## 2019-05-21 PROCEDURE — 99232 SBSQ HOSP IP/OBS MODERATE 35: CPT | Performed by: NURSE PRACTITIONER

## 2019-05-21 RX ORDER — IBUPROFEN 600 MG/1
600 TABLET, FILM COATED ORAL 3 TIMES DAILY
Status: COMPLETED | OUTPATIENT
Start: 2019-05-21 | End: 2019-05-23

## 2019-05-21 RX ORDER — NICOTINE 21 MG/24HR
1 PATCH, TRANSDERMAL 24 HOURS TRANSDERMAL DAILY
Status: DISCONTINUED | OUTPATIENT
Start: 2019-05-21 | End: 2019-07-03 | Stop reason: HOSPADM

## 2019-05-21 RX ORDER — PANTOPRAZOLE SODIUM 40 MG/1
40 TABLET, DELAYED RELEASE ORAL
Status: COMPLETED | OUTPATIENT
Start: 2019-05-21 | End: 2019-05-23

## 2019-05-21 RX ORDER — IBUPROFEN 200 MG
200 TABLET ORAL EVERY 6 HOURS PRN
Status: DISCONTINUED | OUTPATIENT
Start: 2019-05-21 | End: 2019-05-27

## 2019-05-21 RX ORDER — DIVALPROEX SODIUM 500 MG/1
1500 TABLET, EXTENDED RELEASE ORAL AT BEDTIME
Status: DISCONTINUED | OUTPATIENT
Start: 2019-05-21 | End: 2019-05-23

## 2019-05-21 RX ORDER — CLOZAPINE 100 MG/1
100 TABLET ORAL AT BEDTIME
Status: DISCONTINUED | OUTPATIENT
Start: 2019-05-21 | End: 2019-05-23

## 2019-05-21 RX ADMIN — CLOZAPINE 100 MG: 100 TABLET ORAL at 20:39

## 2019-05-21 RX ADMIN — INSULIN GLARGINE 55 UNITS: 100 INJECTION, SOLUTION SUBCUTANEOUS at 20:55

## 2019-05-21 RX ADMIN — HYDROXYZINE HYDROCHLORIDE 50 MG: 25 TABLET ORAL at 13:00

## 2019-05-21 RX ADMIN — HYDROXYZINE HYDROCHLORIDE 50 MG: 25 TABLET ORAL at 08:16

## 2019-05-21 RX ADMIN — INSULIN ASPART 15 UNITS: 100 INJECTION, SOLUTION INTRAVENOUS; SUBCUTANEOUS at 11:48

## 2019-05-21 RX ADMIN — QUETIAPINE FUMARATE 25 MG: 25 TABLET ORAL at 08:16

## 2019-05-21 RX ADMIN — INSULIN ASPART 3 UNITS: 100 INJECTION, SOLUTION INTRAVENOUS; SUBCUTANEOUS at 08:13

## 2019-05-21 RX ADMIN — DIVALPROEX SODIUM 1500 MG: 500 TABLET, EXTENDED RELEASE ORAL at 20:38

## 2019-05-21 RX ADMIN — IBUPROFEN 600 MG: 600 TABLET ORAL at 14:21

## 2019-05-21 RX ADMIN — METFORMIN HYDROCHLORIDE 1000 MG: 1000 TABLET ORAL at 08:11

## 2019-05-21 RX ADMIN — NICOTINE POLACRILEX 4 MG: 2 GUM, CHEWING ORAL at 09:38

## 2019-05-21 RX ADMIN — TOPIRAMATE 25 MG: 25 TABLET, FILM COATED ORAL at 08:11

## 2019-05-21 RX ADMIN — INSULIN ASPART 15 UNITS: 100 INJECTION, SOLUTION INTRAVENOUS; SUBCUTANEOUS at 16:51

## 2019-05-21 RX ADMIN — BENZTROPINE MESYLATE 0.5 MG: 0.5 TABLET ORAL at 08:11

## 2019-05-21 RX ADMIN — IBUPROFEN 600 MG: 600 TABLET ORAL at 08:11

## 2019-05-21 RX ADMIN — INSULIN ASPART 15 UNITS: 100 INJECTION, SOLUTION INTRAVENOUS; SUBCUTANEOUS at 08:11

## 2019-05-21 RX ADMIN — QUETIAPINE FUMARATE 25 MG: 25 TABLET ORAL at 13:00

## 2019-05-21 RX ADMIN — BENZTROPINE MESYLATE 0.5 MG: 0.5 TABLET ORAL at 20:38

## 2019-05-21 RX ADMIN — GABAPENTIN 1000 MG: 400 CAPSULE ORAL at 14:21

## 2019-05-21 RX ADMIN — IBUPROFEN 600 MG: 600 TABLET ORAL at 20:38

## 2019-05-21 RX ADMIN — ATORVASTATIN CALCIUM 20 MG: 20 TABLET, FILM COATED ORAL at 20:38

## 2019-05-21 RX ADMIN — GABAPENTIN 1000 MG: 400 CAPSULE ORAL at 08:10

## 2019-05-21 RX ADMIN — ACETAMINOPHEN 975 MG: 325 TABLET, FILM COATED ORAL at 16:49

## 2019-05-21 RX ADMIN — NICOTINE 1 PATCH: 21 PATCH, EXTENDED RELEASE TRANSDERMAL at 10:38

## 2019-05-21 RX ADMIN — PANTOPRAZOLE SODIUM 40 MG: 40 TABLET, DELAYED RELEASE ORAL at 09:38

## 2019-05-21 RX ADMIN — INSULIN ASPART 3 UNITS: 100 INJECTION, SOLUTION INTRAVENOUS; SUBCUTANEOUS at 11:48

## 2019-05-21 RX ADMIN — METFORMIN HYDROCHLORIDE 1000 MG: 1000 TABLET ORAL at 16:49

## 2019-05-21 RX ADMIN — CLONAZEPAM 0.25 MG: 0.25 TABLET, ORALLY DISINTEGRATING ORAL at 08:11

## 2019-05-21 RX ADMIN — GABAPENTIN 1000 MG: 400 CAPSULE ORAL at 20:38

## 2019-05-21 RX ADMIN — INSULIN ASPART 4 UNITS: 100 INJECTION, SOLUTION INTRAVENOUS; SUBCUTANEOUS at 16:50

## 2019-05-21 RX ADMIN — PRAZOSIN HYDROCHLORIDE 1 MG: 1 CAPSULE ORAL at 20:38

## 2019-05-21 NOTE — PROGRESS NOTES
[facility]    Medicine Progress Note - Hospitalist Service       Date of Admission:  5/10/2019  Assessment & Plan   Ms. Ginette Wood is a pleasant 39 year old lady admitted on 5/10/2019 for agitation, delusions and worsening of her schizoaffective disorder. Hospitalist service is following her for her diabetes management. Her issues are the following:     # Type 2 diabetes mellitus (uncontrolled) with neuropathy   Last A1c was 8.5%.  She remains hyperglycemic. Increase the dose of lantus to 50units and premeal insulin to 15units tid. I will increase the dose of lantus to 55 units today. Continue with correction scale insulin along with metformin.  Patient does not want to make changes in her diet and is only requesting that a physician should adjust her insulin.  Patient was educated on avoiding carbs such as pizza, rice, pasta, potatoes and breads.  Patient will continue Neurontin for diabetic neuropathy.    # Dyslipidemia  Continue Lipitor. LDL was 98.    # Schizoaffective disorder  Management as per psychiatry.  Atypical antipsychotics likely contributing to patient's diabetes mellitus as well.    # Foot Pain  Has DJD of the foot but no fractures on the x-rays done during this admission. Arterial dopplers unremarkable. I will try scheduled ibuprofen for couple of days.     Diet: Consistent Carbohydrate Diet 2755-1429 Calories: Moderate Consistent CHO (4-6 CHO units/meal)    DVT Prophylaxis: Low Risk/Ambulatory with no VTE prophylaxis indicated  Aguirre Catheter: not present  Code Status: Full Code      Disposition Plan   Expected discharge: per psychiatry, recommended to prior living arrangement once per psychiatry.   The patient's care was discussed with the Bedside Nurse.    Entered: Judy Grant MD 05/21/2019, 8:18 AM   Hospitalist Service  [facility]    ______________________________________________________________________    Interval History   PT. Seen and examined. Hospital  course reviewed. Discussed with the nursing staff. Continue to have C/o left foot pain. She is quite frustrated overall. She feels hungry as well. No fevers or chills.     Data reviewed today:  Physical Exam   Vital Signs: Temp: 98.8  F (37.1  C) Temp src: Tympanic BP: 113/67 Pulse: 94   Resp: 18 SpO2: 99 % O2 Device: None (Room air)    Weight: 181 lbs 12.8 oz    General: Patient is sitting in bed, looks frustrated  HEENT: PERRLA, EOMI  Neck: No JVD   LUNGS: Clear to auscultation bilaterally  CARDIOVASCULAR EXAM: S1, S2 heard normal.  ABD: Soft, non-distended, non-tender. BS +ve   EXT: No cyanosis, clubbing or edema. Left foot has surgical scar from previous surgery.Nail polish on all toenails, except bilateral second toenails, with blackish toenail discoloration.  SKIN: No rash, decubitus ulcers.  NEURO: Alert and oriented x 3. No focal neurologic deficits.         Data   Recent Labs   Lab 05/16/19  0622   WBC 9.9   HGB 13.3   MCV 88         POTASSIUM 4.9   CHLORIDE 106   CO2 27   BUN 10   CR 0.49*   ANIONGAP 5   MARIE 9.3   *   ALBUMIN 3.6   PROTTOTAL 7.4   BILITOTAL 0.2   ALKPHOS 63   ALT 15   AST 3

## 2019-05-21 NOTE — PLAN OF CARE
BEHAVIORAL TEAM DISCUSSION    Participants:  Loren Zuleta NP, Dennise Herrera LICSW, Angie Laws LSW, Becca Chery LSW, Beena Kumar LGSW, Ion Morales RN, Avis Escalante RN, [facility], Natalia Costello OT, Gena Gomez OT  Progress: minimal  Continued Stay Criteria/Rationale: irritable, delusional, grandiose  Medical/Physical: Hep C positive, diabetes - working with hospitalist and ordering healthy snacks, black and blue toetail - foot soaks ordered  Precautions:   Falls precaution?: YES, care plan in place   Behavioral Orders   Procedures    Code 1 - Restrict to Unit    Routine Programming     As clinically indicated    Status 15     Every 15 minutes.     Plan: Lauro Lauren is filing for commitment - waiting on paperwork, increase Clozaril, decrease Depakote  Rationale for change in precautions or plan: None    Active Problems:    Mixed hyperlipidemia    Diabetes mellitus, type 2    Schizoaffective disorder, bipolar type (H)    Psychosis (H)      Current Facility-Administered Medications:     acetaminophen (TYLENOL) tablet 975 mg, 975 mg, Oral, Q6H PRN, Raven Stout NP, 975 mg at 05/20/19 2030    atorvastatin (LIPITOR) tablet 20 mg, 20 mg, Oral, At Bedtime, Airam Light NP, 20 mg at 05/20/19 2023    benzocaine-menthol (CEPACOL) 15-3.6 MG lozenge 1 lozenge, 1 lozenge, Buccal, Q1H PRN, Loren Zuleta NP, 1 lozenge at 05/19/19 0553    benztropine (COGENTIN) tablet 0.5 mg, 0.5 mg, Oral, BID, Loren Zuleta NP, 0.5 mg at 05/21/19 0811    [COMPLETED] clonazePAM (klonoPIN) ODT tab 0.25 mg, 0.25 mg, Oral, TID, 0.25 mg at 05/18/19 2047 **FOLLOWED BY** [COMPLETED] clonazePAM (klonoPIN) ODT tab 0.25 mg, 0.25 mg, Oral, BID, 0.25 mg at 05/20/19 0811 **FOLLOWED BY** clonazePAM (klonoPIN) ODT tab 0.25 mg, 0.25 mg, Oral, Daily, Loren Zuleta L, NP, 0.25 mg at 05/21/19 0811    cloZAPine (CLOZARIL) tablet 100 mg, 100 mg, Oral, At Bedtime, Loren Zuleta NP    glucose gel 15-30 g, 15-30  g, Oral, Q15 Min PRN **OR** dextrose 50 % injection 25-50 mL, 25-50 mL, Intravenous, Q15 Min PRN **OR** glucagon injection 1 mg, 1 mg, Subcutaneous, Q15 Min PRN, Shawn Wylie MD    glucose gel 15-30 g, 15-30 g, Oral, Q15 Min PRN **OR** dextrose 50 % injection 25-50 mL, 25-50 mL, Intravenous, Q15 Min PRN **OR** glucagon injection 1 mg, 1 mg, Subcutaneous, Q15 Min PRN, Airam Light NP    divalproex sodium extended-release (DEPAKOTE ER) 24 hr tablet 1,500 mg, 1,500 mg, Oral, At Bedtime, Loren Zuleta NP    gabapentin (NEURONTIN) capsule 1,000 mg, 1,000 mg, Oral, TID, Loren Zuleta NP, 1,000 mg at 05/21/19 0810    hydrOXYzine (ATARAX) tablet 50 mg, 50 mg, Oral, Q6H PRN, Loren Zuleta NP, 50 mg at 05/21/19 0816    ibuprofen (ADVIL/MOTRIN) tablet 200 mg, 200 mg, Oral, Q6H PRN, Judy Grant MD    ibuprofen (ADVIL/MOTRIN) tablet 600 mg, 600 mg, Oral, TID, Judy Grant MD    insulin aspart (NovoLOG) inj (RAPID ACTING), 15 Units, Subcutaneous, TID w/meals, Claudia Lozoya MD, 15 Units at 05/21/19 0811    insulin aspart (NovoLOG) inj (RAPID ACTING), 1-7 Units, Subcutaneous, TID AC, Airam Light NP, 3 Units at 05/21/19 0813    insulin aspart (NovoLOG) inj (RAPID ACTING), 1-5 Units, Subcutaneous, At Bedtime, Airam Light NP, 1 Units at 05/20/19 2024    insulin glargine (LANTUS PEN) injection 55 Units, 55 Units, Subcutaneous, At Bedtime, Judy Grant MD    magnesium sulfate (EPSOM SALT) granules 5 g, 5 g, Other, BID PRN, 5 g at 05/18/19 1331 **AND** Patient care order, , , Per Unit Routine, Khushi Reynolds Mc, APRN CNP    metFORMIN (GLUCOPHAGE) tablet 1,000 mg, 1,000 mg, Oral, BID w/meals, Kuldeep April Keturah, NP, 1,000 mg at 05/21/19 0811    nicotine (NICORETTE) gum 2-4 mg, 2-4 mg, Buccal, Q1H PRN, Raven Stout NP, 4 mg at 05/17/19 1112    pantoprazole (PROTONIX) EC tablet 40 mg, 40 mg, Oral, QAM AC, Judy Grant MD    prazosin (MINIPRESS) capsule 1 mg, 1 mg, Oral,  At Bedtime, Kuldeep April JACKIE Rebollar, 1 mg at 05/20/19 2022    QUEtiapine (SEROquel) tablet 25 mg, 25 mg, Oral, 4x Daily PRN, Loren Zuleta NP, 25 mg at 05/21/19 0816    topiramate (TOPAMAX) tablet 25 mg, 25 mg, Oral, Daily, Loren Zuleta NP, 25 mg at 05/21/19 0811

## 2019-05-21 NOTE — PLAN OF CARE
Problem: Adult Behavioral Health Plan of Care  Goal: Patient-Specific Goal (Individualization)  Description  Patient will attend at least 50% of groups while on unit.  Patient will report at least 6-8 hours of sleep at night.  Patient will be compliant with treatment team recommendations.  Patient will be medication compliant while hospitalized.  Patient will have decrease in delusional statements by discharge.     5/21/2019 0847 by Jenny Cardenas, RN  Note:   Patient withdrawn to self for majority of shift, eating meals in bedroom and listening to headphones off/on in lounge. Hospitalist here to see patient before breakfast, appeared to go well. Reports bilateral lower leg pain rated 10/10 in beginning of shift and anxiety rated 10/10.   0811- Received PRN Ibuprofen 600 mg for pain. Also received PRN Atarax 50 mg and PRN Seroquel 25 mg per request for anxiety.   Laying down to rest/nap throughout morning and going to some groups but only staying for short periods. Friendly during conversation with this writer but mumbling at times with no eye contact. Denies SI/HI and hallucinations this shift. This writer did not observe patient responding to internal stimuli this shift. Blood sugar before lunch was 262. Showered after lunch.   1300- Received PRN Atarax 50 mg and PRN Seroquel 25 mg for reported anxiety rated 8/10. Appeared to have relief within the hour.   Compliant with all scheduled medications this shift. Sitting out in lounge and socializing with peers at end of shift.     Face to face end of shift report communicated to Nancy BOWLES RN. Rounding completed and patient observed sitting in lounge with peers. Continue to monitor at this time.    Jenny Cardenas  5/21/2019  11:40 PM           Problem: Thought Process Alteration  Goal: Optimal Thought Clarity  Description  Patient will hold a reality based conversation by discharge.    5/21/2019 0847 by Jenny Cardenas, RN  Note:   Continue to monitor at this time.       Problem: Fall Injury Risk  Goal: Absence of Fall and Fall-Related Injury  Description  Pt will not experience a fall while hospitalized.     5/21/2019 0847 by Jenny Cardenas RN  Note:   No concerns noted at this time. Continue to monitor.

## 2019-05-21 NOTE — PROGRESS NOTES
[facility]  Psychiatric Progress Note      Impression:   Ginette Wood is a 39 year old female re-admitted on 5/10/19, nine days after discharge from this hospital, with decompensation and increased agitation, disorganization and increase in delusions.  Genie is still slightly labile this morning but is showing some improvement. She is still very grandiose and disorganized through much of the conversation with a large component of her conversation being delusional. Genie rambles about being shot in the hand and attempts to show a hole in her hand which is not there. She talks about having over 20 children who were all stillborn due to cancer. These delusions may be fixed as she has had them for some time and are affected little by medication.        Diagnoses:        Schizoaffective disorder, bipolar type   Methamphetamine use disorder, moderate to severe       Attestation:  Patient has been seen and evaluated by me,  Loren Zuleta, NP          Interim History:   The patient's care was discussed with the treatment team and chart notes were reviewed.          Medications:     Current Facility-Administered Medications Ordered in Epic   Medication Dose Route Frequency Last Rate Last Dose     acetaminophen (TYLENOL) tablet 975 mg  975 mg Oral Q6H PRN         atorvastatin (LIPITOR) tablet 20 mg  20 mg Oral At Bedtime   20 mg at 05/11/19 2108     benztropine (COGENTIN) tablet 1 mg  1 mg Oral BID   1 mg at 05/12/19 0812     busPIRone (BUSPAR) tablet 5 mg  5 mg Oral TID   5 mg at 05/12/19 1316     carBAMazepine (TEGretol XR) 12 hr tablet 100 mg  100 mg Oral BID         clonazePAM (klonoPIN) tablet 0.5 mg  0.5 mg Oral BID   0.5 mg at 05/12/19 0811     glucose gel 15-30 g  15-30 g Oral Q15 Min PRN        Or     dextrose 50 % injection 25-50 mL  25-50 mL Intravenous Q15 Min PRN        Or     glucagon injection 1 mg  1 mg Subcutaneous Q15 Min PRN         glucose gel 15-30 g  15-30 g Oral Q15 Min PRN        Or      "dextrose 50 % injection 25-50 mL  25-50 mL Intravenous Q15 Min PRN        Or     glucagon injection 1 mg  1 mg Subcutaneous Q15 Min PRN         divalproex sodium extended-release (DEPAKOTE ER) 24 hr tablet 2,000 mg  2,000 mg Oral At Bedtime   2,000 mg at 05/11/19 2109     gabapentin (NEURONTIN) capsule 1,200 mg  1,200 mg Oral TID   1,200 mg at 05/12/19 1316     hydrOXYzine (ATARAX) tablet 25-50 mg  25-50 mg Oral Q4H PRN   50 mg at 05/12/19 1151     ibuprofen (ADVIL/MOTRIN) tablet 800 mg  800 mg Oral Q6H PRN         insulin aspart (NovoLOG) inj (RAPID ACTING)  1-7 Units Subcutaneous TID AC   3 Units at 05/12/19 1151     insulin aspart (NovoLOG) inj (RAPID ACTING)  1-5 Units Subcutaneous At Bedtime   2 Units at 05/11/19 2116     insulin glargine (LANTUS PEN) injection 10 Units  10 Units Subcutaneous At Bedtime   10 Units at 05/11/19 2111     metFORMIN (GLUCOPHAGE) tablet 1,000 mg  1,000 mg Oral BID w/meals   1,000 mg at 05/12/19 0810     nicotine (NICORETTE) gum 2-4 mg  2-4 mg Buccal Q1H PRN   4 mg at 05/12/19 1052     OLANZapine (zyPREXA) tablet 10 mg  10 mg Oral TID PRN   10 mg at 05/11/19 1800    Or     OLANZapine (zyPREXA) injection 10 mg  10 mg Intramuscular TID PRN         [START ON 5/13/2019] paliperidone ER (INVEGA) 24 hr tablet 6 mg  6 mg Oral Daily         prazosin (MINIPRESS) capsule 1 mg  1 mg Oral At Bedtime   1 mg at 05/11/19 2108     No current Epic-ordered outpatient medications on file.              10 point ROS negative \"pain everywhere\"       Allergies:     Allergies   Allergen Reactions     Haloperidol Other (See Comments)     Other reaction(s): Seizures  Patient states, \"I fell down and wasn't able to get up.\"  Patient states, \"I fell down and wasn't able to get up.\"              Psychiatric Examination:   /67   Pulse 94   Temp 98.8  F (37.1  C) (Tympanic)   Resp 18   Ht 1.626 m (5' 4\")   Wt 82.5 kg (181 lb 12.8 oz)   SpO2 99%   BMI 31.21 kg/m    Weight is 181 lbs 12.8 oz  Body mass " index is 31.21 kg/m .    Appearance:  awake, alert   Attitude:  cooperative  Eye Contact:  fair  Mood:  Slightly irritable  Affect: mood congruent  Speech:  pressured speech  Psychomotor Behavior:  no evidence of tardive dyskinesia, dystonia, or tics  Thought Process:  disorganized  Associations:  loosening of associations present   Thought Content:  auditory hallucinations present  Insight:  limited  Judgment:  poor  Oriented to:  time, person, and place  Attention Span and Concentration:  limited  Recent and Remote Memory:  fair  Fund of Knowledge: low-normal  Muscle Strength and Tone: normal  Gait and Station: Normal         Labs:     Results for orders placed or performed during the hospital encounter of 05/10/19 (from the past 24 hour(s))   Glucose by meter   Result Value Ref Range    Glucose 290 (H) 70 - 99 mg/dL   US Lower Extremity Arterial Duplex Bilateral    Narrative    US LOWER EXTREMITY ARTERIAL DUPLEX BILATERAL, 5/20/2019 2:36 PM    History: Female, age 39 years; Evaluate for PVD, b/l 2nd toe pain    Comparison: None.    Technique: : Grayscale Doppler ultrasound of the lower extremity  arterial structures. .    FINDINGS: The arterial structures of the lower extremities are patent  with normal waveforms and velocities. No evidence that would suggest  stenosis.      Impression    IMPRESSION:   1.  Normal examination.    IFRAH BREWER MD   Glucose by meter   Result Value Ref Range    Glucose 330 (H) 70 - 99 mg/dL   Glucose by meter   Result Value Ref Range    Glucose 210 (H) 70 - 99 mg/dL   Glucose by meter   Result Value Ref Range    Glucose 251 (H) 70 - 99 mg/dL   Lipid profile   Result Value Ref Range    Cholesterol 164 <200 mg/dL    Triglycerides 137 <150 mg/dL    HDL Cholesterol 39 (L) >49 mg/dL    LDL Cholesterol Calculated 98 <100 mg/dL    Non HDL Cholesterol 125 <130 mg/dL   Glucose by meter   Result Value Ref Range    Glucose 285 (H) 70 - 99 mg/dL          Plan:   Decrease Depakote  Increase  Clozaril  Hospitalist following diabetes  [place] is going forward with petition for commitment.  ELOS:  > 5 days due to symptoms of psychosis and delusions along with Civil Commitment process

## 2019-05-21 NOTE — PROGRESS NOTES
Provider order- educate pt on snack choices and set up daily snacks.    Snacks were set up yesterday. Pt not appropriate for any education at this time. Will attempt to educate at a later date if desired.

## 2019-05-21 NOTE — PLAN OF CARE
Problem: Adult Behavioral Health Plan of Care  Goal: Patient-Specific Goal (Individualization)  Description  Patient will attend at least 50% of groups while on unit.  Patient will report at least 6-8 hours of sleep at night.  Patient will be compliant with treatment team recommendations.  Patient will be medication compliant while hospitalized.  Patient will have decrease in delusional statements by discharge.     5/20/2019 2339 by Melissa Flowers RN  Outcome: No Change     Problem: Thought Process Alteration  Goal: Optimal Thought Clarity  Description  Patient will hold a reality based conversation by discharge.    5/20/2019 2339 by Melissa Flowers, RN  Outcome: No Change     Problem: Fall Injury Risk  Goal: Absence of Fall and Fall-Related Injury  Description  Pt will not experience a fall while hospitalized.     5/20/2019 2339 by Melissa Flowers RN  Outcome: No Change     Face to face end of shift report received from Nancy MAK. Rounding completed and patient observed.    Pt appeared to be sleeping most of this shift, normal respirations and position changes noted. Pt did get up and have one of her snacks from the fridge. Pt upset that she wasn't able to have any coffee or hot tea at 0500, went back to room and slammed her door. Pt refused morning vitals. Pt rude and condescending towards lab and floor staff. Pt did allow lab draw to be completed. Accuchecks at 0200 = 251, 0600 = 285. Pt did not have any falls this shift.     Face to face end of shift report will be communicated to latricia RN.    Melissa Flowers  5/21/2019  6:11 AM

## 2019-05-22 LAB
GLUCOSE BLDC GLUCOMTR-MCNC: 212 MG/DL (ref 70–99)
GLUCOSE BLDC GLUCOMTR-MCNC: 234 MG/DL (ref 70–99)
GLUCOSE BLDC GLUCOMTR-MCNC: 244 MG/DL (ref 70–99)
GLUCOSE BLDC GLUCOMTR-MCNC: 259 MG/DL (ref 70–99)
GLUCOSE BLDC GLUCOMTR-MCNC: 272 MG/DL (ref 70–99)

## 2019-05-22 PROCEDURE — 25000132 ZZH RX MED GY IP 250 OP 250 PS 637: Performed by: NURSE PRACTITIONER

## 2019-05-22 PROCEDURE — 99232 SBSQ HOSP IP/OBS MODERATE 35: CPT | Performed by: NURSE PRACTITIONER

## 2019-05-22 PROCEDURE — 00000146 ZZHCL STATISTIC GLUCOSE BY METER IP

## 2019-05-22 PROCEDURE — 25000125 ZZHC RX 250: Performed by: NURSE PRACTITIONER

## 2019-05-22 PROCEDURE — 12400000 ZZH R&B MH

## 2019-05-22 PROCEDURE — 25000131 ZZH RX MED GY IP 250 OP 636 PS 637: Performed by: INTERNAL MEDICINE

## 2019-05-22 PROCEDURE — 99231 SBSQ HOSP IP/OBS SF/LOW 25: CPT | Performed by: HOSPITALIST

## 2019-05-22 PROCEDURE — 25000132 ZZH RX MED GY IP 250 OP 250 PS 637: Performed by: HOSPITALIST

## 2019-05-22 RX ORDER — BENZTROPINE MESYLATE 1 MG/1
1 TABLET ORAL 2 TIMES DAILY
Status: DISCONTINUED | OUTPATIENT
Start: 2019-05-22 | End: 2019-06-01

## 2019-05-22 RX ORDER — DEXTROSE MONOHYDRATE 25 G/50ML
25-50 INJECTION, SOLUTION INTRAVENOUS
Status: DISCONTINUED | OUTPATIENT
Start: 2019-05-22 | End: 2019-05-27

## 2019-05-22 RX ORDER — NICOTINE POLACRILEX 4 MG
15-30 LOZENGE BUCCAL
Status: DISCONTINUED | OUTPATIENT
Start: 2019-05-22 | End: 2019-05-27

## 2019-05-22 RX ORDER — HYDROXYZINE HYDROCHLORIDE 25 MG/1
50 TABLET, FILM COATED ORAL
Status: DISCONTINUED | OUTPATIENT
Start: 2019-05-22 | End: 2019-05-25

## 2019-05-22 RX ADMIN — INSULIN ASPART 3 UNITS: 100 INJECTION, SOLUTION INTRAVENOUS; SUBCUTANEOUS at 08:00

## 2019-05-22 RX ADMIN — IBUPROFEN 600 MG: 600 TABLET ORAL at 08:34

## 2019-05-22 RX ADMIN — GABAPENTIN 1000 MG: 400 CAPSULE ORAL at 20:10

## 2019-05-22 RX ADMIN — TOPIRAMATE 25 MG: 25 TABLET, FILM COATED ORAL at 08:34

## 2019-05-22 RX ADMIN — IBUPROFEN 600 MG: 600 TABLET ORAL at 14:15

## 2019-05-22 RX ADMIN — QUETIAPINE FUMARATE 25 MG: 25 TABLET ORAL at 20:18

## 2019-05-22 RX ADMIN — BENZTROPINE MESYLATE 0.5 MG: 0.5 TABLET ORAL at 08:34

## 2019-05-22 RX ADMIN — BENZTROPINE MESYLATE 1 MG: 1 TABLET ORAL at 20:11

## 2019-05-22 RX ADMIN — HYDROXYZINE HYDROCHLORIDE 50 MG: 25 TABLET ORAL at 14:15

## 2019-05-22 RX ADMIN — HYDROXYZINE HYDROCHLORIDE 50 MG: 25 TABLET ORAL at 18:09

## 2019-05-22 RX ADMIN — PANTOPRAZOLE SODIUM 40 MG: 40 TABLET, DELAYED RELEASE ORAL at 06:19

## 2019-05-22 RX ADMIN — METFORMIN HYDROCHLORIDE 1000 MG: 1000 TABLET ORAL at 07:59

## 2019-05-22 RX ADMIN — GABAPENTIN 1000 MG: 400 CAPSULE ORAL at 14:16

## 2019-05-22 RX ADMIN — GABAPENTIN 1000 MG: 400 CAPSULE ORAL at 08:34

## 2019-05-22 RX ADMIN — IBUPROFEN 600 MG: 600 TABLET ORAL at 20:11

## 2019-05-22 RX ADMIN — ATORVASTATIN CALCIUM 20 MG: 20 TABLET, FILM COATED ORAL at 20:11

## 2019-05-22 RX ADMIN — METFORMIN HYDROCHLORIDE 1000 MG: 1000 TABLET ORAL at 17:20

## 2019-05-22 RX ADMIN — INSULIN ASPART 15 UNITS: 100 INJECTION, SOLUTION INTRAVENOUS; SUBCUTANEOUS at 11:55

## 2019-05-22 RX ADMIN — CLOZAPINE 100 MG: 100 TABLET ORAL at 20:11

## 2019-05-22 RX ADMIN — CLONAZEPAM 0.25 MG: 0.25 TABLET, ORALLY DISINTEGRATING ORAL at 08:34

## 2019-05-22 RX ADMIN — INSULIN ASPART 15 UNITS: 100 INJECTION, SOLUTION INTRAVENOUS; SUBCUTANEOUS at 08:01

## 2019-05-22 RX ADMIN — PRAZOSIN HYDROCHLORIDE 1 MG: 1 CAPSULE ORAL at 20:11

## 2019-05-22 RX ADMIN — INSULIN ASPART 3 UNITS: 100 INJECTION, SOLUTION INTRAVENOUS; SUBCUTANEOUS at 17:19

## 2019-05-22 RX ADMIN — DIVALPROEX SODIUM 1500 MG: 500 TABLET, EXTENDED RELEASE ORAL at 20:11

## 2019-05-22 RX ADMIN — NICOTINE 1 PATCH: 21 PATCH, EXTENDED RELEASE TRANSDERMAL at 08:33

## 2019-05-22 RX ADMIN — INSULIN ASPART 2 UNITS: 100 INJECTION, SOLUTION INTRAVENOUS; SUBCUTANEOUS at 11:55

## 2019-05-22 ASSESSMENT — ACTIVITIES OF DAILY LIVING (ADL)
LAUNDRY: UNABLE TO COMPLETE
HYGIENE/GROOMING: INDEPENDENT
ORAL_HYGIENE: INDEPENDENT
DRESS: SCRUBS (BEHAVIORAL HEALTH);INDEPENDENT

## 2019-05-22 NOTE — PLAN OF CARE
BEHAVIORAL TEAM DISCUSSION    Participants: Natalia Fabian NP,  Dennise Herrera LICSW, Angie Laws LSW,  Becca Chery LSW, Beena Kumar LGSW, Ion Morales RN, Avis LANGSTON RN, [facility], Mady Holman OT, Gena Gomez OT  Progress: minimal   Continued Stay Criteria/Rationale: still disorganized  Medical/Physical: Blood sugars are improving   Precautions:   Falls precaution?: YES, moderate, care plan initiated    Behavioral Orders   Procedures    Code 1 - Restrict to Unit    Routine Programming     As clinically indicated    Status 15     Every 15 minutes.     Plan: [place] - looking into guardianship, pt unable to care for self independently   Rationale for change in precautions or plan: none    Active Problems:    Mixed hyperlipidemia      Diabetes mellitus, type 2        Schizoaffective disorder, bipolar type (H)       Psychosis (H)    Current Facility-Administered Medications:     acetaminophen (TYLENOL) tablet 975 mg, 975 mg, Oral, Q6H PRN, Raven Stout NP, 975 mg at 05/21/19 1649    atorvastatin (LIPITOR) tablet 20 mg, 20 mg, Oral, At Bedtime, Airam Light NP, 20 mg at 05/21/19 2038    benzocaine-menthol (CEPACOL) 15-3.6 MG lozenge 1 lozenge, 1 lozenge, Buccal, Q1H PRN, Loren Zuleta, NP, 1 lozenge at 05/19/19 0553    benztropine (COGENTIN) tablet 0.5 mg, 0.5 mg, Oral, BID, Loren Zuleta L, NP, 0.5 mg at 05/22/19 0834    [COMPLETED] clonazePAM (klonoPIN) ODT tab 0.25 mg, 0.25 mg, Oral, TID, 0.25 mg at 05/18/19 2047 **FOLLOWED BY** [COMPLETED] clonazePAM (klonoPIN) ODT tab 0.25 mg, 0.25 mg, Oral, BID, 0.25 mg at 05/20/19 0811 **FOLLOWED BY** clonazePAM (klonoPIN) ODT tab 0.25 mg, 0.25 mg, Oral, Daily, AlaspaLoren L, NP, 0.25 mg at 05/22/19 0834    cloZAPine (CLOZARIL) tablet 100 mg, 100 mg, Oral, At Bedtime, Loren Zuleta, NP, 100 mg at 05/21/19 2039    glucose gel 15-30 g, 15-30 g, Oral, Q15 Min PRN **OR** dextrose 50 % injection 25-50 mL, 25-50 mL, Intravenous,  Q15 Min PRN **OR** glucagon injection 1 mg, 1 mg, Subcutaneous, Q15 Min PRN, Shawn Wylie MD    glucose gel 15-30 g, 15-30 g, Oral, Q15 Min PRN **OR** dextrose 50 % injection 25-50 mL, 25-50 mL, Intravenous, Q15 Min PRN **OR** glucagon injection 1 mg, 1 mg, Subcutaneous, Q15 Min PRN, Airam Light NP    divalproex sodium extended-release (DEPAKOTE ER) 24 hr tablet 1,500 mg, 1,500 mg, Oral, At Bedtime, Loren Zuleta, NP, 1,500 mg at 05/21/19 2038    gabapentin (NEURONTIN) capsule 1,000 mg, 1,000 mg, Oral, TID, Loren Zuleta, NP, 1,000 mg at 05/22/19 0834    hydrOXYzine (ATARAX) tablet 50 mg, 50 mg, Oral, Q6H PRN, Loren Zuleta NP, 50 mg at 05/21/19 1300    ibuprofen (ADVIL/MOTRIN) tablet 200 mg, 200 mg, Oral, Q6H PRN, Judy Grant MD    ibuprofen (ADVIL/MOTRIN) tablet 600 mg, 600 mg, Oral, TID, Judy Grant MD, 600 mg at 05/22/19 0834    insulin aspart (NovoLOG) inj (RAPID ACTING), 15 Units, Subcutaneous, TID w/meals, Claudia Lozoya MD, 15 Units at 05/22/19 0801    insulin aspart (NovoLOG) inj (RAPID ACTING), 1-7 Units, Subcutaneous, TID AC, Airam Light NP, 3 Units at 05/22/19 0800    insulin aspart (NovoLOG) inj (RAPID ACTING), 1-5 Units, Subcutaneous, At Bedtime, Airam Light NP, 1 Units at 05/21/19 2055    insulin glargine (LANTUS PEN) injection 55 Units, 55 Units, Subcutaneous, At Bedtime, Judy Grant MD, 55 Units at 05/21/19 2055    magnesium sulfate (EPSOM SALT) granules 5 g, 5 g, Other, BID PRN, 5 g at 05/18/19 1331 **AND** Patient care order, , , Per Unit Routine, Khushi Reynolds Mc, APRN CNP    metFORMIN (GLUCOPHAGE) tablet 1,000 mg, 1,000 mg, Oral, BID w/meals, Kuldeep April Keturah, NP, 1,000 mg at 05/22/19 0759    nicotine (NICODERM CQ) 21 MG/24HR 24 hr patch 1 patch, 1 patch, Transdermal, Daily, Loren Zuleta, NP, 1 patch at 05/22/19 0833    nicotine (NICORETTE) gum 2-4 mg, 2-4 mg, Buccal, Q1H PRN, Raven Stout, NP, 4 mg at 05/21/19 0994     nicotine Patch in Place, , Transdermal, Q8H, Loren Zuleta NP, Stopped at 05/22/19 0154    nicotine patch REMOVAL, , Transdermal, Daily, Loren Zuleta NP    pantoprazole (PROTONIX) EC tablet 40 mg, 40 mg, Oral, QAM AC, Judy Grant MD, 40 mg at 05/22/19 0619    prazosin (MINIPRESS) capsule 1 mg, 1 mg, Oral, At Bedtime, Kuldeep, April JACKIE Rebollar, 1 mg at 05/21/19 2038    QUEtiapine (SEROquel) tablet 25 mg, 25 mg, Oral, 4x Daily PRN, Loren Zuleta NP, 25 mg at 05/21/19 1300    topiramate (TOPAMAX) tablet 25 mg, 25 mg, Oral, Daily, Loren Zuleta NP, 25 mg at 05/22/19 0870

## 2019-05-22 NOTE — PROGRESS NOTES
"[facility]  Psychiatric Progress Note      Impression:   Ginette Wood is a 39 year old female re-admitted on 5/10/19, nine days after discharge from this hospital, with decompensation and increased agitation, disorganization and increase in delusions.    Patient is lying in bed, she reports feeling anxiety and \"rage\", begins to talk about her family and how they are all \"messed up\".  She asks for increases in all of her meds and something to \"calm me down\".  She denies suicidal or homicidal ideation.  Patient reports sleeping good.  She would like atarax scheduled as she states she does not always know to ask for it.         Diagnoses:      Schizoaffective disorder, bipolar type   Methamphetamine use disorder, moderate to severe            Plan:   Continue Depakote taper - 1250mg  Increase Clozaril to 125mg  Schedule Atarax  Continue clonazepam taper    Hospitalist following diabetes  [place] going forward with petition for commitment    ELOS:  > 5 days due to symptoms of psychosis and delusions along with Civil Commitment process      Attestation:  Patient has been seen and evaluated by me,  LATRICE Du CNP          Interim History:   The patient's care was discussed with the treatment team and chart notes were reviewed.          Medications:     Current Facility-Administered Medications Ordered in Epic   Medication Dose Route Frequency Last Rate Last Dose     acetaminophen (TYLENOL) tablet 975 mg  975 mg Oral Q6H PRN         atorvastatin (LIPITOR) tablet 20 mg  20 mg Oral At Bedtime   20 mg at 05/11/19 2108     benztropine (COGENTIN) tablet 1 mg  1 mg Oral BID   1 mg at 05/12/19 0812     busPIRone (BUSPAR) tablet 5 mg  5 mg Oral TID   5 mg at 05/12/19 1316     carBAMazepine (TEGretol XR) 12 hr tablet 100 mg  100 mg Oral BID         clonazePAM (klonoPIN) tablet 0.5 mg  0.5 mg Oral BID   0.5 mg at 05/12/19 0811     glucose gel 15-30 g  15-30 g Oral Q15 Min PRN        Or     dextrose " "50 % injection 25-50 mL  25-50 mL Intravenous Q15 Min PRN        Or     glucagon injection 1 mg  1 mg Subcutaneous Q15 Min PRN         glucose gel 15-30 g  15-30 g Oral Q15 Min PRN        Or     dextrose 50 % injection 25-50 mL  25-50 mL Intravenous Q15 Min PRN        Or     glucagon injection 1 mg  1 mg Subcutaneous Q15 Min PRN         divalproex sodium extended-release (DEPAKOTE ER) 24 hr tablet 2,000 mg  2,000 mg Oral At Bedtime   2,000 mg at 05/11/19 2109     gabapentin (NEURONTIN) capsule 1,200 mg  1,200 mg Oral TID   1,200 mg at 05/12/19 1316     hydrOXYzine (ATARAX) tablet 25-50 mg  25-50 mg Oral Q4H PRN   50 mg at 05/12/19 1151     ibuprofen (ADVIL/MOTRIN) tablet 800 mg  800 mg Oral Q6H PRN         insulin aspart (NovoLOG) inj (RAPID ACTING)  1-7 Units Subcutaneous TID AC   3 Units at 05/12/19 1151     insulin aspart (NovoLOG) inj (RAPID ACTING)  1-5 Units Subcutaneous At Bedtime   2 Units at 05/11/19 2116     insulin glargine (LANTUS PEN) injection 10 Units  10 Units Subcutaneous At Bedtime   10 Units at 05/11/19 2111     metFORMIN (GLUCOPHAGE) tablet 1,000 mg  1,000 mg Oral BID w/meals   1,000 mg at 05/12/19 0810     nicotine (NICORETTE) gum 2-4 mg  2-4 mg Buccal Q1H PRN   4 mg at 05/12/19 1052     OLANZapine (zyPREXA) tablet 10 mg  10 mg Oral TID PRN   10 mg at 05/11/19 1800    Or     OLANZapine (zyPREXA) injection 10 mg  10 mg Intramuscular TID PRN         [START ON 5/13/2019] paliperidone ER (INVEGA) 24 hr tablet 6 mg  6 mg Oral Daily         prazosin (MINIPRESS) capsule 1 mg  1 mg Oral At Bedtime   1 mg at 05/11/19 2108     No current Epic-ordered outpatient medications on file.              10 point ROS negative \"pain everywhere\"       Allergies:     Allergies   Allergen Reactions     Haloperidol Other (See Comments)     Other reaction(s): Seizures  Patient states, \"I fell down and wasn't able to get up.\"  Patient states, \"I fell down and wasn't able to get up.\"              Psychiatric Examination: " "  /66   Pulse 87   Temp 98.1  F (36.7  C) (Tympanic)   Resp 14   Ht 1.626 m (5' 4\")   Wt 82.5 kg (181 lb 12.8 oz)   SpO2 99%   BMI 31.21 kg/m    Weight is 181 lbs 12.8 oz  Body mass index is 31.21 kg/m .    Appearance:  awake, alert   Attitude:  cooperative  Eye Contact:  fair  Mood:   irritable  Affect: mood congruent  Speech:  pressured speech  Psychomotor Behavior:  no evidence of tardive dyskinesia, dystonia, or tics  Thought Process:  disorganized  Associations:  loosening of associations present   Thought Content:  auditory hallucinations present  Insight:  limited  Judgment:  poor  Oriented to:  time, person, and place  Attention Span and Concentration:  limited  Recent and Remote Memory:  fair  Fund of Knowledge: low-normal  Muscle Strength and Tone: normal  Gait and Station: Normal         Labs:     Results for orders placed or performed during the hospital encounter of 05/10/19 (from the past 24 hour(s))   Glucose by meter   Result Value Ref Range    Glucose 323 (H) 70 - 99 mg/dL   Glucose by meter   Result Value Ref Range    Glucose 233 (H) 70 - 99 mg/dL   Glucose by meter   Result Value Ref Range    Glucose 259 (H) 70 - 99 mg/dL   Glucose by meter   Result Value Ref Range    Glucose 244 (H) 70 - 99 mg/dL   Glucose by meter   Result Value Ref Range    Glucose 212 (H) 70 - 99 mg/dL       "

## 2019-05-22 NOTE — PLAN OF CARE
Problem: Adult Behavioral Health Plan of Care  Goal: Patient-Specific Goal (Individualization)  Description  Patient will attend at least 50% of groups while on unit.  Patient will report at least 6-8 hours of sleep at night.  Patient will be compliant with treatment team recommendations.  Patient will be medication compliant while hospitalized.  Patient will have decrease in delusional statements by discharge.     5/21/2019 2343 by Melissa Flowers, RN  Outcome: No Change     Problem: Thought Process Alteration  Goal: Optimal Thought Clarity  Description  Patient will hold a reality based conversation by discharge.    5/21/2019 2343 by Melissa Flowers, RN  Outcome: No Change     Problem: Fall Injury Risk  Goal: Absence of Fall and Fall-Related Injury  Description  Pt will not experience a fall while hospitalized.     5/21/2019 2343 by Melissa Flowers, RN  Outcome: No Change     Face to face end of shift report received from Nancy MAK. Rounding completed and patient observed.    Pt appeared to be sleeping most of this shift, normal respirations and position changes noted. Pt did get up once and had a snack and returned to bed. Pt has not had any falls this shift.     Face to face end of shift report will be communicated to kei MAK.    Melissa Flowers  5/22/2019  6:05 AM

## 2019-05-22 NOTE — PROGRESS NOTES
[facility]    Medicine Progress Note - Hospitalist Service       Date of Admission:  5/10/2019  Assessment & Plan   Ms. Ginette Wood is a pleasant 39 year old lady admitted on 5/10/2019 for agitation, delusions and worsening of her schizoaffective disorder. Hospitalist service is following her for her diabetes management. Her issues are the following:     # Type 2 diabetes mellitus (uncontrolled) with neuropathy   Last A1c was 8.5%.  She remains hyperglycemic. Increase the dose of lantus to 50units and premeal insulin to 15units tid. I will increase the dose of lantus to 55 units today. Continue with correction scale insulin along with metformin.  Patient does not want to make changes in her diet and is only requesting that a physician should adjust her insulin.  Patient was educated on avoiding carbs such as pizza, rice, pasta, potatoes and breads.  Patient will continue Neurontin for diabetic neuropathy.     For today    -increase lantus to 60 units    -continue sliding scale insulin corrective insulin    -add prandial novolog 2 units per carb choice with meals    # Dyslipidemia  Continue Lipitor. LDL was 98.    # Schizoaffective disorder  Management as per psychiatry.  Atypical antipsychotics likely contributing to patient's diabetes mellitus as well.    # Foot Pain  Has DJD of the foot but no fractures on the x-rays done during this admission. Arterial dopplers unremarkable. I will try scheduled ibuprofen for couple of days.     Diet: Consistent Carbohydrate Diet 8760-5245 Calories: Moderate Consistent CHO (4-6 CHO units/meal)    DVT Prophylaxis: Low Risk/Ambulatory with no VTE prophylaxis indicated  Aguirre Catheter: not present  Code Status: Full Code      Disposition Plan   Expected discharge: per psychiatry, recommended to prior living arrangement once per psychiatry.   The patient's care was discussed with the Bedside Nurse.    Entered: Fred Mendoza MD 05/22/2019, 4:20 PM    Hospitalist Service  [facility]    ______________________________________________________________________    Interval History   Patient with no complaints  Eating apple and peanut butter  Per nursing staff was eating/drinking lots of snacks;     Data reviewed today:  Physical Exam   Vital Signs: Temp: 98.4  F (36.9  C) Temp src: Tympanic BP: 115/70 Pulse: 99   Resp: 20 SpO2: 98 %      Weight: 181 lbs 12.8 oz    General: no acute distress  HEENT: PERRLA, EOMI  LUNGS: Clear to auscultation bilaterally  CARDIOVASCULAR EXAM: rrr normal s1 s2  SKIN: warm, dry, no rash on face  NEURO: Alert and oriented x 3. No focal neurologic deficits.         Data   Recent Labs   Lab 05/16/19  0622   WBC 9.9   HGB 13.3   MCV 88         POTASSIUM 4.9   CHLORIDE 106   CO2 27   BUN 10   CR 0.49*   ANIONGAP 5   MARIE 9.3   *   ALBUMIN 3.6   PROTTOTAL 7.4   BILITOTAL 0.2   ALKPHOS 63   ALT 15   AST 3     Fred Dastrange

## 2019-05-22 NOTE — PLAN OF CARE
Problem: Adult Behavioral Health Plan of Care  Goal: Patient-Specific Goal (Individualization)  Description  Patient will attend at least 50% of groups while on unit.  Patient will report at least 6-8 hours of sleep at night.  Patient will be compliant with treatment team recommendations.  Patient will be medication compliant while hospitalized.  Patient will have decrease in delusional statements by discharge.     5/21/2019 1852 by Nancy Stern, RN  Outcome: Improving  Note:   VSS, pain to bilat ft-tylenol 975 mg rec'd. Denies all criteria including: SI, SIB, HI or hallucinations. Endorses mild depression and moderate anxiety. States she is feeling much better. Pt is able to have a short linear conversation about her diabetes and good food choices. With further conversation speech and thought process  becomes delusional,  tangential and pressured.    at dinner.      Problem: Thought Process Alteration  Goal: Optimal Thought Clarity  Description  Patient will hold a reality based conversation by discharge.    5/21/2019 1852 by Nancy Stern, RN  Outcome: Improving  Note:   Short linear conversation.      Problem: Fall Injury Risk  Goal: Absence of Fall and Fall-Related Injury  Description  Pt will not experience a fall while hospitalized.     5/21/2019 1852 by Nancy Stern, RN  Outcome: Improving  Note:   Gait steady, denies feeling light headed or dizzy.    Face to face end of shift report communicated to OBDULIO Torres. Rounding completed and patient observed.    Nancy Stern  5/21/2019  9:53 PM

## 2019-05-22 NOTE — PLAN OF CARE
Spoke with Denis from [place] - he states they have not been able to get the patient's daughter to come in and sign the commitment paperwork - he sent someone out to the house and they couldn't find her - he keeps trying to call her and her phone doesn't always work, when he does get a hold of her, she says she is coming in and then never shows up.  He will try to go himself today and drive around and try to find her to get her to sign the papers - he states there really is no other option for commitment in [place].  He is worried about Rep Payee paperwork for the patient - I will review in team today and send it back to him.    Denis called back and asked that we send a physician's statement over with the additional recommendation for temporary guardianship so that adult protection can get involved - they would not need the family for this - Faxed him a completed physician's statement and rep payee paperwork.

## 2019-05-22 NOTE — PLAN OF CARE
Problem: Adult Behavioral Health Plan of Care  Goal: Patient-Specific Goal (Individualization)  Description  Patient will attend at least 50% of groups while on unit.  Patient will report at least 6-8 hours of sleep at night.  Patient will be compliant with treatment team recommendations.  Patient will be medication compliant while hospitalized.  Patient will have decrease in delusional statements by discharge.     Outcome: No Change     Problem: Adult Behavioral Health Plan of Care  Goal: Adheres to Safety Considerations for Self and Others  Outcome: No Change     Problem: Adult Behavioral Health Plan of Care  Goal: Optimized Coping Skills in Response to Life Stressors  Outcome: No Change     Problem: Adult Behavioral Health Plan of Care  Goal: Develops/Participates in Therapeutic [place] to Support Successful Transition  Outcome: No Change     Problem: Adult Behavioral Health Plan of Care  Goal: Rounds/Family Conference  Outcome: No Change     Problem: Thought Process Alteration  Goal: Optimal Thought Clarity  Description  Patient will hold a reality based conversation by discharge.    Outcome: No Change     Problem: Fall Injury Risk  Goal: Absence of Fall and Fall-Related Injury  Description  Pt will not experience a fall while hospitalized.     Outcome: Improving    Patient denies SI, HI, hallucinations, but is obviously responding, she gives a 10/10 for depression, anxiety, pain. Patient spends most of her shift in her room, she is sitting in her chair, lying in her bed, spends some time napping. Patient eats her meals in her room, she does request snacks a few times during the shift. Patient does get agitated but is easily redirected. Patient is compliant with mediations as well as with nurse assess.    End of shift report given to Nancy MAK

## 2019-05-23 LAB
BASOPHILS # BLD AUTO: 0.1 10E9/L (ref 0–0.2)
BASOPHILS NFR BLD AUTO: 0.6 %
DIFFERENTIAL METHOD BLD: NORMAL
EOSINOPHIL # BLD AUTO: 0.1 10E9/L (ref 0–0.7)
EOSINOPHIL NFR BLD AUTO: 0.9 %
GLUCOSE BLDC GLUCOMTR-MCNC: 144 MG/DL (ref 70–99)
GLUCOSE BLDC GLUCOMTR-MCNC: 145 MG/DL (ref 70–99)
GLUCOSE BLDC GLUCOMTR-MCNC: 228 MG/DL (ref 70–99)
GLUCOSE BLDC GLUCOMTR-MCNC: 273 MG/DL (ref 70–99)
GLUCOSE BLDC GLUCOMTR-MCNC: 287 MG/DL (ref 70–99)
IMM GRANULOCYTES # BLD: 0.1 10E9/L (ref 0–0.4)
IMM GRANULOCYTES NFR BLD: 0.8 %
LYMPHOCYTES # BLD AUTO: 3.1 10E9/L (ref 0.8–5.3)
LYMPHOCYTES NFR BLD AUTO: 35 %
MONOCYTES # BLD AUTO: 0.8 10E9/L (ref 0–1.3)
MONOCYTES NFR BLD AUTO: 9.3 %
NEUTROPHILS # BLD AUTO: 4.8 10E9/L (ref 1.6–8.3)
NEUTROPHILS NFR BLD AUTO: 53.4 %
NRBC # BLD AUTO: 0 10*3/UL
NRBC BLD AUTO-RTO: 0 /100
WBC # BLD AUTO: 9 10E9/L (ref 4–11)

## 2019-05-23 PROCEDURE — 25000132 ZZH RX MED GY IP 250 OP 250 PS 637: Performed by: NURSE PRACTITIONER

## 2019-05-23 PROCEDURE — 25000125 ZZHC RX 250: Performed by: NURSE PRACTITIONER

## 2019-05-23 PROCEDURE — 00000146 ZZHCL STATISTIC GLUCOSE BY METER IP

## 2019-05-23 PROCEDURE — 85004 AUTOMATED DIFF WBC COUNT: CPT | Performed by: PSYCHIATRY & NEUROLOGY

## 2019-05-23 PROCEDURE — 99231 SBSQ HOSP IP/OBS SF/LOW 25: CPT | Performed by: HOSPITALIST

## 2019-05-23 PROCEDURE — 36415 COLL VENOUS BLD VENIPUNCTURE: CPT | Performed by: PSYCHIATRY & NEUROLOGY

## 2019-05-23 PROCEDURE — 85048 AUTOMATED LEUKOCYTE COUNT: CPT | Performed by: PSYCHIATRY & NEUROLOGY

## 2019-05-23 PROCEDURE — 12400000 ZZH R&B MH

## 2019-05-23 PROCEDURE — 25000132 ZZH RX MED GY IP 250 OP 250 PS 637: Performed by: HOSPITALIST

## 2019-05-23 RX ADMIN — GABAPENTIN 1000 MG: 400 CAPSULE ORAL at 08:26

## 2019-05-23 RX ADMIN — CLOZAPINE 125 MG: 25 TABLET ORAL at 20:12

## 2019-05-23 RX ADMIN — METFORMIN HYDROCHLORIDE 1000 MG: 1000 TABLET ORAL at 08:26

## 2019-05-23 RX ADMIN — GABAPENTIN 1000 MG: 400 CAPSULE ORAL at 13:38

## 2019-05-23 RX ADMIN — BENZTROPINE MESYLATE 1 MG: 1 TABLET ORAL at 08:26

## 2019-05-23 RX ADMIN — HYDROXYZINE HYDROCHLORIDE 50 MG: 25 TABLET ORAL at 12:44

## 2019-05-23 RX ADMIN — QUETIAPINE FUMARATE 25 MG: 25 TABLET ORAL at 12:47

## 2019-05-23 RX ADMIN — TOPIRAMATE 25 MG: 25 TABLET, FILM COATED ORAL at 08:26

## 2019-05-23 RX ADMIN — PANTOPRAZOLE SODIUM 40 MG: 40 TABLET, DELAYED RELEASE ORAL at 06:40

## 2019-05-23 RX ADMIN — QUETIAPINE FUMARATE 25 MG: 25 TABLET ORAL at 16:17

## 2019-05-23 RX ADMIN — CLONAZEPAM 0.25 MG: 0.25 TABLET, ORALLY DISINTEGRATING ORAL at 08:25

## 2019-05-23 RX ADMIN — DIVALPROEX SODIUM 1250 MG: 500 TABLET, EXTENDED RELEASE ORAL at 20:12

## 2019-05-23 RX ADMIN — IBUPROFEN 600 MG: 600 TABLET ORAL at 08:26

## 2019-05-23 RX ADMIN — PRAZOSIN HYDROCHLORIDE 1 MG: 1 CAPSULE ORAL at 20:12

## 2019-05-23 RX ADMIN — INSULIN ASPART 2 UNITS: 100 INJECTION, SOLUTION INTRAVENOUS; SUBCUTANEOUS at 08:35

## 2019-05-23 RX ADMIN — INSULIN ASPART 5 UNITS: 100 INJECTION, SOLUTION INTRAVENOUS; SUBCUTANEOUS at 17:12

## 2019-05-23 RX ADMIN — INSULIN ASPART 1 UNITS: 100 INJECTION, SOLUTION INTRAVENOUS; SUBCUTANEOUS at 17:19

## 2019-05-23 RX ADMIN — ATORVASTATIN CALCIUM 20 MG: 20 TABLET, FILM COATED ORAL at 20:12

## 2019-05-23 RX ADMIN — NICOTINE 1 PATCH: 21 PATCH, EXTENDED RELEASE TRANSDERMAL at 08:26

## 2019-05-23 RX ADMIN — GABAPENTIN 1000 MG: 400 CAPSULE ORAL at 20:11

## 2019-05-23 RX ADMIN — BENZTROPINE MESYLATE 1 MG: 1 TABLET ORAL at 20:12

## 2019-05-23 RX ADMIN — HYDROXYZINE HYDROCHLORIDE 50 MG: 25 TABLET ORAL at 08:26

## 2019-05-23 RX ADMIN — HYDROXYZINE HYDROCHLORIDE 50 MG: 25 TABLET ORAL at 17:16

## 2019-05-23 RX ADMIN — METFORMIN HYDROCHLORIDE 1000 MG: 1000 TABLET ORAL at 16:38

## 2019-05-23 RX ADMIN — INSULIN ASPART 3 UNITS: 100 INJECTION, SOLUTION INTRAVENOUS; SUBCUTANEOUS at 12:44

## 2019-05-23 ASSESSMENT — ACTIVITIES OF DAILY LIVING (ADL)
HYGIENE/GROOMING: INDEPENDENT
LAUNDRY: UNABLE TO COMPLETE
DRESS: SCRUBS (BEHAVIORAL HEALTH)
DRESS: SCRUBS (BEHAVIORAL HEALTH);INDEPENDENT
ORAL_HYGIENE: INDEPENDENT
ORAL_HYGIENE: INDEPENDENT
HYGIENE/GROOMING: INDEPENDENT

## 2019-05-23 NOTE — PLAN OF CARE
"  Problem: Adult Behavioral Health Plan of Care  Goal: Patient-Specific Goal (Individualization)  Description  Patient will attend at least 50% of groups while on unit.  Patient will report at least 6-8 hours of sleep at night.  Patient will be compliant with treatment team recommendations.  Patient will be medication compliant while hospitalized.  Patient will have decrease in delusional statements by discharge.     5/23/2019 0752 by Arlyn Lind, RN  Outcome: Improving  Note:   Pt up in lounge area upon arrival, withdrawn activity, poor eye contact, labile affect, irritable during AM medication administration, tangential speech, confabulated memory, voices frustrations et anger re: relationship with mother, stolen money, mother taking advantage of daughter. Self administers insulin appropriately. Statements of \"Just let the diabetes take over, let it kill me!! Who gives a shit!! You guys are trying to kill me any ways!!\"     1104: Staff provided snack to patient---     1247: Accucheck of 287 @ 1150; prn seroquel given for c/o high anxiety et \"feeling agitated\"  Problem: Thought Process Alteration  Goal: Optimal Thought Clarity  Description  Patient will hold a reality based conversation by discharge.    5/23/2019 0752 by Arlyn Lind, RN  Outcome: Improving  Note:   Thought process is delusional, paranoid statements re: \"You guys are trying to kill me with my diabetes medications!!\"      Problem: Fall Injury Risk  Goal: Absence of Fall and Fall-Related Injury  Description  Pt will not experience a fall while hospitalized.     5/23/2019 0752 by Arlyn Lind, RN  Outcome: Improving  Note:   Pt remains free from injury et falls this shift.      "

## 2019-05-23 NOTE — PLAN OF CARE
Face to face end of shift report communicated to oncoming rn's. Rounding completed and patient observed.    Arlyn Lind  5/23/2019  3:10 PM

## 2019-05-23 NOTE — PLAN OF CARE
Problem: Adult Behavioral Health Plan of Care  Goal: Patient-Specific Goal (Individualization)  Description  Patient will attend at least 50% of groups while on unit.  Patient will report at least 6-8 hours of sleep at night.  Patient will be compliant with treatment team recommendations.  Patient will be medication compliant while hospitalized.  Patient will have decrease in delusional statements by discharge.     5/22/2019 1908 by Nancy Stern, RN  Outcome: Improving  Note:   VSS,  pain rated to 10/10 bilat ft. No S&S noted. No grimace with walking or touching feet. Scheduled pain medications rec'd. Denies SI, SIB, HI or hallucinations. Affect flat, mood is irritable this evening. Remains withdrawn and isolative, offers little conversation. States she has nothing to say tonight. Requesting extra snacks and food more frequently.   New insulin orders rec'd.      Problem: Fall Injury Risk  Goal: Absence of Fall and Fall-Related Injury  Description  Pt will not experience a fall while hospitalized.     5/22/2019 1908 by Nancy Stern, RN  Outcome: Improving  Note:   Gait steady, denies dizziness, no problems noted.      Problem: Thought Process Alteration  Goal: Optimal Thought Clarity  Description  Patient will hold a reality based conversation by discharge.    5/22/2019 1908 by Nancy Stern, RN  Outcome: No Change

## 2019-05-23 NOTE — PROGRESS NOTES
[facility]    Medicine Progress Note - Hospitalist Service       Date of Admission:  5/10/2019  Assessment & Plan   Ms. Ginette Wood is a pleasant 39 year old lady admitted on 5/10/2019 for agitation, delusions and worsening of her schizoaffective disorder. Hospitalist service is following her for her diabetes management. Her issues are the following:     # Type 2 diabetes mellitus (uncontrolled) with neuropathy   Last A1c was 8.5%.  She remains hyperglycemic. Increase the dose of lantus to 50units and premeal insulin to 15units tid. I will increase the dose of lantus to 55 units today. Continue with correction scale insulin along with metformin.  Patient does not want to make changes in her diet and is only requesting that a physician should adjust her insulin.  Patient was educated on avoiding carbs such as pizza, rice, pasta, potatoes and breads.  Patient will continue Neurontin for diabetic neuropathy.     For 5/22    -increase lantus to 60 units    -continue sliding scale insulin corrective insulin    -add prandial novolog 2 units per carb choice with meals       For 5/23    -increase lantus to 65 units    -continue sliding scale insulin    -continue prandial novolog 2 units per carb choice with meals    -add 5 units fixed novolog with meals    # Dyslipidemia  Continue Lipitor. LDL was 98.    # Schizoaffective disorder  Management as per psychiatry.  Atypical antipsychotics likely contributing to patient's diabetes mellitus as well.    # Foot Pain  Has DJD of the foot but no fractures on the x-rays done during this admission. Arterial dopplers unremarkable. I will try scheduled ibuprofen for couple of days.     Diet: Consistent Carbohydrate Diet 0802-0090 Calories: Moderate Consistent CHO (4-6 CHO units/meal)    DVT Prophylaxis: Low Risk/Ambulatory with no VTE prophylaxis indicated  Aguirre Catheter: not present  Code Status: Full Code      Disposition Plan   Expected discharge: per  psychiatry, recommended to prior living arrangement once per psychiatry.   The patient's care was discussed with the Bedside Nurse.    Entered: Fred Mendoza MD 05/23/2019, 3:51 PM   Hospitalist Service  [facility]    ______________________________________________________________________    Interval History   Patient drinking coffee with no sugar  Asking if she can have double protein portions  States she is cutting back on sugar/fruit based juice drinks     Data reviewed today:  Physical Exam   Vital Signs: Temp: 98.5  F (36.9  C) Temp src: Tympanic BP: 126/80 Pulse: 89   Resp: 16 SpO2: 99 % O2 Device: None (Room air)    Weight: 181 lbs 12.8 oz    General: no acute distress  HEENT: ncat eomi   LUNGS: Clear to auscultation bilaterally  CARDIOVASCULAR EXAM: rrr normal s1 s2  SKIN: warm, dry, no rash on face  NEURO: Alert and oriented x 3. No focal neurologic deficits.         Data   Recent Labs   Lab 05/23/19  0602   WBC 9.0     Fred Mendoza

## 2019-05-23 NOTE — PLAN OF CARE
Problem: Adult Behavioral Health Plan of Care  Goal: Patient-Specific Goal (Individualization)  Description  Patient will attend at least 50% of groups while on unit.  Patient will report at least 6-8 hours of sleep at night.  Patient will be compliant with treatment team recommendations.  Patient will be medication compliant while hospitalized.  Patient will have decrease in delusional statements by discharge.     5/22/2019 2345 by Melissa Flowers RN  Outcome: No Change     Problem: Thought Process Alteration  Goal: Optimal Thought Clarity  Description  Patient will hold a reality based conversation by discharge.    5/22/2019 2345 by Melissa Flowers, RN  Outcome: No Change     Problem: Fall Injury Risk  Goal: Absence of Fall and Fall-Related Injury  Description  Pt will not experience a fall while hospitalized.     5/22/2019 2345 by Melissa Flowers RN  Outcome: No Change   Face to face end of shift report received from Nancy MAK. Rounding completed and patient observed.    Pt appeared to be sleeping most of this shift, normal respirations and position changes noted. Pt did get up for a snack and then returned to bed. Pt did not have any falls or injury this shift. Pt compliant with morning lab draw.    Face to face end of shift report will be communicated to kei MAK.     Melissa Flowers  5/23/2019  6:31 AM

## 2019-05-23 NOTE — PLAN OF CARE
BEHAVIORAL TEAM DISCUSSION    Participants: Natalia Fabian NP, Dennise Herrera LICSW, Angie Laws LSW, Becca Chery LSW, Beena Kumar LGSW, Krys Rojo RN, Ion Morales RN, Eliza Arndt RN, Mady Holman OT, Natalia Costello OT, eGna Gomez OT  Progress: improved, less delusional, better insight  Continued Stay Criteria/Rationale: irritable, disorganized, isolative  Medical/Physical: Hep C positive, diabetes - working with hospitalist and ordering healthy snacks - blood sugars are improving, black and blue toetail - foot soaks ordered  Precautions:   Falls precaution?: YES, care plan in place   Behavioral Orders   Procedures     Code 1 - Restrict to Unit     Routine Programming     As clinically indicated     Status 15     Every 15 minutes.     Plan: guardianship and rep payee paperwork faxed to [place]  Rationale for change in precautions or plan: None    Active Problems:    Mixed hyperlipidemia    Diabetes mellitus, type 2    Schizoaffective disorder, bipolar type (H)    Psychosis (H)      Current Facility-Administered Medications:      acetaminophen (TYLENOL) tablet 975 mg, 975 mg, Oral, Q6H PRN, Raven Stout, JACKIE, 975 mg at 05/21/19 1649     atorvastatin (LIPITOR) tablet 20 mg, 20 mg, Oral, At Bedtime, Airam Light NP, 20 mg at 05/22/19 2011     benzocaine-menthol (CEPACOL) 15-3.6 MG lozenge 1 lozenge, 1 lozenge, Buccal, Q1H PRN, Loren Zuleta NP, 1 lozenge at 05/19/19 0553     benztropine (COGENTIN) tablet 1 mg, 1 mg, Oral, BID, Natalia Fabian APRN CNP, 1 mg at 05/23/19 0826     cloZAPine (CLOZARIL) tablet 125 mg, 125 mg, Oral, At Bedtime, Natalia Fabian APRN CNP     glucose gel 15-30 g, 15-30 g, Oral, Q15 Min PRN **OR** dextrose 50 % injection 25-50 mL, 25-50 mL, Intravenous, Q15 Min PRN **OR** glucagon injection 1 mg, 1 mg, Subcutaneous, Q15 Min PRN, Fred Mendoza MD     glucose gel 15-30 g, 15-30 g, Oral, Q15 Min PRN **OR** dextrose 50 % injection 25-50 mL,  25-50 mL, Intravenous, Q15 Min PRN **OR** glucagon injection 1 mg, 1 mg, Subcutaneous, Q15 Min PRN, Shawn Wylie MD     glucose gel 15-30 g, 15-30 g, Oral, Q15 Min PRN **OR** dextrose 50 % injection 25-50 mL, 25-50 mL, Intravenous, Q15 Min PRN **OR** glucagon injection 1 mg, 1 mg, Subcutaneous, Q15 Min PRN, Airam Light NP     divalproex sodium extended-release (DEPAKOTE ER) 24 hr tablet 1,250 mg, 1,250 mg, Oral, At Bedtime, Natalia Fabian APRN CNP     gabapentin (NEURONTIN) capsule 1,000 mg, 1,000 mg, Oral, TID, Loren Zuleta NP, 1,000 mg at 05/23/19 0826     hydrOXYzine (ATARAX) tablet 50 mg, 50 mg, Oral, TID, Natalia Fabian APRN CNP, 50 mg at 05/23/19 0826     ibuprofen (ADVIL/MOTRIN) tablet 200 mg, 200 mg, Oral, Q6H PRN, Judy Grant MD     insulin aspart (NovoLOG) inj (RAPID ACTING), , Subcutaneous, Daily with breakfast, Fred Mendoza MD, 15 Units at 05/23/19 0835     insulin aspart (NovoLOG) inj (RAPID ACTING), , Subcutaneous, Daily with lunch, Fred Mendoza MD     insulin aspart (NovoLOG) inj (RAPID ACTING), , Subcutaneous, Daily with supper, Fred Mendoza MD, 10 Units at 05/22/19 1720     insulin aspart (NovoLOG) inj (RAPID ACTING), 1-7 Units, Subcutaneous, TID AC, Airam Light NP, 2 Units at 05/23/19 0835     insulin aspart (NovoLOG) inj (RAPID ACTING), 1-5 Units, Subcutaneous, At Bedtime, Airam Light NP, 2 Units at 05/22/19 2011     insulin glargine (LANTUS PEN) injection 60 Units, 60 Units, Subcutaneous, At Bedtime, Fred Mendoza MD, 60 Units at 05/22/19 2011     magnesium sulfate (EPSOM SALT) granules 5 g, 5 g, Other, BID PRN, 5 g at 05/18/19 1331 **AND** Patient care order, , , Per Unit Routine, Khushi Reynolds Mc, APRN CNP     metFORMIN (GLUCOPHAGE) tablet 1,000 mg, 1,000 mg, Oral, BID w/meals, Light, April Keturah, NP, 1,000 mg at 05/23/19 0826     nicotine (NICODERM CQ) 21 MG/24HR 24 hr patch 1 patch, 1 patch,  Transdermal, Daily, Loren Zuleta NP, 1 patch at 05/23/19 0826     nicotine (NICORETTE) gum 2-4 mg, 2-4 mg, Buccal, Q1H PRN, Raven Stout, NP, 4 mg at 05/21/19 0938     nicotine Patch in Place, , Transdermal, Q8H, Loren Zuleta, NP, Stopped at 05/23/19 0143     nicotine patch REMOVAL, , Transdermal, Daily, Loren Zuleta, NP     prazosin (MINIPRESS) capsule 1 mg, 1 mg, Oral, At Bedtime, Light, April JACKIE Rebollar, 1 mg at 05/22/19 2011     QUEtiapine (SEROquel) tablet 25 mg, 25 mg, Oral, 4x Daily PRN, Loren Zuleta, NP, 25 mg at 05/22/19 2018     topiramate (TOPAMAX) tablet 25 mg, 25 mg, Oral, Daily, Loren Zuleta, NP, 25 mg at 05/23/19 0826

## 2019-05-24 LAB
ANION GAP SERPL CALCULATED.3IONS-SCNC: 7 MMOL/L (ref 3–14)
BUN SERPL-MCNC: 16 MG/DL (ref 7–30)
CALCIUM SERPL-MCNC: 8.6 MG/DL (ref 8.5–10.1)
CHLORIDE SERPL-SCNC: 113 MMOL/L (ref 94–109)
CO2 SERPL-SCNC: 24 MMOL/L (ref 20–32)
CREAT SERPL-MCNC: 0.53 MG/DL (ref 0.52–1.04)
GFR SERPL CREATININE-BSD FRML MDRD: >90 ML/MIN/{1.73_M2}
GLUCOSE BLDC GLUCOMTR-MCNC: 176 MG/DL (ref 70–99)
GLUCOSE BLDC GLUCOMTR-MCNC: 177 MG/DL (ref 70–99)
GLUCOSE BLDC GLUCOMTR-MCNC: 179 MG/DL (ref 70–99)
GLUCOSE BLDC GLUCOMTR-MCNC: 206 MG/DL (ref 70–99)
GLUCOSE BLDC GLUCOMTR-MCNC: 226 MG/DL (ref 70–99)
GLUCOSE SERPL-MCNC: 183 MG/DL (ref 70–99)
POTASSIUM SERPL-SCNC: 4.6 MMOL/L (ref 3.4–5.3)
SODIUM SERPL-SCNC: 144 MMOL/L (ref 133–144)

## 2019-05-24 PROCEDURE — 25000131 ZZH RX MED GY IP 250 OP 636 PS 637: Performed by: HOSPITALIST

## 2019-05-24 PROCEDURE — 00000146 ZZHCL STATISTIC GLUCOSE BY METER IP

## 2019-05-24 PROCEDURE — 25000132 ZZH RX MED GY IP 250 OP 250 PS 637: Performed by: NURSE PRACTITIONER

## 2019-05-24 PROCEDURE — 80048 BASIC METABOLIC PNL TOTAL CA: CPT | Performed by: HOSPITALIST

## 2019-05-24 PROCEDURE — 12400000 ZZH R&B MH

## 2019-05-24 PROCEDURE — 99232 SBSQ HOSP IP/OBS MODERATE 35: CPT | Performed by: NURSE PRACTITIONER

## 2019-05-24 PROCEDURE — 36415 COLL VENOUS BLD VENIPUNCTURE: CPT | Performed by: HOSPITALIST

## 2019-05-24 PROCEDURE — 25000125 ZZHC RX 250: Performed by: NURSE PRACTITIONER

## 2019-05-24 RX ORDER — AMOXICILLIN 250 MG
1 CAPSULE ORAL AT BEDTIME
Status: DISCONTINUED | OUTPATIENT
Start: 2019-05-24 | End: 2019-06-08

## 2019-05-24 RX ORDER — DIVALPROEX SODIUM 500 MG/1
1000 TABLET, EXTENDED RELEASE ORAL AT BEDTIME
Status: DISCONTINUED | OUTPATIENT
Start: 2019-05-24 | End: 2019-05-28

## 2019-05-24 RX ORDER — TOPIRAMATE 50 MG/1
50 TABLET, FILM COATED ORAL DAILY
Status: DISCONTINUED | OUTPATIENT
Start: 2019-05-25 | End: 2019-05-30

## 2019-05-24 RX ADMIN — QUETIAPINE FUMARATE 25 MG: 25 TABLET ORAL at 14:03

## 2019-05-24 RX ADMIN — HYDROXYZINE HYDROCHLORIDE 50 MG: 25 TABLET ORAL at 17:32

## 2019-05-24 RX ADMIN — INSULIN ASPART 2 UNITS: 100 INJECTION, SOLUTION INTRAVENOUS; SUBCUTANEOUS at 17:06

## 2019-05-24 RX ADMIN — PRAZOSIN HYDROCHLORIDE 1 MG: 1 CAPSULE ORAL at 20:19

## 2019-05-24 RX ADMIN — INSULIN ASPART 2 UNITS: 100 INJECTION, SOLUTION INTRAVENOUS; SUBCUTANEOUS at 12:12

## 2019-05-24 RX ADMIN — QUETIAPINE FUMARATE 25 MG: 25 TABLET ORAL at 20:25

## 2019-05-24 RX ADMIN — HYDROXYZINE HYDROCHLORIDE 50 MG: 25 TABLET ORAL at 12:11

## 2019-05-24 RX ADMIN — GABAPENTIN 1000 MG: 400 CAPSULE ORAL at 20:19

## 2019-05-24 RX ADMIN — ATORVASTATIN CALCIUM 20 MG: 20 TABLET, FILM COATED ORAL at 20:20

## 2019-05-24 RX ADMIN — INSULIN ASPART 1 UNITS: 100 INJECTION, SOLUTION INTRAVENOUS; SUBCUTANEOUS at 08:17

## 2019-05-24 RX ADMIN — HYDROXYZINE HYDROCHLORIDE 50 MG: 25 TABLET ORAL at 08:04

## 2019-05-24 RX ADMIN — QUETIAPINE FUMARATE 25 MG: 25 TABLET ORAL at 08:09

## 2019-05-24 RX ADMIN — INSULIN ASPART 5 UNITS: 100 INJECTION, SOLUTION INTRAVENOUS; SUBCUTANEOUS at 08:16

## 2019-05-24 RX ADMIN — INSULIN ASPART 5 UNITS: 100 INJECTION, SOLUTION INTRAVENOUS; SUBCUTANEOUS at 12:14

## 2019-05-24 RX ADMIN — DIVALPROEX SODIUM 1000 MG: 500 TABLET, EXTENDED RELEASE ORAL at 20:19

## 2019-05-24 RX ADMIN — BENZTROPINE MESYLATE 1 MG: 1 TABLET ORAL at 20:20

## 2019-05-24 RX ADMIN — BENZTROPINE MESYLATE 1 MG: 1 TABLET ORAL at 08:04

## 2019-05-24 RX ADMIN — SENNOSIDES AND DOCUSATE SODIUM 1 TABLET: 8.6; 5 TABLET ORAL at 20:20

## 2019-05-24 RX ADMIN — GABAPENTIN 1000 MG: 400 CAPSULE ORAL at 08:03

## 2019-05-24 RX ADMIN — CLOZAPINE 150 MG: 50 TABLET ORAL at 20:19

## 2019-05-24 RX ADMIN — METFORMIN HYDROCHLORIDE 1000 MG: 1000 TABLET ORAL at 08:03

## 2019-05-24 RX ADMIN — INSULIN ASPART 5 UNITS: 100 INJECTION, SOLUTION INTRAVENOUS; SUBCUTANEOUS at 17:07

## 2019-05-24 RX ADMIN — NICOTINE 1 PATCH: 21 PATCH, EXTENDED RELEASE TRANSDERMAL at 08:17

## 2019-05-24 RX ADMIN — TOPIRAMATE 25 MG: 25 TABLET, FILM COATED ORAL at 08:04

## 2019-05-24 RX ADMIN — GABAPENTIN 1000 MG: 400 CAPSULE ORAL at 14:00

## 2019-05-24 RX ADMIN — METFORMIN HYDROCHLORIDE 1000 MG: 1000 TABLET ORAL at 16:29

## 2019-05-24 ASSESSMENT — ACTIVITIES OF DAILY LIVING (ADL)
HYGIENE/GROOMING: INDEPENDENT
LAUNDRY: UNABLE TO COMPLETE
DRESS: SCRUBS (BEHAVIORAL HEALTH)
ORAL_HYGIENE: INDEPENDENT

## 2019-05-24 NOTE — PLAN OF CARE
Face to face report rec'd from OBDULIO Laguna with opportunity to observe patient.    Report rec'd from     Nancy Stern   5/24/2019  3:52 PM

## 2019-05-24 NOTE — PLAN OF CARE
Problem: Adult Behavioral Health Plan of Care  Goal: Patient-Specific Goal (Individualization)  Description  Patient will attend at least 50% of groups while on unit.  Patient will report at least 6-8 hours of sleep at night.  Patient will be compliant with treatment team recommendations.  Patient will be medication compliant while hospitalized.  Patient will have decrease in delusional statements by discharge.     5/23/2019 1929 by Nancy Stern, RN  Outcome: No Change  Note:   VSS, pain to bilat ft rated 8/10 -receiving scheduled Ibuprofen. Denies SI, SIB, HI or hallucinations. States she is having depression and anxiety because her daughter will not answer the phone. Pt screaming and yelling after attempting to call her. Talk with pt about  using coping skills to help control outbursts. -prn Seroquel rec'd.   Continues with fixed delusions--tangential, rambling speech.    at dinner and 273 before snack this evening. pt upset about her blood sugar result and refuses snack, becomes irritable.      Dr. Mendoza up at start of shift to see pt. Novolog orders changed along with increasing Lantus. BMP draw in AM   Problem: Fall Injury Risk  Goal: Absence of Fall and Fall-Related Injury  Description  Pt will not experience a fall while hospitalized.     5/23/2019 1929 by Nancy Stern, RN  Outcome: Improving  Note:   Denies light headedness upon standing and with ambulation.      Face to face end of shift report communicated to Melissa RIVAS RN. Rounding completed and patient observed.    Nancy Stern  5/23/2019  9:42 PM

## 2019-05-24 NOTE — PLAN OF CARE
Problem: Adult Behavioral Health Plan of Care  Goal: Patient-Specific Goal (Individualization)  Description  Patient will attend at least 50% of groups while on unit.  Patient will report at least 6-8 hours of sleep at night.  Patient will be compliant with treatment team recommendations.  Patient will be medication compliant while hospitalized.  Patient will have decrease in delusional statements by discharge.     5/24/2019 1747 by Nancy Stern, RN  Outcome: Improving  Note:   VSS --temp slightly more increased per trend at 99.7 this evening.   Denies all criteria including : SI. SIB, HI or hallucinations. Endorses moderate depression and anxiety with hospitalization and her diabetes and BG levels.   Less irritable this evening. Remains isolative in her room with exception of meals. Speech becomes pressured and tangential when she starts talking about her family.  BG at dinner 226 and 177 at HS. Pt excited her BG is under 200 and talks about the good dinner choices she made. Able to maintain short, linear conversations.        Problem: Thought Process Alteration  Goal: Optimal Thought Clarity  Description  Patient will hold a reality based conversation by discharge.    5/24/2019 1747 by Nancy Stern RN  Outcome: Improving  Note:   Short linear conversations.     Problem: Fall Injury Risk  Goal: Absence of Fall and Fall-Related Injury  Description  Pt will not experience a fall while hospitalized.     5/24/2019 1747 by Nancy Stern RN  Outcome: Improving  Note:   Denies dizziness with standing or    Face to face end of shift report communicated to Melissa RIVAS RN.     Nancy Stern  5/24/2019  10:18 PM         ambulation.

## 2019-05-24 NOTE — PLAN OF CARE
Problem: Adult Behavioral Health Plan of Care  Goal: Patient-Specific Goal (Individualization)  Description  Patient will attend at least 50% of groups while on unit.  Patient will report at least 6-8 hours of sleep at night.  Patient will be compliant with treatment team recommendations.  Patient will be medication compliant while hospitalized.  Patient will have decrease in delusional statements by discharge.     5/23/2019 2339 by Melissa Flowers, RN  Outcome: No Change     Problem: Thought Process Alteration  Goal: Optimal Thought Clarity  Description  Patient will hold a reality based conversation by discharge.    5/23/2019 2339 by Melissa Flowers, RN  Outcome: No Change     Problem: Fall Injury Risk  Goal: Absence of Fall and Fall-Related Injury  Description  Pt will not experience a fall while hospitalized.     5/23/2019 2339 by Melissa Flowers RN  Outcome: No Change    Face to face end of shift report received from Nancy MAK. Rounding completed and patient observed.    Pt appeared to be sleeping most of this shift, normal respirations and position changes noted. Pt did get up once to have a snack but had already eaten all the snacks of hers that had been sent up for the day, pt said she would just go back to bed. Pt did not have any falls this shift.     Face to face end of shift report will be communicated to kei MAK.    Melissa Flowers  5/24/2019  5:50 AM

## 2019-05-24 NOTE — PLAN OF CARE
"  Problem: Adult Behavioral Health Plan of Care  Goal: Patient-Specific Goal (Individualization)  Description  Patient will attend at least 50% of groups while on unit.  Patient will report at least 6-8 hours of sleep at night.  Patient will be compliant with treatment team recommendations.  Patient will be medication compliant while hospitalized.  Patient will have decrease in delusional statements by discharge.     5/24/2019 1155 by Luz Elena Chaudhary RN  Outcome: No Change     Problem: Thought Process Alteration  Goal: Optimal Thought Clarity  Description  Patient will hold a reality based conversation by discharge.    5/24/2019 1155 by Luz Elena Chaudhary, RN  Outcome: No Change     Problem: Fall Injury Risk  Goal: Absence of Fall and Fall-Related Injury  Description  Pt will not experience a fall while hospitalized.     5/24/2019 1155 by Luz Elena Chaudhary RN  Outcome: No Change   PT denies HI and hallucinations. PT reports SI on and off being tired and frustrated and sick of her diabetes. Discussed eating habits with PT who replied \"There is not anything else to really do around here.\"  PT was friendly with writer and made clear statements about blood glucose testing process and how she liked writer taking her blood sugar.  PT requested 0809 PRN Seroquel 25mg. PT denied pain to writer during AM pain assessment. PT steady on feet. PT withdrawn and isolative to room with an overall flat affect.     Face to face end of shift report communicated to Nancy MAK.     Luz Elena Chaudhary  5/24/2019  3:31 PM        "

## 2019-05-24 NOTE — PLAN OF CARE
BEHAVIORAL TEAM DISCUSSION    Participants: Loren Hart NP, Dennise Herrera [facility], Beena Kumar [facility], Diana Campos RN, Karley Ron RN, Mady Holman OT, Natalia Costello OT, Gena Gomez OT  Progress: minimal  Continued Stay Criteria/Rationale: hallucinations, delusional, isolative, irritable  Medical/Physical: Hep C positive, diabetes - working with hospitalist and ordering healthy snacks - blood sugars are improving, black and blue toetail - foot soaks ordered, hx of TBI  Precautions:   Falls precaution?: YES, care plan initiated and continue to monitor   Behavioral Orders   Procedures     Code 1 - Restrict to Unit     Routine Programming     As clinically indicated     Status 15     Every 15 minutes.     Plan: rep payee and guardianship paperwork sent to Lauro Lauren, increasing Clozaril, stop Depakote, start Lamictal, recently started Topmax  Rationale for change in precautions or plan: None    Active Problems:    Mixed hyperlipidemia    Diabetes mellitus, type 2    Schizoaffective disorder, bipolar type (H)    Psychosis (H)      Current Facility-Administered Medications:      acetaminophen (TYLENOL) tablet 975 mg, 975 mg, Oral, Q6H PRN, Raven Stout NP, 975 mg at 05/21/19 1649     atorvastatin (LIPITOR) tablet 20 mg, 20 mg, Oral, At Bedtime, Airam Light NP, 20 mg at 05/23/19 2012     benzocaine-menthol (CEPACOL) 15-3.6 MG lozenge 1 lozenge, 1 lozenge, Buccal, Q1H PRN, Loren Zuleta NP, 1 lozenge at 05/19/19 0553     benztropine (COGENTIN) tablet 1 mg, 1 mg, Oral, BID, Natalia Fabian, LATRICE CNP, 1 mg at 05/24/19 0804     cloZAPine (CLOZARIL) tablet 150 mg, 150 mg, Oral, At Bedtime, Loren Zuleta NP     glucose gel 15-30 g, 15-30 g, Oral, Q15 Min PRN **OR** dextrose 50 % injection 25-50 mL, 25-50 mL, Intravenous, Q15 Min PRN **OR** glucagon injection 1 mg, 1 mg, Subcutaneous, Q15 Min PRN, Fred Mendoza MD     glucose gel 15-30 g, 15-30 g, Oral, Q15 Min PRN **OR** dextrose  50 % injection 25-50 mL, 25-50 mL, Intravenous, Q15 Min PRN **OR** glucagon injection 1 mg, 1 mg, Subcutaneous, Q15 Min PRN, Shawn Wylie MD     glucose gel 15-30 g, 15-30 g, Oral, Q15 Min PRN **OR** dextrose 50 % injection 25-50 mL, 25-50 mL, Intravenous, Q15 Min PRN **OR** glucagon injection 1 mg, 1 mg, Subcutaneous, Q15 Min PRN, Airam Light NP     divalproex sodium extended-release (DEPAKOTE ER) 24 hr tablet 1,000 mg, 1,000 mg, Oral, At Bedtime, Loren Zuleta NP     gabapentin (NEURONTIN) capsule 1,000 mg, 1,000 mg, Oral, TID, Loren Zuleta NP, 1,000 mg at 05/24/19 0803     hydrOXYzine (ATARAX) tablet 50 mg, 50 mg, Oral, TID, Natalia Fabian, LATRICE CNP, 50 mg at 05/24/19 0804     ibuprofen (ADVIL/MOTRIN) tablet 200 mg, 200 mg, Oral, Q6H PRN, Judy Grant MD     insulin aspart (NovoLOG) inj (RAPID ACTING), 5 Units, Subcutaneous, TID w/meals, Fred Mendoza MD, 5 Units at 05/24/19 0816     insulin aspart (NovoLOG) inj (RAPID ACTING), , Subcutaneous, Daily with breakfast, Fred Mendoza MD, 8 Units at 05/24/19 0816     insulin aspart (NovoLOG) inj (RAPID ACTING), , Subcutaneous, Daily with lunch, Fred Mendoza MD, 15 Units at 05/23/19 1245     insulin aspart (NovoLOG) inj (RAPID ACTING), , Subcutaneous, Daily with supper, Fred Mendoza MD, 12 Units at 05/23/19 1711     insulin aspart (NovoLOG) inj (RAPID ACTING), 1-7 Units, Subcutaneous, TID AC, Airam Light NP, 1 Units at 05/24/19 0817     insulin aspart (NovoLOG) inj (RAPID ACTING), 1-5 Units, Subcutaneous, At Bedtime, Kuldeep, April Keturah, NP, 2 Units at 05/23/19 2012     insulin glargine (LANTUS PEN) injection 65 Units, 65 Units, Subcutaneous, At Bedtime, Fred Mendoza MD, 65 Units at 05/23/19 2012     magnesium sulfate (EPSOM SALT) granules 5 g, 5 g, Other, BID PRN, 5 g at 05/18/19 1331 **AND** Patient care order, , , Per Unit Routine, Khushi Reynolds Mc, APRN CNP     metFORMIN (GLUCOPHAGE) tablet  1,000 mg, 1,000 mg, Oral, BID w/meals, Light, April Keturah, NP, 1,000 mg at 05/24/19 0803     nicotine (NICODERM CQ) 21 MG/24HR 24 hr patch 1 patch, 1 patch, Transdermal, Daily, Loren Zuleta NP, 1 patch at 05/24/19 0817     nicotine (NICORETTE) gum 2-4 mg, 2-4 mg, Buccal, Q1H PRN, Raven Stout NP, 4 mg at 05/21/19 0938     nicotine Patch in Place, , Transdermal, Q8H, Loren Zuleta NP, Stopped at 05/24/19 0133     nicotine patch REMOVAL, , Transdermal, Daily, Loren Zuleta NP     prazosin (MINIPRESS) capsule 1 mg, 1 mg, Oral, At Bedtime, Light, April JACKIE Rebollar, 1 mg at 05/23/19 2012     QUEtiapine (SEROquel) tablet 25 mg, 25 mg, Oral, 4x Daily PRN, Loren Zuleta NP, 25 mg at 05/24/19 0809     senna-docusate (SENOKOT-S/PERICOLACE) 8.6-50 MG per tablet 1 tablet, 1 tablet, Oral, At Bedtime, Loren Zuleta NP     [START ON 5/25/2019] topiramate (TOPAMAX) tablet 50 mg, 50 mg, Oral, Daily, Loren Zuleta NP

## 2019-05-25 LAB
GLUCOSE BLDC GLUCOMTR-MCNC: 181 MG/DL (ref 70–99)
GLUCOSE BLDC GLUCOMTR-MCNC: 196 MG/DL (ref 70–99)
GLUCOSE BLDC GLUCOMTR-MCNC: 221 MG/DL (ref 70–99)
GLUCOSE BLDC GLUCOMTR-MCNC: 260 MG/DL (ref 70–99)
GLUCOSE BLDC GLUCOMTR-MCNC: 274 MG/DL (ref 70–99)

## 2019-05-25 PROCEDURE — 25000125 ZZHC RX 250: Performed by: NURSE PRACTITIONER

## 2019-05-25 PROCEDURE — 25000132 ZZH RX MED GY IP 250 OP 250 PS 637: Performed by: NURSE PRACTITIONER

## 2019-05-25 PROCEDURE — 25000128 H RX IP 250 OP 636

## 2019-05-25 PROCEDURE — 00000146 ZZHCL STATISTIC GLUCOSE BY METER IP

## 2019-05-25 PROCEDURE — 25000132 ZZH RX MED GY IP 250 OP 250 PS 637

## 2019-05-25 PROCEDURE — 25000132 ZZH RX MED GY IP 250 OP 250 PS 637: Performed by: PSYCHIATRY & NEUROLOGY

## 2019-05-25 PROCEDURE — 99232 SBSQ HOSP IP/OBS MODERATE 35: CPT | Performed by: NURSE PRACTITIONER

## 2019-05-25 PROCEDURE — 12400000 ZZH R&B MH

## 2019-05-25 RX ORDER — CHLORPROMAZINE HYDROCHLORIDE 25 MG/ML
INJECTION INTRAMUSCULAR
Status: COMPLETED
Start: 2019-05-25 | End: 2019-05-25

## 2019-05-25 RX ORDER — CHLORPROMAZINE HYDROCHLORIDE 25 MG/1
50 TABLET, FILM COATED ORAL 3 TIMES DAILY PRN
Status: DISCONTINUED | OUTPATIENT
Start: 2019-05-25 | End: 2019-05-30

## 2019-05-25 RX ORDER — SENNOSIDES 8.6 MG
8.6 TABLET ORAL ONCE
Status: COMPLETED | OUTPATIENT
Start: 2019-05-25 | End: 2019-05-25

## 2019-05-25 RX ORDER — SENNOSIDES 8.6 MG
TABLET ORAL
Status: DISCONTINUED
Start: 2019-05-25 | End: 2019-05-26 | Stop reason: HOSPADM

## 2019-05-25 RX ORDER — HYDROXYZINE HYDROCHLORIDE 25 MG/1
100 TABLET, FILM COATED ORAL
Status: DISCONTINUED | OUTPATIENT
Start: 2019-05-25 | End: 2019-06-07

## 2019-05-25 RX ORDER — CHLORPROMAZINE HYDROCHLORIDE 25 MG/ML
50 INJECTION INTRAMUSCULAR 3 TIMES DAILY PRN
Status: DISCONTINUED | OUTPATIENT
Start: 2019-05-25 | End: 2019-05-30

## 2019-05-25 RX ADMIN — INSULIN ASPART 5 UNITS: 100 INJECTION, SOLUTION INTRAVENOUS; SUBCUTANEOUS at 09:06

## 2019-05-25 RX ADMIN — INSULIN ASPART 5 UNITS: 100 INJECTION, SOLUTION INTRAVENOUS; SUBCUTANEOUS at 12:30

## 2019-05-25 RX ADMIN — TOPIRAMATE 50 MG: 50 TABLET ORAL at 08:08

## 2019-05-25 RX ADMIN — GABAPENTIN 1000 MG: 400 CAPSULE ORAL at 21:17

## 2019-05-25 RX ADMIN — DIVALPROEX SODIUM 1000 MG: 500 TABLET, EXTENDED RELEASE ORAL at 21:18

## 2019-05-25 RX ADMIN — QUETIAPINE FUMARATE 25 MG: 25 TABLET ORAL at 08:12

## 2019-05-25 RX ADMIN — CLOZAPINE 175 MG: 50 TABLET ORAL at 21:18

## 2019-05-25 RX ADMIN — INSULIN ASPART 5 UNITS: 100 INJECTION, SOLUTION INTRAVENOUS; SUBCUTANEOUS at 17:17

## 2019-05-25 RX ADMIN — METFORMIN HYDROCHLORIDE 1000 MG: 1000 TABLET ORAL at 08:08

## 2019-05-25 RX ADMIN — METFORMIN HYDROCHLORIDE 1000 MG: 1000 TABLET ORAL at 17:17

## 2019-05-25 RX ADMIN — INSULIN ASPART 3 UNITS: 100 INJECTION, SOLUTION INTRAVENOUS; SUBCUTANEOUS at 12:30

## 2019-05-25 RX ADMIN — CHLORPROMAZINE HCI 50 MG: 25 INJECTION INTRAMUSCULAR at 08:29

## 2019-05-25 RX ADMIN — BENZTROPINE MESYLATE 1 MG: 1 TABLET ORAL at 21:18

## 2019-05-25 RX ADMIN — DOCUSATE SODIUM: 50 CAPSULE, LIQUID FILLED ORAL at 21:28

## 2019-05-25 RX ADMIN — PRAZOSIN HYDROCHLORIDE 1 MG: 1 CAPSULE ORAL at 21:18

## 2019-05-25 RX ADMIN — NICOTINE 1 PATCH: 21 PATCH, EXTENDED RELEASE TRANSDERMAL at 08:08

## 2019-05-25 RX ADMIN — INSULIN ASPART 2 UNITS: 100 INJECTION, SOLUTION INTRAVENOUS; SUBCUTANEOUS at 17:16

## 2019-05-25 RX ADMIN — SENNOSIDES 8.6 MG: 8.6 TABLET, FILM COATED ORAL at 21:17

## 2019-05-25 RX ADMIN — HYDROXYZINE HYDROCHLORIDE 50 MG: 25 TABLET ORAL at 08:08

## 2019-05-25 RX ADMIN — HYDROXYZINE HYDROCHLORIDE 100 MG: 25 TABLET ORAL at 12:32

## 2019-05-25 RX ADMIN — GABAPENTIN 1000 MG: 400 CAPSULE ORAL at 14:01

## 2019-05-25 RX ADMIN — BENZTROPINE MESYLATE 1 MG: 1 TABLET ORAL at 08:08

## 2019-05-25 RX ADMIN — HYDROXYZINE HYDROCHLORIDE 100 MG: 25 TABLET ORAL at 17:17

## 2019-05-25 RX ADMIN — CHLORPROMAZINE HYDROCHLORIDE 50 MG: 25 INJECTION INTRAMUSCULAR at 08:29

## 2019-05-25 RX ADMIN — DOCUSATE SODIUM 50 MG: 50 CAPSULE, LIQUID FILLED ORAL at 21:19

## 2019-05-25 RX ADMIN — GABAPENTIN 1000 MG: 400 CAPSULE ORAL at 08:08

## 2019-05-25 RX ADMIN — ATORVASTATIN CALCIUM 20 MG: 20 TABLET, FILM COATED ORAL at 21:18

## 2019-05-25 RX ADMIN — INSULIN ASPART 3 UNITS: 100 INJECTION, SOLUTION INTRAVENOUS; SUBCUTANEOUS at 09:05

## 2019-05-25 ASSESSMENT — ACTIVITIES OF DAILY LIVING (ADL)
ORAL_HYGIENE: INDEPENDENT
LAUNDRY: UNABLE TO COMPLETE
HYGIENE/GROOMING: INDEPENDENT
DRESS: SCRUBS (BEHAVIORAL HEALTH)

## 2019-05-25 NOTE — PLAN OF CARE
Violent/Self-Destructive Seclusion or Restraint Discontinuation Note    Type of seclusion or restraint: Manual Restraint Hold   Patient's response to intervention: PT continued to yell but stayed in bed after administration and allowed staff to exit room with no further threats of violence.  Date of discontinuation: 5/25/2019  Time of discontinuation: 0833   Face to face assessment completed: Yes  Restraint monitoring minimum of every 15 minutes: Episode did not last longer than 15 minutes   Debriefing with patient completed: Yes.  Care plan updated: Yes.

## 2019-05-25 NOTE — PLAN OF CARE
Violent/Self-Destructive Seclusion or Restraint Initiation Note    Patient behaviors justifying violent/self-destructive seclusion or restraint (describe attempted least restricted alternatives and patient's response to interventions): PT continued to be verbally aggressive and threatening to staff. Staff entered with IM medications due to immediate threats of violence.PT refused medication and manual restraint initiated.   Type of restraint initiated: Manual Restraint/ Hold   Date Started: 5/25/2019  Time Started:0831am   LIP notified (name): Natalia GANNON NP  Order obtained: Yes    Patient educated on reason for seclusion or restraints and discontinuation criteria: Yes   Care plan updated: Yes

## 2019-05-25 NOTE — PLAN OF CARE
"Violent/Self-Destructive Seclusion or Restraint Initiation Note    Patient behaviors justifying violent/self-destructive seclusion or restraint (describe attempted least restricted alternatives and patient's response to interventions): During patients medication administration PT became agitated and verbally aggressive. Pt stated \"I hate all of this shit, you all are going to have to just commit me cause I know how to leave. I will take one of those badges from you and go out those doors. You bitches need to get the fuck out of my room and leave me the fuck alone before I punch you bitches in the face. I will take that computer and throw it at you. Just get out of my room before I come at you with something.\" Attempted to redirected. PT continued to escalate and refused to take insulin saying 'If you come near me with that pen I will take it and do something with it.Just get the fuck out.\" Staff excited room and code green was called.     Type of restraint initiated: Manual/Restraint Walking hold to escort patient to ICU.  Date Started: 5/25/2019  Time Started: 0826 AM  LIP notified (name): Natalia CHEUNGNP   Order obtained: Yes.   Patient educated on reason for seclusion or restraints and discontinuation criteria: Yes.  Care plan updated: Yes     "

## 2019-05-25 NOTE — PLAN OF CARE
Problem: Adult Behavioral Health Plan of Care  Goal: Patient-Specific Goal (Individualization)  Description  Patient will attend at least 50% of groups while on unit.  Patient will report at least 6-8 hours of sleep at night.  Patient will be compliant with treatment team recommendations.  Patient will be medication compliant while hospitalized.  Patient will have decrease in delusional statements by discharge.     5/24/2019 2342 by Melissa Flowers, RN  Outcome: No Change     Problem: Thought Process Alteration  Goal: Optimal Thought Clarity  Description  Patient will hold a reality based conversation by discharge.    5/24/2019 2342 by Melissa Flowers, RN  Outcome: No Change     Problem: Fall Injury Risk  Goal: Absence of Fall and Fall-Related Injury  Description  Pt will not experience a fall while hospitalized.     5/24/2019 2342 by Melissa Flowers, RN  Outcome: Improving  Face to face end of shift report received from Nancy MAK. Rounding completed and patient observed.    Pt appeared to be sleeping most of this shift, normal respirations and position changes noted. Pt did not get up for a snack this shift. Pt did not have any falls or injury this shift.     Face to face report will be communicated to oncdouglas RN.    Melissa Flowers  5/25/2019  6:04 AM

## 2019-05-25 NOTE — PLAN OF CARE
Violent/Self-Destructive Seclusion or Restraint Discontinuation Note    Type of seclusion or restraint: Manual Restraint/Walking Hold     Patient's response to intervention: Complaint with entering [facility]ICU room. PT continued with verbal aggression and threatening violence.   Date of discontinuation: 5/25/2019  Time of discontinuation: 0828 am   Face to face assessment completed: Yes.  Restraint monitoring minimum of every 15 minutes: Episode did not last longer than 15 minutes  Debriefing with patient completed: Yes.  Care plan updated: Yes.

## 2019-05-25 NOTE — PROGRESS NOTES
Seclusion/Restraint Face to Face Note  In person face to face assessment completed, including an evaluation of the patient's immediate reaction to the intervention, complete review of systems assessment, behavioral assessment and review/assessment of history, drugs and medications, recent labs, etc., and behavioral condition.  The patient experienced: No adverse physical outcome from seclusion/restraint initiation.  The intervention of restraint or seclusion needs to terminate.    Patient lying in bed in MICU, allow her to express frustration and anger regarding family members.  She remains delusional, although calmer now, and is willing to continue to take thorazine as needed.  Patient agrees to have RN administer her insulin she previously refused and we discuss the importance of keeping this on track as she had been doing very well up to now.  Patient agrees to alert staff to feelings of rage to ask for PRNs as anger is validated, although she agrees to not take it out on staff.  Patient will remain in MICU for at least 24 hours for low stimulation and rest, will re-assess.  Discontinued prn seroquel and added prn thorazine for agitation.      Natalia Fabian  5/25/2019  10:40 AM

## 2019-05-25 NOTE — PLAN OF CARE
Problem: Fall Injury Risk  Goal: Absence of Fall and Fall-Related Injury  Description  Pt will not experience a fall while hospitalized.     5/25/2019 1207 by Luz Elena Chaudhary RN  Outcome: No Change  5/24/2019 2342 by Melissa Flowers RN  Outcome: Improving     Problem: Violence Risk or Actual  Goal: Anger and Impulse Control  Description  PT will verbalize frustrations before making verbal threats to staff  PT will remain free from violence to staff or others while inpatient   Outcome: No Change     Problem: Adult Behavioral Health Plan of Care  Goal: Patient-Specific Goal (Individualization)  Description  Patient will attend at least 50% of groups while on unit.  Patient will report at least 6-8 hours of sleep at night.  Patient will be compliant with treatment team recommendations.  Patient will be medication compliant while hospitalized.  Patient will have decrease in delusional statements by discharge.     5/25/2019 1207 by Luz Elena Chaudhary, RN  Outcome: Declining  5/24/2019 2342 by Melissa Flowers RN  Outcome: No Change     Problem: Adult Behavioral Health Plan of Care  Goal: Adheres to Safety Considerations for Self and Others  Outcome: Declining     Problem: Adult Behavioral Health Plan of Care  Goal: Optimized Coping Skills in Response to Life Stressors  Outcome: Declining     Problem: Thought Process Alteration  Goal: Optimal Thought Clarity  Description  Patient will hold a reality based conversation by discharge.    5/25/2019 1207 by Luz Elena Chaudhary RN  Outcome: Declining       PT compliant with initial AM medication until time for insulin administration. PT threatening staff and placed in [facility]ICU.    Violent/Self-Destructive Seclusion or Restraint Initiation Note     Patient behaviors justifying violent/self-destructive seclusion or restraint (describe attempted least restricted alternatives and patient's response to interventions): During patients medication administration PT became agitated and verbally  "aggressive. Pt stated \"I hate all of this shit, you all are going to have to just commit me cause I know how to leave. I will take one of those badges from you and go out those doors. You bitches need to get the fuck out of my room and leave me the fuck alone before I punch you bitches in the face. I will take that computer and throw it at you. Just get out of my room before I come at you with something.\" Attempted to redirected. PT continued to escalate and refused to take insulin saying 'If you come near me with that pen I will take it and do something with it.Just get the fuck out.\" Staff excited room and code green was called.              Type of restraint initiated: Manual/Restraint Walking hold to escort patient to [facility]ICU.  Date Started: 5/25/2019  Time Started: 0826 AM  LIP notified (name): Natalia CHEUNGNP       Order obtained: Yes.   Patient educated on reason for seclusion or restraints and discontinuation criteria: Yes.  Care plan updated: Yes        PT Seroquel discontinued. Pt started on Thorazine 50 mg TID PRN, 1st dose given IM in MHICU. PT atarax increase from 50 mg TID to 100 mg TID, first 100 mg dose given at 1230.  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 PT expressed having misplaced anger toward staff earlier when she is really upset at her mom. PT agreed to attempt to verbalize frustrations and feelings. PT compliant with afternoon medications and insulin administration.       Face to face end of shift report communicated to evening RN.     Luz Elena Chaudhary  5/25/2019  2:52 PM                 "

## 2019-05-26 LAB
GLUCOSE BLDC GLUCOMTR-MCNC: 178 MG/DL (ref 70–99)
GLUCOSE BLDC GLUCOMTR-MCNC: 244 MG/DL (ref 70–99)
GLUCOSE BLDC GLUCOMTR-MCNC: 247 MG/DL (ref 70–99)
GLUCOSE BLDC GLUCOMTR-MCNC: 249 MG/DL (ref 70–99)
GLUCOSE BLDC GLUCOMTR-MCNC: 346 MG/DL (ref 70–99)

## 2019-05-26 PROCEDURE — 25000132 ZZH RX MED GY IP 250 OP 250 PS 637: Performed by: HOSPITALIST

## 2019-05-26 PROCEDURE — 25000132 ZZH RX MED GY IP 250 OP 250 PS 637: Performed by: NURSE PRACTITIONER

## 2019-05-26 PROCEDURE — 25000131 ZZH RX MED GY IP 250 OP 636 PS 637: Performed by: INTERNAL MEDICINE

## 2019-05-26 PROCEDURE — 12400000 ZZH R&B MH

## 2019-05-26 PROCEDURE — 25000125 ZZHC RX 250: Performed by: NURSE PRACTITIONER

## 2019-05-26 PROCEDURE — 25000131 ZZH RX MED GY IP 250 OP 636 PS 637: Performed by: NURSE PRACTITIONER

## 2019-05-26 PROCEDURE — 99232 SBSQ HOSP IP/OBS MODERATE 35: CPT | Performed by: NURSE PRACTITIONER

## 2019-05-26 PROCEDURE — 00000146 ZZHCL STATISTIC GLUCOSE BY METER IP

## 2019-05-26 RX ORDER — CLOZAPINE 100 MG/1
200 TABLET ORAL AT BEDTIME
Status: DISCONTINUED | OUTPATIENT
Start: 2019-05-26 | End: 2019-05-26

## 2019-05-26 RX ADMIN — DIVALPROEX SODIUM 1000 MG: 500 TABLET, EXTENDED RELEASE ORAL at 20:46

## 2019-05-26 RX ADMIN — CHLORPROMAZINE HYDROCHLORIDE 50 MG: 25 TABLET, FILM COATED ORAL at 11:54

## 2019-05-26 RX ADMIN — METFORMIN HYDROCHLORIDE 1000 MG: 1000 TABLET ORAL at 17:17

## 2019-05-26 RX ADMIN — IBUPROFEN 200 MG: 200 TABLET, FILM COATED ORAL at 09:17

## 2019-05-26 RX ADMIN — NICOTINE 1 PATCH: 21 PATCH, EXTENDED RELEASE TRANSDERMAL at 08:11

## 2019-05-26 RX ADMIN — BENZTROPINE MESYLATE 1 MG: 1 TABLET ORAL at 20:46

## 2019-05-26 RX ADMIN — INSULIN ASPART 3 UNITS: 100 INJECTION, SOLUTION INTRAVENOUS; SUBCUTANEOUS at 08:30

## 2019-05-26 RX ADMIN — METFORMIN HYDROCHLORIDE 1000 MG: 1000 TABLET ORAL at 08:11

## 2019-05-26 RX ADMIN — PRAZOSIN HYDROCHLORIDE 1 MG: 1 CAPSULE ORAL at 20:44

## 2019-05-26 RX ADMIN — INSULIN ASPART 5 UNITS: 100 INJECTION, SOLUTION INTRAVENOUS; SUBCUTANEOUS at 17:17

## 2019-05-26 RX ADMIN — CHLORPROMAZINE HYDROCHLORIDE 50 MG: 25 TABLET, FILM COATED ORAL at 08:11

## 2019-05-26 RX ADMIN — CHLORPROMAZINE HYDROCHLORIDE 50 MG: 25 TABLET, FILM COATED ORAL at 20:43

## 2019-05-26 RX ADMIN — TOPIRAMATE 50 MG: 50 TABLET ORAL at 08:11

## 2019-05-26 RX ADMIN — INSULIN GLARGINE 75 UNITS: 100 INJECTION, SOLUTION SUBCUTANEOUS at 20:43

## 2019-05-26 RX ADMIN — HYDROXYZINE HYDROCHLORIDE 100 MG: 25 TABLET ORAL at 08:11

## 2019-05-26 RX ADMIN — HYDROXYZINE HYDROCHLORIDE 100 MG: 25 TABLET ORAL at 11:51

## 2019-05-26 RX ADMIN — INSULIN ASPART 3 UNITS: 100 INJECTION, SOLUTION INTRAVENOUS; SUBCUTANEOUS at 12:16

## 2019-05-26 RX ADMIN — GABAPENTIN 1000 MG: 400 CAPSULE ORAL at 08:11

## 2019-05-26 RX ADMIN — GABAPENTIN 1000 MG: 400 CAPSULE ORAL at 20:44

## 2019-05-26 RX ADMIN — ACETAMINOPHEN 975 MG: 325 TABLET, FILM COATED ORAL at 19:14

## 2019-05-26 RX ADMIN — GABAPENTIN 1000 MG: 400 CAPSULE ORAL at 14:11

## 2019-05-26 RX ADMIN — SENNOSIDES AND DOCUSATE SODIUM 1 TABLET: 8.6; 5 TABLET ORAL at 20:46

## 2019-05-26 RX ADMIN — CLOZAPINE 175 MG: 50 TABLET ORAL at 20:43

## 2019-05-26 RX ADMIN — HYDROXYZINE HYDROCHLORIDE 100 MG: 25 TABLET ORAL at 18:04

## 2019-05-26 RX ADMIN — BENZTROPINE MESYLATE 1 MG: 1 TABLET ORAL at 08:11

## 2019-05-26 RX ADMIN — ATORVASTATIN CALCIUM 20 MG: 20 TABLET, FILM COATED ORAL at 20:44

## 2019-05-26 ASSESSMENT — ACTIVITIES OF DAILY LIVING (ADL)
DRESS: SCRUBS (BEHAVIORAL HEALTH);INDEPENDENT
ORAL_HYGIENE: INDEPENDENT
LAUNDRY: UNABLE TO COMPLETE
HYGIENE/GROOMING: INDEPENDENT
HYGIENE/GROOMING: INDEPENDENT

## 2019-05-26 ASSESSMENT — MIFFLIN-ST. JEOR: SCORE: 1488

## 2019-05-26 NOTE — PLAN OF CARE
"  Problem: Adult Behavioral Health Plan of Care  Goal: Patient-Specific Goal (Individualization)  Description  Patient will attend at least 50% of groups while on unit.  Patient will report at least 6-8 hours of sleep at night.  Patient will be compliant with treatment team recommendations.  Patient will be medication compliant while hospitalized.  Patient will have decrease in delusional statements by discharge.        5/26/2019 0954 by Luz Elena Chaudhary, RN  Outcome: No Change     Problem: Adult Behavioral Health Plan of Care  Goal: Optimized Coping Skills in Response to Life Stressors  Outcome: No Change     Problem: Thought Process Alteration  Goal: Optimal Thought Clarity  Description  Patient will hold a reality based conversation by discharge.    5/26/2019 0954 by Luz Elena Chaudhary, RN  Outcome: No Change     Problem: Fall Injury Risk  Goal: Absence of Fall and Fall-Related Injury  Description  Pt will not experience a fall while hospitalized.     5/26/2019 0954 by Luz Elena Chaudhary, RN  Outcome: No Change     Problem: Adult Behavioral Health Plan of Care  Goal: Adheres to Safety Considerations for Self and Others  Outcome: Improving     Problem: Violence Risk or Actual  Goal: Anger and Impulse Control  Description  PT will verbalize frustrations before making verbal threats to staff  PT will remain free from violence to staff or others while inpatient   5/26/2019 0954 by Luz Elena Chaudhary, RN  Outcome: Improving        PT requested Thorazine 50 mg PRN with AM medications.  PT stated \"That thorazine is working great I do not feel angry at all.\"  0917  PT requested Ibuprofen 200 mg for gluteal pain r/t IM injection on 5/25 with decrease in pain on reassessment.  PT denied SI/HI and hallucinations. PT has been calm with a flat affect throughout day. [facility] stated again at 1400 that \"Thorazine has been working so great and I just have been thinking about my life and feeling great today.\" PT has been " appropriate with staff and compliant with medications and with insulin administrations.   PT has been running low grade fever and increase pulse. Provider notified.   Face to face end of shift report communicated to Evening RN.     Luz Elena Chaudhary  5/26/2019  2:34 PM

## 2019-05-26 NOTE — PROGRESS NOTES
"[facility][1]  Psychiatric Progress Note      Impression:   Ginette Wood is a 39 year old female re-admitted on 5/10/19, nine days after discharge from this hospital, with decompensation and increased agitation, disorganization and increase in delusions.    Patient remains in MICU although out on open floor this morning.  She is wearing headset and sitting alone at a table.  She reports doing \"ok\", then nods at additional questions and indicates she does not want to be bothered.  Staff charting indicates some improvements from yesterday, she has been without incident and compliant with all her scheduled medications.         Diagnoses:      Schizoaffective disorder, bipolar type   Methamphetamine use disorder, moderate to severe            Plan:   Depakote taper @ 1000mg - will stay at 1000mg for at least a few more days as she continues to struggle with labile mood and outbursts, delusions.  Once more stable, should be able to continue taper reasonably.    Continue Clozaril 175mg - low grade fever and tachy, continue to monitor before increase    Hospitalist following diabetes    [place] is going forward with petition for commitment    ELOS:  > 5 days due to symptoms of psychosis and delusions along with Civil Commitment process      Attestation:  Patient has been seen and evaluated by me,  LATRICE Du CNP          Interim History:   The patient's care was discussed with the treatment team and chart notes were reviewed.          Medications:     Current Facility-Administered Medications   Medication     acetaminophen (TYLENOL) tablet 975 mg     atorvastatin (LIPITOR) tablet 20 mg     benzocaine-menthol (CEPACOL) 15-3.6 MG lozenge 1 lozenge     benztropine (COGENTIN) tablet 1 mg     chlorproMAZINE (THORAZINE) tablet 50 mg    Or     chlorproMAZINE (THORAZINE) injection 50 mg     cloZAPine (CLOZARIL) tablet 175 mg     glucose gel 15-30 g    Or     dextrose 50 % injection 25-50 mL    Or     " "glucagon injection 1 mg     glucose gel 15-30 g    Or     dextrose 50 % injection 25-50 mL    Or     glucagon injection 1 mg     glucose gel 15-30 g    Or     dextrose 50 % injection 25-50 mL    Or     glucagon injection 1 mg     divalproex sodium extended-release (DEPAKOTE ER) 24 hr tablet 1,000 mg     gabapentin (NEURONTIN) capsule 1,000 mg     hydrOXYzine (ATARAX) tablet 100 mg     ibuprofen (ADVIL/MOTRIN) tablet 200 mg     insulin aspart (NovoLOG) inj (RAPID ACTING)     insulin aspart (NovoLOG) inj (RAPID ACTING)     insulin aspart (NovoLOG) inj (RAPID ACTING)     insulin aspart (NovoLOG) inj (RAPID ACTING)     insulin glargine (LANTUS PEN) injection 75 Units     magnesium sulfate (EPSOM SALT) granules 5 g     metFORMIN (GLUCOPHAGE) tablet 1,000 mg     nicotine (NICODERM CQ) 21 MG/24HR 24 hr patch 1 patch     nicotine (NICORETTE) gum 2-4 mg     nicotine Patch in Place     nicotine patch REMOVAL     prazosin (MINIPRESS) capsule 1 mg     senna-docusate (SENOKOT-S/PERICOLACE) 8.6-50 MG per tablet 1 tablet     topiramate (TOPAMAX) tablet 50 mg          10 point ROS negative \"pain everywhere\"       Allergies:     Allergies   Allergen Reactions     Haloperidol Other (See Comments)     Other reaction(s): Seizures  Patient states, \"I fell down and wasn't able to get up.\"  Patient states, \"I fell down and wasn't able to get up.\"              Psychiatric Examination:   /64   Pulse 101   Temp 99  F (37.2  C) (Tympanic)   Resp 18   Ht 1.626 m (5' 4\")   Wt 82.5 kg (181 lb 12.8 oz)   SpO2 98%   BMI 31.21 kg/m    Weight is 181 lbs 12.8 oz  Body mass index is 31.21 kg/m .    Appearance:  awake, alert   Attitude:  cooperative  Eye Contact:  fair  Mood:  slightly irritable  Affect: mood congruent  Speech:  pressured speech  Psychomotor Behavior:  no evidence of tardive dyskinesia, dystonia, or tics  Thought Process:  disorganized  Associations:  loosening of associations present   Thought Content:  auditory " hallucinations present  Insight:  limited  Judgment:  poor  Oriented to:  time, person, and place  Attention Span and Concentration:  limited  Recent and Remote Memory:  fair  Fund of Knowledge: low-normal  Muscle Strength and Tone: normal  Gait and Station: Normal         Labs:     Results for orders placed or performed during the hospital encounter of 05/10/19 (from the past 24 hour(s))   Glucose by meter   Result Value Ref Range    Glucose 260 (H) 70 - 99 mg/dL   Glucose by meter   Result Value Ref Range    Glucose 196 (H) 70 - 99 mg/dL   Glucose by meter   Result Value Ref Range    Glucose 181 (H) 70 - 99 mg/dL   Glucose by meter   Result Value Ref Range    Glucose 249 (H) 70 - 99 mg/dL   Glucose by meter   Result Value Ref Range    Glucose 244 (H) 70 - 99 mg/dL

## 2019-05-26 NOTE — PLAN OF CARE
Problem: Adult Behavioral Health Plan of Care  Goal: Patient-Specific Goal (Individualization)  Description  Patient will attend at least 50% of groups while on unit.  Patient will report at least 6-8 hours of sleep at night.  Patient will be compliant with treatment team recommendations.  Patient will be medication compliant while hospitalized.  Patient will have decrease in delusional statements by discharge.         5/26/2019 1830 by Mitra Devlin, RN  Outcome: No Change   Patient in lounge a lot this inge with headset. Complaint with medications and accu check. Denies hallucinations but seems preoccupied and looks away from staff. Some anxiety and a little restless. No outburst. Moved to private room on open side. Complaints of pain in her second toes on both feet. Tylenol 975 given.  Problem: Thought Process Alteration  Goal: Optimal Thought Clarity  Description  Patient will hold a reality based conversation by discharge.    5/26/2019 1830 by Mitra Devlin, RN  Outcome: No Change   Conversation was appropriate and reality based.Face to face end of shift report communicated to night shift RN...     Mitra Devlin  5/26/2019  6:36 PM

## 2019-05-26 NOTE — PLAN OF CARE
"Face to face end of shift report communicated to night shift RN..     Mitra Devlin  5/25/2019  9:46 PM        Problem: Adult Behavioral Health Plan of Care  Goal: Patient-Specific Goal (Individualization)  Description  Patient will attend at least 50% of groups while on unit.  Patient will report at least 6-8 hours of sleep at night.  Patient will be compliant with treatment team recommendations.  Patient will be medication compliant while hospitalized.  Patient will have decrease in delusional statements by discharge.     5/25/2019 2141 by Mitra Devlin, RN  Outcome: No Change   Patient has been resting a lot tonight. \"I like it back here. Quiet.\" Cooperative with cares. Took her meds without incident. Showered.  Problem: Thought Process Alteration  Goal: Optimal Thought Clarity  Description  Patient will hold a reality based conversation by discharge.    5/25/2019 2141 by Mitra Devlin, RN  Outcome: No Change   Conversation rambling and delusional at times. Affect flat mood labile but not aggressive.  "

## 2019-05-26 NOTE — PLAN OF CARE
Problem: Adult Behavioral Health Plan of Care  Goal: Patient-Specific Goal (Individualization)  Description  Patient will attend at least 50% of groups while on unit.  Patient will report at least 6-8 hours of sleep at night.  Patient will be compliant with treatment team recommendations.  Patient will be medication compliant while hospitalized.  Patient will have decrease in delusional statements by discharge.     5/26/2019 0030 by Melissa Flowers RN  Outcome: No Change     Problem: Thought Process Alteration  Goal: Optimal Thought Clarity  Description  Patient will hold a reality based conversation by discharge.    5/26/2019 0030 by Melissa Flowers, RN  Outcome: No Change     Problem: Violence Risk or Actual  Goal: Anger and Impulse Control  Description  PT will verbalize frustrations before making verbal threats to staff  PT will remain free from violence to staff or others while inpatient   5/26/2019 0030 by Melissa Flowers RN  Outcome: No Change     Problem: Fall Injury Risk  Goal: Absence of Fall and Fall-Related Injury  Description  Pt will not experience a fall while hospitalized.     5/26/2019 0030 by Melissa Flowers, RN  Outcome: Improving     Face to face shift report recieved from Mitra MAK. Rounding completed, pt observed.    Pt appeared to be sleeping most of this shift, normal respirations and position changes noted. Pt did not have any episodes of violence during this shift. Pt did not have any falls or injury this shift.     Face to face report will be communicated to oncdouglas MAK.    Melissa Flowers  5/26/2019  6:11 AM

## 2019-05-26 NOTE — PROGRESS NOTES
Records reviewed daily over the last several days.  She is continued to have relatively higher blood sugars although not high enough to be extremely concerning.  Doubt any ketosis.  Oral intake appears to be adequate.  Also appears that other behavioral health issues have been more prominent concern over the past several days.  It appears she has had difficulties possibly in large part because of thought disorder another behavioral health issues with nighttime insulin.  In the best of circumstances it will be difficult for her to manage a even moderately complex regimen out of hospital.  She had been treated with glipizide in the past although it certainly appears she will require insulin treatment and it may be best to focus on this rather than combining insulin and oral hypoglycemics although continuing metformin would be reasonable.  Her mealtime insulin currently is somewhat complex and will discontinue set doses of mealtime insulin and increase insulin by a carb counting as well as sliding scale with mealtime.  Will discontinue nighttime insulin.  This may be a dose that is particularly difficult for her to understand as its not associated meals.  Continuing a dose of Lantus which is been increased daily for the last several days should provide adequate serum insulin during the night.

## 2019-05-27 LAB
GLUCOSE BLDC GLUCOMTR-MCNC: 143 MG/DL (ref 70–99)
GLUCOSE BLDC GLUCOMTR-MCNC: 149 MG/DL (ref 70–99)
GLUCOSE BLDC GLUCOMTR-MCNC: 223 MG/DL (ref 70–99)
GLUCOSE BLDC GLUCOMTR-MCNC: 254 MG/DL (ref 70–99)

## 2019-05-27 PROCEDURE — 99232 SBSQ HOSP IP/OBS MODERATE 35: CPT | Performed by: NURSE PRACTITIONER

## 2019-05-27 PROCEDURE — 12400000 ZZH R&B MH

## 2019-05-27 PROCEDURE — 25000132 ZZH RX MED GY IP 250 OP 250 PS 637

## 2019-05-27 PROCEDURE — 25000132 ZZH RX MED GY IP 250 OP 250 PS 637: Performed by: NURSE PRACTITIONER

## 2019-05-27 PROCEDURE — 00000146 ZZHCL STATISTIC GLUCOSE BY METER IP

## 2019-05-27 PROCEDURE — 25000125 ZZHC RX 250: Performed by: NURSE PRACTITIONER

## 2019-05-27 RX ORDER — ATORVASTATIN CALCIUM 20 MG/1
TABLET, FILM COATED ORAL
Status: COMPLETED
Start: 2019-05-27 | End: 2019-05-27

## 2019-05-27 RX ORDER — IBUPROFEN 200 MG
400 TABLET ORAL EVERY 6 HOURS PRN
Status: DISCONTINUED | OUTPATIENT
Start: 2019-05-27 | End: 2019-07-03 | Stop reason: HOSPADM

## 2019-05-27 RX ORDER — CLOZAPINE 100 MG/1
200 TABLET ORAL AT BEDTIME
Status: DISCONTINUED | OUTPATIENT
Start: 2019-05-27 | End: 2019-05-30

## 2019-05-27 RX ADMIN — BENZTROPINE MESYLATE 1 MG: 1 TABLET ORAL at 08:15

## 2019-05-27 RX ADMIN — TOPIRAMATE 50 MG: 50 TABLET ORAL at 08:15

## 2019-05-27 RX ADMIN — ATORVASTATIN CALCIUM 20 MG: 20 TABLET, FILM COATED ORAL at 21:00

## 2019-05-27 RX ADMIN — HYDROXYZINE HYDROCHLORIDE 100 MG: 25 TABLET ORAL at 13:00

## 2019-05-27 RX ADMIN — INSULIN ASPART 3 UNITS: 100 INJECTION, SOLUTION INTRAVENOUS; SUBCUTANEOUS at 17:44

## 2019-05-27 RX ADMIN — NICOTINE 1 PATCH: 21 PATCH, EXTENDED RELEASE TRANSDERMAL at 08:16

## 2019-05-27 RX ADMIN — GABAPENTIN 1000 MG: 400 CAPSULE ORAL at 20:52

## 2019-05-27 RX ADMIN — METFORMIN HYDROCHLORIDE 1000 MG: 1000 TABLET ORAL at 08:16

## 2019-05-27 RX ADMIN — GABAPENTIN 1000 MG: 400 CAPSULE ORAL at 13:00

## 2019-05-27 RX ADMIN — BENZTROPINE MESYLATE 1 MG: 1 TABLET ORAL at 20:51

## 2019-05-27 RX ADMIN — ATORVASTATIN CALCIUM 20 MG: 20 TABLET, FILM COATED ORAL at 20:58

## 2019-05-27 RX ADMIN — PRAZOSIN HYDROCHLORIDE 1 MG: 1 CAPSULE ORAL at 20:52

## 2019-05-27 RX ADMIN — CLOZAPINE 200 MG: 100 TABLET ORAL at 20:52

## 2019-05-27 RX ADMIN — GABAPENTIN 1000 MG: 400 CAPSULE ORAL at 08:15

## 2019-05-27 RX ADMIN — INSULIN ASPART 2 UNITS: 100 INJECTION, SOLUTION INTRAVENOUS; SUBCUTANEOUS at 12:38

## 2019-05-27 RX ADMIN — HYDROXYZINE HYDROCHLORIDE 100 MG: 25 TABLET ORAL at 17:42

## 2019-05-27 RX ADMIN — ACETAMINOPHEN 975 MG: 325 TABLET, FILM COATED ORAL at 09:07

## 2019-05-27 RX ADMIN — METFORMIN HYDROCHLORIDE 1000 MG: 1000 TABLET ORAL at 17:43

## 2019-05-27 RX ADMIN — HYDROXYZINE HYDROCHLORIDE 100 MG: 25 TABLET ORAL at 08:15

## 2019-05-27 RX ADMIN — SENNOSIDES AND DOCUSATE SODIUM 1 TABLET: 8.6; 5 TABLET ORAL at 20:52

## 2019-05-27 RX ADMIN — DIVALPROEX SODIUM 1000 MG: 500 TABLET, EXTENDED RELEASE ORAL at 20:51

## 2019-05-27 RX ADMIN — IBUPROFEN 400 MG: 200 TABLET, FILM COATED ORAL at 13:32

## 2019-05-27 RX ADMIN — INSULIN ASPART 1 UNITS: 100 INJECTION, SOLUTION INTRAVENOUS; SUBCUTANEOUS at 08:19

## 2019-05-27 RX ADMIN — CHLORPROMAZINE HYDROCHLORIDE 50 MG: 25 TABLET, FILM COATED ORAL at 09:04

## 2019-05-27 ASSESSMENT — ACTIVITIES OF DAILY LIVING (ADL)
HYGIENE/GROOMING: INDEPENDENT
LAUNDRY: UNABLE TO COMPLETE
ORAL_HYGIENE: INDEPENDENT
DRESS: SCRUBS (BEHAVIORAL HEALTH)

## 2019-05-27 NOTE — PLAN OF CARE
Problem: Adult Behavioral Health Plan of Care  Goal: Patient-Specific Goal (Individualization)  Description  Patient will attend at least 50% of groups while on unit.  Patient will report at least 6-8 hours of sleep at night.  Patient will be compliant with treatment team recommendations.  Patient will be medication compliant while hospitalized.  Patient will have decrease in delusional statements by discharge.         5/26/2019 2357 by Melissa Flowers, RN  Outcome: No Change     Problem: Thought Process Alteration  Goal: Optimal Thought Clarity  Description  Patient will hold a reality based conversation by discharge.    5/26/2019 2357 by Melissa Flowers, RN  Outcome: No Change     Problem: Violence Risk or Actual  Goal: Anger and Impulse Control  Description  PT will verbalize frustrations before making verbal threats to staff  PT will remain free from violence to staff or others while inpatient   Outcome: No Change     Problem: Fall Injury Risk  Goal: Absence of Fall and Fall-Related Injury  Description  Pt will not experience a fall while hospitalized.     Outcome: Improving    Face to face shift report recieved from Mitra MAK. Rounding completed, pt observed.     Pt appeared to be sleeping most of this shift, normal respirations and position changes noted. Pt did not make any verbal threats or have any acts of violence towards staff. Pt did not have any episodes of falls or injuries this shift.     Face to face report will be communicated to kei MAK.    Melissa Flowers  5/27/2019  5:39 AM

## 2019-05-27 NOTE — PROGRESS NOTES
[facility]  Psychiatric Progress Note      Impression:   Ginette Wood is a 39 year old female re-admitted on 5/10/19, nine days after discharge from this hospital, with decompensation and increased agitation, disorganization and increase in delusions.    Patient out on open unit in private room.  She is up listening to headphones, complains of toe pain, and requests the foot soaks again - which are on her prn list already.  Patient presents as much brighter today, more logical in conversation and improved eye contact.  She reports feeling better today and tells me her blood sugar was better this morning, appears quite proud of this.  She tells me the prn thorazine is really helping to keep her anger down, and she has been appropriate on the unit with no outbursts past couple days.  Patient aware will increase Clozaril to 200 mg for tonight.  She reports feeling calm today, took a shower and has clean scrubs on.             Diagnoses:      Schizoaffective disorder, bipolar type   Methamphetamine use disorder, moderate to severe            Plan:   Depakote taper @ 1000mg - will stay at 1000mg for at least a few more days as she continues to struggle with labile mood and outbursts, delusions.  Once more stable, should be able to continue taper reasonably.    Increase Clozaril to 200mg     Hospitalist following diabetes - continue foot soaks    [place] is going forward with petition for commitment    ELOS:  > 5 days due to symptoms of psychosis and delusions along with Civil Commitment process      Attestation:  Patient has been seen and evaluated by me,  Natalia Fabian, LATRICE CNP          Interim History:   The patient's care was discussed with the treatment team and chart notes were reviewed.          Medications:     Current Facility-Administered Medications   Medication     acetaminophen (TYLENOL) tablet 975 mg     atorvastatin (LIPITOR) tablet 20 mg     benzocaine-menthol (CEPACOL) 15-3.6 MG  "lozenge 1 lozenge     benztropine (COGENTIN) tablet 1 mg     chlorproMAZINE (THORAZINE) tablet 50 mg    Or     chlorproMAZINE (THORAZINE) injection 50 mg     cloZAPine (CLOZARIL) tablet 200 mg     glucose gel 15-30 g    Or     dextrose 50 % injection 25-50 mL    Or     glucagon injection 1 mg     glucose gel 15-30 g    Or     dextrose 50 % injection 25-50 mL    Or     glucagon injection 1 mg     glucose gel 15-30 g    Or     dextrose 50 % injection 25-50 mL    Or     glucagon injection 1 mg     divalproex sodium extended-release (DEPAKOTE ER) 24 hr tablet 1,000 mg     gabapentin (NEURONTIN) capsule 1,000 mg     hydrOXYzine (ATARAX) tablet 100 mg     ibuprofen (ADVIL/MOTRIN) tablet 200 mg     insulin aspart (NovoLOG) inj (RAPID ACTING)     insulin aspart (NovoLOG) inj (RAPID ACTING)     insulin aspart (NovoLOG) inj (RAPID ACTING)     insulin aspart (NovoLOG) inj (RAPID ACTING)     insulin glargine (LANTUS PEN) injection 75 Units     magnesium sulfate (EPSOM SALT) granules 5 g     metFORMIN (GLUCOPHAGE) tablet 1,000 mg     nicotine (NICODERM CQ) 21 MG/24HR 24 hr patch 1 patch     nicotine (NICORETTE) gum 2-4 mg     nicotine Patch in Place     nicotine patch REMOVAL     prazosin (MINIPRESS) capsule 1 mg     senna-docusate (SENOKOT-S/PERICOLACE) 8.6-50 MG per tablet 1 tablet     topiramate (TOPAMAX) tablet 50 mg          10 point ROS negative \"pain everywhere\"       Allergies:     Allergies   Allergen Reactions     Haloperidol Other (See Comments)     Other reaction(s): Seizures  Patient states, \"I fell down and wasn't able to get up.\"  Patient states, \"I fell down and wasn't able to get up.\"              Psychiatric Examination:   /69   Pulse 105   Temp 99  F (37.2  C) (Tympanic)   Resp 15   Ht 1.626 m (5' 4\")   Wt 83.3 kg (183 lb 10.3 oz)   SpO2 99%   BMI 31.52 kg/m    Weight is 183 lbs 10.29 oz  Body mass index is 31.52 kg/m .    Appearance:  awake, alert   Attitude:  cooperative  Eye Contact:  " fair  Mood:  slightly irritable  Affect: mood congruent  Speech:  pressured speech  Psychomotor Behavior:  no evidence of tardive dyskinesia, dystonia, or tics  Thought Process:  disorganized  Associations:  loosening of associations present   Thought Content:  auditory hallucinations present  Insight:  limited  Judgment:  poor  Oriented to:  time, person, and place  Attention Span and Concentration:  limited  Recent and Remote Memory:  fair  Fund of Knowledge: low-normal  Muscle Strength and Tone: normal  Gait and Station: Normal         Labs:     Results for orders placed or performed during the hospital encounter of 05/10/19 (from the past 24 hour(s))   Glucose by meter   Result Value Ref Range    Glucose 247 (H) 70 - 99 mg/dL   Glucose by meter   Result Value Ref Range    Glucose 346 (H) 70 - 99 mg/dL   Glucose by meter   Result Value Ref Range    Glucose 178 (H) 70 - 99 mg/dL   Glucose by meter   Result Value Ref Range    Glucose 143 (H) 70 - 99 mg/dL

## 2019-05-27 NOTE — PLAN OF CARE
Problem: Adult Behavioral Health Plan of Care  Goal: Patient-Specific Goal (Individualization)  Description  Patient will attend at least 50% of groups while on unit.  Patient will report at least 6-8 hours of sleep at night.  Patient will be compliant with treatment team recommendations.  Patient will be medication compliant while hospitalized.  Patient will have decrease in delusional statements by discharge.         5/27/2019 1759 by Mitra Devlin, RN  Outcome: No Change   Not attending groups, Isolates in room. Cooperative with staff. Talks and sings to self in her room. Denies suicidal thoughts or depression but turned away from staff when asked about hallucinations. Lower her eyes and did not answer.  Problem: Thought Process Alteration  Goal: Optimal Thought Clarity  Description  Patient will hold a reality based conversation by discharge.    Outcome: No Change   Can hold a reality based conversation but appears to be responding to internal stimuli. Avoids answering questions related to hallucinating.Face to face end of shift report communicated to night shift RN. .     Mitra Devlin  5/27/2019  6:08 PM
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
## 2019-05-28 LAB
GLUCOSE BLDC GLUCOMTR-MCNC: 152 MG/DL (ref 70–99)
GLUCOSE BLDC GLUCOMTR-MCNC: 160 MG/DL (ref 70–99)
GLUCOSE BLDC GLUCOMTR-MCNC: 206 MG/DL (ref 70–99)
GLUCOSE BLDC GLUCOMTR-MCNC: 230 MG/DL (ref 70–99)
GLUCOSE BLDC GLUCOMTR-MCNC: 306 MG/DL (ref 70–99)

## 2019-05-28 PROCEDURE — 99232 SBSQ HOSP IP/OBS MODERATE 35: CPT | Performed by: NURSE PRACTITIONER

## 2019-05-28 PROCEDURE — 25000132 ZZH RX MED GY IP 250 OP 250 PS 637: Performed by: NURSE PRACTITIONER

## 2019-05-28 PROCEDURE — 00000146 ZZHCL STATISTIC GLUCOSE BY METER IP

## 2019-05-28 PROCEDURE — 25000125 ZZHC RX 250: Performed by: NURSE PRACTITIONER

## 2019-05-28 PROCEDURE — 12400000 ZZH R&B MH

## 2019-05-28 RX ADMIN — HYDROXYZINE HYDROCHLORIDE 100 MG: 25 TABLET ORAL at 08:24

## 2019-05-28 RX ADMIN — SENNOSIDES AND DOCUSATE SODIUM 1 TABLET: 8.6; 5 TABLET ORAL at 21:03

## 2019-05-28 RX ADMIN — INSULIN ASPART 1 UNITS: 100 INJECTION, SOLUTION INTRAVENOUS; SUBCUTANEOUS at 17:40

## 2019-05-28 RX ADMIN — PRAZOSIN HYDROCHLORIDE 1 MG: 1 CAPSULE ORAL at 21:04

## 2019-05-28 RX ADMIN — CHLORPROMAZINE HYDROCHLORIDE 50 MG: 25 TABLET, FILM COATED ORAL at 08:32

## 2019-05-28 RX ADMIN — HYDROXYZINE HYDROCHLORIDE 100 MG: 25 TABLET ORAL at 13:07

## 2019-05-28 RX ADMIN — GABAPENTIN 1000 MG: 400 CAPSULE ORAL at 08:24

## 2019-05-28 RX ADMIN — METFORMIN HYDROCHLORIDE 1000 MG: 1000 TABLET ORAL at 17:40

## 2019-05-28 RX ADMIN — TOPIRAMATE 50 MG: 50 TABLET ORAL at 08:24

## 2019-05-28 RX ADMIN — BENZTROPINE MESYLATE 1 MG: 1 TABLET ORAL at 21:03

## 2019-05-28 RX ADMIN — GABAPENTIN 1000 MG: 400 CAPSULE ORAL at 21:04

## 2019-05-28 RX ADMIN — GABAPENTIN 1000 MG: 400 CAPSULE ORAL at 13:06

## 2019-05-28 RX ADMIN — NICOTINE 1 PATCH: 21 PATCH, EXTENDED RELEASE TRANSDERMAL at 08:32

## 2019-05-28 RX ADMIN — INSULIN ASPART 2 UNITS: 100 INJECTION, SOLUTION INTRAVENOUS; SUBCUTANEOUS at 12:32

## 2019-05-28 RX ADMIN — ATORVASTATIN CALCIUM 20 MG: 20 TABLET, FILM COATED ORAL at 21:03

## 2019-05-28 RX ADMIN — METFORMIN HYDROCHLORIDE 1000 MG: 1000 TABLET ORAL at 08:24

## 2019-05-28 RX ADMIN — CHLORPROMAZINE HYDROCHLORIDE 50 MG: 25 TABLET, FILM COATED ORAL at 22:19

## 2019-05-28 RX ADMIN — INSULIN ASPART 2 UNITS: 100 INJECTION, SOLUTION INTRAVENOUS; SUBCUTANEOUS at 08:21

## 2019-05-28 RX ADMIN — CHLORPROMAZINE HYDROCHLORIDE 50 MG: 25 TABLET, FILM COATED ORAL at 12:47

## 2019-05-28 RX ADMIN — HYDROXYZINE HYDROCHLORIDE 100 MG: 25 TABLET ORAL at 17:40

## 2019-05-28 RX ADMIN — BENZTROPINE MESYLATE 1 MG: 1 TABLET ORAL at 08:24

## 2019-05-28 RX ADMIN — DIVALPROEX SODIUM 750 MG: 500 TABLET, EXTENDED RELEASE ORAL at 21:04

## 2019-05-28 ASSESSMENT — ACTIVITIES OF DAILY LIVING (ADL)
LAUNDRY: UNABLE TO COMPLETE
DRESS: SCRUBS (BEHAVIORAL HEALTH)
HYGIENE/GROOMING: INDEPENDENT
ORAL_HYGIENE: INDEPENDENT
HYGIENE/GROOMING: INDEPENDENT
DRESS: SCRUBS (BEHAVIORAL HEALTH)
ORAL_HYGIENE: INDEPENDENT
LAUNDRY: UNABLE TO COMPLETE

## 2019-05-28 NOTE — PLAN OF CARE
Face to face end of shift report will be communicated to oncoming RN.     MANDY COFFEY  5/28/2019  6:03 AM    Problem: Adult Behavioral Health Plan of Care  Goal: Patient-Specific Goal (Individualization)  Description  Patient will attend at least 50% of groups while on unit.  Patient will report at least 6-8 hours of sleep at night.  Patient will be compliant with treatment team recommendations.  Patient will be medication compliant while hospitalized.  Patient will have decrease in delusional statements by discharge.         5/28/2019 0105 by Mandy Coffey RN  Note:   Pt appears to be sleeping in bed with eyes closed, having regular respirations and position changes. 15 minutes and PRN safety checks completed with no noted complains. Will continue to monitor.   0600-  Blood glucose 160 at 0215. Pt got up later on and got a snack with no further complains or requests. Pt slept approximately 6 hours.         Problem: Thought Process Alteration  Goal: Optimal Thought Clarity  Description  Patient will hold a reality based conversation by discharge.    5/28/2019 0105 by Mandy Coffey RN  Note:   Pt appears to be sleeping in bed with eyes closed, having regular respirations and position changes. 15 minutes and PRN safety checks completed with no noted complains. Will continue to monitor.        Problem: Fall Injury Risk  Goal: Absence of Fall and Fall-Related Injury  Description  Pt will not experience a fall while hospitalized.     Note:   No falls noted or reported so far this shift.     Problem: Violence Risk or Actual  Goal: Anger and Impulse Control  Description  PT will verbalize frustrations before making verbal threats to staff  PT will remain free from violence to staff or others while inpatient   Note:   Pt appears to be sleeping in bed with eyes closed, having regular respirations and position changes. 15 minutes and PRN safety checks completed with no noted complains. Will continue to monitor.

## 2019-05-28 NOTE — PROGRESS NOTES
Have reached max preferred dose of Lantus at 80 units daily. Not have any hypoglycemia episodes, still having high in the 200's. Will increase carb count from 3u/carb unit to 5u/carb unit.

## 2019-05-28 NOTE — PLAN OF CARE
Problem: Adult Behavioral Health Plan of Care  Goal: Patient-Specific Goal (Individualization)  Description  Patient will attend at least 50% of groups while on unit.  Patient will report at least 6-8 hours of sleep at night.  Patient will be compliant with treatment team recommendations.  Patient will be medication compliant while hospitalized.  Patient will have decrease in delusional statements by discharge.         5/28/2019 0912 by Bianca Mckeon, RN  Outcome: No Change  Patient denies SI, HI, pain. She endorses some depression and anxiety. She also states that she is hearing voices, but declines to discuss what they are saying. She is preoccupied when talking with this writer and does not always answer questions. She requested and received PRN thorazine PO 0832 due to the voices. She is cooperative with assessments and compliant with medications. She requests to be alone in her room at this time. Will attempt to re-approach later.  1247: Patient requested and received PRN thorazine 50 mg PO for AH and agitation. She states that this works well for her.  1700: Patient states that she has been feeling better with her sugars being lower than she is used to. She also states that the thorazine she has been receiving throughout the day today has helped reduce the voices.  1930: Patient requested, and received foot soak, which she tolerated well.   2000: Patient temp 100.1 - on call provider notified about this and order given to hold dose of clozaril this evening.  2220: Patient requested and received PRN thorazine 50 mg for agitation and hallucinations.    Problem: Fall Injury Risk  Goal: Absence of Fall and Fall-Related Injury  Description  Pt will not experience a fall while hospitalized.     5/28/2019 0912 by Bianca Mckeon, RN  Outcome: No Change   Patient has not had any falls so far this shift. She is balanced and has a steady gait.  Problem: Violence Risk or Actual  Goal: Anger and Impulse  Control  Description  PT will verbalize frustrations before making verbal threats to staff  PT will remain free from violence to staff or others while inpatient   5/28/2019 0912 by Bianca Mckeon, RN  Outcome: No Change  Patient does verbalize that she is frustrated being here and not knowing what the plan is for her. She was encouraged to talk with nurse practitioner and  today. This writer also let  know that patient has questions about discharge.    This writer will continue to provide care for this patient from 3:00-11:30 pm.    Face to face end of shift report communicated to oncoming shift.     Bianca Mckeon  5/28/2019  10:57 PM

## 2019-05-28 NOTE — PLAN OF CARE
BEHAVIORAL TEAM DISCUSSION    Participants: Natalia Fabian NP,   Dennise Herrera LICSW, Angie Laws LSW,  Becca Chery LSW, Beena Kumar LGSW,   Radha Bills RN,  Natalia Costello OT, Izabel Zapata RN, rKys Rojo RN  Progress: Fair   Continued Stay Criteria/Rationale: Hallucinating   Medical/Physical: Sore throat, increased temp- continue to monitor, Hep C positive, diabetes - working with hospitalist and ordering healthy snacks - blood sugars are improving, black and blue toetail - foot soaks ordered, hx of TBI  Precautions:   Falls precaution?: Yes- Care plan initiated   Behavioral Orders   Procedures    Code 1 - Restrict to Unit    Routine Programming     As clinically indicated    Status 15     Every 15 minutes.     Plan: Increased Clozaril yesterday,  rep payee and guardianship paperwork sent to [place]  Rationale for change in precautions or plan: None     Active Problems:    Mixed hyperlipidemia      Diabetes mellitus, type 2      Schizoaffective disorder, bipolar type (H)        Current Facility-Administered Medications:     acetaminophen (TYLENOL) tablet 975 mg, 975 mg, Oral, Q6H PRN, Raven Stout, NP, 975 mg at 05/27/19 0907    atorvastatin (LIPITOR) tablet 20 mg, 20 mg, Oral, At Bedtime, Airam Light NP, 20 mg at 05/27/19 2100    benzocaine-menthol (CEPACOL) 15-3.6 MG lozenge 1 lozenge, 1 lozenge, Buccal, Q1H PRN, Loren Zuleta NP, 1 lozenge at 05/19/19 0553    benztropine (COGENTIN) tablet 1 mg, 1 mg, Oral, BID, WoNatalia armstrong APRN CNP, 1 mg at 05/28/19 0824    chlorproMAZINE (THORAZINE) tablet 50 mg, 50 mg, Oral, TID PRN, 50 mg at 05/28/19 0832 **OR** chlorproMAZINE (THORAZINE) injection 50 mg, 50 mg, Intramuscular, TID PRN, Natalia Fabian APRN CNP, 50 mg at 05/25/19 0829    cloZAPine (CLOZARIL) tablet 200 mg, 200 mg, Oral, At Bedtime, Natalia Fabian, APRN CNP, 200 mg at 05/27/19 2052    glucose gel 15-30 g, 15-30 g, Oral, Q15 Min PRN **OR** dextrose  50 % injection 25-50 mL, 25-50 mL, Intravenous, Q15 Min PRN **OR** glucagon injection 1 mg, 1 mg, Subcutaneous, Q15 Min PRN, Airam Light NP    divalproex sodium extended-release (DEPAKOTE ER) 24 hr tablet 1,000 mg, 1,000 mg, Oral, At Bedtime, Loren Zuleta NP, 1,000 mg at 05/27/19 2051    gabapentin (NEURONTIN) capsule 1,000 mg, 1,000 mg, Oral, TID, Loren Zuleta NP, 1,000 mg at 05/28/19 0824    hydrOXYzine (ATARAX) tablet 100 mg, 100 mg, Oral, TID, Natalia Fabian APRN CNP, 100 mg at 05/28/19 0824    ibuprofen (ADVIL/MOTRIN) tablet 400 mg, 400 mg, Oral, Q6H PRN, Natalia Fabian APRN CNP, 400 mg at 05/27/19 1332    insulin aspart (NovoLOG) inj (RAPID ACTING), , Subcutaneous, TID w/meals, Denisse Malin NP, 15 Units at 05/28/19 0822    insulin aspart (NovoLOG) inj (RAPID ACTING), 1-7 Units, Subcutaneous, TID AC, Airam Light NP, 2 Units at 05/28/19 0821    insulin glargine (LANTUS PEN) injection 80 Units, 80 Units, Subcutaneous, At Bedtime, Denisse Malin NP, 80 Units at 05/27/19 2051    magnesium sulfate (EPSOM SALT) granules 5 g, 5 g, Other, BID PRN **AND** Patient care order, , , Per Unit Routine, Natalia Fabian APRN CNP    metFORMIN (GLUCOPHAGE) tablet 1,000 mg, 1,000 mg, Oral, BID w/meals, Airam Light NP, 1,000 mg at 05/28/19 0824    nicotine (NICODERM CQ) 21 MG/24HR 24 hr patch 1 patch, 1 patch, Transdermal, Daily, Loren Zuleta NP, 1 patch at 05/28/19 0832    nicotine (NICORETTE) gum 2-4 mg, 2-4 mg, Buccal, Q1H PRN, Raven Stout, NP, 4 mg at 05/21/19 0938    nicotine Patch in Place, , Transdermal, Q8H, Loren Zuleta NP, Stopped at 05/28/19 0248    nicotine patch REMOVAL, , Transdermal, Daily, Loren Zuleta, JACKIE    prazosin (MINIPRESS) capsule 1 mg, 1 mg, Oral, At Bedtime, Lightterson, April JACKIE Rebollar, 1 mg at 05/27/19 2052    senna-docusate (SENOKOT-S/PERICOLACE) 8.6-50 MG per tablet 1 tablet, 1 tablet, Oral, At  Bedtime, Loren Zuleta NP, 1 tablet at 05/27/19 2052    topiramate (TOPAMAX) tablet 50 mg, 50 mg, Oral, Daily, Loren Zuleta NP, 50 mg at 05/28/19 0824

## 2019-05-28 NOTE — PROGRESS NOTES
[facility]  Psychiatric Progress Note      Impression:   Ginette Wood is a 39 year old female re-admitted on 5/10/19, nine days after discharge from this hospital, with decompensation and increased agitation, disorganization and increase in delusions.    Patient again doing better, more linear and logical conversation, focuses on her medications and her feet, which she reports foot soaks are helping and prn thorazine makes her less angry.  She is tolerating Clozaril so far, at 200mg at hs.  Depakote continues at 1000mg at hs, however could try a decrease now.  Low grade fever, monitor.               Diagnoses:      Schizoaffective disorder, bipolar type   Methamphetamine use disorder, moderate to severe            Plan:   Depakote taper - decrease tonight to 750mg  Continue Clozaril to 200mg for today    Hospitalist following diabetes - continue foot soaks    [place] is going forward with petition for commitment    ELOS:  > 5 days due to symptoms of psychosis and delusions along with Civil Commitment process      Attestation:  Patient has been seen and evaluated by me,  LATRICE Du CNP          Interim History:   The patient's care was discussed with the treatment team and chart notes were reviewed.          Medications:     Current Facility-Administered Medications   Medication     acetaminophen (TYLENOL) tablet 975 mg     atorvastatin (LIPITOR) tablet 20 mg     benzocaine-menthol (CEPACOL) 15-3.6 MG lozenge 1 lozenge     benzocaine-menthol (CEPACOL) 15-3.6 MG lozenge 1 lozenge     benztropine (COGENTIN) tablet 1 mg     chlorproMAZINE (THORAZINE) tablet 50 mg    Or     chlorproMAZINE (THORAZINE) injection 50 mg     cloZAPine (CLOZARIL) tablet 200 mg     glucose gel 15-30 g    Or     dextrose 50 % injection 25-50 mL    Or     glucagon injection 1 mg     divalproex sodium extended-release (DEPAKOTE ER) 24 hr tablet 1,000 mg     gabapentin (NEURONTIN) capsule 1,000 mg     hydrOXYzine  "(ATARAX) tablet 100 mg     ibuprofen (ADVIL/MOTRIN) tablet 400 mg     insulin aspart (NovoLOG) inj (RAPID ACTING)     insulin aspart (NovoLOG) inj (RAPID ACTING)     insulin glargine (LANTUS PEN) injection 80 Units     magnesium sulfate (EPSOM SALT) granules 5 g     metFORMIN (GLUCOPHAGE) tablet 1,000 mg     nicotine (NICODERM CQ) 21 MG/24HR 24 hr patch 1 patch     nicotine (NICORETTE) gum 2-4 mg     nicotine Patch in Place     nicotine patch REMOVAL     prazosin (MINIPRESS) capsule 1 mg     senna-docusate (SENOKOT-S/PERICOLACE) 8.6-50 MG per tablet 1 tablet     topiramate (TOPAMAX) tablet 50 mg          10 point ROS negative \"pain everywhere\"       Allergies:     Allergies   Allergen Reactions     Haloperidol Other (See Comments)     Other reaction(s): Seizures  Patient states, \"I fell down and wasn't able to get up.\"  Patient states, \"I fell down and wasn't able to get up.\"              Psychiatric Examination:   /67   Pulse 92   Temp 99.5  F (37.5  C) (Tympanic)   Resp 18   Ht 1.626 m (5' 4\")   Wt 83.3 kg (183 lb 10.3 oz)   SpO2 96%   BMI 31.52 kg/m    Weight is 183 lbs 10.29 oz  Body mass index is 31.52 kg/m .    Appearance:  awake, alert   Attitude:  cooperative  Eye Contact:  fair  Mood:  slightly irritable  Affect: mood congruent  Speech:  pressured speech  Psychomotor Behavior:  no evidence of tardive dyskinesia, dystonia, or tics  Thought Process:  disorganized  Associations:  loosening of associations present   Thought Content:  auditory hallucinations present  Insight:  limited  Judgment:  poor  Oriented to:  time, person, and place  Attention Span and Concentration:  limited  Recent and Remote Memory:  fair  Fund of Knowledge: low-normal  Muscle Strength and Tone: normal  Gait and Station: Normal         Labs:     Results for orders placed or performed during the hospital encounter of 05/10/19 (from the past 24 hour(s))   Glucose by meter   Result Value Ref Range    Glucose 254 (H) 70 - 99 " mg/dL   Glucose by meter   Result Value Ref Range    Glucose 149 (H) 70 - 99 mg/dL   Glucose by meter   Result Value Ref Range    Glucose 160 (H) 70 - 99 mg/dL   Glucose by meter   Result Value Ref Range    Glucose 230 (H) 70 - 99 mg/dL   Glucose by meter   Result Value Ref Range    Glucose 206 (H) 70 - 99 mg/dL

## 2019-05-29 LAB
GLUCOSE BLDC GLUCOMTR-MCNC: 115 MG/DL (ref 70–99)
GLUCOSE BLDC GLUCOMTR-MCNC: 134 MG/DL (ref 70–99)
GLUCOSE BLDC GLUCOMTR-MCNC: 165 MG/DL (ref 70–99)
GLUCOSE BLDC GLUCOMTR-MCNC: 173 MG/DL (ref 70–99)
GLUCOSE BLDC GLUCOMTR-MCNC: 190 MG/DL (ref 70–99)
GLUCOSE BLDC GLUCOMTR-MCNC: 196 MG/DL (ref 70–99)
GLUCOSE BLDC GLUCOMTR-MCNC: 248 MG/DL (ref 70–99)

## 2019-05-29 PROCEDURE — 12400000 ZZH R&B MH

## 2019-05-29 PROCEDURE — 25000132 ZZH RX MED GY IP 250 OP 250 PS 637: Performed by: NURSE PRACTITIONER

## 2019-05-29 PROCEDURE — 25000125 ZZHC RX 250: Performed by: NURSE PRACTITIONER

## 2019-05-29 PROCEDURE — 99232 SBSQ HOSP IP/OBS MODERATE 35: CPT | Performed by: NURSE PRACTITIONER

## 2019-05-29 PROCEDURE — 00000146 ZZHCL STATISTIC GLUCOSE BY METER IP

## 2019-05-29 RX ORDER — DIVALPROEX SODIUM 500 MG/1
500 TABLET, EXTENDED RELEASE ORAL AT BEDTIME
Status: DISCONTINUED | OUTPATIENT
Start: 2019-05-29 | End: 2019-05-30

## 2019-05-29 RX ADMIN — PRAZOSIN HYDROCHLORIDE 1 MG: 1 CAPSULE ORAL at 20:09

## 2019-05-29 RX ADMIN — CHLORPROMAZINE HYDROCHLORIDE 50 MG: 25 TABLET, FILM COATED ORAL at 17:52

## 2019-05-29 RX ADMIN — BENZTROPINE MESYLATE 1 MG: 1 TABLET ORAL at 20:09

## 2019-05-29 RX ADMIN — HYDROXYZINE HYDROCHLORIDE 100 MG: 25 TABLET ORAL at 08:24

## 2019-05-29 RX ADMIN — TOPIRAMATE 50 MG: 50 TABLET ORAL at 08:25

## 2019-05-29 RX ADMIN — HYDROXYZINE HYDROCHLORIDE 100 MG: 25 TABLET ORAL at 17:50

## 2019-05-29 RX ADMIN — NICOTINE 1 PATCH: 21 PATCH, EXTENDED RELEASE TRANSDERMAL at 08:27

## 2019-05-29 RX ADMIN — GABAPENTIN 1000 MG: 400 CAPSULE ORAL at 20:09

## 2019-05-29 RX ADMIN — METFORMIN HYDROCHLORIDE 1000 MG: 1000 TABLET ORAL at 17:50

## 2019-05-29 RX ADMIN — GABAPENTIN 1000 MG: 400 CAPSULE ORAL at 13:11

## 2019-05-29 RX ADMIN — CHLORPROMAZINE HYDROCHLORIDE 50 MG: 25 TABLET, FILM COATED ORAL at 09:08

## 2019-05-29 RX ADMIN — SENNOSIDES AND DOCUSATE SODIUM 1 TABLET: 8.6; 5 TABLET ORAL at 20:10

## 2019-05-29 RX ADMIN — METFORMIN HYDROCHLORIDE 1000 MG: 1000 TABLET ORAL at 08:25

## 2019-05-29 RX ADMIN — INSULIN ASPART 1 UNITS: 100 INJECTION, SOLUTION INTRAVENOUS; SUBCUTANEOUS at 08:25

## 2019-05-29 RX ADMIN — HYDROXYZINE HYDROCHLORIDE 100 MG: 25 TABLET ORAL at 13:11

## 2019-05-29 RX ADMIN — BENZTROPINE MESYLATE 1 MG: 1 TABLET ORAL at 08:25

## 2019-05-29 RX ADMIN — GABAPENTIN 1000 MG: 400 CAPSULE ORAL at 08:24

## 2019-05-29 RX ADMIN — CLOZAPINE 200 MG: 100 TABLET ORAL at 20:09

## 2019-05-29 RX ADMIN — ATORVASTATIN CALCIUM 20 MG: 20 TABLET, FILM COATED ORAL at 20:09

## 2019-05-29 RX ADMIN — INSULIN ASPART 2 UNITS: 100 INJECTION, SOLUTION INTRAVENOUS; SUBCUTANEOUS at 17:48

## 2019-05-29 RX ADMIN — DIVALPROEX SODIUM 500 MG: 500 TABLET, EXTENDED RELEASE ORAL at 20:09

## 2019-05-29 ASSESSMENT — ACTIVITIES OF DAILY LIVING (ADL)
LAUNDRY: UNABLE TO COMPLETE
HYGIENE/GROOMING: INDEPENDENT
DRESS: SCRUBS (BEHAVIORAL HEALTH);INDEPENDENT
DRESS: INDEPENDENT
LAUNDRY: UNABLE TO COMPLETE
HYGIENE/GROOMING: INDEPENDENT
ORAL_HYGIENE: INDEPENDENT
ORAL_HYGIENE: INDEPENDENT

## 2019-05-29 NOTE — PLAN OF CARE
Patient up and on the unit - presenting as more verbal today - states she wants to know what is going on with Lauro Lauren - said she now wants to go to [facility] again and do out patient.  Placed a call to Lauro Lauren and had to leave a message.  Spoke with her later - she states she called and talked to Denis and he has to check with adult services and get back to us.

## 2019-05-29 NOTE — PROGRESS NOTES
[facility]  Psychiatric Progress Note      Impression:   Ginette is up in the lounge area this morning. She is able to hold a fairly linear conversation at this time. She is much less labile than she appeared to be this morning per nursing report. Genie does admit thorazine does help reduce irritability and moodiness. Genie feels it help her focus and concentrate as well. Genie also talks about her feet, she thinks the foot soaks are helpful but she may need a cast If her toe or foot is fractured. Not sure where this idea came from, there is no evidence of fracture. She does have a history of fracture however. She is somewhat social with peers.              Diagnoses:      Schizoaffective disorder, bipolar type   Methamphetamine use disorder, moderate to severe            Plan:   Depakote taper - decrease tonight to 500 mg  Continue Clozaril to 200mg for today    Hospitalist following diabetes - continue foot soaks    [place] is going forward with petition for commitment    ELOS:  > 5 days due to symptoms of psychosis and delusions along with Civil Commitment process      Attestation:  Patient has been seen and evaluated by me,  Loren Zuleta NP          Interim History:   The patient's care was discussed with the treatment team and chart notes were reviewed.          Medications:     Current Facility-Administered Medications   Medication     acetaminophen (TYLENOL) tablet 975 mg     atorvastatin (LIPITOR) tablet 20 mg     benzocaine-menthol (CEPACOL) 15-3.6 MG lozenge 1 lozenge     benztropine (COGENTIN) tablet 1 mg     chlorproMAZINE (THORAZINE) tablet 50 mg    Or     chlorproMAZINE (THORAZINE) injection 50 mg     cloZAPine (CLOZARIL) tablet 200 mg     glucose gel 15-30 g    Or     dextrose 50 % injection 25-50 mL    Or     glucagon injection 1 mg     divalproex sodium extended-release (DEPAKOTE ER) 24 hr tablet 500 mg     gabapentin (NEURONTIN) capsule 1,000 mg     hydrOXYzine (ATARAX) tablet 100  "mg     ibuprofen (ADVIL/MOTRIN) tablet 400 mg     insulin aspart (NovoLOG) inj (RAPID ACTING)     insulin aspart (NovoLOG) inj (RAPID ACTING)     insulin glargine (LANTUS PEN) injection 80 Units     magnesium sulfate (EPSOM SALT) granules 5 g     metFORMIN (GLUCOPHAGE) tablet 1,000 mg     nicotine (NICODERM CQ) 21 MG/24HR 24 hr patch 1 patch     nicotine (NICORETTE) gum 2-4 mg     nicotine Patch in Place     nicotine patch REMOVAL     prazosin (MINIPRESS) capsule 1 mg     senna-docusate (SENOKOT-S/PERICOLACE) 8.6-50 MG per tablet 1 tablet     topiramate (TOPAMAX) tablet 50 mg          10 point ROS negative \"pain everywhere\"       Allergies:     Allergies   Allergen Reactions     Haloperidol Other (See Comments)     Other reaction(s): Seizures  Patient states, \"I fell down and wasn't able to get up.\"  Patient states, \"I fell down and wasn't able to get up.\"              Psychiatric Examination:   /72   Pulse 106   Temp 99.5  F (37.5  C) (Tympanic)   Resp 16   Ht 1.626 m (5' 4\")   Wt 83.3 kg (183 lb 10.3 oz)   SpO2 99%   BMI 31.52 kg/m    Weight is 183 lbs 10.29 oz  Body mass index is 31.52 kg/m .    Appearance:  awake, alert   Attitude:  cooperative  Eye Contact:  fair  Mood:  brighter  Affect: mood congruent  Speech:  pressured speech  Psychomotor Behavior:  no evidence of tardive dyskinesia, dystonia, or tics  Thought Process: somewhat disorganized  Associations:  loosening of associations present   Thought Content:  auditory hallucinations present  Insight:  limited  Judgment:  poor  Oriented to:  time, person, and place  Attention Span and Concentration:  limited  Recent and Remote Memory:  fair  Fund of Knowledge: low-normal  Muscle Strength and Tone: normal  Gait and Station: Normal         Labs:     Results for orders placed or performed during the hospital encounter of 05/10/19 (from the past 24 hour(s))   Glucose by meter   Result Value Ref Range    Glucose 152 (H) 70 - 99 mg/dL   Glucose by " meter   Result Value Ref Range    Glucose 306 (H) 70 - 99 mg/dL   Glucose by meter   Result Value Ref Range    Glucose 196 (H) 70 - 99 mg/dL   Glucose by meter   Result Value Ref Range    Glucose 173 (H) 70 - 99 mg/dL   Glucose by meter   Result Value Ref Range    Glucose 115 (H) 70 - 99 mg/dL

## 2019-05-29 NOTE — PROGRESS NOTES
Spoke with Pt about placement and how she is doing in general. Pt presented as delusional with a labile affect and was difficult to redirect from angry tangents at times. Pt spoke of going to [facility] in [place].
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
## 2019-05-29 NOTE — PLAN OF CARE
Date of Office Visit: 2017  Encounter Provider: ALIYAH Herzog  Place of Service: [facility] CARDIOLOGY  Patient Name: Antonia Hernandez  :1938    Chief Complaint   Patient presents with   • Shortness of Breath   :     HPI: Antonia Hernandez is a 78 y.o. female comes in today for follow up.     She has a history of coronary disease, mitral valve replacement poorly controlled diabetes. 2012 she had a cardiac catheterization showing a 90% proximal LAD, 90% proximal RCA, 100% distal RCA, 80% OM. She was referred for CABG and had a LIMA to the LAD, sequential vein graft to RCA and posterior descending, vein graft to the first marginal. At that time she also had a mitral valve replacement with a 27 mm [employer]. Raúl Epic porcine valve.    , she was admitted for heart failure.  Echo shows EF 55%.  Mitral valve gradients were slightly increased but stable.    The end of October, patient called in complaining of weight increase.  She had an echocardiogram ordered.  She was to come in and see patent but it appears she was hospitalized instead.  She was admitted actually to [facility] and transferred to [facility].  She had an acute diastolic heart failure exacerbation and AK I.  She was diuresed and discharged.  She had recently been diagnosed with C. difficile and are diuretics were held due for fear of dehydration.  This is likely led to her heart failure exacerbation.  She has an element of dementia and is now a DNR.    She comes in today with her family. She is now in a rehab facility in [place] which is a couple of hours away from here. Her son is an anesthesiaologists and helps take care of her.  She denies palpitations or tachycardia,dizziness, chest pain. She still has shortness of breath and mild edema. She does sleep with her head propped up. She does lay on her side to sleep. Denies PND. She has a poor appetite and not eating well. Pt does not want to do  Face to face end of shift report will be communicated to oncoming RN.     DAIANA COFFEY  5/29/2019  7:00 AM    Problem: Adult Behavioral Health Plan of Care  Goal: Patient-Specific Goal (Individualization)  Description  Patient will attend at least 50% of groups while on unit.  Patient will report at least 6-8 hours of sleep at night.  Patient will be compliant with treatment team recommendations.  Patient will be medication compliant while hospitalized.  Patient will have decrease in delusional statements by discharge.      Note:   Pt appears to be sleeping in bed with eyes closed and regular respirations. 15 minutes and PRN safety checks completed with no noted complains. Will continue to monitor.   0225- Pt woken up by staff. . Temperature 99.2 , slight improvement from previously noted. Pt c/o not feeling herself. Pt pleasant but sleepy. Pt returned to bed. Provider on call was notified of pt's HR and temperature increase during afternoons, Clozaril dose was held this evening. Will continue to monitor.   0631- . Pt appears to be feeling better and in good mood. Will continue to monitor.      Problem: Thought Process Alteration  Goal: Optimal Thought Clarity  Description  Patient will hold a reality based conversation by discharge.    Note:   Pt appears to be sleeping in bed with eyes closed and regular respirations. 15 minutes and PRN safety checks completed with no noted complains. Will continue to monitor.      Problem: Fall Injury Risk  Goal: Absence of Fall and Fall-Related Injury  Description  Pt will not experience a fall while hospitalized.     Note:   No falls noted or reported so far this shift.     Problem: Violence Risk or Actual  Goal: Anger and Impulse Control  Description  PT will verbalize frustrations before making verbal threats to staff  PT will remain free from violence to staff or others while inpatient   Note:   Pt appears to be sleeping in bed with eyes closed and  physical therapy because she is tired.     The patient is very difficult to get a history from due to her dementia.        Past Medical History:   Diagnosis Date   • Acute diastolic congestive heart failure    • Anemia    • Atrial fibrillation     prior to MI; CHAD2-VASC of 5   • C. difficile colitis    • CKD (chronic kidney disease), stage IV    • Coronary artery disease    • Dehydrated hereditary stomatocytosis    • Dementia    • Diabetes mellitus    • DNR (do not resuscitate)    • Fall     rough   • Fluid retention     in abdomen and feet   • Health care maintenance    • Heparin activated thrombocytopenia    • History of acute renal failure    • Hx of CABG    • Hyperlipidemia    • Hypertension    • Mild renal insufficiency    • Mitral valve regurgitation     tx with porcine MVR   • Myocardial infarction 06/03/2012    in [place].; unsuccessful PCI of RCA   • PAF (paroxysmal atrial fibrillation)    • Pneumonia    • Rheumatic heart disease    • SOB (shortness of breath)    • Type 2 diabetes mellitus     poorly controlled ; had a neighbor with low sugar so she lets it run high.   • Vitamin D deficiency        Past Surgical History:   Procedure Laterality Date   • CARDIAC CATHETERIZATION  06/04/2012    in [place] at time of MI; 90% pLAD ;90% pRAC; 100% dPCA; 80% OM1 and tx with CABG.   • CORONARY ARTERY BYPASS GRAFT  06/01/2012    CABG x4: LIMA to LAD, sequential vein graft to RCA and post , vein graft to first marginal.   • CORONARY STENT PLACEMENT      proximal right coronary artery.   • MITRAL VALVE REPLACEMENT      27mm St. Raúl Epic porcine valve with preservstion of all subvalvular apparatus, reconstruction of the anterior chordae with [place] neochords.           Review of Systems   Constitution: Positive for malaise/fatigue. Negative for fever.   HENT: Negative for ear pain, hearing loss, nosebleeds and sore throat.    Eyes: Negative for double vision, pain, vision loss in left eye, vision loss in right eye and visual  regular respirations. 15 minutes and PRN safety checks completed with no noted complains. Will continue to monitor.         "disturbance.   Cardiovascular: Positive for dyspnea on exertion and leg swelling. Negative for claudication.   Respiratory: Negative for cough, snoring and wheezing.    Endocrine: Negative for cold intolerance, heat intolerance and polyuria.   Skin: Negative for color change, itching and rash.   Musculoskeletal: Negative for joint pain, joint swelling and muscle cramps.   Gastrointestinal: Negative for abdominal pain, diarrhea, melena, nausea and vomiting.   Genitourinary: Negative for bladder incontinence and hematuria.   Neurological: Negative for excessive daytime sleepiness, dizziness, light-headedness, paresthesias and seizures.   Psychiatric/Behavioral: Negative for depression. The patient is not nervous/anxious.    All other systems reviewed and are negative.    All other systems reviewed and are negative    Allergies   Allergen Reactions   • Heparin      Other reaction(s): Thrombocytopenia   • Penicillins        All aspects of family and social history reviewed.          Objective:     Vitals:    11/17/17 1515   BP: 108/66   BP Location: Left arm   Weight: 115 lb (52.2 kg)   Height: 64\" (162.6 cm)     Body mass index is 19.74 kg/(m^2).    PHYSICAL EXAM:  Physical Exam   Constitutional: She is oriented to person, place, and time. She appears well-developed and well-nourished.   HENT:   Head: Normocephalic and atraumatic.   Neck: Neck supple. No JVD present.   Cardiovascular: Normal rate, regular rhythm, normal heart sounds and intact distal pulses.    Pulses:       Carotid pulses are 2+ on the right side, and 2+ on the left side.       Radial pulses are 2+ on the right side, and 2+ on the left side.        Dorsalis pedis pulses are 2+ on the right side, and 2+ on the left side.   Pulmonary/Chest: Effort normal. No accessory muscle usage. No respiratory distress. She has rales (bilateral).   Abdominal: Soft. Normal appearance and bowel sounds are normal. There is no tenderness.   Musculoskeletal: Normal range " of motion. She exhibits edema (trace).   Neurological: She is alert and oriented to person, place, and time.   Skin: Skin is warm, dry and intact. She is not diaphoretic.   Psychiatric: She has a normal mood and affect. Her speech is normal and behavior is normal. Judgment and thought content normal. Cognition and memory are normal.         ECG 12 Lead  Date/Time: 11/17/2017 3:57 PM  Performed by: CHRISTELLE SHEPHERD  Authorized by: CHRISTELLE SHEPHERD   Comparison: compared with previous ECG from 10/24/2017  Rhythm: sinus rhythm  Ectopy: atrial premature contractions  BPM: 91  Clinical impression: abnormal ECG  Comments: Indication: PAF                Assessment:       Diagnosis Plan   1. Acute diastolic congestive heart failure     2. Essential hypertension     3. Stage 4 chronic kidney disease          Orders Placed This Encounter   Procedures   • ECG 12 Lead     This order was created via procedure documentation       Current Outpatient Prescriptions   Medication Sig Dispense Refill   • acetaminophen (TYLENOL) 500 MG tablet Take 500 mg by mouth Every 6 (Six) Hours As Needed for Mild Pain .     • aspirin 81 MG tablet Take  by mouth daily.     • atorvastatin (LIPITOR) 40 MG tablet Take 40 mg by mouth Daily.     • bisacodyl (BISACODYL LAXATIVE) 10 MG suppository Insert 10 mg into the rectum As Needed for Constipation.     • bisacodyl (DULCOLAX) 5 MG EC tablet Take 5 mg by mouth Daily As Needed for Constipation.     • Budesonide (ENTOCORT EC) 3 MG 24 hr capsule Take 6 mg by mouth Every Morning.     • budesonide (PULMICORT) 0.5 MG/2ML nebulizer solution Take 0.5 mg by nebulization 2 (Two) Times a Day.     • calcitriol (ROCALTROL) 0.25 MCG capsule Take 0.25 mcg by mouth Daily. Takes Monday,wed, frid     • carvedilol (COREG) 12.5 MG tablet Take 12.5 mg by mouth 2 (Two) Times a Day With Meals.     • Cholecalciferol (VITAMIN D) 1000 UNITS tablet Take  by mouth daily.     • colesevelam (WELCHOL) 625 MG tablet Take 1,875 mg by  mouth 2 (Two) Times a Day With Meals.     • cyanocobalamin 1000 MCG/ML injection Inject 1,000 mcg into the shoulder, thigh, or buttocks 1 (One) Time Per Week.     • docusate sodium (COLACE) 100 MG capsule Take 100 mg by mouth 2 (Two) Times a Day.     • donepezil (ARICEPT) 5 MG tablet Take 5 mg by mouth Every Night.     • DULoxetine (CYMBALTA) 30 MG capsule Take 30 mg by mouth Daily.     • escitalopram (LEXAPRO) 10 MG tablet Take 10 mg by mouth Daily.     • famotidine (PEPCID) 40 MG tablet Take 40 mg by mouth Daily.     • ferrous sulfate 325 (65 FE) MG tablet Take 1 tablet by mouth Daily With Breakfast. 30 tablet 3   • FREESTYLE LITE test strip USE TO TEST BLOOD SUGAR THREE TIMES DAILY AS DIRECTED 300 each 0   • furosemide (LASIX) 40 MG tablet Take 1.5 tablets by mouth Daily. 30 tablet 3   • hydrALAZINE (APRESOLINE) 25 MG tablet Take 25 mg by mouth 3 (Three) Times a Day.     • insulin lispro (humaLOG) 100 UNIT/ML injection Inject  under the skin 4 (Four) Times a Day Before Meals & at Bedtime. Sliding scale     • Insulin Pen Needle (BD PEN NEEDLE MATTI U/F) 32G X 4 MM misc Use twice a day 200 each 1   • ipratropium-albuterol (DUO-NEB) 0.5-2.5 mg/mL nebulizer Take 3 mL by nebulization Every 4 (Four) Hours As Needed for Wheezing.     • linagliptin (TRADJENTA) 5 MG tablet tablet Take 1 tablet by mouth Daily. 30 tablet 5   • ondansetron ODT (ZOFRAN-ODT) 4 MG disintegrating tablet Take 4 mg by mouth Every 8 (Eight) Hours As Needed for Nausea or Vomiting.     • oxybutynin XL (DITROPAN-XL) 10 MG 24 hr tablet Take 10 mg by mouth Daily.     • potassium chloride (MICRO-K) 10 MEQ CR capsule Take 2 capsules by mouth Daily. 30 capsule 6   • QUEtiapine (SEROquel) 25 MG tablet Take 12.5 mg by mouth 2 (Two) Times a Day.     • saccharomyces boulardii (FLORASTOR) 250 MG capsule Take 250 mg by mouth 2 (Two) Times a Day.     • SITagliptin (JANUVIA) 50 MG tablet Take 50 mg by mouth Daily.     • TOUJEO SOLOSTAR 300 UNIT/ML solution  pen-injector Inject 30 Units under the skin Daily. (Patient taking differently: Inject 25 Units under the skin Daily.) 3 pen 5   • traZODone (DESYREL) 50 MG tablet Take 25 mg by mouth Every Night.     • vitamin D (ERGOCALCIFEROL) 31697 units capsule capsule Take 50,000 Units by mouth 1 (One) Time Per Week.     • simvastatin (ZOCOR) 40 MG tablet Take 1 tablet by mouth Every Night. 10 tablet 0     No current facility-administered medications for this visit.             Plan:       1. Diastolic heart failure. Patient admitted for diastolic heart failure. She is now in a rehab facility. She is on Lasix 40 mg daily. She still has some rales, complains of shortness of breath, poor appetite and edema. I will increase her furosemide to 60 mg daily. They need to keep doing daily weights at the rehab facility. We will also check a BMP to evaluate her kidney function. Her son is helping manage her close to home.   2. Hypertension. She was hypertensive in the hospital but hypotensive here today. I will give the rehab facility parameters to hold her hydralazine if her systolic blood pressure is less than 120.  3. Chronic kidney disease. Monitor kidney function.           Follow up in office in 3 weeks.     As always, it has been a pleasure to participate in this patient's care.      Sincerely,      ALIYAH Herzog

## 2019-05-29 NOTE — PROGRESS NOTES
Glucose less variable. Will continue 80 units long acting Lantus, give 15 units short acting with meals and continue correction scale.

## 2019-05-29 NOTE — PLAN OF CARE
BEHAVIORAL TEAM DISCUSSION    Participants: Loren Zuleta NP,Gokul Hathaway NP, Khushi Reynolds NP,  Dennise Herrera [facility] Hospital[facility], Angie Laws LSW,  Becca Chery LSW, Beena Kumar SW,  Melissa Bills RN, [facility], Mady Holman OT, Natalia Costello OT.  Progress: Fair  Continued Stay Criteria/Rationale: Hallucinating  Medical/Physical: Sore throat, increased temp- continue to monitor, Hep C positive, diabetes - working with hospitalist and ordering healthy snacks - blood sugars are improving, black and blue toetail - foot soaks ordered, hx of TBI  Precautions:   Falls precaution?: YES, Care plan in place.    Behavioral Orders   Procedures    Code 1 - Restrict to Unit    Routine Programming     As clinically indicated    Status 15     Every 15 minutes.     Plan: Try clozaril again. Decrease in depakote. Have hospitalist check for infection of toe.   Rationale for change in precautions or plan: none    Current Facility-Administered Medications:     acetaminophen (TYLENOL) tablet 975 mg, 975 mg, Oral, Q6H PRN, Raven Stout, NP, 975 mg at 05/27/19 0907    atorvastatin (LIPITOR) tablet 20 mg, 20 mg, Oral, At Bedtime, Airam Light NP, 20 mg at 05/28/19 2103    benzocaine-menthol (CEPACOL) 15-3.6 MG lozenge 1 lozenge, 1 lozenge, Buccal, Q1H PRN, Natalia Fabian APRN CNP    benztropine (COGENTIN) tablet 1 mg, 1 mg, Oral, BID, WoNatalia armstrong APRN CNP, 1 mg at 05/29/19 0825    chlorproMAZINE (THORAZINE) tablet 50 mg, 50 mg, Oral, TID PRN, 50 mg at 05/29/19 0908 **OR** chlorproMAZINE (THORAZINE) injection 50 mg, 50 mg, Intramuscular, TID PRN, Natalia Fabian APRN CNP, 50 mg at 05/25/19 0829    cloZAPine (CLOZARIL) tablet 200 mg, 200 mg, Oral, At Bedtime, Natalia Fabian APRN CNP, Stopped at 05/28/19 2049    glucose gel 15-30 g, 15-30 g, Oral, Q15 Min PRN **OR** dextrose 50 % injection 25-50 mL, 25-50 mL, Intravenous, Q15 Min PRN **OR** glucagon  injection 1 mg, 1 mg, Subcutaneous, Q15 Min PRN, Airam Light NP    divalproex sodium extended-release (DEPAKOTE ER) 24 hr tablet 500 mg, 500 mg, Oral, At Bedtime, Loren Zuleta NP    gabapentin (NEURONTIN) capsule 1,000 mg, 1,000 mg, Oral, TID, Loren Zuleta NP, 1,000 mg at 05/29/19 0824    hydrOXYzine (ATARAX) tablet 100 mg, 100 mg, Oral, TID, Natalia Fabian APRN CNP, 100 mg at 05/29/19 0824    ibuprofen (ADVIL/MOTRIN) tablet 400 mg, 400 mg, Oral, Q6H PRN, Natalia Fabian APRN CNP, 400 mg at 05/27/19 1332    insulin aspart (NovoLOG) inj (RAPID ACTING), , Subcutaneous, TID w/meals, Denisse Malin NP, 15 Units at 05/29/19 0827    insulin aspart (NovoLOG) inj (RAPID ACTING), 1-7 Units, Subcutaneous, TID AC, Airam Light NP, 1 Units at 05/29/19 0825    insulin glargine (LANTUS PEN) injection 80 Units, 80 Units, Subcutaneous, At Bedtime, Denisse Malin NP, 80 Units at 05/28/19 2108    magnesium sulfate (EPSOM SALT) granules 5 g, 5 g, Other, BID PRN, 5 g at 05/28/19 1924 **AND** Patient care order, , , Per Unit Routine, Natalia Fabian APRN CNP    metFORMIN (GLUCOPHAGE) tablet 1,000 mg, 1,000 mg, Oral, BID w/meals, Airam Light NP, 1,000 mg at 05/29/19 0825    nicotine (NICODERM CQ) 21 MG/24HR 24 hr patch 1 patch, 1 patch, Transdermal, Daily, Loren Zuleta NP, 1 patch at 05/29/19 0827    nicotine (NICORETTE) gum 2-4 mg, 2-4 mg, Buccal, Q1H PRN, Raven Stout NP, 4 mg at 05/21/19 0938    nicotine Patch in Place, , Transdermal, Q8H, Loren Zuleta NP    nicotine patch REMOVAL, , Transdermal, Daily, Loren Zuleta NP    prazosin (MINIPRESS) capsule 1 mg, 1 mg, Oral, At Bedtime, Light, April JACKIE Rebollar, 1 mg at 05/28/19 2104    senna-docusate (SENOKOT-S/PERICOLACE) 8.6-50 MG per tablet 1 tablet, 1 tablet, Oral, At Bedtime, Loren Zuleta NP, 1 tablet at 05/28/19 2103    topiramate (TOPAMAX) tablet 50 mg, 50 mg, Oral,  Daily, Loren Zuleta, NP, 50 mg at 05/29/19 4420   Active Problems:    Mixed hyperlipidemia     Diabetes mellitus, type 2     Schizoaffective disorder, bipolar type (H)

## 2019-05-29 NOTE — PLAN OF CARE
"Face to face end of shift report received from Mandy CHOUDHURY RN. Rounding completed. [place] [facility]     Radha Broderick  5/29/2019  7:44 AM     Problem: Adult Behavioral Health Plan of Care  Goal: Patient-Specific Goal (Individualization)  Description  Patient will attend at least 50% of groups while on unit.  Patient will report at least 6-8 hours of sleep at night.  Patient will be compliant with treatment team recommendations.  Patient will be medication compliant while hospitalized.  Patient will have decrease in delusional statements by discharge.         5/29/2019 0744 by Radha Broderick  Outcome: Improving    Pt has not attended any groups this shift.   Pt has reported getting enough sleep this morning.  Pt has been compliant with treatment team recommendations.  Pt makes delusional comments throughout the morning.    Pt has been irritable, labile this morning. She is withdrawn, isolates to herself for most of the morning. She keeps her headphones on and listens to music. Pt took all medications as prescribed then stated \"Give me all the prns you can. I need that Thorazine\". Pt was talking to herself when this writer approached her and she was stating \"I should of left those fucking people in the ditch, left them to die\". Pt was very upset at this time, very difficult to understand as she was mumbling. She denies any SI, HI, or hallucinations. Though she appears to be responding as she is talking to herself throughout med pass and sitting at the table by herself. She states she is in \"excruciating pain\"-- some relief with am medications. Pt sugar at 1130 was 115. Held sliding scale due to parameters. Held off on the carb count due to lower sugars and rechecked blood sugar. 1250-134 and 1315-165. NovoLog was administered due to climbing sugars. Pt received afternoon medications of Atarax and Gabapentin. During administration she asked for Thorazine. Pt calm at this time and was informed that she " needs to wait some to allow her other medications to work. Pt accepting of this.     Problem: Thought Process Alteration  Goal: Optimal Thought Clarity  Description  Patient will hold a reality based conversation by discharge.    5/29/2019 0744 by Radha Broderick  Outcome: Improving  Pt has not had a reality based conversation with this writer this morning.      Problem: Fall Injury Risk  Goal: Absence of Fall and Fall-Related Injury  Description  Pt will not experience a fall while hospitalized.     5/29/2019 0744 by Radha Broderick  Outcome: Improving     Pt has been steady on her feet this shift--no falls.     Problem: Violence Risk or Actual  Goal: Anger and Impulse Control  Description  PT will verbalize frustrations before making verbal threats to staff  PT will remain free from violence to staff or others while inpatient   5/29/2019 0744 by Radha Broderick  Outcome: Improving  Pt has not made any verbal threats to staff.  Pt not has not been violent this shift.

## 2019-05-30 LAB
BASOPHILS # BLD AUTO: 0.1 10E9/L (ref 0–0.2)
BASOPHILS NFR BLD AUTO: 0.6 %
DIFFERENTIAL METHOD BLD: NORMAL
EOSINOPHIL # BLD AUTO: 0.2 10E9/L (ref 0–0.7)
EOSINOPHIL NFR BLD AUTO: 1.9 %
GLUCOSE BLDC GLUCOMTR-MCNC: 134 MG/DL (ref 70–99)
GLUCOSE BLDC GLUCOMTR-MCNC: 213 MG/DL (ref 70–99)
GLUCOSE BLDC GLUCOMTR-MCNC: 239 MG/DL (ref 70–99)
GLUCOSE BLDC GLUCOMTR-MCNC: 243 MG/DL (ref 70–99)
GLUCOSE BLDC GLUCOMTR-MCNC: 92 MG/DL (ref 70–99)
IMM GRANULOCYTES # BLD: 0.1 10E9/L (ref 0–0.4)
IMM GRANULOCYTES NFR BLD: 1 %
LYMPHOCYTES # BLD AUTO: 2.8 10E9/L (ref 0.8–5.3)
LYMPHOCYTES NFR BLD AUTO: 31 %
MONOCYTES # BLD AUTO: 0.8 10E9/L (ref 0–1.3)
MONOCYTES NFR BLD AUTO: 8.4 %
NEUTROPHILS # BLD AUTO: 5.2 10E9/L (ref 1.6–8.3)
NEUTROPHILS NFR BLD AUTO: 57.1 %
NRBC # BLD AUTO: 0 10*3/UL
NRBC BLD AUTO-RTO: 0 /100
WBC # BLD AUTO: 9.1 10E9/L (ref 4–11)

## 2019-05-30 PROCEDURE — 12400000 ZZH R&B MH

## 2019-05-30 PROCEDURE — 85004 AUTOMATED DIFF WBC COUNT: CPT | Performed by: PSYCHIATRY & NEUROLOGY

## 2019-05-30 PROCEDURE — 25000132 ZZH RX MED GY IP 250 OP 250 PS 637: Performed by: NURSE PRACTITIONER

## 2019-05-30 PROCEDURE — 36415 COLL VENOUS BLD VENIPUNCTURE: CPT | Performed by: PSYCHIATRY & NEUROLOGY

## 2019-05-30 PROCEDURE — 00000146 ZZHCL STATISTIC GLUCOSE BY METER IP

## 2019-05-30 PROCEDURE — 85048 AUTOMATED LEUKOCYTE COUNT: CPT | Performed by: PSYCHIATRY & NEUROLOGY

## 2019-05-30 PROCEDURE — 25000125 ZZHC RX 250: Performed by: NURSE PRACTITIONER

## 2019-05-30 PROCEDURE — 99232 SBSQ HOSP IP/OBS MODERATE 35: CPT | Performed by: NURSE PRACTITIONER

## 2019-05-30 RX ORDER — TOPIRAMATE 100 MG/1
100 TABLET, FILM COATED ORAL DAILY
Status: DISCONTINUED | OUTPATIENT
Start: 2019-05-31 | End: 2019-06-19

## 2019-05-30 RX ORDER — DIVALPROEX SODIUM 250 MG/1
250 TABLET, EXTENDED RELEASE ORAL AT BEDTIME
Status: DISCONTINUED | OUTPATIENT
Start: 2019-05-30 | End: 2019-05-31

## 2019-05-30 RX ORDER — CHLORPROMAZINE HYDROCHLORIDE 25 MG/1
50 TABLET, FILM COATED ORAL 3 TIMES DAILY
Status: DISCONTINUED | OUTPATIENT
Start: 2019-05-30 | End: 2019-05-31

## 2019-05-30 RX ADMIN — IBUPROFEN 400 MG: 200 TABLET, FILM COATED ORAL at 15:24

## 2019-05-30 RX ADMIN — GABAPENTIN 1000 MG: 400 CAPSULE ORAL at 13:35

## 2019-05-30 RX ADMIN — SENNOSIDES AND DOCUSATE SODIUM 1 TABLET: 8.6; 5 TABLET ORAL at 20:20

## 2019-05-30 RX ADMIN — GABAPENTIN 1000 MG: 400 CAPSULE ORAL at 20:19

## 2019-05-30 RX ADMIN — IBUPROFEN 400 MG: 200 TABLET, FILM COATED ORAL at 23:49

## 2019-05-30 RX ADMIN — BENZTROPINE MESYLATE 1 MG: 1 TABLET ORAL at 20:19

## 2019-05-30 RX ADMIN — CHLORPROMAZINE HYDROCHLORIDE 50 MG: 25 TABLET, SUGAR COATED ORAL at 20:19

## 2019-05-30 RX ADMIN — METFORMIN HYDROCHLORIDE 1000 MG: 1000 TABLET ORAL at 08:08

## 2019-05-30 RX ADMIN — METFORMIN HYDROCHLORIDE 1000 MG: 1000 TABLET ORAL at 17:24

## 2019-05-30 RX ADMIN — ATORVASTATIN CALCIUM 20 MG: 20 TABLET, FILM COATED ORAL at 20:19

## 2019-05-30 RX ADMIN — INSULIN GLARGINE 85 UNITS: 100 INJECTION, SOLUTION SUBCUTANEOUS at 20:21

## 2019-05-30 RX ADMIN — HYDROXYZINE HYDROCHLORIDE 100 MG: 25 TABLET ORAL at 17:24

## 2019-05-30 RX ADMIN — INSULIN ASPART 2 UNITS: 100 INJECTION, SOLUTION INTRAVENOUS; SUBCUTANEOUS at 12:05

## 2019-05-30 RX ADMIN — TOPIRAMATE 50 MG: 50 TABLET ORAL at 08:08

## 2019-05-30 RX ADMIN — HYDROXYZINE HYDROCHLORIDE 100 MG: 25 TABLET ORAL at 08:08

## 2019-05-30 RX ADMIN — NICOTINE 1 PATCH: 21 PATCH, EXTENDED RELEASE TRANSDERMAL at 08:07

## 2019-05-30 RX ADMIN — PRAZOSIN HYDROCHLORIDE 1 MG: 1 CAPSULE ORAL at 20:19

## 2019-05-30 RX ADMIN — DIVALPROEX SODIUM 250 MG: 250 TABLET, EXTENDED RELEASE ORAL at 20:19

## 2019-05-30 RX ADMIN — BENZTROPINE MESYLATE 1 MG: 1 TABLET ORAL at 08:08

## 2019-05-30 RX ADMIN — CHLORPROMAZINE HYDROCHLORIDE 50 MG: 25 TABLET, SUGAR COATED ORAL at 13:35

## 2019-05-30 RX ADMIN — CHLORPROMAZINE HYDROCHLORIDE 50 MG: 25 TABLET, FILM COATED ORAL at 08:17

## 2019-05-30 RX ADMIN — GABAPENTIN 1000 MG: 400 CAPSULE ORAL at 08:07

## 2019-05-30 RX ADMIN — HYDROXYZINE HYDROCHLORIDE 100 MG: 25 TABLET ORAL at 12:06

## 2019-05-30 ASSESSMENT — ACTIVITIES OF DAILY LIVING (ADL)
DRESS: SCRUBS (BEHAVIORAL HEALTH);INDEPENDENT
LAUNDRY: UNABLE TO COMPLETE
HYGIENE/GROOMING: INDEPENDENT
HYGIENE/GROOMING: INDEPENDENT
LAUNDRY: UNABLE TO COMPLETE
ORAL_HYGIENE: INDEPENDENT
DRESS: SCRUBS (BEHAVIORAL HEALTH);INDEPENDENT
ORAL_HYGIENE: INDEPENDENT

## 2019-05-30 NOTE — PLAN OF CARE
Face to face end of shift report obtained from OBDULIO Stoddard. Pt observed.     DAIANA COFFEY  5/30/2019  12:32 AM

## 2019-05-30 NOTE — PLAN OF CARE
"  Problem: Adult Behavioral Health Plan of Care  Goal: Patient-Specific Goal (Individualization)  Description  Patient will attend at least 50% of groups while on unit.  Patient will report at least 6-8 hours of sleep at night.  Patient will be compliant with treatment team recommendations.  Patient will be medication compliant while hospitalized.  Patient will have decrease in delusional statements by discharge.         5/30/2019 1559 by Arlyn Lind, RN  Outcome: No Change  Note:   1545: Pt sitting in lounge area visiting with peers upon arrival, mood is calm, full range affect. Activity is withdrawn.     1800: Accucheck of 92 before dinner, scheduled Novolog held along with sliding scale coverage. Pt is very pleased with accucheck et she feels. States,\" I feel so much better today after stopping that Clozeril.\"     2143: Pt returned to room post dinner, appears responding talking to self, HS accucheck of 239; self administers insulin appropriately. Ate snack et returned to room.     Face to face end of shift report communicated to oncoming staff.     Arlyn Lind  6/1/2019  8:10 PM             Problem: Adult Behavioral Health Plan of Care  Goal: Adheres to Safety Considerations for Self and Others  Outcome: Improving  Note:   Appropriate behavior with staff et peers.     Problem: Fall Injury Risk  Goal: Absence of Fall and Fall-Related Injury  Description  Pt will not experience a fall while hospitalized.     5/30/2019 1559 by Arlyn Lind, RN  Outcome: Improving  Note:   Pt remains free from injury et fall this shift.      Problem: Thought Process Alteration  Goal: Optimal Thought Clarity  Description  Patient will hold a reality based conversation by discharge.    5/30/2019 1559 by Arlyn Lind, RN  Outcome: No Change  Note:   Delusional, paranoid thought process continues. Majority of conversation is linear, logical et reality based.      "

## 2019-05-30 NOTE — PROGRESS NOTES
Increase mealtime insulin dosing from 15 units to 20 units with each meal. Averaging  carbs per meal over the past week. Continue Lantus, will increase to 85 as she still has not had any lows with overnight fasting-lowest sugar was 134.
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
## 2019-05-30 NOTE — PLAN OF CARE
"  Problem: Adult Behavioral Health Plan of Care  Goal: Patient-Specific Goal (Individualization)  Description  Patient will attend at least 50% of groups while on unit.  Patient will report at least 6-8 hours of sleep at night.  Patient will be compliant with treatment team recommendations.  Patient will be medication compliant while hospitalized.  Patient will have decrease in delusional statements by discharge.         5/30/2019 1045 by Dorota Engel RN  Outcome: No Change     0745: Pt is observed in room at beginning of shift.   Patient cooperative with this writer during nursing assessment. Flat, blunt affect observed. Pt appears to be irritable. She does request PRN for agitation. Pt received and taken PRN thorazine 50 mg PO at 0817. Pt states she does have some depression. Reports frustration at being in our facility. Denies hallucinations but appears to be responding. Has darting eye contact and delyed responses. Has been observed talking to self in room.  Patient does not converse much with writer with the exception of assessment questions. Denies SI, SIB, HI, and pain. Pt was out in lounge for breakfast and went to room afterwards. Has been sleeping much of morning. She ate 100% of breakfast. She is medications compliant. She is able to make needs known. Will continue to monitor and document any changes.   1145: Pt reports not feeling well this afternoon. She reports feeling nauseated. Pt does have visible perspiration. /66   Pulse 96   Temp 99.3  F (37.4  C) (Tympanic)   Resp 16   Ht 1.626 m (5' 4\")   Wt 83.3 kg (183 lb 10.3 oz)   SpO2 98%   BMI 31.52 kg/m     Blood glucose was 231.   Writer called and updated provider, Loren. Will continue to monitor and document any changes. Pt currently laying in bed. Patient given water to drink.  1211: Pt up in lounge eating lunch.   1340: Pt in room. States she feels somewhat better. She is grandiose and has pressured speech. States She, her brother, " "and father are princes and princesses. Reports she grew up with Queen [place] playing [place] in her house. She also states by the time she was 10, she was a billionaire. It is hard for this writer to get a word into conversation.   1400: Pt having foot soak.   1510: Face to face end of shift report communicated to Arlyn MAK.     Dorota Engel  5/30/2019  3:25 PM        Problem: Thought Process Alteration  Goal: Optimal Thought Clarity  Description  Patient will hold a reality based conversation by discharge.    5/30/2019 1045 by Dorota Engel RN  Outcome: No Change   Does not converse with writer in much detail. Makes minimal responses. Irritable. Answers assessment questions in \"yes or no\" responses.   Problem: Fall Injury Risk  Goal: Absence of Fall and Fall-Related Injury  Description  Pt will not experience a fall while hospitalized.  1155: Pt reports not feeling well. Steady during ambulation and transfer.      5/30/2019 1045 by Dorota Engel RN  Outcome: Improving   She is steady and  balanced. No concerns at this time. No falls thus far in shift.   Problem: Violence Risk or Actual  Goal: Anger and Impulse Control  Description  PT will verbalize frustrations before making verbal threats to staff  PT will remain free from violence to staff or others while inpatient   5/30/2019 1045 by Dorota Engel RN  Outcome: Improving   Pt states she is frustrated. Does request thorazine for this. Verbalizes need for medication.   "

## 2019-05-30 NOTE — PROGRESS NOTES
[facility]  Psychiatric Progress Note      Impression:   Ginette is laying in bed this morning and reports she does not feel well. She reports her feet hurt. Genie does not think the clozaril is working well for her. She reports feeling unwell after taking it. Will discontinue this as she is noted to have some mild tachycardia and hyperthermia. This did improve slightly after one dose of clozaril was held. She again became slightly tachycardic and had an elevated temp after getting clozaril last night. Will discontinue the clozaril and start thorazine as she does seem to be responding well to this medication.          Diagnoses:      Schizoaffective disorder, bipolar type   Methamphetamine use disorder, moderate to severe            Plan:   Depakote taper - decrease tonight to 250 mg  Discontinue clozaril  Schedule thorazine 50 mg three times daily    Hospitalist following diabetes - continue foot soaks    [place] is going forward with petition for commitment    ELOS:  > 5 days due to symptoms of psychosis and delusions along with Civil Commitment process      Attestation:  Patient has been seen and evaluated by me,  Loren Zuleta NP          Interim History:   The patient's care was discussed with the treatment team and chart notes were reviewed.          Medications:     Current Facility-Administered Medications   Medication     acetaminophen (TYLENOL) tablet 975 mg     atorvastatin (LIPITOR) tablet 20 mg     benzocaine-menthol (CEPACOL) 15-3.6 MG lozenge 1 lozenge     benztropine (COGENTIN) tablet 1 mg     chlorproMAZINE (THORAZINE) tablet 50 mg     glucose gel 15-30 g    Or     dextrose 50 % injection 25-50 mL    Or     glucagon injection 1 mg     divalproex sodium extended-release (DEPAKOTE ER) 24 hr tablet 250 mg     gabapentin (NEURONTIN) capsule 1,000 mg     hydrOXYzine (ATARAX) tablet 100 mg     ibuprofen (ADVIL/MOTRIN) tablet 400 mg     insulin aspart (NovoLOG) inj (RAPID ACTING)      "insulin aspart (NovoLOG) inj (RAPID ACTING)     insulin glargine (LANTUS PEN) injection 85 Units     magnesium sulfate (EPSOM SALT) granules 5 g     metFORMIN (GLUCOPHAGE) tablet 1,000 mg     nicotine (NICODERM CQ) 21 MG/24HR 24 hr patch 1 patch     nicotine (NICORETTE) gum 2-4 mg     nicotine Patch in Place     nicotine patch REMOVAL     prazosin (MINIPRESS) capsule 1 mg     senna-docusate (SENOKOT-S/PERICOLACE) 8.6-50 MG per tablet 1 tablet     [START ON 5/31/2019] topiramate (TOPAMAX) tablet 100 mg          10 point ROS negative \"pain everywhere\"       Allergies:     Allergies   Allergen Reactions     Haloperidol Other (See Comments)     Other reaction(s): Seizures  Patient states, \"I fell down and wasn't able to get up.\"  Patient states, \"I fell down and wasn't able to get up.\"              Psychiatric Examination:   /66   Pulse 96   Temp 99.3  F (37.4  C) (Tympanic)   Resp 16   Ht 1.626 m (5' 4\")   Wt 83.3 kg (183 lb 10.3 oz)   SpO2 98%   BMI 31.52 kg/m    Weight is 183 lbs 10.29 oz  Body mass index is 31.52 kg/m .    Appearance:  awake, alert   Attitude:  cooperative  Eye Contact:  fair  Mood:  brighter  Affect: mood congruent  Speech:  pressured speech  Psychomotor Behavior:  no evidence of tardive dyskinesia, dystonia, or tics  Thought Process: somewhat disorganized  Associations:  loosening of associations present   Thought Content:  auditory hallucinations present  Insight:  limited  Judgment:  poor  Oriented to:  time, person, and place  Attention Span and Concentration:  limited  Recent and Remote Memory:  fair  Fund of Knowledge: low-normal  Muscle Strength and Tone: normal  Gait and Station: Normal         Labs:     Results for orders placed or performed during the hospital encounter of 05/10/19 (from the past 24 hour(s))   Glucose by meter   Result Value Ref Range    Glucose 134 (H) 70 - 99 mg/dL   Glucose by meter   Result Value Ref Range    Glucose 165 (H) 70 - 99 mg/dL   Glucose by " meter   Result Value Ref Range    Glucose 190 (H) 70 - 99 mg/dL   Glucose by meter   Result Value Ref Range    Glucose 248 (H) 70 - 99 mg/dL   Glucose by meter   Result Value Ref Range    Glucose 243 (H) 70 - 99 mg/dL   Glucose by meter   Result Value Ref Range    Glucose 134 (H) 70 - 99 mg/dL   WBC and differential   Result Value Ref Range    WBC 9.1 4.0 - 11.0 10e9/L    Diff Method Automated Method     % Neutrophils 57.1 %    % Lymphocytes 31.0 %    % Monocytes 8.4 %    % Eosinophils 1.9 %    % Basophils 0.6 %    % Immature Granulocytes 1.0 %    Nucleated RBCs 0 0 /100    Absolute Neutrophil 5.2 1.6 - 8.3 10e9/L    Absolute Lymphocytes 2.8 0.8 - 5.3 10e9/L    Absolute Monocytes 0.8 0.0 - 1.3 10e9/L    Absolute Eosinophils 0.2 0.0 - 0.7 10e9/L    Absolute Basophils 0.1 0.0 - 0.2 10e9/L    Abs Immature Granulocytes 0.1 0 - 0.4 10e9/L    Absolute Nucleated RBC 0.0    Glucose by meter   Result Value Ref Range    Glucose 213 (H) 70 - 99 mg/dL

## 2019-05-30 NOTE — PROGRESS NOTES
[facility]    Spiritual Health Progress Note    Date of Service (when I saw the patient): 05/30/2019     Assessment & Plan   Ginette Wood is a 39 year old female who was admitted on 5/10/2019.  Introduced the patient as an initial visit to Spiritual Health Services.  Patient sat down at the table in commons area and immediately started talking to me after I got done with another patient.  She started out by saying that she believes in God. She then went almost non-stop into numerous disjointed stories of life.  Several seemed possible while other stories were so outside the norm of reality that I needed to refocus her thoughts through questions.  She talked a lot about being very rich and making jewels for the queen of [place] and going to numerous places around the world.    We focused more on the wounds of her life and rage that she was feeling and talking about that from a spiritual standpoint.  We closed our time in prayer.      Rev. Ilan Flowers  Volunteer

## 2019-05-30 NOTE — PLAN OF CARE
Face to face end of shift report will be communicated to oncoming RN.     MANDY COFFEY  5/30/2019  5:45 AM    Problem: Adult Behavioral Health Plan of Care  Goal: Patient-Specific Goal (Individualization)  Description  Patient will attend at least 50% of groups while on unit.  Patient will report at least 6-8 hours of sleep at night.  Patient will be compliant with treatment team recommendations.  Patient will be medication compliant while hospitalized.  Patient will have decrease in delusional statements by discharge.         5/30/2019 0011 by Mandy Coffey RN  Note:   Pt appears to be sleeping in bed with eyes closed, having regular respirations and position changes. 15 minutes and PRN safety checks completed with no noted complains. Will continue to monitor.   0214- . No complains. Pt sleeping well.    Problem: Thought Process Alteration  Goal: Optimal Thought Clarity  Description  Patient will hold a reality based conversation by discharge.    5/30/2019 0011 by Mandy Coffey RN  Note:   Pt appears to be sleeping in bed with eyes closed, having regular respirations and position changes. 15 minutes and PRN safety checks completed with no noted complains. Will continue to monitor.      Problem: Fall Injury Risk  Goal: Absence of Fall and Fall-Related Injury  Description  Pt will not experience a fall while hospitalized.     5/30/2019 0011 by Mandy Coffey RN  Note:   No falls noted or reported so far this shift.     Problem: Violence Risk or Actual  Goal: Anger and Impulse Control  Description  PT will verbalize frustrations before making verbal threats to staff  PT will remain free from violence to staff or others while inpatient   5/30/2019 0011 by Mandy Coffey RN  Note:   Pt appears to be sleeping in bed with eyes closed, having regular respirations and position changes. 15 minutes and PRN safety checks completed with no noted complains. Will continue to monitor.

## 2019-05-31 LAB
GLUCOSE BLDC GLUCOMTR-MCNC: 116 MG/DL (ref 70–99)
GLUCOSE BLDC GLUCOMTR-MCNC: 116 MG/DL (ref 70–99)
GLUCOSE BLDC GLUCOMTR-MCNC: 191 MG/DL (ref 70–99)
GLUCOSE BLDC GLUCOMTR-MCNC: 197 MG/DL (ref 70–99)
GLUCOSE BLDC GLUCOMTR-MCNC: 204 MG/DL (ref 70–99)

## 2019-05-31 PROCEDURE — 25000132 ZZH RX MED GY IP 250 OP 250 PS 637: Performed by: NURSE PRACTITIONER

## 2019-05-31 PROCEDURE — 99232 SBSQ HOSP IP/OBS MODERATE 35: CPT | Performed by: NURSE PRACTITIONER

## 2019-05-31 PROCEDURE — 00000146 ZZHCL STATISTIC GLUCOSE BY METER IP

## 2019-05-31 PROCEDURE — 25000125 ZZHC RX 250: Performed by: NURSE PRACTITIONER

## 2019-05-31 PROCEDURE — 12400000 ZZH R&B MH

## 2019-05-31 PROCEDURE — 25000131 ZZH RX MED GY IP 250 OP 636 PS 637: Performed by: NURSE PRACTITIONER

## 2019-05-31 RX ORDER — CHLORPROMAZINE HYDROCHLORIDE 100 MG/1
100 TABLET, FILM COATED ORAL 3 TIMES DAILY
Status: DISCONTINUED | OUTPATIENT
Start: 2019-05-31 | End: 2019-06-17

## 2019-05-31 RX ADMIN — HYDROXYZINE HYDROCHLORIDE 100 MG: 25 TABLET ORAL at 08:11

## 2019-05-31 RX ADMIN — HYDROXYZINE HYDROCHLORIDE 100 MG: 25 TABLET ORAL at 12:06

## 2019-05-31 RX ADMIN — TOPIRAMATE 100 MG: 100 TABLET, FILM COATED ORAL at 08:13

## 2019-05-31 RX ADMIN — NICOTINE 1 PATCH: 21 PATCH, EXTENDED RELEASE TRANSDERMAL at 08:15

## 2019-05-31 RX ADMIN — GABAPENTIN 1000 MG: 400 CAPSULE ORAL at 08:11

## 2019-05-31 RX ADMIN — HYDROXYZINE HYDROCHLORIDE 100 MG: 25 TABLET ORAL at 17:12

## 2019-05-31 RX ADMIN — INSULIN GLARGINE 85 UNITS: 100 INJECTION, SOLUTION SUBCUTANEOUS at 20:24

## 2019-05-31 RX ADMIN — GABAPENTIN 1000 MG: 400 CAPSULE ORAL at 14:13

## 2019-05-31 RX ADMIN — SENNOSIDES AND DOCUSATE SODIUM 1 TABLET: 8.6; 5 TABLET ORAL at 20:28

## 2019-05-31 RX ADMIN — ATORVASTATIN CALCIUM 20 MG: 20 TABLET, FILM COATED ORAL at 20:25

## 2019-05-31 RX ADMIN — MAGNESIUM HYDROXIDE 30 ML: 400 SUSPENSION ORAL at 15:42

## 2019-05-31 RX ADMIN — CHLORPROMAZINE HYDROCHLORIDE 50 MG: 25 TABLET, SUGAR COATED ORAL at 08:12

## 2019-05-31 RX ADMIN — CHLORPROMAZINE HYDROCHLORIDE 100 MG: 100 TABLET, SUGAR COATED ORAL at 14:13

## 2019-05-31 RX ADMIN — METFORMIN HYDROCHLORIDE 1000 MG: 1000 TABLET ORAL at 08:13

## 2019-05-31 RX ADMIN — METFORMIN HYDROCHLORIDE 1000 MG: 1000 TABLET ORAL at 17:16

## 2019-05-31 RX ADMIN — INSULIN ASPART 2 UNITS: 100 INJECTION, SOLUTION INTRAVENOUS; SUBCUTANEOUS at 12:03

## 2019-05-31 RX ADMIN — GABAPENTIN 1000 MG: 400 CAPSULE ORAL at 20:25

## 2019-05-31 RX ADMIN — BENZTROPINE MESYLATE 1 MG: 1 TABLET ORAL at 20:26

## 2019-05-31 RX ADMIN — BENZTROPINE MESYLATE 1 MG: 1 TABLET ORAL at 08:10

## 2019-05-31 RX ADMIN — PRAZOSIN HYDROCHLORIDE 1 MG: 1 CAPSULE ORAL at 20:25

## 2019-05-31 RX ADMIN — CHLORPROMAZINE HYDROCHLORIDE 100 MG: 100 TABLET, SUGAR COATED ORAL at 20:28

## 2019-05-31 ASSESSMENT — ACTIVITIES OF DAILY LIVING (ADL)
HYGIENE/GROOMING: INDEPENDENT
ORAL_HYGIENE: INDEPENDENT
DRESS: INDEPENDENT;SCRUBS (BEHAVIORAL HEALTH)
LAUNDRY: UNABLE TO COMPLETE
DRESS: SCRUBS (BEHAVIORAL HEALTH);INDEPENDENT
LAUNDRY: UNABLE TO COMPLETE
HYGIENE/GROOMING: INDEPENDENT
ORAL_HYGIENE: INDEPENDENT

## 2019-05-31 NOTE — PLAN OF CARE
Face to face end of shift report obtained from OBDULIO Stoddard. Pt observed in room.    DAIANA COFFEY  5/30/2019  11:44 PM

## 2019-05-31 NOTE — PLAN OF CARE
"  Problem: Adult Behavioral Health Plan of Care  Goal: Patient-Specific Goal (Individualization)  Description  Patient will attend at least 50% of groups while on unit.  Patient will report at least 6-8 hours of sleep at night.  Patient will be compliant with treatment team recommendations.  Patient will be medication compliant while hospitalized.  Patient will have decrease in delusional statements by discharge.         5/31/2019 1727 by Dorota Engel RN  Outcome: No Change     Patient observed in room at beginning of shift, 1540. Reports she is doing better now that she is on the thorazine. Pt speech tangential and pressured. She continues to have delusional content as she talks about a giant abscess on her arm from doing drugs a couple months ago and needing It amputated. Pt endorses anxiety and depression. She rates them both a \"10\" on a 0-10 numeric scale. She states she wants to go. Pt in room much of shift thus far. She eats dinner in room. Isolative and withdrawn. She is medication compliant. She did request PRN milk of magnesium for constipation at 1542. BG at 1640 was 116. Will continue to monitor and document any changes.  1945: Pt BG was 197.  Ate a snack and is room. Pt took shower this afternoon. Patient declined foot soak this evening.     Face to face end of shift report communicated to Mandy MAK.     Dorota Engel  5/31/2019  11:23 PM          Problem: Thought Process Alteration  Goal: Optimal Thought Clarity  Description  Patient will hold a reality based conversation by discharge.    5/31/2019 1727 by Dorota Engel RN  Outcome: No Change   Patient continues to have delusional  thought content.   Problem: Fall Injury Risk  Goal: Absence of Fall and Fall-Related Injury  Description  Pt will not experience a fall while hospitalized.     5/31/2019 1727 by Dorota Engel RN  Outcome: Improving   Pt is steady and balanced during ambulation.   Problem: Violence Risk or Actual  Goal: Anger and " Impulse Control  Description  PT will verbalize frustrations before making verbal threats to staff  PT will remain free from violence to staff or others while inpatient   5/31/2019 1727 by Dorota Engel, RN  Outcome: Improving   Pt is a little restless and irritable but maintains appropriate boundaries with staff and peers.

## 2019-05-31 NOTE — PLAN OF CARE
Face to face end of shift report will be communicated to oncoming RN.     MANDY COFFEY  5/31/2019  5:45 AM    Problem: Adult Behavioral Health Plan of Care  Goal: Patient-Specific Goal (Individualization)  Description  Patient will attend at least 50% of groups while on unit.  Patient will report at least 6-8 hours of sleep at night.  Patient will be compliant with treatment team recommendations.  Patient will be medication compliant while hospitalized.  Patient will have decrease in delusional statements by discharge.         5/31/2019 0016 by Mandy Coffey RN  Note:   2349- Pt complain of 8/10 lower back pain. Motrin 400 mg given with small snack. 15gm of carb. Will continue to monitor.  0100- Pt talking to self, having full conversation.   0204- Pt sleeping, woken up by staff for BS check. . Pt returned to bed. Will continue to monitor.   0545- Pt stayed in bed sleeping since 2 am BS check. Pt slept approximately 3.5-4 hours.     Problem: Thought Process Alteration  Goal: Optimal Thought Clarity  Description  Patient will hold a reality based conversation by discharge.    5/31/2019 0016 by Mandy Coffey RN  Note:   Pt has not made any delusional comments so far this shift. Pt appears to be responding. Able to communicate needs correctly.      Problem: Fall Injury Risk  Goal: Absence of Fall and Fall-Related Injury  Description  Pt will not experience a fall while hospitalized.     5/31/2019 0016 by Mandy Coffey RN  Note:   No falls noted or reported so far this shift.     Problem: Violence Risk or Actual  Goal: Anger and Impulse Control  Description  PT will verbalize frustrations before making verbal threats to staff  PT will remain free from violence to staff or others while inpatient   5/31/2019 0016 by Mandy Coffey RN  Note:   Pt keeping behavior appropriately so far this shift. No angry outburst noted or reported so far this shift.

## 2019-05-31 NOTE — PLAN OF CARE
Face to face end of shift report communicated from Mandy CHOUDHURY RN. Pt in bed, appears to be sleeping at start of shift.      Julia Franco  5/31/2019  7:48 AM       Problem: Adult Behavioral Health Plan of Care  Goal: Patient-Specific Goal (Individualization)  Description  Patient will attend at least 50% of groups while on unit.  Patient will report at least 6-8 hours of sleep at night.  Patient will be compliant with treatment team recommendations.  Patient will be medication compliant while hospitalized.  Patient will have decrease in delusional statements by discharge.       Pt has been in her room this shift refusing to attend groups or come to the lounge for lunch. Pt rates all symptoms at a 10/10 including anxiety, depression, suicidal/homicidal ideation, pain and hallucinations. Pt states she is mad at [place] from the [facility] for taking so much time to get her guardianship or commitment done. Pt talked about a having her check mailed her to the hospital and was given the address.   5/31/2019 0747 by Julia Franco, RN  Outcome: Improving     Problem: Thought Process Alteration  Goal: Optimal Thought Clarity  Description  Patient will hold a reality based conversation by discharge.    5/31/2019 0747 by Julia Franco, RN  Outcome: Improving     Problem: Fall Injury Risk  Goal: Absence of Fall and Fall-Related Injury  Description  Pt will not experience a fall while hospitalized.     5/31/2019 0747 by Julia Franco, RN  Outcome: Improving     Problem: Violence Risk or Actual  Goal: Anger and Impulse Control  Description  PT will verbalize frustrations before making verbal threats to staff  PT will remain free from violence to staff or others while inpatient   5/31/2019 0747 by Julia Franco, RN  Outcome: Improving  Pt has been free from violence this shift. Pt became very frustrated and mad regarding her circumstances here and verbalized her frustrations to staff without threats to  staff or peers.       Face to face end of shift report communicated to oncoming RN. Pt is a violenc and fall risk.     Julia Franco  5/31/2019  7:49 AM

## 2019-05-31 NOTE — PROGRESS NOTES
[facility]  Psychiatric Progress Note      Impression:   Ginette is sitting up in her room this morning. She is less irritable and pressured than she has been previously. Genie feels she is doing well on the thorazine. She did feel as thought the Clozaril was causing agitation and irritability. Her vital signs are improved with no noted hyperthermia or tachycardia. She continues to have delusional grandiosity however. Will titrate thorazine as tolerated. Continue to monitor WBC and ANC for the first month or so of treatment.          Diagnoses:      Schizoaffective disorder, bipolar type   Methamphetamine use disorder, moderate to severe            Plan:   Increase thorazine to 100 mg three times a day  Discontinue depakote    Hospitalist following diabetes - continue foot soaks    [facility] is going forward with petition for commitment    ELOS:  > 5 days due to symptoms of psychosis and delusions along with Civil Commitment process      Attestation:  Patient has been seen and evaluated by me,  Loren Zuleta NP          Interim History:   The patient's care was discussed with the treatment team and chart notes were reviewed.          Medications:     Current Facility-Administered Medications   Medication     acetaminophen (TYLENOL) tablet 975 mg     atorvastatin (LIPITOR) tablet 20 mg     benzocaine-menthol (CEPACOL) 15-3.6 MG lozenge 1 lozenge     benztropine (COGENTIN) tablet 1 mg     chlorproMAZINE (THORAZINE) tablet 50 mg     glucose gel 15-30 g    Or     dextrose 50 % injection 25-50 mL    Or     glucagon injection 1 mg     divalproex sodium extended-release (DEPAKOTE ER) 24 hr tablet 250 mg     gabapentin (NEURONTIN) capsule 1,000 mg     hydrOXYzine (ATARAX) tablet 100 mg     ibuprofen (ADVIL/MOTRIN) tablet 400 mg     insulin aspart (NovoLOG) inj (RAPID ACTING)     insulin aspart (NovoLOG) inj (RAPID ACTING)     insulin glargine (LANTUS PEN) injection 85 Units     magnesium sulfate (EPSOM SALT)  "granules 5 g     metFORMIN (GLUCOPHAGE) tablet 1,000 mg     nicotine (NICODERM CQ) 21 MG/24HR 24 hr patch 1 patch     nicotine (NICORETTE) gum 2-4 mg     nicotine Patch in Place     nicotine patch REMOVAL     prazosin (MINIPRESS) capsule 1 mg     senna-docusate (SENOKOT-S/PERICOLACE) 8.6-50 MG per tablet 1 tablet     topiramate (TOPAMAX) tablet 100 mg          10 point ROS negative \"pain everywhere\"       Allergies:     Allergies   Allergen Reactions     Haloperidol Other (See Comments)     Other reaction(s): Seizures  Patient states, \"I fell down and wasn't able to get up.\"  Patient states, \"I fell down and wasn't able to get up.\"              Psychiatric Examination:   /66   Pulse 89   Temp 98.6  F (37  C) (Tympanic)   Resp 16   Ht 1.626 m (5' 4\")   Wt 83.3 kg (183 lb 10.3 oz)   SpO2 99%   BMI 31.52 kg/m    Weight is 183 lbs 10.29 oz  Body mass index is 31.52 kg/m .    Appearance:  awake, alert   Attitude:  cooperative  Eye Contact:  fair  Mood:  brighter  Affect: mood congruent  Speech:  pressured speech  Psychomotor Behavior:  no evidence of tardive dyskinesia, dystonia, or tics  Thought Process: somewhat disorganized  Associations:  loosening of associations present   Thought Content:  auditory hallucinations present  Insight:  limited  Judgment:  poor  Oriented to:  time, person, and place  Attention Span and Concentration:  limited  Recent and Remote Memory:  fair  Fund of Knowledge: low-normal  Muscle Strength and Tone: normal  Gait and Station: Normal         Labs:     Results for orders placed or performed during the hospital encounter of 05/10/19 (from the past 24 hour(s))   Glucose by meter   Result Value Ref Range    Glucose 213 (H) 70 - 99 mg/dL   Glucose by meter   Result Value Ref Range    Glucose 92 70 - 99 mg/dL   Glucose by meter   Result Value Ref Range    Glucose 239 (H) 70 - 99 mg/dL   Glucose by meter   Result Value Ref Range    Glucose 204 (H) 70 - 99 mg/dL   Glucose by meter "   Result Value Ref Range    Glucose 116 (H) 70 - 99 mg/dL

## 2019-05-31 NOTE — PLAN OF CARE
Called and left a message again for Denis from [facility] - will need to coordinate with them before sending referrals to [facility] to see what level of care she was in in the past and how it was funded.

## 2019-06-01 LAB
GLUCOSE BLDC GLUCOMTR-MCNC: 102 MG/DL (ref 70–99)
GLUCOSE BLDC GLUCOMTR-MCNC: 117 MG/DL (ref 70–99)
GLUCOSE BLDC GLUCOMTR-MCNC: 161 MG/DL (ref 70–99)
GLUCOSE BLDC GLUCOMTR-MCNC: 168 MG/DL (ref 70–99)
GLUCOSE BLDC GLUCOMTR-MCNC: 88 MG/DL (ref 70–99)

## 2019-06-01 PROCEDURE — 25000125 ZZHC RX 250: Performed by: NURSE PRACTITIONER

## 2019-06-01 PROCEDURE — 25000132 ZZH RX MED GY IP 250 OP 250 PS 637: Performed by: NURSE PRACTITIONER

## 2019-06-01 PROCEDURE — 12400000 ZZH R&B MH

## 2019-06-01 PROCEDURE — 00000146 ZZHCL STATISTIC GLUCOSE BY METER IP

## 2019-06-01 RX ORDER — BENZTROPINE MESYLATE 0.5 MG/1
0.5 TABLET ORAL 2 TIMES DAILY
Status: DISCONTINUED | OUTPATIENT
Start: 2019-06-01 | End: 2019-06-19

## 2019-06-01 RX ADMIN — CHLORPROMAZINE HYDROCHLORIDE 100 MG: 100 TABLET, SUGAR COATED ORAL at 14:25

## 2019-06-01 RX ADMIN — HYDROXYZINE HYDROCHLORIDE 100 MG: 25 TABLET ORAL at 12:36

## 2019-06-01 RX ADMIN — IBUPROFEN 400 MG: 200 TABLET, FILM COATED ORAL at 06:45

## 2019-06-01 RX ADMIN — HYDROXYZINE HYDROCHLORIDE 100 MG: 25 TABLET ORAL at 18:04

## 2019-06-01 RX ADMIN — INSULIN GLARGINE 80 UNITS: 100 INJECTION, SOLUTION SUBCUTANEOUS at 20:45

## 2019-06-01 RX ADMIN — SENNOSIDES AND DOCUSATE SODIUM 1 TABLET: 8.6; 5 TABLET ORAL at 20:40

## 2019-06-01 RX ADMIN — NICOTINE 1 PATCH: 21 PATCH, EXTENDED RELEASE TRANSDERMAL at 08:39

## 2019-06-01 RX ADMIN — METFORMIN HYDROCHLORIDE 1000 MG: 1000 TABLET ORAL at 18:04

## 2019-06-01 RX ADMIN — CHLORPROMAZINE HYDROCHLORIDE 100 MG: 100 TABLET, SUGAR COATED ORAL at 20:40

## 2019-06-01 RX ADMIN — BENZTROPINE MESYLATE 0.5 MG: 0.5 TABLET ORAL at 08:40

## 2019-06-01 RX ADMIN — HYDROXYZINE HYDROCHLORIDE 100 MG: 25 TABLET ORAL at 08:40

## 2019-06-01 RX ADMIN — PRAZOSIN HYDROCHLORIDE 1 MG: 1 CAPSULE ORAL at 20:40

## 2019-06-01 RX ADMIN — CHLORPROMAZINE HYDROCHLORIDE 100 MG: 100 TABLET, SUGAR COATED ORAL at 09:00

## 2019-06-01 RX ADMIN — INSULIN ASPART 1 UNITS: 100 INJECTION, SOLUTION INTRAVENOUS; SUBCUTANEOUS at 08:38

## 2019-06-01 RX ADMIN — TOPIRAMATE 100 MG: 100 TABLET, FILM COATED ORAL at 08:40

## 2019-06-01 RX ADMIN — GABAPENTIN 1000 MG: 400 CAPSULE ORAL at 08:40

## 2019-06-01 RX ADMIN — GABAPENTIN 1000 MG: 400 CAPSULE ORAL at 20:40

## 2019-06-01 RX ADMIN — METFORMIN HYDROCHLORIDE 1000 MG: 1000 TABLET ORAL at 08:40

## 2019-06-01 RX ADMIN — BENZTROPINE MESYLATE 0.5 MG: 0.5 TABLET ORAL at 20:40

## 2019-06-01 RX ADMIN — ATORVASTATIN CALCIUM 20 MG: 20 TABLET, FILM COATED ORAL at 20:40

## 2019-06-01 RX ADMIN — GABAPENTIN 1000 MG: 400 CAPSULE ORAL at 14:25

## 2019-06-01 RX ADMIN — MAGNESIUM HYDROXIDE 30 ML: 400 SUSPENSION ORAL at 14:26

## 2019-06-01 ASSESSMENT — ACTIVITIES OF DAILY LIVING (ADL)
HYGIENE/GROOMING: INDEPENDENT
ORAL_HYGIENE: INDEPENDENT
HYGIENE/GROOMING: INDEPENDENT
DRESS: SCRUBS (BEHAVIORAL HEALTH);INDEPENDENT
DRESS: INDEPENDENT;SCRUBS (BEHAVIORAL HEALTH)
ORAL_HYGIENE: INDEPENDENT
LAUNDRY: UNABLE TO COMPLETE
LAUNDRY: UNABLE TO COMPLETE

## 2019-06-01 NOTE — PLAN OF CARE
Problem: Adult Behavioral Health Plan of Care  Goal: Patient-Specific Goal (Individualization)  Description  Patient will attend at least 50% of groups while on unit.  Patient will report at least 6-8 hours of sleep at night.  Patient will be compliant with treatment team recommendations.  Patient will be medication compliant while hospitalized.  Patient will have decrease in delusional statements by discharge.         6/1/2019 1625 by Arlyn Lind, RN  Outcome: Improving  Note:   1545: Pt lying in bed upon arrival, even, non-labored respirations noted, eyes closed.   1730: Held scheduled novolog, plus additional sliding scale coverage. Accucheck of 102- Ate 50 % of dinner.   2054: Compliant with nursing assessment et medication administration, denies SI/HI, hallucinations, anxiety et pain. Pt is more isolative et withdrawn this shift, 2000 accucheck of 161- Appropriately self administers scheduled lantus.      2322: Face to face end of shift report communicated to on coming staff. .     Arlyn Lind  6/1/2019  11:22 PM             Problem: Adult Behavioral Health Plan of Care  Goal: Adheres to Safety Considerations for Self and Others  Outcome: Improving  Note:   Appropriate behavior with staff et peers.      Problem: Thought Process Alteration  Goal: Optimal Thought Clarity  Description  Patient will hold a reality based conversation by discharge.    Outcome: Improving  Note:   Thought process is linear, logical. Majority of conversation is reality based. Offers little conversation. Appears responding to internal stimuli- No delusional statements made to this writer or reported this shift.

## 2019-06-01 NOTE — PLAN OF CARE
"Face to face report received from Mandy CHOUDHURY RN. Pt. Observed in bed.    Problem: Adult Behavioral Health Plan of Care  Goal: Patient-Specific Goal (Individualization)  Description  Patient will attend at least 50% of groups while on unit.  Patient will report at least 6-8 hours of sleep at night.  Patient will be compliant with treatment team recommendations.  Patient will be medication compliant while hospitalized.  Patient will have decrease in delusional statements by discharge.    Pt. Leonor HI,SI, hallucinations and pain the a.m. Pt. Reporting anxiety and depression. Pt. Agitated this a.m raising voice when asked questions. Pt. Withdrawn and isolating to room. Pt. Requesting staff get her ice water. Pt. Leaving room x 2 to get her breakfast and lunch tray. Pt. Eating 75% of breakfast and 50% of lunch. Pt. Reporting \"When do I get the Thorazine. It's working.\" Pt. Reporting no issues with bladder. Pt. Reporting \"I am having problems with bowel.\" This writer asked Pt. Offered PRN and refused at this time. 1426 Pt. Administered PRN MOM per her request for constipation with no results this a.m. Will continue to monitor. Pt. Encouraged to attend group and shower.        6/1/2019 1308 by Eliza Arndt RN  Outcome: No Change     Problem: Fall Injury Risk  Goal: Absence of Fall and Fall-Related Injury  Description  Pt will not experience a fall while hospitalized.   Pt. Free from falls this a.m.   6/1/2019 1308 by Eliza Arndt RN  Outcome: Improving     Problem: Violence Risk or Actual  Goal: Anger and Impulse Control  Description  PT will verbalize frustrations before making verbal threats to staff  PT will remain free from violence to staff or others while inpatient     Pt. Free from verbal threats of violence to staff.   6/1/2019 1308 by Eliza Arndt RN  Outcome: No Change     Face to face end of shift report will be communicated to kei Arndt  6/1/2019  1:17 PM        "

## 2019-06-01 NOTE — PROGRESS NOTES
Overall glucose trend is much less erratic. She is now having some low end normals though no hypoglycemia. She has pronounced postprandial elevations still. Per nursing staff she has been eating less than she was but still eating adequately. Will decrease Lantus from 85 to 80 units, keep mealtime dose at 20 units for now, may need to decrease that if starts having lows. Limits snacks to bedtime only, make sure glucose check for bedtime is done before the snack is given.

## 2019-06-01 NOTE — PLAN OF CARE
Face to face end of shift report will be communicated to oncoming RN.     MANDY COFFEY  6/1/2019  6:33 AM    Problem: Adult Behavioral Health Plan of Care  Goal: Patient-Specific Goal (Individualization)  Description  Patient will attend at least 50% of groups while on unit.  Patient will report at least 6-8 hours of sleep at night.  Patient will be compliant with treatment team recommendations.  Patient will be medication compliant while hospitalized.  Patient will have decrease in delusional statements by discharge.         6/1/2019 0039 by Mandy Coffey RN  Outcome: No Change     Problem: Thought Process Alteration  Goal: Optimal Thought Clarity  Description  Patient will hold a reality based conversation by discharge.    6/1/2019 0039 by Mandy Coffey RN  Outcome: No Change     Problem: Violence Risk or Actual  Goal: Anger and Impulse Control  Description  PT will verbalize frustrations before making verbal threats to staff  PT will remain free from violence to staff or others while inpatient   6/1/2019 0039 by Mandy Coffey RN  Outcome: No Change     Problem: Fall Injury Risk  Goal: Absence of Fall and Fall-Related Injury  Description  Pt will not experience a fall while hospitalized.     6/1/2019 0039 by Mandy Coffey RN  Outcome: Improving  Pt appears to be sleeping in bed with eyes closed, having regular respirations and position changes.15 minutes and PRN safety checks completed with no noted complains. No falls noted or reported so far this shift. Will continue to monitor.   0200 BS 88. Snack given 35 gm of carbohydrates.  0600- Pt slept approximately 6 hours with no noted complains. Will continue to monitor.  0634 . Pt c/o 8/10 back pain. Motrin 400 mg given at 0645. Will continue to monitor.

## 2019-06-01 NOTE — PLAN OF CARE
Face to face end of shift report obtained from OBDULIO Raya. Pt observed resting in bed.    DAIANA COFFEY  6/1/2019  12:28 AM

## 2019-06-02 LAB
GLUCOSE BLDC GLUCOMTR-MCNC: 105 MG/DL (ref 70–99)
GLUCOSE BLDC GLUCOMTR-MCNC: 107 MG/DL (ref 70–99)
GLUCOSE BLDC GLUCOMTR-MCNC: 155 MG/DL (ref 70–99)
GLUCOSE BLDC GLUCOMTR-MCNC: 160 MG/DL (ref 70–99)
GLUCOSE BLDC GLUCOMTR-MCNC: 191 MG/DL (ref 70–99)

## 2019-06-02 PROCEDURE — 00000146 ZZHCL STATISTIC GLUCOSE BY METER IP

## 2019-06-02 PROCEDURE — 12400000 ZZH R&B MH

## 2019-06-02 PROCEDURE — 25000125 ZZHC RX 250: Performed by: NURSE PRACTITIONER

## 2019-06-02 PROCEDURE — 25000132 ZZH RX MED GY IP 250 OP 250 PS 637: Performed by: NURSE PRACTITIONER

## 2019-06-02 PROCEDURE — 99232 SBSQ HOSP IP/OBS MODERATE 35: CPT | Performed by: NURSE PRACTITIONER

## 2019-06-02 RX ADMIN — SENNOSIDES AND DOCUSATE SODIUM 1 TABLET: 8.6; 5 TABLET ORAL at 20:11

## 2019-06-02 RX ADMIN — GABAPENTIN 1000 MG: 400 CAPSULE ORAL at 13:59

## 2019-06-02 RX ADMIN — INSULIN GLARGINE 80 UNITS: 100 INJECTION, SOLUTION SUBCUTANEOUS at 20:08

## 2019-06-02 RX ADMIN — CHLORPROMAZINE HYDROCHLORIDE 100 MG: 100 TABLET, SUGAR COATED ORAL at 20:11

## 2019-06-02 RX ADMIN — BENZTROPINE MESYLATE 0.5 MG: 0.5 TABLET ORAL at 08:11

## 2019-06-02 RX ADMIN — HYDROXYZINE HYDROCHLORIDE 100 MG: 25 TABLET ORAL at 08:10

## 2019-06-02 RX ADMIN — NICOTINE 1 PATCH: 21 PATCH, EXTENDED RELEASE TRANSDERMAL at 08:09

## 2019-06-02 RX ADMIN — BENZTROPINE MESYLATE 0.5 MG: 0.5 TABLET ORAL at 20:11

## 2019-06-02 RX ADMIN — PRAZOSIN HYDROCHLORIDE 1 MG: 1 CAPSULE ORAL at 20:15

## 2019-06-02 RX ADMIN — INSULIN ASPART 2 UNITS: 100 INJECTION, SOLUTION INTRAVENOUS; SUBCUTANEOUS at 12:06

## 2019-06-02 RX ADMIN — ATORVASTATIN CALCIUM 20 MG: 20 TABLET, FILM COATED ORAL at 20:12

## 2019-06-02 RX ADMIN — TOPIRAMATE 100 MG: 100 TABLET, FILM COATED ORAL at 08:10

## 2019-06-02 RX ADMIN — GABAPENTIN 1000 MG: 400 CAPSULE ORAL at 20:10

## 2019-06-02 RX ADMIN — METFORMIN HYDROCHLORIDE 1000 MG: 1000 TABLET ORAL at 08:10

## 2019-06-02 RX ADMIN — CHLORPROMAZINE HYDROCHLORIDE 100 MG: 100 TABLET, SUGAR COATED ORAL at 13:59

## 2019-06-02 RX ADMIN — METFORMIN HYDROCHLORIDE 1000 MG: 1000 TABLET ORAL at 17:58

## 2019-06-02 RX ADMIN — HYDROXYZINE HYDROCHLORIDE 100 MG: 25 TABLET ORAL at 12:05

## 2019-06-02 RX ADMIN — CHLORPROMAZINE HYDROCHLORIDE 100 MG: 100 TABLET, SUGAR COATED ORAL at 08:10

## 2019-06-02 RX ADMIN — GABAPENTIN 1000 MG: 400 CAPSULE ORAL at 08:10

## 2019-06-02 RX ADMIN — HYDROXYZINE HYDROCHLORIDE 100 MG: 25 TABLET ORAL at 17:58

## 2019-06-02 ASSESSMENT — ACTIVITIES OF DAILY LIVING (ADL)
DRESS: SCRUBS (BEHAVIORAL HEALTH);INDEPENDENT
LAUNDRY: UNABLE TO COMPLETE
LAUNDRY: UNABLE TO COMPLETE
ORAL_HYGIENE: INDEPENDENT
DRESS: INDEPENDENT;SCRUBS (BEHAVIORAL HEALTH)
HYGIENE/GROOMING: INDEPENDENT
ORAL_HYGIENE: INDEPENDENT
HYGIENE/GROOMING: INDEPENDENT

## 2019-06-02 ASSESSMENT — MIFFLIN-ST. JEOR: SCORE: 1519.56

## 2019-06-02 NOTE — PLAN OF CARE
Face to face end of shift report will be communicated to oncoming RN.     MANDY COFFEY  6/2/2019  6:08 AM    Problem: Adult Behavioral Health Plan of Care  Goal: Patient-Specific Goal (Individualization)  Description  Patient will attend at least 50% of groups while on unit.  Patient will report at least 6-8 hours of sleep at night.  Patient will be compliant with treatment team recommendations.  Patient will be medication compliant while hospitalized.  Patient will have decrease in delusional statements by discharge.         6/2/2019 0020 by Mandy Coffey RN  Outcome: No Change     Problem: Thought Process Alteration  Goal: Optimal Thought Clarity  Description  Patient will hold a reality based conversation by discharge.    6/2/2019 0020 by Mandy Coffey RN  Outcome: No Change     Problem: Violence Risk or Actual  Goal: Anger and Impulse Control  Description  PT will verbalize frustrations before making verbal threats to staff  PT will remain free from violence to staff or others while inpatient   6/2/2019 0020 by Mandy Coffey RN  Outcome: No Change     Problem: Fall Injury Risk  Goal: Absence of Fall and Fall-Related Injury  Description  Pt will not experience a fall while hospitalized.     6/2/2019 0020 by Mandy Coffey RN  Outcome: Improving  Pt appears to be sleeping in bed with eyes closed, having regular respirations and position changes. 15 minutes and PRN safety checks completed with no noted complains. No falls noted or reported so far this shift. Will continue to monitor.   0210- . Pt back to sleep.   0600- Pt slept approximately 6 hours. Will continue to monitor.
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
## 2019-06-02 NOTE — PROGRESS NOTES
[facility]  Psychiatric Progress Note      Impression:   Ginette is up in her room. She is more calm and less agitated and pressured this morning. She is less grandiose and delusional today. She does say she is upset still with [facility] as she has not heard about her commitment at this point. Genie is also reporting a decreased appetite with better blood sugar control. She is very happy about this. Genie does think discontinued depakote and clozaril have helped this tremendously. She feels her current medications are working well for her.           Diagnoses:      Schizoaffective disorder, bipolar type   Methamphetamine use disorder, moderate to severe            Plan:   Continue current medications    Hospitalist following diabetes - continue foot soaks    [facility] is going forward with petition for commitment    ELOS:  > 5 days due to symptoms of psychosis and delusions along with Civil Commitment process      Attestation:  Patient has been seen and evaluated by me,  Loren Zuleta NP          Interim History:   The patient's care was discussed with the treatment team and chart notes were reviewed.          Medications:     Current Facility-Administered Medications   Medication     acetaminophen (TYLENOL) tablet 975 mg     atorvastatin (LIPITOR) tablet 20 mg     benzocaine-menthol (CEPACOL) 15-3.6 MG lozenge 1 lozenge     benztropine (COGENTIN) tablet 0.5 mg     chlorproMAZINE (THORAZINE) tablet 100 mg     glucose gel 15-30 g    Or     dextrose 50 % injection 25-50 mL    Or     glucagon injection 1 mg     gabapentin (NEURONTIN) capsule 1,000 mg     hydrOXYzine (ATARAX) tablet 100 mg     ibuprofen (ADVIL/MOTRIN) tablet 400 mg     insulin aspart (NovoLOG) inj (RAPID ACTING)     insulin aspart (NovoLOG) inj (RAPID ACTING)     insulin glargine (LANTUS PEN) injection 80 Units     magnesium hydroxide (MILK OF MAGNESIA) suspension 30 mL     magnesium sulfate (EPSOM SALT) granules 5 g     metFORMIN  "(GLUCOPHAGE) tablet 1,000 mg     nicotine (NICODERM CQ) 21 MG/24HR 24 hr patch 1 patch     nicotine (NICORETTE) gum 2-4 mg     nicotine Patch in Place     nicotine patch REMOVAL     prazosin (MINIPRESS) capsule 1 mg     senna-docusate (SENOKOT-S/PERICOLACE) 8.6-50 MG per tablet 1 tablet     topiramate (TOPAMAX) tablet 100 mg          10 point ROS negative \"pain everywhere\"       Allergies:     Allergies   Allergen Reactions     Haloperidol Other (See Comments)     Other reaction(s): Seizures  Patient states, \"I fell down and wasn't able to get up.\"  Patient states, \"I fell down and wasn't able to get up.\"              Psychiatric Examination:   /72   Pulse 90   Temp 98.8  F (37.1  C) (Tympanic)   Resp 14   Ht 1.626 m (5' 4\")   Wt 86.5 kg (190 lb 9.6 oz)   SpO2 99%   BMI 32.72 kg/m    Weight is 190 lbs 9.6 oz  Body mass index is 32.72 kg/m .    Appearance:  awake, alert   Attitude:  cooperative  Eye Contact:  fair  Mood:  brighter  Affect: mood congruent  Speech:  pressured speech  Psychomotor Behavior:  no evidence of tardive dyskinesia, dystonia, or tics  Thought Process: somewhat disorganized  Associations:  loosening of associations present   Thought Content:  auditory hallucinations present  Insight:  limited  Judgment:  poor  Oriented to:  time, person, and place  Attention Span and Concentration:  limited  Recent and Remote Memory:  fair  Fund of Knowledge: low-normal  Muscle Strength and Tone: normal  Gait and Station: Normal         Labs:     Results for orders placed or performed during the hospital encounter of 05/10/19 (from the past 24 hour(s))   Glucose by meter   Result Value Ref Range    Glucose 117 (H) 70 - 99 mg/dL   Glucose by meter   Result Value Ref Range    Glucose 102 (H) 70 - 99 mg/dL   Glucose by meter   Result Value Ref Range    Glucose 161 (H) 70 - 99 mg/dL   Glucose by meter   Result Value Ref Range    Glucose 155 (H) 70 - 99 mg/dL   Glucose by meter   Result Value Ref Range "    Glucose 105 (H) 70 - 99 mg/dL

## 2019-06-02 NOTE — PROGRESS NOTES
Glucose trend is stabilizing nicely. Eating remains stable with less hunger as well. No insulin changes today.

## 2019-06-02 NOTE — PLAN OF CARE
Face to face end of shift report obtained from OBDULIO Stoddard. Patient observed resting in bed.    DAIANA COFFEY  6/2/2019  12:10 AM

## 2019-06-03 LAB
GLUCOSE BLDC GLUCOMTR-MCNC: 105 MG/DL (ref 70–99)
GLUCOSE BLDC GLUCOMTR-MCNC: 106 MG/DL (ref 70–99)
GLUCOSE BLDC GLUCOMTR-MCNC: 153 MG/DL (ref 70–99)
GLUCOSE BLDC GLUCOMTR-MCNC: 185 MG/DL (ref 70–99)
GLUCOSE BLDC GLUCOMTR-MCNC: 189 MG/DL (ref 70–99)
GLUCOSE BLDC GLUCOMTR-MCNC: 203 MG/DL (ref 70–99)
GLUCOSE BLDC GLUCOMTR-MCNC: 65 MG/DL (ref 70–99)

## 2019-06-03 PROCEDURE — 00000146 ZZHCL STATISTIC GLUCOSE BY METER IP

## 2019-06-03 PROCEDURE — 25000125 ZZHC RX 250: Performed by: NURSE PRACTITIONER

## 2019-06-03 PROCEDURE — 25000132 ZZH RX MED GY IP 250 OP 250 PS 637: Performed by: NURSE PRACTITIONER

## 2019-06-03 PROCEDURE — 25000131 ZZH RX MED GY IP 250 OP 636 PS 637: Performed by: NURSE PRACTITIONER

## 2019-06-03 PROCEDURE — 12400000 ZZH R&B MH

## 2019-06-03 RX ADMIN — GABAPENTIN 1000 MG: 400 CAPSULE ORAL at 13:27

## 2019-06-03 RX ADMIN — SENNOSIDES AND DOCUSATE SODIUM 1 TABLET: 8.6; 5 TABLET ORAL at 20:22

## 2019-06-03 RX ADMIN — HYDROXYZINE HYDROCHLORIDE 100 MG: 25 TABLET ORAL at 11:15

## 2019-06-03 RX ADMIN — INSULIN GLARGINE 80 UNITS: 100 INJECTION, SOLUTION SUBCUTANEOUS at 20:22

## 2019-06-03 RX ADMIN — PRAZOSIN HYDROCHLORIDE 1 MG: 1 CAPSULE ORAL at 20:22

## 2019-06-03 RX ADMIN — BENZTROPINE MESYLATE 0.5 MG: 0.5 TABLET ORAL at 08:14

## 2019-06-03 RX ADMIN — GABAPENTIN 1000 MG: 400 CAPSULE ORAL at 08:14

## 2019-06-03 RX ADMIN — HYDROXYZINE HYDROCHLORIDE 100 MG: 25 TABLET ORAL at 17:05

## 2019-06-03 RX ADMIN — METFORMIN HYDROCHLORIDE 1000 MG: 1000 TABLET ORAL at 17:05

## 2019-06-03 RX ADMIN — BENZTROPINE MESYLATE 0.5 MG: 0.5 TABLET ORAL at 20:22

## 2019-06-03 RX ADMIN — TOPIRAMATE 100 MG: 100 TABLET, FILM COATED ORAL at 08:14

## 2019-06-03 RX ADMIN — CHLORPROMAZINE HYDROCHLORIDE 100 MG: 100 TABLET, SUGAR COATED ORAL at 08:14

## 2019-06-03 RX ADMIN — GABAPENTIN 1000 MG: 400 CAPSULE ORAL at 20:22

## 2019-06-03 RX ADMIN — ATORVASTATIN CALCIUM 20 MG: 20 TABLET, FILM COATED ORAL at 20:22

## 2019-06-03 RX ADMIN — INSULIN ASPART 1 UNITS: 100 INJECTION, SOLUTION INTRAVENOUS; SUBCUTANEOUS at 17:05

## 2019-06-03 RX ADMIN — HYDROXYZINE HYDROCHLORIDE 100 MG: 25 TABLET ORAL at 08:14

## 2019-06-03 RX ADMIN — INSULIN ASPART 1 UNITS: 100 INJECTION, SOLUTION INTRAVENOUS; SUBCUTANEOUS at 08:13

## 2019-06-03 RX ADMIN — CHLORPROMAZINE HYDROCHLORIDE 100 MG: 100 TABLET, SUGAR COATED ORAL at 20:26

## 2019-06-03 RX ADMIN — NICOTINE 1 PATCH: 21 PATCH, EXTENDED RELEASE TRANSDERMAL at 08:13

## 2019-06-03 RX ADMIN — METFORMIN HYDROCHLORIDE 1000 MG: 1000 TABLET ORAL at 08:19

## 2019-06-03 RX ADMIN — CHLORPROMAZINE HYDROCHLORIDE 100 MG: 100 TABLET, SUGAR COATED ORAL at 13:27

## 2019-06-03 ASSESSMENT — ACTIVITIES OF DAILY LIVING (ADL)
LAUNDRY: UNABLE TO COMPLETE
HYGIENE/GROOMING: INDEPENDENT
ORAL_HYGIENE: INDEPENDENT
DRESS: SCRUBS (BEHAVIORAL HEALTH)
HYGIENE/GROOMING: INDEPENDENT
ORAL_HYGIENE: INDEPENDENT
DRESS: SCRUBS (BEHAVIORAL HEALTH)

## 2019-06-03 NOTE — PLAN OF CARE
"1045-  Pt. Stated \"I think my blood sugar is low\", blood sugar obtained with results of 65mg/dl, snacks given. Verbal order obtained from Denisse IVORY NP to hold all lunchtime insulin.  1140-  Blood glucose 109mg/dl.  Pt, encouraged to eat well at lunchtime.   1215-  Pt. Ate 100% of lunch, including 90 grams of carbohydrates.  Pt. Reports she feels \"fine\".    "

## 2019-06-03 NOTE — PLAN OF CARE
"  Problem: Adult Behavioral Health Plan of Care  Goal: Patient-Specific Goal (Individualization)  Description  Patient will attend at least 50% of groups while on unit.  Patient will report at least 6-8 hours of sleep at night.  Patient will be compliant with treatment team recommendations.  Patient will be medication compliant while hospitalized.  Patient will have decrease in delusional statements by discharge.         6/2/2019 2110 by Arlyn Lind, RN  Outcome: No Change  Note:   1545: Pt lying in bed upon arrival, even, non-labored respirations noted, eyes closed.   1700: accucheck of 107; Scheduled novolog held, along with sliding scale coverage. Pt is irritable, speech is pressured, states, \"I'm just tired.\" Total of 60 unit carbs.  2113:Activity is isolative et withdrawn, denies SI/HI, hallucinations, anxiety, depression. Offers little conversation this shift. Out for snack, returned to room.     2313: Face to face end of shift report communicated to oncoming staff.     Arlyn Lind  6/2/2019  11:13 PM             Problem: Adult Behavioral Health Plan of Care  Goal: Adheres to Safety Considerations for Self and Others  Outcome: No Change     Problem: Thought Process Alteration  Goal: Optimal Thought Clarity  Description  Patient will hold a reality based conversation by discharge.    6/2/2019 2110 by Arlyn Lind, RN  Outcome: No Change  Note:   No delusional statements made to this writer or reported, offers little conversation.      Problem: Fall Injury Risk  Goal: Absence of Fall and Fall-Related Injury  Description  Pt will not experience a fall while hospitalized.     6/2/2019 2110 by Arlyn Lind, RN  Outcome: Improving  Note:   Pt remains free from injury et falls this shift.  Appropriate level of safety awareness.      Problem: Violence Risk or Actual  Goal: Anger and Impulse Control  Description  PT will verbalize frustrations before making verbal threats to staff  PT will remain free " from violence to staff or others while inpatient   6/2/2019 2110 by Arlyn Lind, RN  Outcome: Improving  Note:   Pt remains free angry outbursts. Remains in control impulsive behavior.

## 2019-06-03 NOTE — PLAN OF CARE
"Problem: Adult Behavioral Health Plan of Care  Goal: Patient-Specific Goal (Individualization)  Description  Patient will attend at least 50% of groups while on unit.  Patient will report at least 6-8 hours of sleep at night.  Patient will be compliant with treatment team recommendations.  Patient will be medication compliant while hospitalized.  Patient will have decrease in delusional statements by discharge.    Pt resting in bed at start of shift. Pt compliant with medication, assessment and blood sugar checks. When asked if pt has anxiety, pt responded with \"I hate life\". However, denies SI. Pt did talk about how awful she felt when her blood sugar was 65 earlier. Pt remained in her room/bed until 1900. Pt did not participate in groups this evening. Will continue to monitor.    1600 Blood sugar 203.  1649 Blood sugar 185.  1700 Pt ate 75% of dinner consisting of 60 grams of carbs.  1900 Pt sitting out in lounge.  1951 Blood sugar 106. Pt did eat evening snack consisting of ham and cheese sandwich, grapes, two orange juices and one package of vj crackers.       Outcome: No Change     Problem: Thought Process Alteration  Goal: Optimal Thought Clarity  Description  Patient will hold a reality based conversation by discharge.      Outcome: No Change     Problem: Fall Injury Risk  Goal: Absence of Fall and Fall-Related Injury  Description  Pt will not experience a fall while hospitalized.    No falls this shift. Pt wearing appropriate footwear. Steady gait.     Outcome: Improving     Problem: Violence Risk or Actual  Goal: Anger and Impulse Control  Description  PT will verbalize frustrations before making verbal threats to staff  PT will remain free from violence to staff or others while inpatient     Free from violence/ frustration towards others.    Outcome: Improving    Face to face end of shift report communicated to oncdouglas MAK.     Sally Sumner  6/3/2019  8:42 PM                     "

## 2019-06-03 NOTE — PROGRESS NOTES
Hypoglycemia prior to lunch at 65. First episode. Nursing report she ate poorly for breakfast. She is eating well now. As ordered will hold lunch time dose of short acting, recheck in 1 hour and recheck at 4 pm.

## 2019-06-03 NOTE — PLAN OF CARE
Problem: Adult Behavioral Health Plan of Care  Goal: Patient-Specific Goal (Individualization)  Description  Patient will attend at least 50% of groups while on unit.  Patient will report at least 6-8 hours of sleep at night.  Patient will be compliant with treatment team recommendations.  Patient will be medication compliant while hospitalized.  Patient will have decrease in delusional statements by discharge.       Pt. Has been in bed all morning, slept 6 hours last night, compliant with treatment team recommendations, taking prescribed medications, continues to have pressured speech with flight of ideas, delusional statements, will continue to monitor.  6/3/2019 1033 by Ayah Matos RN  Outcome: No Change     Problem: Thought Process Alteration  Goal: Optimal Thought Clarity  Description  Patient will hold a reality based conversation by discharge.    6/3/2019 1033 by Ayah Matos RN  Outcome: No Change   Pt. Does not have reality based conversation.  Problem: Violence Risk or Actual  Goal: Anger and Impulse Control  Description  PT will verbalize frustrations before making verbal threats to staff  PT will remain free from violence to staff or others while inpatient   6/3/2019 1033 by Ayah Matos RN  Outcome: No Change   PT. Has not made verbal threats, has remained free from violence so far, will continue to monitor.  Problem: Fall Injury Risk  Goal: Absence of Fall and Fall-Related Injury  Description  Pt will not experience a fall while hospitalized.     6/3/2019 1033 by Ayah Matos RN  Outcome: Improving   Pts. Gait is steady when up, no falls noted or reported.  Face to face end of shift report will be communicated to afternoon shift RN.     Ayah Matos  6/3/2019  1:01 PM

## 2019-06-03 NOTE — PLAN OF CARE
Spoke with Denis from [place] - he states he checked with the court again and she is still not on the calendar for the guardianship - he states he is frustrated but there are no other options.  Discussed options for placement as the patient is requesting to go back to [facility] - he states there is not likely any openings at this point and she should probably go to an IRTS and then look at [facility] - she has not been to [facility] before so will send referral information on her and look at [facility] and then [facility].

## 2019-06-03 NOTE — PLAN OF CARE
Face to face end of shift report will be communicated to oncoming RN.     MANDY COFFEY  6/3/2019  7:39 AM        Problem: Adult Behavioral Health Plan of Care  Goal: Patient-Specific Goal (Individualization)  Description  Patient will attend at least 50% of groups while on unit.  Patient will report at least 6-8 hours of sleep at night.  Patient will be compliant with treatment team recommendations.  Patient will be medication compliant while hospitalized.  Patient will have decrease in delusional statements by discharge.         6/3/2019 0042 by Mandy Coffey RN  Outcome: No Change     Problem: Thought Process Alteration  Goal: Optimal Thought Clarity  Description  Patient will hold a reality based conversation by discharge.    6/3/2019 0042 by Mandy Coffey RN  Outcome: No Change     Problem: Violence Risk or Actual  Goal: Anger and Impulse Control  Description  PT will verbalize frustrations before making verbal threats to staff  PT will remain free from violence to staff or others while inpatient   6/3/2019 0042 by Mandy Coffey RN  Outcome: No Change     Problem: Fall Injury Risk  Goal: Absence of Fall and Fall-Related Injury  Description  Pt will not experience a fall while hospitalized.     6/3/2019 0042 by Mandy Coffey RN  Outcome: Improving   Pt appears to be resting in bed. 15 minutes and PRN safety checks completed with no noted complains. No falls noted or reported so far this shift.  Will continue to monitor.   0203- .   0652- .

## 2019-06-03 NOTE — PLAN OF CARE
BEHAVIORAL TEAM DISCUSSION    Participants: Loren Zuleta NP, Dennise Herrera LICSW, Angie Laws LSW,  Becca Chery LSW, Beena Kumar LGSW, Ion Morales RN, Ayah Ryan RN, [facility], Mady Holman OT, Natalia Costello OT, Gena Gomez OT   Progress: Fair more verbal.   Continued Stay Criteria/Rationale: Grandiose, delusional. High risk for relapse into psychosis.   Medical/Physical: Hep C positive, diabetes - working with hospitalist and ordering healthy snacks - blood sugars are improving, black and blue toetail - foot soaks.  Precautions:   Falls precaution?: YES, care plan in place.    Behavioral Orders   Procedures    Code 1 - Restrict to Unit    Routine Programming     As clinically indicated    Status 15     Every 15 minutes.     Plan: Plan for IRTS programming first.   Rationale for change in precautions or plan: none    Current Facility-Administered Medications:     acetaminophen (TYLENOL) tablet 975 mg, 975 mg, Oral, Q6H PRN, Raven Stout NP, 975 mg at 05/27/19 0907    atorvastatin (LIPITOR) tablet 20 mg, 20 mg, Oral, At Bedtime, Airam Light NP, 20 mg at 06/02/19 2012    benzocaine-menthol (CEPACOL) 15-3.6 MG lozenge 1 lozenge, 1 lozenge, Buccal, Q1H PRN, Natalia Fabian APRN CNP    benztropine (COGENTIN) tablet 0.5 mg, 0.5 mg, Oral, BID, Loren Zuleta NP, 0.5 mg at 06/03/19 0814    chlorproMAZINE (THORAZINE) tablet 100 mg, 100 mg, Oral, TID, Loren Zuleta NP, 100 mg at 06/03/19 0814    glucose gel 15-30 g, 15-30 g, Oral, Q15 Min PRN **OR** dextrose 50 % injection 25-50 mL, 25-50 mL, Intravenous, Q15 Min PRN **OR** glucagon injection 1 mg, 1 mg, Subcutaneous, Q15 Min PRN, Airam Light NP    gabapentin (NEURONTIN) capsule 1,000 mg, 1,000 mg, Oral, TID, Alaspa, Loren L, NP, 1,000 mg at 06/03/19 0814    hydrOXYzine (ATARAX) tablet 100 mg, 100 mg, Oral, TID, Natalia Fabian APRN CNP, 100 mg at 06/03/19 0814    ibuprofen  (ADVIL/MOTRIN) tablet 400 mg, 400 mg, Oral, Q6H PRN, Natalia Fabian APRN CNP, 400 mg at 06/01/19 0645    insulin aspart (NovoLOG) inj (RAPID ACTING), 20 Units, Subcutaneous, TID w/meals, Denisse Malin NP, 20 Units at 06/03/19 0813    insulin aspart (NovoLOG) inj (RAPID ACTING), 1-7 Units, Subcutaneous, TID AC, Airam Light NP, 1 Units at 06/03/19 0813    insulin glargine (LANTUS PEN) injection 80 Units, 80 Units, Subcutaneous, At Bedtime, Denisse Malin NP, 80 Units at 06/02/19 2008    magnesium hydroxide (MILK OF MAGNESIA) suspension 30 mL, 30 mL, Oral, Daily PRN, Loren Zuleta NP, 30 mL at 06/01/19 1426    magnesium sulfate (EPSOM SALT) granules 5 g, 5 g, Other, BID PRN, 5 g at 05/30/19 1411 **AND** Patient care order, , , Per Unit Routine, Natalia Fabian APRN CNP    metFORMIN (GLUCOPHAGE) tablet 1,000 mg, 1,000 mg, Oral, BID w/meals, Kuldeep April Ketruah, NP, 1,000 mg at 06/03/19 0819    nicotine (NICODERM CQ) 21 MG/24HR 24 hr patch 1 patch, 1 patch, Transdermal, Daily, Loren Zuleta NP, 1 patch at 06/03/19 0813    nicotine (NICORETTE) gum 2-4 mg, 2-4 mg, Buccal, Q1H PRN, Raven Stout NP, 4 mg at 05/21/19 0938    nicotine Patch in Place, , Transdermal, Q8H, Loren Zuleta NP, Stopped at 06/03/19 0523    nicotine patch REMOVAL, , Transdermal, Daily, Loren Zuleta NP, Stopped at 06/01/19 0842    prazosin (MINIPRESS) capsule 1 mg, 1 mg, Oral, At Bedtime, Kuldeep April JACKIE Rebollar, 1 mg at 06/02/19 2015    senna-docusate (SENOKOT-S/PERICOLACE) 8.6-50 MG per tablet 1 tablet, 1 tablet, Oral, At Bedtime, Loren Zuleta NP, 1 tablet at 06/02/19 2011    topiramate (TOPAMAX) tablet 100 mg, 100 mg, Oral, Daily, Loren Zuleta, NP, 100 mg at 06/03/19 0814   Active Problems:    Mixed hyperlipidemia      Diabetes mellitus, type 2     Schizoaffective disorder, bipolar type (H)

## 2019-06-03 NOTE — PLAN OF CARE
Face to face end of shift report obtained from OBDULIO Stoddard. Patient observed resting in bed.    DAIANA COFFEY  6/3/2019  12:22 AM

## 2019-06-04 LAB
GLUCOSE BLDC GLUCOMTR-MCNC: 113 MG/DL (ref 70–99)
GLUCOSE BLDC GLUCOMTR-MCNC: 115 MG/DL (ref 70–99)
GLUCOSE BLDC GLUCOMTR-MCNC: 127 MG/DL (ref 70–99)
GLUCOSE BLDC GLUCOMTR-MCNC: 171 MG/DL (ref 70–99)
GLUCOSE BLDC GLUCOMTR-MCNC: 227 MG/DL (ref 70–99)
GLUCOSE BLDC GLUCOMTR-MCNC: 319 MG/DL (ref 70–99)
GLUCOSE BLDC GLUCOMTR-MCNC: 43 MG/DL (ref 70–99)

## 2019-06-04 PROCEDURE — 12400000 ZZH R&B MH

## 2019-06-04 PROCEDURE — 25000132 ZZH RX MED GY IP 250 OP 250 PS 637: Performed by: NURSE PRACTITIONER

## 2019-06-04 PROCEDURE — 00000146 ZZHCL STATISTIC GLUCOSE BY METER IP

## 2019-06-04 PROCEDURE — 25000125 ZZHC RX 250: Performed by: NURSE PRACTITIONER

## 2019-06-04 PROCEDURE — 99232 SBSQ HOSP IP/OBS MODERATE 35: CPT | Performed by: NURSE PRACTITIONER

## 2019-06-04 RX ADMIN — METFORMIN HYDROCHLORIDE 1000 MG: 1000 TABLET ORAL at 18:04

## 2019-06-04 RX ADMIN — GABAPENTIN 1000 MG: 400 CAPSULE ORAL at 08:32

## 2019-06-04 RX ADMIN — CHLORPROMAZINE HYDROCHLORIDE 100 MG: 100 TABLET, SUGAR COATED ORAL at 20:29

## 2019-06-04 RX ADMIN — HYDROXYZINE HYDROCHLORIDE 100 MG: 25 TABLET ORAL at 18:04

## 2019-06-04 RX ADMIN — TOPIRAMATE 100 MG: 100 TABLET, FILM COATED ORAL at 08:32

## 2019-06-04 RX ADMIN — BENZTROPINE MESYLATE 0.5 MG: 0.5 TABLET ORAL at 20:29

## 2019-06-04 RX ADMIN — CHLORPROMAZINE HYDROCHLORIDE 100 MG: 100 TABLET, SUGAR COATED ORAL at 13:39

## 2019-06-04 RX ADMIN — CHLORPROMAZINE HYDROCHLORIDE 100 MG: 100 TABLET, SUGAR COATED ORAL at 08:32

## 2019-06-04 RX ADMIN — BENZTROPINE MESYLATE 0.5 MG: 0.5 TABLET ORAL at 08:33

## 2019-06-04 RX ADMIN — HYDROXYZINE HYDROCHLORIDE 100 MG: 25 TABLET ORAL at 08:32

## 2019-06-04 RX ADMIN — GABAPENTIN 1000 MG: 400 CAPSULE ORAL at 13:39

## 2019-06-04 RX ADMIN — INSULIN GLARGINE 80 UNITS: 100 INJECTION, SOLUTION SUBCUTANEOUS at 20:31

## 2019-06-04 RX ADMIN — DEXTROSE 30 G: 15 GEL ORAL at 14:23

## 2019-06-04 RX ADMIN — INSULIN ASPART 3 UNITS: 100 INJECTION, SOLUTION INTRAVENOUS; SUBCUTANEOUS at 18:06

## 2019-06-04 RX ADMIN — GABAPENTIN 1000 MG: 400 CAPSULE ORAL at 20:29

## 2019-06-04 RX ADMIN — ATORVASTATIN CALCIUM 20 MG: 20 TABLET, FILM COATED ORAL at 20:29

## 2019-06-04 RX ADMIN — ACETAMINOPHEN 975 MG: 325 TABLET, FILM COATED ORAL at 00:10

## 2019-06-04 RX ADMIN — METFORMIN HYDROCHLORIDE 1000 MG: 1000 TABLET ORAL at 08:32

## 2019-06-04 RX ADMIN — SENNOSIDES AND DOCUSATE SODIUM 1 TABLET: 8.6; 5 TABLET ORAL at 20:29

## 2019-06-04 RX ADMIN — HYDROXYZINE HYDROCHLORIDE 100 MG: 25 TABLET ORAL at 11:22

## 2019-06-04 RX ADMIN — NICOTINE 1 PATCH: 21 PATCH, EXTENDED RELEASE TRANSDERMAL at 08:33

## 2019-06-04 RX ADMIN — PRAZOSIN HYDROCHLORIDE 1 MG: 1 CAPSULE ORAL at 20:29

## 2019-06-04 ASSESSMENT — ACTIVITIES OF DAILY LIVING (ADL)
HYGIENE/GROOMING: INDEPENDENT
ORAL_HYGIENE: INDEPENDENT
HYGIENE/GROOMING: INDEPENDENT
DRESS: SCRUBS (BEHAVIORAL HEALTH);INDEPENDENT
DRESS: SCRUBS (BEHAVIORAL HEALTH)
ORAL_HYGIENE: INDEPENDENT
LAUNDRY: UNABLE TO COMPLETE

## 2019-06-04 NOTE — PLAN OF CARE
"  Problem: Adult Behavioral Health Plan of Care  Goal: Patient-Specific Goal (Individualization)  Description  Patient will attend at least 50% of groups while on unit.  Patient will report at least 6-8 hours of sleep at night.  Patient will be compliant with treatment team recommendations.  Patient will be medication compliant while hospitalized.  Patient will have decrease in delusional statements by discharge.         6/4/2019 1544 by Arlyn Lind, RN  Outcome: No Change  Note:   1535: Pt lying in bed, even, non-labored respirations noted, eyes closed.     1645: Accucheck of 277; scheduled Novolog held d/t low accucheck of 1200; Sliding scale coverage given.    1800: Pt states, \"I'm just not feeling good.\" I feel hot, low grade temp continues, responds appropriately to questions, skin feels et somewhat clammy, lungs clear to ausculation bilaterally, bowel sounds active x4, absent edema. Pt has been isolative et withdrawn majority of shift, laying in bed.     2300: 2000: Accucheck of 319, lantus given, compliant with HS medication administration, 100% of snack, Denies SI/HI, hallucinations, anxiety, depression. Voices frustrations re: filing status.     2302: Face to face end of shift report communicated to oncoming staff.     Arlyn Lind  6/4/2019  11:02 PM           Problem: Adult Behavioral Health Plan of Care  Goal: Adheres to Safety Considerations for Self and Others  6/4/2019 1544 by Arlyn Lind, RN  Outcome: No Change  Note:   Appropriate behavior with staff et peers. Activity is isolative.      Problem: Violence Risk or Actual  Goal: Anger and Impulse Control  Description  PT will verbalize frustrations before making verbal threats to staff  PT will remain free from violence to staff or others while inpatient   6/4/2019 1544 by Arlyn Lind, RN  Outcome: Improving  Note:   Pt remains in control of impulsive behavior.      Problem: Fall Injury Risk  Goal: Absence of Fall and Fall-Related " Injury  Description  Pt will not experience a fall while hospitalized.     6/4/2019 1544 by Arlyn Lind, RN  Outcome: Improving  Note:   Pt remains free from falls this shift.  Appropriate level of safety awareness et memory re-call.

## 2019-06-04 NOTE — PLAN OF CARE
Face to face end of shift report obtained from OBDULIO Zavala. Pt observed awake resting in bed.    DAIANA COFFEY  6/4/2019  12:28 AM

## 2019-06-04 NOTE — PLAN OF CARE
Face to face end of shift report will be communicated to oncoming RN.     MANDY COFFEY  6/4/2019  6:40 AM    Problem: Adult Behavioral Health Plan of Care  Goal: Patient-Specific Goal (Individualization)  Description  Patient will attend at least 50% of groups while on unit.  Patient will report at least 6-8 hours of sleep at night.  Patient will be compliant with treatment team recommendations.  Patient will be medication compliant while hospitalized.  Patient will have decrease in delusional statements by discharge.         6/4/2019 0107 by Mandy Coffey RN  Outcome: No Change     Problem: Thought Process Alteration  Goal: Optimal Thought Clarity  Description  Patient will hold a reality based conversation by discharge.    6/4/2019 0107 by Mandy Coffey RN  Outcome: No Change     Problem: Violence Risk or Actual  Goal: Anger and Impulse Control  Description  PT will verbalize frustrations before making verbal threats to staff  PT will remain free from violence to staff or others while inpatient   6/4/2019 0107 by Mandy Coffey RN  Outcome: No Change     Problem: Fall Injury Risk  Goal: Absence of Fall and Fall-Related Injury  Description  Pt will not experience a fall while hospitalized.     6/4/2019 0107 by Mandy Coffey RN  Outcome: Improving   Pt awake resting in bed at beginning of shift. Pt c/o 8/10 generalized pain. Acetaminophen 975 mg given at 0010  . Pt appeared to be sleeping by 0100. No falls noted or reported so far this shift. Will continue to monitor.  0208- . Pt awake.   0630- Pt did not sleep well, offered music and other relaxation techniques during the night but pt declined, apparently patient slept during the day. Pt slept approximately 2.5. Will continue to monitor.   0657- .

## 2019-06-04 NOTE — PLAN OF CARE
"1420-  Pt. Woke up from a nap, asked to have her blood glucose checked.  1422-  Blood glucose- 43 mg/dl.  1423- 30 gm glucose gel given po, Pt. Eating vj crackers, pudding and orange juice at table in dayroom.  1455- Blood glucose-113 mg/dl.  Pt. States \"I feel way better\"  "

## 2019-06-04 NOTE — PROGRESS NOTES
Hospitalist Note    Patient chart checked.  BS in good control, continue current insulin regimen.  Please call if patient is not eating well or if there are any questions.    Rocio Tompkins MD

## 2019-06-04 NOTE — PLAN OF CARE
Problem: Adult Behavioral Health Plan of Care  Goal: Patient-Specific Goal (Individualization)  Description  Patient will attend at least 50% of groups while on unit.  Patient will report at least 6-8 hours of sleep at night.  Patient will be compliant with treatment team recommendations.  Patient will be medication compliant while hospitalized.  Patient will have decrease in delusional statements by discharge.  Pt. Is irritable, staying in bed so far this shift, did get up to get meal tray and ate in room, is not attending group therapy, slept 2.5 hours last night, is medication compliant, allowing blood glucose checks, continues to have delusional statements, mumbling under breath, will continue to monitor.       6/4/2019 1024 by Ayah Matos, RN  Outcome: No Change     Problem: Thought Process Alteration  Goal: Optimal Thought Clarity  Description  Patient will hold a reality based conversation by discharge.    6/4/2019 1024 by Ayah Matos, RN  Outcome: No Change   Pt. Is not able to have reality based conversation, irritable with questions.  Problem: Violence Risk or Actual  Goal: Anger and Impulse Control  Description  PT will verbalize frustrations before making verbal threats to staff  PT will remain free from violence to staff or others while inpatient   6/4/2019 1024 by Ayah Matos, RN  Outcome: No Change   Pt. Has not made any verbal threats to staff, but is irritable with questions.  Problem: Fall Injury Risk  Goal: Absence of Fall and Fall-Related Injury  Description  Pt will not experience a fall while hospitalized.     6/4/2019 1024 by Ayah Matos, RN  Outcome: Improving   Pt. Has a steady gait, no falls reported or noted.

## 2019-06-04 NOTE — PROGRESS NOTES
[facility]  Psychiatric Progress Note      Impression:   Ginette is awake in bed. She is irritable and upset with [place] and the commitment process with them. She feels as though they don't care about her or have forgotten her. Assured her we are trying to move her forward and get her to a safe place mental health wise. She is thankful for the staff and programming here but very upset with her [place]. Genie does report feeling better than she did on admit. She is more irritable and labile than she has been previously. Will continue to monitor.          Diagnoses:      Schizoaffective disorder, bipolar type   Methamphetamine use disorder, moderate to severe            Plan:   Continue current medications    Hospitalist following diabetes - continue foot soaks    [place] is going forward with petition for commitment    ELOS:  > 5 days due to symptoms of psychosis and delusions along with Civil Commitment process      Attestation:  Patient has been seen and evaluated by me,  Loren Zuleta NP          Interim History:   The patient's care was discussed with the treatment team and chart notes were reviewed.          Medications:     Current Facility-Administered Medications   Medication     acetaminophen (TYLENOL) tablet 975 mg     atorvastatin (LIPITOR) tablet 20 mg     benzocaine-menthol (CEPACOL) 15-3.6 MG lozenge 1 lozenge     benztropine (COGENTIN) tablet 0.5 mg     chlorproMAZINE (THORAZINE) tablet 100 mg     glucose gel 15-30 g    Or     dextrose 50 % injection 25-50 mL    Or     glucagon injection 1 mg     gabapentin (NEURONTIN) capsule 1,000 mg     hydrOXYzine (ATARAX) tablet 100 mg     ibuprofen (ADVIL/MOTRIN) tablet 400 mg     insulin aspart (NovoLOG) inj (RAPID ACTING)     insulin aspart (NovoLOG) inj (RAPID ACTING)     insulin glargine (LANTUS PEN) injection 80 Units     magnesium hydroxide (MILK OF MAGNESIA) suspension 30 mL     magnesium sulfate (EPSOM SALT) granules 5 g     metFORMIN  "(GLUCOPHAGE) tablet 1,000 mg     nicotine (NICODERM CQ) 21 MG/24HR 24 hr patch 1 patch     nicotine (NICORETTE) gum 2-4 mg     nicotine Patch in Place     nicotine patch REMOVAL     prazosin (MINIPRESS) capsule 1 mg     senna-docusate (SENOKOT-S/PERICOLACE) 8.6-50 MG per tablet 1 tablet     topiramate (TOPAMAX) tablet 100 mg          10 point ROS negative \"pain everywhere\"       Allergies:     Allergies   Allergen Reactions     Haloperidol Other (See Comments)     Other reaction(s): Seizures  Patient states, \"I fell down and wasn't able to get up.\"  Patient states, \"I fell down and wasn't able to get up.\"              Psychiatric Examination:   /74 (BP Location: Right arm)   Pulse 99   Temp 98.6  F (37  C) (Tympanic)   Resp 16   Ht 1.626 m (5' 4\")   Wt 86.5 kg (190 lb 9.6 oz)   SpO2 100%   BMI 32.72 kg/m    Weight is 190 lbs 9.6 oz  Body mass index is 32.72 kg/m .    Appearance:  awake, alert   Attitude:  cooperative  Eye Contact:  fair  Mood:  irritable  Affect: mood congruent  Speech:  pressured speech  Psychomotor Behavior:  no evidence of tardive dyskinesia, dystonia, or tics  Thought Process: somewhat disorganized  Associations:  loosening of associations present   Thought Content:  Denies SI and Hi. Reports a decrease in AH  Insight:  limited  Judgment:  poor  Oriented to:  time, person, and place  Attention Span and Concentration:  limited  Recent and Remote Memory:  fair  Fund of Knowledge: low-normal  Muscle Strength and Tone: normal  Gait and Station: Normal         Labs:     Results for orders placed or performed during the hospital encounter of 05/10/19 (from the past 24 hour(s))   Glucose by meter   Result Value Ref Range    Glucose 65 (L) 70 - 99 mg/dL   Glucose by meter   Result Value Ref Range    Glucose 105 (H) 70 - 99 mg/dL   Glucose by meter   Result Value Ref Range    Glucose 203 (H) 70 - 99 mg/dL   Glucose by meter   Result Value Ref Range    Glucose 185 (H) 70 - 99 mg/dL   Glucose " by meter   Result Value Ref Range    Glucose 106 (H) 70 - 99 mg/dL   Glucose by meter   Result Value Ref Range    Glucose 171 (H) 70 - 99 mg/dL   Glucose by meter   Result Value Ref Range    Glucose 115 (H) 70 - 99 mg/dL

## 2019-06-05 LAB
ALBUMIN SERPL-MCNC: 3.4 G/DL (ref 3.4–5)
ALP SERPL-CCNC: 83 U/L (ref 40–150)
ALT SERPL W P-5'-P-CCNC: 193 U/L (ref 0–50)
AMMONIA PLAS-SCNC: 13 UMOL/L (ref 10–50)
ANION GAP SERPL CALCULATED.3IONS-SCNC: 8 MMOL/L (ref 3–14)
AST SERPL W P-5'-P-CCNC: 99 U/L (ref 0–45)
BASOPHILS # BLD AUTO: 0.1 10E9/L (ref 0–0.2)
BASOPHILS NFR BLD AUTO: 0.5 %
BILIRUB SERPL-MCNC: 0.2 MG/DL (ref 0.2–1.3)
BUN SERPL-MCNC: 11 MG/DL (ref 7–30)
CALCIUM SERPL-MCNC: 8.6 MG/DL (ref 8.5–10.1)
CHLORIDE SERPL-SCNC: 110 MMOL/L (ref 94–109)
CO2 SERPL-SCNC: 23 MMOL/L (ref 20–32)
CREAT SERPL-MCNC: 0.66 MG/DL (ref 0.52–1.04)
DIFFERENTIAL METHOD BLD: NORMAL
EOSINOPHIL # BLD AUTO: 0.2 10E9/L (ref 0–0.7)
EOSINOPHIL NFR BLD AUTO: 2.2 %
ERYTHROCYTE [DISTWIDTH] IN BLOOD BY AUTOMATED COUNT: 13.1 % (ref 10–15)
GFR SERPL CREATININE-BSD FRML MDRD: >90 ML/MIN/{1.73_M2}
GLUCOSE BLDC GLUCOMTR-MCNC: 123 MG/DL (ref 70–99)
GLUCOSE BLDC GLUCOMTR-MCNC: 125 MG/DL (ref 70–99)
GLUCOSE BLDC GLUCOMTR-MCNC: 171 MG/DL (ref 70–99)
GLUCOSE BLDC GLUCOMTR-MCNC: 219 MG/DL (ref 70–99)
GLUCOSE BLDC GLUCOMTR-MCNC: 93 MG/DL (ref 70–99)
GLUCOSE BLDC GLUCOMTR-MCNC: 99 MG/DL (ref 70–99)
GLUCOSE SERPL-MCNC: 172 MG/DL (ref 70–99)
HCT VFR BLD AUTO: 38.2 % (ref 35–47)
HGB BLD-MCNC: 12.9 G/DL (ref 11.7–15.7)
IMM GRANULOCYTES # BLD: 0.1 10E9/L (ref 0–0.4)
IMM GRANULOCYTES NFR BLD: 0.5 %
LYMPHOCYTES # BLD AUTO: 3.3 10E9/L (ref 0.8–5.3)
LYMPHOCYTES NFR BLD AUTO: 35.9 %
MCH RBC QN AUTO: 28.4 PG (ref 26.5–33)
MCHC RBC AUTO-ENTMCNC: 33.8 G/DL (ref 31.5–36.5)
MCV RBC AUTO: 84 FL (ref 78–100)
MONOCYTES # BLD AUTO: 0.7 10E9/L (ref 0–1.3)
MONOCYTES NFR BLD AUTO: 7 %
NEUTROPHILS # BLD AUTO: 5 10E9/L (ref 1.6–8.3)
NEUTROPHILS NFR BLD AUTO: 53.9 %
NRBC # BLD AUTO: 0 10*3/UL
NRBC BLD AUTO-RTO: 0 /100
PLATELET # BLD AUTO: 257 10E9/L (ref 150–450)
POTASSIUM SERPL-SCNC: 4.5 MMOL/L (ref 3.4–5.3)
PROT SERPL-MCNC: 7.2 G/DL (ref 6.8–8.8)
RBC # BLD AUTO: 4.55 10E12/L (ref 3.8–5.2)
SODIUM SERPL-SCNC: 141 MMOL/L (ref 133–144)
WBC # BLD AUTO: 9.3 10E9/L (ref 4–11)

## 2019-06-05 PROCEDURE — 25000132 ZZH RX MED GY IP 250 OP 250 PS 637: Performed by: NURSE PRACTITIONER

## 2019-06-05 PROCEDURE — 82140 ASSAY OF AMMONIA: CPT | Performed by: NURSE PRACTITIONER

## 2019-06-05 PROCEDURE — 25000125 ZZHC RX 250: Performed by: NURSE PRACTITIONER

## 2019-06-05 PROCEDURE — 36415 COLL VENOUS BLD VENIPUNCTURE: CPT | Performed by: NURSE PRACTITIONER

## 2019-06-05 PROCEDURE — 00000146 ZZHCL STATISTIC GLUCOSE BY METER IP

## 2019-06-05 PROCEDURE — 99232 SBSQ HOSP IP/OBS MODERATE 35: CPT | Performed by: NURSE PRACTITIONER

## 2019-06-05 PROCEDURE — 12400000 ZZH R&B MH

## 2019-06-05 PROCEDURE — 85025 COMPLETE CBC W/AUTO DIFF WBC: CPT | Performed by: NURSE PRACTITIONER

## 2019-06-05 PROCEDURE — 80053 COMPREHEN METABOLIC PANEL: CPT | Performed by: NURSE PRACTITIONER

## 2019-06-05 RX ADMIN — CHLORPROMAZINE HYDROCHLORIDE 100 MG: 100 TABLET, SUGAR COATED ORAL at 08:23

## 2019-06-05 RX ADMIN — METFORMIN HYDROCHLORIDE 1000 MG: 1000 TABLET ORAL at 08:23

## 2019-06-05 RX ADMIN — TOPIRAMATE 100 MG: 100 TABLET, FILM COATED ORAL at 08:23

## 2019-06-05 RX ADMIN — CHLORPROMAZINE HYDROCHLORIDE 100 MG: 100 TABLET, SUGAR COATED ORAL at 21:10

## 2019-06-05 RX ADMIN — METFORMIN HYDROCHLORIDE 1000 MG: 1000 TABLET ORAL at 17:48

## 2019-06-05 RX ADMIN — BENZTROPINE MESYLATE 0.5 MG: 0.5 TABLET ORAL at 21:10

## 2019-06-05 RX ADMIN — HYDROXYZINE HYDROCHLORIDE 100 MG: 25 TABLET ORAL at 08:23

## 2019-06-05 RX ADMIN — NICOTINE 1 PATCH: 21 PATCH, EXTENDED RELEASE TRANSDERMAL at 08:23

## 2019-06-05 RX ADMIN — GABAPENTIN 1000 MG: 400 CAPSULE ORAL at 21:10

## 2019-06-05 RX ADMIN — INSULIN GLARGINE 80 UNITS: 100 INJECTION, SOLUTION SUBCUTANEOUS at 21:15

## 2019-06-05 RX ADMIN — PRAZOSIN HYDROCHLORIDE 1 MG: 1 CAPSULE ORAL at 21:10

## 2019-06-05 RX ADMIN — BENZTROPINE MESYLATE 0.5 MG: 0.5 TABLET ORAL at 08:23

## 2019-06-05 RX ADMIN — HYDROXYZINE HYDROCHLORIDE 100 MG: 25 TABLET ORAL at 17:47

## 2019-06-05 RX ADMIN — SENNOSIDES AND DOCUSATE SODIUM 1 TABLET: 8.6; 5 TABLET ORAL at 21:10

## 2019-06-05 RX ADMIN — ATORVASTATIN CALCIUM 20 MG: 20 TABLET, FILM COATED ORAL at 21:10

## 2019-06-05 RX ADMIN — HYDROXYZINE HYDROCHLORIDE 100 MG: 25 TABLET ORAL at 12:24

## 2019-06-05 RX ADMIN — CHLORPROMAZINE HYDROCHLORIDE 100 MG: 100 TABLET, SUGAR COATED ORAL at 13:26

## 2019-06-05 RX ADMIN — GABAPENTIN 1000 MG: 400 CAPSULE ORAL at 13:26

## 2019-06-05 RX ADMIN — GABAPENTIN 1000 MG: 400 CAPSULE ORAL at 08:23

## 2019-06-05 ASSESSMENT — ACTIVITIES OF DAILY LIVING (ADL)
LAUNDRY: UNABLE TO COMPLETE
ORAL_HYGIENE: INDEPENDENT
HYGIENE/GROOMING: INDEPENDENT
DRESS: SCRUBS (BEHAVIORAL HEALTH);INDEPENDENT

## 2019-06-05 NOTE — PLAN OF CARE
Face to face end of shift report obtained from OBDULIO Stoddard. Patient observed resting in bed.    DAIANA COFFEY  6/5/2019  12:11 AM

## 2019-06-05 NOTE — PLAN OF CARE
"  Problem: Adult Behavioral Health Plan of Care  Goal: Patient-Specific Goal (Individualization)  Description  Patient will attend at least 50% of groups while on unit.  Patient will report at least 6-8 hours of sleep at night.  Patient will be compliant with treatment team recommendations.  Patient will be medication compliant while hospitalized.  Patient will have decrease in delusional statements by discharge.         6/5/2019 0907 by Borok Altman RN  Outcome: No Change  Note:   Patient is isolative to her room this morning.  She was slightly irritable when attempting nursing assessment.  Patient offered little information or conversation.  Patient did state \"I don't know how I feel yet this morning.\".  Her affect is flat.  Patient was compliant with medications and insulin.  Her speech is rapid and pressured.  She makes minimal eye contact with this writer.    Ate 100% of breakfast meal- 90 grams of carbs.  Did not meet order parameters for sliding scale Novolog this AM.  Accucheck was 125.    Ate 50% of lunch meal.  Did not meet order parameters for sliding scale Novolog. Accucheck was 99.   Patient c/o glucose \"just starting to feel low\".  Accucheck at this time 93.    Problem: Thought Process Alteration  Goal: Optimal Thought Clarity  Description  Patient will hold a reality based conversation by discharge.    6/5/2019 0907 by Brook Altman RN  Outcome: No Change  Note:   Patient offers little conversation to this writer and would only state \"I don't know how I feel yet this morning.\".       Problem: Fall Injury Risk  Goal: Absence of Fall and Fall-Related Injury  Description  Pt will not experience a fall while hospitalized.     6/5/2019 0907 by Brook Altman, RN  Outcome: No Change  Note:   Patient's gait is balanced and steady.  No c/o dizziness.  Patient had no noted falls this shift.     Problem: Violence Risk or Actual  Goal: Anger and Impulse Control  Description  PT will verbalize " frustrations before making verbal threats to staff  PT will remain free from violence to staff or others while inpatient   6/5/2019 0907 by Brook Altman RN  Outcome: No Change  Note:   Patient had no noted verbal threats or violent outbursts this shift.

## 2019-06-05 NOTE — PLAN OF CARE
Spoke with Brook from [facility] - they have had the patient in the past and really liked having her there - they reviewed the information and are not sure she is stable enough for their program - do not think she could participate in programming right now - will send updated notes on Monday and will continue to monitor.

## 2019-06-05 NOTE — PLAN OF CARE
BEHAVIORAL TEAM DISCUSSION    Participants: Natalia Fabian NP,  Dennise Herrera LICSW, Angie Laws LSW,  Becca Chery LSW, Beena Kumar LGSW, Ion Morales RN, [facility], Mady Holman OT, Natalia Costello OT, Gena Gomez OT   Progress: Minimal. More isolative yesterday.   Continued Stay Criteria/Rationale: Irritable and isolative. Grandiose, delusional. High risk for relapse into psychosis.  Medical/Physical: Hypoglycemic episodes-hospitalist contacted. Hep C positive, diabetes - working with hospitalist, black and blue toetail - foot soaks.  Precautions:   Falls precaution?: YES, Care plan in place.    Behavioral Orders   Procedures    Code 1 - Restrict to Unit    Routine Programming     As clinically indicated    Status 15     Every 15 minutes.     Plan: [facility] contacted. Other facilities have been contacted as well I.e. [facility] and [facility].     Rationale for change in precautions or plan: none    Current Facility-Administered Medications:     acetaminophen (TYLENOL) tablet 975 mg, 975 mg, Oral, Q6H PRN, Raven Stout NP, 975 mg at 06/04/19 0010    atorvastatin (LIPITOR) tablet 20 mg, 20 mg, Oral, At Bedtime, Airam Light NP, 20 mg at 06/04/19 2029    benzocaine-menthol (CEPACOL) 15-3.6 MG lozenge 1 lozenge, 1 lozenge, Buccal, Q1H PRN, Natalia Fabian, APRN CNP    benztropine (COGENTIN) tablet 0.5 mg, 0.5 mg, Oral, BID, Loren Zuleta, NP, 0.5 mg at 06/05/19 0823    chlorproMAZINE (THORAZINE) tablet 100 mg, 100 mg, Oral, TID, Loren Zuleta, NP, 100 mg at 06/05/19 0823    glucose gel 15-30 g, 15-30 g, Oral, Q15 Min PRN, 30 g at 06/04/19 1423 **OR** dextrose 50 % injection 25-50 mL, 25-50 mL, Intravenous, Q15 Min PRN **OR** glucagon injection 1 mg, 1 mg, Subcutaneous, Q15 Min PRN, Airam Light NP    gabapentin (NEURONTIN) capsule 1,000 mg, 1,000 mg, Oral, TID, Loren Zuleta NP, 1,000 mg at 06/05/19 0823     hydrOXYzine (ATARAX) tablet 100 mg, 100 mg, Oral, TID, Natalia Fabian APRN CNP, 100 mg at 06/05/19 0823    ibuprofen (ADVIL/MOTRIN) tablet 400 mg, 400 mg, Oral, Q6H PRN, Natalia Fabian APRN CNP, 400 mg at 06/01/19 0645    insulin aspart (NovoLOG) inj (RAPID ACTING), 20 Units, Subcutaneous, TID w/meals, Denisse Malin, NP, 20 Units at 06/05/19 0824    insulin aspart (NovoLOG) inj (RAPID ACTING), 1-7 Units, Subcutaneous, TID AC, Airam Light NP, 3 Units at 06/04/19 1806    insulin glargine (LANTUS PEN) injection 80 Units, 80 Units, Subcutaneous, At Bedtime, Denisse Malin NP, 80 Units at 06/04/19 2031    magnesium hydroxide (MILK OF MAGNESIA) suspension 30 mL, 30 mL, Oral, Daily PRN, Loren Zuleta NP, 30 mL at 06/01/19 1426    magnesium sulfate (EPSOM SALT) granules 5 g, 5 g, Other, BID PRN, 5 g at 05/30/19 1411 **AND** Patient care order, , , Per Unit Routine, Natalia Fabian APRN CNP    metFORMIN (GLUCOPHAGE) tablet 1,000 mg, 1,000 mg, Oral, BID w/meals, Airam Light NP, 1,000 mg at 06/05/19 0823    nicotine (NICODERM CQ) 21 MG/24HR 24 hr patch 1 patch, 1 patch, Transdermal, Daily, Loren Zuleta NP, 1 patch at 06/05/19 0823    nicotine (NICORETTE) gum 2-4 mg, 2-4 mg, Buccal, Q1H PRN, Raven Stout NP, 4 mg at 05/21/19 0938    nicotine Patch in Place, , Transdermal, Q8H, Loren Zuleta NP, Stopped at 06/05/19 0151    nicotine patch REMOVAL, , Transdermal, Daily, oLren Zuleta NP, Stopped at 06/01/19 0842    prazosin (MINIPRESS) capsule 1 mg, 1 mg, Oral, At Bedtime, Kuldeep April JACKIE Rebollar, 1 mg at 06/04/19 2029    senna-docusate (SENOKOT-S/PERICOLACE) 8.6-50 MG per tablet 1 tablet, 1 tablet, Oral, At Bedtime, Loren Zuleta NP, 1 tablet at 06/04/19 2029    topiramate (TOPAMAX) tablet 100 mg, 100 mg, Oral, Daily, Loren Zuleta NP, 100 mg at 06/05/19 0823   Active Problems:    Mixed hyperlipidemia      Diabetes mellitus, type 2         Schizoaffective disorder, bipolar type (H)

## 2019-06-05 NOTE — PLAN OF CARE
Face to face end of shift report will be communicated to oncoming RN.     MANDY COFFEY  6/5/2019  7:4 AM      Problem: Adult Behavioral Health Plan of Care  Goal: Patient-Specific Goal (Individualization)  Description  Patient will attend at least 50% of groups while on unit.  Patient will report at least 6-8 hours of sleep at night.  Patient will be compliant with treatment team recommendations.  Patient will be medication compliant while hospitalized.  Patient will have decrease in delusional statements by discharge.         6/5/2019 0140 by Mandy Coffey RN  Outcome: No Change     Problem: Thought Process Alteration  Goal: Optimal Thought Clarity  Description  Patient will hold a reality based conversation by discharge.    6/5/2019 0140 by Mandy Coffey RN  Outcome: No Change     Problem: Violence Risk or Actual  Goal: Anger and Impulse Control  Description  PT will verbalize frustrations before making verbal threats to staff  PT will remain free from violence to staff or others while inpatient   6/5/2019 0140 by Mandy Coffey RN  Outcome: No Change     Problem: Fall Injury Risk  Goal: Absence of Fall and Fall-Related Injury  Description  Pt will not experience a fall while hospitalized.     6/5/2019 0140 by Mandy Coffey RN  Outcome: Improving   Pt appears to be resting in bed with eyes closed, having regular respirations and position changes. 15 minutes and PRN safety checks completed with no noted complains.No falls noted or reported so far this shift. Will continue to monitor.   0700- Pt was awake most of the night, resting in bed, denied c/o any kind with exception of requesting a snack. BS at 0219 was 219, educated patient on not eating large amounts for snacks. 15 grams of snack given after BS monitoring at 0200. This morning BS was 125. Pt slept approximately 2.5 hours. Will continue to monitor.

## 2019-06-06 LAB
ALBUMIN UR-MCNC: NEGATIVE MG/DL
APPEARANCE UR: CLEAR
BACTERIA #/AREA URNS HPF: ABNORMAL /HPF
BILIRUB UR QL STRIP: NEGATIVE
COLOR UR AUTO: ABNORMAL
GLUCOSE BLDC GLUCOMTR-MCNC: 133 MG/DL (ref 70–99)
GLUCOSE BLDC GLUCOMTR-MCNC: 135 MG/DL (ref 70–99)
GLUCOSE BLDC GLUCOMTR-MCNC: 150 MG/DL (ref 70–99)
GLUCOSE BLDC GLUCOMTR-MCNC: 153 MG/DL (ref 70–99)
GLUCOSE BLDC GLUCOMTR-MCNC: 161 MG/DL (ref 70–99)
GLUCOSE BLDC GLUCOMTR-MCNC: 86 MG/DL (ref 70–99)
GLUCOSE UR STRIP-MCNC: NEGATIVE MG/DL
HGB UR QL STRIP: NEGATIVE
KETONES UR STRIP-MCNC: NEGATIVE MG/DL
LEUKOCYTE ESTERASE UR QL STRIP: NEGATIVE
MUCOUS THREADS #/AREA URNS LPF: PRESENT /LPF
NITRATE UR QL: NEGATIVE
PH UR STRIP: 6 PH (ref 4.7–8)
RBC #/AREA URNS AUTO: <1 /HPF (ref 0–2)
SOURCE: ABNORMAL
SP GR UR STRIP: 1.02 (ref 1–1.03)
SQUAMOUS #/AREA URNS AUTO: 2 /HPF (ref 0–1)
UROBILINOGEN UR STRIP-MCNC: NORMAL MG/DL (ref 0–2)
WBC #/AREA URNS AUTO: 2 /HPF (ref 0–5)

## 2019-06-06 PROCEDURE — 12400000 ZZH R&B MH

## 2019-06-06 PROCEDURE — 99232 SBSQ HOSP IP/OBS MODERATE 35: CPT | Performed by: NURSE PRACTITIONER

## 2019-06-06 PROCEDURE — 81001 URINALYSIS AUTO W/SCOPE: CPT | Performed by: NURSE PRACTITIONER

## 2019-06-06 PROCEDURE — 25000131 ZZH RX MED GY IP 250 OP 636 PS 637: Performed by: NURSE PRACTITIONER

## 2019-06-06 PROCEDURE — 25000132 ZZH RX MED GY IP 250 OP 250 PS 637: Performed by: NURSE PRACTITIONER

## 2019-06-06 PROCEDURE — 25000125 ZZHC RX 250: Performed by: NURSE PRACTITIONER

## 2019-06-06 PROCEDURE — 00000146 ZZHCL STATISTIC GLUCOSE BY METER IP

## 2019-06-06 RX ADMIN — BENZTROPINE MESYLATE 0.5 MG: 0.5 TABLET ORAL at 08:19

## 2019-06-06 RX ADMIN — CHLORPROMAZINE HYDROCHLORIDE 100 MG: 100 TABLET, SUGAR COATED ORAL at 08:19

## 2019-06-06 RX ADMIN — CHLORPROMAZINE HYDROCHLORIDE 100 MG: 100 TABLET, SUGAR COATED ORAL at 14:56

## 2019-06-06 RX ADMIN — SENNOSIDES AND DOCUSATE SODIUM 1 TABLET: 8.6; 5 TABLET ORAL at 20:19

## 2019-06-06 RX ADMIN — INSULIN ASPART 1 UNITS: 100 INJECTION, SOLUTION INTRAVENOUS; SUBCUTANEOUS at 12:14

## 2019-06-06 RX ADMIN — METFORMIN HYDROCHLORIDE 1000 MG: 1000 TABLET ORAL at 08:19

## 2019-06-06 RX ADMIN — HYDROXYZINE HYDROCHLORIDE 100 MG: 25 TABLET ORAL at 08:19

## 2019-06-06 RX ADMIN — HYDROXYZINE HYDROCHLORIDE 100 MG: 25 TABLET ORAL at 18:00

## 2019-06-06 RX ADMIN — CHLORPROMAZINE HYDROCHLORIDE 100 MG: 100 TABLET, SUGAR COATED ORAL at 20:19

## 2019-06-06 RX ADMIN — ATORVASTATIN CALCIUM 20 MG: 20 TABLET, FILM COATED ORAL at 20:19

## 2019-06-06 RX ADMIN — BENZTROPINE MESYLATE 0.5 MG: 0.5 TABLET ORAL at 20:19

## 2019-06-06 RX ADMIN — METFORMIN HYDROCHLORIDE 1000 MG: 1000 TABLET ORAL at 18:00

## 2019-06-06 RX ADMIN — GABAPENTIN 1000 MG: 400 CAPSULE ORAL at 14:56

## 2019-06-06 RX ADMIN — GABAPENTIN 1000 MG: 400 CAPSULE ORAL at 08:19

## 2019-06-06 RX ADMIN — PRAZOSIN HYDROCHLORIDE 1 MG: 1 CAPSULE ORAL at 20:19

## 2019-06-06 RX ADMIN — GABAPENTIN 1000 MG: 400 CAPSULE ORAL at 20:19

## 2019-06-06 RX ADMIN — INSULIN GLARGINE 80 UNITS: 100 INJECTION, SOLUTION SUBCUTANEOUS at 20:20

## 2019-06-06 RX ADMIN — HYDROXYZINE HYDROCHLORIDE 100 MG: 25 TABLET ORAL at 12:13

## 2019-06-06 RX ADMIN — TOPIRAMATE 100 MG: 100 TABLET, FILM COATED ORAL at 08:19

## 2019-06-06 RX ADMIN — NICOTINE 1 PATCH: 21 PATCH, EXTENDED RELEASE TRANSDERMAL at 08:19

## 2019-06-06 ASSESSMENT — ACTIVITIES OF DAILY LIVING (ADL)
LAUNDRY: UNABLE TO COMPLETE
HYGIENE/GROOMING: INDEPENDENT
DRESS: SCRUBS (BEHAVIORAL HEALTH);INDEPENDENT
ORAL_HYGIENE: INDEPENDENT

## 2019-06-06 NOTE — PLAN OF CARE
BEHAVIORAL TEAM DISCUSSION    Participants: Natalia Fabian NP,  [facility], Angie Laws LSW,  Becca Chery LSW, Beena Kumar SW,  Ion Morales RN, Crow Leyva RN, Mady Holman OT, Natalia Costello OT  Progress: none - now placed on 1:1   Continued Stay Criteria/Rationale: unable to discharge to lesser level of care due to inability to care for self, high risk of relapse of psychosis   Medical/Physical: liver enzymes elevated - consult with hospitalist   Precautions:   Falls precaution?: YES - care plan initiated   Behavioral Orders   Procedures    Code 1 - Restrict to Unit    Routine Programming     As clinically indicated    Status 15     Every 15 minutes.     Plan: [facility] referrals sent out, looking for a safe discharge plan   Rationale for change in precautions or plan: none     Active Problems:    Mixed hyperlipidemia    Diabetes mellitus, type 2    Schizoaffective disorder, bipolar type (H)      Current Facility-Administered Medications:     acetaminophen (TYLENOL) tablet 975 mg, 975 mg, Oral, Q6H PRN, Raven Stout NP, 975 mg at 06/04/19 0010    atorvastatin (LIPITOR) tablet 20 mg, 20 mg, Oral, At Bedtime, Airam Light NP, 20 mg at 06/05/19 2110    benzocaine-menthol (CEPACOL) 15-3.6 MG lozenge 1 lozenge, 1 lozenge, Buccal, Q1H PRN, Natalia Fabian APRN CNP    benztropine (COGENTIN) tablet 0.5 mg, 0.5 mg, Oral, BID, Loren Zuleta, NP, 0.5 mg at 06/06/19 0819    chlorproMAZINE (THORAZINE) tablet 100 mg, 100 mg, Oral, TID, Loren Zuleta L, NP, 100 mg at 06/06/19 0819    glucose gel 15-30 g, 15-30 g, Oral, Q15 Min PRN, 30 g at 06/04/19 1423 **OR** dextrose 50 % injection 25-50 mL, 25-50 mL, Intravenous, Q15 Min PRN **OR** glucagon injection 1 mg, 1 mg, Subcutaneous, Q15 Min PRN, Airam Light NP    gabapentin (NEURONTIN) capsule 1,000 mg, 1,000 mg, Oral, TID, Loren Zuleta, NP, 1,000 mg at 06/06/19 0819    hydrOXYzine (ATARAX) tablet 100 mg, 100 mg, Oral,  TID, Natalia Fabian APRN CNP, 100 mg at 06/06/19 0819    ibuprofen (ADVIL/MOTRIN) tablet 400 mg, 400 mg, Oral, Q6H PRN, Natalia aFbian APRN CNP, 400 mg at 06/01/19 0645    insulin aspart (NovoLOG) inj (RAPID ACTING), 20 Units, Subcutaneous, TID w/meals, Denisse Malin NP, 20 Units at 06/06/19 0820    insulin aspart (NovoLOG) inj (RAPID ACTING), 1-7 Units, Subcutaneous, TID AC, Kuldeep April Keturah, NP, 3 Units at 06/04/19 1806    insulin glargine (LANTUS PEN) injection 80 Units, 80 Units, Subcutaneous, At Bedtime, Denisse Malin NP, 80 Units at 06/05/19 2115    magnesium hydroxide (MILK OF MAGNESIA) suspension 30 mL, 30 mL, Oral, Daily PRN, Loren Zuleta NP, 30 mL at 06/01/19 1426    magnesium sulfate (EPSOM SALT) granules 5 g, 5 g, Other, BID PRN, 5 g at 05/30/19 1411 **AND** Patient care order, , , Per Unit Routine, Natalia Fabian APRN CNP    metFORMIN (GLUCOPHAGE) tablet 1,000 mg, 1,000 mg, Oral, BID w/meals, Kuldeep, April Keturah, NP, 1,000 mg at 06/06/19 0819    nicotine (NICODERM CQ) 21 MG/24HR 24 hr patch 1 patch, 1 patch, Transdermal, Daily, Loren Zuleta NP, 1 patch at 06/06/19 0819    nicotine (NICORETTE) gum 2-4 mg, 2-4 mg, Buccal, Q1H PRN, Raven Stout NP, 4 mg at 05/21/19 0938    nicotine Patch in Place, , Transdermal, Q8H, Loren Zlueta NP    nicotine patch REMOVAL, , Transdermal, Daily, Loren Zultea NP, Stopped at 06/01/19 0842    prazosin (MINIPRESS) capsule 1 mg, 1 mg, Oral, At Bedtime, Kuldeep April JACKIE Rebollar, 1 mg at 06/05/19 2110    senna-docusate (SENOKOT-S/PERICOLACE) 8.6-50 MG per tablet 1 tablet, 1 tablet, Oral, At Bedtime, Loren Zuleta NP, 1 tablet at 06/05/19 2110    topiramate (TOPAMAX) tablet 100 mg, 100 mg, Oral, Daily, Loren Zuleta NP, 100 mg at 06/06/19 0819

## 2019-06-06 NOTE — PLAN OF CARE
"  Problem: Adult Behavioral Health Plan of Care  Goal: Patient-Specific Goal (Individualization)  Description  Patient will attend at least 50% of groups while on unit.  Patient will report at least 6-8 hours of sleep at night.  Patient will be compliant with treatment team recommendations.  Patient will be medication compliant while hospitalized.  Patient will have decrease in delusional statements by discharge.         6/5/2019 2010 by Jenny Cardenas, RN  Note:   Patient requesting nurse during safety check in beginning of shift. Entering room, patient is sitting on edge of bed and appears very anxious with pressured speech stating, \"I feel like I'm going to fall over onto my face. Take my blood sugar. Take it now.\" Accucheck is 123 at this time with a pulse of 116. Immediately after checking blood sugar, patient laying back down and appears to be falling back to sleep quickly. Pulse recheck before dinner was 94. Patient only ate half a cheeseburger for dinner, receiving no sliding scale Novolog. Scheduled 20 units were held d/t lower blood sugars and not eating enough dinner. Continues laying in bed with mild snoring noted until being woken up at 2000. Accucheck is 172 this evening. Patient is running a low grade fever of 99.6, pulse 105 and appears sweaty, lethargic and speech is slurred at this time. Difficulty getting out of bed and needed staff assistance to bathroom and back to bed. On-call provider notified and labs ordered. Provider recommends hospitalist consult tomorrow d/t high ALT and AST levels with potential of activated Hepatitis C. Will report to next shift to address in morning. Patient did start to clear after being awake for a bit longer and steady on feet. Declines evening snack. Compliant with scheduled medications. Continues laying in bed, appears to be sleeping for remainder of shift. Continue to monitor.      Face to face end of shift report given and care continued with this writer. "     Jenny Cardenas  6/5/2019  10:51 PM           Problem: Thought Process Alteration  Goal: Optimal Thought Clarity  Description  Patient will hold a reality based conversation by discharge.    6/5/2019 2010 by Jenny Cardenas, RN  Note:   Continue to monitor at this time.      Problem: Fall Injury Risk  Goal: Absence of Fall and Fall-Related Injury  Description  Pt will not experience a fall while hospitalized.     6/5/2019 2010 by Jenny Cardenas, RN  Note:   Continue to monitor at this time.      Problem: Violence Risk or Actual  Goal: Anger and Impulse Control  Description  PT will verbalize frustrations before making verbal threats to staff  PT will remain free from violence to staff or others while inpatient   6/5/2019 2010 by Jenny Cardenas, RN  Note:   No concerns noted at this time. Continue to monitor.

## 2019-06-06 NOTE — PLAN OF CARE
Problem: Adult Behavioral Health Plan of Care  Goal: Patient-Specific Goal (Individualization)  Description  Patient will attend at least 50% of groups while on unit.  Patient will report at least 6-8 hours of sleep at night.  Patient will be compliant with treatment team recommendations.  Patient will be medication compliant while hospitalized.  Patient will have decrease in delusional statements by discharge.         6/6/2019 0238 by Jenny Cardenas, RN  Note:   Patient laying in bed with eyes closed, position changes noted all shift. Blood sugar at 0200 was 153 and 135 at 0600. Continues laying in bed, appears sleeping towards end of shift. Continue to monitor at this time.    Problem: Fall Injury Risk  Goal: Absence of Fall and Fall-Related Injury  Description  Pt will not experience a fall while hospitalized.     6/6/2019 0653 by Jenny Cardenas, RN  Note:   No concerns noted at this time. Continue to monitor.     Face to face end of shift report communicated to Brook AQUINO RN.     Jenny Cardenas  6/6/2019  6:55 AM

## 2019-06-06 NOTE — PLAN OF CARE
"  Problem: Adult Behavioral Health Plan of Care  Goal: Patient-Specific Goal (Individualization)  Description  Patient will attend at least 50% of groups while on unit.  Patient will report at least 6-8 hours of sleep at night.  Patient will be compliant with treatment team recommendations.  Patient will be medication compliant while hospitalized.  Patient will have decrease in delusional statements by discharge.         6/6/2019 1716 by Jenny Cardenas, RN  Note:   Patient laying in bed resting in beginning of shift. Blood sugar was 86 before dinner. No sliding scale given and scheduled Novolog held at this time. Patient is running another low grade fever of 99.0 in beginning of shift. Steady gait observed for the short periods patient is out of bed. Appears to be responding to internal stimuli, flight of ideas during staff interaction but speech is clear. Patient did eat 100% of dinner this evening. Up to lounge more this afternoon, listening to headphones, talking to self and writing in journal. Blood sugar this evening was 150. Patient continues running a temp of 100 this evening but states, \"I feel just fine\". Compliant with scheduled medications. Laying back in bed to rest by 2200 for remainder of shift. Continue to monitor.      Problem: Thought Process Alteration  Goal: Optimal Thought Clarity  Description  Patient will hold a reality based conversation by discharge.    6/6/2019 1716 by Jenny Cardenas, RN  Outcome: No Change  Note:   Continue to monitor at this time.      Problem: Violence Risk or Actual  Goal: Anger and Impulse Control  Description  PT will verbalize frustrations before making verbal threats to staff  PT will remain free from violence to staff or others while inpatient   6/6/2019 1716 by Jenny Cardenas, RN  Outcome: Improving  Note:   No concerns noted at this time. Continue to monitor.     Face to face end of shift report communicated to Yaneth MARIA RN.     Jenny Cardenas  6/6/2019  10:45 " PM

## 2019-06-06 NOTE — PLAN OF CARE
"  Problem: Adult Behavioral Health Plan of Care  Goal: Patient-Specific Goal (Individualization)  Description  Patient will attend at least 50% of groups while on unit.  Patient will report at least 6-8 hours of sleep at night.  Patient will be compliant with treatment team recommendations.  Patient will be medication compliant while hospitalized.  Patient will have decrease in delusional statements by discharge.         6/6/2019 0920 by Brook Altman RN  Outcome: No Change  Note:   Patient not compliant with nursing assessment this morning.  Patient did verbalize lower back pain but declined further assessment.  Declines pain intervention.  Patient is isolative and withdrawn to her room this shift.  Her eye contact is minimal.  Her speech is rapid and pressured.  Patient was placed on 1:1 this AM r/t being unsteady on her feet.  Patient wearing non-skid socks.  Patient refuses to use a walker.  When patient was placed on 1:1, she was able to walk steadily.  Patient was upset staff were in her room for this 1:1.  Provider, PCS, charge nurse updated.  Provider assessed patient.  0932: Per provider: may discontinue 1:1 at this time.  Provided patient with tap bell at bedside.  Patient states she will use this bell if needed.  Patient states she sometimes feels like this when initially waking up for the day and getting up for breakfast.    Accucheck prior to breakfast was 135, ate 50% of breakfast/30 grams of carbohydrates.  Held sliding scale insulin, order parameters not met .   1144: Accucheck 161, ate 75% of lunch/60 grams of carbohydrates.   Patient she began to talking to this writer while giving Novolog.  Patient was talking about a queen cobra snake that she can talk to.  Patient then began speaking in tongues \"Like I do with her because she can't speak English.\".  Patient was tangential and rambling about spikes impaling her leg, giving the queen cobra snake \"the peace sign\", and protecting the snake from " "other snakes.  Patient could not hold reality based conversation at this time but at other times is able to.   1500:  Patient requests accucheck.  \"I'm not sure if I feel low\".  Accucheck was 133.  Patient then denies feeling symptomatic.           Problem: Thought Process Alteration  Goal: Optimal Thought Clarity  Description  Patient will hold a reality based conversation by discharge.    6/6/2019 0920 by Brook Altman RN  Outcome: Improving  Note:   Patient able to hold reality based conversation for short amount of time.  Patient gives little information or conversation to this writer.       Problem: Fall Injury Risk  Goal: Absence of Fall and Fall-Related Injury  Description  Pt will not experience a fall while hospitalized.     6/6/2019 0920 by Brook Altman RN  Outcome: Declining  Note:   Patient was placed on 1:1 this AM r/t being unsteady on her feet.  Patient wearing non-skid socks.  Patient refuses to use a walker.  When patient was placed on 1:1, she was able to walk steadily.  Patient was upset staff were in her room for this 1:1.  Charge nurse, PCS, NP updated.     Problem: Violence Risk or Actual  Goal: Anger and Impulse Control  Description  PT will verbalize frustrations before making verbal threats to staff  PT will remain free from violence to staff or others while inpatient   6/6/2019 0920 by Brook Altman RN  Outcome: Improving  Note:   Patient is irritable with interactions but had no noted verbal/physical outbursts this AM.       Face to face end of shift report communicated to Jenny DANIEL RN.     Brook Altman  6/6/2019  3:00 PM          "

## 2019-06-06 NOTE — PROGRESS NOTES
[facility]  Psychiatric Progress Note      Impression:   Patient placed on 1:1 after observed slurring and unsteadiness early this morning.  Labs were ordered and drawn, liver enzymes elevated.  Patient currently reports feeling fine, her vitals are WNL and steady, glucose relatively stable.  Observed patient stand and take a few steps, presents as steady on her feet, no other observable reason for concern - 1:1 is discontinued.    Met with patient again, she is out in [place] area.  She reports feeling good, better eye contact and appears more calm.  She does display some tangential topics, delusional stories, however provides good humor and is social with peers, who later she invites to sit with her at her table.  Patient asks about her discharge plans, having the knowledge that [facility] may be a possibility for her, she is happy about this.  She denies additional concerns and we make no changes in medications.    -- Consulted with Dr. Salazar over the phone, regarding this patient's elevated liver enzymes.  He encouraged a re-check in a couple days, informs this in itself is not alarming, her bilirubin is WNL, which indicates her liver is functioning and likely having some inflammation currently.  He advices this does not indicate having to change any medications currently, however if levels are checked again and they are further elevated, we may need to consider changes - however we also have to consider her medications are working - patient does have history of Hep C as well.  Will keep monitoring her vitals, steadiness, and re-check labs are ordered.         Diagnoses:      Schizoaffective disorder, bipolar type   Methamphetamine use disorder, moderate to severe            Plan:   Continue current medications    Hospitalist following diabetes - continue foot soaks    [place] is going forward with petition for commitment    ELOS:  > 5 days due to symptoms of psychosis and delusions along  "with Civil Commitment process      Attestation:  Patient has been seen and evaluated by me,  LATRICE Du CNP          Interim History:   The patient's care was discussed with the treatment team and chart notes were reviewed.          Medications:     Current Facility-Administered Medications   Medication     acetaminophen (TYLENOL) tablet 975 mg     atorvastatin (LIPITOR) tablet 20 mg     benzocaine-menthol (CEPACOL) 15-3.6 MG lozenge 1 lozenge     benztropine (COGENTIN) tablet 0.5 mg     chlorproMAZINE (THORAZINE) tablet 100 mg     glucose gel 15-30 g    Or     dextrose 50 % injection 25-50 mL    Or     glucagon injection 1 mg     gabapentin (NEURONTIN) capsule 1,000 mg     hydrOXYzine (ATARAX) tablet 100 mg     ibuprofen (ADVIL/MOTRIN) tablet 400 mg     insulin aspart (NovoLOG) inj (RAPID ACTING)     insulin aspart (NovoLOG) inj (RAPID ACTING)     insulin glargine (LANTUS PEN) injection 80 Units     magnesium hydroxide (MILK OF MAGNESIA) suspension 30 mL     magnesium sulfate (EPSOM SALT) granules 5 g     metFORMIN (GLUCOPHAGE) tablet 1,000 mg     nicotine (NICODERM CQ) 21 MG/24HR 24 hr patch 1 patch     nicotine (NICORETTE) gum 2-4 mg     nicotine Patch in Place     nicotine patch REMOVAL     prazosin (MINIPRESS) capsule 1 mg     senna-docusate (SENOKOT-S/PERICOLACE) 8.6-50 MG per tablet 1 tablet     topiramate (TOPAMAX) tablet 100 mg          10 point ROS negative \"pain everywhere\"       Allergies:     Allergies   Allergen Reactions     Haloperidol Other (See Comments)     Other reaction(s): Seizures  Patient states, \"I fell down and wasn't able to get up.\"  Patient states, \"I fell down and wasn't able to get up.\"              Psychiatric Examination:   /76 (BP Location: Left arm)   Pulse 88   Temp 97.9  F (36.6  C) (Tympanic)   Resp 16   Ht 1.626 m (5' 4\")   Wt 86.5 kg (190 lb 9.6 oz)   SpO2 99%   BMI 32.72 kg/m    Weight is 190 lbs 9.6 oz  Body mass index is 32.72 " kg/m .    Appearance:  awake, alert   Attitude:  cooperative  Eye Contact:  fair  Mood:  Improving  Affect: mood congruent  Speech:  pressured speech - better  Psychomotor Behavior:  no evidence of tardive dyskinesia, dystonia, or tics  Thought Process: less disorganized  Associations:  loosening of associations present   Thought Content:  Denies SI and Hi. Reports a decrease in AH  Insight:  limited  Judgment:  poor  Oriented to:  time, person, and place  Attention Span and Concentration:  limited  Recent and Remote Memory:  fair  Fund of Knowledge: low-normal  Muscle Strength and Tone: normal  Gait and Station: Normal         Labs:     Results for orders placed or performed during the hospital encounter of 05/10/19 (from the past 24 hour(s))   Glucose by meter   Result Value Ref Range    Glucose 93 70 - 99 mg/dL   Glucose by meter   Result Value Ref Range    Glucose 123 (H) 70 - 99 mg/dL   Glucose by meter   Result Value Ref Range    Glucose 171 (H) 70 - 99 mg/dL   CBC with platelets differential   Result Value Ref Range    WBC 9.3 4.0 - 11.0 10e9/L    RBC Count 4.55 3.8 - 5.2 10e12/L    Hemoglobin 12.9 11.7 - 15.7 g/dL    Hematocrit 38.2 35.0 - 47.0 %    MCV 84 78 - 100 fl    MCH 28.4 26.5 - 33.0 pg    MCHC 33.8 31.5 - 36.5 g/dL    RDW 13.1 10.0 - 15.0 %    Platelet Count 257 150 - 450 10e9/L    Diff Method Automated Method     % Neutrophils 53.9 %    % Lymphocytes 35.9 %    % Monocytes 7.0 %    % Eosinophils 2.2 %    % Basophils 0.5 %    % Immature Granulocytes 0.5 %    Nucleated RBCs 0 0 /100    Absolute Neutrophil 5.0 1.6 - 8.3 10e9/L    Absolute Lymphocytes 3.3 0.8 - 5.3 10e9/L    Absolute Monocytes 0.7 0.0 - 1.3 10e9/L    Absolute Eosinophils 0.2 0.0 - 0.7 10e9/L    Absolute Basophils 0.1 0.0 - 0.2 10e9/L    Abs Immature Granulocytes 0.1 0 - 0.4 10e9/L    Absolute Nucleated RBC 0.0    Comprehensive metabolic panel   Result Value Ref Range    Sodium 141 133 - 144 mmol/L    Potassium 4.5 3.4 - 5.3 mmol/L     Chloride 110 (H) 94 - 109 mmol/L    Carbon Dioxide 23 20 - 32 mmol/L    Anion Gap 8 3 - 14 mmol/L    Glucose 172 (H) 70 - 99 mg/dL    Urea Nitrogen 11 7 - 30 mg/dL    Creatinine 0.66 0.52 - 1.04 mg/dL    GFR Estimate >90 >60 mL/min/[1.73_m2]    GFR Estimate If Black >90 >60 mL/min/[1.73_m2]    Calcium 8.6 8.5 - 10.1 mg/dL    Bilirubin Total 0.2 0.2 - 1.3 mg/dL    Albumin 3.4 3.4 - 5.0 g/dL    Protein Total 7.2 6.8 - 8.8 g/dL    Alkaline Phosphatase 83 40 - 150 U/L     (H) 0 - 50 U/L    AST 99 (H) 0 - 45 U/L   Ammonia   Result Value Ref Range    Ammonia 13 10 - 50 umol/L   Glucose by meter   Result Value Ref Range    Glucose 153 (H) 70 - 99 mg/dL   Glucose by meter   Result Value Ref Range    Glucose 135 (H) 70 - 99 mg/dL   UA with Microscopic reflex to Culture   Result Value Ref Range    Color Urine Light Yellow     Appearance Urine Clear     Glucose Urine Negative NEG^Negative mg/dL    Bilirubin Urine Negative NEG^Negative    Ketones Urine Negative NEG^Negative mg/dL    Specific Gravity Urine 1.019 1.003 - 1.035    Blood Urine Negative NEG^Negative    pH Urine 6.0 4.7 - 8.0 pH    Protein Albumin Urine Negative NEG^Negative mg/dL    Urobilinogen mg/dL Normal 0.0 - 2.0 mg/dL    Nitrite Urine Negative NEG^Negative    Leukocyte Esterase Urine Negative NEG^Negative    Source Midstream Urine     WBC Urine 2 0 - 5 /HPF    RBC Urine <1 0 - 2 /HPF    Bacteria Urine None (A) NEG^Negative /HPF    Squamous Epithelial /HPF Urine 2 (H) 0 - 1 /HPF    Mucous Urine Present (A) NEG^Negative /LPF   Glucose by meter   Result Value Ref Range    Glucose 161 (H) 70 - 99 mg/dL

## 2019-06-06 NOTE — PLAN OF CARE
Patient up and on the unit - states she is feeling better today - asking about placement - states she heard she might be going to [facility] and she really likes this idea - states she really liked being there and it was very helpful for her - she was able to carry on a fairly linear conversation until she started to talk about architecture of houses in [place] and then she started to veer off and talk about how she is able to tell people about old houses because she has a vast knowledge of architecture and started talking about servants quarters and so on - able to pull back and talk more about the programming at [facility] and looking forward to when they have openings - let her know we will check again next week and encouraged her to be up on the unit and participating as much as she can.  Patient was pleasant.

## 2019-06-07 LAB
GLUCOSE BLDC GLUCOMTR-MCNC: 108 MG/DL (ref 70–99)
GLUCOSE BLDC GLUCOMTR-MCNC: 111 MG/DL (ref 70–99)
GLUCOSE BLDC GLUCOMTR-MCNC: 136 MG/DL (ref 70–99)
GLUCOSE BLDC GLUCOMTR-MCNC: 159 MG/DL (ref 70–99)
GLUCOSE BLDC GLUCOMTR-MCNC: 165 MG/DL (ref 70–99)
GLUCOSE BLDC GLUCOMTR-MCNC: 173 MG/DL (ref 70–99)
GLUCOSE BLDC GLUCOMTR-MCNC: 67 MG/DL (ref 70–99)
GLUCOSE BLDC GLUCOMTR-MCNC: 83 MG/DL (ref 70–99)
GLUCOSE BLDC GLUCOMTR-MCNC: 93 MG/DL (ref 70–99)
GLUCOSE BLDC GLUCOMTR-MCNC: 99 MG/DL (ref 70–99)

## 2019-06-07 PROCEDURE — 25000132 ZZH RX MED GY IP 250 OP 250 PS 637: Performed by: NURSE PRACTITIONER

## 2019-06-07 PROCEDURE — 00000146 ZZHCL STATISTIC GLUCOSE BY METER IP

## 2019-06-07 PROCEDURE — 99232 SBSQ HOSP IP/OBS MODERATE 35: CPT | Performed by: NURSE PRACTITIONER

## 2019-06-07 PROCEDURE — 12400000 ZZH R&B MH

## 2019-06-07 PROCEDURE — 25000125 ZZHC RX 250: Performed by: NURSE PRACTITIONER

## 2019-06-07 RX ORDER — HYDROXYZINE HYDROCHLORIDE 25 MG/1
50 TABLET, FILM COATED ORAL
Status: DISCONTINUED | OUTPATIENT
Start: 2019-06-07 | End: 2019-06-09

## 2019-06-07 RX ADMIN — TOPIRAMATE 100 MG: 100 TABLET, FILM COATED ORAL at 08:43

## 2019-06-07 RX ADMIN — GABAPENTIN 1000 MG: 400 CAPSULE ORAL at 14:55

## 2019-06-07 RX ADMIN — CHLORPROMAZINE HYDROCHLORIDE 100 MG: 100 TABLET, SUGAR COATED ORAL at 14:56

## 2019-06-07 RX ADMIN — HYDROXYZINE HYDROCHLORIDE 50 MG: 25 TABLET ORAL at 17:21

## 2019-06-07 RX ADMIN — METFORMIN HYDROCHLORIDE 1000 MG: 1000 TABLET ORAL at 08:43

## 2019-06-07 RX ADMIN — MAGNESIUM HYDROXIDE 30 ML: 400 SUSPENSION ORAL at 16:45

## 2019-06-07 RX ADMIN — PRAZOSIN HYDROCHLORIDE 1 MG: 1 CAPSULE ORAL at 20:42

## 2019-06-07 RX ADMIN — CHLORPROMAZINE HYDROCHLORIDE 100 MG: 100 TABLET, SUGAR COATED ORAL at 20:42

## 2019-06-07 RX ADMIN — SENNOSIDES AND DOCUSATE SODIUM 1 TABLET: 8.6; 5 TABLET ORAL at 20:42

## 2019-06-07 RX ADMIN — HYDROXYZINE HYDROCHLORIDE 50 MG: 25 TABLET ORAL at 08:43

## 2019-06-07 RX ADMIN — METFORMIN HYDROCHLORIDE 1000 MG: 1000 TABLET ORAL at 17:21

## 2019-06-07 RX ADMIN — BENZTROPINE MESYLATE 0.5 MG: 0.5 TABLET ORAL at 08:43

## 2019-06-07 RX ADMIN — GABAPENTIN 1000 MG: 400 CAPSULE ORAL at 08:43

## 2019-06-07 RX ADMIN — HYDROXYZINE HYDROCHLORIDE 50 MG: 25 TABLET ORAL at 12:30

## 2019-06-07 RX ADMIN — GABAPENTIN 1000 MG: 400 CAPSULE ORAL at 20:42

## 2019-06-07 RX ADMIN — ATORVASTATIN CALCIUM 20 MG: 20 TABLET, FILM COATED ORAL at 20:42

## 2019-06-07 RX ADMIN — CHLORPROMAZINE HYDROCHLORIDE 100 MG: 100 TABLET, SUGAR COATED ORAL at 08:43

## 2019-06-07 RX ADMIN — BENZTROPINE MESYLATE 0.5 MG: 0.5 TABLET ORAL at 20:42

## 2019-06-07 RX ADMIN — INSULIN GLARGINE 40 UNITS: 100 INJECTION, SOLUTION SUBCUTANEOUS at 20:41

## 2019-06-07 ASSESSMENT — ACTIVITIES OF DAILY LIVING (ADL)
ORAL_HYGIENE: INDEPENDENT
DRESS: INDEPENDENT
LAUNDRY: UNABLE TO COMPLETE
HYGIENE/GROOMING: INDEPENDENT
HYGIENE/GROOMING: INDEPENDENT
ORAL_HYGIENE: INDEPENDENT
DRESS: SCRUBS (BEHAVIORAL HEALTH)
LAUNDRY: UNABLE TO COMPLETE

## 2019-06-07 NOTE — PLAN OF CARE
"Face to face end of shift report received from Karley MARIA RN. Rounding completed. Patient observed in bed, appears asleep, respirations observed.     Radha Guerinolph  6/7/2019  7:54 AM     Problem: Adult Behavioral Health Plan of Care  Goal: Patient-Specific Goal (Individualization)  Description  Patient will attend at least 50% of groups while on unit.  Patient will report at least 6-8 hours of sleep at night.  Patient will be compliant with treatment team recommendations.  Patient will be medication compliant while hospitalized.  Patient will have decrease in delusional statements by discharge.         Outcome: No Change    Pt very irritable, angry this shift. She refuses to participate in any assessment questions and when asked another she gets even more upset. She verbalizes frustration with her blood sugars, constantly being poked \"You people keep upping my fucking insulin, but then keep giving me this shit food. I'm not eating this shittie food anymore. I'll starve and die from my diabetes\". Pt then has complaints of her pain levels \"I hurt everywhere. I don't want your fucking tylenol. It doesn't work. I can go anywhere else and get oxys or morphine why can't I get that shit here\". Pt was allowed to vent for some time and she appeared calmer for a minute, but then started up again \"I vent my frustrations and then you people put me in the back and shoot me in the ass with meds then leave me alone right away like its fucking funny\". This writer excused herself and allowed pt to calm herself. She laid in bed for most of the morning-- when asked if she would come out of her room she stated \"No I'm just going to lay here and die from my diabetes\". Pt did come out of her room to sit in the lounge at 1030. Pt denies any SI, HI, and hallucinations. She did not appear preoccupied at all during morning assessment, or while sitting out in the lounge. Pt a little less irritable more towards lunch. Went in her room to " "check her blood sugar \"I'm fucking starving, you guys are starving me\". Blood sugar--108.     Pt did not receive sliding scale this morning or scheduled 20 units of insulin. Blood sugar at 0609 was 136--at 0842 after eating she was 99. She reported that she ate 1 pancake, 1 sausage, and 1 piece of toast (2 carb units=30 grams). Staff reported she ate maybe 50 % of her meal.   Pt accucheck at 1155 was 108. Pt did not receive sliding scale, but did receive her 20 units. She ate everything on her lunch except her chips (60 grams). Pt irritable with this writer again after lunch pulled up her sleeve \"just give me the fucking shot already\". Novolog was given and pt allowed to rest.     1345-- Pt to nurse's station reporting that she feels dionte her sugar is low. Pt sugar at 83. Rechecked sugar at 1417-- 67. Paged hospitalist (see fellow RN note). Pt refusing glucose gel and refusing to eat anything stating \"My mouth is too dry and it gets stuck in my throat\". Pt agreed to orange juice-- drank 240 mL. Pt upset stating \"What the fuck is that orange juice going to do. It will only raise it 40\". Again offered pt something to eat, but refused. Will recheck blood sugar-- at 1447-- 111.    1447-- PT appears to be feeling better, reports feeling better, and is not as irritable.     Pt has been steady on her feet this shift. She has not appeared unsteady at all. She has not had any falls.      Problem: Thought Process Alteration  Goal: Optimal Thought Clarity  Description  Patient will hold a reality based conversation by discharge.    Outcome: No Change   Pt verbalizes frustrations surrounding her stay here in the hospital. She does not engage in conversation with this writer.     Problem: Violence Risk or Actual  Goal: Anger and Impulse Control  Description  PT will verbalize frustrations before making verbal threats to staff  PT will remain free from violence to staff or others while inpatient   Outcome: No Change  Pt verbalized " frustrations and did not make any verbal threats towards staff.   Pt has not had any violence this shift.      Problem: Fall Injury Risk  Goal: Absence of Fall and Fall-Related Injury  Description  Pt will not experience a fall while hospitalized.  Patient will have tap bell at bedside.       6/7/2019 0753 by Radha Broderick  Outcome: Improving   Pt has been steady on her feet this shift. She has not appeared unsteady at all. She has not had any falls.     Face to face end of shift report will be given to pm shift FRANCIA Broderick  6/7/2019  3:03 PM

## 2019-06-07 NOTE — PLAN OF CARE
Problem: Fall Injury Risk  Goal: Absence of Fall and Fall-Related Injury  Description  Pt will not experience a fall while hospitalized.  Patient will have tap bell at bedside.     Pt is steady on feet, pt free of falls      6/7/2019 0143 by Yaneth Ron, RN  Outcome: Improving   Face to face shift report received from Jenny DANIEL RN. Patient observed. Patient has been in bed with eyes closed and regular respirations throughout the night. 15 minute checks completed all night. No complaints offered. Will continue to monitor. The face to face report will be communicated to on coming RN.   Pt woke at 0005 she states she believes her blood sugar is low, nurse does complete a BG at 0010 with results of 173. Pt states she feels like she is low and feels like she needs a snack. Nurse does encourage patient to not eat a snack due to high BG. Pt states she needs one and asks again she asks for pudding and gram crackers. Nurse does encourage her to eat food higher in protein and lower in carb. Nurse offers a string cheese. Pt agrees but also asks for a pudding, nurse encourages greek yogurt. Pt agrees with yogurt. Pt appears to be sleeping.   0209   Yaneth Ron  6/7/2019  1:43 AM

## 2019-06-07 NOTE — PROGRESS NOTES
"[facility]  Psychiatric Progress Note      Impression:   Patient in her room sitting up on her bed.  She reports some irritable, but states \"I'm ok now\".  We discuss trying to decrease her hydroxyzine to 50mg tid instead of 100mg, due to higher anticholinergic medications and side effects - she agrees and also reminded to monitor for worsening irritability or anxiety.  Inform patient she will have another lab draw tomorrow to check her overall CBC and Liver Enzymes.  Patient denies si/hi and reports continues to sleep good.  She was mainly quiet today during this visit.         Diagnoses:      Schizoaffective disorder, bipolar type   Methamphetamine use disorder, moderate to severe            Plan:   Decrease Hydroxyzine to 50 mg tid and continue other medications    Hospitalist following diabetes - continue foot soaks    [place] is going forward with petition for commitment    ELOS:  > 5 days due to symptoms of psychosis and delusions along with Civil Commitment process      Attestation:  Patient has been seen and evaluated by me,  LATRICE Du CNP          Interim History:   The patient's care was discussed with the treatment team and chart notes were reviewed.          Medications:     Current Facility-Administered Medications   Medication     acetaminophen (TYLENOL) tablet 975 mg     atorvastatin (LIPITOR) tablet 20 mg     benzocaine-menthol (CEPACOL) 15-3.6 MG lozenge 1 lozenge     benztropine (COGENTIN) tablet 0.5 mg     chlorproMAZINE (THORAZINE) tablet 100 mg     glucose gel 15-30 g    Or     dextrose 50 % injection 25-50 mL    Or     glucagon injection 1 mg     gabapentin (NEURONTIN) capsule 1,000 mg     hydrOXYzine (ATARAX) tablet 50 mg     ibuprofen (ADVIL/MOTRIN) tablet 400 mg     insulin aspart (NovoLOG) inj (RAPID ACTING)     insulin aspart (NovoLOG) inj (RAPID ACTING)     insulin glargine (LANTUS PEN) injection 80 Units     magnesium hydroxide (MILK OF MAGNESIA) suspension " "30 mL     magnesium sulfate (EPSOM SALT) granules 5 g     metFORMIN (GLUCOPHAGE) tablet 1,000 mg     nicotine (NICODERM CQ) 21 MG/24HR 24 hr patch 1 patch     nicotine (NICORETTE) gum 2-4 mg     nicotine Patch in Place     nicotine patch REMOVAL     prazosin (MINIPRESS) capsule 1 mg     senna-docusate (SENOKOT-S/PERICOLACE) 8.6-50 MG per tablet 1 tablet     topiramate (TOPAMAX) tablet 100 mg          10 point ROS negative \"pain everywhere\"       Allergies:     Allergies   Allergen Reactions     Haloperidol Other (See Comments)     Other reaction(s): Seizures  Patient states, \"I fell down and wasn't able to get up.\"  Patient states, \"I fell down and wasn't able to get up.\"              Psychiatric Examination:   BP 96/60   Pulse 81   Temp 98.9  F (37.2  C) (Tympanic)   Resp 18   Ht 1.626 m (5' 4\")   Wt 86.5 kg (190 lb 9.6 oz)   SpO2 96%   BMI 32.72 kg/m    Weight is 190 lbs 9.6 oz  Body mass index is 32.72 kg/m .    Appearance:  awake, alert   Attitude:  cooperative  Eye Contact:  fair  Mood:  Improving  Affect: mood congruent  Speech:  pressured speech - better  Psychomotor Behavior:  no evidence of tardive dyskinesia, dystonia, or tics  Thought Process: less disorganized  Associations:  loosening of associations present   Thought Content:  Denies SI and Hi. Reports a decrease in AH  Insight:  limited  Judgment:  poor  Oriented to:  time, person, and place  Attention Span and Concentration:  limited  Recent and Remote Memory:  fair  Fund of Knowledge: low-normal  Muscle Strength and Tone: normal  Gait and Station: Normal         Labs:     Results for orders placed or performed during the hospital encounter of 05/10/19 (from the past 24 hour(s))   UA with Microscopic reflex to Culture   Result Value Ref Range    Color Urine Light Yellow     Appearance Urine Clear     Glucose Urine Negative NEG^Negative mg/dL    Bilirubin Urine Negative NEG^Negative    Ketones Urine Negative NEG^Negative mg/dL    Specific " Gravity Urine 1.019 1.003 - 1.035    Blood Urine Negative NEG^Negative    pH Urine 6.0 4.7 - 8.0 pH    Protein Albumin Urine Negative NEG^Negative mg/dL    Urobilinogen mg/dL Normal 0.0 - 2.0 mg/dL    Nitrite Urine Negative NEG^Negative    Leukocyte Esterase Urine Negative NEG^Negative    Source Midstream Urine     WBC Urine 2 0 - 5 /HPF    RBC Urine <1 0 - 2 /HPF    Bacteria Urine None (A) NEG^Negative /HPF    Squamous Epithelial /HPF Urine 2 (H) 0 - 1 /HPF    Mucous Urine Present (A) NEG^Negative /LPF   Glucose by meter   Result Value Ref Range    Glucose 161 (H) 70 - 99 mg/dL   Glucose by meter   Result Value Ref Range    Glucose 133 (H) 70 - 99 mg/dL   Glucose by meter   Result Value Ref Range    Glucose 86 70 - 99 mg/dL   Glucose by meter   Result Value Ref Range    Glucose 150 (H) 70 - 99 mg/dL   Glucose by meter   Result Value Ref Range    Glucose 173 (H) 70 - 99 mg/dL   Glucose by meter   Result Value Ref Range    Glucose 159 (H) 70 - 99 mg/dL   Glucose by meter   Result Value Ref Range    Glucose 136 (H) 70 - 99 mg/dL

## 2019-06-07 NOTE — PLAN OF CARE
BEHAVIORAL TEAM DISCUSSION    Participants:  Natalia Fabian NP, Dennise Herrera [facility], Beena Kumar [facility], Krys Rojo RN, Ion Morales RN, Jayla Engel RN, Natalia Costello OT  Progress: minimal  Continued Stay Criteria/Rationale: unable to discharge to lesser level of care due to inability to care for self, high risk of relapse of psychosis, flight of ideas, appears to be responding to internal stimuli  Medical/Physical: elevated liver enzymes - consulting with hospitalist, Hx of Hep C, draw labs, diabetes  Precautions:   Falls precaution?: No, no longer on 1:1 for fall risk   Behavioral Orders   Procedures    Code 1 - Restrict to Unit    Routine Programming     As clinically indicated    Status 15     Every 15 minutes.     Plan: IRTS referrals sent, continue to stabilize  Rationale for change in precautions or plan: None    Active Problems:    Mixed hyperlipidemia    Diabetes mellitus, type 2    Schizoaffective disorder, bipolar type (H)      Current Facility-Administered Medications:     acetaminophen (TYLENOL) tablet 975 mg, 975 mg, Oral, Q6H PRN, Raven Stout NP, 975 mg at 06/04/19 0010    atorvastatin (LIPITOR) tablet 20 mg, 20 mg, Oral, At Bedtime, Airam Light NP, 20 mg at 06/06/19 2019    benzocaine-menthol (CEPACOL) 15-3.6 MG lozenge 1 lozenge, 1 lozenge, Buccal, Q1H PRN, Natalia Fabian, APRN CNP    benztropine (COGENTIN) tablet 0.5 mg, 0.5 mg, Oral, BID, Loren Zuleta, NP, 0.5 mg at 06/07/19 0843    chlorproMAZINE (THORAZINE) tablet 100 mg, 100 mg, Oral, TID, Loren Zuleta L, NP, 100 mg at 06/07/19 0843    glucose gel 15-30 g, 15-30 g, Oral, Q15 Min PRN, 30 g at 06/04/19 1423 **OR** dextrose 50 % injection 25-50 mL, 25-50 mL, Intravenous, Q15 Min PRN **OR** glucagon injection 1 mg, 1 mg, Subcutaneous, Q15 Min PRN, Airam Light NP    gabapentin (NEURONTIN) capsule 1,000 mg, 1,000 mg, Oral, TID, Loren Zuleta NP, 1,000 mg at 06/07/19 0843    hydrOXYzine (ATARAX) tablet 50  mg, 50 mg, Oral, TID, Natalia Fabian APRN CNP, 50 mg at 06/07/19 0843    ibuprofen (ADVIL/MOTRIN) tablet 400 mg, 400 mg, Oral, Q6H PRN, Natalia Fabian APRN CNP, 400 mg at 06/01/19 0645    insulin aspart (NovoLOG) inj (RAPID ACTING), 20 Units, Subcutaneous, TID w/meals, Denisse Malin NP, Stopped at 06/07/19 0904    insulin aspart (NovoLOG) inj (RAPID ACTING), 1-7 Units, Subcutaneous, TID AC, Airam Light NP, 1 Units at 06/06/19 1214    insulin glargine (LANTUS PEN) injection 80 Units, 80 Units, Subcutaneous, At Bedtime, Denisse Malin NP, 80 Units at 06/06/19 2020    magnesium hydroxide (MILK OF MAGNESIA) suspension 30 mL, 30 mL, Oral, Daily PRN, Loren Zuleta NP, 30 mL at 06/01/19 1426    magnesium sulfate (EPSOM SALT) granules 5 g, 5 g, Other, BID PRN, 5 g at 05/30/19 1411 **AND** Patient care order, , , Per Unit Routine, Natalia Fabian APRN CNP    metFORMIN (GLUCOPHAGE) tablet 1,000 mg, 1,000 mg, Oral, BID w/meals, Airam Light NP, 1,000 mg at 06/07/19 0843    nicotine (NICODERM CQ) 21 MG/24HR 24 hr patch 1 patch, 1 patch, Transdermal, Daily, Loren Zuleta NP, 1 patch at 06/06/19 0819    nicotine (NICORETTE) gum 2-4 mg, 2-4 mg, Buccal, Q1H PRN, Raven Stout NP, 4 mg at 05/21/19 0938    nicotine Patch in Place, , Transdermal, Q8H, Loren Zuleta NP    nicotine patch REMOVAL, , Transdermal, Daily, Loren Zuleta NP, Stopped at 06/01/19 0842    prazosin (MINIPRESS) capsule 1 mg, 1 mg, Oral, At Bedtime, Airam Light, NP, 1 mg at 06/06/19 2019    senna-docusate (SENOKOT-S/PERICOLACE) 8.6-50 MG per tablet 1 tablet, 1 tablet, Oral, At Bedtime, Loren Zuleta NP, 1 tablet at 06/06/19 2019    topiramate (TOPAMAX) tablet 100 mg, 100 mg, Oral, Daily, Loren Zuleta NP, 100 mg at 06/07/19 0843

## 2019-06-07 NOTE — PLAN OF CARE
"This writer spoke with Dr. Salazar in regard to patient's low BG today due to assigned nurse working with patient to increase BG. Nurse initial introduced self and began to explain the reason for the phone call, when MD stated \"Dr. Flowers is following this patient and watching her lab.\" nurse stated that we currently have a patient with a low blood glucose and her insulin was held yesterday by another nurse. MD states \"Dr. Flowers is following this patient and watching the labs, the last blood glucose was 83\" nurse interupted MD and stated \"BG was last 67 and we are concerned it will continue to drop. MD states \"you have glucose orders to follow correct?\" nurse does state this is correct but asked to address concerns that patient was once eating sugar packets and is no longer, when can we adjust the\" MD then interrupts nurse and states \"you have glucose orders to follow, good bye\" he then hangs up the phone.   "

## 2019-06-07 NOTE — PLAN OF CARE
"Problem: Adult Behavioral Health Plan of Care  Goal: Patient-Specific Goal (Individualization)  Description  Patient will attend at least 50% of groups while on unit.  Patient will report at least 6-8 hours of sleep at night.  Patient will be compliant with treatment team recommendations.  Patient will be medication compliant while hospitalized.  Patient will have decrease in delusional statements by discharge.    Pt resting in bed and listening to music at start of shift. Pt reported feeling \"fine\". No complaints at this time. Pt slightly irritable with staff but cooperative. Will continue to monitor.    1638 Blood sugar 93. Pt stated that she feels like she is getting used to the lower blood sugars now. Pt reports feeling better than she did.  1645 C/o constipation. Pt reports it has been \"awhile\". Prn milk of mag administered.  1720 Held novolog 20 units give due to pt not eating entire meal, ordering less food than usual.  1951 Blood sugar 165. Pt currently napping.  2015 Pt did eat HS snack. Pt irritable with staff when rechecking BP and giving insulin.     Outcome: No Change     Problem: Thought Process Alteration  Goal: Optimal Thought Clarity  Description  Patient will hold a reality based conversation by discharge.      Outcome: No Change     Problem: Fall Injury Risk  Goal: Absence of Fall and Fall-Related Injury  Description  Pt will not experience a fall while hospitalized.  Patient will have tap bell at bedside.       Steady gait. Wearing appropriate footwear. No falls this shift.    Outcome: Improving    Face to face end of shift report communicated to oncoming RN.      Sally Sumner  6/7/2019  10:02 PM           "

## 2019-06-08 LAB
ALT SERPL W P-5'-P-CCNC: 392 U/L (ref 0–50)
AST SERPL W P-5'-P-CCNC: 166 U/L (ref 0–45)
BASOPHILS # BLD AUTO: 0.1 10E9/L (ref 0–0.2)
BASOPHILS NFR BLD AUTO: 0.6 %
DIFFERENTIAL METHOD BLD: NORMAL
EOSINOPHIL # BLD AUTO: 0.3 10E9/L (ref 0–0.7)
EOSINOPHIL NFR BLD AUTO: 3 %
ERYTHROCYTE [DISTWIDTH] IN BLOOD BY AUTOMATED COUNT: 13.5 % (ref 10–15)
GLUCOSE BLDC GLUCOMTR-MCNC: 141 MG/DL (ref 70–99)
GLUCOSE BLDC GLUCOMTR-MCNC: 161 MG/DL (ref 70–99)
GLUCOSE BLDC GLUCOMTR-MCNC: 253 MG/DL (ref 70–99)
GLUCOSE BLDC GLUCOMTR-MCNC: 287 MG/DL (ref 70–99)
GLUCOSE BLDC GLUCOMTR-MCNC: 45 MG/DL (ref 70–99)
GLUCOSE BLDC GLUCOMTR-MCNC: 61 MG/DL (ref 70–99)
GLUCOSE BLDC GLUCOMTR-MCNC: 95 MG/DL (ref 70–99)
GLUCOSE BLDC GLUCOMTR-MCNC: 98 MG/DL (ref 70–99)
HCT VFR BLD AUTO: 36.2 % (ref 35–47)
HGB BLD-MCNC: 11.9 G/DL (ref 11.7–15.7)
IMM GRANULOCYTES # BLD: 0.1 10E9/L (ref 0–0.4)
IMM GRANULOCYTES NFR BLD: 0.6 %
LYMPHOCYTES # BLD AUTO: 3.1 10E9/L (ref 0.8–5.3)
LYMPHOCYTES NFR BLD AUTO: 34.2 %
MCH RBC QN AUTO: 28.1 PG (ref 26.5–33)
MCHC RBC AUTO-ENTMCNC: 32.9 G/DL (ref 31.5–36.5)
MCV RBC AUTO: 85 FL (ref 78–100)
MONOCYTES # BLD AUTO: 0.7 10E9/L (ref 0–1.3)
MONOCYTES NFR BLD AUTO: 7.7 %
NEUTROPHILS # BLD AUTO: 4.9 10E9/L (ref 1.6–8.3)
NEUTROPHILS NFR BLD AUTO: 53.9 %
NRBC # BLD AUTO: 0 10*3/UL
NRBC BLD AUTO-RTO: 0 /100
PLATELET # BLD AUTO: 241 10E9/L (ref 150–450)
RBC # BLD AUTO: 4.24 10E12/L (ref 3.8–5.2)
WBC # BLD AUTO: 9.1 10E9/L (ref 4–11)

## 2019-06-08 PROCEDURE — 25000132 ZZH RX MED GY IP 250 OP 250 PS 637: Performed by: NURSE PRACTITIONER

## 2019-06-08 PROCEDURE — 36415 COLL VENOUS BLD VENIPUNCTURE: CPT | Performed by: NURSE PRACTITIONER

## 2019-06-08 PROCEDURE — 85025 COMPLETE CBC W/AUTO DIFF WBC: CPT | Performed by: NURSE PRACTITIONER

## 2019-06-08 PROCEDURE — 25000125 ZZHC RX 250: Performed by: NURSE PRACTITIONER

## 2019-06-08 PROCEDURE — 84450 TRANSFERASE (AST) (SGOT): CPT | Performed by: NURSE PRACTITIONER

## 2019-06-08 PROCEDURE — 00000146 ZZHCL STATISTIC GLUCOSE BY METER IP

## 2019-06-08 PROCEDURE — 12400000 ZZH R&B MH

## 2019-06-08 PROCEDURE — 84460 ALANINE AMINO (ALT) (SGPT): CPT | Performed by: NURSE PRACTITIONER

## 2019-06-08 RX ORDER — AMOXICILLIN 250 MG
2 CAPSULE ORAL AT BEDTIME
Status: DISCONTINUED | OUTPATIENT
Start: 2019-06-08 | End: 2019-07-02

## 2019-06-08 RX ADMIN — GABAPENTIN 1000 MG: 400 CAPSULE ORAL at 13:29

## 2019-06-08 RX ADMIN — PRAZOSIN HYDROCHLORIDE 1 MG: 1 CAPSULE ORAL at 20:40

## 2019-06-08 RX ADMIN — GABAPENTIN 1000 MG: 400 CAPSULE ORAL at 08:25

## 2019-06-08 RX ADMIN — HYDROXYZINE HYDROCHLORIDE 50 MG: 25 TABLET ORAL at 12:10

## 2019-06-08 RX ADMIN — METFORMIN HYDROCHLORIDE 1000 MG: 1000 TABLET ORAL at 08:25

## 2019-06-08 RX ADMIN — NICOTINE 1 PATCH: 21 PATCH, EXTENDED RELEASE TRANSDERMAL at 08:30

## 2019-06-08 RX ADMIN — INSULIN ASPART 3 UNITS: 100 INJECTION, SOLUTION INTRAVENOUS; SUBCUTANEOUS at 17:20

## 2019-06-08 RX ADMIN — SENNOSIDES AND DOCUSATE SODIUM 2 TABLET: 8.6; 5 TABLET ORAL at 20:39

## 2019-06-08 RX ADMIN — INSULIN GLARGINE 40 UNITS: 100 INJECTION, SOLUTION SUBCUTANEOUS at 20:42

## 2019-06-08 RX ADMIN — BENZTROPINE MESYLATE 0.5 MG: 0.5 TABLET ORAL at 08:25

## 2019-06-08 RX ADMIN — CHLORPROMAZINE HYDROCHLORIDE 100 MG: 100 TABLET, SUGAR COATED ORAL at 20:40

## 2019-06-08 RX ADMIN — CHLORPROMAZINE HYDROCHLORIDE 100 MG: 100 TABLET, SUGAR COATED ORAL at 13:29

## 2019-06-08 RX ADMIN — DEXTROSE 30 G: 15 GEL ORAL at 13:39

## 2019-06-08 RX ADMIN — HYDROXYZINE HYDROCHLORIDE 50 MG: 25 TABLET ORAL at 08:25

## 2019-06-08 RX ADMIN — BENZTROPINE MESYLATE 0.5 MG: 0.5 TABLET ORAL at 20:39

## 2019-06-08 RX ADMIN — TOPIRAMATE 100 MG: 100 TABLET, FILM COATED ORAL at 08:26

## 2019-06-08 RX ADMIN — CHLORPROMAZINE HYDROCHLORIDE 100 MG: 100 TABLET, SUGAR COATED ORAL at 08:26

## 2019-06-08 RX ADMIN — GABAPENTIN 1000 MG: 400 CAPSULE ORAL at 20:39

## 2019-06-08 ASSESSMENT — ACTIVITIES OF DAILY LIVING (ADL)
DRESS: SCRUBS (BEHAVIORAL HEALTH)
DRESS: SCRUBS (BEHAVIORAL HEALTH)
HYGIENE/GROOMING: INDEPENDENT
ORAL_HYGIENE: INDEPENDENT
ORAL_HYGIENE: INDEPENDENT
HYGIENE/GROOMING: INDEPENDENT

## 2019-06-08 NOTE — PLAN OF CARE
Face to face end of shift report received from Sally BRAMBILA RN. Rounding completed. Patient observed.     Crow Leyva  6/8/2019  12:10 AM     Problem: Adult Behavioral Health Plan of Care  Goal: Patient-Specific Goal (Individualization)  Description  Patient will attend at least 50% of groups while on unit.  Patient will report at least 6-8 hours of sleep at night.  Patient will be compliant with treatment team recommendations.  Patient will be medication compliant while hospitalized.  Patient will have decrease in delusional statements by discharge.         6/8/2019 0010 by Crow Leyva, RN  Outcome: No Change  Pt has been in bed with eyes closed and regular respirations observed all night. Will continue to monitor.    When pt was woken up for 0200 accucheck pt was irritable and labile with writer. Pt's blood glucose 161.      When pt was woken up for AM blood glucose and continued to be irritable. Pt blood glucose was 95. Pt stated that she did not have any symptoms of being low and declined any interventions. Pt agree to notify staff if she needs anything.     Pt had complaints of bilateral foot pain and stated that she needs an antibiotic for her infection. Will pass to next shift. Pt declined any PRN pain medications.     Problem: Fall Injury Risk  Goal: Absence of Fall and Fall-Related Injury  Description  Pt will not experience a fall while hospitalized.  Patient will have tap bell at bedside.       6/8/2019 0010 by Crow Leyva, RN  Outcome: Improving   Pt has remained free from falls at this time.

## 2019-06-08 NOTE — PLAN OF CARE
"  Problem: Adult Behavioral Health Plan of Care  Goal: Patient-Specific Goal (Individualization)  Description  Patient will attend at least 50% of groups while on unit.  Patient will report at least 6-8 hours of sleep at night.  Patient will be compliant with treatment team recommendations.  Patient will be medication compliant while hospitalized.  Patient will have decrease in delusional statements by discharge.         6/8/2019 1840 by Julia Covington RN  Outcome: No Change     Problem: Thought Process Alteration  Goal: Optimal Thought Clarity  Description  Patient will hold a reality based conversation by discharge.    6/8/2019 1840 by Julia Covington RN  Outcome: No Change     Problem: Violence Risk or Actual  Goal: Anger and Impulse Control  Description  PT will verbalize frustrations before making verbal threats to staff  PT will remain free from violence to staff or others while inpatient   6/8/2019 1840 by Julia Covington RN  Outcome: No Change     Problem: Fall Injury Risk  Goal: Absence of Fall and Fall-Related Injury  Description  Pt will not experience a fall while hospitalized.  Patient will have tap bell at bedside.       6/8/2019 1840 by Julia Covington RN  Outcome: Improving   Patient has been resting in bed most of the evening. When staff tries to talk to her,she is very irritable and dismissive. Tells staff to \"just leave and close the door \". States she is constipated but refused offer of milk of magnesia. \"That shit doesn't work.\" refused her scheduled atarax stating she didn't want it. Is steady on feet when up in lounge area. Has not made any delusional statements but does not talk with staff long enough.  Contacted provider with patient complaint of constipation, New order received. Patient refused her footsoak this evening. Remains very irritable.  Face to face end of shift report will be communicated to night shift nurse..     Julia Covington  6/8/2019  8:55 PM        "

## 2019-06-08 NOTE — PLAN OF CARE
"  Problem: Adult Behavioral Health Plan of Care  Goal: Patient-Specific Goal (Individualization)  Description  Patient will attend at least 50% of groups while on unit.  Patient will report at least 6-8 hours of sleep at night.  Patient will be compliant with treatment team recommendations.  Patient will be medication compliant while hospitalized.  Patient will have decrease in delusional statements by discharge.       Pt. Has not attended group therapy, slept 6.5 hours last night, compliant with treatment team recommendations, irritable at times, is medication compliant, talks to self occasionally, continues to make some delusional statements.  1320-  Patient walked up to door to Nurses station, states \"My blood sugar is low\"  1321- Blood glucose 45 mg/dl. Food given, eating vj crackers, chocolate pudding and orange juice. Refusing to take the glucose gel po at this time.  1339-  Pt. Feels \"dizzy\", took 30 gm glucose gel po.  1340-  Blood glucose 61 mg/dl. Eating more food.  1355-  Pt. States \"I feel so much better\",  Blood glucose 141 mg/dl.  1400-  Dr. Tompkins notified of low blood glucose levels, new orders noted in system.  1430- Pt. Sitting up in dayroom, states \"I feel fine\"  6/8/2019 0859 by Ayah Matos, RN  Outcome: No Change     Problem: Thought Process Alteration  Goal: Optimal Thought Clarity  Description  Patient will hold a reality based conversation by discharge.    Outcome: No Change   Pt. Not able to hold a reality based conversation, irritable with questions  Problem: Violence Risk or Actual  Goal: Anger and Impulse Control  Description  PT will verbalize frustrations before making verbal threats to staff  PT will remain free from violence to staff or others while inpatient   Outcome: No Change   Pt has not made any verbal threats, has been free from violence.  Problem: Fall Injury Risk  Goal: Absence of Fall and Fall-Related Injury  Description  Pt will not experience a fall while " hospitalized.  Patient will have tap bell at bedside.       6/8/2019 0859 by Ayah Matos, RN  Outcome: Improving   Pt. Has a tap bell at bedside, no falls noted or reported.  Face to face end of shift report will be communicated to afternoon shift RN.     Ayah Matos  6/8/2019  3:28 PM

## 2019-06-09 LAB
ALBUMIN SERPL-MCNC: 3.2 G/DL (ref 3.4–5)
ALP SERPL-CCNC: 87 U/L (ref 40–150)
ALT SERPL W P-5'-P-CCNC: 298 U/L (ref 0–50)
ANION GAP SERPL CALCULATED.3IONS-SCNC: 6 MMOL/L (ref 3–14)
AST SERPL W P-5'-P-CCNC: 60 U/L (ref 0–45)
BASOPHILS # BLD AUTO: 0 10E9/L (ref 0–0.2)
BASOPHILS NFR BLD AUTO: 0.5 %
BILIRUB DIRECT SERPL-MCNC: <0.1 MG/DL (ref 0–0.2)
BILIRUB SERPL-MCNC: 0.2 MG/DL (ref 0.2–1.3)
BUN SERPL-MCNC: 10 MG/DL (ref 7–30)
CALCIUM SERPL-MCNC: 8.4 MG/DL (ref 8.5–10.1)
CHLORIDE SERPL-SCNC: 114 MMOL/L (ref 94–109)
CO2 SERPL-SCNC: 22 MMOL/L (ref 20–32)
CREAT SERPL-MCNC: 0.58 MG/DL (ref 0.52–1.04)
DIFFERENTIAL METHOD BLD: ABNORMAL
EOSINOPHIL # BLD AUTO: 0.2 10E9/L (ref 0–0.7)
EOSINOPHIL NFR BLD AUTO: 2.6 %
ERYTHROCYTE [DISTWIDTH] IN BLOOD BY AUTOMATED COUNT: 13.3 % (ref 10–15)
GFR SERPL CREATININE-BSD FRML MDRD: >90 ML/MIN/{1.73_M2}
GLUCOSE BLDC GLUCOMTR-MCNC: 166 MG/DL (ref 70–99)
GLUCOSE BLDC GLUCOMTR-MCNC: 196 MG/DL (ref 70–99)
GLUCOSE BLDC GLUCOMTR-MCNC: 260 MG/DL (ref 70–99)
GLUCOSE BLDC GLUCOMTR-MCNC: 273 MG/DL (ref 70–99)
GLUCOSE BLDC GLUCOMTR-MCNC: 302 MG/DL (ref 70–99)
GLUCOSE SERPL-MCNC: 234 MG/DL (ref 70–99)
HCT VFR BLD AUTO: 35.4 % (ref 35–47)
HGB BLD-MCNC: 11.6 G/DL (ref 11.7–15.7)
IMM GRANULOCYTES # BLD: 0.1 10E9/L (ref 0–0.4)
IMM GRANULOCYTES NFR BLD: 0.6 %
LYMPHOCYTES # BLD AUTO: 2.5 10E9/L (ref 0.8–5.3)
LYMPHOCYTES NFR BLD AUTO: 30.8 %
MCH RBC QN AUTO: 28 PG (ref 26.5–33)
MCHC RBC AUTO-ENTMCNC: 32.8 G/DL (ref 31.5–36.5)
MCV RBC AUTO: 85 FL (ref 78–100)
MONOCYTES # BLD AUTO: 0.5 10E9/L (ref 0–1.3)
MONOCYTES NFR BLD AUTO: 6.3 %
NEUTROPHILS # BLD AUTO: 4.7 10E9/L (ref 1.6–8.3)
NEUTROPHILS NFR BLD AUTO: 59.2 %
NRBC # BLD AUTO: 0 10*3/UL
NRBC BLD AUTO-RTO: 0 /100
PLATELET # BLD AUTO: 227 10E9/L (ref 150–450)
POTASSIUM SERPL-SCNC: 4.8 MMOL/L (ref 3.4–5.3)
PROT SERPL-MCNC: 6.4 G/DL (ref 6.8–8.8)
RBC # BLD AUTO: 4.15 10E12/L (ref 3.8–5.2)
SODIUM SERPL-SCNC: 142 MMOL/L (ref 133–144)
WBC # BLD AUTO: 8 10E9/L (ref 4–11)

## 2019-06-09 PROCEDURE — 00000146 ZZHCL STATISTIC GLUCOSE BY METER IP

## 2019-06-09 PROCEDURE — 85025 COMPLETE CBC W/AUTO DIFF WBC: CPT | Performed by: NURSE PRACTITIONER

## 2019-06-09 PROCEDURE — 25000132 ZZH RX MED GY IP 250 OP 250 PS 637: Performed by: NURSE PRACTITIONER

## 2019-06-09 PROCEDURE — 82248 BILIRUBIN DIRECT: CPT | Performed by: NURSE PRACTITIONER

## 2019-06-09 PROCEDURE — 12400000 ZZH R&B MH

## 2019-06-09 PROCEDURE — 80053 COMPREHEN METABOLIC PANEL: CPT | Performed by: NURSE PRACTITIONER

## 2019-06-09 PROCEDURE — 99232 SBSQ HOSP IP/OBS MODERATE 35: CPT | Performed by: NURSE PRACTITIONER

## 2019-06-09 PROCEDURE — 36415 COLL VENOUS BLD VENIPUNCTURE: CPT | Performed by: NURSE PRACTITIONER

## 2019-06-09 RX ORDER — BUSPIRONE HYDROCHLORIDE 5 MG/1
10 TABLET ORAL 3 TIMES DAILY
Status: DISCONTINUED | OUTPATIENT
Start: 2019-06-09 | End: 2019-06-19

## 2019-06-09 RX ADMIN — BUSPIRONE HYDROCHLORIDE 10 MG: 5 TABLET ORAL at 13:37

## 2019-06-09 RX ADMIN — INSULIN ASPART 1 UNITS: 100 INJECTION, SOLUTION INTRAVENOUS; SUBCUTANEOUS at 08:09

## 2019-06-09 RX ADMIN — INSULIN GLARGINE 40 UNITS: 100 INJECTION, SOLUTION SUBCUTANEOUS at 20:55

## 2019-06-09 RX ADMIN — GABAPENTIN 1000 MG: 400 CAPSULE ORAL at 08:09

## 2019-06-09 RX ADMIN — BUSPIRONE HYDROCHLORIDE 10 MG: 5 TABLET ORAL at 09:56

## 2019-06-09 RX ADMIN — TOPIRAMATE 100 MG: 100 TABLET, FILM COATED ORAL at 08:09

## 2019-06-09 RX ADMIN — BENZTROPINE MESYLATE 0.5 MG: 0.5 TABLET ORAL at 20:53

## 2019-06-09 RX ADMIN — GABAPENTIN 1000 MG: 400 CAPSULE ORAL at 13:37

## 2019-06-09 RX ADMIN — HYDROXYZINE HYDROCHLORIDE 50 MG: 25 TABLET ORAL at 08:09

## 2019-06-09 RX ADMIN — PRAZOSIN HYDROCHLORIDE 1 MG: 1 CAPSULE ORAL at 20:53

## 2019-06-09 RX ADMIN — GABAPENTIN 1000 MG: 400 CAPSULE ORAL at 20:53

## 2019-06-09 RX ADMIN — CHLORPROMAZINE HYDROCHLORIDE 100 MG: 100 TABLET, SUGAR COATED ORAL at 13:37

## 2019-06-09 RX ADMIN — CHLORPROMAZINE HYDROCHLORIDE 100 MG: 100 TABLET, SUGAR COATED ORAL at 20:53

## 2019-06-09 RX ADMIN — NICOTINE 1 PATCH: 21 PATCH, EXTENDED RELEASE TRANSDERMAL at 08:09

## 2019-06-09 RX ADMIN — BUSPIRONE HYDROCHLORIDE 10 MG: 5 TABLET ORAL at 20:52

## 2019-06-09 RX ADMIN — INSULIN ASPART 4 UNITS: 100 INJECTION, SOLUTION INTRAVENOUS; SUBCUTANEOUS at 11:57

## 2019-06-09 RX ADMIN — BENZTROPINE MESYLATE 0.5 MG: 0.5 TABLET ORAL at 08:09

## 2019-06-09 RX ADMIN — CHLORPROMAZINE HYDROCHLORIDE 100 MG: 100 TABLET, SUGAR COATED ORAL at 08:09

## 2019-06-09 RX ADMIN — SENNOSIDES AND DOCUSATE SODIUM 2 TABLET: 8.6; 5 TABLET ORAL at 20:53

## 2019-06-09 RX ADMIN — INSULIN ASPART 2 UNITS: 100 INJECTION, SOLUTION INTRAVENOUS; SUBCUTANEOUS at 16:48

## 2019-06-09 ASSESSMENT — ACTIVITIES OF DAILY LIVING (ADL)
ORAL_HYGIENE: INDEPENDENT
DRESS: SCRUBS (BEHAVIORAL HEALTH)
HYGIENE/GROOMING: INDEPENDENT
ORAL_HYGIENE: INDEPENDENT
DRESS: SCRUBS (BEHAVIORAL HEALTH)
HYGIENE/GROOMING: INDEPENDENT

## 2019-06-09 NOTE — PROGRESS NOTES
"[facility]  Psychiatric Progress Note      Impression:   Patient lying down in her room, she tells me she finally had a BM and feels much better, would like to keep Senna as is.  She reports feeling like the reduction in hydroxyzine has helped with her cotton mouth, and she requests we discontinue it and start her back on buspirone, which she tells me she was on for years with good results.  Patient continues to do well taking the thorazine, she reports it \"takes my rage away\".  She feels she was more irritable past couple days because of constipation.  She denies si/hi.  Patient had drop in blood sugar past couple days, RN spoke with Hospitalist yesterday and he discontinued her scheduled meal-time insulin and discontinued her Metformin.    Patient's ALT and AST have come back down significantly, will continue to monitor - we discontinue Lipitor yesterday after Pharm consult - may need to consider alternative.          Diagnoses:      Schizoaffective disorder, bipolar type   Methamphetamine use disorder, moderate to severe            Plan:   Discontinue Hydroxyzine and start buspirone 10mg tid  Continue other medications as prescribed    Hospitalist following diabetes - continue foot soaks    [place] is going forward with petition for commitment    ELOS:  > 5 days due to symptoms of psychosis and delusions along with Civil Commitment process      Attestation:  Patient has been seen and evaluated by me,  LATRICE Du CNP          Interim History:   The patient's care was discussed with the treatment team and chart notes were reviewed.          Medications:     Current Facility-Administered Medications   Medication     benzocaine-menthol (CEPACOL) 15-3.6 MG lozenge 1 lozenge     benztropine (COGENTIN) tablet 0.5 mg     chlorproMAZINE (THORAZINE) tablet 100 mg     glucose gel 15-30 g    Or     dextrose 50 % injection 25-50 mL    Or     glucagon injection 1 mg     gabapentin (NEURONTIN) " "capsule 1,000 mg     hydrOXYzine (ATARAX) tablet 50 mg     ibuprofen (ADVIL/MOTRIN) tablet 400 mg     insulin aspart (NovoLOG) inj (RAPID ACTING)     insulin glargine (LANTUS PEN) injection 40 Units     magnesium hydroxide (MILK OF MAGNESIA) suspension 30 mL     magnesium sulfate (EPSOM SALT) granules 5 g     nicotine (NICODERM CQ) 21 MG/24HR 24 hr patch 1 patch     nicotine (NICORETTE) gum 2-4 mg     nicotine Patch in Place     nicotine patch REMOVAL     prazosin (MINIPRESS) capsule 1 mg     senna-docusate (SENOKOT-S/PERICOLACE) 8.6-50 MG per tablet 2 tablet     topiramate (TOPAMAX) tablet 100 mg          10 point ROS negative \"pain everywhere\"       Allergies:     Allergies   Allergen Reactions     Haloperidol Other (See Comments)     Other reaction(s): Seizures  Patient states, \"I fell down and wasn't able to get up.\"  Patient states, \"I fell down and wasn't able to get up.\"              Psychiatric Examination:   /59   Pulse 102   Temp 99.1  F (37.3  C) (Tympanic)   Resp 16   Ht 1.626 m (5' 4\")   Wt 86.5 kg (190 lb 9.6 oz)   SpO2 96%   BMI 32.72 kg/m    Weight is 190 lbs 9.6 oz  Body mass index is 32.72 kg/m .    Appearance:  awake, alert   Attitude:  cooperative  Eye Contact:  fair  Mood:  Improving  Affect: mood congruent  Speech:  pressured speech - better  Psychomotor Behavior:  no evidence of tardive dyskinesia, dystonia, or tics  Thought Process: less disorganized  Associations:  loosening of associations present   Thought Content:  Denies SI and Hi. Reports a decrease in AH  Insight:  limited  Judgment:  poor  Oriented to:  time, person, and place  Attention Span and Concentration:  limited  Recent and Remote Memory:  fair  Fund of Knowledge: low-normal  Muscle Strength and Tone: normal  Gait and Station: Normal         Labs:       Results for orders placed or performed during the hospital encounter of 05/10/19 (from the past 24 hour(s))   Glucose by meter   Result Value Ref Range    Glucose " 98 70 - 99 mg/dL   Glucose by meter   Result Value Ref Range    Glucose 45 (LL) 70 - 99 mg/dL   Glucose by meter   Result Value Ref Range    Glucose 61 (L) 70 - 99 mg/dL   Glucose by meter   Result Value Ref Range    Glucose 141 (H) 70 - 99 mg/dL   Glucose by meter   Result Value Ref Range    Glucose 253 (H) 70 - 99 mg/dL   Glucose by meter   Result Value Ref Range    Glucose 287 (H) 70 - 99 mg/dL   Glucose by meter   Result Value Ref Range    Glucose 166 (H) 70 - 99 mg/dL   Comprehensive metabolic panel   Result Value Ref Range    Sodium 142 133 - 144 mmol/L    Potassium 4.8 3.4 - 5.3 mmol/L    Chloride 114 (H) 94 - 109 mmol/L    Carbon Dioxide 22 20 - 32 mmol/L    Anion Gap 6 3 - 14 mmol/L    Glucose 234 (H) 70 - 99 mg/dL    Urea Nitrogen 10 7 - 30 mg/dL    Creatinine 0.58 0.52 - 1.04 mg/dL    GFR Estimate >90 >60 mL/min/[1.73_m2]    GFR Estimate If Black >90 >60 mL/min/[1.73_m2]    Calcium 8.4 (L) 8.5 - 10.1 mg/dL    Bilirubin Total 0.2 0.2 - 1.3 mg/dL    Albumin 3.2 (L) 3.4 - 5.0 g/dL    Protein Total 6.4 (L) 6.8 - 8.8 g/dL    Alkaline Phosphatase 87 40 - 150 U/L     (H) 0 - 50 U/L    AST 60 (H) 0 - 45 U/L   CBC with platelets differential   Result Value Ref Range    WBC 8.0 4.0 - 11.0 10e9/L    RBC Count 4.15 3.8 - 5.2 10e12/L    Hemoglobin 11.6 (L) 11.7 - 15.7 g/dL    Hematocrit 35.4 35.0 - 47.0 %    MCV 85 78 - 100 fl    MCH 28.0 26.5 - 33.0 pg    MCHC 32.8 31.5 - 36.5 g/dL    RDW 13.3 10.0 - 15.0 %    Platelet Count 227 150 - 450 10e9/L    Diff Method Automated Method     % Neutrophils 59.2 %    % Lymphocytes 30.8 %    % Monocytes 6.3 %    % Eosinophils 2.6 %    % Basophils 0.5 %    % Immature Granulocytes 0.6 %    Nucleated RBCs 0 0 /100    Absolute Neutrophil 4.7 1.6 - 8.3 10e9/L    Absolute Lymphocytes 2.5 0.8 - 5.3 10e9/L    Absolute Monocytes 0.5 0.0 - 1.3 10e9/L    Absolute Eosinophils 0.2 0.0 - 0.7 10e9/L    Absolute Basophils 0.0 0.0 - 0.2 10e9/L    Abs Immature Granulocytes 0.1 0 - 0.4 10e9/L     Absolute Nucleated RBC 0.0    Bilirubin direct   Result Value Ref Range    Bilirubin Direct <0.1 0.0 - 0.2 mg/dL

## 2019-06-09 NOTE — PLAN OF CARE
"  Problem: Adult Behavioral Health Plan of Care  Goal: Patient-Specific Goal (Individualization)  Description  Patient will attend at least 50% of groups while on unit.  Patient will report at least 6-8 hours of sleep at night.  Patient will be compliant with treatment team recommendations.  Patient will be medication compliant while hospitalized.  Patient will have decrease in delusional statements by discharge       6/9/2019 0938 by Ayah Matos RN  Outcome: No Change   Pt. Is not attending group therapy, compliant with treatment team recommendations, medication compliant, slept 7.5 hours, will continue to monitor.  Problem: Thought Process Alteration  Goal: Optimal Thought Clarity  Description  Patient will hold a reality based conversation by discharge.    6/9/2019 0938 by Ayah Matos RN  Outcome: No Change   Pt. Irritable with conversation, does not have reality based conversation.  Problem: Fall Injury Risk  Goal: Absence of Fall and Fall-Related Injury  Description  Pt will not experience a fall while hospitalized.  Patient will have tap bell at bedside.       6/9/2019 0938 by Ayah Matos RN  Outcome: Improving   Pt. Has not had a fall, gait is steady.  Problem: Violence Risk or Actual  Goal: Anger and Impulse Control  Description  PT will verbalize frustrations before making verbal threats to staff  PT will remain free from violence to staff or others while inpatient   6/9/2019 0938 by Ayah Matos RN  Outcome: Improving  Pt. Has not made verbal threats, has been free from violence, but is irritable.  Face to face end of shift report will be communicated to afternoon shift RN.   1510-  Pt. Reports feeling \"crappy\", requested blood sugar to be checked, results= 260 mg/dl. Pt. Sitting up in lounge.    Ayah Matos  6/9/2019  12:06 PM        "

## 2019-06-09 NOTE — PLAN OF CARE
Problem: Adult Behavioral Health Plan of Care  Goal: Patient-Specific Goal (Individualization)  Description  Patient will attend at least 50% of groups while on unit.  Patient will report at least 6-8 hours of sleep at night.  Patient will be compliant with treatment team recommendations.  Patient will be medication compliant while hospitalized.  Patient will have decrease in delusional statements by discharge.         6/9/2019 0151 by Ayah Rodriguez, RN  Note:   Report received from Shital lama. Pt observed sleeping in supine position with regular and unlabored respirations.    Pt has been in bed with eyes closed and regular respirations. 15 minute and PRN checks all night. No complaints offered. Will continue to monitor.    AM           Problem: Thought Process Alteration  Goal: Optimal Thought Clarity  Description  Patient will hold a reality based conversation by discharge.    6/9/2019 0151 by Ayah Rodriguez RN  Note:   Pt has been sleeping     Problem: Violence Risk or Actual  Goal: Anger and Impulse Control  Description  PT will verbalize frustrations before making verbal threats to staff  PT will remain free from violence to staff or others while inpatient   6/9/2019 0151 by Ayah Rodriguez, RN  Note:   Pt has been sleeping. Pt was pleasant and cooperative with writer during accucheck this am and reported feeling a little better than yesterday.      Problem: Fall Injury Risk  Goal: Absence of Fall and Fall-Related Injury  Description  Pt will not experience a fall while hospitalized.  Patient will have tap bell at bedside.       6/9/2019 0151 by Ayah Rodriguez, RN  Note:   Pt has been sleeping and remained free of falls and injury this NOC shift     Problem: Adult Behavioral Health Plan of Care  Goal: Adheres to Safety Considerations for Self and Others  Note:   Pt has been safe with herself and others, although verbally aggressive with staff on previous shifts.     Face to  face end of shift report communicated to Ayah Rodriguez  6/09/2019  7 AM

## 2019-06-10 LAB
GLUCOSE BLDC GLUCOMTR-MCNC: 169 MG/DL (ref 70–99)
GLUCOSE BLDC GLUCOMTR-MCNC: 241 MG/DL (ref 70–99)
GLUCOSE BLDC GLUCOMTR-MCNC: 242 MG/DL (ref 70–99)
GLUCOSE BLDC GLUCOMTR-MCNC: 249 MG/DL (ref 70–99)
GLUCOSE BLDC GLUCOMTR-MCNC: 294 MG/DL (ref 70–99)
GLUCOSE BLDC GLUCOMTR-MCNC: 302 MG/DL (ref 70–99)
GLUCOSE BLDC GLUCOMTR-MCNC: 307 MG/DL (ref 70–99)

## 2019-06-10 PROCEDURE — 25000132 ZZH RX MED GY IP 250 OP 250 PS 637: Performed by: NURSE PRACTITIONER

## 2019-06-10 PROCEDURE — 00000146 ZZHCL STATISTIC GLUCOSE BY METER IP

## 2019-06-10 PROCEDURE — 99232 SBSQ HOSP IP/OBS MODERATE 35: CPT | Performed by: NURSE PRACTITIONER

## 2019-06-10 PROCEDURE — 12400000 ZZH R&B MH

## 2019-06-10 RX ADMIN — CHLORPROMAZINE HYDROCHLORIDE 100 MG: 100 TABLET, SUGAR COATED ORAL at 20:18

## 2019-06-10 RX ADMIN — GABAPENTIN 1000 MG: 400 CAPSULE ORAL at 08:30

## 2019-06-10 RX ADMIN — BUSPIRONE HYDROCHLORIDE 10 MG: 5 TABLET ORAL at 08:30

## 2019-06-10 RX ADMIN — NICOTINE 1 PATCH: 21 PATCH, EXTENDED RELEASE TRANSDERMAL at 08:30

## 2019-06-10 RX ADMIN — GABAPENTIN 1000 MG: 400 CAPSULE ORAL at 20:17

## 2019-06-10 RX ADMIN — GABAPENTIN 1000 MG: 400 CAPSULE ORAL at 13:45

## 2019-06-10 RX ADMIN — IBUPROFEN 400 MG: 200 TABLET, FILM COATED ORAL at 20:21

## 2019-06-10 RX ADMIN — PRAZOSIN HYDROCHLORIDE 1 MG: 1 CAPSULE ORAL at 20:17

## 2019-06-10 RX ADMIN — BENZTROPINE MESYLATE 0.5 MG: 0.5 TABLET ORAL at 08:30

## 2019-06-10 RX ADMIN — INSULIN ASPART 3 UNITS: 100 INJECTION, SOLUTION INTRAVENOUS; SUBCUTANEOUS at 12:19

## 2019-06-10 RX ADMIN — CHLORPROMAZINE HYDROCHLORIDE 100 MG: 100 TABLET, SUGAR COATED ORAL at 08:30

## 2019-06-10 RX ADMIN — BUSPIRONE HYDROCHLORIDE 10 MG: 5 TABLET ORAL at 20:17

## 2019-06-10 RX ADMIN — INSULIN ASPART 3 UNITS: 100 INJECTION, SOLUTION INTRAVENOUS; SUBCUTANEOUS at 16:52

## 2019-06-10 RX ADMIN — BENZTROPINE MESYLATE 0.5 MG: 0.5 TABLET ORAL at 20:17

## 2019-06-10 RX ADMIN — CHLORPROMAZINE HYDROCHLORIDE 100 MG: 100 TABLET, SUGAR COATED ORAL at 13:46

## 2019-06-10 RX ADMIN — SENNOSIDES AND DOCUSATE SODIUM 2 TABLET: 8.6; 5 TABLET ORAL at 20:18

## 2019-06-10 RX ADMIN — BUSPIRONE HYDROCHLORIDE 10 MG: 5 TABLET ORAL at 13:46

## 2019-06-10 RX ADMIN — INSULIN GLARGINE 40 UNITS: 100 INJECTION, SOLUTION SUBCUTANEOUS at 20:22

## 2019-06-10 RX ADMIN — INSULIN ASPART 1 UNITS: 100 INJECTION, SOLUTION INTRAVENOUS; SUBCUTANEOUS at 08:30

## 2019-06-10 RX ADMIN — TOPIRAMATE 100 MG: 100 TABLET, FILM COATED ORAL at 08:30

## 2019-06-10 ASSESSMENT — ACTIVITIES OF DAILY LIVING (ADL)
HYGIENE/GROOMING: INDEPENDENT
LAUNDRY: UNABLE TO COMPLETE
ORAL_HYGIENE: INDEPENDENT
DRESS: SCRUBS (BEHAVIORAL HEALTH);INDEPENDENT
HYGIENE/GROOMING: INDEPENDENT
ORAL_HYGIENE: INDEPENDENT
DRESS: SCRUBS (BEHAVIORAL HEALTH)

## 2019-06-10 NOTE — PLAN OF CARE
Face to face end of shift report communicated from Julia DANIEL RN. Pt in bed, appears to be sleeping at start of shift.     Julia Franco  6/9/2019  11:53 PM       Problem: Adult Behavioral Health Plan of Care  Goal: Patient-Specific Goal (Individualization)  Description  Patient will attend at least 50% of groups while on unit.  Patient will report at least 6-8 hours of sleep at night.  Patient will be compliant with treatment team recommendations.  Patient will be medication compliant while hospitalized.  Patient will have decrease in delusional statements by discharge.  Pt in room laying on bed with eyes closed with regular respirations and position changes.       6/9/2019 2352 by Julia Franco, RN  Outcome: Improving     Problem: Thought Process Alteration  Goal: Optimal Thought Clarity  Description  Patient will hold a reality based conversation by discharge.    6/9/2019 2352 by Julia Franco RN  Outcome: Improving     Problem: Fall Injury Risk  Goal: Absence of Fall and Fall-Related Injury  Description  Pt will not experience a fall while hospitalized.  Patient will have tap bell at bedside.     Pt has not fallen this shift.       6/9/2019 2352 by Julia Franco, RN  Outcome: Improving     Problem: Violence Risk or Actual  Goal: Anger and Impulse Control  Description  PT will verbalize frustrations before making verbal threats to staff  PT will remain free from violence to staff or others while inpatient   Pt has not had any  Violent outbursts this shift.   6/9/2019 2352 by Julia Franco RN  Outcome: Improving    Face to face end of shift report communicated to oncoming RN. Reported that pt is a fall risk at risk of violence.     Julia Franco  6/9/2019  11:53 PM
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
## 2019-06-10 NOTE — PLAN OF CARE
BEHAVIORAL TEAM DISCUSSION    Participants: Natalia Fabian NP, Jabari [facility], Angie Laws LSW, Becca Chery LSW, Jenny Cardenas RN, Krys Rojo RN, Radha Bills RN, Mady Holman OT, Natalia Costello OT, Gena Gomez OT  Progress: minimal  Continued Stay Criteria/Rationale: unable to discharge to lesser level of care due to inability to care for self, high risk of relapse of psychosis, currently experiencing flight of ideas, appears to be responding to internal stimuli  Medical/Physical: Diabetes - working with hospitalist, liver enzymes have decreased, hx of Hep C, foot soaks are changed to PRN  Precautions:   Falls precaution?: No   Behavioral Orders   Procedures    Code 1 - Restrict to Unit    Routine Programming     As clinically indicated    Status 15     Every 15 minutes.     Plan: IRTS referrals sent, continue to stabilize  Rationale for change in precautions or plan: None    Active Problems:    Mixed hyperlipidemia    Diabetes mellitus, type 2    Schizoaffective disorder, bipolar type (H)      Current Facility-Administered Medications:     benzocaine-menthol (CEPACOL) 15-3.6 MG lozenge 1 lozenge, 1 lozenge, Buccal, Q1H PRN, Natalia Fabian APRN CNP    benztropine (COGENTIN) tablet 0.5 mg, 0.5 mg, Oral, BID, Loren Zuleta, NP, 0.5 mg at 06/10/19 0830    busPIRone (BUSPAR) tablet 10 mg, 10 mg, Oral, TID, Natalia Fabian APRN CNP, 10 mg at 06/10/19 0830    chlorproMAZINE (THORAZINE) tablet 100 mg, 100 mg, Oral, TID, Rolando Zuletaista L, NP, 100 mg at 06/10/19 0830    glucose gel 15-30 g, 15-30 g, Oral, Q15 Min PRN, 30 g at 06/08/19 1339 **OR** dextrose 50 % injection 25-50 mL, 25-50 mL, Intravenous, Q15 Min PRN **OR** glucagon injection 1 mg, 1 mg, Subcutaneous, Q15 Min PRN, Airam Light NP    gabapentin (NEURONTIN) capsule 1,000 mg, 1,000 mg, Oral, TID, Loren Zuleta NP, 1,000 mg at 06/10/19 0830    ibuprofen (ADVIL/MOTRIN) tablet 400 mg, 400 mg, Oral, Q6H PRN, Caren,  LATRICE Rizo CNP, 400 mg at 06/01/19 0645    insulin aspart (NovoLOG) inj (RAPID ACTING), 1-7 Units, Subcutaneous, TID AC, Airam Light NP, 1 Units at 06/10/19 0830    insulin glargine (LANTUS PEN) injection 40 Units, 40 Units, Subcutaneous, At Bedtime, Rocio Tompkins MD, 40 Units at 06/09/19 2055    magnesium hydroxide (MILK OF MAGNESIA) suspension 30 mL, 30 mL, Oral, Daily PRN, Loren Zuleta NP, 30 mL at 06/07/19 1645    magnesium sulfate (EPSOM SALT) granules 5 g, 5 g, Other, BID PRN, 5 g at 05/30/19 1411 **AND** Patient care order, , , Per Unit Routine, Natalia Fabian APRN CNP    nicotine (NICODERM CQ) 21 MG/24HR 24 hr patch 1 patch, 1 patch, Transdermal, Daily, Loren Zuleta NP, 1 patch at 06/10/19 0830    nicotine (NICORETTE) gum 2-4 mg, 2-4 mg, Buccal, Q1H PRN, Raven Stout NP, 4 mg at 05/21/19 0938    nicotine Patch in Place, , Transdermal, Q8H, Loren Zuleta NP, Stopped at 06/10/19 0153    nicotine patch REMOVAL, , Transdermal, Daily, Loren Zuleta NP, Stopped at 06/01/19 0842    prazosin (MINIPRESS) capsule 1 mg, 1 mg, Oral, At Bedtime, Airam Light NP, 1 mg at 06/09/19 2053    senna-docusate (SENOKOT-S/PERICOLACE) 8.6-50 MG per tablet 2 tablet, 2 tablet, Oral, At Bedtime, Airam Light NP, 2 tablet at 06/09/19 2053    topiramate (TOPAMAX) tablet 100 mg, 100 mg, Oral, Daily, Loren Zuleta NP, 100 mg at 06/10/19 0830

## 2019-06-10 NOTE — PROGRESS NOTES
[facility]  Psychiatric Progress Note      Impression:   Patient in her room, reports feeling much better today with the change to buspirone and physically feeling better with insulin changes.  She asks again about when she will be leaving, inform patient we meet as a team this morning and will inquire if there has been any word yet, which is doubtful early Monday morning.  She accepts this and we agree to no changes today.  Patient denies si/hi, denies voices, denies feelings of rage or anger, and appears brighter affect today, smiles and overall improvement.         Diagnoses:      Schizoaffective disorder, bipolar type   Methamphetamine use disorder, moderate to severe            Plan:   Changes this week:  Discontinue mealtime insulin - per hospitalist  Discontinue metformine - per hospitalist  Discontinue Lipitor  Discontinue Tylenol  Discontinue Hydroxyzine   Started buspirone 10mg tid  Continue other medications as prescribed    Hospitalist following diabetes - continue foot soaks    [place] [facility] is going forward with petition for commitment    ELOS:  > 5 days for safe discharge plan  Re-check Liver panel within the week    Attestation:  Patient has been seen and evaluated by me,  LATRICE Du CNP          Interim History:   The patient's care was discussed with the treatment team and chart notes were reviewed.          Medications:     Current Facility-Administered Medications   Medication     benzocaine-menthol (CEPACOL) 15-3.6 MG lozenge 1 lozenge     benztropine (COGENTIN) tablet 0.5 mg     busPIRone (BUSPAR) tablet 10 mg     chlorproMAZINE (THORAZINE) tablet 100 mg     glucose gel 15-30 g    Or     dextrose 50 % injection 25-50 mL    Or     glucagon injection 1 mg     gabapentin (NEURONTIN) capsule 1,000 mg     ibuprofen (ADVIL/MOTRIN) tablet 400 mg     insulin aspart (NovoLOG) inj (RAPID ACTING)     insulin glargine (LANTUS PEN) injection 40 Units     magnesium hydroxide (MILK OF  "MAGNESIA) suspension 30 mL     magnesium sulfate (EPSOM SALT) granules 5 g     nicotine (NICODERM CQ) 21 MG/24HR 24 hr patch 1 patch     nicotine (NICORETTE) gum 2-4 mg     nicotine Patch in Place     nicotine patch REMOVAL     prazosin (MINIPRESS) capsule 1 mg     senna-docusate (SENOKOT-S/PERICOLACE) 8.6-50 MG per tablet 2 tablet     topiramate (TOPAMAX) tablet 100 mg          10 point ROS negative \"pain everywhere\"       Allergies:     Allergies   Allergen Reactions     Haloperidol Other (See Comments)     Other reaction(s): Seizures  Patient states, \"I fell down and wasn't able to get up.\"  Patient states, \"I fell down and wasn't able to get up.\"              Psychiatric Examination:   /75   Pulse 84   Temp 98.4  F (36.9  C) (Tympanic)   Resp 16   Ht 1.626 m (5' 4\")   Wt 86.5 kg (190 lb 9.6 oz)   SpO2 99%   BMI 32.72 kg/m    Weight is 190 lbs 9.6 oz  Body mass index is 32.72 kg/m .    Appearance:  awake, alert   Attitude:  cooperative  Eye Contact:  fair  Mood:  Improving  Affect: mood congruent  Speech:  pressured speech - better  Psychomotor Behavior:  no evidence of tardive dyskinesia, dystonia, or tics  Thought Process: less disorganized, much better  Associations:  loosening of associations present   Thought Content:  Denies SI and Hi. Reports a decrease in AH  Insight:  limited  Judgment:  poor  Oriented to:  time, person, and place  Attention Span and Concentration:  limited  Recent and Remote Memory:  fair  Fund of Knowledge: low-normal  Muscle Strength and Tone: normal  Gait and Station: Normal         Labs:       Results for orders placed or performed during the hospital encounter of 05/10/19 (from the past 24 hour(s))   Glucose by meter   Result Value Ref Range    Glucose 302 (H) 70 - 99 mg/dL   Glucose by meter   Result Value Ref Range    Glucose 260 (H) 70 - 99 mg/dL   Glucose by meter   Result Value Ref Range    Glucose 196 (H) 70 - 99 mg/dL   Glucose by meter   Result Value Ref Range "    Glucose 273 (H) 70 - 99 mg/dL   Glucose by meter   Result Value Ref Range    Glucose 169 (H) 70 - 99 mg/dL
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
## 2019-06-10 NOTE — PLAN OF CARE
"  Problem: Adult Behavioral Health Plan of Care  Goal: Patient-Specific Goal (Individualization)  Description  Patient will attend at least 50% of groups while on unit.  Patient will report at least 6-8 hours of sleep at night.  Patient will be compliant with treatment team recommendations.  Patient will be medication compliant while hospitalized.  Patient will have decrease in delusional statements by discharge.         6/9/2019 1929 by Julia Covington RN  Outcome: No Change     Problem: Thought Process Alteration  Goal: Optimal Thought Clarity  Description  Patient will hold a reality based conversation by discharge.    6/9/2019 1929 by Jluia Covington RN  Outcome: No Change     Problem: Violence Risk or Actual  Goal: Anger and Impulse Control  Description  PT will verbalize frustrations before making verbal threats to staff  PT will remain free from violence to staff or others while inpatient   6/9/2019 1929 by Julia Covington RN  Outcome: No Change     Problem: Fall Injury Risk  Goal: Absence of Fall and Fall-Related Injury  Description  Pt will not experience a fall while hospitalized.  Patient will have tap bell at bedside.       6/9/2019 1929 by Julia Covington RN  Outcome: Improving   Patient has been out of room most of the evening. States she does feel much better since having a bowel movement. States she is \"so fucking happy that she can take Buspar again.\" Did attend groups this evening. Played game of cards with other patient. Has had no verbal outbursts. Is steady on her feet when up and about. Did refuse her foot soak this evening. Patient keeps conversation brief and is dismissive.    Face to face end of shift report communicated to nightshift RN.     Julia Covington  6/9/2019  9:24 PM        "

## 2019-06-11 LAB
GLUCOSE BLDC GLUCOMTR-MCNC: 171 MG/DL (ref 70–99)
GLUCOSE BLDC GLUCOMTR-MCNC: 280 MG/DL (ref 70–99)
GLUCOSE BLDC GLUCOMTR-MCNC: 287 MG/DL (ref 70–99)
GLUCOSE BLDC GLUCOMTR-MCNC: 307 MG/DL (ref 70–99)

## 2019-06-11 PROCEDURE — 25000132 ZZH RX MED GY IP 250 OP 250 PS 637: Performed by: NURSE PRACTITIONER

## 2019-06-11 PROCEDURE — 99232 SBSQ HOSP IP/OBS MODERATE 35: CPT | Performed by: NURSE PRACTITIONER

## 2019-06-11 PROCEDURE — 00000146 ZZHCL STATISTIC GLUCOSE BY METER IP

## 2019-06-11 PROCEDURE — 25000131 ZZH RX MED GY IP 250 OP 636 PS 637: Performed by: INTERNAL MEDICINE

## 2019-06-11 PROCEDURE — 12400000 ZZH R&B MH

## 2019-06-11 RX ORDER — AMLODIPINE AND BENAZEPRIL HYDROCHLORIDE 5; 20 MG/1; MG/1
1 CAPSULE ORAL DAILY
Status: DISCONTINUED | OUTPATIENT
Start: 2019-06-11 | End: 2019-06-11 | Stop reason: CLARIF

## 2019-06-11 RX ADMIN — PRAZOSIN HYDROCHLORIDE 1 MG: 1 CAPSULE ORAL at 21:11

## 2019-06-11 RX ADMIN — GABAPENTIN 1000 MG: 400 CAPSULE ORAL at 14:00

## 2019-06-11 RX ADMIN — INSULIN ASPART 3 UNITS: 100 INJECTION, SOLUTION INTRAVENOUS; SUBCUTANEOUS at 12:12

## 2019-06-11 RX ADMIN — TOPIRAMATE 100 MG: 100 TABLET, FILM COATED ORAL at 08:46

## 2019-06-11 RX ADMIN — GABAPENTIN 1000 MG: 400 CAPSULE ORAL at 21:11

## 2019-06-11 RX ADMIN — CHLORPROMAZINE HYDROCHLORIDE 100 MG: 100 TABLET, SUGAR COATED ORAL at 21:10

## 2019-06-11 RX ADMIN — SENNOSIDES AND DOCUSATE SODIUM 2 TABLET: 8.6; 5 TABLET ORAL at 21:11

## 2019-06-11 RX ADMIN — GABAPENTIN 1000 MG: 400 CAPSULE ORAL at 08:46

## 2019-06-11 RX ADMIN — NICOTINE 1 PATCH: 21 PATCH, EXTENDED RELEASE TRANSDERMAL at 08:49

## 2019-06-11 RX ADMIN — CHLORPROMAZINE HYDROCHLORIDE 100 MG: 100 TABLET, SUGAR COATED ORAL at 14:00

## 2019-06-11 RX ADMIN — BENZTROPINE MESYLATE 0.5 MG: 0.5 TABLET ORAL at 21:11

## 2019-06-11 RX ADMIN — INSULIN GLARGINE 40 UNITS: 100 INJECTION, SOLUTION SUBCUTANEOUS at 21:13

## 2019-06-11 RX ADMIN — BUSPIRONE HYDROCHLORIDE 10 MG: 5 TABLET ORAL at 14:00

## 2019-06-11 RX ADMIN — IBUPROFEN 400 MG: 200 TABLET, FILM COATED ORAL at 15:19

## 2019-06-11 RX ADMIN — INSULIN ASPART 3 UNITS: 100 INJECTION, SOLUTION INTRAVENOUS; SUBCUTANEOUS at 16:55

## 2019-06-11 RX ADMIN — BENZTROPINE MESYLATE 0.5 MG: 0.5 TABLET ORAL at 08:46

## 2019-06-11 RX ADMIN — BUSPIRONE HYDROCHLORIDE 10 MG: 5 TABLET ORAL at 21:11

## 2019-06-11 RX ADMIN — CHLORPROMAZINE HYDROCHLORIDE 100 MG: 100 TABLET, SUGAR COATED ORAL at 08:46

## 2019-06-11 RX ADMIN — BUSPIRONE HYDROCHLORIDE 10 MG: 5 TABLET ORAL at 08:46

## 2019-06-11 RX ADMIN — INSULIN ASPART 1 UNITS: 100 INJECTION, SOLUTION INTRAVENOUS; SUBCUTANEOUS at 08:46

## 2019-06-11 ASSESSMENT — ACTIVITIES OF DAILY LIVING (ADL)
HYGIENE/GROOMING: INDEPENDENT
LAUNDRY: UNABLE TO COMPLETE
DRESS: SCRUBS (BEHAVIORAL HEALTH)
ORAL_HYGIENE: INDEPENDENT
HYGIENE/GROOMING: INDEPENDENT
DRESS: SCRUBS (BEHAVIORAL HEALTH);INDEPENDENT
ORAL_HYGIENE: INDEPENDENT
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
## 2019-06-11 NOTE — PROGRESS NOTES
Blood sugars trending up some since cutting back on Lantus and stopping mealtime insulin, due to decreased oral intake by the patient. Blood sugar generally in 200-300 range. Will restart her metformin and continue to monitor

## 2019-06-11 NOTE — PLAN OF CARE
BEHAVIORAL TEAM DISCUSSION     Participants: Loren Zuleta NP,  Angie Laws LSW, Becca Chery LSW, Jenny Cardenas RN, Krys Rojo RN, Evaristo Sandoval RN,  Natalia Costello OT  Progress: minimal  Continued Stay Criteria/Rationale: unable to discharge to lesser level of care due to inability to care for self, high risk of relapse of psychosis, currently experiencing flight of ideas, appears to be responding to internal stimuli  Medical/Physical: Diabetes - working with hospitalist, liver enzymes have decreased, hx of Hep C, foot soaks are changed to PRN  Precautions:   Falls precaution?: No        Behavioral Orders   Procedures     Code 1 - Restrict to Unit     Routine Programming       As clinically indicated     Status 15       Every 15 minutes.      Plan: IRTS referrals sent, continue to stabilize  Rationale for change in precautions or plan: None     Active Problems:    Mixed hyperlipidemia    Diabetes mellitus, type 2    Schizoaffective disorder, bipolar type (H)        Current Facility-Administered Medications:      benzocaine-menthol (CEPACOL) 15-3.6 MG lozenge 1 lozenge, 1 lozenge, Buccal, Q1H PRN, Natalia Fabian APRN CNP     benztropine (COGENTIN) tablet 0.5 mg, 0.5 mg, Oral, BID, Loren Zuleta NP, 0.5 mg at 06/10/19 0830     busPIRone (BUSPAR) tablet 10 mg, 10 mg, Oral, TID, Natalia Fabian APRN CNP, 10 mg at 06/10/19 0830     chlorproMAZINE (THORAZINE) tablet 100 mg, 100 mg, Oral, TID, Loren Zuleta NP, 100 mg at 06/10/19 0830     glucose gel 15-30 g, 15-30 g, Oral, Q15 Min PRN, 30 g at 06/08/19 1339 **OR** dextrose 50 % injection 25-50 mL, 25-50 mL, Intravenous, Q15 Min PRN **OR** glucagon injection 1 mg, 1 mg, Subcutaneous, Q15 Min PRN, Airam Light NP     gabapentin (NEURONTIN) capsule 1,000 mg, 1,000 mg, Oral, TID, Loren Zuleta NP, 1,000 mg at 06/10/19 0830     ibuprofen (ADVIL/MOTRIN) tablet 400 mg, 400 mg, Oral, Q6H PRN, Natalia Fabian, APRN CNP, 400  mg at 06/01/19 0645     insulin aspart (NovoLOG) inj (RAPID ACTING), 1-7 Units, Subcutaneous, TID AC, Airam Light NP, 1 Units at 06/10/19 0830     insulin glargine (LANTUS PEN) injection 40 Units, 40 Units, Subcutaneous, At Bedtime, Rocio Tompkins MD, 40 Units at 06/09/19 2055     magnesium hydroxide (MILK OF MAGNESIA) suspension 30 mL, 30 mL, Oral, Daily PRN, Loren Zuleta NP, 30 mL at 06/07/19 1645     magnesium sulfate (EPSOM SALT) granules 5 g, 5 g, Other, BID PRN, 5 g at 05/30/19 1411 **AND** Patient care order, , , Per Unit Routine, Natalia Fabian, APRN CNP     nicotine (NICODERM CQ) 21 MG/24HR 24 hr patch 1 patch, 1 patch, Transdermal, Daily, Loren Zuleta NP, 1 patch at 06/10/19 0830     nicotine (NICORETTE) gum 2-4 mg, 2-4 mg, Buccal, Q1H PRN, Raven Stout NP, 4 mg at 05/21/19 0938     nicotine Patch in Place, , Transdermal, Q8H, Loren Zuleta NP, Stopped at 06/10/19 0153     nicotine patch REMOVAL, , Transdermal, Daily, Loren Zuleta NP, Stopped at 06/01/19 0842     prazosin (MINIPRESS) capsule 1 mg, 1 mg, Oral, At Bedtime, Airam Light NP, 1 mg at 06/09/19 2053     senna-docusate (SENOKOT-S/PERICOLACE) 8.6-50 MG per tablet 2 tablet, 2 tablet, Oral, At Bedtime, Airam Light NP, 2 tablet at 06/09/19 2053     topiramate (TOPAMAX) tablet 100 mg, 100 mg, Oral, Daily, Loren Zuleta NP, 100 mg at 06/10/19 0830

## 2019-06-11 NOTE — PLAN OF CARE
Problem: Adult Behavioral Health Plan of Care  Goal: Patient-Specific Goal (Individualization)  Description  Patient will attend at least 50% of groups while on unit.  Patient will report at least 6-8 hours of sleep at night.  Patient will be compliant with treatment team recommendations.  Patient will be medication compliant while hospitalized.  Patient will have decrease in delusional statements by discharge.       6/11/2019 0203 by Eliza Arndt RN  Outcome: No Change    Pt has been in bed with eyes closed and regular respirations x 6 hours. 15 minute and PRN checks all night. No complaints offered. Will continue to monitor.      Eliza Arndt  6/11/2019  2:04 AM      Problem: Fall Injury Risk  Goal: Absence of Fall and Fall-Related Injury  Description  Pt will not experience a fall while hospitalized.  Patient will have tap bell at bedside.       6/11/2019 0124 by Eliza Arndt, RN  Outcome: No Change     Problem: Violence Risk or Actual  Goal: Anger and Impulse Control  Description  PT will verbalize frustrations before making verbal threats to staff  PT will remain free from violence to staff or others while inpatient   6/11/2019 0124 by Eliza Arndt, RN  Outcome: No Change   Face to face end of shift report will be communicated to oncoming RN.     Eliza Arndt  6/11/2019  2:04 AM

## 2019-06-11 NOTE — PROGRESS NOTES
"[facility]  Psychiatric Progress Note      Impression:   Genie is laying in bed and reports she is \"pissed off\" that she is still here.  She would like to be discharged to another facility and is tired of being here. She wants to go to [facility] and is hoping that happens soon. Genie feels her symptoms have improved. She does acknowledge her blood sugars are bit more erratic as of late due to some diet changes and medication adjustments. eGnie does not make delusional statements or grandiose statements at this time.         Diagnoses:      Schizoaffective disorder, bipolar type   Methamphetamine use disorder, moderate to severe            Plan:   Continue current medications    Hospitalist following diabetes - continue foot soaks    [facility] is going forward with petition for commitment    ELOS:  > 5 days for safe discharge plan  Re-check Liver panel within the week    Attestation:  Patient has been seen and evaluated by me,  Loren Zuleta NP          Interim History:   The patient's care was discussed with the treatment team and chart notes were reviewed.          Medications:     Current Facility-Administered Medications   Medication     benzocaine-menthol (CEPACOL) 15-3.6 MG lozenge 1 lozenge     benztropine (COGENTIN) tablet 0.5 mg     busPIRone (BUSPAR) tablet 10 mg     chlorproMAZINE (THORAZINE) tablet 100 mg     glucose gel 15-30 g    Or     dextrose 50 % injection 25-50 mL    Or     glucagon injection 1 mg     gabapentin (NEURONTIN) capsule 1,000 mg     ibuprofen (ADVIL/MOTRIN) tablet 400 mg     insulin aspart (NovoLOG) inj (RAPID ACTING)     insulin glargine (LANTUS PEN) injection 40 Units     magnesium hydroxide (MILK OF MAGNESIA) suspension 30 mL     magnesium sulfate (EPSOM SALT) granules 5 g     nicotine (NICODERM CQ) 21 MG/24HR 24 hr patch 1 patch     nicotine (NICORETTE) gum 2-4 mg     nicotine Patch in Place     nicotine patch REMOVAL     prazosin (MINIPRESS) capsule 1 mg     " "senna-docusate (SENOKOT-S/PERICOLACE) 8.6-50 MG per tablet 2 tablet     topiramate (TOPAMAX) tablet 100 mg          10 point ROS negative \"pain everywhere\"       Allergies:     Allergies   Allergen Reactions     Haloperidol Other (See Comments)     Other reaction(s): Seizures  Patient states, \"I fell down and wasn't able to get up.\"  Patient states, \"I fell down and wasn't able to get up.\"              Psychiatric Examination:   /73   Pulse 91   Temp 98.6  F (37  C) (Tympanic)   Resp 14   Ht 1.626 m (5' 4\")   Wt 86.5 kg (190 lb 9.6 oz)   SpO2 98%   BMI 32.72 kg/m    Weight is 190 lbs 9.6 oz  Body mass index is 32.72 kg/m .    Appearance:  awake, alert   Attitude:  cooperative  Eye Contact:  fair  Mood:  Improving  Affect: mood congruent  Speech:  pressured speech - better  Psychomotor Behavior:  no evidence of tardive dyskinesia, dystonia, or tics  Thought Process: less disorganized, much better  Associations:  loosening of associations present   Thought Content:  Denies SI and Hi. Reports a decrease in AH  Insight:  limited  Judgment:  poor  Oriented to:  time, person, and place  Attention Span and Concentration:  limited  Recent and Remote Memory:  fair  Fund of Knowledge: low-normal  Muscle Strength and Tone: normal  Gait and Station: Normal         Labs:       Results for orders placed or performed during the hospital encounter of 05/10/19 (from the past 24 hour(s))   Glucose by meter   Result Value Ref Range    Glucose 307 (H) 70 - 99 mg/dL   Glucose by meter   Result Value Ref Range    Glucose 249 (H) 70 - 99 mg/dL   Glucose by meter   Result Value Ref Range    Glucose 302 (H) 70 - 99 mg/dL   Glucose by meter   Result Value Ref Range    Glucose 294 (H) 70 - 99 mg/dL   Glucose by meter   Result Value Ref Range    Glucose 171 (H) 70 - 99 mg/dL   Glucose by meter   Result Value Ref Range    Glucose 280 (H) 70 - 99 mg/dL       "

## 2019-06-11 NOTE — PLAN OF CARE
"Problem: Adult Behavioral Health Plan of Care  Goal: Patient-Specific Goal (Individualization)  Description  Patient will attend at least 50% of groups while on unit.  Patient will report at least 6-8 hours of sleep at night.  Patient will be compliant with treatment team recommendations.  Patient will be medication compliant while hospitalized.  Patient will have decrease in delusional statements by discharge.       6/10/2019 2142 by Edgar Escalante RN  Outcome: No Change  Pt has been up on the unit intermittently throughout the shift. Has spent much of the shift in bed, resting. Limited interaction with peers when up on the unit. Prefers to sit in the dayroom, with headphones on and writing on her \"graphs.\" Appears preoccupied much of the time. Blood sugar 249 mg/dl before supper. Pt irritable and demanding a snack--\"I'm fucking hungry. I'm fucking starving.\" Pt given sugar-free pudding and one package of vj crackers. Ate 100% of supper meal. Blood sugar 302 mg/dl before HS snack. Remains compliant with medications as prescribed. Denied depression, anxiety and/or SI--stating \"Nope. I'm on Buspar now.\" Has remained free from self-harm and/or injury this shift. Did c/o bilateral feet pain and back pain. PRN Ibuprofen 400 mg PO given with HS medications. Reported that she is sleeping \"okay.\" Slept approximately 7 hours last night. Gait remains steady and balanced. Has remained free from falls and/or injury this shift. No overt delusional statements this shift.     Face to face end of shift report communicated to oncoming RN.     Edgar Escalante  6/10/2019  10:59 PM          "

## 2019-06-12 LAB
GLUCOSE BLDC GLUCOMTR-MCNC: 220 MG/DL (ref 70–99)
GLUCOSE BLDC GLUCOMTR-MCNC: 268 MG/DL (ref 70–99)
GLUCOSE BLDC GLUCOMTR-MCNC: 281 MG/DL (ref 70–99)

## 2019-06-12 PROCEDURE — 25000132 ZZH RX MED GY IP 250 OP 250 PS 637: Performed by: NURSE PRACTITIONER

## 2019-06-12 PROCEDURE — 25000125 ZZHC RX 250: Performed by: INTERNAL MEDICINE

## 2019-06-12 PROCEDURE — 00000146 ZZHCL STATISTIC GLUCOSE BY METER IP

## 2019-06-12 PROCEDURE — 12400000 ZZH R&B MH

## 2019-06-12 RX ADMIN — GABAPENTIN 1000 MG: 400 CAPSULE ORAL at 13:29

## 2019-06-12 RX ADMIN — BUSPIRONE HYDROCHLORIDE 10 MG: 5 TABLET ORAL at 13:29

## 2019-06-12 RX ADMIN — NICOTINE 1 PATCH: 21 PATCH, EXTENDED RELEASE TRANSDERMAL at 08:45

## 2019-06-12 RX ADMIN — BUSPIRONE HYDROCHLORIDE 10 MG: 5 TABLET ORAL at 20:38

## 2019-06-12 RX ADMIN — INSULIN ASPART 2 UNITS: 100 INJECTION, SOLUTION INTRAVENOUS; SUBCUTANEOUS at 08:48

## 2019-06-12 RX ADMIN — GABAPENTIN 1000 MG: 400 CAPSULE ORAL at 08:46

## 2019-06-12 RX ADMIN — TOPIRAMATE 100 MG: 100 TABLET, FILM COATED ORAL at 08:46

## 2019-06-12 RX ADMIN — CHLORPROMAZINE HYDROCHLORIDE 100 MG: 100 TABLET, SUGAR COATED ORAL at 08:46

## 2019-06-12 RX ADMIN — BENZTROPINE MESYLATE 0.5 MG: 0.5 TABLET ORAL at 20:38

## 2019-06-12 RX ADMIN — INSULIN GLARGINE 40 UNITS: 100 INJECTION, SOLUTION SUBCUTANEOUS at 20:38

## 2019-06-12 RX ADMIN — METFORMIN HYDROCHLORIDE 1000 MG: 1000 TABLET ORAL at 17:07

## 2019-06-12 RX ADMIN — SENNOSIDES AND DOCUSATE SODIUM 2 TABLET: 8.6; 5 TABLET ORAL at 20:37

## 2019-06-12 RX ADMIN — BUSPIRONE HYDROCHLORIDE 10 MG: 5 TABLET ORAL at 08:46

## 2019-06-12 RX ADMIN — INSULIN ASPART 3 UNITS: 100 INJECTION, SOLUTION INTRAVENOUS; SUBCUTANEOUS at 17:07

## 2019-06-12 RX ADMIN — PRAZOSIN HYDROCHLORIDE 1 MG: 1 CAPSULE ORAL at 20:38

## 2019-06-12 RX ADMIN — CHLORPROMAZINE HYDROCHLORIDE 100 MG: 100 TABLET, SUGAR COATED ORAL at 13:29

## 2019-06-12 RX ADMIN — BENZTROPINE MESYLATE 0.5 MG: 0.5 TABLET ORAL at 08:46

## 2019-06-12 RX ADMIN — METFORMIN HYDROCHLORIDE 1000 MG: 1000 TABLET ORAL at 08:46

## 2019-06-12 RX ADMIN — INSULIN ASPART 3 UNITS: 100 INJECTION, SOLUTION INTRAVENOUS; SUBCUTANEOUS at 12:03

## 2019-06-12 RX ADMIN — CHLORPROMAZINE HYDROCHLORIDE 100 MG: 100 TABLET, SUGAR COATED ORAL at 20:38

## 2019-06-12 RX ADMIN — GABAPENTIN 1000 MG: 400 CAPSULE ORAL at 20:38

## 2019-06-12 RX ADMIN — IBUPROFEN 400 MG: 200 TABLET, FILM COATED ORAL at 02:27

## 2019-06-12 ASSESSMENT — ACTIVITIES OF DAILY LIVING (ADL)
DRESS: SCRUBS (BEHAVIORAL HEALTH);INDEPENDENT
LAUNDRY: UNABLE TO COMPLETE
HYGIENE/GROOMING: INDEPENDENT
DRESS: SCRUBS (BEHAVIORAL HEALTH);INDEPENDENT
ORAL_HYGIENE: INDEPENDENT
HYGIENE/GROOMING: INDEPENDENT
ORAL_HYGIENE: INDEPENDENT

## 2019-06-12 NOTE — PLAN OF CARE
Problem: Adult Behavioral Health Plan of Care  Goal: Patient-Specific Goal (Individualization)  Description  Patient will attend at least 50% of groups while on unit.  Patient will report at least 6-8 hours of sleep at night.  Patient will be compliant with treatment team recommendations.  Patient will be medication compliant while hospitalized.  Patient will have decrease in delusional statements by discharge.         6/12/2019 0256 by Ayah Rodriguez RN  Outcome: Improving  Note:   Report received from Olivia. Rounding complete. Pt observed     Pt has been in bed with eyes closed and regular respirations. 15 minute and PRN checks all night. No complaints offered. Will continue to monitor.    Pt slept from 4801-1838.   0227- Pt stated she had 10/10 right sided molar pain on the top and bottom jaw. Pt requested and was admin 400 mg IB.    AM      Face to face end of shift report communicated to oncoming RN.     June 12, 2019  2:52 AM        Problem: Thought Process Alteration  Goal: Optimal Thought Clarity  Description  Patient will hold a reality based conversation by discharge.    6/12/2019 0256 by Ayah Rodriguez RN  Outcome: Improving  Note:   Pt is able to hold a reality based conversation with this writer this NOC shift and is pleasant and cooperative.      Problem: Fall Injury Risk  Goal: Absence of Fall and Fall-Related Injury  Description  Pt will not experience a fall while hospitalized.  Patient will have tap bell at bedside.       6/12/2019 0256 by Ayah Rodriguez RN  Outcome: Improving  Note:   Pt presents as balanced and steady on her feet and has remained free of falls and injury this NOC shift.     Problem: Violence Risk or Actual  Goal: Anger and Impulse Control  Description  PT will verbalize frustrations before making verbal threats to staff  PT will remain free from violence to staff or others while inpatient   6/12/2019 0256 by Ayah Rordiguez RN  Outcome: Improving  Note:    Pt has been polite, calm, and cooperative with this writer and makes her needs known appropriately.

## 2019-06-12 NOTE — PLAN OF CARE
"Spoke with Ayah from [facility] - she confirmed they received the updates notes from yesterday, but has not had a chance to review them. She did question if pt would benefit from their program she stated that pt did their program before and \"was compliant, but she didn't really get a lot out of our program last time. I know they want her to do an IRTS before foster care, but I am not sure if she would benefit\" Ayah initially asked if pt was on the list for a [facility] - reminded Ayah pt was not under a commitment. Ayah said she would review the notes and talk with Bety and will most likely get back to staff next week as they are not doing any admission this week.   "

## 2019-06-12 NOTE — PLAN OF CARE
Face to face end of shift report received from DICK Flowers RN. Pt observed up on the unit.     Edgar Escalante  6/12/2019  3:52 PM

## 2019-06-12 NOTE — PLAN OF CARE
BEHAVIORAL TEAM DISCUSSION    Participants: Loren Zuleta NP,  Angie Laws LSW,  Becca Chery LSW, Beena Kumar [facility],  Krys Rojo RN,  Radha Bills RN, Jayla Engel RN, [facility], Natalia Costello OT,   Progress: Minimal   Continued Stay Criteria/Rationale: unable to discharge to lesser level of care due to inability to care for self, high risk of relapse of psychosis, currently experiencing flight of ideas, appears to be responding to internal stimuli  Medical/Physical:Diabetes - working with hospitalist, liver enzymes have decreased, hx of Hep C, foot soaks are changed to PRN  Precautions:   Falls precaution?: No  Behavioral Orders   Procedures    Code 1 - Restrict to Unit    Routine Programming     As clinically indicated    Status 15     Every 15 minutes.     Plan: IRTS if appropriate, continue to stabilize, Guardianship has been filed but no court date is set, looking at [place] if no additional plan can be secured  Rationale for change in precautions or plan: NONE    Active Problems:    Mixed hyperlipidemia    Diabetes mellitus, type 2    Schizoaffective disorder, bipolar type (H)    Current Facility-Administered Medications:     benzocaine-menthol (CEPACOL) 15-3.6 MG lozenge 1 lozenge, 1 lozenge, Buccal, Q1H PRN, Natalia Fabian APRN CNP    benztropine (COGENTIN) tablet 0.5 mg, 0.5 mg, Oral, BID, Loren Zuleta, NP, 0.5 mg at 06/12/19 0846    busPIRone (BUSPAR) tablet 10 mg, 10 mg, Oral, TID, Natalia Fabian APRN CNP, 10 mg at 06/12/19 0846    chlorproMAZINE (THORAZINE) tablet 100 mg, 100 mg, Oral, TID, Loren Zuleta, NP, 100 mg at 06/12/19 0846    glucose gel 15-30 g, 15-30 g, Oral, Q15 Min PRN, 30 g at 06/08/19 1339 **OR** dextrose 50 % injection 25-50 mL, 25-50 mL, Intravenous, Q15 Min PRN **OR** glucagon injection 1 mg, 1 mg, Subcutaneous, Q15 Min PRN, Airam Light NP    gabapentin (NEURONTIN) capsule 1,000 mg, 1,000 mg, Oral, TID, Loren Zuleta  JACKIE VAZQUEZ, 1,000 mg at 06/12/19 0846    ibuprofen (ADVIL/MOTRIN) tablet 400 mg, 400 mg, Oral, Q6H PRN, Natalia Fabian APRN CNP, 400 mg at 06/12/19 0227    insulin aspart (NovoLOG) inj (RAPID ACTING), 1-7 Units, Subcutaneous, TID AC, Airam Light NP, 2 Units at 06/12/19 0848    insulin glargine (LANTUS PEN) injection 40 Units, 40 Units, Subcutaneous, At Bedtime, Rocio Tompkins MD, 40 Units at 06/11/19 2113    magnesium hydroxide (MILK OF MAGNESIA) suspension 30 mL, 30 mL, Oral, Daily PRN, Loren Zuleta NP, 30 mL at 06/07/19 1645    magnesium sulfate (EPSOM SALT) granules 5 g, 5 g, Other, BID PRN **AND** Patient care order, , , Per Unit Routine, Natalia Fabian APRN CNP    metFORMIN (GLUCOPHAGE) tablet 1,000 mg, 1,000 mg, Oral, BID w/meals, Baljit Stinson MD, 1,000 mg at 06/12/19 0846    nicotine (NICODERM CQ) 21 MG/24HR 24 hr patch 1 patch, 1 patch, Transdermal, Daily, Loren Zuleta NP, 1 patch at 06/12/19 0845    nicotine (NICORETTE) gum 2-4 mg, 2-4 mg, Buccal, Q1H PRN, [place] Stout NP, 4 mg at 05/21/19 0938    nicotine Patch in Place, , Transdermal, Q8H, Loren Zuleta NP    nicotine patch REMOVAL, , Transdermal, Daily, Loren Zuleta NP, Stopped at 06/01/19 0842    prazosin (MINIPRESS) capsule 1 mg, 1 mg, Oral, At Bedtime, Kuldeep April JACKIE Rebollar, 1 mg at 06/11/19 2111    senna-docusate (SENOKOT-S/PERICOLACE) 8.6-50 MG per tablet 2 tablet, 2 tablet, Oral, At Bedtime, Airam Light NP, 2 tablet at 06/11/19 2111    topiramate (TOPAMAX) tablet 100 mg, 100 mg, Oral, Daily, Loren Zuleta NP, 100 mg at 06/12/19 0657

## 2019-06-12 NOTE — PLAN OF CARE
"Problem: Adult Behavioral Health Plan of Care  Goal: Patient-Specific Goal (Individualization)  Description  Patient will attend at least 50% of groups while on unit.  Patient will report at least 6-8 hours of sleep at night.  Patient will be compliant with treatment team recommendations.  Patient will be medication compliant while hospitalized.  Patient will have decrease in delusional statements by discharge.      6/11/2019 2210 by Edgar Escalante RN  Outcome: No Change  Pt has spent much of the shift in bed, resting/sleeping. Did not attend any groups. Up on unit for supper meal, snack and brief periods in between. Pleasant and cooperative on assessment. Denied pain-but had been given PRN Ibuprofen at approximately 1520 by the previous shift. Speech rambling and tangential. 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 No overt delusional statements. Appears preoccupied much of the time. Blood sugar 287 mg/dl before supper meal. Ate 100% of supper meal. Blood sugar 307 mg/dl before HS snack. Remains compliant with medications as prescribed. Slept approximately 6 hours last night.     Problem: Thought Process Alteration  Goal: Optimal Thought Clarity  Description  Patient will hold a reality based conversation by discharge.    6/11/2019 2210 by Edgar Escalante RN  Outcome: No Change    Problem: Fall Injury Risk  Goal: Absence of Fall and Fall-Related Injury  Description  Pt will not experience a fall while hospitalized.  Patient will have tap bell at bedside.       6/11/2019 2210 by Edgar Escalante RN  Outcome: No Change  Gait steady and balanced. Has remained free from falls and/or injury this shift.    Problem: Violence Risk or Actual  Goal: Anger and Impulse Control  Description  PT will verbalize frustrations before making verbal threats to staff  PT will " remain free from violence to staff or others while inpatient   6/11/2019 2210 by Edgar Escalante RN  Outcome: No Change  Pleasant and cooperative this shift. Mood calm. Has not demonstrated any violent and/or aggressive behavior(s) this shift.

## 2019-06-12 NOTE — PLAN OF CARE
Problem: Adult Behavioral Health Plan of Care  Goal: Patient-Specific Goal (Individualization)  Description  Patient will attend at least 50% of groups while on unit.  Patient will report at least 6-8 hours of sleep at night.  Patient will be compliant with treatment team recommendations.  Patient will be medication compliant while hospitalized.  Patient will have decrease in delusional statements by discharge.         6/12/2019 0755 by Melissa Flowers, RN  Outcome: No Change     Problem: Fall Injury Risk  Goal: Absence of Fall and Fall-Related Injury  Description  Pt will not experience a fall while hospitalized.  Patient will have tap bell at bedside.       6/12/2019 0755 by Melissa Flowers, RN  Outcome: Improving     Problem: Violence Risk or Actual  Goal: Anger and Impulse Control  Description  PT will verbalize frustrations before making verbal threats to staff  PT will remain free from violence to staff or others while inpatient   6/12/2019 0755 by Melissa Flowers, RN  Outcome: Improving     Face to face shift report recieved from Ayah MAK. Rounding completed, pt observed.     Pt compliant with medications this shift. Pt appears to be responding to internal stimuli and is in room having conversations with self and laughing at times. Pt encouraged to attend groups. Pt balanced and steady on feet and has not had any falls this shift. Pt has not had any threats of violence towards staff this shift.    Face to face report will be communicated to kei MAK.    Melissa Flowers  6/12/2019  12:15 PM

## 2019-06-13 LAB
GLUCOSE BLDC GLUCOMTR-MCNC: 153 MG/DL (ref 70–99)
GLUCOSE BLDC GLUCOMTR-MCNC: 178 MG/DL (ref 70–99)
GLUCOSE BLDC GLUCOMTR-MCNC: 189 MG/DL (ref 70–99)
GLUCOSE BLDC GLUCOMTR-MCNC: 200 MG/DL (ref 70–99)

## 2019-06-13 PROCEDURE — 12400000 ZZH R&B MH

## 2019-06-13 PROCEDURE — 25000132 ZZH RX MED GY IP 250 OP 250 PS 637: Performed by: NURSE PRACTITIONER

## 2019-06-13 PROCEDURE — 00000146 ZZHCL STATISTIC GLUCOSE BY METER IP

## 2019-06-13 PROCEDURE — 25000125 ZZHC RX 250: Performed by: INTERNAL MEDICINE

## 2019-06-13 PROCEDURE — 25000131 ZZH RX MED GY IP 250 OP 636 PS 637: Performed by: INTERNAL MEDICINE

## 2019-06-13 PROCEDURE — 99232 SBSQ HOSP IP/OBS MODERATE 35: CPT | Performed by: NURSE PRACTITIONER

## 2019-06-13 RX ADMIN — CHLORPROMAZINE HYDROCHLORIDE 100 MG: 100 TABLET, SUGAR COATED ORAL at 13:26

## 2019-06-13 RX ADMIN — INSULIN GLARGINE 40 UNITS: 100 INJECTION, SOLUTION SUBCUTANEOUS at 20:45

## 2019-06-13 RX ADMIN — SENNOSIDES AND DOCUSATE SODIUM 2 TABLET: 8.6; 5 TABLET ORAL at 20:46

## 2019-06-13 RX ADMIN — GABAPENTIN 1000 MG: 400 CAPSULE ORAL at 08:27

## 2019-06-13 RX ADMIN — METFORMIN HYDROCHLORIDE 1000 MG: 1000 TABLET ORAL at 17:05

## 2019-06-13 RX ADMIN — INSULIN ASPART 2 UNITS: 100 INJECTION, SOLUTION INTRAVENOUS; SUBCUTANEOUS at 17:07

## 2019-06-13 RX ADMIN — GABAPENTIN 1000 MG: 400 CAPSULE ORAL at 13:26

## 2019-06-13 RX ADMIN — BUSPIRONE HYDROCHLORIDE 10 MG: 5 TABLET ORAL at 13:26

## 2019-06-13 RX ADMIN — BUSPIRONE HYDROCHLORIDE 10 MG: 5 TABLET ORAL at 20:46

## 2019-06-13 RX ADMIN — GABAPENTIN 1000 MG: 400 CAPSULE ORAL at 20:46

## 2019-06-13 RX ADMIN — INSULIN ASPART 5 UNITS: 100 INJECTION, SOLUTION INTRAVENOUS; SUBCUTANEOUS at 17:06

## 2019-06-13 RX ADMIN — CHLORPROMAZINE HYDROCHLORIDE 100 MG: 100 TABLET, SUGAR COATED ORAL at 20:46

## 2019-06-13 RX ADMIN — BENZTROPINE MESYLATE 0.5 MG: 0.5 TABLET ORAL at 20:46

## 2019-06-13 RX ADMIN — INSULIN ASPART 1 UNITS: 100 INJECTION, SOLUTION INTRAVENOUS; SUBCUTANEOUS at 12:07

## 2019-06-13 RX ADMIN — CHLORPROMAZINE HYDROCHLORIDE 100 MG: 100 TABLET, SUGAR COATED ORAL at 08:28

## 2019-06-13 RX ADMIN — INSULIN ASPART 5 UNITS: 100 INJECTION, SOLUTION INTRAVENOUS; SUBCUTANEOUS at 08:29

## 2019-06-13 RX ADMIN — METFORMIN HYDROCHLORIDE 1000 MG: 1000 TABLET ORAL at 08:28

## 2019-06-13 RX ADMIN — PRAZOSIN HYDROCHLORIDE 1 MG: 1 CAPSULE ORAL at 20:46

## 2019-06-13 RX ADMIN — INSULIN ASPART 1 UNITS: 100 INJECTION, SOLUTION INTRAVENOUS; SUBCUTANEOUS at 08:29

## 2019-06-13 RX ADMIN — BENZTROPINE MESYLATE 0.5 MG: 0.5 TABLET ORAL at 08:27

## 2019-06-13 RX ADMIN — TOPIRAMATE 100 MG: 100 TABLET, FILM COATED ORAL at 08:27

## 2019-06-13 RX ADMIN — BUSPIRONE HYDROCHLORIDE 10 MG: 5 TABLET ORAL at 08:27

## 2019-06-13 RX ADMIN — INSULIN ASPART 5 UNITS: 100 INJECTION, SOLUTION INTRAVENOUS; SUBCUTANEOUS at 12:08

## 2019-06-13 RX ADMIN — NICOTINE 1 PATCH: 21 PATCH, EXTENDED RELEASE TRANSDERMAL at 08:27

## 2019-06-13 ASSESSMENT — ACTIVITIES OF DAILY LIVING (ADL)
ORAL_HYGIENE: INDEPENDENT
HYGIENE/GROOMING: INDEPENDENT
DRESS: SCRUBS (BEHAVIORAL HEALTH);INDEPENDENT
ORAL_HYGIENE: INDEPENDENT
HYGIENE/GROOMING: INDEPENDENT
DRESS: SCRUBS (BEHAVIORAL HEALTH);INDEPENDENT
LAUNDRY: UNABLE TO COMPLETE

## 2019-06-13 NOTE — PLAN OF CARE
Problem: Adult Behavioral Health Plan of Care  Goal: Patient-Specific Goal (Individualization)  Description  Patient will attend at least 50% of groups while on unit.  Patient will report at least 6-8 hours of sleep at night.  Patient will be compliant with treatment team recommendations.  Patient will be medication compliant while hospitalized.  Patient will have decrease in delusional statements by discharge.         6/13/2019 0158 by Ayah Rodriguez RN  Outcome: Improving  Note:   Report received from Avis lama. Pt observed sleeping in prone position.    Pt has been in bed with eyes closed and regular respirations. 15 minute and PRN checks all night. No complaints offered. Will continue to monitor.    AM     Face to face end of shift report communicated to oncoming RN.     June 13, 2019  1:58 AM           Problem: Fall Injury Risk  Goal: Absence of Fall and Fall-Related Injury  Description  Pt will not experience a fall while hospitalized.  Patient will have tap bell at bedside.       6/13/2019 0158 by Ayah Rodriguez, RN  Outcome: Improving  Note:   Pt sleeping but has remained fall free     Problem: Violence Risk or Actual  Goal: Anger and Impulse Control  Description  PT will verbalize frustrations before making verbal threats to staff  PT will remain free from violence to staff or others while inpatient   6/13/2019 0158 by Ayah Rodriguez RN  Outcome: Improving  Note:   Pt has been asleep and had no aggressive behaviors

## 2019-06-13 NOTE — PLAN OF CARE
BEHAVIORAL TEAM DISCUSSION    Participants: Loren Zuleta NP, Angie Laws LSW,  Becca Chery LSW, Beena Kumar [facility], Radha Bills RN, Ira Anaya RN, [facility]  Progress: Minimal   Continued Stay Criteria/Rationale: unable to discharge to lesser level of care due to inability to care for self, high risk of relapse of psychosis, currently experiencing flight of ideas, appears to be responding to internal stimuli  Medical/Physical:Diabetes - working with hospitalist, liver enzymes have decreased, hx of Hep C, foot soaks are changed to PRN  Precautions:   Falls precaution?: No       Behavioral Orders   Procedures    Code 1 - Restrict to Unit    Routine Programming       As clinically indicated    Status 15       Every 15 minutes.      Plan: IRTS if appropriate, continue to stabilize, Guardianship has been filed but no court date is set, looking at [place] if no additional plan can be secured   Rationale for change in precautions or plan: NONE     Active Problems:    Mixed hyperlipidemia    Diabetes mellitus, type 2    Schizoaffective disorder, bipolar type (H)      Current Facility-Administered Medications:     benzocaine-menthol (CEPACOL) 15-3.6 MG lozenge 1 lozenge, 1 lozenge, Buccal, Q1H PRN, Natalia Fabian APRN CNP    benztropine (COGENTIN) tablet 0.5 mg, 0.5 mg, Oral, BID, Loren Zuleta NP, 0.5 mg at 06/13/19 0827    busPIRone (BUSPAR) tablet 10 mg, 10 mg, Oral, TID, Natalia Fabian APRN CNP, 10 mg at 06/13/19 0827    chlorproMAZINE (THORAZINE) tablet 100 mg, 100 mg, Oral, TID, Loren Zuleta NP, 100 mg at 06/13/19 0828    glucose gel 15-30 g, 15-30 g, Oral, Q15 Min PRN, 30 g at 06/08/19 1339 **OR** dextrose 50 % injection 25-50 mL, 25-50 mL, Intravenous, Q15 Min PRN **OR** glucagon injection 1 mg, 1 mg, Subcutaneous, Q15 Min PRN, Airam Light NP    gabapentin (NEURONTIN) capsule 1,000 mg, 1,000 mg, Oral, TID, Loren Zuleta, NP, 1,000 mg at  06/13/19 0827    ibuprofen (ADVIL/MOTRIN) tablet 400 mg, 400 mg, Oral, Q6H PRN, Natalia Fabian APRN CNP, 400 mg at 06/12/19 0227    insulin aspart (NovoLOG) inj (RAPID ACTING), 5 Units, Subcutaneous, TID w/meals, Baljit Stinson MD, 5 Units at 06/13/19 0829    insulin aspart (NovoLOG) inj (RAPID ACTING), 1-7 Units, Subcutaneous, TID AC, Airam Light NP, 1 Units at 06/13/19 0829    insulin glargine (LANTUS PEN) injection 40 Units, 40 Units, Subcutaneous, At Bedtime, Rocio Tompkins MD, 40 Units at 06/12/19 2038    magnesium hydroxide (MILK OF MAGNESIA) suspension 30 mL, 30 mL, Oral, Daily PRN, Loren Zuleta NP, 30 mL at 06/07/19 1645    magnesium sulfate (EPSOM SALT) granules 5 g, 5 g, Other, BID PRN **AND** Patient care order, , , Per Unit Routine, Natalia Fabian APRN CNP    metFORMIN (GLUCOPHAGE) tablet 1,000 mg, 1,000 mg, Oral, BID w/meals, Baljit Stinson MD, 1,000 mg at 06/13/19 0828    nicotine (NICODERM CQ) 21 MG/24HR 24 hr patch 1 patch, 1 patch, Transdermal, Daily, Loren Zuleta NP, 1 patch at 06/13/19 0827    nicotine (NICORETTE) gum 2-4 mg, 2-4 mg, Buccal, Q1H PRN, [place] Stout NP, 4 mg at 05/21/19 0938    nicotine Patch in Place, , Transdermal, Q8H, Loren Zuleta NP, Stopped at 06/13/19 0140    nicotine patch REMOVAL, , Transdermal, Daily, Loren Zuleta NP, Stopped at 06/01/19 0842    prazosin (MINIPRESS) capsule 1 mg, 1 mg, Oral, At Bedtime, Airam Light NP, 1 mg at 06/12/19 2038    senna-docusate (SENOKOT-S/PERICOLACE) 8.6-50 MG per tablet 2 tablet, 2 tablet, Oral, At Bedtime, Kuldeep April JACKIE Rebollar, 2 tablet at 06/12/19 2037    topiramate (TOPAMAX) tablet 100 mg, 100 mg, Oral, Daily, Loren Zuleta NP, 100 mg at 06/13/19 7044

## 2019-06-13 NOTE — PLAN OF CARE
Problem: Adult Behavioral Health Plan of Care  Goal: Patient-Specific Goal (Individualization)  Description  Patient will attend at least 50% of groups while on unit.  Patient will report at least 6-8 hours of sleep at night.  Patient will be compliant with treatment team recommendations.  Patient will be medication compliant while hospitalized.  Patient will have decrease in delusional statements by discharge.         6/12/2019 2140 by Edgar Escalante RN  Outcome: No Change  Pt has, again, spent the majority of the shift in bed, resting/sleeping. Did not attend any groups. Up on the unit for supper meal and HS snack. Ate 100% of supper meal and 100% of HS snack. Appeared more preoccupied and withdrawn this shift. Minimal responses to questions/attempts at conversation. Remains compliant with medications as prescribed. Slept only approximately 1 hour last night.     Problem: Thought Process Alteration  Goal: Optimal Thought Clarity  Description  Patient will hold a reality based conversation by discharge.      6/12/2019 2140 by Edgar Escalante RN  Outcome: No Change    Problem: Fall Injury Risk  Goal: Absence of Fall and Fall-Related Injury  Description  Pt will not experience a fall while hospitalized.  Patient will have tap bell at bedside.       6/12/2019 2140 by Edgar Escalante, RN  Outcome: No Change   Gait steady and balanced. Pt has remained free from falls and/or injury this shift.    Problem: Violence Risk or Actual  Goal: Anger and Impulse Control  Description  PT will verbalize frustrations before making verbal threats to staff  PT will remain free from violence to staff or others while inpatient   6/12/2019 2140 by Edgar Escalante RN  Outcome: No Change  Pt has not demonstrated any aggressive and/or violent behavior this shift.    Face to face end of shift report communicated to oncoming RN.     Edgar Escalante  6/12/2019  11:07 PM

## 2019-06-13 NOTE — PROGRESS NOTES
Blood glucose continues to trend 200-300.   Currently on 40 units Lantus daily, Metformin 1000 mg BID and sliding scale.  - Will add back Aspart insulin 5 units TID with meals  - Continue to follow
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
## 2019-06-13 NOTE — PLAN OF CARE
Problem: Adult Behavioral Health Plan of Care  Goal: Patient-Specific Goal (Individualization)  Description  Patient will attend at least 50% of groups while on unit.  Patient will report at least 6-8 hours of sleep at night.  Patient will be compliant with treatment team recommendations.  Patient will be medication compliant while hospitalized.  Patient will have decrease in delusional statements by discharge.         6/13/2019 0742 by Melissa Flowers, RN  Outcome: No Change     Problem: Thought Process Alteration  Goal: Optimal Thought Clarity  Description  Patient will hold a reality based conversation by discharge.    6/13/2019 0742 by Melissa Flowers, RN  Outcome: No Change     Problem: Fall Injury Risk  Goal: Absence of Fall and Fall-Related Injury  Description  Pt will not experience a fall while hospitalized.  Patient will have tap bell at bedside.       6/13/2019 0742 by Melissa Flowers RN  Outcome: Improving     Problem: Violence Risk or Actual  Goal: Anger and Impulse Control  Description  PT will verbalize frustrations before making verbal threats to staff  PT will remain free from violence to staff or others while inpatient   6/13/2019 0742 by Melissa Flowers, RN  Outcome: Improving    Face to face shift report recieved from Ayah MAK. Rounding completed, pt observed.     Pt compliant with medications this shift. Pt asking when she will be able to leave and why she has been here so long. Pt says she wants to go to [place] to the [facility]. Pt did not have and falls or injury this shift. Pt did not have any violence towards staff or other patients this shift.     Face to face report will be communicated to kei MAK.    Melissa Flowers  6/13/2019  10:45 AM

## 2019-06-13 NOTE — PLAN OF CARE
Face to face end of shift report received from OBDULIO Torres. Pt observed up on the unit.     Edgar Escalante  6/13/2019  4:00 PM

## 2019-06-14 LAB
GLUCOSE BLDC GLUCOMTR-MCNC: 128 MG/DL (ref 70–99)
GLUCOSE BLDC GLUCOMTR-MCNC: 154 MG/DL (ref 70–99)
GLUCOSE BLDC GLUCOMTR-MCNC: 158 MG/DL (ref 70–99)
GLUCOSE BLDC GLUCOMTR-MCNC: 243 MG/DL (ref 70–99)

## 2019-06-14 PROCEDURE — 25000132 ZZH RX MED GY IP 250 OP 250 PS 637: Performed by: NURSE PRACTITIONER

## 2019-06-14 PROCEDURE — 00000146 ZZHCL STATISTIC GLUCOSE BY METER IP

## 2019-06-14 PROCEDURE — 12400000 ZZH R&B MH

## 2019-06-14 PROCEDURE — 25000125 ZZHC RX 250: Performed by: INTERNAL MEDICINE

## 2019-06-14 PROCEDURE — 99232 SBSQ HOSP IP/OBS MODERATE 35: CPT | Performed by: NURSE PRACTITIONER

## 2019-06-14 RX ADMIN — BUSPIRONE HYDROCHLORIDE 10 MG: 5 TABLET ORAL at 20:05

## 2019-06-14 RX ADMIN — CHLORPROMAZINE HYDROCHLORIDE 100 MG: 100 TABLET, SUGAR COATED ORAL at 13:45

## 2019-06-14 RX ADMIN — SENNOSIDES AND DOCUSATE SODIUM 2 TABLET: 8.6; 5 TABLET ORAL at 20:06

## 2019-06-14 RX ADMIN — TOPIRAMATE 100 MG: 100 TABLET, FILM COATED ORAL at 08:17

## 2019-06-14 RX ADMIN — INSULIN ASPART 1 UNITS: 100 INJECTION, SOLUTION INTRAVENOUS; SUBCUTANEOUS at 16:19

## 2019-06-14 RX ADMIN — METFORMIN HYDROCHLORIDE 1000 MG: 1000 TABLET ORAL at 08:17

## 2019-06-14 RX ADMIN — NICOTINE 1 PATCH: 21 PATCH, EXTENDED RELEASE TRANSDERMAL at 08:16

## 2019-06-14 RX ADMIN — GABAPENTIN 1000 MG: 400 CAPSULE ORAL at 20:06

## 2019-06-14 RX ADMIN — PRAZOSIN HYDROCHLORIDE 1 MG: 1 CAPSULE ORAL at 20:06

## 2019-06-14 RX ADMIN — BENZTROPINE MESYLATE 0.5 MG: 0.5 TABLET ORAL at 20:05

## 2019-06-14 RX ADMIN — METFORMIN HYDROCHLORIDE 1000 MG: 1000 TABLET ORAL at 16:28

## 2019-06-14 RX ADMIN — GABAPENTIN 1000 MG: 400 CAPSULE ORAL at 13:45

## 2019-06-14 RX ADMIN — BENZTROPINE MESYLATE 0.5 MG: 0.5 TABLET ORAL at 08:17

## 2019-06-14 RX ADMIN — CHLORPROMAZINE HYDROCHLORIDE 100 MG: 100 TABLET, SUGAR COATED ORAL at 20:06

## 2019-06-14 RX ADMIN — INSULIN ASPART 1 UNITS: 100 INJECTION, SOLUTION INTRAVENOUS; SUBCUTANEOUS at 08:17

## 2019-06-14 RX ADMIN — GABAPENTIN 1000 MG: 400 CAPSULE ORAL at 08:16

## 2019-06-14 RX ADMIN — BUSPIRONE HYDROCHLORIDE 10 MG: 5 TABLET ORAL at 13:45

## 2019-06-14 RX ADMIN — INSULIN ASPART 3 UNITS: 100 INJECTION, SOLUTION INTRAVENOUS; SUBCUTANEOUS at 12:09

## 2019-06-14 RX ADMIN — INSULIN GLARGINE 40 UNITS: 100 INJECTION, SOLUTION SUBCUTANEOUS at 20:11

## 2019-06-14 RX ADMIN — CHLORPROMAZINE HYDROCHLORIDE 100 MG: 100 TABLET, SUGAR COATED ORAL at 08:17

## 2019-06-14 RX ADMIN — BUSPIRONE HYDROCHLORIDE 10 MG: 5 TABLET ORAL at 08:16

## 2019-06-14 ASSESSMENT — ACTIVITIES OF DAILY LIVING (ADL)
ORAL_HYGIENE: INDEPENDENT
LAUNDRY: UNABLE TO COMPLETE
DRESS: INDEPENDENT;SCRUBS (BEHAVIORAL HEALTH)
HYGIENE/GROOMING: INDEPENDENT

## 2019-06-14 NOTE — PROGRESS NOTES
[facility]  Psychiatric Progress Note      Impression:   Genie is irritable about not being able to leave and not really having a place to go. She feels as though Lauro Lauren is not doing anything to help her either. She feels frustrated from being here so long. She would like to be out of here and more independent. She remains delusional, however, this may be baseline for her. Apparently Lauro Lauren did file emergency guardianship and there will be a court hearing today. Will continue to monitor for stability and s/s of decompensation.          Diagnoses:      Schizoaffective disorder, bipolar type   Methamphetamine use disorder, moderate to severe            Plan:   Continue current medications    Hospitalist following diabetes - continue foot soaks    [place] is going forward with petition for commitment    ELOS:  > 5 days for safe discharge plan  Re-check Liver panel within the week    Attestation:  Patient has been seen and evaluated by me,  Loren Zuleta, JACKIE          Interim History:   The patient's care was discussed with the treatment team and chart notes were reviewed.          Medications:     Current Facility-Administered Medications   Medication     benzocaine-menthol (CEPACOL) 15-3.6 MG lozenge 1 lozenge     benztropine (COGENTIN) tablet 0.5 mg     busPIRone (BUSPAR) tablet 10 mg     chlorproMAZINE (THORAZINE) tablet 100 mg     glucose gel 15-30 g    Or     dextrose 50 % injection 25-50 mL    Or     glucagon injection 1 mg     gabapentin (NEURONTIN) capsule 1,000 mg     ibuprofen (ADVIL/MOTRIN) tablet 400 mg     insulin aspart (NovoLOG) inj (RAPID ACTING)     insulin aspart (NovoLOG) inj (RAPID ACTING)     insulin glargine (LANTUS PEN) injection 40 Units     magnesium hydroxide (MILK OF MAGNESIA) suspension 30 mL     magnesium sulfate (EPSOM SALT) granules 5 g     metFORMIN (GLUCOPHAGE) tablet 1,000 mg     nicotine (NICODERM CQ) 21 MG/24HR 24 hr patch 1 patch     nicotine (NICORETTE) gum 2-4  "mg     nicotine Patch in Place     nicotine patch REMOVAL     prazosin (MINIPRESS) capsule 1 mg     senna-docusate (SENOKOT-S/PERICOLACE) 8.6-50 MG per tablet 2 tablet     topiramate (TOPAMAX) tablet 100 mg          10 point ROS negative \"pain everywhere\"       Allergies:     Allergies   Allergen Reactions     Haloperidol Other (See Comments)     Other reaction(s): Seizures  Patient states, \"I fell down and wasn't able to get up.\"  Patient states, \"I fell down and wasn't able to get up.\"              Psychiatric Examination:   /73   Pulse 89   Temp 97.8  F (36.6  C) (Tympanic)   Resp 14   Ht 1.626 m (5' 4\")   Wt 86.5 kg (190 lb 9.6 oz)   SpO2 99%   BMI 32.72 kg/m    Weight is 190 lbs 9.6 oz  Body mass index is 32.72 kg/m .    Appearance:  awake, alert   Attitude:  cooperative  Eye Contact:  fair  Mood:  Improving  Affect: mood congruent  Speech:  pressured speech - better  Psychomotor Behavior:  no evidence of tardive dyskinesia, dystonia, or tics  Thought Process: less disorganized, much better but still has a delusional content.   Associations:  loosening of associations present   Thought Content:  Denies SI and Hi. Reports a decrease in AH  Insight:  limited  Judgment:  poor  Oriented to:  time, person, and place  Attention Span and Concentration:  limited  Recent and Remote Memory:  fair  Fund of Knowledge: low-normal  Muscle Strength and Tone: normal  Gait and Station: Normal         Labs:       Results for orders placed or performed during the hospital encounter of 05/10/19 (from the past 24 hour(s))   Glucose by meter   Result Value Ref Range    Glucose 153 (H) 70 - 99 mg/dL   Glucose by meter   Result Value Ref Range    Glucose 200 (H) 70 - 99 mg/dL   Glucose by meter   Result Value Ref Range    Glucose 178 (H) 70 - 99 mg/dL   Glucose by meter   Result Value Ref Range    Glucose 158 (H) 70 - 99 mg/dL       "

## 2019-06-14 NOTE — PLAN OF CARE
BEHAVIORAL TEAM DISCUSSION    Participants: Loren Zuleta NP,  Angie Laws LSW,  Becca Chery LSW, Beena Kumar [facility], Radha Bills RN, Ira Anaya RN, Gena Gomez OT  Progress: fair   Continued Stay Criteria/Rationale: waiting for appropriate discharge bed  Medical/Physical: Diabetes - working with hospitalist, liver enzymes have decreased, hx of Hep C, foot soaks are changed to PRN  Precautions:   Falls precaution?: No  Behavioral Orders   Procedures     Code 1 - Restrict to Unit     Routine Programming     As clinically indicated     Status 15     Every 15 minutes.     Plan: IRTS if appropriate, continue to stabilize, Guardianship has been filed but no court date is set, looking at [place] if no additional plan can be secured.  Pt asking to leave, may need to discharge as not able to place hold and asking to leave.  Rationale for change in precautions or plan:     Active Problems:    Mixed hyperlipidemia       Diabetes mellitus, type 2        Schizoaffective disorder, bipolar type (H)    Current Facility-Administered Medications:      benzocaine-menthol (CEPACOL) 15-3.6 MG lozenge 1 lozenge, 1 lozenge, Buccal, Q1H PRN, Natalia Fabian APRN CNP     benztropine (COGENTIN) tablet 0.5 mg, 0.5 mg, Oral, BID, Loren Zuleta NP, 0.5 mg at 06/14/19 0817     busPIRone (BUSPAR) tablet 10 mg, 10 mg, Oral, TID, Natalia Fabian APRN CNP, 10 mg at 06/14/19 0816     chlorproMAZINE (THORAZINE) tablet 100 mg, 100 mg, Oral, TID, Loren Zuleta NP, 100 mg at 06/14/19 0817     glucose gel 15-30 g, 15-30 g, Oral, Q15 Min PRN, 30 g at 06/08/19 1339 **OR** dextrose 50 % injection 25-50 mL, 25-50 mL, Intravenous, Q15 Min PRN **OR** glucagon injection 1 mg, 1 mg, Subcutaneous, Q15 Min PRN, Airam Light NP     gabapentin (NEURONTIN) capsule 1,000 mg, 1,000 mg, Oral, TID, Loren Zuleta NP, 1,000 mg at 06/14/19 0816     ibuprofen (ADVIL/MOTRIN) tablet 400 mg, 400 mg, Oral, Q6H PRN, [place],  LATRICE Rizo CNP, 400 mg at 06/12/19 0227     insulin aspart (NovoLOG) inj (RAPID ACTING), 7 Units, Subcutaneous, TID w/meals, Baljit Stinson MD, 7 Units at 06/14/19 0818     insulin aspart (NovoLOG) inj (RAPID ACTING), 1-7 Units, Subcutaneous, TID AC, Airam Light NP, 1 Units at 06/14/19 0817     insulin glargine (LANTUS PEN) injection 40 Units, 40 Units, Subcutaneous, At Bedtime, Rocio Tompkins MD, 40 Units at 06/13/19 2045     magnesium hydroxide (MILK OF MAGNESIA) suspension 30 mL, 30 mL, Oral, Daily PRN, Loren Zuleta NP, 30 mL at 06/07/19 1645     magnesium sulfate (EPSOM SALT) granules 5 g, 5 g, Other, BID PRN **AND** Patient care order, , , Per Unit Routine, Natalia Fabian APRN CNP     metFORMIN (GLUCOPHAGE) tablet 1,000 mg, 1,000 mg, Oral, BID w/meals, Baljit Stinson MD, 1,000 mg at 06/14/19 0817     nicotine (NICODERM CQ) 21 MG/24HR 24 hr patch 1 patch, 1 patch, Transdermal, Daily, Loren Zuleta NP, 1 patch at 06/14/19 0816     nicotine (NICORETTE) gum 2-4 mg, 2-4 mg, Buccal, Q1H PRN, Raven Stout NP, 4 mg at 05/21/19 0938     nicotine Patch in Place, , Transdermal, Q8H, Loren Zuleta NP     nicotine patch REMOVAL, , Transdermal, Daily, Loren Zuleta NP, Stopped at 06/01/19 0842     prazosin (MINIPRESS) capsule 1 mg, 1 mg, Oral, At Bedtime, Airam Light NP, 1 mg at 06/13/19 2046     senna-docusate (SENOKOT-S/PERICOLACE) 8.6-50 MG per tablet 2 tablet, 2 tablet, Oral, At Bedtime, Airam Light NP, 2 tablet at 06/13/19 2046     topiramate (TOPAMAX) tablet 100 mg, 100 mg, Oral, Daily, Loren Zuleta, JACKIE, 100 mg at 06/14/19 0817

## 2019-06-14 NOTE — PLAN OF CARE
Problem: Adult Behavioral Health Plan of Care  Goal: Patient-Specific Goal (Individualization)  Description  Patient will attend at least 50% of groups while on unit.  Patient will report at least 6-8 hours of sleep at night.  Patient will be compliant with treatment team recommendations.  Patient will be medication compliant while hospitalized.  Patient will have decrease in delusional statements by discharge.     6/14/2019 1032 by Eliza Arndt RN  Outcome: No Change    Pt. Denies HI, SI, depression, anxiety, and hallucinations. Pt. Reporting chronic pain, scheduled medications administered with affective results. Pt. Appears responding to internal stimuli. Pt. Speech is pressured and delayed. Pt. Mumbling to self during conversation with this writer. Pt. Out to lounge socializing with peers and staff. Pt. Attending groups. Pt. Eating WDL. Pt. Offering no c/o issues with bowel and bladder. Pt. In agreement to update staff to thoughts feelings of wanting to harm self or others. Pt. Cooperative with medications and nursing assessment.      Problem: Thought Process Alteration  Goal: Optimal Thought Clarity  Description  Patient will hold a reality based conversation by discharge.    6/14/2019 1032 by Eliza Arndt, RN  Outcome: No Change    Pt. Continues to work on goal outcome.      Problem: Violence Risk or Actual  Goal: Anger and Impulse Control  Description  PT will verbalize frustrations before making verbal threats to staff  PT will remain free from violence to staff or others while inpatient   6/14/2019 1032 by Eliza Arndt, RN  Outcome: No Change    Pt. Free from violence at this time.      Problem: Fall Injury Risk  Goal: Absence of Fall and Fall-Related Injury  Description  Pt will not experience a fall while hospitalized.  Patient will have tap bell at bedside.       6/14/2019 1032 by Eliza Arndt, RN  Outcome: Improving    Pt. Free from injury at this time.     Face to face end of  shift report communicated to oncoming OBDULIO.     Eliza Arndt  6/14/2019

## 2019-06-14 NOTE — PLAN OF CARE
Problem: Adult Behavioral Health Plan of Care  Goal: Patient-Specific Goal (Individualization)  Description  Patient will attend at least 50% of groups while on unit.  Patient will report at least 6-8 hours of sleep at night.  Patient will be compliant with treatment team recommendations.  Patient will be medication compliant while hospitalized.  Patient will have decrease in delusional statements by discharge.         6/14/2019 0131 by Ayah Rodriguez RN  Outcome: Improving  Note:   Report received from Avis. Rounding complete. Pt observed sitting in the lounge at the beginning of the shift with no complaints offered.    0115- Pt has been in bed with eyes closed and regular respirations. 15 minute and PRN checks all night. No complaints offered. Will continue to monitor.    AM     Face to face end of shift report communicated to oncoming RN.    June 14, 2019  1:31 AM         Problem: Thought Process Alteration  Goal: Optimal Thought Clarity  Description  Patient will hold a reality based conversation by discharge.    6/14/2019 0131 by Ayah Rodriguez RN  Outcome: Improving  Note:   Pt still appears responding but it is improving and less than previously observed.     Problem: Fall Injury Risk  Goal: Absence of Fall and Fall-Related Injury  Description  Pt will not experience a fall while hospitalized.  Patient will have tap bell at bedside.       6/14/2019 0131 by Ayah Rodriguez RN  Outcome: Improving  Note:   Pt has remained free of falls and injury this NOC shift.

## 2019-06-14 NOTE — PLAN OF CARE
"Problem: Adult Behavioral Health Plan of Care  Goal: Patient-Specific Goal (Individualization)  Description  Patient will attend at least 50% of groups while on unit.  Patient will report at least 6-8 hours of sleep at night.  Patient will be compliant with treatment team recommendations.  Patient will be medication compliant while hospitalized.  Patient will have decrease in delusional statements by discharge.        2019 by Edgar Escalante RN  Outcome: No Change  Pt up on the unit more this shift. More alert and engaging with staff. Did ramble about her ()  being royalty, how \"every woman was after him,\" etc. Ate 100% of supper meal and HS snack. Blood sugars 200 mg/dl and 178 mg/dl this shift. Slept approximately 7 hours last night. Remains compliant with medications as prescribed.     Problem: Thought Process Alteration  Goal: Optimal Thought Clarity  Description  Patient will hold a reality based conversation by discharge.    2019 by Edgar Escalante, RN  Outcome: No Change  Appeared less preoccupied this shift. Grandiose, at times. Rambling and tangential speech.    Problem: Fall Injury Risk  Goal: Absence of Fall and Fall-Related Injury  Description  Pt will not experience a fall while hospitalized.  Patient will have tap bell at bedside.       2019 by Edgar Escalante RN  Outcome: No Change  Gait steady and balanced. Has remained free from falls and/or injury this shift.    Problem: Violence Risk or Actual  Goal: Anger and Impulse Control  Description  PT will verbalize frustrations before making verbal threats to staff  PT will remain free from violence to staff or others while inpatient   2019 by Edgar Escalante RN  Outcome: No Change  No violent and/or aggressive behavior(s) this shift.              "

## 2019-06-14 NOTE — PLAN OF CARE
"Spoke with Lauro Lauren  - state Denis is out of the office today. She is going to call over to the courts today, but states \"if needed you guys could an emergency hold on her couldn't you?\" Spoke with Siena she stated she spoke with adult protection and they are going to take guardianship of pt and stated \"Denis has not been successful in getting emergency guardianship so adult and family services can take over\" Siena stated that pt should have an emergency hearing today and should be assigned a guardian today.     Received a call from Kerry from Lauro Lauren - she requested the \"low down\" on pt as she is planning to file emergency guardianship of pt today. According to Tara Hunter who is the head of the mental health unit (/Director) stated that pt is no longer her own decision maker. Siena stated pt is no longer able to make the decision to leave.     Spoke with Siena and Kerry on speaker phone - stated they are sending over an \"ex-parte emergency guardianship order\" right now - Kerry will be the assigned guardian - she stated she is okay with the referral to [facility] if that is what \"is in her best interest.\" Spoke with NP and a referral to [facility] was placed.     Received emergency guardianship papers for pt and placed in pt chart - also gave pt a copy. Pt took news well and asked for the number to [facility] - pt stated after [facility] she wants to get into treatment. Pt was also given the direct number to her guardian.     Received a call back from [facility] - she had additional questions after speaking with pt - states pt \"seems kind of like a mess\" progress notes and labs were sent as requested.     Called [facility] to follow up on notes and labs sent - they confirmed they received them and she had already contacted her clinical director to review, but has not heard back yet from clinical director as of 2:30pm.   "

## 2019-06-14 NOTE — PROGRESS NOTES
Reviewed blood glucose trend. Improved, now 150-200. Ideal range would be . Will continue Lantus 40 units.  Increase meal time insulin from 5 to 7 units TID with meals. Continue sliding scale.

## 2019-06-14 NOTE — PROGRESS NOTES
Reviewed pt's blood glucose since yesterday.  Much better control since meal time insulin was increased from 5 to 7 units TID.  Continue current regimen.    Augustine Davis MD

## 2019-06-15 LAB
GLUCOSE BLDC GLUCOMTR-MCNC: 106 MG/DL (ref 70–99)
GLUCOSE BLDC GLUCOMTR-MCNC: 111 MG/DL (ref 70–99)
GLUCOSE BLDC GLUCOMTR-MCNC: 144 MG/DL (ref 70–99)
GLUCOSE BLDC GLUCOMTR-MCNC: 163 MG/DL (ref 70–99)
GLUCOSE BLDC GLUCOMTR-MCNC: 171 MG/DL (ref 70–99)

## 2019-06-15 PROCEDURE — 25000132 ZZH RX MED GY IP 250 OP 250 PS 637: Performed by: NURSE PRACTITIONER

## 2019-06-15 PROCEDURE — 00000146 ZZHCL STATISTIC GLUCOSE BY METER IP

## 2019-06-15 PROCEDURE — 25000125 ZZHC RX 250: Performed by: INTERNAL MEDICINE

## 2019-06-15 PROCEDURE — 99232 SBSQ HOSP IP/OBS MODERATE 35: CPT | Performed by: NURSE PRACTITIONER

## 2019-06-15 PROCEDURE — 12400000 ZZH R&B MH

## 2019-06-15 RX ORDER — LANOLIN ALCOHOL/MO/W.PET/CERES
3 CREAM (GRAM) TOPICAL
Status: DISCONTINUED | OUTPATIENT
Start: 2019-06-15 | End: 2019-07-03 | Stop reason: HOSPADM

## 2019-06-15 RX ADMIN — GABAPENTIN 1000 MG: 400 CAPSULE ORAL at 20:27

## 2019-06-15 RX ADMIN — GABAPENTIN 1000 MG: 400 CAPSULE ORAL at 08:28

## 2019-06-15 RX ADMIN — PRAZOSIN HYDROCHLORIDE 1 MG: 1 CAPSULE ORAL at 20:27

## 2019-06-15 RX ADMIN — GABAPENTIN 1000 MG: 400 CAPSULE ORAL at 14:04

## 2019-06-15 RX ADMIN — INSULIN GLARGINE 40 UNITS: 100 INJECTION, SOLUTION SUBCUTANEOUS at 20:27

## 2019-06-15 RX ADMIN — CHLORPROMAZINE HYDROCHLORIDE 100 MG: 100 TABLET, SUGAR COATED ORAL at 20:27

## 2019-06-15 RX ADMIN — CHLORPROMAZINE HYDROCHLORIDE 100 MG: 100 TABLET, SUGAR COATED ORAL at 14:04

## 2019-06-15 RX ADMIN — BENZTROPINE MESYLATE 0.5 MG: 0.5 TABLET ORAL at 08:29

## 2019-06-15 RX ADMIN — TOPIRAMATE 100 MG: 100 TABLET, FILM COATED ORAL at 08:29

## 2019-06-15 RX ADMIN — BUSPIRONE HYDROCHLORIDE 10 MG: 5 TABLET ORAL at 14:04

## 2019-06-15 RX ADMIN — SENNOSIDES AND DOCUSATE SODIUM 2 TABLET: 8.6; 5 TABLET ORAL at 20:27

## 2019-06-15 RX ADMIN — BUSPIRONE HYDROCHLORIDE 10 MG: 5 TABLET ORAL at 20:27

## 2019-06-15 RX ADMIN — INSULIN ASPART 1 UNITS: 100 INJECTION, SOLUTION INTRAVENOUS; SUBCUTANEOUS at 16:32

## 2019-06-15 RX ADMIN — CHLORPROMAZINE HYDROCHLORIDE 100 MG: 100 TABLET, SUGAR COATED ORAL at 08:29

## 2019-06-15 RX ADMIN — BENZTROPINE MESYLATE 0.5 MG: 0.5 TABLET ORAL at 20:27

## 2019-06-15 RX ADMIN — BUSPIRONE HYDROCHLORIDE 10 MG: 5 TABLET ORAL at 08:29

## 2019-06-15 RX ADMIN — INSULIN ASPART 1 UNITS: 100 INJECTION, SOLUTION INTRAVENOUS; SUBCUTANEOUS at 08:27

## 2019-06-15 RX ADMIN — METFORMIN HYDROCHLORIDE 1000 MG: 1000 TABLET ORAL at 16:32

## 2019-06-15 RX ADMIN — NICOTINE 1 PATCH: 21 PATCH, EXTENDED RELEASE TRANSDERMAL at 08:28

## 2019-06-15 RX ADMIN — METFORMIN HYDROCHLORIDE 1000 MG: 1000 TABLET ORAL at 08:29

## 2019-06-15 ASSESSMENT — ACTIVITIES OF DAILY LIVING (ADL)
HYGIENE/GROOMING: INDEPENDENT
DRESS: INDEPENDENT;SCRUBS (BEHAVIORAL HEALTH)
DRESS: INDEPENDENT;SCRUBS (BEHAVIORAL HEALTH)
ORAL_HYGIENE: INDEPENDENT
LAUNDRY: UNABLE TO COMPLETE
HYGIENE/GROOMING: INDEPENDENT
LAUNDRY: UNABLE TO COMPLETE
ORAL_HYGIENE: INDEPENDENT

## 2019-06-15 NOTE — PLAN OF CARE
Problem: Fall Injury Risk  Goal: Absence of Fall and Fall-Related Injury  Description  Pt will not experience a fall while hospitalized.  Patient will have tap bell at bedside.       Outcome: Improving  Patient currently sleeping in bed, no recent falls.     Problem: Adult Behavioral Health Plan of Care  Goal: Patient-Specific Goal (Individualization)  Description  Patient will attend at least 50% of groups while on unit.  Patient will report at least 6-8 hours of sleep at night.  Patient will be compliant with treatment team recommendations.  Patient will be medication compliant while hospitalized.  Patient will have decrease in delusional statements by discharge.    Patient has been asleep thus far this shift. No delusional statements to staff during rounds        6/15/2019 0347 by Sulma Hirsch RN  Outcome: No Change     Problem: Violence Risk or Actual  Goal: Anger and Impulse Control  Description  PT will verbalize frustrations before making verbal threats to staff  PT will remain free from violence to staff or others while inpatient   6/15/2019 0347 by Sulma Hirsch RN  Outcome: Improving  No violence or threats on night shift.    Face to face report will be given to oncoming RN
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
## 2019-06-15 NOTE — PLAN OF CARE
"Pt denies SI and HI. She does appear to be responding to internal stimuli, stops in mid conversation and talks to herself. Speech is pressured and rambled. Is irritable. Affect blunted and flat. Isolates to her room much of this shift, coming out for meals. Is compliant with medications. Asks when she will be able to \"get out of this place.\" No complaints of pain at this time. Blood sugar was 163 at dinner. Will continue to monitor.   Problem: Adult Behavioral Health Plan of Care  Goal: Patient-Specific Goal (Individualization)  Description  Patient will attend at least 50% of groups while on unit.  Patient will report at least 6-8 hours of sleep at night.  Patient will be compliant with treatment team recommendations.  Patient will be medication compliant while hospitalized.  Patient will have decrease in delusional statements by discharge.         6/15/2019 1758 by Sadie Erazo RN  Outcome: No Change     Problem: Fall Injury Risk  Goal: Absence of Fall and Fall-Related Injury  Description  Pt will not experience a fall while hospitalized.  Patient will have tap bell at bedside.       6/15/2019 1758 by Sadie Erazo, RN  Outcome: Improving     Problem: Violence Risk or Actual  Goal: Anger and Impulse Control  Description  PT will verbalize frustrations before making verbal threats to staff  PT will remain free from violence to staff or others while inpatient   6/15/2019 1758 by Sadie Erazo, RN  Outcome: Improving     Problem: Thought Process Alteration  Goal: Optimal Thought Clarity  Description  Patient will hold a reality based conversation by discharge.    6/15/2019 1758 by Sadie Erazo, RN  Outcome: Declining    Face to face end of shift report communicated to OBDULIO Ozuna.     Sadie Erazo  6/15/2019  10:01 PM           "

## 2019-06-15 NOTE — PLAN OF CARE
"  Problem: Adult Behavioral Health Plan of Care  Goal: Patient-Specific Goal (Individualization)  Description  Patient will attend at least 50% of groups while on unit.  Patient will report at least 6-8 hours of sleep at night.  Patient will be compliant with treatment team recommendations.  Patient will be medication compliant while hospitalized.  Patient will have decrease in delusional statements by discharge.       6/15/2019 1327 by Eliza Arndt, RN  Outcome: No Change   Pt. Denies HI, SI, depression, and anxiety. Pt. Reporting AH and chronic pain, scheduled medications administered with effective results. Pt. Appears responding to internal stimuli. Pt. Speech is pressured and delayed. Pt. Mumbling to self during conversation with this writer. \"I'm hearing things but they are not bad.\" Pt. Would not answer this writer with what the voices were saying. Pt. Isolating to room, out to lounge for meals. Pt. Encouraged to attend groups. Pt. Eating WDL. Pt. Offering no c/o issues with bowel and bladder. Pt. In agreement to update staff to thoughts feelings of wanting to harm self or others. Pt. Cooperative with medications and nursing assessment. @ 1400 Pt. Reporting she felt her blood glucose was low. Pt. dysphoretic and mild shake to hands. Pt. . Pt. Administered per request snack of pudding, juice and 2 gram crackers = 40 carbs as charted in flow sheet. Pt. Reports effective results. Provider called with update.      Problem: Thought Process Alteration  Goal: Optimal Thought Clarity  Description  Patient will hold a reality based conversation by discharge.    Outcome: No Change    Pt. Continue to work on goal this a.m.      Problem: Violence Risk or Actual  Goal: Anger and Impulse Control  Description  PT will verbalize frustrations before making verbal threats to staff  PT will remain free from violence to staff or others while inpatient   6/15/2019 1327 by Eliza Arndt, RN  Outcome: " Improving  Pt. Free from inappropriate behavior this a.m.      Problem: Fall Injury Risk  Goal: Absence of Fall and Fall-Related Injury  Description  Pt will not experience a fall while hospitalized.  Patient will have tap bell at bedside.       6/15/2019 1327 by Eliza Arndt RN  Outcome: Improving    Pt. Free from falls this a.m.     Face to face end of shift report communicated to oncoming RN.     Eliza Arndt  6/15/2019  3:47 PM

## 2019-06-15 NOTE — PLAN OF CARE
"Face to face end of shift report communicated to Sulma AQIUNO RN.      Sherry Cano  6/14/2019  11:10 PM        Problem: Adult Behavioral Health Plan of Care  Goal: Patient-Specific Goal (Individualization)  Description  Patient will attend at least 50% of groups while on unit.  Patient will report at least 6-8 hours of sleep at night.  Patient will be compliant with treatment team recommendations.  Patient will be medication compliant while hospitalized.  Patient will have decrease in delusional statements by discharge.         6/14/2019 2057 by Sherry Cano, RN  Outcome: No Change  Note:   Patient does not attend any groups this shift.  Patient does not want to get out of bed this shift.  Patient reports \"I'm just going to sleep until I get to leave here\"  Patient is compliant with all medication admin this shift.  Patient's speech is often rambling and very difficult to understand.  Pt denies any SI, HI, and hallucinations this shift. Pt denies any pain. States sleep was \"ok\" but \"I'm always being woken up to eat another snack, another meal\"      Will continue to monitor.    Patient was up in lounge having snack and listening to headphones.       Problem: Violence Risk or Actual  Goal: Anger and Impulse Control  Description  PT will verbalize frustrations before making verbal threats to staff  PT will remain free from violence to staff or others while inpatient   6/14/2019 2057 by Sherry Cano, RN  Outcome: No Change  Note:   Patient remains free from violence and does not make any threats toward staff or peers.       "

## 2019-06-16 LAB
GLUCOSE BLDC GLUCOMTR-MCNC: 172 MG/DL (ref 70–99)
GLUCOSE BLDC GLUCOMTR-MCNC: 172 MG/DL (ref 70–99)
GLUCOSE BLDC GLUCOMTR-MCNC: 191 MG/DL (ref 70–99)
GLUCOSE BLDC GLUCOMTR-MCNC: 97 MG/DL (ref 70–99)

## 2019-06-16 PROCEDURE — 12400000 ZZH R&B MH

## 2019-06-16 PROCEDURE — 25000132 ZZH RX MED GY IP 250 OP 250 PS 637: Performed by: NURSE PRACTITIONER

## 2019-06-16 PROCEDURE — 00000146 ZZHCL STATISTIC GLUCOSE BY METER IP

## 2019-06-16 PROCEDURE — 25000125 ZZHC RX 250: Performed by: INTERNAL MEDICINE

## 2019-06-16 RX ADMIN — GABAPENTIN 1000 MG: 400 CAPSULE ORAL at 08:38

## 2019-06-16 RX ADMIN — GABAPENTIN 1000 MG: 400 CAPSULE ORAL at 14:20

## 2019-06-16 RX ADMIN — INSULIN GLARGINE 40 UNITS: 100 INJECTION, SOLUTION SUBCUTANEOUS at 20:30

## 2019-06-16 RX ADMIN — INSULIN ASPART 2 UNITS: 100 INJECTION, SOLUTION INTRAVENOUS; SUBCUTANEOUS at 16:40

## 2019-06-16 RX ADMIN — METFORMIN HYDROCHLORIDE 1000 MG: 1000 TABLET ORAL at 08:38

## 2019-06-16 RX ADMIN — BUSPIRONE HYDROCHLORIDE 10 MG: 5 TABLET ORAL at 14:21

## 2019-06-16 RX ADMIN — BENZTROPINE MESYLATE 0.5 MG: 0.5 TABLET ORAL at 20:30

## 2019-06-16 RX ADMIN — BUSPIRONE HYDROCHLORIDE 10 MG: 5 TABLET ORAL at 20:30

## 2019-06-16 RX ADMIN — GABAPENTIN 1000 MG: 400 CAPSULE ORAL at 20:29

## 2019-06-16 RX ADMIN — SENNOSIDES AND DOCUSATE SODIUM 2 TABLET: 8.6; 5 TABLET ORAL at 20:30

## 2019-06-16 RX ADMIN — NICOTINE 1 PATCH: 21 PATCH, EXTENDED RELEASE TRANSDERMAL at 14:20

## 2019-06-16 RX ADMIN — BENZTROPINE MESYLATE 0.5 MG: 0.5 TABLET ORAL at 08:39

## 2019-06-16 RX ADMIN — BUSPIRONE HYDROCHLORIDE 10 MG: 5 TABLET ORAL at 08:38

## 2019-06-16 RX ADMIN — METFORMIN HYDROCHLORIDE 1000 MG: 1000 TABLET ORAL at 16:41

## 2019-06-16 RX ADMIN — CHLORPROMAZINE HYDROCHLORIDE 100 MG: 100 TABLET, SUGAR COATED ORAL at 20:30

## 2019-06-16 RX ADMIN — TOPIRAMATE 100 MG: 100 TABLET, FILM COATED ORAL at 08:39

## 2019-06-16 RX ADMIN — INSULIN ASPART 1 UNITS: 100 INJECTION, SOLUTION INTRAVENOUS; SUBCUTANEOUS at 08:37

## 2019-06-16 RX ADMIN — CHLORPROMAZINE HYDROCHLORIDE 100 MG: 100 TABLET, SUGAR COATED ORAL at 08:39

## 2019-06-16 RX ADMIN — PRAZOSIN HYDROCHLORIDE 1 MG: 1 CAPSULE ORAL at 20:30

## 2019-06-16 RX ADMIN — CHLORPROMAZINE HYDROCHLORIDE 100 MG: 100 TABLET, SUGAR COATED ORAL at 14:21

## 2019-06-16 ASSESSMENT — MIFFLIN-ST. JEOR: SCORE: 1496.42

## 2019-06-16 ASSESSMENT — ACTIVITIES OF DAILY LIVING (ADL)
HYGIENE/GROOMING: INDEPENDENT
DRESS: INDEPENDENT;SCRUBS (BEHAVIORAL HEALTH)
HYGIENE/GROOMING: INDEPENDENT
LAUNDRY: UNABLE TO COMPLETE
ORAL_HYGIENE: INDEPENDENT
LAUNDRY: UNABLE TO COMPLETE
ORAL_HYGIENE: INDEPENDENT
DRESS: INDEPENDENT;SCRUBS (BEHAVIORAL HEALTH)

## 2019-06-16 NOTE — PLAN OF CARE
"  Problem: Adult Behavioral Health Plan of Care  Goal: Patient-Specific Goal (Individualization)  Description  Patient will attend at least 50% of groups while on unit.  Patient will report at least 6-8 hours of sleep at night.  Patient will be compliant with treatment team recommendations.  Patient will be medication compliant while hospitalized.  Patient will have decrease in delusional statements by discharge.         6/16/2019 1327 by Eliza Arndt, RN  Outcome: No Change    Pt. Denies HI, SI, depression, pain, and anxiety. Pt. denies but Appears responding to internal stimuli. Pt. Speech is delayed. Pt. Mumbling to self during conversation with this writer. Pt. Isolating to room, out to lounge for lunch. Pt. Encouraged to attend groups and shower. Pt. Refusing nicotine patch with morning medications. \"I don't want that fucking thing.\" Pt. requesting her noon blood glucose be administered a second time 3 minutes after being administered. Pt. updated her blood glues was administered. Pt. raising voice and reports  I don't give a fuck, keep checking it again and again. I'm not going to eat.   Pt. denies and is absent of s/s of low blood glucose. Pt. encouraged to get out of bed and eat lunch in lounge. Pt. in agreement. Pt. eating WDL Pt. Offering no c/o issues with bowel and bladder. Pt. In agreement to update staff to thoughts feelings of wanting to harm self or others. Pt. Cooperative with medications and nursing assessment. Pt. Continues to stay in lounge after lunch. Pt. Requesting nicotine patch with 1400 medications. Patch applied to left arm per pt. Request.         Problem: Thought Process Alteration  Goal: Optimal Thought Clarity  Description  Patient will hold a reality based conversation by discharge.    6/16/2019 1327 by Eliza Arndt, RN  Outcome: No Change    Pt. Goal not met this a.m.      Problem: Violence Risk or Actual  Goal: Anger and Impulse Control  Description  PT will verbalize " frustrations before making verbal threats to staff  PT will remain free from violence to staff or others while inpatient   6/16/2019 1327 by Eliza Arndt, RN  Outcome: No Change    Pt. Continue to work on outcome goal this a.m.      Problem: Fall Injury Risk  Goal: Absence of Fall and Fall-Related Injury  Description  Pt will not experience a fall while hospitalized.  Patient will have tap bell at bedside.       6/16/2019 1327 by Eliza Arndt RN  Outcome: Improving    Pt. Free from falls this a.m.     Face to face end of shift report to be communicated to oncoming FRANCIA Arndt  6/16/2019  3:05 PM

## 2019-06-16 NOTE — PLAN OF CARE
"Pt is irritable. She does not participate much in nursing assessment. States, \"I don't care\" to all assessment question. \"I just want to go get the hell out of here.\" She has spend much of the shift in her room. Does come out for meals. Appears to be responding to internal stimuli. Is seen and heard talking to herself several times. No complaint of pain.   Problem: Fall Injury Risk  Goal: Absence of Fall and Fall-Related Injury  Description  Pt will not experience a fall while hospitalized.  Patient will have tap bell at bedside.       6/16/2019 1759 by Sadie Erazo, RN  Outcome: Improving     Problem: Violence Risk or Actual  Goal: Anger and Impulse Control  Description  PT will verbalize frustrations before making verbal threats to staff  PT will remain free from violence to staff or others while inpatient   6/16/2019 1759 by Sadie Erazo, RN  Outcome: Improving     Problem: Adult Behavioral Health Plan of Care  Goal: Patient-Specific Goal (Individualization)  Description  Patient will attend at least 50% of groups while on unit.  Patient will report at least 6-8 hours of sleep at night.  Patient will be compliant with treatment team recommendations.  Patient will be medication compliant while hospitalized.  Patient will have decrease in delusional statements by discharge.         6/16/2019 1759 by Sadie Erazo RN  Outcome: Declining     Problem: Thought Process Alteration  Goal: Optimal Thought Clarity  Description  Patient will hold a reality based conversation by discharge.    6/16/2019 1759 by Sadie Erazo, RN  Outcome: Declining     Face to face end of shift report communicated to OBDULIO Ozuna.     Sadie Erazo  6/16/2019  10:15 PM          "

## 2019-06-16 NOTE — PROGRESS NOTES
Medicine Progress Note - Hospitalist Service        Saw pt in psych el today; not happy about her diabetes control.  Shouting out profanities at me, stating she has not been eating well.  Pt's BG has ranged 106-172.  Fair control.  Continue current insulin regimen.    Augustine Davis MD  Hospitalist Service   [facility]

## 2019-06-16 NOTE — PLAN OF CARE
Problem: Adult Behavioral Health Plan of Care  Goal: Patient-Specific Goal (Individualization)  Description  Patient will attend at least 50% of groups while on unit.  Patient will report at least 6-8 hours of sleep at night.  Patient will be compliant with treatment team recommendations.  Patient will be medication compliant while hospitalized.  Patient will have decrease in delusional statements by discharge.  Patient has slept later half of shift, initially had trouble falling asleep       6/16/2019 0643 by Sulma Hirsch RN  Outcome: No Change     Problem: Fall Injury Risk  Goal: Absence of Fall and Fall-Related Injury  Description  Pt will not experience a fall while hospitalized.  Patient will have tap bell at bedside.       6/16/2019 0643 by Sulma Hirsch RN  Outcome: Improving  Gait steady and even when writer saw ambulating     Problem: Violence Risk or Actual  Goal: Anger and Impulse Control  Description  PT will verbalize frustrations before making verbal threats to staff  PT will remain free from violence to staff or others while inpatient   6/16/2019 0643 by Sulma Hirsch RN  Outcome: No Change  Patient has made no threats or evidence of violence this shift     Problem: Thought Process Alteration  Goal: Optimal Thought Clarity  Description  Patient will hold a reality based conversation by discharge.    6/16/2019 0644 by Sulma Hirsch RN  Outcome: No Change  Patient has made no delusional statements to writer last night or this am though brief superficial conversation  Face to face report will be given to am oncoming RN

## 2019-06-16 NOTE — PROGRESS NOTES
Medicine Progress Note - Hospitalist Service        Pt's BG has ranged 106-163.  Recommend continuing current insulin regimen.      Augustine Davis MD  Hospitalist Service   [facility]

## 2019-06-17 LAB
GLUCOSE BLDC GLUCOMTR-MCNC: 169 MG/DL (ref 70–99)
GLUCOSE BLDC GLUCOMTR-MCNC: 173 MG/DL (ref 70–99)
GLUCOSE BLDC GLUCOMTR-MCNC: 180 MG/DL (ref 70–99)
GLUCOSE BLDC GLUCOMTR-MCNC: 202 MG/DL (ref 70–99)

## 2019-06-17 PROCEDURE — 00000146 ZZHCL STATISTIC GLUCOSE BY METER IP

## 2019-06-17 PROCEDURE — 25000125 ZZHC RX 250: Performed by: INTERNAL MEDICINE

## 2019-06-17 PROCEDURE — 99232 SBSQ HOSP IP/OBS MODERATE 35: CPT | Performed by: NURSE PRACTITIONER

## 2019-06-17 PROCEDURE — 25000132 ZZH RX MED GY IP 250 OP 250 PS 637: Performed by: NURSE PRACTITIONER

## 2019-06-17 PROCEDURE — 12400000 ZZH R&B MH

## 2019-06-17 RX ADMIN — BUSPIRONE HYDROCHLORIDE 10 MG: 5 TABLET ORAL at 20:30

## 2019-06-17 RX ADMIN — INSULIN ASPART 1 UNITS: 100 INJECTION, SOLUTION INTRAVENOUS; SUBCUTANEOUS at 16:54

## 2019-06-17 RX ADMIN — GABAPENTIN 1000 MG: 400 CAPSULE ORAL at 08:20

## 2019-06-17 RX ADMIN — METFORMIN HYDROCHLORIDE 1000 MG: 1000 TABLET ORAL at 08:21

## 2019-06-17 RX ADMIN — TOPIRAMATE 100 MG: 100 TABLET, FILM COATED ORAL at 08:21

## 2019-06-17 RX ADMIN — NICOTINE 1 PATCH: 21 PATCH, EXTENDED RELEASE TRANSDERMAL at 08:20

## 2019-06-17 RX ADMIN — PRAZOSIN HYDROCHLORIDE 1 MG: 1 CAPSULE ORAL at 20:30

## 2019-06-17 RX ADMIN — INSULIN ASPART 1 UNITS: 100 INJECTION, SOLUTION INTRAVENOUS; SUBCUTANEOUS at 12:24

## 2019-06-17 RX ADMIN — CHLORPROMAZINE HYDROCHLORIDE 150 MG: 25 TABLET, FILM COATED ORAL at 13:49

## 2019-06-17 RX ADMIN — GABAPENTIN 1000 MG: 400 CAPSULE ORAL at 20:30

## 2019-06-17 RX ADMIN — BENZTROPINE MESYLATE 0.5 MG: 0.5 TABLET ORAL at 20:41

## 2019-06-17 RX ADMIN — SENNOSIDES AND DOCUSATE SODIUM 2 TABLET: 8.6; 5 TABLET ORAL at 20:30

## 2019-06-17 RX ADMIN — BUSPIRONE HYDROCHLORIDE 10 MG: 5 TABLET ORAL at 13:49

## 2019-06-17 RX ADMIN — BUSPIRONE HYDROCHLORIDE 10 MG: 5 TABLET ORAL at 08:20

## 2019-06-17 RX ADMIN — GABAPENTIN 1000 MG: 400 CAPSULE ORAL at 13:49

## 2019-06-17 RX ADMIN — INSULIN GLARGINE 40 UNITS: 100 INJECTION, SOLUTION SUBCUTANEOUS at 20:32

## 2019-06-17 RX ADMIN — INSULIN ASPART 2 UNITS: 100 INJECTION, SOLUTION INTRAVENOUS; SUBCUTANEOUS at 08:19

## 2019-06-17 RX ADMIN — CHLORPROMAZINE HYDROCHLORIDE 100 MG: 100 TABLET, SUGAR COATED ORAL at 08:20

## 2019-06-17 RX ADMIN — BENZTROPINE MESYLATE 0.5 MG: 0.5 TABLET ORAL at 08:21

## 2019-06-17 RX ADMIN — METFORMIN HYDROCHLORIDE 1000 MG: 1000 TABLET ORAL at 16:58

## 2019-06-17 RX ADMIN — CHLORPROMAZINE HYDROCHLORIDE 150 MG: 25 TABLET, FILM COATED ORAL at 20:29

## 2019-06-17 ASSESSMENT — ACTIVITIES OF DAILY LIVING (ADL)
ORAL_HYGIENE: INDEPENDENT
HYGIENE/GROOMING: INDEPENDENT
HYGIENE/GROOMING: INDEPENDENT
DRESS: INDEPENDENT
LAUNDRY: UNABLE TO COMPLETE
DRESS: INDEPENDENT;SCRUBS (BEHAVIORAL HEALTH)
ORAL_HYGIENE: INDEPENDENT

## 2019-06-17 NOTE — PROGRESS NOTES
[facility]  Psychiatric Progress Note      Impression:   Genie is up laughing to herself in the lounge area. She reports she is having thoughts but they are not voices. She thinks they are old memories. Genie rambles about knowing different languages such as Osborne, Incan, and Mayan, as well as Lithuanian and Irish. She asks about discharge planning and was informed that referrals are being sent out for placement options. She would like some sort of transitional housing and would eventually like to go to [facility] for treatment. Gen[facility] was [facility] to [facility]. Will increase thorzine to 150 mg three times a day.          Diagnoses:      Schizoaffective disorder, bipolar type   Methamphetamine use disorder, moderate to severe            Plan:   Continue current medications    Hospitalist following diabetes - continue foot soaks    [place] is going forward with petition for commitment    ELOS:  > 5 days for safe discharge plan  Re-check Liver panel within the week    Attestation:  Patient has been seen and evaluated by me,  Loren Zuleta NP          Interim History:   The patient's care was discussed with the treatment team and chart notes were reviewed.          Medications:     Current Facility-Administered Medications   Medication     benzocaine-menthol (CEPACOL) 15-3.6 MG lozenge 1 lozenge     benztropine (COGENTIN) tablet 0.5 mg     busPIRone (BUSPAR) tablet 10 mg     chlorproMAZINE (THORAZINE) tablet 150 mg     glucose gel 15-30 g    Or     dextrose 50 % injection 25-50 mL    Or     glucagon injection 1 mg     gabapentin (NEURONTIN) capsule 1,000 mg     ibuprofen (ADVIL/MOTRIN) tablet 400 mg     insulin aspart (NovoLOG) inj (RAPID ACTING)     insulin aspart (NovoLOG) inj (RAPID ACTING)     insulin glargine (LANTUS PEN) injection 40 Units     magnesium hydroxide (MILK OF MAGNESIA) suspension 30 mL     magnesium sulfate (EPSOM SALT) granules 5 g     melatonin tablet 3 mg      "metFORMIN (GLUCOPHAGE) tablet 1,000 mg     nicotine (NICODERM CQ) 21 MG/24HR 24 hr patch 1 patch     nicotine (NICORETTE) gum 2-4 mg     nicotine Patch in Place     nicotine patch REMOVAL     prazosin (MINIPRESS) capsule 1 mg     senna-docusate (SENOKOT-S/PERICOLACE) 8.6-50 MG per tablet 2 tablet     topiramate (TOPAMAX) tablet 100 mg          10 point ROS negative \"pain everywhere\"       Allergies:     Allergies   Allergen Reactions     Haloperidol Other (See Comments)     Other reaction(s): Seizures  Patient states, \"I fell down and wasn't able to get up.\"  Patient states, \"I fell down and wasn't able to get up.\"              Psychiatric Examination:   /82   Pulse 87   Temp 97.3  F (36.3  C) (Tympanic)   Resp 14   Ht 1.626 m (5' 4\")   Wt 84.1 kg (185 lb 8 oz)   SpO2 99%   BMI 31.84 kg/m    Weight is 185 lbs 8 oz  Body mass index is 31.84 kg/m .    Appearance:  awake, alert   Attitude:  cooperative  Eye Contact:  fair  Mood: slightly irritable  Affect: mood congruent  Speech:  pressured speech - better  Psychomotor Behavior:  no evidence of tardive dyskinesia, dystonia, or tics  Thought Process: less disorganized, much better but still has a delusional content.   Associations:  loosening of associations present   Thought Content:  Denies SI and Hi. Reports a decrease in AH  Insight:  limited  Judgment:  poor  Oriented to:  time, person, and place  Attention Span and Concentration:  limited  Recent and Remote Memory:  fair  Fund of Knowledge: low-normal  Muscle Strength and Tone: normal  Gait and Station: Normal         Labs:       Results for orders placed or performed during the hospital encounter of 05/10/19 (from the past 24 hour(s))   Glucose by meter   Result Value Ref Range    Glucose 97 70 - 99 mg/dL   Glucose by meter   Result Value Ref Range    Glucose 191 (H) 70 - 99 mg/dL   Glucose by meter   Result Value Ref Range    Glucose 172 (H) 70 - 99 mg/dL   Glucose by meter   Result Value Ref Range "    Glucose 202 (H) 70 - 99 mg/dL

## 2019-06-17 NOTE — PLAN OF CARE
Problem: Adult Behavioral Health Plan of Care  Goal: Patient-Specific Goal (Individualization)  Description  Patient will attend at least 50% of groups while on unit.  Patient will report at least 6-8 hours of sleep at night.  Patient will be compliant with treatment team recommendations.  Patient will be medication compliant while hospitalized.  Patient will have decrease in delusional statements by discharge.  Patient up at onset of shift appears to be sleeping. No delusional statements to writer when talked with at onset of shift          6/17/2019 0248 by Sulma Hirsch RN  Outcome: No Change     Problem: Thought Process Alteration  Goal: Optimal Thought Clarity  Description  Patient will hold a reality based conversation by discharge.    6/17/2019 0248 by Sulma Hirsch RN  Outcome: No Change   Patient able to hold appropriate short superficial conversation with writer  Problem: Fall Injury Risk  Goal: Absence of Fall and Fall-Related Injury  Description  Pt will not experience a fall while hospitalized.  Patient will have tap bell at bedside.       6/17/2019 0248 by Sulma Hirsch RN  Outcome: Improving  Gait even and steady when ambulating     Problem: Violence Risk or Actual  Goal: Anger and Impulse Control  Description  PT will verbalize frustrations before making verbal threats to staff  PT will remain free from violence to staff or others while inpatient   6/17/2019 0248 by Sulma Hirsch RN  Outcome: Improving  Patient was cooperative and pleasant during interaction with writer  Face to face report will be given oncoming RN

## 2019-06-17 NOTE — PLAN OF CARE
BEHAVIORAL TEAM DISCUSSION    Participants:  Loren Zuleta NP,  Angie Laws LSW,  Becca Chery LSW, Beena Kumar LGSW, Ion Morales RN, Josh Walls RN, Mady Holman OT, Natalia Costello OT, Gena Gomez OT   Progress: Fair  Continued Stay Criteria/Rationale: Delusional, irritable,   Medical/Physical: Diabetes - working with hospitalist, liver enzymes have decreased, hx of Hep C, foot soaks are PRN.  Precautions:   Falls precaution?: Not flagging as a high fall risk but Fall prevention is being addressed in nursing care plan    Behavioral Orders   Procedures    Code 1 - Restrict to Unit    Routine Programming     As clinically indicated    Status 15     Every 15 minutes.     Plan: Increase Thorazine. IRTS if appropriate, continue to stabilize, Guardianship has been filed but no court date is set, looking at [place] if no additional plan can be secured.  Pt asking to leave, may need to discharge as not able to place hold and asking to leave  Rationale for change in precautions or plan: none    Current Facility-Administered Medications:     benzocaine-menthol (CEPACOL) 15-3.6 MG lozenge 1 lozenge, 1 lozenge, Buccal, Q1H PRN, Natalia Fabian APRN CNP    benztropine (COGENTIN) tablet 0.5 mg, 0.5 mg, Oral, BID, Loren Zuleta NP, 0.5 mg at 06/17/19 0821    busPIRone (BUSPAR) tablet 10 mg, 10 mg, Oral, TID, Natalia Fabian APRN CNP, 10 mg at 06/17/19 0820    chlorproMAZINE (THORAZINE) tablet 150 mg, 150 mg, Oral, TID, Loren Zuleta NP    glucose gel 15-30 g, 15-30 g, Oral, Q15 Min PRN, 30 g at 06/08/19 1339 **OR** dextrose 50 % injection 25-50 mL, 25-50 mL, Intravenous, Q15 Min PRN **OR** glucagon injection 1 mg, 1 mg, Subcutaneous, Q15 Min PRN, Airam Light NP    gabapentin (NEURONTIN) capsule 1,000 mg, 1,000 mg, Oral, TID, Alaspa, Loren L, NP, 1,000 mg at 06/17/19 0820    ibuprofen (ADVIL/MOTRIN) tablet 400 mg, 400 mg, Oral, Q6H PRN, Natalia Fabian, LATRICE CNP, 400 mg at  06/12/19 0227    insulin aspart (NovoLOG) inj (RAPID ACTING), 7 Units, Subcutaneous, TID w/meals, Baljit Stinson MD, 7 Units at 06/17/19 0819    insulin aspart (NovoLOG) inj (RAPID ACTING), 1-7 Units, Subcutaneous, TID AC, Kuldeep, April Keturah, NP, 2 Units at 06/17/19 0819    insulin glargine (LANTUS PEN) injection 40 Units, 40 Units, Subcutaneous, At Bedtime, Rocio Tompkins MD, 40 Units at 06/16/19 2030    magnesium hydroxide (MILK OF MAGNESIA) suspension 30 mL, 30 mL, Oral, Daily PRN, Loren Zuleta NP, 30 mL at 06/07/19 1645    magnesium sulfate (EPSOM SALT) granules 5 g, 5 g, Other, BID PRN **AND** Patient care order, , , Per Unit Routine, Natalia Fabian, APRN CNP    melatonin tablet 3 mg, 3 mg, Oral, At Bedtime PRN, Loren Zuleta NP    metFORMIN (GLUCOPHAGE) tablet 1,000 mg, 1,000 mg, Oral, BID w/meals, Baljit Stinson MD, 1,000 mg at 06/17/19 0821    nicotine (NICODERM CQ) 21 MG/24HR 24 hr patch 1 patch, 1 patch, Transdermal, Daily, Loren Zuleta NP, 1 patch at 06/17/19 0820    nicotine (NICORETTE) gum 2-4 mg, 2-4 mg, Buccal, Q1H PRN, Raven Stout NP, 4 mg at 05/21/19 0938    nicotine Patch in Place, , Transdermal, Q8H, Loren Zuleta NP    nicotine patch REMOVAL, , Transdermal, Daily, Loren Zuleta NP, Stopped at 06/01/19 0842    prazosin (MINIPRESS) capsule 1 mg, 1 mg, Oral, At Bedtime, Kuldeep April JACKIE Rebollar, 1 mg at 06/16/19 2030    senna-docusate (SENOKOT-S/PERICOLACE) 8.6-50 MG per tablet 2 tablet, 2 tablet, Oral, At Bedtime, Kuldeep April JACKIE Rebollar, 2 tablet at 06/16/19 2030    topiramate (TOPAMAX) tablet 100 mg, 100 mg, Oral, Daily, Loren Zuleta NP, 100 mg at 06/17/19 0821   Active Problems:    Mixed hyperlipidemia        Diabetes mellitus, type 2        Schizoaffective disorder, bipolar type (H)

## 2019-06-17 NOTE — PLAN OF CARE
"  Problem: Adult Behavioral Health Plan of Care  Goal: Patient-Specific Goal (Individualization)  Description  Patient will attend at least 50% of groups while on unit.  Patient will report at least 6-8 hours of sleep at night.  Patient will be compliant with treatment team recommendations.  Patient will be medication compliant while hospitalized.  Patient will have decrease in delusional statements by discharge.  6/17/2019 1137 by Eliza Arndt, RN  Outcome: No Change    Pt. Denies HI, SI, anxiety, and depression. Pt. Reports to all over pain, refusing any PRN at this time. Pt. To nurses station raising voice \"I would like to leave. I want the fuck out of here. I was attacked by 8 dogs and my daughter took my car. I had to get 200 stiches and staples. I hit my head and looked like a elephant.\" Pt. Using distraction at this time. Out to lounge listening to headphones, coloring, and writing. Pt. In agreement to update staff to thoughts feelings of wanting to harm self or others. Pt. Cooperative with medications and nursing assessment. Pt. requesting Nicotine patch be administered to left arm. Pt. Educated on changing patch placement daily. Pt. Eating WDL 75% of breakfast. Pt. Offers no c/o issues with bowel and bladder.         1400 Pt signed ABELINO for [employer]. Daughter spoke with this writer reporting she is willing to have her mother live with her. This writer updated Jorge pt . Is not discharging today. Jorge reports she will call for updates.     Problem: Thought Process Alteration  Goal: Optimal Thought Clarity  Description  Patient will hold a reality based conversation by discharge.    6/17/2019 1137 by Eliza Arndt, RN  Outcome: No Change    Pt. Goal not met this a.m. Pt. Continue to work toward goal outcome.      Problem: Violence Risk or Actual  Goal: Anger and Impulse Control  Description  PT will verbalize frustrations before making verbal threats to staff  PT will remain free from violence to " staff or others while inpatient   6/17/2019 1137 by Eliza Arndt, RN  Outcome: No Change    Pt. Not threatening violence to staff this a.m.      Problem: Fall Injury Risk  Goal: Absence of Fall and Fall-Related Injury  Description  Pt will not experience a fall while hospitalized.  Patient will have tap bell at bedside.       6/17/2019 1137 by Eliza Arndt, RN  Outcome: Improving    Pt. Gait balanced and steady, Free from falls at this time.     Face to face end of shift report to be communicated to oncoming RN.     Eliza Arndt  6/17/2019  2:03 PM

## 2019-06-18 LAB
GLUCOSE BLDC GLUCOMTR-MCNC: 112 MG/DL (ref 70–99)
GLUCOSE BLDC GLUCOMTR-MCNC: 154 MG/DL (ref 70–99)
GLUCOSE BLDC GLUCOMTR-MCNC: 169 MG/DL (ref 70–99)
GLUCOSE BLDC GLUCOMTR-MCNC: 194 MG/DL (ref 70–99)

## 2019-06-18 PROCEDURE — 00000146 ZZHCL STATISTIC GLUCOSE BY METER IP

## 2019-06-18 PROCEDURE — 99232 SBSQ HOSP IP/OBS MODERATE 35: CPT | Performed by: NURSE PRACTITIONER

## 2019-06-18 PROCEDURE — 25000132 ZZH RX MED GY IP 250 OP 250 PS 637: Performed by: NURSE PRACTITIONER

## 2019-06-18 PROCEDURE — 12400000 ZZH R&B MH

## 2019-06-18 PROCEDURE — 25000125 ZZHC RX 250: Performed by: INTERNAL MEDICINE

## 2019-06-18 RX ADMIN — INSULIN ASPART 1 UNITS: 100 INJECTION, SOLUTION INTRAVENOUS; SUBCUTANEOUS at 11:39

## 2019-06-18 RX ADMIN — CHLORPROMAZINE HYDROCHLORIDE 150 MG: 25 TABLET, FILM COATED ORAL at 20:30

## 2019-06-18 RX ADMIN — INSULIN ASPART 1 UNITS: 100 INJECTION, SOLUTION INTRAVENOUS; SUBCUTANEOUS at 17:00

## 2019-06-18 RX ADMIN — BUSPIRONE HYDROCHLORIDE 10 MG: 5 TABLET ORAL at 20:30

## 2019-06-18 RX ADMIN — CHLORPROMAZINE HYDROCHLORIDE 150 MG: 25 TABLET, FILM COATED ORAL at 13:18

## 2019-06-18 RX ADMIN — CHLORPROMAZINE HYDROCHLORIDE 150 MG: 25 TABLET, FILM COATED ORAL at 08:28

## 2019-06-18 RX ADMIN — NICOTINE 1 PATCH: 21 PATCH, EXTENDED RELEASE TRANSDERMAL at 08:27

## 2019-06-18 RX ADMIN — METFORMIN HYDROCHLORIDE 1000 MG: 1000 TABLET ORAL at 07:57

## 2019-06-18 RX ADMIN — GABAPENTIN 1000 MG: 400 CAPSULE ORAL at 13:18

## 2019-06-18 RX ADMIN — BENZTROPINE MESYLATE 0.5 MG: 0.5 TABLET ORAL at 20:29

## 2019-06-18 RX ADMIN — BUSPIRONE HYDROCHLORIDE 10 MG: 5 TABLET ORAL at 13:18

## 2019-06-18 RX ADMIN — BUSPIRONE HYDROCHLORIDE 10 MG: 5 TABLET ORAL at 08:28

## 2019-06-18 RX ADMIN — PRAZOSIN HYDROCHLORIDE 1 MG: 1 CAPSULE ORAL at 20:29

## 2019-06-18 RX ADMIN — GABAPENTIN 1000 MG: 400 CAPSULE ORAL at 08:29

## 2019-06-18 RX ADMIN — METFORMIN HYDROCHLORIDE 1000 MG: 1000 TABLET ORAL at 17:00

## 2019-06-18 RX ADMIN — SENNOSIDES AND DOCUSATE SODIUM 2 TABLET: 8.6; 5 TABLET ORAL at 20:30

## 2019-06-18 RX ADMIN — BENZTROPINE MESYLATE 0.5 MG: 0.5 TABLET ORAL at 08:29

## 2019-06-18 RX ADMIN — GABAPENTIN 1000 MG: 400 CAPSULE ORAL at 20:29

## 2019-06-18 RX ADMIN — TOPIRAMATE 100 MG: 100 TABLET, FILM COATED ORAL at 08:29

## 2019-06-18 RX ADMIN — INSULIN GLARGINE 40 UNITS: 100 INJECTION, SOLUTION SUBCUTANEOUS at 20:30

## 2019-06-18 ASSESSMENT — ACTIVITIES OF DAILY LIVING (ADL)
ORAL_HYGIENE: INDEPENDENT
DRESS: INDEPENDENT
HYGIENE/GROOMING: INDEPENDENT

## 2019-06-18 NOTE — PLAN OF CARE
"Spoke with pt's guardian, Kerry, and she stated discharging to her daughters house is not a good idea and does not approve that discharge.     [employer] does not have any openings at this time states \"it could be a month or a few months\"   Left a message with Anand Munoz regarding referral that was sent by previous . Spoke with pt's nurse to update on guardian denying pt's placement plan stating it was not a safe environment for pt to be at this time - pt was sleeping and this writer did not want to wake pt up.     Spoke with Sherry - she stated she has not received the referral. Will send referral.       "

## 2019-06-18 NOTE — PLAN OF CARE
Problem: Adult Behavioral Health Plan of Care  Goal: Patient-Specific Goal (Individualization)  Description  Patient will attend at least 50% of groups while on unit.  Patient will report at least 6-8 hours of sleep at night.  Patient will be compliant with treatment team recommendations.  Patient will be medication compliant while hospitalized.  Patient will have decrease in delusional statements by discharge.         6/18/2019 0010 by Melissa Flowers, RN  Outcome: No Change     Problem: Thought Process Alteration  Goal: Optimal Thought Clarity  Description  Patient will hold a reality based conversation by discharge.    6/18/2019 0010 by Melissa Flowers, RN  Outcome: No Change     Problem: Fall Injury Risk  Goal: Absence of Fall and Fall-Related Injury  Description  Pt will not experience a fall while hospitalized.  Patient will have tap bell at bedside.       6/18/2019 0010 by Melissa Flowers, RN  Outcome: Improving     Problem: Violence Risk or Actual  Goal: Anger and Impulse Control  Description  PT will verbalize frustrations before making verbal threats to staff  PT will remain free from violence to staff or others while inpatient   6/18/2019 0010 by Melissa Flowers, RN  Outcome: Improving     Face to face shift report recieved from Nancy MAK. Rounding completed, pt observed.    Pt appeared to be sleeping most of this shift, normal respirations and position changes noted. Pt did not have any violence towards staff or peers this shift. Pt did not have any falls or injury this shift.    Face to face report will be communicated to kei MAK.    Melissa Flowers  6/18/2019  5:40 AM

## 2019-06-18 NOTE — PROGRESS NOTES
Pt has been yelling out to staff about not having anyone help her with her getting out of here and wanting someone to set up transport for her. Explained to her that she will be seen by provider and  but they are at lunch, offered prn to help calm down but pt only has ibuprofen and refuses to take that. Pt was yellig I should all a , you guys aren't doing what you're supposed to. The next time nurse went out to update pt that she is still working on something for her agitation and she then stated that she had just ate something bad and didn't feel well so that's why she was yelling. Now another staff is out to speak with pt and she was getting upset about her situation again. T/C to Loren and ok to give pt her thorazine early.

## 2019-06-18 NOTE — PLAN OF CARE
BEHAVIORAL TEAM DISCUSSION    Participants:  Loren Zuleta NP,  Betsy Herrera  LICSW,  Becca Chery LSW, Beena Kumar SW, Ion Morales RN, Josh Walls RN, Natalia Costello OT, Gena Gomez OT, Cheyenne Edward RT  Progress: Fair  Continued Stay Criteria/Rationale: Delusional, irritable, rambling, grandiose, very vulnerable   Medical/Physical: Diabetes - working with hospitalist, liver enzymes have decreased, hx of Hep C, foot soaks are PRN.  Precautions:   Falls precaution?: Not flagging as a high fall risk but Fall prevention is being addressed in nursing care plan    Behavioral Orders   Procedures     Code 1 - Restrict to Unit     Routine Programming     As clinically indicated     Status 15     Every 15 minutes.     Plan:  Thorazine increased. IRTS if appropriate, continue to stabilize, Emergency Guardianship through [employer] was appointed pending a hearing, today wants to go and live with her daughter - will need to have this approved through [employer] - otherwise looking at possibly [facility] in [place]   Rationale for change in precautions or plan: none      Current Facility-Administered Medications:      benzocaine-menthol (CEPACOL) 15-3.6 MG lozenge 1 lozenge, 1 lozenge, Buccal, Q1H PRN, Natalia Fabian APRN CNP     benztropine (COGENTIN) tablet 0.5 mg, 0.5 mg, Oral, BID, Loren Zuleta NP, 0.5 mg at 06/18/19 0829     busPIRone (BUSPAR) tablet 10 mg, 10 mg, Oral, TID, Natalia Fabian APRN CNP, 10 mg at 06/18/19 0828     chlorproMAZINE (THORAZINE) tablet 150 mg, 150 mg, Oral, TID, Loren Zuleta NP, 150 mg at 06/18/19 0828     glucose gel 15-30 g, 15-30 g, Oral, Q15 Min PRN, 30 g at 06/08/19 1339 **OR** dextrose 50 % injection 25-50 mL, 25-50 mL, Intravenous, Q15 Min PRN **OR** glucagon injection 1 mg, 1 mg, Subcutaneous, Q15 Min PRN, Airam Light NP     gabapentin (NEURONTIN) capsule 1,000 mg, 1,000 mg, Oral, TID, Loren Zuleta NP, 1,000 mg at 06/18/19 4854     ibuprofen  (ADVIL/MOTRIN) tablet 400 mg, 400 mg, Oral, Q6H PRN, Natalia Fabian APRN CNP, 400 mg at 06/12/19 0227     insulin aspart (NovoLOG) inj (RAPID ACTING), 7 Units, Subcutaneous, TID w/meals, Baljit Stinson MD, 7 Units at 06/18/19 0757     insulin aspart (NovoLOG) inj (RAPID ACTING), 1-7 Units, Subcutaneous, TID AC, Airam Light NP, 1 Units at 06/17/19 1654     insulin glargine (LANTUS PEN) injection 40 Units, 40 Units, Subcutaneous, At Bedtime, Rocio Tompkins MD, 40 Units at 06/17/19 2032     magnesium hydroxide (MILK OF MAGNESIA) suspension 30 mL, 30 mL, Oral, Daily PRN, Loren Zuleta NP, 30 mL at 06/07/19 1645     magnesium sulfate (EPSOM SALT) granules 5 g, 5 g, Other, BID PRN **AND** Patient care order, , , Per Unit Routine, Natalia Fabian APRN CNP     melatonin tablet 3 mg, 3 mg, Oral, At Bedtime PRN, Loren Zuleta NP     metFORMIN (GLUCOPHAGE) tablet 1,000 mg, 1,000 mg, Oral, BID w/meals, Baljit Stinson MD, 1,000 mg at 06/18/19 0757     nicotine (NICODERM CQ) 21 MG/24HR 24 hr patch 1 patch, 1 patch, Transdermal, Daily, Loren Zuleta NP, 1 patch at 06/18/19 0827     nicotine (NICORETTE) gum 2-4 mg, 2-4 mg, Buccal, Q1H PRN, Raven Stout NP, 4 mg at 05/21/19 0938     nicotine Patch in Place, , Transdermal, Q8H, Loren Zuleta NP     nicotine patch REMOVAL, , Transdermal, Daily, Loren Zuleta NP, Stopped at 06/01/19 0842     prazosin (MINIPRESS) capsule 1 mg, 1 mg, Oral, At Bedtime, Airam Light NP, 1 mg at 06/17/19 2030     senna-docusate (SENOKOT-S/PERICOLACE) 8.6-50 MG per tablet 2 tablet, 2 tablet, Oral, At Bedtime, Kuldeep April JACKIE Rebollar, 2 tablet at 06/17/19 2030     topiramate (TOPAMAX) tablet 100 mg, 100 mg, Oral, Daily, Loren Zuleta NP, 100 mg at 06/18/19 0851     Active Problems:    Mixed hyperlipidemia        Diabetes mellitus, type 2        Schizoaffective disorder, bipolar type (H)
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
## 2019-06-18 NOTE — PROGRESS NOTES
Face to face end of shift report william be  communicated to evening shift rn.     JUANCARLOS HODGSON  6/18/2019  2:56 PM

## 2019-06-18 NOTE — PLAN OF CARE
"  Problem: Adult Behavioral Health Plan of Care  Goal: Patient-Specific Goal (Individualization)  Description  Patient will attend at least 50% of groups while on unit.  Patient will report at least 6-8 hours of sleep at night.  Patient will be compliant with treatment team recommendations.  Patient will be medication compliant while hospitalized.  Patient will have decrease in delusional statements by discharge.         6/17/2019 2010 by Nancy Stern, RN  Outcome: No Change    VSS, affect remains falt. sleeping soundly until woke to check blood sugar before dinner. Pt states \"I'm leaving for the res tomorrow, I get to go home.\" thoughts remain delusional, flight of ideas.   Cooperative with nursing assessment. Behavior appropriate with staff and peers.   BG-169 at dinner and 173 at HS snack.       Problem: Thought Process Alteration  Goal: Optimal Thought Clarity  Description  Patient will hold a reality based conversation by discharge.    6/17/2019 2010 by Nancy Stern, RN  Outcome: No Change     Problem: Violence Risk or Actual  Goal: Anger and Impulse Control  Description  PT will verbalize frustrations before making verbal threats to staff  PT will remain free from violence to staff or others while inpatient   6/17/2019 2010 by Nancy Stern, RN  Outcome: No Change     Problem: Fall Injury Risk  Goal: Absence of Fall and Fall-Related Injury  Description  Pt will not experience a fall while hospitalized.  Patient will have tap bell at bedside.       6/17/2019 2010 by Nancy Stern, RN  Outcome: Improving   Face to face end of shift report communicated to oncoming shift.     Nancy Stern  6/17/2019  10:58 PM          "

## 2019-06-18 NOTE — PLAN OF CARE
"  Problem: Adult Behavioral Health Plan of Care  Goal: Patient-Specific Goal (Individualization)  Description  Patient will attend at least 50% of groups while on unit.  Patient will report at least 6-8 hours of sleep at night.  Patient will be compliant with treatment team recommendations.  Patient will be medication compliant while hospitalized.  Patient will have decrease in delusional statements by discharge.         6/18/2019 1833 by Nancy Stern, RN  Outcome: Improving    VSS, states her R-hand has cramps from writing-declines need to tx. Irritable-making statements about the emergency guardian Crystal \"she lets her dogs run all over and the kill kittens and kids. She thinks she is the millionaire of the [place].\"   Spends more time in lounge-interacting with peers, smiling and laughing at times.   Continues with fixed delusions, flight of ideas,  Cooperative with nursing assessment. Behavior appropriate with peers ans staff.      Problem: Thought Process Alteration  Goal: Optimal Thought Clarity  Description  Patient will hold a reality based conversation by discharge.    6/18/2019 1833 by Nancy Stern, RN  Outcome: Improving     Problem: Fall Injury Risk  Goal: Absence of Fall and Fall-Related Injury  Description  Pt will not experience a fall while hospitalized.  Patient will have tap bell at bedside.       6/18/2019 1833 by Nancy Stern, RN  Outcome: Improving     Problem: Violence Risk or Actual  Goal: Anger and Impulse Control  Description  PT will verbalize frustrations before making verbal threats to staff  PT will remain free from violence to staff or others while inpatient   6/18/2019 1833 by Nancy Stern, RN  Outcome: Improving    Face to face end of shift report communicated to oncoming shift.     Nancy Stern  6/18/2019  7:41 PM             "

## 2019-06-18 NOTE — PLAN OF CARE
"1:1 with patient - she is very upset because she feels like everyone is ignoring her today - she demands to talk to the other  and starts swearing - explained that she needs to be more calm in how she is talking about things - we reviewed the court papers and she is very upset that she was appointed a guardian - very vulgar about the staff in [place] and about the appointed guardian - upset that it as only done because she wanted to leave - tried to explain to her that this was petitioned a while ago and it likely was done on an emergency basis because she was demanding to leave - she is upset that she did not know about the guardianship - tried to remind her that her and I talked about this on a couple of occasions as it was taking too long for the commitment and it would help her get into placement - she denies ever talking with me about it and remains very upset at the appointed guardian - she starts to draw personal lines about why the personal appointed as guardian is doing this to her personally and not because it is in her best interest.  She started to ramble some as she discussed children and nobility and things like that - finally able to calm her down some - she understands that she needs to stay here until at least the hearing on the 28th - she is concerned that she be allowed to attend the hearing - she did have some talk about wanting to either \"kick their asses\" of the people involved when she goes to the hearing or contact relatives that she can talk into making the decisions that she wants.  In the end, she was calm and we discussed what she needed to do for the next few days to remain in control as she can't change what is happening right now.  She agreed to use aroma therapy and to stay calm.  "

## 2019-06-18 NOTE — PROGRESS NOTES
Medicine Progress Note - Hospitalist Service        Pt's BG has ranged 112-202.   Somewhat higher range from previous days, but acceptable; also BG is trending down.  Continue current insulin regimen.    Augustine Davis MD  Hospitalist Service   [facility]

## 2019-06-19 LAB
GLUCOSE BLDC GLUCOMTR-MCNC: 135 MG/DL (ref 70–99)
GLUCOSE BLDC GLUCOMTR-MCNC: 142 MG/DL (ref 70–99)
GLUCOSE BLDC GLUCOMTR-MCNC: 150 MG/DL (ref 70–99)
GLUCOSE BLDC GLUCOMTR-MCNC: 182 MG/DL (ref 70–99)
GLUCOSE BLDC GLUCOMTR-MCNC: 227 MG/DL (ref 70–99)

## 2019-06-19 PROCEDURE — 25000132 ZZH RX MED GY IP 250 OP 250 PS 637: Performed by: NURSE PRACTITIONER

## 2019-06-19 PROCEDURE — 25000125 ZZHC RX 250: Performed by: INTERNAL MEDICINE

## 2019-06-19 PROCEDURE — 12400000 ZZH R&B MH

## 2019-06-19 PROCEDURE — 99232 SBSQ HOSP IP/OBS MODERATE 35: CPT | Performed by: NURSE PRACTITIONER

## 2019-06-19 PROCEDURE — 00000146 ZZHCL STATISTIC GLUCOSE BY METER IP

## 2019-06-19 RX ORDER — GABAPENTIN 400 MG/1
1200 CAPSULE ORAL 3 TIMES DAILY
Status: DISCONTINUED | OUTPATIENT
Start: 2019-06-19 | End: 2019-07-03 | Stop reason: HOSPADM

## 2019-06-19 RX ORDER — BUSPIRONE HYDROCHLORIDE 7.5 MG/1
15 TABLET ORAL 3 TIMES DAILY
Status: DISCONTINUED | OUTPATIENT
Start: 2019-06-19 | End: 2019-07-03 | Stop reason: HOSPADM

## 2019-06-19 RX ORDER — BENZTROPINE MESYLATE 0.5 MG/1
0.5 TABLET ORAL DAILY
Status: COMPLETED | OUTPATIENT
Start: 2019-06-19 | End: 2019-06-19

## 2019-06-19 RX ORDER — BENZTROPINE MESYLATE 1 MG/1
1 TABLET ORAL 2 TIMES DAILY
Status: DISCONTINUED | OUTPATIENT
Start: 2019-06-19 | End: 2019-06-20

## 2019-06-19 RX ADMIN — CHLORPROMAZINE HYDROCHLORIDE 150 MG: 25 TABLET, FILM COATED ORAL at 13:40

## 2019-06-19 RX ADMIN — INSULIN ASPART 1 UNITS: 100 INJECTION, SOLUTION INTRAVENOUS; SUBCUTANEOUS at 16:51

## 2019-06-19 RX ADMIN — BUSPIRONE HYDROCHLORIDE 15 MG: 15 TABLET ORAL at 20:17

## 2019-06-19 RX ADMIN — BENZTROPINE MESYLATE 0.5 MG: 0.5 TABLET ORAL at 08:07

## 2019-06-19 RX ADMIN — GABAPENTIN 1200 MG: 400 CAPSULE ORAL at 20:16

## 2019-06-19 RX ADMIN — GABAPENTIN 1000 MG: 400 CAPSULE ORAL at 08:08

## 2019-06-19 RX ADMIN — INSULIN ASPART 1 UNITS: 100 INJECTION, SOLUTION INTRAVENOUS; SUBCUTANEOUS at 11:45

## 2019-06-19 RX ADMIN — GABAPENTIN 1200 MG: 400 CAPSULE ORAL at 13:40

## 2019-06-19 RX ADMIN — IBUPROFEN 400 MG: 200 TABLET, FILM COATED ORAL at 23:42

## 2019-06-19 RX ADMIN — METFORMIN HYDROCHLORIDE 1000 MG: 1000 TABLET ORAL at 16:51

## 2019-06-19 RX ADMIN — INSULIN GLARGINE 40 UNITS: 100 INJECTION, SOLUTION SUBCUTANEOUS at 20:19

## 2019-06-19 RX ADMIN — SENNOSIDES AND DOCUSATE SODIUM 2 TABLET: 8.6; 5 TABLET ORAL at 20:16

## 2019-06-19 RX ADMIN — BUSPIRONE HYDROCHLORIDE 10 MG: 5 TABLET ORAL at 08:07

## 2019-06-19 RX ADMIN — TOPIRAMATE 100 MG: 100 TABLET, FILM COATED ORAL at 08:09

## 2019-06-19 RX ADMIN — METFORMIN HYDROCHLORIDE 1000 MG: 1000 TABLET ORAL at 08:09

## 2019-06-19 RX ADMIN — CHLORPROMAZINE HYDROCHLORIDE 150 MG: 25 TABLET, FILM COATED ORAL at 20:17

## 2019-06-19 RX ADMIN — BENZTROPINE MESYLATE 1 MG: 1 TABLET ORAL at 20:16

## 2019-06-19 RX ADMIN — BENZTROPINE MESYLATE 0.5 MG: 0.5 TABLET ORAL at 11:44

## 2019-06-19 RX ADMIN — BUSPIRONE HYDROCHLORIDE 15 MG: 15 TABLET ORAL at 13:41

## 2019-06-19 RX ADMIN — PRAZOSIN HYDROCHLORIDE 1 MG: 1 CAPSULE ORAL at 20:17

## 2019-06-19 RX ADMIN — INSULIN ASPART 1 UNITS: 100 INJECTION, SOLUTION INTRAVENOUS; SUBCUTANEOUS at 08:12

## 2019-06-19 RX ADMIN — CHLORPROMAZINE HYDROCHLORIDE 150 MG: 25 TABLET, FILM COATED ORAL at 08:08

## 2019-06-19 ASSESSMENT — ACTIVITIES OF DAILY LIVING (ADL)
HYGIENE/GROOMING: INDEPENDENT
LAUNDRY: UNABLE TO COMPLETE
ORAL_HYGIENE: INDEPENDENT
DRESS: SCRUBS (BEHAVIORAL HEALTH)
HYGIENE/GROOMING: INDEPENDENT
ORAL_HYGIENE: INDEPENDENT
DRESS: INDEPENDENT

## 2019-06-19 NOTE — PLAN OF CARE
"  Problem: Adult Behavioral Health Plan of Care  Goal: Patient-Specific Goal (Individualization)  Description  Patient will attend at least 50% of groups while on unit.  Patient will report at least 6-8 hours of sleep at night.  Patient will be compliant with treatment team recommendations.  Patient will be medication compliant while hospitalized.  Patient will have decrease in delusional statements by discharge.    Patient had only small portion of rice cereal this am, refused milk and other protein items complaining of not feeling well and headache, was given a pudding which she accepted. Did state little later \"Can you check my blood sugar I don't feel good\" Accucheck was 135 at 1020. Sitting up on unit majority of shift.  Statements about being upset with [place] and them making up stuff and jealous of her. Agreeable and aware of medication changes.         6/19/2019 1408 by Sulma Hirsch RN  Outcome: Improving     Problem: Thought Process Alteration  Goal: Optimal Thought Clarity  Description  Patient will hold a reality based conversation by discharge.    6/19/2019 1408 by Sulma Hirsch RN  Outcome: No Change  Patient able to hold simple conversation, that is not delusional.      Problem: Fall Injury Risk  Goal: Absence of Fall and Fall-Related Injury  Description  Pt will not experience a fall while hospitalized.  Patient will have tap bell at bedside.       6/19/2019 1408 by Sulma Hirsch RN  Outcome: Improving  Gait even and steady when walking     Problem: Violence Risk or Actual  Goal: Anger and Impulse Control  Description  PT will verbalize frustrations before making verbal threats to staff  PT will remain free from violence to staff or others while inpatient   6/19/2019 1408 by Sulma Hirsch RN  Outcome: No Change   Patient irritable this shift earlier hsa become pleasant and talkative    Face to face report to be done with oncoming RN at change of shift     "

## 2019-06-19 NOTE — PROGRESS NOTES
[facility]  Psychiatric Progress Note      Impression:   Patient sitting in [facility] area.  She presents with increased irritability and expresses anger toward her new guardian, who she feels is not looking out for her.  Patient reports she does not want to go back to [place] and would prefer to go somewhere in [facility].  She is very verbal, makes some delusional statements, however primarily linear and logical speech, although loud.  We agree to increase buspar to 15 mg tid and increase Topamax to 150 mg.  She also mentions she feels her gabapentin should be back to 1200 mg tid as it used to be.  Will also increase cogentin to 1 mg as patient complains of some shakiness, although denies this is from medication, it seems likely.           Diagnoses:      Schizoaffective disorder, bipolar type   Methamphetamine use disorder, moderate to severe            Plan:   Increase buspar to 15mg tid  Increase Topamax to 150mg daily  Increase gabapentin to original 1200mg tid  Increase cogentin to 1mg bid    Hospitalist following diabetes - continue foot soaks    Re-check Liver panel and labs tomorrow    ELOS:  > 5 days for safe discharge plan  Patient has guardian      Attestation:  Patient has been seen and evaluated by me,  LATRICE Du CNP          Interim History:   The patient's care was discussed with the treatment team and chart notes were reviewed.          Medications:     Current Facility-Administered Medications   Medication     benzocaine-menthol (CEPACOL) 15-3.6 MG lozenge 1 lozenge     benztropine (COGENTIN) tablet 0.5 mg     benztropine (COGENTIN) tablet 1 mg     busPIRone (BUSPAR) tablet 15 mg     chlorproMAZINE (THORAZINE) tablet 150 mg     glucose gel 15-30 g    Or     dextrose 50 % injection 25-50 mL    Or     glucagon injection 1 mg     gabapentin (NEURONTIN) capsule 1,200 mg     ibuprofen (ADVIL/MOTRIN) tablet 400 mg     insulin aspart (NovoLOG) inj (RAPID ACTING)     insulin  "aspart (NovoLOG) inj (RAPID ACTING)     insulin glargine (LANTUS PEN) injection 40 Units     magnesium hydroxide (MILK OF MAGNESIA) suspension 30 mL     magnesium sulfate (EPSOM SALT) granules 5 g     melatonin tablet 3 mg     metFORMIN (GLUCOPHAGE) tablet 1,000 mg     nicotine (NICODERM CQ) 21 MG/24HR 24 hr patch 1 patch     nicotine (NICORETTE) gum 2-4 mg     nicotine Patch in Place     nicotine patch REMOVAL     prazosin (MINIPRESS) capsule 1 mg     senna-docusate (SENOKOT-S/PERICOLACE) 8.6-50 MG per tablet 2 tablet     [START ON 6/20/2019] topiramate (TOPAMAX) tablet 150 mg          10 point ROS negative \"pain everywhere\"       Allergies:     Allergies   Allergen Reactions     Haloperidol Other (See Comments)     Other reaction(s): Seizures  Patient states, \"I fell down and wasn't able to get up.\"  Patient states, \"I fell down and wasn't able to get up.\"              Psychiatric Examination:   /60   Pulse 82   Temp 98.6  F (37  C) (Tympanic)   Resp 12   Ht 1.626 m (5' 4\")   Wt 84.1 kg (185 lb 8 oz)   SpO2 98%   BMI 31.84 kg/m    Weight is 185 lbs 8 oz  Body mass index is 31.84 kg/m .    Appearance:  awake, alert   Attitude:  cooperative  Eye Contact:  fair  Mood: slightly irritable  Affect: mood congruent  Speech:  pressured speech - better  Psychomotor Behavior:  no evidence of tardive dyskinesia, dystonia, or tics  Thought Process: less disorganized, much better but still has a delusional content.   Associations:  loosening of associations present   Thought Content:  Denies SI and Hi. Reports a decrease in AH  Insight:  limited  Judgment:  poor  Oriented to:  time, person, and place  Attention Span and Concentration:  limited  Recent and Remote Memory:  fair  Fund of Knowledge: low-normal  Muscle Strength and Tone: normal  Gait and Station: Normal         Labs:       Results for orders placed or performed during the hospital encounter of 05/10/19 (from the past 24 hour(s))   Glucose by meter "   Result Value Ref Range    Glucose 169 (H) 70 - 99 mg/dL   Glucose by meter   Result Value Ref Range    Glucose 154 (H) 70 - 99 mg/dL   Glucose by meter   Result Value Ref Range    Glucose 194 (H) 70 - 99 mg/dL   Glucose by meter   Result Value Ref Range    Glucose 150 (H) 70 - 99 mg/dL

## 2019-06-19 NOTE — PLAN OF CARE
BEHAVIORAL TEAM DISCUSSION    Participants: Natalia Fabian NP, Becca Chery LSW, Beena Kumar [facility], Josh Walls RN, Ion Morales RN, [facility], Mady Holman OT, Gena Gomez OT  Progress: Fair  Continued Stay Criteria/Rationale: Delusional, irritable, rambling, grandiose, very vulnerable   Medical/Physical: Diabetes - working with hospitalist, liver enzymes have decreased, hx of Hep C, foot soaks are PRN.  Precautions:   Falls precaution?: Not flagging as a high fall risk but Fall prevention is being addressed in nursing care plan         Behavioral Orders   Procedures    Code 1 - Restrict to Unit    Routine Programming       As clinically indicated    Status 15       Every 15 minutes.      Plan: is not appropriate for an IRTS - referral to [facility] and looking into [facility]  Rationale for change in precautions or plan: none    Active Problems:    Mixed hyperlipidemia    Diabetes mellitus, type 2    Schizoaffective disorder, bipolar type (H)       Current Facility-Administered Medications:     benzocaine-menthol (CEPACOL) 15-3.6 MG lozenge 1 lozenge, 1 lozenge, Buccal, Q1H PRN, Natalia Fabian APRN CNP    benztropine (COGENTIN) tablet 0.5 mg, 0.5 mg, Oral, BID, Loren Zuleta NP, 0.5 mg at 06/19/19 0807    busPIRone (BUSPAR) tablet 10 mg, 10 mg, Oral, TID, Natalia Fabian APRN CNP, 10 mg at 06/19/19 0807    chlorproMAZINE (THORAZINE) tablet 150 mg, 150 mg, Oral, TID, Loren Zuleta, NP, 150 mg at 06/19/19 0808    glucose gel 15-30 g, 15-30 g, Oral, Q15 Min PRN, 30 g at 06/08/19 1339 **OR** dextrose 50 % injection 25-50 mL, 25-50 mL, Intravenous, Q15 Min PRN **OR** glucagon injection 1 mg, 1 mg, Subcutaneous, Q15 Min PRN, Airam Light NP    gabapentin (NEURONTIN) capsule 1,000 mg, 1,000 mg, Oral, TID, Loren Zuleta NP, 1,000 mg at 06/19/19 0808    ibuprofen (ADVIL/MOTRIN) tablet 400 mg, 400 mg, Oral, Q6H PRN, Natalia Fabian APRN CNP, 400 mg at 06/12/19  0227    insulin aspart (NovoLOG) inj (RAPID ACTING), 7 Units, Subcutaneous, TID w/meals, Baljit Stinson MD, 7 Units at 06/19/19 0813    insulin aspart (NovoLOG) inj (RAPID ACTING), 1-7 Units, Subcutaneous, TID AC, Kuldeep, April Keturah, NP, 1 Units at 06/19/19 0812    insulin glargine (LANTUS PEN) injection 40 Units, 40 Units, Subcutaneous, At Bedtime, Rocio Tompkins MD, 40 Units at 06/18/19 2030    magnesium hydroxide (MILK OF MAGNESIA) suspension 30 mL, 30 mL, Oral, Daily PRN, Loren Zuleta NP, 30 mL at 06/07/19 1645    magnesium sulfate (EPSOM SALT) granules 5 g, 5 g, Other, BID PRN **AND** Patient care order, , , Per Unit Routine, Natalia Fabian, APRN CNP    melatonin tablet 3 mg, 3 mg, Oral, At Bedtime PRN, Loren Zuleta NP    metFORMIN (GLUCOPHAGE) tablet 1,000 mg, 1,000 mg, Oral, BID w/meals, Baljit Stinson MD, 1,000 mg at 06/19/19 0809    nicotine (NICODERM CQ) 21 MG/24HR 24 hr patch 1 patch, 1 patch, Transdermal, Daily, Loren Zuleta NP, 1 patch at 06/18/19 0827    nicotine (NICORETTE) gum 2-4 mg, 2-4 mg, Buccal, Q1H PRN, Raven Stout NP, 4 mg at 05/21/19 0938    nicotine Patch in Place, , Transdermal, Q8H, Loren Zuleta NP, Stopped at 06/19/19 0310    nicotine patch REMOVAL, , Transdermal, Daily, Loren Zuleta NP, Stopped at 06/01/19 0842    prazosin (MINIPRESS) capsule 1 mg, 1 mg, Oral, At Bedtime, Kuldeep, April JACKIE Rebollar, 1 mg at 06/18/19 2029    senna-docusate (SENOKOT-S/PERICOLACE) 8.6-50 MG per tablet 2 tablet, 2 tablet, Oral, At Bedtime, Kuldeep April JACKIE Rebollar, 2 tablet at 06/18/19 2030    topiramate (TOPAMAX) tablet 100 mg, 100 mg, Oral, Daily, Loren Zuleta NP, 100 mg at 06/19/19 0809

## 2019-06-19 NOTE — PLAN OF CARE
Problem: Adult Behavioral Health Plan of Care  Goal: Patient-Specific Goal (Individualization)  Description  Patient will attend at least 50% of groups while on unit.  Patient will report at least 6-8 hours of sleep at night.  Patient will be compliant with treatment team recommendations.  Patient will be medication compliant while hospitalized.  Patient will have decrease in delusional statements by discharge.         6/18/2019 2353 by Melissa Flowers, RN  Outcome: No Change     Problem: Thought Process Alteration  Goal: Optimal Thought Clarity  Description  Patient will hold a reality based conversation by discharge.    6/18/2019 2353 by Melissa Flowers, RN  Outcome: No Change     Problem: Fall Injury Risk  Goal: Absence of Fall and Fall-Related Injury  Description  Pt will not experience a fall while hospitalized.  Patient will have tap bell at bedside.       6/18/2019 2353 by Melissa Flowers, RN  Outcome: Improving     Problem: Violence Risk or Actual  Goal: Anger and Impulse Control  Description  PT will verbalize frustrations before making verbal threats to staff  PT will remain free from violence to staff or others while inpatient   6/18/2019 2353 by Melissa Flowers, RN  Outcome: Improving     Face to face shift report recieved from Nancy MAK. Rounding completed, pt observed.    Pt appeared to be sleeping most of this shift, normal respirations and position changes noted. Pt did not have any violence towards staff or peers this shift. Pt did not have any falls or injury this shift.     Face to face report will be communicated to kei MAK.    Melissa Flowers  6/19/2019  5:55 AM

## 2019-06-19 NOTE — PROGRESS NOTES
"[facility]    Spiritual Health Progress Note    Date of Service (when I saw the patient): 06/19/2019     Assessment & Plan   Ginette Wood is a 39 year old female who was admitted on 5/10/2019. Visit was a follow-up to check in with patient from previous visit. Patient was quite negative today about almost everything she talked about. She again had rambling thoughts from subject to subject. She talked about \"Ra\" the god of [place] and asked if I knew about him. Said she would like to go back to [place] where the elza is.  She talked about her kids in a way that was very difficult to track. Claimed that a medication that was given her caused her to have diabetes.  Used very rough language throughout.  Asked her about times in her life she could think of that were happy times.  She said she couldn't think of one.  The closest she said she could come up with was when she was caring for her siblings and seeing them off to school even though she wasn't their mother.  She put headphones to listen to music after we were done.    Rev. Ilan Flowers  Volunteer   "
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
## 2019-06-19 NOTE — PROGRESS NOTES
Glucose has been stable with very few highs or lows over the last several days. She is consistently requiring 1 unit of sliding scale insulin so will increase mealtime insulin to 8 units from 7 units. If she remains stable without severe peaks and valleys will consider this her long term treatment plan.

## 2019-06-19 NOTE — PLAN OF CARE
"Spoke with Becca from [facility] and she requested a referral be faxed - this was faxed. Becca stated they do have some opening right now. Spoke with Josiah from [facility] she stated she does have an opening at [facility], but right now she is observing two of the females to \"make sure they get along\" - Josiah asked if pt was able to come and view their residence as that is a requirement - explained to Josiah that may not be possible, but maybe pt's guardian would be able to.     Spoke with pt and gave pt an update while sitting in the lounge. Pt remained appropriate in conversation, but continues to have some delusions and rambling thoughts. Pt expressed the concerns regarding her guardian states \"she knows there's jacinda dogs running amuck over there\" pt talked about how \"olanzapine caused my diabetes and now that I was switched to thorazine it's not a problem anyone my levels are 150 and never go below 40\" pt states \"I don't know why you guys didn't think of that before I don't even need pain meds I was on oxy's don't even need those anymore. I didn't sleep for three weeks and took 150mg of trazadone and I slept for three days straight didn't even get up to use the bathroom. That's too strong now all I need is 5 of ambien and I'm fine\" pt requested this writer contact her guardian to check on court date for next week- message was left.     Spoke with Kerry and she stated she spoke with Denis this morning and they believe a long term facility would be better as \"she's never been able to stabilize herself Denis has worked with her for a long time\" Kerry stated they do not feel it is appropriate for pt to be at court in person and will coordinate a possible ITV hearing or hold the hearing via phone.   "

## 2019-06-20 LAB
ALBUMIN SERPL-MCNC: 3.9 G/DL (ref 3.4–5)
ALP SERPL-CCNC: 81 U/L (ref 40–150)
ALT SERPL W P-5'-P-CCNC: 67 U/L (ref 0–50)
ANION GAP SERPL CALCULATED.3IONS-SCNC: 7 MMOL/L (ref 3–14)
AST SERPL W P-5'-P-CCNC: 19 U/L (ref 0–45)
BASOPHILS # BLD AUTO: 0 10E9/L (ref 0–0.2)
BASOPHILS NFR BLD AUTO: 0.4 %
BILIRUB DIRECT SERPL-MCNC: <0.1 MG/DL (ref 0–0.2)
BILIRUB SERPL-MCNC: 0.2 MG/DL (ref 0.2–1.3)
BUN SERPL-MCNC: 8 MG/DL (ref 7–30)
CALCIUM SERPL-MCNC: 8.7 MG/DL (ref 8.5–10.1)
CHLORIDE SERPL-SCNC: 111 MMOL/L (ref 94–109)
CO2 SERPL-SCNC: 21 MMOL/L (ref 20–32)
CREAT SERPL-MCNC: 0.49 MG/DL (ref 0.52–1.04)
DIFFERENTIAL METHOD BLD: NORMAL
EOSINOPHIL # BLD AUTO: 0.1 10E9/L (ref 0–0.7)
EOSINOPHIL NFR BLD AUTO: 1.4 %
ERYTHROCYTE [DISTWIDTH] IN BLOOD BY AUTOMATED COUNT: 12.9 % (ref 10–15)
GFR SERPL CREATININE-BSD FRML MDRD: >90 ML/MIN/{1.73_M2}
GLUCOSE BLDC GLUCOMTR-MCNC: 128 MG/DL (ref 70–99)
GLUCOSE BLDC GLUCOMTR-MCNC: 166 MG/DL (ref 70–99)
GLUCOSE BLDC GLUCOMTR-MCNC: 167 MG/DL (ref 70–99)
GLUCOSE BLDC GLUCOMTR-MCNC: 187 MG/DL (ref 70–99)
GLUCOSE SERPL-MCNC: 145 MG/DL (ref 70–99)
HCT VFR BLD AUTO: 37.5 % (ref 35–47)
HGB BLD-MCNC: 12.7 G/DL (ref 11.7–15.7)
IMM GRANULOCYTES # BLD: 0 10E9/L (ref 0–0.4)
IMM GRANULOCYTES NFR BLD: 0.5 %
LYMPHOCYTES # BLD AUTO: 3.3 10E9/L (ref 0.8–5.3)
LYMPHOCYTES NFR BLD AUTO: 42.1 %
MCH RBC QN AUTO: 28.7 PG (ref 26.5–33)
MCHC RBC AUTO-ENTMCNC: 33.9 G/DL (ref 31.5–36.5)
MCV RBC AUTO: 85 FL (ref 78–100)
MONOCYTES # BLD AUTO: 0.5 10E9/L (ref 0–1.3)
MONOCYTES NFR BLD AUTO: 6.3 %
NEUTROPHILS # BLD AUTO: 3.8 10E9/L (ref 1.6–8.3)
NEUTROPHILS NFR BLD AUTO: 49.3 %
NRBC # BLD AUTO: 0 10*3/UL
NRBC BLD AUTO-RTO: 0 /100
PLATELET # BLD AUTO: 238 10E9/L (ref 150–450)
POTASSIUM SERPL-SCNC: 4.7 MMOL/L (ref 3.4–5.3)
PROT SERPL-MCNC: 7.5 G/DL (ref 6.8–8.8)
RBC # BLD AUTO: 4.43 10E12/L (ref 3.8–5.2)
SODIUM SERPL-SCNC: 139 MMOL/L (ref 133–144)
WBC # BLD AUTO: 7.7 10E9/L (ref 4–11)

## 2019-06-20 PROCEDURE — 25000132 ZZH RX MED GY IP 250 OP 250 PS 637: Performed by: NURSE PRACTITIONER

## 2019-06-20 PROCEDURE — 00000146 ZZHCL STATISTIC GLUCOSE BY METER IP

## 2019-06-20 PROCEDURE — 80076 HEPATIC FUNCTION PANEL: CPT | Performed by: NURSE PRACTITIONER

## 2019-06-20 PROCEDURE — 85025 COMPLETE CBC W/AUTO DIFF WBC: CPT | Performed by: NURSE PRACTITIONER

## 2019-06-20 PROCEDURE — 99232 SBSQ HOSP IP/OBS MODERATE 35: CPT | Performed by: NURSE PRACTITIONER

## 2019-06-20 PROCEDURE — 25000131 ZZH RX MED GY IP 250 OP 636 PS 637: Performed by: INTERNAL MEDICINE

## 2019-06-20 PROCEDURE — 36415 COLL VENOUS BLD VENIPUNCTURE: CPT | Performed by: NURSE PRACTITIONER

## 2019-06-20 PROCEDURE — 82248 BILIRUBIN DIRECT: CPT | Performed by: NURSE PRACTITIONER

## 2019-06-20 PROCEDURE — 12400000 ZZH R&B MH

## 2019-06-20 PROCEDURE — 80053 COMPREHEN METABOLIC PANEL: CPT | Performed by: NURSE PRACTITIONER

## 2019-06-20 PROCEDURE — 25000125 ZZHC RX 250: Performed by: INTERNAL MEDICINE

## 2019-06-20 RX ORDER — ALUMINA, MAGNESIA, AND SIMETHICONE 2400; 2400; 240 MG/30ML; MG/30ML; MG/30ML
30 SUSPENSION ORAL EVERY 4 HOURS PRN
Status: DISCONTINUED | OUTPATIENT
Start: 2019-06-20 | End: 2019-07-03 | Stop reason: HOSPADM

## 2019-06-20 RX ADMIN — SENNOSIDES AND DOCUSATE SODIUM 2 TABLET: 8.6; 5 TABLET ORAL at 20:30

## 2019-06-20 RX ADMIN — GABAPENTIN 1200 MG: 400 CAPSULE ORAL at 13:25

## 2019-06-20 RX ADMIN — TOPIRAMATE 150 MG: 50 TABLET ORAL at 08:08

## 2019-06-20 RX ADMIN — GABAPENTIN 1200 MG: 400 CAPSULE ORAL at 08:06

## 2019-06-20 RX ADMIN — BUSPIRONE HYDROCHLORIDE 15 MG: 15 TABLET ORAL at 13:25

## 2019-06-20 RX ADMIN — INSULIN GLARGINE 40 UNITS: 100 INJECTION, SOLUTION SUBCUTANEOUS at 20:31

## 2019-06-20 RX ADMIN — BUSPIRONE HYDROCHLORIDE 15 MG: 15 TABLET ORAL at 08:05

## 2019-06-20 RX ADMIN — METFORMIN HYDROCHLORIDE 1000 MG: 1000 TABLET ORAL at 08:06

## 2019-06-20 RX ADMIN — CHLORPROMAZINE HYDROCHLORIDE 150 MG: 25 TABLET, FILM COATED ORAL at 20:30

## 2019-06-20 RX ADMIN — INSULIN ASPART 1 UNITS: 100 INJECTION, SOLUTION INTRAVENOUS; SUBCUTANEOUS at 17:02

## 2019-06-20 RX ADMIN — NICOTINE 1 PATCH: 21 PATCH, EXTENDED RELEASE TRANSDERMAL at 08:07

## 2019-06-20 RX ADMIN — CHLORPROMAZINE HYDROCHLORIDE 150 MG: 25 TABLET, FILM COATED ORAL at 13:25

## 2019-06-20 RX ADMIN — BUSPIRONE HYDROCHLORIDE 15 MG: 15 TABLET ORAL at 20:31

## 2019-06-20 RX ADMIN — METFORMIN HYDROCHLORIDE 1000 MG: 1000 TABLET ORAL at 17:02

## 2019-06-20 RX ADMIN — CHLORPROMAZINE HYDROCHLORIDE 150 MG: 25 TABLET, FILM COATED ORAL at 08:05

## 2019-06-20 RX ADMIN — INSULIN ASPART 1 UNITS: 100 INJECTION, SOLUTION INTRAVENOUS; SUBCUTANEOUS at 11:41

## 2019-06-20 RX ADMIN — PRAZOSIN HYDROCHLORIDE 1 MG: 1 CAPSULE ORAL at 20:31

## 2019-06-20 RX ADMIN — GABAPENTIN 1200 MG: 400 CAPSULE ORAL at 20:30

## 2019-06-20 RX ADMIN — BENZTROPINE MESYLATE 1 MG: 1 TABLET ORAL at 08:04

## 2019-06-20 ASSESSMENT — ACTIVITIES OF DAILY LIVING (ADL)
HYGIENE/GROOMING: INDEPENDENT
ORAL_HYGIENE: INDEPENDENT
DRESS: SCRUBS (BEHAVIORAL HEALTH)

## 2019-06-20 NOTE — PROGRESS NOTES
"[facility]  Psychiatric Progress Note      Impression:   Patient again sitting in [facility] area.  She reports medication changes yesterday are good, except she does not want cogentin because \"It dries me out\" and has not had a BM in a couple days.  She would like to try Mylaanta and has scheduled Senna.  Patient presents as a bit more calm today, continues with some negative thoughts, although not as loud and is able to have more linear/logical today - better eye contact.  Continues to wait for updates on placement.         Diagnoses:      Schizoaffective disorder, bipolar type   Methamphetamine use disorder, moderate to severe            Plan:   Cont buspar to 15mg tid  Cont Topamax to 150mg daily  Cont gabapentin to original 1200mg tid  Discontinue cogentin to 1mg bid    Hospitalist following diabetes - continue foot soaks    Labs this morning are WNL    ELOS:  > 5 days for safe discharge plan  Patient has guardian      Attestation:  Patient has been seen and evaluated by me,  LATRICE Du CNP          Interim History:   The patient's care was discussed with the treatment team and chart notes were reviewed.          Medications:     Current Facility-Administered Medications   Medication     benzocaine-menthol (CEPACOL) 15-3.6 MG lozenge 1 lozenge     benztropine (COGENTIN) tablet 1 mg     busPIRone (BUSPAR) tablet 15 mg     chlorproMAZINE (THORAZINE) tablet 150 mg     glucose gel 15-30 g    Or     dextrose 50 % injection 25-50 mL    Or     glucagon injection 1 mg     gabapentin (NEURONTIN) capsule 1,200 mg     ibuprofen (ADVIL/MOTRIN) tablet 400 mg     insulin aspart (NovoLOG) inj (RAPID ACTING)     insulin aspart (NovoLOG) inj (RAPID ACTING)     insulin glargine (LANTUS PEN) injection 40 Units     magnesium hydroxide (MILK OF MAGNESIA) suspension 30 mL     magnesium sulfate (EPSOM SALT) granules 5 g     melatonin tablet 3 mg     metFORMIN (GLUCOPHAGE) tablet 1,000 mg     nicotine " "(NICODERM CQ) 21 MG/24HR 24 hr patch 1 patch     nicotine (NICORETTE) gum 2-4 mg     nicotine Patch in Place     nicotine patch REMOVAL     prazosin (MINIPRESS) capsule 1 mg     senna-docusate (SENOKOT-S/PERICOLACE) 8.6-50 MG per tablet 2 tablet     topiramate (TOPAMAX) tablet 150 mg          10 point ROS negative \"pain everywhere\"       Allergies:     Allergies   Allergen Reactions     Haloperidol Other (See Comments)     Other reaction(s): Seizures  Patient states, \"I fell down and wasn't able to get up.\"  Patient states, \"I fell down and wasn't able to get up.\"              Psychiatric Examination:   /69   Pulse 89   Temp 98.1  F (36.7  C) (Tympanic)   Resp 16   Ht 1.626 m (5' 4\")   Wt 84.1 kg (185 lb 8 oz)   SpO2 100%   BMI 31.84 kg/m    Weight is 185 lbs 8 oz  Body mass index is 31.84 kg/m .    Appearance:  awake, alert   Attitude:  cooperative  Eye Contact:  fair  Mood: slightly irritable  Affect: mood congruent  Speech:  pressured speech - better  Psychomotor Behavior:  no evidence of tardive dyskinesia, dystonia, or tics  Thought Process: less disorganized, much better but still has a delusional content.   Associations:  loosening of associations present   Thought Content:  Denies SI and Hi. Reports a decrease in AH  Insight:  limited  Judgment:  poor  Oriented to:  time, person, and place  Attention Span and Concentration:  limited  Recent and Remote Memory:  fair  Fund of Knowledge: low-normal  Muscle Strength and Tone: normal  Gait and Station: Normal         Labs:       Results for orders placed or performed during the hospital encounter of 05/10/19 (from the past 24 hour(s))   Glucose by meter   Result Value Ref Range    Glucose 135 (H) 70 - 99 mg/dL   Glucose by meter   Result Value Ref Range    Glucose 142 (H) 70 - 99 mg/dL   Glucose by meter   Result Value Ref Range    Glucose 182 (H) 70 - 99 mg/dL   Glucose by meter   Result Value Ref Range    Glucose 227 (H) 70 - 99 mg/dL "   Comprehensive metabolic panel   Result Value Ref Range    Sodium 139 133 - 144 mmol/L    Potassium 4.7 3.4 - 5.3 mmol/L    Chloride 111 (H) 94 - 109 mmol/L    Carbon Dioxide 21 20 - 32 mmol/L    Anion Gap 7 3 - 14 mmol/L    Glucose 145 (H) 70 - 99 mg/dL    Urea Nitrogen 8 7 - 30 mg/dL    Creatinine 0.49 (L) 0.52 - 1.04 mg/dL    GFR Estimate >90 >60 mL/min/[1.73_m2]    GFR Estimate If Black >90 >60 mL/min/[1.73_m2]    Calcium 8.7 8.5 - 10.1 mg/dL    Bilirubin Total 0.2 0.2 - 1.3 mg/dL    Albumin 3.9 3.4 - 5.0 g/dL    Protein Total 7.5 6.8 - 8.8 g/dL    Alkaline Phosphatase 81 40 - 150 U/L    ALT 67 (H) 0 - 50 U/L    AST 19 0 - 45 U/L   CBC with platelets differential   Result Value Ref Range    WBC 7.7 4.0 - 11.0 10e9/L    RBC Count 4.43 3.8 - 5.2 10e12/L    Hemoglobin 12.7 11.7 - 15.7 g/dL    Hematocrit 37.5 35.0 - 47.0 %    MCV 85 78 - 100 fl    MCH 28.7 26.5 - 33.0 pg    MCHC 33.9 31.5 - 36.5 g/dL    RDW 12.9 10.0 - 15.0 %    Platelet Count 238 150 - 450 10e9/L    Diff Method Automated Method     % Neutrophils 49.3 %    % Lymphocytes 42.1 %    % Monocytes 6.3 %    % Eosinophils 1.4 %    % Basophils 0.4 %    % Immature Granulocytes 0.5 %    Nucleated RBCs 0 0 /100    Absolute Neutrophil 3.8 1.6 - 8.3 10e9/L    Absolute Lymphocytes 3.3 0.8 - 5.3 10e9/L    Absolute Monocytes 0.5 0.0 - 1.3 10e9/L    Absolute Eosinophils 0.1 0.0 - 0.7 10e9/L    Absolute Basophils 0.0 0.0 - 0.2 10e9/L    Abs Immature Granulocytes 0.0 0 - 0.4 10e9/L    Absolute Nucleated RBC 0.0    Bilirubin direct   Result Value Ref Range    Bilirubin Direct <0.1 0.0 - 0.2 mg/dL   Glucose by meter   Result Value Ref Range    Glucose 128 (H) 70 - 99 mg/dL

## 2019-06-20 NOTE — PLAN OF CARE
Problem: Adult Behavioral Health Plan of Care  Goal: Patient-Specific Goal (Individualization)  Description  Patient will attend at least 50% of groups while on unit.  Patient will report at least 6-8 hours of sleep at night.  Patient will be compliant with treatment team recommendations.  Patient will be medication compliant while hospitalized.  Patient will have decrease in delusional statements by discharge.         6/20/2019 1821 by Nancy Stern, RN  Outcome: No Change    Temp 99.4-fluids encouraged.   Denies all criteria including SI, SIB, HI or hallucinations.  Pt states she is having a good day today.  Continues with delusional statements but is able to hold short linear conversation and answer assessment questions.   Behavior appropriate with staff and peers.      Problem: Thought Process Alteration  Goal: Optimal Thought Clarity  Description  Patient will hold a reality based conversation by discharge.    6/20/2019 1821 by Nancy Stern, RN  Outcome: No Change     Problem: Fall Injury Risk  Goal: Absence of Fall and Fall-Related Injury  Description  Pt will not experience a fall while hospitalized.  Patient will have tap bell at bedside.       6/20/2019 1821 by Nancy Stern, RN  Outcome: No Change    Face to face end of shift report communicated to oncoming shift.     Nancy Stern  6/20/2019  9:10 PM

## 2019-06-20 NOTE — PLAN OF CARE
Problem: Adult Behavioral Health Plan of Care  Goal: Patient-Specific Goal (Individualization)  Description  Patient will attend at least 50% of groups while on unit.  Patient will report at least 6-8 hours of sleep at night.  Patient will be compliant with treatment team recommendations.  Patient will be medication compliant while hospitalized.  Patient will have decrease in delusional statements by discharge.         6/20/2019 1457 by Sulma Hirsch, RN  Outcome: No Change  Patient has been pleasant and cooperative , appears fatigued but up on unit making graphs majority of am     Problem: Thought Process Alteration  Goal: Optimal Thought Clarity  Description  Patient will hold a reality based conversation by discharge.    6/20/2019 1457 by Sulma Hirsch RN  Outcome: No Change  Can have brief reality based interaction on clearly answered subjects   Problem: Fall Injury Risk  Goal: Absence of Fall and Fall-Related Injury  Description  Pt will not experience a fall while hospitalized.  Patient will have tap bell at bedside.       6/20/2019 1457 by Sulma Hirsch RN  Outcome: Improving  Gait steady and even       Problem: Violence Risk or Actual  Goal: Anger and Impulse Control  Description  PT will verbalize frustrations before making verbal threats to staff  PT will remain free from violence to staff or others while inpatient   6/20/2019 1457 by Sulma Hirsch, RN  Outcome: No Change  Patient has made no threats to staff, or aggressive acts

## 2019-06-20 NOTE — PLAN OF CARE
Message was left with Becca at [facility] to follow up on referral and email sent to Sherry with [facility] to follow up on referral as well.

## 2019-06-20 NOTE — PLAN OF CARE
BEHAVIORAL TEAM DISCUSSION    Participants: Natalia Fabian NP, Becca Chery LSW, Beena Kumar [facility], Ion Morales RN, Ayah Matos RN, Jabari [facility], [facility], Mady Holman OT, Natalia Costello OT  Progress: fair  Continued Stay Criteria/Rationale: grandiose, irritable, paranoid  Medical/Physical: c/o of no BM - interventions in place, Diabetes - working with hospitalist, liver enzymes have decreased, hx of Hep C, foot soaks are PRN.  Precautions:   Falls precaution?: No  Behavioral Orders   Procedures    Code 1 - Restrict to Unit    Routine Programming     As clinically indicated    Status 15     Every 15 minutes.     Plan: referral to [facility], continue to stabilize, increased Topomax  Rationale for change in precautions or plan: None    Active Problems:    Mixed hyperlipidemia    Diabetes mellitus, type 2    Schizoaffective disorder, bipolar type (H)       Current Facility-Administered Medications:     benzocaine-menthol (CEPACOL) 15-3.6 MG lozenge 1 lozenge, 1 lozenge, Buccal, Q1H PRN, Natalia Fabian APRN CNP    busPIRone (BUSPAR) tablet 15 mg, 15 mg, Oral, TID, Natalia Fabian APRN CNP, 15 mg at 06/20/19 0805    chlorproMAZINE (THORAZINE) tablet 150 mg, 150 mg, Oral, TID, Loren Zuleta, NP, 150 mg at 06/20/19 0805    glucose gel 15-30 g, 15-30 g, Oral, Q15 Min PRN, 30 g at 06/08/19 1339 **OR** dextrose 50 % injection 25-50 mL, 25-50 mL, Intravenous, Q15 Min PRN **OR** glucagon injection 1 mg, 1 mg, Subcutaneous, Q15 Min PRN, Airam Light NP    gabapentin (NEURONTIN) capsule 1,200 mg, 1,200 mg, Oral, TID, Natalia Fabian APRN CNP, 1,200 mg at 06/20/19 0806    ibuprofen (ADVIL/MOTRIN) tablet 400 mg, 400 mg, Oral, Q6H PRN, Natalia Fabian APRN CNP, 400 mg at 06/19/19 2342    insulin aspart (NovoLOG) inj (RAPID ACTING), 8 Units, Subcutaneous, TID w/meals, Denisse Malin, NP, 8 Units at 06/20/19 0811    insulin aspart (NovoLOG) inj  (RAPID ACTING), 1-7 Units, Subcutaneous, TID AC, Light, April Keturah, NP, 1 Units at 06/19/19 1651    insulin glargine (LANTUS PEN) injection 40 Units, 40 Units, Subcutaneous, At Bedtime, Rocio Tompkins MD, 40 Units at 06/19/19 2019    magnesium hydroxide (MILK OF MAGNESIA) suspension 30 mL, 30 mL, Oral, Daily PRN, Loren Zuleta NP, 30 mL at 06/07/19 1645    magnesium sulfate (EPSOM SALT) granules 5 g, 5 g, Other, BID PRN, 5 g at 06/19/19 1435 **AND** Patient care order, , , Per Unit Routine, Natalia Fabian APRN CNP    melatonin tablet 3 mg, 3 mg, Oral, At Bedtime PRN, Loren Zuleta NP    metFORMIN (GLUCOPHAGE) tablet 1,000 mg, 1,000 mg, Oral, BID w/meals, Baljit Stinson MD, 1,000 mg at 06/20/19 0806    nicotine (NICODERM CQ) 21 MG/24HR 24 hr patch 1 patch, 1 patch, Transdermal, Daily, Loren Zuleta NP, 1 patch at 06/20/19 0807    nicotine (NICORETTE) gum 2-4 mg, 2-4 mg, Buccal, Q1H PRN, Raven Stout NP, 4 mg at 05/21/19 0938    nicotine Patch in Place, , Transdermal, Q8H, Loren Zuleta NP    nicotine patch REMOVAL, , Transdermal, Daily, Loren Zuleta NP, Stopped at 06/01/19 0842    prazosin (MINIPRESS) capsule 1 mg, 1 mg, Oral, At Bedtime, Light, April JACKIE Rebollar, 1 mg at 06/19/19 2017    senna-docusate (SENOKOT-S/PERICOLACE) 8.6-50 MG per tablet 2 tablet, 2 tablet, Oral, At Bedtime, Light, April JACKIE Rebollar, 2 tablet at 06/19/19 2016    topiramate (TOPAMAX) tablet 150 mg, 150 mg, Oral, Daily, Natalia Fabian APRN CNP, 150 mg at 06/20/19 0865

## 2019-06-20 NOTE — PLAN OF CARE
Problem: Adult Behavioral Health Plan of Care  Goal: Patient-Specific Goal (Individualization)  Description  Patient will attend at least 50% of groups while on unit.  Patient will report at least 6-8 hours of sleep at night.  Patient will be compliant with treatment team recommendations.  Patient will be medication compliant while hospitalized.  Patient will have decrease in delusional statements by discharge.         6/20/2019 0001 by Melissa Flowers, RN  Outcome: No Change     Problem: Thought Process Alteration  Goal: Optimal Thought Clarity  Description  Patient will hold a reality based conversation by discharge.    6/20/2019 0001 by Melissa Flowers RN  Outcome: No Change     Problem: Fall Injury Risk  Goal: Absence of Fall and Fall-Related Injury  Description  Pt will not experience a fall while hospitalized.  Patient will have tap bell at bedside.       6/20/2019 0001 by Melissa Flowers RN  Outcome: Improving     Problem: Violence Risk or Actual  Goal: Anger and Impulse Control  Description  PT will verbalize frustrations before making verbal threats to staff  PT will remain free from violence to staff or others while inpatient   6/20/2019 0001 by Melissa Flowers RN  Outcome: Improving     Face to face shift report recieved from Nancy MAK. Rounding completed, pt observed.    Pt awake this whole shift, denied need for any PRN medications. Pt out in lobby writing in her notebook. Pt appeared to be responding to internal stimuli and was talking and laughing to self. Pt did not have any falls or injury this shift. Pt did not have any episodes of violence towards staff or others this shift.  Pt compliant with morning lab draw.    Face to face report will be communicated to kei MAK.    Melissa Flowers  6/20/2019  5:53 AM

## 2019-06-20 NOTE — PLAN OF CARE
Problem: Adult Behavioral Health Plan of Care  Goal: Patient-Specific Goal (Individualization)  Description  Patient will attend at least 50% of groups while on unit.  Patient will report at least 6-8 hours of sleep at night.  Patient will be compliant with treatment team recommendations.  Patient will be medication compliant while hospitalized.  Patient will have decrease in delusional statements by discharge.         6/19/2019 1957 by Nancy Stern, RN  Outcome: No Change  VSS, denies SI, SIB, HI or hallucinations. Continues with fixed delusional statements. Becomes tangential with further conversation. Affect flat, out in lounge but remains withdrawn and isolative today making her charts.   Cooperative with nursing assessments. Behavior appropriate with staff and peers.      Problem: Fall Injury Risk  Goal: Absence of Fall and Fall-Related Injury  Description  Pt will not experience a fall while hospitalized.  Patient will have tap bell at bedside.       6/19/2019 1957 by Nancy Stern, RN  Outcome: Improving     Problem: Violence Risk or Actual  Goal: Anger and Impulse Control  Description  PT will verbalize frustrations before making verbal threats to staff  PT will remain free from violence to staff or others while inpatient   6/19/2019 1957 by Nancy Stern, RN  Outcome: Improving

## 2019-06-21 LAB
GLUCOSE BLDC GLUCOMTR-MCNC: 110 MG/DL (ref 70–99)
GLUCOSE BLDC GLUCOMTR-MCNC: 148 MG/DL (ref 70–99)
GLUCOSE BLDC GLUCOMTR-MCNC: 191 MG/DL (ref 70–99)
GLUCOSE BLDC GLUCOMTR-MCNC: 193 MG/DL (ref 70–99)
GLUCOSE BLDC GLUCOMTR-MCNC: 218 MG/DL (ref 70–99)

## 2019-06-21 PROCEDURE — 12400000 ZZH R&B MH

## 2019-06-21 PROCEDURE — 25000132 ZZH RX MED GY IP 250 OP 250 PS 637: Performed by: NURSE PRACTITIONER

## 2019-06-21 PROCEDURE — 25000125 ZZHC RX 250: Performed by: INTERNAL MEDICINE

## 2019-06-21 PROCEDURE — 00000146 ZZHCL STATISTIC GLUCOSE BY METER IP

## 2019-06-21 RX ORDER — SALIVA STIMULANT COMB. NO.3
2 SPRAY, NON-AEROSOL (ML) MUCOUS MEMBRANE 4 TIMES DAILY PRN
Status: DISCONTINUED | OUTPATIENT
Start: 2019-06-21 | End: 2019-07-03 | Stop reason: HOSPADM

## 2019-06-21 RX ADMIN — GABAPENTIN 1200 MG: 400 CAPSULE ORAL at 20:16

## 2019-06-21 RX ADMIN — PRAZOSIN HYDROCHLORIDE 1 MG: 1 CAPSULE ORAL at 20:16

## 2019-06-21 RX ADMIN — METFORMIN HYDROCHLORIDE 1000 MG: 1000 TABLET ORAL at 08:18

## 2019-06-21 RX ADMIN — BUSPIRONE HYDROCHLORIDE 15 MG: 15 TABLET ORAL at 08:18

## 2019-06-21 RX ADMIN — INSULIN ASPART 1 UNITS: 100 INJECTION, SOLUTION INTRAVENOUS; SUBCUTANEOUS at 16:50

## 2019-06-21 RX ADMIN — METFORMIN HYDROCHLORIDE 1000 MG: 1000 TABLET ORAL at 16:49

## 2019-06-21 RX ADMIN — CHLORPROMAZINE HYDROCHLORIDE 150 MG: 25 TABLET, FILM COATED ORAL at 20:16

## 2019-06-21 RX ADMIN — TOPIRAMATE 150 MG: 50 TABLET ORAL at 08:18

## 2019-06-21 RX ADMIN — GABAPENTIN 1200 MG: 400 CAPSULE ORAL at 14:26

## 2019-06-21 RX ADMIN — BUSPIRONE HYDROCHLORIDE 15 MG: 15 TABLET ORAL at 14:26

## 2019-06-21 RX ADMIN — GABAPENTIN 1200 MG: 400 CAPSULE ORAL at 08:17

## 2019-06-21 RX ADMIN — CHLORPROMAZINE HYDROCHLORIDE 150 MG: 25 TABLET, FILM COATED ORAL at 14:26

## 2019-06-21 RX ADMIN — INSULIN GLARGINE 40 UNITS: 100 INJECTION, SOLUTION SUBCUTANEOUS at 20:15

## 2019-06-21 RX ADMIN — NICOTINE 1 PATCH: 21 PATCH, EXTENDED RELEASE TRANSDERMAL at 08:18

## 2019-06-21 RX ADMIN — INSULIN ASPART 2 UNITS: 100 INJECTION, SOLUTION INTRAVENOUS; SUBCUTANEOUS at 12:38

## 2019-06-21 RX ADMIN — BUSPIRONE HYDROCHLORIDE 15 MG: 15 TABLET ORAL at 20:16

## 2019-06-21 RX ADMIN — CHLORPROMAZINE HYDROCHLORIDE 150 MG: 25 TABLET, FILM COATED ORAL at 08:17

## 2019-06-21 RX ADMIN — SENNOSIDES AND DOCUSATE SODIUM 2 TABLET: 8.6; 5 TABLET ORAL at 20:16

## 2019-06-21 ASSESSMENT — ACTIVITIES OF DAILY LIVING (ADL)
HYGIENE/GROOMING: INDEPENDENT
ORAL_HYGIENE: INDEPENDENT
DRESS: SCRUBS (BEHAVIORAL HEALTH)
LAUNDRY: UNABLE TO COMPLETE
ORAL_HYGIENE: INDEPENDENT
HYGIENE/GROOMING: INDEPENDENT
DRESS: SCRUBS (BEHAVIORAL HEALTH);INDEPENDENT
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
## 2019-06-21 NOTE — PLAN OF CARE
Problem: Adult Behavioral Health Plan of Care  Goal: Patient-Specific Goal (Individualization)  Description  Patient will attend at least 50% of groups while on unit.  Patient will report at least 6-8 hours of sleep at night.  Patient will be compliant with treatment team recommendations.  Patient will be medication compliant while hospitalized.  Patient will have decrease in delusional statements by discharge.         6/20/2019 2357 by Melissa Flowers, RN  Outcome: No Change     Problem: Thought Process Alteration  Goal: Optimal Thought Clarity  Description  Patient will hold a reality based conversation by discharge.    6/20/2019 2357 by Melissa Flowers, RN  Outcome: No Change     Problem: Fall Injury Risk  Goal: Absence of Fall and Fall-Related Injury  Description  Pt will not experience a fall while hospitalized.  Patient will have tap bell at bedside.       6/20/2019 2357 by Melissa Flowers RN  Outcome: Improving     Problem: Violence Risk or Actual  Goal: Anger and Impulse Control  Description  PT will verbalize frustrations before making verbal threats to staff  PT will remain free from violence to staff or others while inpatient   6/20/2019 2357 by Melissa Flowers, RN  Outcome: Improving     Face to face shift report recieved from Nancy MAK. Rounding completed, pt observed.    Pt appeared to be sleeping most of this shift, normal respirations and position changes noted. Pt did not have any episodes of violence towards staff or others this shift. Pt did not have any falls or injury this shift.      Face to face report will be communicated to kei MAK.    Melissa Flowers  6/21/2019  6:01 AM

## 2019-06-21 NOTE — PLAN OF CARE
BEHAVIORAL TEAM DISCUSSION    Participants: Raven Stout NP, Dennise Herrera LICSW, Becca Chery LSW, Beena Kumar SW,  Krys Rojo RN, Ayah Matos RN, Josh Walls RN, Mady Holman OT, Natalia Costello OT  Progress: fair  Continued Stay Criteria/Rationale: grandiose, irritable, paranoid  Medical/Physical: c/o of no BM - interventions in place, Diabetes - working with hospitalist, liver enzymes have decreased, hx of Hep C, foot soaks are PRN.  Precautions:   Falls precaution?: No       Behavioral Orders   Procedures    Code 1 - Restrict to Unit    Routine Programming       As clinically indicated    Status 15       Every 15 minutes.      Plan: referral to [place], continue to stabilize, increased Topomax  Rationale for change in precautions or plan: None     Active Problems:    Mixed hyperlipidemia    Diabetes mellitus, type 2    Schizoaffective disorder, bipolar type (H)        Current Facility-Administered Medications:     benzocaine-menthol (CEPACOL) 15-3.6 MG lozenge 1 lozenge, 1 lozenge, Buccal, Q1H PRN, Natalia Fabian APRN CNP    busPIRone (BUSPAR) tablet 15 mg, 15 mg, Oral, TID, Natalia Fabian APRN CNP, 15 mg at 06/20/19 0805    chlorproMAZINE (THORAZINE) tablet 150 mg, 150 mg, Oral, TID, Loren Zuleta, NP, 150 mg at 06/20/19 0805    glucose gel 15-30 g, 15-30 g, Oral, Q15 Min PRN, 30 g at 06/08/19 1339 **OR** dextrose 50 % injection 25-50 mL, 25-50 mL, Intravenous, Q15 Min PRN **OR** glucagon injection 1 mg, 1 mg, Subcutaneous, Q15 Min PRN, Airam Light NP    gabapentin (NEURONTIN) capsule 1,200 mg, 1,200 mg, Oral, TID, Natalia Fabian APRN CNP, 1,200 mg at 06/20/19 0806    ibuprofen (ADVIL/MOTRIN) tablet 400 mg, 400 mg, Oral, Q6H PRN, Natalia Fabian APRN CNP, 400 mg at 06/19/19 2342    insulin aspart (NovoLOG) inj (RAPID ACTING), 8 Units, Subcutaneous, TID w/meals, Denisse Malin, NP, 8 Units at 06/20/19 0811    insulin aspart (NovoLOG) inj  (RAPID ACTING), 1-7 Units, Subcutaneous, TID AC, Light, April Keturah, NP, 1 Units at 06/19/19 1651    insulin glargine (LANTUS PEN) injection 40 Units, 40 Units, Subcutaneous, At Bedtime, Rocio Tompkins MD, 40 Units at 06/19/19 2019    magnesium hydroxide (MILK OF MAGNESIA) suspension 30 mL, 30 mL, Oral, Daily PRN, Loren Zuleta NP, 30 mL at 06/07/19 1645    magnesium sulfate (EPSOM SALT) granules 5 g, 5 g, Other, BID PRN, 5 g at 06/19/19 1435 **AND** Patient care order, , , Per Unit Routine, Natalia Fabian APRN CNP    melatonin tablet 3 mg, 3 mg, Oral, At Bedtime PRN, Loren Zuleta NP    metFORMIN (GLUCOPHAGE) tablet 1,000 mg, 1,000 mg, Oral, BID w/meals, Baljit Stinson MD, 1,000 mg at 06/20/19 0806    nicotine (NICODERM CQ) 21 MG/24HR 24 hr patch 1 patch, 1 patch, Transdermal, Daily, Loren Zuleta NP, 1 patch at 06/20/19 0807    nicotine (NICORETTE) gum 2-4 mg, 2-4 mg, Buccal, Q1H PRN, Raven Stout NP, 4 mg at 05/21/19 0938    nicotine Patch in Place, , Transdermal, Q8H, Loren Zuleta NP    nicotine patch REMOVAL, , Transdermal, Daily, Loren Zuleta NP, Stopped at 06/01/19 0842    prazosin (MINIPRESS) capsule 1 mg, 1 mg, Oral, At Bedtime, Light, April JACKIE Rebollar, 1 mg at 06/19/19 2017    senna-docusate (SENOKOT-S/PERICOLACE) 8.6-50 MG per tablet 2 tablet, 2 tablet, Oral, At Bedtime, Light, April JACKIE Rebollar, 2 tablet at 06/19/19 2016    topiramate (TOPAMAX) tablet 150 mg, 150 mg, Oral, Daily, Natalia Fabian APRN CNP, 150 mg at 06/20/19 0814

## 2019-06-21 NOTE — PLAN OF CARE
"  Problem: Adult Behavioral Health Plan of Care  Goal: Patient-Specific Goal (Individualization)  Description  Patient will attend at least 50% of groups while on unit.  Patient will report at least 6-8 hours of sleep at night.  Patient will be compliant with treatment team recommendations.  Patient will be medication compliant while hospitalized.  Patient will have decrease in delusional statements by discharge.         6/21/2019 1800 by Nancy Stern, RN  Outcome: No Change     VSS, pain rated 10/10 to bilat feet-prn pain medication offered pt states \"fuck your fucking meds! They don't do shit.\" labile and increased irritability today. Remains cooperative with assessments . Spends most of time in lounge charting her graphs in journals.   at dinner and 218 before snack.   Problem: Thought Process Alteration  Goal: Optimal Thought Clarity  Description  Patient will hold a reality based conversation by discharge.    6/21/2019 1800 by Nancy Stern, RN  Outcome: No Change     Problem: Violence Risk or Actual  Goal: Anger and Impulse Control  Description  PT will verbalize frustrations before making verbal threats to staff  PT will remain free from violence to staff or others while inpatient   6/21/2019 1800 by Nancy Stern, RN  Outcome: No Change     Problem: Fall Injury Risk  Goal: Absence of Fall and Fall-Related Injury  Description  Pt will not experience a fall while hospitalized.  Patient will have tap bell at bedside.       6/21/2019 1800 by Nancy Stern, RN  Outcome: Improving    Face to face end of shift report communicated to oncoming night shift.     Nancy Stern  6/21/2019  10:43 PM           "

## 2019-06-21 NOTE — PROGRESS NOTES
Blood glucose has been stable with reasonable sugars for several days now. Her carbohydrates per meal have been consistent. Will sign off with current plan of 1000mg Metformin BID, 40 units Lantus at bedtime, 8 units novolog with meals, QID blood sugars. These orders are placed for discharge when she is ready. Please notify attending hospitalist for any questions or problems and we will consult again.

## 2019-06-22 LAB
GLUCOSE BLDC GLUCOMTR-MCNC: 142 MG/DL (ref 70–99)
GLUCOSE BLDC GLUCOMTR-MCNC: 166 MG/DL (ref 70–99)
GLUCOSE BLDC GLUCOMTR-MCNC: 177 MG/DL (ref 70–99)
GLUCOSE BLDC GLUCOMTR-MCNC: 293 MG/DL (ref 70–99)

## 2019-06-22 PROCEDURE — 25000132 ZZH RX MED GY IP 250 OP 250 PS 637: Performed by: NURSE PRACTITIONER

## 2019-06-22 PROCEDURE — 12400000 ZZH R&B MH

## 2019-06-22 PROCEDURE — 99232 SBSQ HOSP IP/OBS MODERATE 35: CPT | Performed by: NURSE PRACTITIONER

## 2019-06-22 PROCEDURE — 00000146 ZZHCL STATISTIC GLUCOSE BY METER IP

## 2019-06-22 PROCEDURE — 25000125 ZZHC RX 250: Performed by: INTERNAL MEDICINE

## 2019-06-22 RX ADMIN — INSULIN ASPART 4 UNITS: 100 INJECTION, SOLUTION INTRAVENOUS; SUBCUTANEOUS at 11:25

## 2019-06-22 RX ADMIN — NICOTINE 1 PATCH: 21 PATCH, EXTENDED RELEASE TRANSDERMAL at 08:19

## 2019-06-22 RX ADMIN — PRAZOSIN HYDROCHLORIDE 1 MG: 1 CAPSULE ORAL at 20:45

## 2019-06-22 RX ADMIN — METFORMIN HYDROCHLORIDE 1000 MG: 1000 TABLET ORAL at 16:52

## 2019-06-22 RX ADMIN — BUSPIRONE HYDROCHLORIDE 15 MG: 15 TABLET ORAL at 13:54

## 2019-06-22 RX ADMIN — BUSPIRONE HYDROCHLORIDE 15 MG: 15 TABLET ORAL at 20:45

## 2019-06-22 RX ADMIN — INSULIN ASPART 1 UNITS: 100 INJECTION, SOLUTION INTRAVENOUS; SUBCUTANEOUS at 16:58

## 2019-06-22 RX ADMIN — METFORMIN HYDROCHLORIDE 1000 MG: 1000 TABLET ORAL at 08:20

## 2019-06-22 RX ADMIN — CHLORPROMAZINE HYDROCHLORIDE 150 MG: 25 TABLET, FILM COATED ORAL at 13:54

## 2019-06-22 RX ADMIN — BUSPIRONE HYDROCHLORIDE 15 MG: 15 TABLET ORAL at 08:20

## 2019-06-22 RX ADMIN — SENNOSIDES AND DOCUSATE SODIUM 2 TABLET: 8.6; 5 TABLET ORAL at 20:45

## 2019-06-22 RX ADMIN — GABAPENTIN 1200 MG: 400 CAPSULE ORAL at 08:20

## 2019-06-22 RX ADMIN — INSULIN ASPART 1 UNITS: 100 INJECTION, SOLUTION INTRAVENOUS; SUBCUTANEOUS at 08:18

## 2019-06-22 RX ADMIN — INSULIN GLARGINE 40 UNITS: 100 INJECTION, SOLUTION SUBCUTANEOUS at 20:45

## 2019-06-22 RX ADMIN — CHLORPROMAZINE HYDROCHLORIDE 150 MG: 25 TABLET, FILM COATED ORAL at 08:20

## 2019-06-22 RX ADMIN — GABAPENTIN 1200 MG: 400 CAPSULE ORAL at 20:45

## 2019-06-22 RX ADMIN — CHLORPROMAZINE HYDROCHLORIDE 150 MG: 25 TABLET, FILM COATED ORAL at 20:45

## 2019-06-22 RX ADMIN — GABAPENTIN 1200 MG: 400 CAPSULE ORAL at 13:53

## 2019-06-22 RX ADMIN — TOPIRAMATE 150 MG: 50 TABLET ORAL at 08:20

## 2019-06-22 ASSESSMENT — ACTIVITIES OF DAILY LIVING (ADL)
HYGIENE/GROOMING: INDEPENDENT
ORAL_HYGIENE: INDEPENDENT
DRESS: SCRUBS (BEHAVIORAL HEALTH)
LAUNDRY: UNABLE TO COMPLETE
DRESS: SCRUBS (BEHAVIORAL HEALTH);INDEPENDENT
HYGIENE/GROOMING: INDEPENDENT
LAUNDRY: UNABLE TO COMPLETE
ORAL_HYGIENE: INDEPENDENT

## 2019-06-22 ASSESSMENT — MIFFLIN-ST. JEOR: SCORE: 1528.17

## 2019-06-22 NOTE — PLAN OF CARE
Problem: Adult Behavioral Health Plan of Care  Goal: Patient-Specific Goal (Individualization)  Description  Patient will attend at least 50% of groups while on unit.  Patient will report at least 6-8 hours of sleep at night.  Patient will be compliant with treatment team recommendations.  Patient will be medication compliant while hospitalized.  Patient will have decrease in delusional statements by discharge.         6/22/2019 0006 by Melissa Flowers, RN  Outcome: No Change     Problem: Thought Process Alteration  Goal: Optimal Thought Clarity  Description  Patient will hold a reality based conversation by discharge.    6/22/2019 0006 by Melissa Flowers, RN  Outcome: No Change     Problem: Fall Injury Risk  Goal: Absence of Fall and Fall-Related Injury  Description  Pt will not experience a fall while hospitalized.  Patient will have tap bell at bedside.       6/22/2019 0006 by Melissa Flowers RN  Outcome: Improving     Problem: Violence Risk or Actual  Goal: Anger and Impulse Control  Description  PT will verbalize frustrations before making verbal threats to staff  PT will remain free from violence to staff or others while inpatient   6/22/2019 0006 by Melissa Flowers, RN  Outcome: Improving     Face to face shift report recieved from Nancy MAK. Rounding completed, pt observed.    Pt appeared to be sleeping most of this shift, normal respirations and position changes noted. Pt did not have any episodes of violence towards staff or others this shift. Pt did not have any falls or injuries this shift.     Face to face report will be communicated to kei MAK.    Melissa Flowers  6/22/2019  6:33 AM

## 2019-06-22 NOTE — PLAN OF CARE
Problem: Adult Behavioral Health Plan of Care  Goal: Patient-Specific Goal (Individualization)  Description  Patient will attend at least 50% of groups while on unit.  Patient will report at least 6-8 hours of sleep at night.  Patient will be compliant with treatment team recommendations.  Patient will be medication compliant while hospitalized.  Patient will have decrease in delusional statements by discharge.         Outcome: No Change     Problem: Adult Behavioral Health Plan of Care  Goal: Adheres to Safety Considerations for Self and Others  Outcome: Improving     Problem: Adult Behavioral Health Plan of Care  Goal: Optimized Coping Skills in Response to Life Stressors  Outcome: Improving     Problem: Adult Behavioral Health Plan of Care  Goal: Develops/Participates in Therapeutic [place] to Support Successful Transition  Outcome: Improving     Problem: Thought Process Alteration  Goal: Optimal Thought Clarity  Description  Patient will hold a reality based conversation by discharge.    Outcome: Improving     Problem: Fall Injury Risk  Goal: Absence of Fall and Fall-Related Injury  Description  Pt will not experience a fall while hospitalized.  Patient will have tap bell at bedside.       Outcome: Improving     Problem: Violence Risk or Actual  Goal: Anger and Impulse Control  Description  PT will verbalize frustrations before making verbal threats to staff  PT will remain free from violence to staff or others while inpatient   Outcome: Improving    Patient is seen this shift in her room, she does get up for breakfast, she is compliant with medications. Nurse assess is done with client, however client goes off on other subjects that do not pertain to the questions. [place] Patient also speaks about where she is going once she is released from here this also causes her some anxiety. Patient is out of her room for some time today, eats her meals in the  ceferino, and visits with other patients. Patient gets her foot soak today, and showers.     End of shift hand off report was given to Jessica OLMEDO RN

## 2019-06-22 NOTE — PROGRESS NOTES
"[facility]  Psychiatric Progress Note    Subjective   This is a 39 year old female with schizoaffective disorder, bipolar type and methamphetamine use disorder severe. Pt reports she is feeling much better, her sugars are improving and she believes her medications are effective. She is happy to be back on buspar \"it just works for me\". As our conversation goes on she becomes increasingly animated and loud, swearing, and making negative statement re: her family. She was redirectable to multiple verbal prompts. She was allowed to vent her frustration at the treatment her daughter received by her family but reminded escalations in her behavior only go against her, not them  When not discussing family she is calm.     10 point ROS negative other than what is noted in HPI       DIagnoses:   Schizoaffective disorder, bipolar type  Methamphetamine use disorder, moderate to severe    Attestation:  Patient has been seen and evaluated by me,  LATRICE Kendall CNP          Interim History:   The patient's care was discussed with the treatment team and chart notes were reviewed.          Medications:       busPIRone  15 mg Oral TID     chlorproMAZINE  150 mg Oral TID     gabapentin  1,200 mg Oral TID     insulin aspart  8 Units Subcutaneous TID w/meals     insulin aspart  1-7 Units Subcutaneous TID AC     insulin glargine  40 Units Subcutaneous At Bedtime     metFORMIN  1,000 mg Oral BID w/meals     nicotine  1 patch Transdermal Daily     nicotine   Transdermal Q8H     nicotine   Transdermal Daily     prazosin  1 mg Oral At Bedtime     senna-docusate  2 tablet Oral At Bedtime     topiramate  150 mg Oral Daily     alum & mag hydroxide-simethicone, artificial saliva, sore throat lozenge, glucose **OR** dextrose **OR** glucagon, ibuprofen, magnesium hydroxide, magnesium sulfate **AND** Patient care order, melatonin, nicotine          Allergies:     Allergies   Allergen Reactions     Haloperidol Other (See " "Comments)     Other reaction(s): Seizures  Patient states, \"I fell down and wasn't able to get up.\"  Patient states, \"I fell down and wasn't able to get up.\"              Psychiatric Examination:   /80   Pulse 95   Temp 97.2  F (36.2  C) (Tympanic)   Resp 14   Ht 1.626 m (5' 4\")   Wt 87.3 kg (192 lb 8 oz)   SpO2 99%   BMI 33.04 kg/m    Weight is 192 lbs 8 oz  Body mass index is 33.04 kg/m .    Appearance:  awake, alert  Attitude:  cooperative  Eye Contact:  fair  Mood:  better and until talking about her family  Affect:  appropriate and in normal range  Speech:  pressured speech  Psychomotor Behavior:  no evidence of tardive dyskinesia, dystonia, or tics  Thought Process:  improving. Some delusional context, more pronounced when she becomes upset  Associations:  loosening of associations present  Thought Content:  no evidence of suicidal ideation or homicidal ideation and pt reports improvement in AH  Insight:  limited  Judgment:  poor  Oriented to:  time, person, and place  Attention Span and Concentration:  limited  Recent and Remote Memory:  fair  Fund of Knowledge: low-normal  Muscle Strength and Tone: normal  Gait and Station: Normal  Perception at times pt appears preoccupied         Labs:     Recent Results (from the past 24 hour(s))   Glucose by meter    Collection Time: 06/21/19  4:48 PM   Result Value Ref Range    Glucose 148 (H) 70 - 99 mg/dL   Glucose by meter    Collection Time: 06/21/19  7:56 PM   Result Value Ref Range    Glucose 218 (H) 70 - 99 mg/dL   Glucose by meter    Collection Time: 06/22/19  6:31 AM   Result Value Ref Range    Glucose 142 (H) 70 - 99 mg/dL   Glucose by meter    Collection Time: 06/22/19 11:23 AM   Result Value Ref Range    Glucose 293 (H) 70 - 99 mg/dL             Assessment/ Plan:   Continue on current medications and treatment plan.    "

## 2019-06-23 LAB
GLUCOSE BLDC GLUCOMTR-MCNC: 195 MG/DL (ref 70–99)
GLUCOSE BLDC GLUCOMTR-MCNC: 214 MG/DL (ref 70–99)
GLUCOSE BLDC GLUCOMTR-MCNC: 231 MG/DL (ref 70–99)
GLUCOSE BLDC GLUCOMTR-MCNC: 239 MG/DL (ref 70–99)

## 2019-06-23 PROCEDURE — 12400000 ZZH R&B MH

## 2019-06-23 PROCEDURE — 25000132 ZZH RX MED GY IP 250 OP 250 PS 637: Performed by: NURSE PRACTITIONER

## 2019-06-23 PROCEDURE — 25000131 ZZH RX MED GY IP 250 OP 636 PS 637: Performed by: NURSE PRACTITIONER

## 2019-06-23 PROCEDURE — 25000125 ZZHC RX 250: Performed by: INTERNAL MEDICINE

## 2019-06-23 PROCEDURE — 00000146 ZZHCL STATISTIC GLUCOSE BY METER IP

## 2019-06-23 PROCEDURE — 99232 SBSQ HOSP IP/OBS MODERATE 35: CPT | Performed by: NURSE PRACTITIONER

## 2019-06-23 RX ADMIN — CHLORPROMAZINE HYDROCHLORIDE 150 MG: 25 TABLET, FILM COATED ORAL at 14:11

## 2019-06-23 RX ADMIN — TOPIRAMATE 150 MG: 50 TABLET ORAL at 08:15

## 2019-06-23 RX ADMIN — INSULIN ASPART 2 UNITS: 100 INJECTION, SOLUTION INTRAVENOUS; SUBCUTANEOUS at 16:53

## 2019-06-23 RX ADMIN — METFORMIN HYDROCHLORIDE 1000 MG: 1000 TABLET ORAL at 16:53

## 2019-06-23 RX ADMIN — GABAPENTIN 1200 MG: 400 CAPSULE ORAL at 20:38

## 2019-06-23 RX ADMIN — BUSPIRONE HYDROCHLORIDE 15 MG: 15 TABLET ORAL at 08:15

## 2019-06-23 RX ADMIN — CHLORPROMAZINE HYDROCHLORIDE 150 MG: 25 TABLET, FILM COATED ORAL at 08:15

## 2019-06-23 RX ADMIN — GABAPENTIN 1200 MG: 400 CAPSULE ORAL at 14:11

## 2019-06-23 RX ADMIN — BUSPIRONE HYDROCHLORIDE 15 MG: 15 TABLET ORAL at 14:11

## 2019-06-23 RX ADMIN — CHLORPROMAZINE HYDROCHLORIDE 150 MG: 25 TABLET, FILM COATED ORAL at 20:38

## 2019-06-23 RX ADMIN — BUSPIRONE HYDROCHLORIDE 15 MG: 15 TABLET ORAL at 20:39

## 2019-06-23 RX ADMIN — INSULIN GLARGINE 40 UNITS: 100 INJECTION, SOLUTION SUBCUTANEOUS at 20:37

## 2019-06-23 RX ADMIN — PRAZOSIN HYDROCHLORIDE 1 MG: 1 CAPSULE ORAL at 20:38

## 2019-06-23 RX ADMIN — METFORMIN HYDROCHLORIDE 1000 MG: 1000 TABLET ORAL at 08:15

## 2019-06-23 RX ADMIN — INSULIN ASPART 2 UNITS: 100 INJECTION, SOLUTION INTRAVENOUS; SUBCUTANEOUS at 08:13

## 2019-06-23 RX ADMIN — SENNOSIDES AND DOCUSATE SODIUM 2 TABLET: 8.6; 5 TABLET ORAL at 20:38

## 2019-06-23 RX ADMIN — NICOTINE 1 PATCH: 21 PATCH, EXTENDED RELEASE TRANSDERMAL at 08:14

## 2019-06-23 RX ADMIN — INSULIN ASPART 2 UNITS: 100 INJECTION, SOLUTION INTRAVENOUS; SUBCUTANEOUS at 11:43

## 2019-06-23 RX ADMIN — GABAPENTIN 1200 MG: 400 CAPSULE ORAL at 08:15

## 2019-06-23 ASSESSMENT — ACTIVITIES OF DAILY LIVING (ADL)
LAUNDRY: UNABLE TO COMPLETE
HYGIENE/GROOMING: INDEPENDENT
LAUNDRY: UNABLE TO COMPLETE
DRESS: SCRUBS (BEHAVIORAL HEALTH)
ORAL_HYGIENE: INDEPENDENT
HYGIENE/GROOMING: INDEPENDENT
DRESS: SCRUBS (BEHAVIORAL HEALTH);INDEPENDENT
ORAL_HYGIENE: INDEPENDENT

## 2019-06-23 NOTE — PLAN OF CARE
Problem: Adult Behavioral Health Plan of Care  Goal: Patient-Specific Goal (Individualization)  Description  Patient will attend at least 50% of groups while on unit.  Patient will report at least 6-8 hours of sleep at night.  Patient will be compliant with treatment team recommendations.  Patient will be medication compliant while hospitalized.  Patient will have decrease in delusional statements by discharge.         Outcome: Improving     Problem: Adult Behavioral Health Plan of Care  Goal: Adheres to Safety Considerations for Self and Others  Outcome: Improving     Problem: Adult Behavioral Health Plan of Care  Goal: Optimized Coping Skills in Response to Life Stressors  Outcome: Improving     Problem: Adult Behavioral Health Plan of Care  Goal: Develops/Participates in Therapeutic [place] to Support Successful Transition  Outcome: Improving     Problem: Thought Process Alteration  Goal: Optimal Thought Clarity  Description  Patient will hold a reality based conversation by discharge.    Outcome: Improving     Problem: Fall Injury Risk  Goal: Absence of Fall and Fall-Related Injury  Description  Pt will not experience a fall while hospitalized.  Patient will have tap bell at bedside.       Outcome: Improving     Problem: Violence Risk or Actual  Goal: Anger and Impulse Control  Description  PT will verbalize frustrations before making verbal threats to staff  PT will remain free from violence to staff or others while inpatient   Outcome: Improving     Patient does not effectively participate in criteria questions. Patient states today that she had some bad dreams last night and then again at lunch time. Patient states she had a dream about [place] and another one about someone in her family that had . Patient also talks a lot about her mother, and her nieces and nephews. Patient states she wants to get out and go buy her grand kids some bikes. Patient is out to meals, she does spend time in the lounge  interacting with staff as well as nurses. Patient is appropriate with nurse assess, as well as compliant with medication regime.     End of shift hand of report given to Jessica OLMEDO RN

## 2019-06-23 NOTE — PROGRESS NOTES
"[facility]  Psychiatric Progress Note    Subjective   This is a 39 year old female with schizoaffective disorder, bipolar type and methamphetamine use disorder severe. Pt is out in the dayroom reading news ads and writing. Genie again today notes \"these meds are now working\". Pt mentioned her eye sight and the need for her glasses, I asked where they were and she immediately became loud, swearing about her family which continued for a few minutes until she was able to be redirected. Delusions remain present, pt talks about her broken back, broken foot, which she believes healed on their own with the help of thorazine. Is anxious to discharge, discussed self regulation, not allowing her family to illicit the negative emotional response as this gives them the power. Denies medicatioin side effects.     10 point ROS negative other than what is noted in HPI       DIagnoses:   Schizoaffective disorder, bipolar type  Methamphetamine use disorder, moderate to severe    Attestation:  Patient has been seen and evaluated by me,  LATRICE Kendall CNP          Interim History:   The patient's care was discussed with the treatment team and chart notes were reviewed.          Medications:       busPIRone  15 mg Oral TID     chlorproMAZINE  150 mg Oral TID     gabapentin  1,200 mg Oral TID     insulin aspart  8 Units Subcutaneous TID w/meals     insulin aspart  1-7 Units Subcutaneous TID AC     insulin glargine  40 Units Subcutaneous At Bedtime     metFORMIN  1,000 mg Oral BID w/meals     nicotine  1 patch Transdermal Daily     nicotine   Transdermal Q8H     nicotine   Transdermal Daily     prazosin  1 mg Oral At Bedtime     senna-docusate  2 tablet Oral At Bedtime     topiramate  150 mg Oral Daily     alum & mag hydroxide-simethicone, artificial saliva, sore throat lozenge, glucose **OR** dextrose **OR** glucagon, ibuprofen, magnesium hydroxide, magnesium sulfate **AND** Patient care order, melatonin, nicotine   " "       Allergies:     Allergies   Allergen Reactions     Haloperidol Other (See Comments)     Other reaction(s): Seizures  Patient states, \"I fell down and wasn't able to get up.\"  Patient states, \"I fell down and wasn't able to get up.\"              Psychiatric Examination:   /65   Pulse 88   Temp 98.9  F (37.2  C) (Tympanic)   Resp 16   Ht 1.626 m (5' 4\")   Wt 87.3 kg (192 lb 8 oz)   SpO2 99%   BMI 33.04 kg/m    Weight is 192 lbs 8 oz  Body mass index is 33.04 kg/m .    Appearance:  awake, alert  Attitude:  cooperative  Eye Contact:  fair  Mood:  better and until talking about her family  Affect:  appropriate and in normal range  Speech:  pressured speech  Psychomotor Behavior:  no evidence of tardive dyskinesia, dystonia, or tics  Thought Process:  improving. Some delusional context, more pronounced when she becomes upset  Associations:  loosening of associations present  Thought Content:  no evidence of suicidal ideation or homicidal ideation and pt reports improvement in AH  Insight:  limited  Judgment:  poor  Oriented to:  time, person, and place  Attention Span and Concentration:  limited  Recent and Remote Memory:  fair  Fund of Knowledge: low-normal  Muscle Strength and Tone: normal  Gait and Station: Normal  Perception at times pt appears preoccupied         Labs:     Recent Results (from the past 24 hour(s))   Glucose by meter    Collection Time: 06/22/19 11:23 AM   Result Value Ref Range    Glucose 293 (H) 70 - 99 mg/dL   Glucose by meter    Collection Time: 06/22/19  4:56 PM   Result Value Ref Range    Glucose 166 (H) 70 - 99 mg/dL   Glucose by meter    Collection Time: 06/22/19  7:50 PM   Result Value Ref Range    Glucose 177 (H) 70 - 99 mg/dL   Glucose by meter    Collection Time: 06/23/19  6:27 AM   Result Value Ref Range    Glucose 231 (H) 70 - 99 mg/dL             Assessment/ Plan:   Continue on current medications and treatment plan.    "

## 2019-06-23 NOTE — PLAN OF CARE
Problem: Adult Behavioral Health Plan of Care  Goal: Patient-Specific Goal (Individualization)  Description  Patient will attend at least 50% of groups while on unit.  Patient will report at least 6-8 hours of sleep at night.  Patient will be compliant with treatment team recommendations.  Patient will be medication compliant while hospitalized.  Patient will have decrease in delusional statements by discharge.         6/22/2019 2340 by Melissa Flowers, RN  Outcome: No Change     Problem: Thought Process Alteration  Goal: Optimal Thought Clarity  Description  Patient will hold a reality based conversation by discharge.    6/22/2019 2340 by Melissa Flowers, RN  Outcome: No Change     Problem: Fall Injury Risk  Goal: Absence of Fall and Fall-Related Injury  Description  Pt will not experience a fall while hospitalized.  Patient will have tap bell at bedside.       6/22/2019 2340 by Melissa Flowers, RN  Outcome: Improving     Problem: Violence Risk or Actual  Goal: Anger and Impulse Control  Description  PT will verbalize frustrations before making verbal threats to staff  PT will remain free from violence to staff or others while inpatient   6/22/2019 2340 by Melissa Flowers, RN  Outcome: Improving     Face to face shift report recieved from Jessica MAK. Rounding completed, pt observed.    Pt appeared to be sleeping most of this shift, normal respirations and position changes noted. Pt did not have episodes of violence towards staff or peers. Pt did not have any falls or injury this shift.     Face to face report will be communicated to kei MAK.    Melissa Flowers  6/23/2019  6:05 AM

## 2019-06-23 NOTE — PLAN OF CARE
"  Problem: Adult Behavioral Health Plan of Care  Goal: Patient-Specific Goal (Individualization)  Description  Patient will attend at least 50% of groups while on unit.  Patient will report at least 6-8 hours of sleep at night.  Patient will be compliant with treatment team recommendations.  Patient will be medication compliant while hospitalized.  Patient will have decrease in delusional statements by discharge.         6/22/2019 2216 by Jessica Colon, RN  Outcome: No Change   Patient has been in lounge area most of this shift, does at time socialize with peers. Has not attended any groups this shift.  Eating and sleeping with no complaints.   Did complain of feet hurting, thinks her left foot is broken states, \"I've asked to have it xray ed but no one does anything about it\"\".   Rambles on about things in her life, talking about her family.  Takes all medications are prescribed.    Refused second foot soak this evening.  Blood glucose this shift were 162 & 177    Face to face end of shift report communicated to night shift RN.     Jessica Colon  6/22/2019  10:34 PM        "

## 2019-06-24 LAB
GLUCOSE BLDC GLUCOMTR-MCNC: 206 MG/DL (ref 70–99)
GLUCOSE BLDC GLUCOMTR-MCNC: 213 MG/DL (ref 70–99)
GLUCOSE BLDC GLUCOMTR-MCNC: 219 MG/DL (ref 70–99)
GLUCOSE BLDC GLUCOMTR-MCNC: 264 MG/DL (ref 70–99)

## 2019-06-24 PROCEDURE — 25000132 ZZH RX MED GY IP 250 OP 250 PS 637: Performed by: NURSE PRACTITIONER

## 2019-06-24 PROCEDURE — 12400000 ZZH R&B MH

## 2019-06-24 PROCEDURE — 99232 SBSQ HOSP IP/OBS MODERATE 35: CPT | Performed by: NURSE PRACTITIONER

## 2019-06-24 PROCEDURE — 00000146 ZZHCL STATISTIC GLUCOSE BY METER IP

## 2019-06-24 PROCEDURE — 25000125 ZZHC RX 250: Performed by: INTERNAL MEDICINE

## 2019-06-24 RX ADMIN — INSULIN GLARGINE 40 UNITS: 100 INJECTION, SOLUTION SUBCUTANEOUS at 20:59

## 2019-06-24 RX ADMIN — CHLORPROMAZINE HYDROCHLORIDE 150 MG: 25 TABLET, FILM COATED ORAL at 08:14

## 2019-06-24 RX ADMIN — Medication 2 SPRAY: at 05:40

## 2019-06-24 RX ADMIN — INSULIN ASPART 2 UNITS: 100 INJECTION, SOLUTION INTRAVENOUS; SUBCUTANEOUS at 08:12

## 2019-06-24 RX ADMIN — GABAPENTIN 1200 MG: 400 CAPSULE ORAL at 13:58

## 2019-06-24 RX ADMIN — BUSPIRONE HYDROCHLORIDE 15 MG: 15 TABLET ORAL at 13:58

## 2019-06-24 RX ADMIN — METFORMIN HYDROCHLORIDE 1000 MG: 1000 TABLET ORAL at 08:14

## 2019-06-24 RX ADMIN — BUSPIRONE HYDROCHLORIDE 15 MG: 15 TABLET ORAL at 20:15

## 2019-06-24 RX ADMIN — GABAPENTIN 1200 MG: 400 CAPSULE ORAL at 08:14

## 2019-06-24 RX ADMIN — INSULIN ASPART 2 UNITS: 100 INJECTION, SOLUTION INTRAVENOUS; SUBCUTANEOUS at 16:57

## 2019-06-24 RX ADMIN — TOPIRAMATE 150 MG: 50 TABLET ORAL at 08:14

## 2019-06-24 RX ADMIN — METFORMIN HYDROCHLORIDE 1000 MG: 1000 TABLET ORAL at 16:35

## 2019-06-24 RX ADMIN — BUSPIRONE HYDROCHLORIDE 15 MG: 15 TABLET ORAL at 08:14

## 2019-06-24 RX ADMIN — INSULIN ASPART 3 UNITS: 100 INJECTION, SOLUTION INTRAVENOUS; SUBCUTANEOUS at 11:58

## 2019-06-24 RX ADMIN — SENNOSIDES AND DOCUSATE SODIUM 2 TABLET: 8.6; 5 TABLET ORAL at 20:15

## 2019-06-24 RX ADMIN — CHLORPROMAZINE HYDROCHLORIDE 150 MG: 25 TABLET, FILM COATED ORAL at 20:15

## 2019-06-24 RX ADMIN — PRAZOSIN HYDROCHLORIDE 1 MG: 1 CAPSULE ORAL at 20:15

## 2019-06-24 RX ADMIN — GABAPENTIN 1200 MG: 400 CAPSULE ORAL at 20:15

## 2019-06-24 RX ADMIN — CHLORPROMAZINE HYDROCHLORIDE 150 MG: 25 TABLET, FILM COATED ORAL at 13:58

## 2019-06-24 ASSESSMENT — ACTIVITIES OF DAILY LIVING (ADL)
LAUNDRY: UNABLE TO COMPLETE
HYGIENE/GROOMING: INDEPENDENT
HYGIENE/GROOMING: INDEPENDENT
LAUNDRY: UNABLE TO COMPLETE
ORAL_HYGIENE: INDEPENDENT
DRESS: SCRUBS (BEHAVIORAL HEALTH)
DRESS: SCRUBS (BEHAVIORAL HEALTH);INDEPENDENT
ORAL_HYGIENE: INDEPENDENT
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
## 2019-06-24 NOTE — PROGRESS NOTES
"[facility]  Psychiatric Progress Note    Subjective   This is a 39 year old female with schizoaffective disorder, bipolar type and methamphetamine use disorder severe.     Patient sitting in [facility] area.  She presents as fairly calm today, states medications are working as she sleeps good and feels less \"rage\".  Patient does begin to talk about still being here and hoping \"they\" are working on getting her somewhere else, assure her  is working on this and as soon as we know something different, will let her know.  She denies si/hi and we make no changes in medications today.     10 point ROS negative other than what is noted in HPI       DIagnoses:   Schizoaffective disorder, bipolar type  Methamphetamine use disorder, moderate to severe    Attestation:  Patient has been seen and evaluated by me,  LATRICE Du CNP          Interim History:   The patient's care was discussed with the treatment team and chart notes were reviewed.          Medications:       busPIRone  15 mg Oral TID     chlorproMAZINE  150 mg Oral TID     gabapentin  1,200 mg Oral TID     insulin aspart  8 Units Subcutaneous TID w/meals     insulin aspart  1-7 Units Subcutaneous TID AC     insulin glargine  40 Units Subcutaneous At Bedtime     metFORMIN  1,000 mg Oral BID w/meals     nicotine  1 patch Transdermal Daily     nicotine   Transdermal Q8H     nicotine   Transdermal Daily     prazosin  1 mg Oral At Bedtime     senna-docusate  2 tablet Oral At Bedtime     topiramate  150 mg Oral Daily     alum & mag hydroxide-simethicone, artificial saliva, sore throat lozenge, glucose **OR** dextrose **OR** glucagon, ibuprofen, magnesium hydroxide, magnesium sulfate **AND** Patient care order, melatonin, nicotine          Allergies:     Allergies   Allergen Reactions     Haloperidol Other (See Comments)     Other reaction(s): Seizures  Patient states, \"I fell down and wasn't able to get up.\"  Patient states, \"I fell " "down and wasn't able to get up.\"              Psychiatric Examination:   /70   Pulse 102   Temp 98.2  F (36.8  C) (Tympanic)   Resp 16   Ht 1.626 m (5' 4\")   Wt 87.3 kg (192 lb 8 oz)   SpO2 99%   BMI 33.04 kg/m    Weight is 192 lbs 8 oz  Body mass index is 33.04 kg/m .    Appearance:  awake, alert  Attitude:  cooperative  Eye Contact:  fair  Mood:  better and until talking about her family  Affect:  appropriate and in normal range  Speech:  pressured speech  Psychomotor Behavior:  no evidence of tardive dyskinesia, dystonia, or tics  Thought Process:  improving. Some delusional context, more pronounced when she becomes upset  Associations:  loosening of associations present  Thought Content:  no evidence of suicidal ideation or homicidal ideation and pt reports improvement in AH  Insight:  limited  Judgment:  poor  Oriented to:  time, person, and place  Attention Span and Concentration:  limited  Recent and Remote Memory:  fair  Fund of Knowledge: low-normal  Muscle Strength and Tone: normal  Gait and Station: Normal  Perception at times pt appears preoccupied         Labs:     Recent Results (from the past 24 hour(s))   Glucose by meter    Collection Time: 06/23/19  4:30 PM   Result Value Ref Range    Glucose 239 (H) 70 - 99 mg/dL   Glucose by meter    Collection Time: 06/23/19  7:51 PM   Result Value Ref Range    Glucose 195 (H) 70 - 99 mg/dL   Glucose by meter    Collection Time: 06/24/19  5:51 AM   Result Value Ref Range    Glucose 206 (H) 70 - 99 mg/dL             Assessment/ Plan:   Continue on current medications and treatment plan.    "

## 2019-06-24 NOTE — PLAN OF CARE
"Patient more irritable and rapid with her speech today - she is swearing loudly and repeating statements about several things about the [place] that she has said multiple times.  At one point I did need to ask her to stop swearing so much and so loudly - she did cue better with this.  She was somewhat angry stating she doesn't want to be in [place] - today - and said loud and derogatory things about the town and why she doesn't want to live there.  Today she now wants to go to \"[facility]\" for CD treatment and wants to see Jabari about updating her rule 25.   "

## 2019-06-24 NOTE — PLAN OF CARE
Problem: Adult Behavioral Health Plan of Care  Goal: Patient-Specific Goal (Individualization)  Description  Patient will attend at least 50% of groups while on unit.  Patient will report at least 6-8 hours of sleep at night.  Patient will be compliant with treatment team recommendations.  Patient will be medication compliant while hospitalized.  Patient will have decrease in delusional statements by discharge.         6/23/2019 2105 by Jessica Colon, RN  Outcome: No Change   Has been up on unit for most of this shift. Did attend one group for a short time today.  Pleasant and cooperative with cares, taking all medications as prescribed. Denies being suicidal, no voices. Believes she is leaving this week.  Sits at table in lounge area listening to headphones and writing on paper.  Problem: Fall Injury Risk  Goal: Absence of Fall and Fall-Related Injury  Description  Pt will not experience a fall while hospitalized.  Patient will have tap bell at bedside.       6/23/2019 2105 by Jessica Colon, RN  Outcome: Improving   Has had no falls or injuries this shift  Problem: Violence Risk or Actual  Goal: Anger and Impulse Control  Description  PT will verbalize frustrations before making verbal threats to staff  PT will remain free from violence to staff or others while inpatient   6/23/2019 2105 by Jessica Colon, RN  Outcome: Improving   . Has had to outburst this shift and has made no verbal threats to staff or peers.  Does like to talk about her life and the things she has seen and done.      Face to face end of shift report communicated to night shift RN.     Jessica Colon  6/23/2019  9:50 PM

## 2019-06-24 NOTE — PLAN OF CARE
"  Problem: Adult Behavioral Health Plan of Care  Goal: Patient-Specific Goal (Individualization)  Description  Patient will attend at least 50% of groups while on unit.  Patient will report at least 6-8 hours of sleep at night.  Patient will be compliant with treatment team recommendations.  Patient will be medication compliant while hospitalized.  Patient will have decrease in delusional statements by discharge.         6/23/2019 2344 by Melissa Flowers RN  Outcome: No Change     Problem: Thought Process Alteration  Goal: Optimal Thought Clarity  Description  Patient will hold a reality based conversation by discharge.    6/23/2019 2344 by Melissa Flowers, RN  Outcome: No Change     Problem: Fall Injury Risk  Goal: Absence of Fall and Fall-Related Injury  Description  Pt will not experience a fall while hospitalized.  Patient will have tap bell at bedside.       6/23/2019 2344 by Melissa Flowers RN  Outcome: Improving     Problem: Violence Risk or Actual  Goal: Anger and Impulse Control  Description  PT will verbalize frustrations before making verbal threats to staff  PT will remain free from violence to staff or others while inpatient   6/23/2019 2344 by Melissa Flowers, RN  Outcome: Improving    Face to face shift report recieved from Jessica MAK. Rounding completed, pt observed.    Pt appeared to be sleeping at the beginning of this shift, normal respirations and position changes noted. Pt awake and out in lobby after 0315. Pt asked a staff for some juice but was explained that due to her diabetes it was suggested that she drink water or was offered some decaf tea. Pt opted for the tea, in which pt decided the water wasn't hot enough and dumped it out in the sink and just drank water. Later on pt started loudly talking about no one cares if \"I fucking choke because my mouth is fucking dry and no one will give me anything to help the fucking dryness and I will die from my diabetes.\" Writer went to talk to patient and " explained that she had Biotene available for mouth dryness which was given at that time- 0540. Pt did not have any falls this shift. Pt did not have any episodes of violence towards staff or others this shift.     Face to face report will be communicated to oncoming RN.    Melissa Flowers  6/24/2019  6:39 AM

## 2019-06-24 NOTE — PLAN OF CARE
BEHAVIORAL TEAM DISCUSSION    Participants: Natalia Fabian NP, Dennise Herrera LICSW, Becca Chery LSW, Beena Kumar LGSW, Lorna Munoz RN, Crow Leyva RN, [facility],Jabari Triana [facility], Mady Holman OT, Natalia Costello OT, Gena Gomez OT  Progress: fair  Continued Stay Criteria/Rationale: grandiose, irritable  Medical/Physical: Diabetes - working with hospitalist, liver enzymes have decreased, hx of Hep C, foot soaks are PRN.  Precautions:   Falls precaution?: No  Behavioral Orders   Procedures     Code 1 - Restrict to Unit     Routine Programming     As clinically indicated     Status 15     Every 15 minutes.     Plan: ref[place] [facility]  Rationale for change in precautions or plan: none    Active Problems:    Mixed hyperlipidemia    Diabetes mellitus, type 2    Schizoaffective disorder, bipolar type (H)       Current Facility-Administered Medications:      alum & mag hydroxide-simethicone (MYLANTA ES/MAALOX  ES) suspension 30 mL, 30 mL, Oral, Q4H PRN, Natalia Fabian APRN CNP     artificial saliva (BIOTENE MT) solution 2 spray, 2 spray, Swish & Spit, 4x Daily PRN, Raven Stout NP, 2 spray at 06/24/19 0540     benzocaine-menthol (CEPACOL) 15-3.6 MG lozenge 1 lozenge, 1 lozenge, Buccal, Q1H PRN, Natalia Fabian APRN CNP     busPIRone (BUSPAR) tablet 15 mg, 15 mg, Oral, TID, Natalia Fabian APRN CNP, 15 mg at 06/24/19 0814     chlorproMAZINE (THORAZINE) tablet 150 mg, 150 mg, Oral, TID, Loren Zuleta NP, 150 mg at 06/24/19 0814     glucose gel 15-30 g, 15-30 g, Oral, Q15 Min PRN, 30 g at 06/08/19 1339 **OR** dextrose 50 % injection 25-50 mL, 25-50 mL, Intravenous, Q15 Min PRN **OR** glucagon injection 1 mg, 1 mg, Subcutaneous, Q15 Min PRN, Airam Light NP     gabapentin (NEURONTIN) capsule 1,200 mg, 1,200 mg, Oral, TID, Natalia Fabian, APRN CNP, 1,200 mg at 06/24/19 0814     ibuprofen (ADVIL/MOTRIN) tablet 400 mg, 400 mg,  Oral, Q6H PRN, Natalia Fabian APRN CNP, 400 mg at 06/19/19 2342     insulin aspart (NovoLOG) inj (RAPID ACTING), 8 Units, Subcutaneous, TID w/meals, Denisse Malin NP, 8 Units at 06/24/19 0812     insulin aspart (NovoLOG) inj (RAPID ACTING), 1-7 Units, Subcutaneous, TID AC, Airam Light NP, 2 Units at 06/24/19 0812     insulin glargine (LANTUS PEN) injection 40 Units, 40 Units, Subcutaneous, At Bedtime, Rocio Tompkins MD, 40 Units at 06/23/19 2037     magnesium hydroxide (MILK OF MAGNESIA) suspension 30 mL, 30 mL, Oral, Daily PRN, Loren Zuleta NP, 30 mL at 06/07/19 1645     magnesium sulfate (EPSOM SALT) granules 5 g, 5 g, Other, BID PRN, 5 g at 06/19/19 1435 **AND** Patient care order, , , Per Unit Routine, Natalia Fabian APRN CNP     melatonin tablet 3 mg, 3 mg, Oral, At Bedtime PRN, Loren Zuleta NP     metFORMIN (GLUCOPHAGE) tablet 1,000 mg, 1,000 mg, Oral, BID w/meals, Baljit Stinson MD, 1,000 mg at 06/24/19 0814     nicotine (NICODERM CQ) 21 MG/24HR 24 hr patch 1 patch, 1 patch, Transdermal, Daily, Loren Zuleta NP, 1 patch at 06/23/19 0814     nicotine (NICORETTE) gum 2-4 mg, 2-4 mg, Buccal, Q1H PRN, Raven Stout NP, 4 mg at 05/21/19 0938     nicotine Patch in Place, , Transdermal, Q8H, Loren Zuleta NP, Stopped at 06/24/19 0226     nicotine patch REMOVAL, , Transdermal, Daily, Loren Zuleta NP, Stopped at 06/01/19 0842     prazosin (MINIPRESS) capsule 1 mg, 1 mg, Oral, At Bedtime, Airam Light NP, 1 mg at 06/23/19 2038     senna-docusate (SENOKOT-S/PERICOLACE) 8.6-50 MG per tablet 2 tablet, 2 tablet, Oral, At Bedtime, Airam Light NP, 2 tablet at 06/23/19 2038     topiramate (TOPAMAX) tablet 150 mg, 150 mg, Oral, Daily, Natalia Fabian, APRN CNP, 150 mg at 06/24/19 0814

## 2019-06-24 NOTE — PLAN OF CARE
Problem: Adult Behavioral Health Plan of Care  Goal: Patient-Specific Goal (Individualization)  Description  Patient will attend at least 50% of groups while on unit.  Patient will report at least 6-8 hours of sleep at night.  Patient will be compliant with treatment team recommendations.  Patient will be medication compliant while hospitalized.  Patient will have decrease in delusional statements by discharge.         6/24/2019 1513 by Jessica Bailey RN  Outcome: No Change     Problem: Adult Behavioral Health Plan of Care  Goal: Adheres to Safety Considerations for Self and Others  6/24/2019 1513 by Jessica Bailey RN  Outcome: Improving     Problem: Adult Behavioral Health Plan of Care  Goal: Optimized Coping Skills in Response to Life Stressors  6/24/2019 1513 by Jessica Bailey RN  Outcome: Improving     Problem: Thought Process Alteration  Goal: Optimal Thought Clarity  Description  Patient will hold a reality based conversation by discharge.    6/24/2019 1513 by Jessica Bailey RN  Outcome: Improving     Problem: Fall Injury Risk  Goal: Absence of Fall and Fall-Related Injury  Description  Pt will not experience a fall while hospitalized.  Patient will have tap bell at bedside.       6/24/2019 1513 by Jessica Bailey RN  Outcome: Improving     Problem: Violence Risk or Actual  Goal: Anger and Impulse Control  Description  PT will verbalize frustrations before making verbal threats to staff  PT will remain free from violence to staff or others while inpatient   6/24/2019 1513 by Jessica Bailey RN  Outcome: Improving    Patient denies SI, HI, hallucinations, pain is noted 10/10 in her feet, she refuses any intervention, foot soak was offered also and patient refused. Patient does not answer to anxiety/depression questioning. Patient is out of her room for the majority of the day, she does nap intermit. Patient is anxious to hear about where she is going to be discharging to. Patient again talks about her  mother and how much she hates her. Patient is cooperative with nurse assess as well as medications.

## 2019-06-25 LAB
GLUCOSE BLDC GLUCOMTR-MCNC: 127 MG/DL (ref 70–99)
GLUCOSE BLDC GLUCOMTR-MCNC: 195 MG/DL (ref 70–99)
GLUCOSE BLDC GLUCOMTR-MCNC: 197 MG/DL (ref 70–99)
GLUCOSE BLDC GLUCOMTR-MCNC: 237 MG/DL (ref 70–99)
GLUCOSE BLDC GLUCOMTR-MCNC: 254 MG/DL (ref 70–99)

## 2019-06-25 PROCEDURE — 25000132 ZZH RX MED GY IP 250 OP 250 PS 637: Performed by: NURSE PRACTITIONER

## 2019-06-25 PROCEDURE — 25000125 ZZHC RX 250: Performed by: INTERNAL MEDICINE

## 2019-06-25 PROCEDURE — 12400000 ZZH R&B MH

## 2019-06-25 PROCEDURE — 00000146 ZZHCL STATISTIC GLUCOSE BY METER IP

## 2019-06-25 RX ORDER — CHLORPROMAZINE HYDROCHLORIDE 100 MG/1
200 TABLET, FILM COATED ORAL 3 TIMES DAILY
Status: DISCONTINUED | OUTPATIENT
Start: 2019-06-25 | End: 2019-07-03 | Stop reason: HOSPADM

## 2019-06-25 RX ADMIN — SENNOSIDES AND DOCUSATE SODIUM 2 TABLET: 8.6; 5 TABLET ORAL at 20:19

## 2019-06-25 RX ADMIN — GABAPENTIN 1200 MG: 400 CAPSULE ORAL at 13:28

## 2019-06-25 RX ADMIN — CHLORPROMAZINE HYDROCHLORIDE 200 MG: 100 TABLET, SUGAR COATED ORAL at 13:28

## 2019-06-25 RX ADMIN — CHLORPROMAZINE HYDROCHLORIDE 150 MG: 25 TABLET, FILM COATED ORAL at 08:30

## 2019-06-25 RX ADMIN — METFORMIN HYDROCHLORIDE 1000 MG: 1000 TABLET ORAL at 17:05

## 2019-06-25 RX ADMIN — PRAZOSIN HYDROCHLORIDE 1 MG: 1 CAPSULE ORAL at 20:19

## 2019-06-25 RX ADMIN — GABAPENTIN 1200 MG: 400 CAPSULE ORAL at 20:20

## 2019-06-25 RX ADMIN — INSULIN ASPART 2 UNITS: 100 INJECTION, SOLUTION INTRAVENOUS; SUBCUTANEOUS at 17:06

## 2019-06-25 RX ADMIN — BUSPIRONE HYDROCHLORIDE 15 MG: 15 TABLET ORAL at 20:20

## 2019-06-25 RX ADMIN — BUSPIRONE HYDROCHLORIDE 15 MG: 15 TABLET ORAL at 08:30

## 2019-06-25 RX ADMIN — GABAPENTIN 1200 MG: 400 CAPSULE ORAL at 08:30

## 2019-06-25 RX ADMIN — CHLORPROMAZINE HYDROCHLORIDE 200 MG: 100 TABLET, SUGAR COATED ORAL at 20:19

## 2019-06-25 RX ADMIN — METFORMIN HYDROCHLORIDE 1000 MG: 1000 TABLET ORAL at 08:30

## 2019-06-25 RX ADMIN — TOPIRAMATE 150 MG: 50 TABLET ORAL at 08:30

## 2019-06-25 RX ADMIN — NICOTINE 1 PATCH: 21 PATCH, EXTENDED RELEASE TRANSDERMAL at 08:30

## 2019-06-25 RX ADMIN — BUSPIRONE HYDROCHLORIDE 15 MG: 15 TABLET ORAL at 13:28

## 2019-06-25 RX ADMIN — INSULIN GLARGINE 40 UNITS: 100 INJECTION, SOLUTION SUBCUTANEOUS at 20:18

## 2019-06-25 ASSESSMENT — ACTIVITIES OF DAILY LIVING (ADL)
DRESS: SCRUBS (BEHAVIORAL HEALTH);INDEPENDENT
ORAL_HYGIENE: INDEPENDENT
HYGIENE/GROOMING: INDEPENDENT
DRESS: INDEPENDENT;SCRUBS (BEHAVIORAL HEALTH)
HYGIENE/GROOMING: INDEPENDENT
LAUNDRY: UNABLE TO COMPLETE
ORAL_HYGIENE: INDEPENDENT

## 2019-06-25 NOTE — PLAN OF CARE
BEHAVIORAL TEAM DISCUSSION    Participants: Natalia Fabian NP, Dennise Herrera LICSW,  Becca Chery LSW, Beena Kumar LGSW, Radha Bills RN, Lorna Munoz RN, [facility], Natalia Costello OT, Gena Gomez OT  Progress: minimal   Continued Stay Criteria/Rationale: grandiose, irritable   Medical/Physical: DM- working with hospitalist, liver enzymes have decreased, hx of Hep C, foot soaks are PRN.  Precautions:   Falls precaution?: No  Behavioral Orders   Procedures    Code 1 - Restrict to Unit    Routine Programming     As clinically indicated    Status 15     Every 15 minutes.     Plan: referrals to [facility] and [facility]; [facility] has openings.  Rationale for change in precautions or plan:     Active Problems:    Mixed hyperlipidemia      Diabetes mellitus, type 2      Schizoaffective disorder, bipolar type (H)    Current Facility-Administered Medications:     alum & mag hydroxide-simethicone (MYLANTA ES/MAALOX  ES) suspension 30 mL, 30 mL, Oral, Q4H PRN, Natalia Fabian APRN CNP    artificial saliva (BIOTENE MT) solution 2 spray, 2 spray, Swish & Spit, 4x Daily PRN, Raven Stout NP, 2 spray at 06/24/19 0540    benzocaine-menthol (CEPACOL) 15-3.6 MG lozenge 1 lozenge, 1 lozenge, Buccal, Q1H PRN, Natalia Fabian APRN CNP    busPIRone (BUSPAR) tablet 15 mg, 15 mg, Oral, TID, Natalia Fabian APRN CNP, 15 mg at 06/25/19 0830    chlorproMAZINE (THORAZINE) tablet 150 mg, 150 mg, Oral, TID, Loren Zuleta, NP, 150 mg at 06/25/19 0830    glucose gel 15-30 g, 15-30 g, Oral, Q15 Min PRN, 30 g at 06/08/19 1339 **OR** dextrose 50 % injection 25-50 mL, 25-50 mL, Intravenous, Q15 Min PRN **OR** glucagon injection 1 mg, 1 mg, Subcutaneous, Q15 Min PRN, Light, April Keturah, NP    gabapentin (NEURONTIN) capsule 1,200 mg, 1,200 mg, Oral, TID, WoelNatalia gordon, APRN CNP, 1,200 mg at 06/25/19 0830    ibuprofen (ADVIL/MOTRIN) tablet 400 mg, 400 mg, Oral, Q6H PRN, Woelpern,  LATRICE Rizo CNP, 400 mg at 06/19/19 2342    insulin aspart (NovoLOG) inj (RAPID ACTING), 8 Units, Subcutaneous, TID w/meals, Denisse Malin NP, 8 Units at 06/25/19 0833    insulin aspart (NovoLOG) inj (RAPID ACTING), 1-7 Units, Subcutaneous, TID AC, Kuldeep April Keturah, NP, 2 Units at 06/24/19 1657    insulin glargine (LANTUS PEN) injection 40 Units, 40 Units, Subcutaneous, At Bedtime, Rocio Tompkins MD, 40 Units at 06/24/19 2059    magnesium hydroxide (MILK OF MAGNESIA) suspension 30 mL, 30 mL, Oral, Daily PRN, Loren Zuleta NP, 30 mL at 06/07/19 1645    magnesium sulfate (EPSOM SALT) granules 5 g, 5 g, Other, BID PRN, 5 g at 06/19/19 1435 **AND** Patient care order, , , Per Unit Routine, Natalia Fabian APRN CNP    melatonin tablet 3 mg, 3 mg, Oral, At Bedtime PRN, Loren Zuleta NP    metFORMIN (GLUCOPHAGE) tablet 1,000 mg, 1,000 mg, Oral, BID w/meals, Baljit Stinson MD, 1,000 mg at 06/25/19 0830    nicotine (NICODERM CQ) 21 MG/24HR 24 hr patch 1 patch, 1 patch, Transdermal, Daily, Loren Zuleta NP, 1 patch at 06/25/19 0830    nicotine (NICORETTE) gum 2-4 mg, 2-4 mg, Buccal, Q1H PRN, Raven Stout NP, 4 mg at 05/21/19 0938    nicotine Patch in Place, , Transdermal, Q8H, Loren Zuleta NP, Stopped at 06/25/19 0331    nicotine patch REMOVAL, , Transdermal, Daily, Loren Zuleta NP, Stopped at 06/01/19 0842    prazosin (MINIPRESS) capsule 1 mg, 1 mg, Oral, At Bedtime, Kuldeep April JACKIE Rebollar, 1 mg at 06/24/19 2015    senna-docusate (SENOKOT-S/PERICOLACE) 8.6-50 MG per tablet 2 tablet, 2 tablet, Oral, At Bedtime, Airam Light NP, 2 tablet at 06/24/19 2015    topiramate (TOPAMAX) tablet 150 mg, 150 mg, Oral, Daily, Natalia Fabian, APRN CNP, 150 mg at 06/25/19 0830

## 2019-06-25 NOTE — PLAN OF CARE
Problem: Adult Behavioral Health Plan of Care  Goal: Patient-Specific Goal (Individualization)  Description  Patient will attend at least 50% of groups while on unit.  Patient will report at least 6-8 hours of sleep at night.  Patient will be compliant with treatment team recommendations.  Patient will be medication compliant while hospitalized.  Patient will have decrease in delusional statements by discharge.         6/24/2019 2126 by Jessica Colon, RN  Outcome: No Change  In room most of this shift sleeping.  Continues to talk about family swearing frequently.  Did not attend any groups.  Takes all medications with no problems.  Up in lounge area in late evening coloring and listening to headphones.  Keeps to self.  Problem: Violence Risk or Actual  Goal: Anger and Impulse Control  Description  PT will verbalize frustrations before making verbal threats to staff  PT will remain free from violence to staff or others while inpatient   6/24/2019 2126 by Jessica Colon, RN  Outcome: Improving   No anger outburst or verbalization of frustration toward staff     Problem: Fall Injury Risk  Goal: Absence of Fall and Fall-Related Injury  Description  Pt will not experience a fall while hospitalized.  Patient will have tap bell at bedside.       6/24/2019 2126 by Jessica Colon, RN  Outcome: Improving   Has had no injuries while at hospital    Face to face end of shift report communicated to night shift RNTadeo Colon  6/24/2019  10:10 PM
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
## 2019-06-25 NOTE — PLAN OF CARE
Face to face end of shift report received from OBDULIO Crandall. Pt observed up on the unit.    Edgar Escalante  6/25/2019  3:55 PM

## 2019-06-25 NOTE — PLAN OF CARE
Problem: Adult Behavioral Health Plan of Care  Goal: Patient-Specific Goal (Individualization)  Description  Patient will attend at least 50% of groups while on unit.  Patient will report at least 6-8 hours of sleep at night.  Patient will be compliant with treatment team recommendations.  Patient will be medication compliant while hospitalized.  Patient will have decrease in delusional statements by discharge.         6/24/2019 2353 by Melissa Flowers, RN  Outcome: No Change     Problem: Thought Process Alteration  Goal: Optimal Thought Clarity  Description  Patient will hold a reality based conversation by discharge.    6/24/2019 2353 by Melissa Flowers, RN  Outcome: No Change     Problem: Fall Injury Risk  Goal: Absence of Fall and Fall-Related Injury  Description  Pt will not experience a fall while hospitalized.  Patient will have tap bell at bedside.       6/24/2019 2353 by Melissa Flowers, RN  Outcome: Improving     Problem: Violence Risk or Actual  Goal: Anger and Impulse Control  Description  PT will verbalize frustrations before making verbal threats to staff  PT will remain free from violence to staff or others while inpatient   6/24/2019 2353 by Melissa Flowers, RN  Outcome: Improving     Face to face shift report recieved from Jessica MAK. Rounding completed, pt observed.    Pt appeared to be sleeping most of this shift, normal respirations and position changes noted. Pt did not have any episodes of violence towards staff or peers this shift. Pt did not have any falls this shift.     Face to face report will be communicated to kei MAK.    Melissa Flowers  6/25/2019  5:44 AM

## 2019-06-25 NOTE — PLAN OF CARE
"Patient angry and delusional again today - loud and rambling, grandiose - swearing loudly at the door again - asked her to stop saying \"Fuck\" so much and to lower her voice.  Continues to complain about her guardian today and Denis indicating that it is there fault because [employer] is always on top of things and those two are not doing their jobs right.  Needed lots of cues today.  "

## 2019-06-25 NOTE — PLAN OF CARE
Left another message for pt's guardian this morning. Left a message with Lauro Lauren  as well. Have not received a call back as of this afternoon. Left a message for Becca at [facility] as well.     Received a call back from pt's guardian who directed this writer to Kiah regarding pt's funding source for [facility]. Left a message with Kiah. Spoke with Becca who stated she left a message with pt's guardian and will have a nurse from [facility] call and speak with pt's nurse either today or tomorrow.

## 2019-06-26 LAB
GLUCOSE BLDC GLUCOMTR-MCNC: 135 MG/DL (ref 70–99)
GLUCOSE BLDC GLUCOMTR-MCNC: 160 MG/DL (ref 70–99)
GLUCOSE BLDC GLUCOMTR-MCNC: 192 MG/DL (ref 70–99)
GLUCOSE BLDC GLUCOMTR-MCNC: 267 MG/DL (ref 70–99)

## 2019-06-26 PROCEDURE — 25000132 ZZH RX MED GY IP 250 OP 250 PS 637: Performed by: NURSE PRACTITIONER

## 2019-06-26 PROCEDURE — 25000131 ZZH RX MED GY IP 250 OP 636 PS 637: Performed by: INTERNAL MEDICINE

## 2019-06-26 PROCEDURE — 99232 SBSQ HOSP IP/OBS MODERATE 35: CPT | Performed by: NURSE PRACTITIONER

## 2019-06-26 PROCEDURE — 25000125 ZZHC RX 250: Performed by: INTERNAL MEDICINE

## 2019-06-26 PROCEDURE — 00000146 ZZHCL STATISTIC GLUCOSE BY METER IP

## 2019-06-26 PROCEDURE — 12400000 ZZH R&B MH

## 2019-06-26 RX ADMIN — SENNOSIDES AND DOCUSATE SODIUM 2 TABLET: 8.6; 5 TABLET ORAL at 20:30

## 2019-06-26 RX ADMIN — INSULIN ASPART 2 UNITS: 100 INJECTION, SOLUTION INTRAVENOUS; SUBCUTANEOUS at 17:06

## 2019-06-26 RX ADMIN — GABAPENTIN 1200 MG: 400 CAPSULE ORAL at 20:30

## 2019-06-26 RX ADMIN — METFORMIN HYDROCHLORIDE 1000 MG: 1000 TABLET ORAL at 17:06

## 2019-06-26 RX ADMIN — TOPIRAMATE 150 MG: 50 TABLET ORAL at 08:31

## 2019-06-26 RX ADMIN — GABAPENTIN 1200 MG: 400 CAPSULE ORAL at 08:31

## 2019-06-26 RX ADMIN — PRAZOSIN HYDROCHLORIDE 1 MG: 1 CAPSULE ORAL at 20:29

## 2019-06-26 RX ADMIN — CHLORPROMAZINE HYDROCHLORIDE 200 MG: 100 TABLET, SUGAR COATED ORAL at 20:30

## 2019-06-26 RX ADMIN — CHLORPROMAZINE HYDROCHLORIDE 200 MG: 100 TABLET, SUGAR COATED ORAL at 13:40

## 2019-06-26 RX ADMIN — BUSPIRONE HYDROCHLORIDE 15 MG: 15 TABLET ORAL at 13:40

## 2019-06-26 RX ADMIN — BUSPIRONE HYDROCHLORIDE 15 MG: 15 TABLET ORAL at 08:32

## 2019-06-26 RX ADMIN — GABAPENTIN 1200 MG: 400 CAPSULE ORAL at 13:40

## 2019-06-26 RX ADMIN — INSULIN GLARGINE 40 UNITS: 100 INJECTION, SOLUTION SUBCUTANEOUS at 20:28

## 2019-06-26 RX ADMIN — BUSPIRONE HYDROCHLORIDE 15 MG: 15 TABLET ORAL at 20:30

## 2019-06-26 RX ADMIN — METFORMIN HYDROCHLORIDE 1000 MG: 1000 TABLET ORAL at 08:33

## 2019-06-26 RX ADMIN — CHLORPROMAZINE HYDROCHLORIDE 200 MG: 100 TABLET, SUGAR COATED ORAL at 08:30

## 2019-06-26 RX ADMIN — INSULIN ASPART 3 UNITS: 100 INJECTION, SOLUTION INTRAVENOUS; SUBCUTANEOUS at 12:06

## 2019-06-26 RX ADMIN — NICOTINE 1 PATCH: 21 PATCH, EXTENDED RELEASE TRANSDERMAL at 08:29

## 2019-06-26 ASSESSMENT — ACTIVITIES OF DAILY LIVING (ADL)
DRESS: SCRUBS (BEHAVIORAL HEALTH);INDEPENDENT
HYGIENE/GROOMING: INDEPENDENT
LAUNDRY: UNABLE TO COMPLETE
ORAL_HYGIENE: INDEPENDENT
HYGIENE/GROOMING: INDEPENDENT
ORAL_HYGIENE: INDEPENDENT
DRESS: SCRUBS (BEHAVIORAL HEALTH);INDEPENDENT

## 2019-06-26 NOTE — PLAN OF CARE
Face to face end of shift report received from OBDULIO Isaacs. Pt observed, up on the unit.    Edgar Escalante  6/26/2019  4:06 PM

## 2019-06-26 NOTE — PROGRESS NOTES
"[facility]  Psychiatric Progress Note    Subjective   This is a 39 year old female with schizoaffective disorder, bipolar type and methamphetamine use disorder severe.     Patient again sitting in [facility] area, although was just in group.  She presents as more calm today, smiles a bit more, although continues with grandiose statements - yet not as loudly or crass.  She talks about having a dream last night about \"Slash\" and was woken up before she was done and was upset about this - it was a \"good\" dream.  She does not bring up her discharge plans today.  Social work is working on this.  We make no changes today.             DIagnoses:   Schizoaffective disorder, bipolar type  Methamphetamine use disorder, moderate to severe    Attestation:  Patient has been seen and evaluated by me,  LATRICE Du CNP          Interim History:   The patient's care was discussed with the treatment team and chart notes were reviewed.          Medications:       busPIRone  15 mg Oral TID     chlorproMAZINE  200 mg Oral TID     gabapentin  1,200 mg Oral TID     insulin aspart  8 Units Subcutaneous TID w/meals     insulin aspart  1-7 Units Subcutaneous TID AC     insulin glargine  40 Units Subcutaneous At Bedtime     metFORMIN  1,000 mg Oral BID w/meals     nicotine  1 patch Transdermal Daily     nicotine   Transdermal Q8H     nicotine   Transdermal Daily     prazosin  1 mg Oral At Bedtime     senna-docusate  2 tablet Oral At Bedtime     topiramate  150 mg Oral Daily     alum & mag hydroxide-simethicone, artificial saliva, sore throat lozenge, glucose **OR** dextrose **OR** glucagon, ibuprofen, magnesium hydroxide, magnesium sulfate **AND** Patient care order, melatonin, nicotine          Allergies:     Allergies   Allergen Reactions     Haloperidol Other (See Comments)     Other reaction(s): Seizures  Patient states, \"I fell down and wasn't able to get up.\"  Patient states, \"I fell down and wasn't able to get " "up.\"              Psychiatric Examination:   BP 95/64   Pulse 105   Temp 98  F (36.7  C) (Tympanic)   Resp 14   Ht 1.626 m (5' 4\")   Wt 87.3 kg (192 lb 8 oz)   SpO2 100%   BMI 33.04 kg/m    Weight is 192 lbs 8 oz  Body mass index is 33.04 kg/m .    Appearance:  awake, alert  Attitude:  cooperative  Eye Contact:  fair  Mood:  better and until talking about her family  Affect:  appropriate and in normal range  Speech:  pressured speech  Psychomotor Behavior:  no evidence of tardive dyskinesia, dystonia, or tics  Thought Process:  improving. Some delusional context, more pronounced when she becomes upset  Associations:  loosening of associations present  Thought Content:  no evidence of suicidal ideation or homicidal ideation and pt reports improvement in AH  Insight:  limited  Judgment:  poor  Oriented to:  time, person, and place  Attention Span and Concentration:  limited  Recent and Remote Memory:  fair  Fund of Knowledge: low-normal  Muscle Strength and Tone: normal  Gait and Station: Normal  Perception at times pt appears preoccupied         Labs:     Recent Results (from the past 24 hour(s))   Glucose by meter    Collection Time: 06/25/19 12:07 PM   Result Value Ref Range    Glucose 195 (H) 70 - 99 mg/dL   Glucose by meter    Collection Time: 06/25/19  3:00 PM   Result Value Ref Range    Glucose 197 (H) 70 - 99 mg/dL   Glucose by meter    Collection Time: 06/25/19  4:44 PM   Result Value Ref Range    Glucose 237 (H) 70 - 99 mg/dL   Glucose by meter    Collection Time: 06/25/19  7:29 PM   Result Value Ref Range    Glucose 254 (H) 70 - 99 mg/dL   Glucose by meter    Collection Time: 06/26/19  6:31 AM   Result Value Ref Range    Glucose 135 (H) 70 - 99 mg/dL             Assessment/ Plan:   Continue on current medications and treatment plan.    "

## 2019-06-26 NOTE — PLAN OF CARE
BEHAVIORAL TEAM DISCUSSION    Participants: Natalia Fabian NP, Dennise Herrera LICSW, Becca Chery LSW, Beena Kumar LGSW, Krys Rojo RN, Radha Broderick RN, Radha Bills RN, [facility], Mady Holman OT, Natalia Costello OT, Gena Gomez OT  Progress: fair  Continued Stay Criteria/Rationale: grandiose, irritable, lingering delusions  Medical/Physical: DM- working with hospitalist, liver enzymes have decreased, hx of Hep C, foot soaks are PRN.  Precautions:   Falls precaution?: No  Behavioral Orders   Procedures    Code 1 - Restrict to Unit    Routine Programming     As clinically indicated    Status 15     Every 15 minutes.     Plan: checking on funding source for [facility], referrals to [facility] and [facility]   Rationale for change in precautions or plan: None    Active Problems:    Mixed hyperlipidemia    Diabetes mellitus, type 2    Schizoaffective disorder, bipolar type (H)      Current Facility-Administered Medications:     alum & mag hydroxide-simethicone (MYLANTA ES/MAALOX  ES) suspension 30 mL, 30 mL, Oral, Q4H PRN, Natalia Fabian APRN CNP    artificial saliva (BIOTENE MT) solution 2 spray, 2 spray, Swish & Spit, 4x Daily PRN, Raven Stout, NP, 2 spray at 06/24/19 0540    benzocaine-menthol (CEPACOL) 15-3.6 MG lozenge 1 lozenge, 1 lozenge, Buccal, Q1H PRN, Natalia Fabian APRN CNP    busPIRone (BUSPAR) tablet 15 mg, 15 mg, Oral, TID, Natalia Fabian APRN CNP, 15 mg at 06/26/19 0832    chlorproMAZINE (THORAZINE) tablet 200 mg, 200 mg, Oral, TID, Natalia Fabian APRN CNP, 200 mg at 06/26/19 0830    glucose gel 15-30 g, 15-30 g, Oral, Q15 Min PRN, 30 g at 06/08/19 1339 **OR** dextrose 50 % injection 25-50 mL, 25-50 mL, Intravenous, Q15 Min PRN **OR** glucagon injection 1 mg, 1 mg, Subcutaneous, Q15 Min PRN, Airam Light, NP    gabapentin (NEURONTIN) capsule 1,200 mg, 1,200 mg, Oral, TID, Natalia Fabian, APRN CNP, 1,200 mg at 06/26/19  0831    ibuprofen (ADVIL/MOTRIN) tablet 400 mg, 400 mg, Oral, Q6H PRN, Natalia Fabian APRN CNP, 400 mg at 06/19/19 2342    insulin aspart (NovoLOG) inj (RAPID ACTING), 8 Units, Subcutaneous, TID w/meals, Denisse Malin NP, 8 Units at 06/26/19 0836    insulin aspart (NovoLOG) inj (RAPID ACTING), 1-7 Units, Subcutaneous, TID AC, Airam Light NP, 2 Units at 06/25/19 1706    insulin glargine (LANTUS PEN) injection 40 Units, 40 Units, Subcutaneous, At Bedtime, Rocio Tompkins MD, 40 Units at 06/25/19 2018    magnesium hydroxide (MILK OF MAGNESIA) suspension 30 mL, 30 mL, Oral, Daily PRN, Loren Zuleta NP, 30 mL at 06/07/19 1645    magnesium sulfate (EPSOM SALT) granules 5 g, 5 g, Other, BID PRN, 5 g at 06/19/19 1435 **AND** Patient care order, , , Per Unit Routine, Natalia Fabian APRN CNP    melatonin tablet 3 mg, 3 mg, Oral, At Bedtime PRN, Loren Zuleta NP    metFORMIN (GLUCOPHAGE) tablet 1,000 mg, 1,000 mg, Oral, BID w/meals, Baljit Stinson MD, 1,000 mg at 06/26/19 0833    nicotine (NICODERM CQ) 21 MG/24HR 24 hr patch 1 patch, 1 patch, Transdermal, Daily, Loren Zuleta NP, 1 patch at 06/26/19 0829    nicotine (NICORETTE) gum 2-4 mg, 2-4 mg, Buccal, Q1H PRN, Raven Stout NP, 4 mg at 05/21/19 0938    nicotine Patch in Place, , Transdermal, Q8H, Loren Zuleta NP, Stopped at 06/26/19 0249    nicotine patch REMOVAL, , Transdermal, Daily, Loren Zuleta NP, Stopped at 06/01/19 0842    prazosin (MINIPRESS) capsule 1 mg, 1 mg, Oral, At Bedtime, Kuldeep April JACKIE Rebollar, 1 mg at 06/25/19 2019    senna-docusate (SENOKOT-S/PERICOLACE) 8.6-50 MG per tablet 2 tablet, 2 tablet, Oral, At Bedtime, Kuldeep April JACKIE Rebollar, 2 tablet at 06/25/19 2019    topiramate (TOPAMAX) tablet 150 mg, 150 mg, Oral, Daily, Natalia Fabian, APRN CNP, 150 mg at 06/26/19 0831

## 2019-06-26 NOTE — PLAN OF CARE
Problem: Adult Behavioral Health Plan of Care  Goal: Patient-Specific Goal (Individualization)  Description  Patient will attend at least 50% of groups while on unit.  Patient will report at least 6-8 hours of sleep at night.  Patient will be compliant with treatment team recommendations.  Patient will be medication compliant while hospitalized.  Patient will have decrease in delusional statements by discharge.         6/25/2019 2355 by Melissa Flowers, RN  Outcome: No Change     Problem: Thought Process Alteration  Goal: Optimal Thought Clarity  Description  Patient will hold a reality based conversation by discharge.    6/25/2019 2355 by Melissa Flowers, RN  Outcome: No Change     Problem: Fall Injury Risk  Goal: Absence of Fall and Fall-Related Injury  Description  Pt will not experience a fall while hospitalized.  Patient will have tap bell at bedside.       6/25/2019 2355 by Melissa Flowers, RN  Outcome: Improving     Problem: Violence Risk or Actual  Goal: Anger and Impulse Control  Description  PT will verbalize frustrations before making verbal threats to staff  PT will remain free from violence to staff or others while inpatient   6/25/2019 2355 by Melissa Flowers, RN  Outcome: Improving     Face to face shift report recieved from Avis MAK. Rounding completed, pt observed.    Pt appeared to be sleeping most of this shift, normal respirations and position changes noted. Pt did not have any episodes of aggression or violence this shift. Pt did not have any noted falls this shift.     Face to face report will be communicated to kei MAK.    Melissa Flowers  6/26/2019  5:50 AM
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
## 2019-06-26 NOTE — PLAN OF CARE
Problem: Adult Behavioral Health Plan of Care  Goal: Patient-Specific Goal (Individualization)  Description  Patient will attend at least 50% of groups while on unit.  Patient will report at least 6-8 hours of sleep at night.  Patient will be compliant with treatment team recommendations.  Patient will be medication compliant while hospitalized.  Patient will have decrease in delusional statements by discharge.      6/25/2019 2201 by Edgar Escalante RN  Outcome: No Change  Pt up on the unit much of the shift. Did spend some time in bed, resting. Did not attend any groups this shift. Alert and appropriate, at times--then grandiose with rambling speech, at other times. Remains oriented to person, place, time and situation. Reported feeling depressed and anxious, but denied SI. Blood sugar 237 mg/dl before supper meal. Pt ate 100% of supper meal. Blood sugar 254 mg/dl before HS snack. Slept approximately 6.5 hours last night. Remains compliant with medications as prescribed.     Problem: Thought Process Alteration  Goal: Optimal Thought Clarity  Description  Patient will hold a reality based conversation by discharge.    6/25/2019 2201 by Edgar Escalante RN  Outcome: No Change    Problem: Fall Injury Risk  Goal: Absence of Fall and Fall-Related Injury  Description  Pt will not experience a fall while hospitalized.  Patient will have tap bell at bedside.       6/25/2019 2201 by Edgar Escalante RN  Outcome: No Change  Gait steady and balanced. Pt remained free from fall(s) and/or injury this shift.     Face to face end of shift report communicated to oncoming RN.     Edgar Escalante  6/25/2019  11:14 PM

## 2019-06-26 NOTE — PLAN OF CARE
"Message was left for pt's guardian on both office and cell phone. Left a message for Denis as well. Spoke with Denis - he stated the [facility] funding triny needed to be filled out and faxed into [facility] - Denis said to contact Melchor the  for [place] - message was left.     Asked Denis about pt's [employer] hearing scheduled for Friday - Denis stated he will be helping with the hearing, but as he is \"not involved anymore\" Denis did state they will need to do a test run tomorrow for the connection.     Message was left for Becca at [place].     Spoke with Harrison Cheek from [facility] - stated he would sign off on the [facility]/professional statement of need. Harrison suggested this writer contact Sally Mills who is pt's guardian's supervisor to inform her of the contact with [facility]. Harrison recommended keeping contact between both [place] and [facility] for funding.     Received a call back from Kiah - she stated she is out next week, but [place] waiver with pt on tentatively set for July 10th - states she needs to confirm with their nurse as they both will need to be present with pt to complete it. Kiah stated if pt were to go to [place] prior to July 10th they would complete it with her there.    "

## 2019-06-27 LAB
GLUCOSE BLDC GLUCOMTR-MCNC: 144 MG/DL (ref 70–99)
GLUCOSE BLDC GLUCOMTR-MCNC: 157 MG/DL (ref 70–99)
GLUCOSE BLDC GLUCOMTR-MCNC: 214 MG/DL (ref 70–99)
GLUCOSE BLDC GLUCOMTR-MCNC: 221 MG/DL (ref 70–99)
GLUCOSE BLDC GLUCOMTR-MCNC: 234 MG/DL (ref 70–99)
GLUCOSE BLDC GLUCOMTR-MCNC: 266 MG/DL (ref 70–99)

## 2019-06-27 PROCEDURE — 00000146 ZZHCL STATISTIC GLUCOSE BY METER IP

## 2019-06-27 PROCEDURE — 25000132 ZZH RX MED GY IP 250 OP 250 PS 637: Performed by: NURSE PRACTITIONER

## 2019-06-27 PROCEDURE — 25000125 ZZHC RX 250: Performed by: INTERNAL MEDICINE

## 2019-06-27 PROCEDURE — 12400000 ZZH R&B MH

## 2019-06-27 RX ADMIN — SENNOSIDES AND DOCUSATE SODIUM 2 TABLET: 8.6; 5 TABLET ORAL at 20:17

## 2019-06-27 RX ADMIN — BUSPIRONE HYDROCHLORIDE 15 MG: 15 TABLET ORAL at 08:09

## 2019-06-27 RX ADMIN — INSULIN ASPART 2 UNITS: 100 INJECTION, SOLUTION INTRAVENOUS; SUBCUTANEOUS at 11:44

## 2019-06-27 RX ADMIN — INSULIN ASPART 1 UNITS: 100 INJECTION, SOLUTION INTRAVENOUS; SUBCUTANEOUS at 08:07

## 2019-06-27 RX ADMIN — INSULIN GLARGINE 40 UNITS: 100 INJECTION, SOLUTION SUBCUTANEOUS at 20:15

## 2019-06-27 RX ADMIN — METFORMIN HYDROCHLORIDE 1000 MG: 1000 TABLET ORAL at 08:09

## 2019-06-27 RX ADMIN — CHLORPROMAZINE HYDROCHLORIDE 200 MG: 100 TABLET, SUGAR COATED ORAL at 13:07

## 2019-06-27 RX ADMIN — BUSPIRONE HYDROCHLORIDE 15 MG: 15 TABLET ORAL at 20:17

## 2019-06-27 RX ADMIN — GABAPENTIN 1200 MG: 400 CAPSULE ORAL at 13:07

## 2019-06-27 RX ADMIN — CHLORPROMAZINE HYDROCHLORIDE 200 MG: 100 TABLET, SUGAR COATED ORAL at 08:08

## 2019-06-27 RX ADMIN — PRAZOSIN HYDROCHLORIDE 1 MG: 1 CAPSULE ORAL at 20:17

## 2019-06-27 RX ADMIN — NICOTINE 1 PATCH: 21 PATCH, EXTENDED RELEASE TRANSDERMAL at 08:12

## 2019-06-27 RX ADMIN — CHLORPROMAZINE HYDROCHLORIDE 200 MG: 100 TABLET, SUGAR COATED ORAL at 20:17

## 2019-06-27 RX ADMIN — GABAPENTIN 1200 MG: 400 CAPSULE ORAL at 20:16

## 2019-06-27 RX ADMIN — BUSPIRONE HYDROCHLORIDE 15 MG: 15 TABLET ORAL at 13:07

## 2019-06-27 RX ADMIN — GABAPENTIN 1200 MG: 400 CAPSULE ORAL at 08:08

## 2019-06-27 RX ADMIN — METFORMIN HYDROCHLORIDE 1000 MG: 1000 TABLET ORAL at 16:56

## 2019-06-27 RX ADMIN — TOPIRAMATE 150 MG: 50 TABLET ORAL at 08:09

## 2019-06-27 RX ADMIN — INSULIN ASPART 2 UNITS: 100 INJECTION, SOLUTION INTRAVENOUS; SUBCUTANEOUS at 16:56

## 2019-06-27 RX ADMIN — IBUPROFEN 400 MG: 200 TABLET, FILM COATED ORAL at 09:59

## 2019-06-27 ASSESSMENT — ACTIVITIES OF DAILY LIVING (ADL)
HYGIENE/GROOMING: INDEPENDENT
DRESS: SCRUBS (BEHAVIORAL HEALTH);INDEPENDENT
DRESS: INDEPENDENT;SCRUBS (BEHAVIORAL HEALTH)
HYGIENE/GROOMING: INDEPENDENT
ORAL_HYGIENE: INDEPENDENT
LAUNDRY: UNABLE TO COMPLETE
ORAL_HYGIENE: INDEPENDENT

## 2019-06-27 NOTE — PLAN OF CARE
Problem: Adult Behavioral Health Plan of Care  Goal: Patient-Specific Goal (Individualization)  Description  Patient will attend at least 50% of groups while on unit.  Patient will report at least 6-8 hours of sleep at night.  Patient will be compliant with treatment team recommendations.  Patient will be medication compliant while hospitalized.  Patient will have decrease in delusional statements by discharge.      2019 by Edgar Escalante RN  Outcome: No Change  Pt has been up on the unit almost the entire shift. Pleasant and cooperative. Initiating conversation with peers and staff, at times. Mood is grandiose and expansive. Speech pressured and tangential. Talks of flying the plane that Keeley Birch ([facility]) was on when she , about being shot numerous times, about the skin on her foot coming off, etc. Stories are very detailed and, often, graphic in nature. Did not attend any groups this evening. 75% of supper meal. Slept approximately 7 hours last night. Remains compliant with medications as prescribed. Preprandial blood sugars 192 mg/dl and 160 mg/dl.     Problem: Thought Process Alteration  Goal: Optimal Thought Clarity  Description  Patient will hold a reality based conversation by discharge.    2019 by Edgar Escalante RN  Outcome: Declining    Problem: Fall Injury Risk  Goal: Absence of Fall and Fall-Related Injury  Description  Pt will not experience a fall while hospitalized.  Patient will have tap bell at bedside.       2019 by Edgar Escalante RN  Outcome: No Change  Gait remains steady and balanced. Pt has remained free from fall(s) and/or injury this shift.    Problem: Violence Risk or Actual  Goal: Anger and Impulse Control  Description  PT will verbalize frustrations before making verbal threats to staff  PT will remain free from violence to staff or others while inpatient   2019 by Edgar Escalante, RN  Outcome: No Change  Profanity  laced, violent and graphic stories. Has not demonstrated any aggressive and/or violent behaviors directed at peers and/or staff.      Face to face end of shift report communicated to oncoming RN. Pt observed, up on the unit.     Edgar Escalante  6/26/2019  11:15 PM

## 2019-06-27 NOTE — PLAN OF CARE
"Patient up and on the unit - presenting as more under control today -more awareness of her volume, language and appearance - wanting to look \"put together\" for the nurse from [facility] today.  We were also suppose to do a run through with Lauro Lauren for her guardianship hearing tomorrow - left a message as we did not hear from them today.  By mid afternoon, still have not seen the nurse from [facility] - will follow up tomorrow if needed.  "

## 2019-06-27 NOTE — PLAN OF CARE
Face to face shift report received from Edgar MARIA RN. Patient observed.    Problem: Fall Injury Risk  Goal: Absence of Fall and Fall-Related Injury  Description  Pt will not experience a fall while hospitalized.  Patient will have tap bell at bedside.     Pt is steady on feet. Free from falls      Patient has been in bed with eyes closed and regular respirations throughout the night. 15 minute checks completed all night. No complaints offered. Will continue to monitor.    Pt up at 0225 states she does not feel good, she does request to have her blood glucose checked at that time. Pt blood glucose is 157 pt returns to bed.   6/27/2019 0259 by Yaneth Ron, RN  Outcome: Improving     The face to face report will be communicated to on coming RN.     Yaneth Ron  6/27/2019  3:01 AM

## 2019-06-27 NOTE — PROGRESS NOTES
"[facility]    Spiritual Health Progress Note    Date of Service (when I saw the patient): 06/27/2019     Assessment & Plan   Ginette Wood is a 39 year old female who was admitted on 5/10/2019. Visit was a follow-up to check in with patient from previous visit. Patient often asks , \"So how's God?\"  says, \"Great!\" She smiles and then talks a lot about family and siblings and wants to get them cell phones so she can keep track of them.  Still seems to ramble from subject to subject.  She was busy with writing columns of names and other things on note pads.    Rev. Ilan Flowers  Volunteer   "

## 2019-06-27 NOTE — PLAN OF CARE
[facility] called to say they will be here tomorrow to do a pre-admissions screening with patient. Did not have a specific time but will call before coming.

## 2019-06-27 NOTE — PLAN OF CARE
Face to face end of shift report communicated from Eloina MICHEL RN. Pt in the lounge listening to headphones at the start of the shift.     Julia Franco  6/27/2019  7:49 AM       Problem: Adult Behavioral Health Plan of Care  Goal: Patient-Specific Goal (Individualization)  Description  Patient will attend at least 50% of groups while on unit.  Patient will report at least 6-8 hours of sleep at night.  Patient will be compliant with treatment team recommendations.  Patient will be medication compliant while hospitalized.  Patient will have decrease in delusional statements by discharge.  Pt up in lounge most of the shift. Pt declined to attend groups this morning, compliant with medications. Pt denies depression, anxiety, voices and suicidal/homicidal ideation. Pt complained of not feeling good and asked to have her blood sugar checked at 0945. Pt was asked about her symptoms, she responded that she doesn't know but her ear hurts and she just doesn't feel good. Pt blood sugar was 266. Pt reported that she doesn't have a lot of pain in her ear just draining in her ear and sinuses. Pt talked about her ear draining in her face then said that her ear does hurt. Pt asked to rate the pain and she stated it was a 10/10 and she needs some ibuprofen. Pt was given ibuprofen 400mg at 0959.    Pt states pain has decreased after taking ibuprofen. Pt states that after her shower, her ear drained.        6/27/2019 0749 by Julia Franco, RN  Outcome: Improving     Problem: Thought Process Alteration  Goal: Optimal Thought Clarity  Description  Patient will hold a reality based conversation by discharge.      Pt able to maintain reality based conversation this shift.   6/27/2019 0749 by Julia Franco, RN  Outcome: Improving     Problem: Fall Injury Risk  Goal: Absence of Fall and Fall-Related Injury  Description  Pt will not experience a fall while hospitalized.  Patient will have tap bell at bedside.     Pt has been  free of falls this shift.   6/27/2019 0749 by Julia Franco, RN  Outcome: Improving     Problem: Violence Risk or Actual  Goal: Anger and Impulse Control  Description  PT will verbalize frustrations before making verbal threats to staff  PT will remain free from violence to staff or others while inpatient     Pt has not had any violent outbursts this shift.   6/27/2019 0749 by Julia Franco, RN  Outcome: Improving    Face to face end of shift report communicated to oncoming RN. Reported that pt is a fall risk and at risk of violence.     Julia Franco  6/27/2019  7:50 AM

## 2019-06-27 NOTE — PLAN OF CARE
BEHAVIORAL TEAM DISCUSSION    Participants: Gokul Hathaway NP, Dennise Herrera LICSW, Angie Laws LSW,  Radha Broderick RN, Krys Roth RN, Mady Holman OT, Natalia Costello OT, Gena Gomez OT   Progress: Fair  Continued Stay Criteria/Rationale: grandiose, irritable, lingering delusions  Medical/Physical: DM- working with hospitalist, liver enzymes have decreased, hx of Hep C, foot soaks are PRN.  Precautions:   Falls precaution?: No  Behavioral Orders   Procedures    Code 1 - Restrict to Unit    Routine Programming     As clinically indicated    Status 15     Every 15 minutes.     [place] with pt on tentatively set for July 10th. Kiah stated if pt were to go to [place] prior to July 10th they would complete it with her there.  Recent med change with improvement noted.   Rationale for change in precautions or plan: none    Current Facility-Administered Medications:     alum & mag hydroxide-simethicone (MYLANTA ES/MAALOX  ES) suspension 30 mL, 30 mL, Oral, Q4H PRN, Natalia Fabian APRN CNP    artificial saliva (BIOTENE MT) solution 2 spray, 2 spray, Swish & Spit, 4x Daily PRN, Raven Stout, NP, 2 spray at 06/24/19 0540    benzocaine-menthol (CEPACOL) 15-3.6 MG lozenge 1 lozenge, 1 lozenge, Buccal, Q1H PRN, Natalia Fabian APRN CNP    busPIRone (BUSPAR) tablet 15 mg, 15 mg, Oral, TID, Natalia Fabian APRN CNP, 15 mg at 06/27/19 0809    chlorproMAZINE (THORAZINE) tablet 200 mg, 200 mg, Oral, TID, Natalia Fabian APRN CNP, 200 mg at 06/27/19 0808    glucose gel 15-30 g, 15-30 g, Oral, Q15 Min PRN, 30 g at 06/08/19 1339 **OR** dextrose 50 % injection 25-50 mL, 25-50 mL, Intravenous, Q15 Min PRN **OR** glucagon injection 1 mg, 1 mg, Subcutaneous, Q15 Min PRN, Airam Light, NP    gabapentin (NEURONTIN) capsule 1,200 mg, 1,200 mg, Oral, TID, Natalia Fabian APRN CNP, 1,200 mg at 06/27/19 0808    ibuprofen (ADVIL/MOTRIN)  tablet 400 mg, 400 mg, Oral, Q6H PRN, Natalia Fabian APRN CNP, 400 mg at 06/19/19 2342    insulin aspart (NovoLOG) inj (RAPID ACTING), 8 Units, Subcutaneous, TID w/meals, Denisse Malin NP, 8 Units at 06/27/19 0806    insulin aspart (NovoLOG) inj (RAPID ACTING), 1-7 Units, Subcutaneous, TID AC, Airam Light NP, 1 Units at 06/27/19 0807    insulin glargine (LANTUS PEN) injection 40 Units, 40 Units, Subcutaneous, At Bedtime, Rocio Tompkins MD, 40 Units at 06/26/19 2028    magnesium hydroxide (MILK OF MAGNESIA) suspension 30 mL, 30 mL, Oral, Daily PRN, Loren Zuleta NP, 30 mL at 06/07/19 1645    magnesium sulfate (EPSOM SALT) granules 5 g, 5 g, Other, BID PRN, 5 g at 06/19/19 1435 **AND** Patient care order, , , Per Unit Routine, Natalia Fabian APRN CNP    melatonin tablet 3 mg, 3 mg, Oral, At Bedtime PRN, Loren Zuleta NP    metFORMIN (GLUCOPHAGE) tablet 1,000 mg, 1,000 mg, Oral, BID w/meals, Baljit Stinson MD, 1,000 mg at 06/27/19 0809    nicotine (NICODERM CQ) 21 MG/24HR 24 hr patch 1 patch, 1 patch, Transdermal, Daily, Loren Zuleta NP, 1 patch at 06/27/19 0812    nicotine (NICORETTE) gum 2-4 mg, 2-4 mg, Buccal, Q1H PRN, Raven Stout NP, 4 mg at 05/21/19 0938    nicotine Patch in Place, , Transdermal, Q8H, Loren Zuleta NP    nicotine patch REMOVAL, , Transdermal, Daily, Loren Zuleta NP, Stopped at 06/01/19 0842    prazosin (MINIPRESS) capsule 1 mg, 1 mg, Oral, At Bedtime, Airam Light NP, 1 mg at 06/26/19 2029    senna-docusate (SENOKOT-S/PERICOLACE) 8.6-50 MG per tablet 2 tablet, 2 tablet, Oral, At Bedtime, Kuldeep April JACKIE Rebollar, 2 tablet at 06/26/19 2030    topiramate (TOPAMAX) tablet 150 mg, 150 mg, Oral, Daily, Natalia Fabian, LATRICE CNP, 150 mg at 06/27/19 0809   Active Problems:    Mixed hyperlipidemia        Diabetes mellitus, type 2        Schizoaffective disorder, bipolar type (H)

## 2019-06-27 NOTE — PLAN OF CARE
Face to face end of shift report received from OBDULIO Isaacs. Pt observed, up on the unit.     Edgar Escalante  6/27/2019  3:51 PM

## 2019-06-28 LAB
GLUCOSE BLDC GLUCOMTR-MCNC: 149 MG/DL (ref 70–99)
GLUCOSE BLDC GLUCOMTR-MCNC: 165 MG/DL (ref 70–99)
GLUCOSE BLDC GLUCOMTR-MCNC: 180 MG/DL (ref 70–99)
GLUCOSE BLDC GLUCOMTR-MCNC: 208 MG/DL (ref 70–99)
GLUCOSE BLDC GLUCOMTR-MCNC: 362 MG/DL (ref 70–99)

## 2019-06-28 PROCEDURE — 25000125 ZZHC RX 250: Performed by: INTERNAL MEDICINE

## 2019-06-28 PROCEDURE — 25000132 ZZH RX MED GY IP 250 OP 250 PS 637: Performed by: NURSE PRACTITIONER

## 2019-06-28 PROCEDURE — 99232 SBSQ HOSP IP/OBS MODERATE 35: CPT | Performed by: NURSE PRACTITIONER

## 2019-06-28 PROCEDURE — 12400000 ZZH R&B MH

## 2019-06-28 PROCEDURE — 00000146 ZZHCL STATISTIC GLUCOSE BY METER IP

## 2019-06-28 RX ORDER — AMOXICILLIN 500 MG/1
500 CAPSULE ORAL EVERY 8 HOURS SCHEDULED
Status: DISCONTINUED | OUTPATIENT
Start: 2019-06-28 | End: 2019-06-28

## 2019-06-28 RX ADMIN — AMOXICILLIN AND CLAVULANATE POTASSIUM 1 TABLET: 875; 125 TABLET, FILM COATED ORAL at 12:24

## 2019-06-28 RX ADMIN — PRAZOSIN HYDROCHLORIDE 1 MG: 1 CAPSULE ORAL at 20:42

## 2019-06-28 RX ADMIN — INSULIN ASPART 1 UNITS: 100 INJECTION, SOLUTION INTRAVENOUS; SUBCUTANEOUS at 08:17

## 2019-06-28 RX ADMIN — INSULIN ASPART 5 UNITS: 100 INJECTION, SOLUTION INTRAVENOUS; SUBCUTANEOUS at 11:46

## 2019-06-28 RX ADMIN — SENNOSIDES AND DOCUSATE SODIUM 2 TABLET: 8.6; 5 TABLET ORAL at 20:42

## 2019-06-28 RX ADMIN — IBUPROFEN 400 MG: 200 TABLET, FILM COATED ORAL at 02:54

## 2019-06-28 RX ADMIN — INSULIN GLARGINE 40 UNITS: 100 INJECTION, SOLUTION SUBCUTANEOUS at 20:41

## 2019-06-28 RX ADMIN — IBUPROFEN 400 MG: 200 TABLET, FILM COATED ORAL at 16:23

## 2019-06-28 RX ADMIN — CHLORPROMAZINE HYDROCHLORIDE 200 MG: 100 TABLET, SUGAR COATED ORAL at 13:31

## 2019-06-28 RX ADMIN — METFORMIN HYDROCHLORIDE 1000 MG: 1000 TABLET ORAL at 08:15

## 2019-06-28 RX ADMIN — BUSPIRONE HYDROCHLORIDE 15 MG: 15 TABLET ORAL at 20:42

## 2019-06-28 RX ADMIN — TOPIRAMATE 150 MG: 50 TABLET ORAL at 08:15

## 2019-06-28 RX ADMIN — METFORMIN HYDROCHLORIDE 1000 MG: 1000 TABLET ORAL at 17:11

## 2019-06-28 RX ADMIN — MELATONIN TAB 3 MG 3 MG: 3 TAB at 03:03

## 2019-06-28 RX ADMIN — NICOTINE 1 PATCH: 21 PATCH, EXTENDED RELEASE TRANSDERMAL at 08:15

## 2019-06-28 RX ADMIN — BUSPIRONE HYDROCHLORIDE 15 MG: 15 TABLET ORAL at 13:31

## 2019-06-28 RX ADMIN — GABAPENTIN 1200 MG: 400 CAPSULE ORAL at 20:42

## 2019-06-28 RX ADMIN — BUSPIRONE HYDROCHLORIDE 15 MG: 15 TABLET ORAL at 08:21

## 2019-06-28 RX ADMIN — GABAPENTIN 1200 MG: 400 CAPSULE ORAL at 13:31

## 2019-06-28 RX ADMIN — INSULIN ASPART 1 UNITS: 100 INJECTION, SOLUTION INTRAVENOUS; SUBCUTANEOUS at 17:13

## 2019-06-28 RX ADMIN — CHLORPROMAZINE HYDROCHLORIDE 200 MG: 100 TABLET, SUGAR COATED ORAL at 20:42

## 2019-06-28 RX ADMIN — CHLORPROMAZINE HYDROCHLORIDE 200 MG: 100 TABLET, SUGAR COATED ORAL at 08:15

## 2019-06-28 RX ADMIN — GABAPENTIN 1200 MG: 400 CAPSULE ORAL at 08:15

## 2019-06-28 ASSESSMENT — ACTIVITIES OF DAILY LIVING (ADL)
DRESS: SCRUBS (BEHAVIORAL HEALTH);INDEPENDENT
HYGIENE/GROOMING: INDEPENDENT
LAUNDRY: UNABLE TO COMPLETE
ORAL_HYGIENE: INDEPENDENT
ORAL_HYGIENE: INDEPENDENT
DRESS: INDEPENDENT;SCRUBS (BEHAVIORAL HEALTH)
HYGIENE/GROOMING: INDEPENDENT

## 2019-06-28 NOTE — PROGRESS NOTES
"[facility]  Psychiatric Progress Note    Subjective   This is a 39 year old female with schizoaffective disorder-bipolar type and methamphetamine use disorder severe.    Patient was sitting in the day room as I entered the unit- she called out my name to come and visit informing me about the court decision, she will remain under current guardianship. Genie adds she is \"OK\" with this. She is not as loud as she had been previously although does continue to swear about others in her life. She reports \"less shit in my head\"-likes her current medications. C/o right ear or tooth pain \"I can't really tell- its the whole side\". Believes her mood is \"better than before\". Denies feelings of depression or anxiety.    10 point ROS negative other than what is noted in HPI       DIagnoses:   Schizoaffective Disorder, bipolar type  Methamphetamine use disorder, moderate to severe    Right otitis media w/ effusion    Attestation:  Patient has been seen and evaluated by me,  LATRICE Kendall CNP          Interim History:   The patient's care was discussed with the treatment team and chart notes were reviewed.          Medications:       busPIRone  15 mg Oral TID     chlorproMAZINE  200 mg Oral TID     gabapentin  1,200 mg Oral TID     insulin aspart  8 Units Subcutaneous TID w/meals     insulin aspart  1-7 Units Subcutaneous TID AC     insulin glargine  40 Units Subcutaneous At Bedtime     metFORMIN  1,000 mg Oral BID w/meals     nicotine  1 patch Transdermal Daily     nicotine   Transdermal Q8H     nicotine   Transdermal Daily     prazosin  1 mg Oral At Bedtime     senna-docusate  2 tablet Oral At Bedtime     topiramate  150 mg Oral Daily     alum & mag hydroxide-simethicone, artificial saliva, sore throat lozenge, glucose **OR** dextrose **OR** glucagon, ibuprofen, magnesium hydroxide, magnesium sulfate **AND** Patient care order, melatonin, nicotine          Allergies:     Allergies   Allergen Reactions     " "Haloperidol Other (See Comments)     Other reaction(s): Seizures  Patient states, \"I fell down and wasn't able to get up.\"  Patient states, \"I fell down and wasn't able to get up.\"              Psychiatric Examination:   BP 96/63   Pulse 68   Temp 98.1  F (36.7  C) (Tympanic)   Resp 12   Ht 1.626 m (5' 4\")   Wt 87.3 kg (192 lb 8 oz)   SpO2 96%   BMI 33.04 kg/m    Weight is 192 lbs 8 oz  Body mass index is 33.04 kg/m .    Appearance:  awake, alert  Attitude:  cooperative  Eye Contact:  fair  Mood:  improved unless family is mentioned  Affect:  mood congruent  Speech:  pressured speech  Psychomotor Behavior:  no evidence of tardive dyskinesia, dystonia, or tics and intact station, gait and muscle tone  Thought Process:  improving- mostly logical. When upset, delusional context significantly increases  Associations:  loosening of associations present  Thought Content:  no evidence of suicidal ideation or homicidal ideation and AH improved  Insight:  limited  Judgment:  limited  Oriented to:  time, person, and place  Attention Span and Concentration:  limited  Recent and Remote Memory:  fair  Fund of Knowledge: low-normal  Muscle Strength and Tone: normal  Gait and Station: Normal  Perception: appears pre-occupied.    EXAM:  TM's were visualized. Right ear- pain noted with gentle pressure applied to tragus. Canal is moist. TM is dull and opaque with light erythema and effusion noted. Left ear w/o pain. Canal is clear revealing clear TM w/o erythema.  Posterior pharynx: clear without exudate  Neck: neck is supple. Scattered shotty lymph nodes in right.  Cardiac: Heart RRR w/o murmur  Lungs: CTAB         Labs:     Recent Results (from the past 24 hour(s))   Glucose by meter    Collection Time: 06/27/19 11:40 AM   Result Value Ref Range    Glucose 221 (H) 70 - 99 mg/dL   Glucose by meter    Collection Time: 06/27/19  4:51 PM   Result Value Ref Range    Glucose 234 (H) 70 - 99 mg/dL   Glucose by meter    Collection " Time: 06/27/19  7:37 PM   Result Value Ref Range    Glucose 214 (H) 70 - 99 mg/dL   Glucose by meter    Collection Time: 06/28/19  1:00 AM   Result Value Ref Range    Glucose 180 (H) 70 - 99 mg/dL   Glucose by meter    Collection Time: 06/28/19  6:29 AM   Result Value Ref Range    Glucose 165 (H) 70 - 99 mg/dL             Assessment/ Plan:    amoxicillin 500 mg TID X 10 days   No additional medication changes      For all new medications pt was instructed on the drug. Dose route, action and potential side effects. Pt verbalized understanding.

## 2019-06-28 NOTE — PLAN OF CARE
"Pt has been up on the unit socializing with peers. She is pleasant and cooperative with staff. Has been fixated on meeting with nurse from [facility]. Social work is aware and has left a message with [facility]. Pt makes several grandiose statements. 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 She denies anxiety and depression. Blood sugar was 362 at lunch times. Provider was notified indicated that right ear infection could also be part of the increase. Pt did report she ate 4 large pancakes with a regular and a sugar free syrup and a bowl of cereal. Has also been drinking coffee throughout the day but reports she has been using the substitute sugar. Pt appears to be okay with staying on guardianship for at least another 6 months. States, \"I don't care where I gotta go, I am not going back to [place] though. I don't want to get out and drink but I would like a cigarette.\" She is compliant with prescribed medication. Will continue to monitor.   1330: Pt met with Jennifer from [facility] for screening. Pt says it went well.  Problem: Adult Behavioral Health Plan of Care  Goal: Patient-Specific Goal (Individualization)  Description  Patient will attend at least 50% of groups while on unit.  Patient will report at least 6-8 hours of sleep at night.  Patient will be compliant with treatment team recommendations.  Patient will be medication compliant while hospitalized.  Patient will have decrease in delusional statements by discharge.         6/28/2019 1155 by Sadie Erazo, RN  Outcome: Improving     Problem: Fall Injury Risk  Goal: Absence of Fall and Fall-Related Injury  Description  Pt will not experience a fall while hospitalized.  Patient will have tap bell at bedside.       6/28/2019 1155 by Sadie Erazo, RN  Outcome: Improving     Problem: Violence Risk or Actual  Goal: Anger and Impulse Control  Description  PT will verbalize frustrations before making verbal " threats to staff  PT will remain free from violence to staff or others while inpatient   Outcome: Improving    Face to face end of shift report communicated to OBDULIO Albarran.     Sadie Erazo  6/28/2019  2:34 PM

## 2019-06-28 NOTE — PLAN OF CARE
BEHAVIORAL TEAM DISCUSSION    Participants: Narcisa Solomon NP, Angie Laws LSW, Beena Kumar LGSW, Ayah Matos RN, Olivia Champagne RN, Krys Rojo RN, Mady Holman OT, Ntaalia Costello OT  Progress: fair  Continued Stay Criteria/Rationale: delusional, irritable, manic, grandiose  Medical/Physical: tooth pain - NP to assess need to antibiotics, DM- working with hospitalist, liver enzymes have decreased, hx of Hep C, foot soaks are PRN.  Precautions:   Falls precaution?: No  Behavioral Orders   Procedures    Code 1 - Restrict to Unit    Routine Programming     As clinically indicated    Status 15     Every 15 minutes.     Plan: court hearing today for guardianship, [place] with pt on tentatively set for July 10th. [facility] stated if pt were to go to [place] prior to July 10th they would complete it with her there  Rationale for change in precautions or plan: None    Active Problems:    Mixed hyperlipidemia    Diabetes mellitus, type 2    Schizoaffective disorder, bipolar type (H)      Current Facility-Administered Medications:     alum & mag hydroxide-simethicone (MYLANTA ES/MAALOX  ES) suspension 30 mL, 30 mL, Oral, Q4H PRN, Hussein Fabiana Tammy APRN CNP    artificial saliva (BIOTENE MT) solution 2 spray, 2 spray, Swish & Spit, 4x Daily PRN, Raven Stout, NP, 2 spray at 06/24/19 0540    benzocaine-menthol (CEPACOL) 15-3.6 MG lozenge 1 lozenge, 1 lozenge, Buccal, Q1H PRN, Hussein Fabiana Tammy APRN CNP    busPIRone (BUSPAR) tablet 15 mg, 15 mg, Oral, TID, Woelperdinora Natalia Tammy APRN CNP, 15 mg at 06/28/19 0821    chlorproMAZINE (THORAZINE) tablet 200 mg, 200 mg, Oral, TID, Caren Natalia Tammy, APRN CNP, 200 mg at 06/28/19 0815    glucose gel 15-30 g, 15-30 g, Oral, Q15 Min PRN, 30 g at 06/08/19 1339 **OR** dextrose 50 % injection 25-50 mL, 25-50 mL, Intravenous, Q15 Min PRN **OR** glucagon injection 1 mg, 1 mg, Subcutaneous, Q15 Min PRN, Airam Light NP     gabapentin (NEURONTIN) capsule 1,200 mg, 1,200 mg, Oral, TID, Natalia Fabian APRN CNP, 1,200 mg at 06/28/19 0815    ibuprofen (ADVIL/MOTRIN) tablet 400 mg, 400 mg, Oral, Q6H PRN, Natalia Fabian APRN CNP, 400 mg at 06/28/19 0254    insulin aspart (NovoLOG) inj (RAPID ACTING), 8 Units, Subcutaneous, TID w/meals, Denisse Malin NP, 8 Units at 06/28/19 0817    insulin aspart (NovoLOG) inj (RAPID ACTING), 1-7 Units, Subcutaneous, TID ROJELIO, Airam Light NP, 1 Units at 06/28/19 0817    insulin glargine (LANTUS PEN) injection 40 Units, 40 Units, Subcutaneous, At Bedtime, Rocio Tompkins MD, 40 Units at 06/27/19 2015    magnesium hydroxide (MILK OF MAGNESIA) suspension 30 mL, 30 mL, Oral, Daily PRN, Loren Zuleta NP, 30 mL at 06/07/19 1645    magnesium sulfate (EPSOM SALT) granules 5 g, 5 g, Other, BID PRN, 5 g at 06/19/19 1435 **AND** Patient care order, , , Per Unit Routine, Natalia Fabian APRN CNP    melatonin tablet 3 mg, 3 mg, Oral, At Bedtime PRN, Loren Zuleta NP, 3 mg at 06/28/19 0303    metFORMIN (GLUCOPHAGE) tablet 1,000 mg, 1,000 mg, Oral, BID w/meals, Baljit Stinson MD, 1,000 mg at 06/28/19 0815    nicotine (NICODERM CQ) 21 MG/24HR 24 hr patch 1 patch, 1 patch, Transdermal, Daily, Loren Zuleta NP, 1 patch at 06/28/19 0815    nicotine (NICORETTE) gum 2-4 mg, 2-4 mg, Buccal, Q1H PRN, Raven Stout NP, 4 mg at 05/21/19 0938    nicotine Patch in Place, , Transdermal, Q8H, Loren Zuleta NP    nicotine patch REMOVAL, , Transdermal, Daily, Loren Zuleta NP, Stopped at 06/01/19 0842    prazosin (MINIPRESS) capsule 1 mg, 1 mg, Oral, At Bedtime, Airam Light NP, 1 mg at 06/27/19 2017    senna-docusate (SENOKOT-S/PERICOLACE) 8.6-50 MG per tablet 2 tablet, 2 tablet, Oral, At Bedtime, Airam Light NP, 2 tablet at 06/27/19 2017    topiramate (TOPAMAX) tablet 150 mg, 150 mg, Oral, Daily, Natalia Fabian, APRN CNP, 150 mg at  06/28/19 0815

## 2019-06-28 NOTE — PLAN OF CARE
Face to face shift report received from Edgar MARIA RN. Patient observed.    Problem: Adult Behavioral Health Plan of Care  Goal: Patient-Specific Goal (Individualization)  Description  Patient will attend at least 50% of groups while on unit.  Patient will report at least 6-8 hours of sleep at night.  Patient will be compliant with treatment team recommendations.  Patient will be medication compliant while hospitalized.  Patient will have decrease in delusional statements by discharge.    Patient has been in bed with eyes closed and regular respirations throughout the night. 15 minute checks completed all night. No complaints offered. Will continue to monitor.  Pt is up at 0100 she states she is not feeling well tonight and requests to have her BG checked. Results are 180  Pt is reports she has nausea, she states she is as nauseous as she was before when she was pregnant. Pt reports she believes her depo provera is due, she requests nurse to look into this. Pt is informed of he last documented dose administered was 6/15/19. Pt reports she believes she received one the last time she was admitted here. Nurse unable to verify that. Pt lays back down.  0254 pt administered Ibuprofen for c/o right ear pain, she reports she believes this is caused by tooth pain that is also causing cheek, and jaw pain.   Pt state she has court tomorrow and she is anxious about this, she states she needs sleep so she can present herself right in court. She requests a sleep aid. 0300 Melatonin 3 mg administered to patient. She continues to sit in the lounge awake.        6/28/2019 0456 by Yaneth Ron RN  Outcome: No Change     Problem: Fall Injury Risk  Goal: Absence of Fall and Fall-Related Injury  Description  Pt will not experience a fall while hospitalized.  Patient will have tap bell at bedside.       6/28/2019 0456 by Yaneth Ron RN  Outcome: Improving   The face to face report will be communicated to on coming RN.      Yaneth Ron  6/28/2019  5:10 AM

## 2019-06-28 NOTE — PLAN OF CARE
Patient had guardianship hearing today via telephone with [place] [employer] [facility] - they reviewed the facts and agreed to continue her under the current guardianship and review again in 6 months - next hearing is December 9 @ 10:00.    Spoke later with Melchor from [facility] - she will be calling Kiah from [place] and work out the details for funding.  Placed a call to Becca from [facility] to follow up on placement.    OBDULIO Powell from [facility] came and met with the patient to screen her - she will go back and team with the intake team and get back to us.

## 2019-06-28 NOTE — PLAN OF CARE
Problem: Adult Behavioral Health Plan of Care  Goal: Patient-Specific Goal (Individualization)  Description  Patient will attend at least 50% of groups while on unit.  Patient will report at least 6-8 hours of sleep at night.  Patient will be compliant with treatment team recommendations.  Patient will be medication compliant while hospitalized.  Patient will have decrease in delusional statements by discharge.      Outcome: Improving  Pt up on the unit all shift. Pleasant and cooperative. Slightly hyperactive and pressured at the beginning of the shift, but still alert and oriented to person, place, time and situation. Did not attend any groups this evening. Remains compliant with medications as prescribed. Ate 100% of supper meal. Slept approximately 4.75 hours last night. No delusional statements this shift. Preprandial blood sugars 234 mg/dl and 214 mg/dl.    Problem: Thought Process Alteration  Goal: Optimal Thought Clarity  Description  Patient will hold a reality based conversation by discharge.    Outcome: Improving    Problem: Fall Injury Risk  Goal: Absence of Fall and Fall-Related Injury  Description  Pt will not experience a fall while hospitalized.  Patient will have tap bell at bedside.       Outcome: No Change  Gait remains steady and balanced. Pt remained free from fall(s) and/or injury this shift.    Problem: Violence Risk or Actual  Goal: Anger and Impulse Control  Description  PT will verbalize frustrations before making verbal threats to staff  PT will remain free from violence to staff or others while inpatient   Outcome: No Change  Pt did not demonstrate any aggressive and/or violent behavior(s) this shift.

## 2019-06-29 LAB
GLUCOSE BLDC GLUCOMTR-MCNC: 187 MG/DL (ref 70–99)
GLUCOSE BLDC GLUCOMTR-MCNC: 216 MG/DL (ref 70–99)
GLUCOSE BLDC GLUCOMTR-MCNC: 251 MG/DL (ref 70–99)
GLUCOSE BLDC GLUCOMTR-MCNC: 299 MG/DL (ref 70–99)
GLUCOSE BLDC GLUCOMTR-MCNC: 299 MG/DL (ref 70–99)

## 2019-06-29 PROCEDURE — 99232 SBSQ HOSP IP/OBS MODERATE 35: CPT | Performed by: NURSE PRACTITIONER

## 2019-06-29 PROCEDURE — 12400000 ZZH R&B MH

## 2019-06-29 PROCEDURE — 25000132 ZZH RX MED GY IP 250 OP 250 PS 637: Performed by: NURSE PRACTITIONER

## 2019-06-29 PROCEDURE — 00000146 ZZHCL STATISTIC GLUCOSE BY METER IP

## 2019-06-29 PROCEDURE — 25000125 ZZHC RX 250: Performed by: INTERNAL MEDICINE

## 2019-06-29 RX ADMIN — AMOXICILLIN AND CLAVULANATE POTASSIUM 1 TABLET: 875; 125 TABLET, FILM COATED ORAL at 01:22

## 2019-06-29 RX ADMIN — METFORMIN HYDROCHLORIDE 1000 MG: 1000 TABLET ORAL at 17:46

## 2019-06-29 RX ADMIN — METFORMIN HYDROCHLORIDE 1000 MG: 1000 TABLET ORAL at 08:13

## 2019-06-29 RX ADMIN — SENNOSIDES AND DOCUSATE SODIUM 2 TABLET: 8.6; 5 TABLET ORAL at 20:18

## 2019-06-29 RX ADMIN — IBUPROFEN 400 MG: 200 TABLET, FILM COATED ORAL at 20:18

## 2019-06-29 RX ADMIN — GABAPENTIN 1200 MG: 400 CAPSULE ORAL at 13:28

## 2019-06-29 RX ADMIN — CHLORPROMAZINE HYDROCHLORIDE 200 MG: 100 TABLET, SUGAR COATED ORAL at 13:28

## 2019-06-29 RX ADMIN — TOPIRAMATE 150 MG: 50 TABLET ORAL at 08:13

## 2019-06-29 RX ADMIN — PRAZOSIN HYDROCHLORIDE 1 MG: 1 CAPSULE ORAL at 20:19

## 2019-06-29 RX ADMIN — CHLORPROMAZINE HYDROCHLORIDE 200 MG: 100 TABLET, SUGAR COATED ORAL at 08:13

## 2019-06-29 RX ADMIN — INSULIN GLARGINE 40 UNITS: 100 INJECTION, SOLUTION SUBCUTANEOUS at 20:22

## 2019-06-29 RX ADMIN — INSULIN ASPART 2 UNITS: 100 INJECTION, SOLUTION INTRAVENOUS; SUBCUTANEOUS at 17:47

## 2019-06-29 RX ADMIN — BUSPIRONE HYDROCHLORIDE 15 MG: 15 TABLET ORAL at 08:13

## 2019-06-29 RX ADMIN — GABAPENTIN 1200 MG: 400 CAPSULE ORAL at 20:19

## 2019-06-29 RX ADMIN — NICOTINE 1 PATCH: 21 PATCH, EXTENDED RELEASE TRANSDERMAL at 08:14

## 2019-06-29 RX ADMIN — GABAPENTIN 1200 MG: 400 CAPSULE ORAL at 08:13

## 2019-06-29 RX ADMIN — Medication 2 SPRAY: at 14:36

## 2019-06-29 RX ADMIN — BUSPIRONE HYDROCHLORIDE 15 MG: 15 TABLET ORAL at 20:19

## 2019-06-29 RX ADMIN — INSULIN ASPART 4 UNITS: 100 INJECTION, SOLUTION INTRAVENOUS; SUBCUTANEOUS at 08:16

## 2019-06-29 RX ADMIN — CHLORPROMAZINE HYDROCHLORIDE 200 MG: 100 TABLET, SUGAR COATED ORAL at 20:18

## 2019-06-29 RX ADMIN — IBUPROFEN 400 MG: 200 TABLET, FILM COATED ORAL at 14:36

## 2019-06-29 RX ADMIN — INSULIN ASPART 4 UNITS: 100 INJECTION, SOLUTION INTRAVENOUS; SUBCUTANEOUS at 11:51

## 2019-06-29 RX ADMIN — BUSPIRONE HYDROCHLORIDE 15 MG: 15 TABLET ORAL at 13:28

## 2019-06-29 RX ADMIN — AMOXICILLIN AND CLAVULANATE POTASSIUM 1 TABLET: 875; 125 TABLET, FILM COATED ORAL at 11:53

## 2019-06-29 ASSESSMENT — ACTIVITIES OF DAILY LIVING (ADL)
HYGIENE/GROOMING: INDEPENDENT
DRESS: INDEPENDENT;SCRUBS (BEHAVIORAL HEALTH)
LAUNDRY: UNABLE TO COMPLETE
ORAL_HYGIENE: INDEPENDENT
LAUNDRY: UNABLE TO COMPLETE
DRESS: SCRUBS (BEHAVIORAL HEALTH);INDEPENDENT
HYGIENE/GROOMING: INDEPENDENT
ORAL_HYGIENE: INDEPENDENT
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
## 2019-06-29 NOTE — PLAN OF CARE
Face to face shift report received from Edgar MARIA RN. Patient observed.    Problem: Fall Injury Risk  Goal: Absence of Fall and Fall-Related Injury  Description  Pt will not experience a fall while hospitalized.  Patient will have tap bell at bedside.       Patient has been in bed with eyes closed and regular respirations throughout the night. 15 minute checks completed all night. No complaints offered. Will continue to monitor.    6/29/2019 0453 by Yaneth Ron RN  Outcome: Improving     The face to face report will be communicated to on coming RN.     Yaneth Ron  6/29/2019  4:54 AM

## 2019-06-29 NOTE — PHARMACY
[facility]    Pharmacy      Antimicrobial Stewardship Note     Current antimicrobial therapy:  Anti-infectives (From now, onward)    Start     Dose/Rate Route Frequency Ordered Stop    06/28/19 1200  amoxicillin-clavulanate (AUGMENTIN) 875-125 MG per tablet 1 tablet      1 tablet Oral EVERY 12 HOURS 06/28/19 1148 07/05/19 1159        Indication: acute otitis media    Days of Therapy: 2     Pertinent labs:  Creatinine   Creatinine   Date Value Ref Range Status   06/20/2019 0.49 (L) 0.52 - 1.04 mg/dL Final   06/09/2019 0.58 0.52 - 1.04 mg/dL Final   06/05/2019 0.66 0.52 - 1.04 mg/dL Final     WBC   WBC   Date Value Ref Range Status   06/20/2019 7.7 4.0 - 11.0 10e9/L Final   06/09/2019 8.0 4.0 - 11.0 10e9/L Final   06/08/2019 9.1 4.0 - 11.0 10e9/L Final     Procalcitonin No results found for: PCAL  CRP No results found for: CRP    Culture Results: none    Recommendations/Interventions:  1. Augmentin is appropriate as it is first-line therapy for AOM in adults, per UpToDate. No recommendations at this time. Will continue to monitor.     Bushra Cheung [facility]  June 29, 2019

## 2019-06-29 NOTE — PROGRESS NOTES
ANA MARIA YU CRYSTAL  Patient initiated visit with the  noting that she has been baptized and not her two children. Ana Maria said her    two years ago.  She noted that she was baptized [employer] in alf by a nun.  When asked if she wanted to talk to a  the answer was no. She noted she would be getting out of the hospital unit and living in [place] at [place].    Ana Maria seemed to be upbeat and very chatty today. She expressed no other spiritual care requests at this time.

## 2019-06-29 NOTE — PLAN OF CARE
"Pt has been sitting in the lounge listening to headphones and socializing with peers much of this shift. She is concerned about needing depo shot. This writer did review chart and found last depo injection was given 19 pt was informed and sticky note left for provider. Pt states, \"ill see if I can get it next week because I don't want an egg to drop and I could get pregnant. What if some christine flips out and pees on me, I will get pregnant.\" Assured pt that this would not likely happen. Pt speech is rapid and rambled. She continues to make delusional and grandiose statements. States, \"I started having kids when I was 3. I was royalty so everyone wanting to have kids with me. My dad had a lot of money.\" Pt denies pain at this time. Sticks her finger in her ear stating, \"no see, it doesn't even hurt today.\" Pt denies anxiety and depression. Is hopeful to leave next week. States, \"I think they should have the funding figured out by Friday.\" She is compliant with prescribed medication.  1100: Pt unorganized and delusional. Talks in reality for short period of time. Says she is famous in a bunch of music videos. \"I grew up with the backstreet boys. Timoteo donato is mad at me right now cause he's a punk but we use to hang out all of the time.\"  1300: Pt came to nurse station door because headphone stopped working. While standing at the door she became dizzy. States, \"I almost , check my sugar.\" Pt blood sugar was 251. Educated on standing up slowly   1436: Pt endorses in right sided face/head pain. Requested and was given ibuprofen 400 mg. Biotene spray given as requested as well.     Face to face end of shift report communicated to OBDULIO Stoddard.     Sadie Erazo  2019  2:29 PM          Problem: Thought Process Alteration  Goal: Optimal Thought Clarity  Description  Patient will hold a reality based conversation by discharge.    2019 0938 by Sadie Erazo RN  Outcome: No Change   "   Problem: Adult Behavioral Health Plan of Care  Goal: Patient-Specific Goal (Individualization)  Description  Patient will attend at least 50% of groups while on unit.  Patient will report at least 6-8 hours of sleep at night.  Patient will be compliant with treatment team recommendations.  Patient will be medication compliant while hospitalized.  Patient will have decrease in delusional statements by discharge.         6/29/2019 0938 by Sadie Erazo, RN  Outcome: Improving     Problem: Fall Injury Risk  Goal: Absence of Fall and Fall-Related Injury  Description  Pt will not experience a fall while hospitalized.  Patient will have tap bell at bedside.       6/29/2019 0938 by Sadie Erazo, RN  Outcome: Improving     Problem: Violence Risk or Actual  Goal: Anger and Impulse Control  Description  PT will verbalize frustrations before making verbal threats to staff  PT will remain free from violence to staff or others while inpatient   6/29/2019 0938 by Sadie Erazo, RN  Outcome: Improving

## 2019-06-29 NOTE — PROGRESS NOTES
"[facility]  Psychiatric Progress Note    Subjective   This is a 39 year old female with schizoaffective disorder-bipolar type and methamphetamine use disorder severe.    Genie spends the majority of her day out in the day room coloring or writing. She often converses with peers. Overall mood has improved, remains labile anytime there is mention of family or Logan which illicit's almost immediate response of agitation. Sleep is good. AM BG improving. Right ear examined, TM is clearing. No erythema-mod effusion.    10 point ROS negative other than what is noted in HPI       DIagnoses:   Schizoaffective Disorder, bipolar type  Methamphetamine use disorder, moderate to severe    Right otitis media w/ effusion    Attestation:  Patient has been seen and evaluated by me,  LATRICE Kendall CNP          Interim History:   The patient's care was discussed with the treatment team and chart notes were reviewed.          Medications:       amoxicillin-clavulanate  1 tablet Oral Q12H     busPIRone  15 mg Oral TID     chlorproMAZINE  200 mg Oral TID     gabapentin  1,200 mg Oral TID     insulin aspart  8 Units Subcutaneous TID w/meals     insulin aspart  1-7 Units Subcutaneous TID AC     insulin glargine  40 Units Subcutaneous At Bedtime     metFORMIN  1,000 mg Oral BID w/meals     nicotine  1 patch Transdermal Daily     nicotine   Transdermal Q8H     nicotine   Transdermal Daily     prazosin  1 mg Oral At Bedtime     senna-docusate  2 tablet Oral At Bedtime     topiramate  150 mg Oral Daily     alum & mag hydroxide-simethicone, artificial saliva, sore throat lozenge, glucose **OR** dextrose **OR** glucagon, ibuprofen, magnesium hydroxide, magnesium sulfate **AND** Patient care order, melatonin, nicotine          Allergies:     Allergies   Allergen Reactions     Haloperidol Other (See Comments)     Other reaction(s): Seizures  Patient states, \"I fell down and wasn't able to get up.\"  Patient states, \"I fell " "down and wasn't able to get up.\"              Psychiatric Examination:   /71   Pulse 92   Temp 99.2  F (37.3  C) (Tympanic)   Resp 16   Ht 1.626 m (5' 4\")   Wt 87.3 kg (192 lb 8 oz)   SpO2 99%   BMI 33.04 kg/m    Weight is 192 lbs 8 oz  Body mass index is 33.04 kg/m .    Appearance:  awake, alert  Attitude:  cooperative  Eye Contact:  fair  Mood:  improved unless family is mentioned  Affect:  mood congruent  Speech:  less pressured  Psychomotor Behavior:  no evidence of tardive dyskinesia, dystonia, or tics and intact station, gait and muscle tone  Thought Process:  improving- mostly logical. When upset, delusional context significantly increases  Associations:  loosening of associations present  Thought Content:  no evidence of suicidal ideation or homicidal ideation and AH improved  Insight:  limited  Judgment:  limited  Oriented to:  time, person, and place  Attention Span and Concentration:  limited  Recent and Remote Memory:  fair  Fund of Knowledge: low-normal  Muscle Strength and Tone: normal  Gait and Station: Normal  Perception: appears pre-occupied at times  Right side of face- less swollen, less tenderness noted.         Labs:     Recent Results (from the past 24 hour(s))   Glucose by meter    Collection Time: 06/28/19 11:32 AM   Result Value Ref Range    Glucose 362 (H) 70 - 99 mg/dL   Glucose by meter    Collection Time: 06/28/19  4:17 PM   Result Value Ref Range    Glucose 149 (H) 70 - 99 mg/dL   Glucose by meter    Collection Time: 06/28/19  8:00 PM   Result Value Ref Range    Glucose 208 (H) 70 - 99 mg/dL   Glucose by meter    Collection Time: 06/29/19  6:43 AM   Result Value Ref Range    Glucose 299 (H) 70 - 99 mg/dL             Assessment/ Plan:    Continue on current medications. Monitor pain and swelling.   Provide safe and therapeutic environment. Possible discharge next week.        For all new medications pt was instructed on the drug. Dose route, action and potential side " effects. Pt verbalized understanding.

## 2019-06-29 NOTE — PLAN OF CARE
Problem: Adult Behavioral Health Plan of Care  Goal: Patient-Specific Goal (Individualization)  Description  Patient will attend at least 50% of groups while on unit.  Patient will report at least 6-8 hours of sleep at night.  Patient will be compliant with treatment team recommendations.  Patient will be medication compliant while hospitalized.  Patient will have decrease in delusional statements by discharge.         6/29/2019 1601 by Arlyn Lind, RN  Outcome: No Change  Note:   1535: Pt up in lounge area upon arrival, blunted affect, mood is calm.    2236: Pt out et about majority of shift, in good spirits offered more conversation this shift, in good spirits. Compliant with HS medication administration et nursing assessment. Self administers scheduled insulins appropriately. Adequate food et fluid intake. States effective pain relief  antibiotic is helpting ear infection.     Face to face end of shift report to be communicated to on-coming staff.     Arlyn Lind  6/29/2019  10:43 PM             Problem: Adult Behavioral Health Plan of Care  Goal: Adheres to Safety Considerations for Self and Others  Outcome: Improving  Note:   Appropriate behavior with staff et peers.      Problem: Thought Process Alteration  Goal: Optimal Thought Clarity  Description  Patient will hold a reality based conversation by discharge.    6/29/2019 1601 by Arlyn Lind, RN  Outcome: No Change  Note:   Thought process is linear, logical, questionable delusional statements re: falling off, being on [employer] @ the age of 13.          Problem: Fall Injury Risk  Goal: Absence of Fall and Fall-Related Injury  Description  Pt will not experience a fall while hospitalized.  Patient will have tap bell at bedside.       6/29/2019 1601 by Arlyn Lind, RN  Outcome: Improving  Note:   Pt remains free from falls. Appropriate level of safety awareness.  Gait is steady.

## 2019-06-30 LAB
GLUCOSE BLDC GLUCOMTR-MCNC: 158 MG/DL (ref 70–99)
GLUCOSE BLDC GLUCOMTR-MCNC: 247 MG/DL (ref 70–99)
GLUCOSE BLDC GLUCOMTR-MCNC: 272 MG/DL (ref 70–99)
GLUCOSE BLDC GLUCOMTR-MCNC: 309 MG/DL (ref 70–99)

## 2019-06-30 PROCEDURE — 25000132 ZZH RX MED GY IP 250 OP 250 PS 637: Performed by: NURSE PRACTITIONER

## 2019-06-30 PROCEDURE — 00000146 ZZHCL STATISTIC GLUCOSE BY METER IP

## 2019-06-30 PROCEDURE — 12400000 ZZH R&B MH

## 2019-06-30 PROCEDURE — 25000125 ZZHC RX 250: Performed by: INTERNAL MEDICINE

## 2019-06-30 RX ADMIN — INSULIN ASPART 3 UNITS: 100 INJECTION, SOLUTION INTRAVENOUS; SUBCUTANEOUS at 17:40

## 2019-06-30 RX ADMIN — GABAPENTIN 1200 MG: 400 CAPSULE ORAL at 20:20

## 2019-06-30 RX ADMIN — INSULIN ASPART 4 UNITS: 100 INJECTION, SOLUTION INTRAVENOUS; SUBCUTANEOUS at 11:36

## 2019-06-30 RX ADMIN — GABAPENTIN 1200 MG: 400 CAPSULE ORAL at 08:15

## 2019-06-30 RX ADMIN — METFORMIN HYDROCHLORIDE 1000 MG: 1000 TABLET ORAL at 08:15

## 2019-06-30 RX ADMIN — GABAPENTIN 1200 MG: 400 CAPSULE ORAL at 13:10

## 2019-06-30 RX ADMIN — BUSPIRONE HYDROCHLORIDE 15 MG: 15 TABLET ORAL at 20:20

## 2019-06-30 RX ADMIN — CHLORPROMAZINE HYDROCHLORIDE 200 MG: 100 TABLET, SUGAR COATED ORAL at 08:14

## 2019-06-30 RX ADMIN — BUSPIRONE HYDROCHLORIDE 15 MG: 15 TABLET ORAL at 13:11

## 2019-06-30 RX ADMIN — INSULIN GLARGINE 40 UNITS: 100 INJECTION, SOLUTION SUBCUTANEOUS at 20:21

## 2019-06-30 RX ADMIN — AMOXICILLIN AND CLAVULANATE POTASSIUM 1 TABLET: 875; 125 TABLET, FILM COATED ORAL at 11:36

## 2019-06-30 RX ADMIN — BUSPIRONE HYDROCHLORIDE 15 MG: 15 TABLET ORAL at 08:14

## 2019-06-30 RX ADMIN — NICOTINE 1 PATCH: 21 PATCH, EXTENDED RELEASE TRANSDERMAL at 08:15

## 2019-06-30 RX ADMIN — TOPIRAMATE 150 MG: 50 TABLET ORAL at 08:14

## 2019-06-30 RX ADMIN — METFORMIN HYDROCHLORIDE 1000 MG: 1000 TABLET ORAL at 17:38

## 2019-06-30 RX ADMIN — CHLORPROMAZINE HYDROCHLORIDE 200 MG: 100 TABLET, SUGAR COATED ORAL at 20:20

## 2019-06-30 RX ADMIN — PRAZOSIN HYDROCHLORIDE 1 MG: 1 CAPSULE ORAL at 20:20

## 2019-06-30 RX ADMIN — AMOXICILLIN AND CLAVULANATE POTASSIUM 1 TABLET: 875; 125 TABLET, FILM COATED ORAL at 01:38

## 2019-06-30 RX ADMIN — SENNOSIDES AND DOCUSATE SODIUM 2 TABLET: 8.6; 5 TABLET ORAL at 20:20

## 2019-06-30 RX ADMIN — CHLORPROMAZINE HYDROCHLORIDE 200 MG: 100 TABLET, SUGAR COATED ORAL at 13:10

## 2019-06-30 RX ADMIN — INSULIN ASPART 1 UNITS: 100 INJECTION, SOLUTION INTRAVENOUS; SUBCUTANEOUS at 08:17

## 2019-06-30 ASSESSMENT — ACTIVITIES OF DAILY LIVING (ADL)
ORAL_HYGIENE: INDEPENDENT
HYGIENE/GROOMING: INDEPENDENT
LAUNDRY: UNABLE TO COMPLETE
LAUNDRY: UNABLE TO COMPLETE
DRESS: SCRUBS (BEHAVIORAL HEALTH);INDEPENDENT
ORAL_HYGIENE: INDEPENDENT
HYGIENE/GROOMING: INDEPENDENT
DRESS: INDEPENDENT;SCRUBS (BEHAVIORAL HEALTH)

## 2019-06-30 ASSESSMENT — MIFFLIN-ST. JEOR: SCORE: 1525.45

## 2019-06-30 NOTE — PLAN OF CARE
Face to face shift report received from Arlyn MONTIEL RN. Patient observed.      Problem: Fall Injury Risk  Goal: Absence of Fall and Fall-Related Injury  Description  Pt will not experience a fall while hospitalized.  Patient will have tap bell at bedside.       6/30/2019 0436 by Yaneth Ron RN  Outcome: Improving     Problem: Fall Injury Risk  Goal: Absence of Fall and Fall-Related Injury  Description  Pt will not experience a fall while hospitalized.  Patient will have tap bell at bedside.     Patient has been in bed with eyes closed and regular respirations throughout the night. 15 minute checks completed all night. No complaints offered. Will continue to monitor.        6/30/2019 0436 by Yaneth Ron RN  Outcome: Improving  The face to face report will be communicated to on coming FRANCIA Ron  6/30/2019  4:37 AM

## 2019-06-30 NOTE — PLAN OF CARE
Problem: Adult Behavioral Health Plan of Care  Goal: Patient-Specific Goal (Individualization)  Description  Patient will attend at least 50% of groups while on unit.  Patient will report at least 6-8 hours of sleep at night.  Patient will be compliant with treatment team recommendations.  Patient will be medication compliant while hospitalized.  Patient will have decrease in delusional statements by discharge.         6/30/2019 1537 by Arlyn Lind RN  Outcome: No Change  Flowsheets (Taken 6/30/2019 1537)  Patient Personal Strengths: humor  Note:   1525: Pt sitting out in lounge area upon arrival, mood is calm, affect blunted.     2247: Grandiose, full range affect, in good spirits, participated in groups, increase socialization with staff et peers. Out majority of unit. Compliant with medication administration. Returned to room post 2130.     Problem: Adult Behavioral Health Plan of Care  Goal: Adheres to Safety Considerations for Self and Others  Outcome: Improving  Note:   Appropriate behavior with staff et peers.      Problem: Thought Process Alteration  Goal: Optimal Thought Clarity  Description  Patient will hold a reality based conversation by discharge.    6/30/2019 1537 by Arlyn Lind, RN  Outcome: No Change  Note:   Delusional thought process continues, conversation is linear, some reality based.      Problem: Fall Injury Risk  Goal: Absence of Fall and Fall-Related Injury  Description  Pt will not experience a fall while hospitalized.  Patient will have tap bell at bedside.       6/30/2019 1537 by Arlyn Lind, RN  Outcome: Improving  Note:   Pt remains free from falls this shift. Appropriate level of safety awareness.  Gait is steady.      Problem: Violence Risk or Actual  Goal: Anger and Impulse Control  Description  PT will verbalize frustrations before making verbal threats to staff  PT will remain free from violence to staff or others while inpatient   6/30/2019 1537 by Diogo  Arlyn HERBERT RN  Outcome: Improving
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
## 2019-06-30 NOTE — PHARMACY
[facility]    Pharmacy      Antimicrobial Stewardship Note     Current antimicrobial therapy:  Anti-infectives (From now, onward)    Start     Dose/Rate Route Frequency Ordered Stop    06/28/19 1200  amoxicillin-clavulanate (AUGMENTIN) 875-125 MG per tablet 1 tablet      1 tablet Oral EVERY 12 HOURS 06/28/19 1148 07/05/19 1159          Indication: acute otitis media    Days of Therapy: 3     Pertinent labs:  Creatinine   Creatinine   Date Value Ref Range Status   06/20/2019 0.49 (L) 0.52 - 1.04 mg/dL Final   06/09/2019 0.58 0.52 - 1.04 mg/dL Final   06/05/2019 0.66 0.52 - 1.04 mg/dL Final     WBC   WBC   Date Value Ref Range Status   06/20/2019 7.7 4.0 - 11.0 10e9/L Final   06/09/2019 8.0 4.0 - 11.0 10e9/L Final   06/08/2019 9.1 4.0 - 11.0 10e9/L Final     Procalcitonin No results found for: PCAL  CRP No results found for: CRP    Culture Results: none     Recommendations/Interventions:  1. No recommendations at this time. Will continue to monitor.    Bushra Cheung [facility]  June 30, 2019

## 2019-07-01 LAB
GLUCOSE BLDC GLUCOMTR-MCNC: 159 MG/DL (ref 70–99)
GLUCOSE BLDC GLUCOMTR-MCNC: 231 MG/DL (ref 70–99)
GLUCOSE BLDC GLUCOMTR-MCNC: 237 MG/DL (ref 70–99)
GLUCOSE BLDC GLUCOMTR-MCNC: 263 MG/DL (ref 70–99)
GLUCOSE BLDC GLUCOMTR-MCNC: 363 MG/DL (ref 70–99)
HCG UR QL: NEGATIVE

## 2019-07-01 PROCEDURE — 25000132 ZZH RX MED GY IP 250 OP 250 PS 637: Performed by: NURSE PRACTITIONER

## 2019-07-01 PROCEDURE — 99232 SBSQ HOSP IP/OBS MODERATE 35: CPT | Performed by: NURSE PRACTITIONER

## 2019-07-01 PROCEDURE — 12400000 ZZH R&B MH

## 2019-07-01 PROCEDURE — 81025 URINE PREGNANCY TEST: CPT | Performed by: NURSE PRACTITIONER

## 2019-07-01 PROCEDURE — 25000125 ZZHC RX 250: Performed by: INTERNAL MEDICINE

## 2019-07-01 PROCEDURE — 00000146 ZZHCL STATISTIC GLUCOSE BY METER IP

## 2019-07-01 RX ORDER — TOPIRAMATE 50 MG/1
150 TABLET, FILM COATED ORAL DAILY
Qty: 90 TABLET | Refills: 0 | Status: SHIPPED | OUTPATIENT
Start: 2019-07-02 | End: 2019-08-09

## 2019-07-01 RX ORDER — PRAZOSIN HYDROCHLORIDE 1 MG/1
1 CAPSULE ORAL AT BEDTIME
Qty: 30 CAPSULE | Refills: 0 | Status: SHIPPED | OUTPATIENT
Start: 2019-07-01 | End: 2019-08-09

## 2019-07-01 RX ORDER — BUSPIRONE HYDROCHLORIDE 15 MG/1
15 TABLET ORAL 3 TIMES DAILY
Qty: 90 TABLET | Refills: 0 | Status: SHIPPED | OUTPATIENT
Start: 2019-07-01 | End: 2019-08-09

## 2019-07-01 RX ORDER — CHLORPROMAZINE HYDROCHLORIDE 200 MG/1
200 TABLET, FILM COATED ORAL 3 TIMES DAILY
Qty: 90 TABLET | Refills: 0 | Status: SHIPPED | OUTPATIENT
Start: 2019-07-01 | End: 2019-08-09

## 2019-07-01 RX ORDER — GABAPENTIN 400 MG/1
1200 CAPSULE ORAL 3 TIMES DAILY
Qty: 270 CAPSULE | Refills: 0 | Status: SHIPPED | OUTPATIENT
Start: 2019-07-01 | End: 2019-08-09

## 2019-07-01 RX ORDER — AMOXICILLIN 250 MG
2 CAPSULE ORAL AT BEDTIME
Qty: 60 TABLET | Refills: 0 | Status: SHIPPED | OUTPATIENT
Start: 2019-07-01 | End: 2019-07-02

## 2019-07-01 RX ADMIN — BUSPIRONE HYDROCHLORIDE 15 MG: 15 TABLET ORAL at 13:44

## 2019-07-01 RX ADMIN — SENNOSIDES AND DOCUSATE SODIUM 2 TABLET: 8.6; 5 TABLET ORAL at 20:46

## 2019-07-01 RX ADMIN — CHLORPROMAZINE HYDROCHLORIDE 200 MG: 100 TABLET, SUGAR COATED ORAL at 20:46

## 2019-07-01 RX ADMIN — INSULIN ASPART 3 UNITS: 100 INJECTION, SOLUTION INTRAVENOUS; SUBCUTANEOUS at 08:11

## 2019-07-01 RX ADMIN — CHLORPROMAZINE HYDROCHLORIDE 200 MG: 100 TABLET, SUGAR COATED ORAL at 13:44

## 2019-07-01 RX ADMIN — BUSPIRONE HYDROCHLORIDE 15 MG: 15 TABLET ORAL at 20:46

## 2019-07-01 RX ADMIN — INSULIN GLARGINE 40 UNITS: 100 INJECTION, SOLUTION SUBCUTANEOUS at 21:12

## 2019-07-01 RX ADMIN — TOPIRAMATE 150 MG: 50 TABLET ORAL at 08:07

## 2019-07-01 RX ADMIN — METFORMIN HYDROCHLORIDE 1000 MG: 1000 TABLET ORAL at 16:51

## 2019-07-01 RX ADMIN — AMOXICILLIN AND CLAVULANATE POTASSIUM 1 TABLET: 875; 125 TABLET, FILM COATED ORAL at 12:13

## 2019-07-01 RX ADMIN — INSULIN ASPART 5 UNITS: 100 INJECTION, SOLUTION INTRAVENOUS; SUBCUTANEOUS at 12:15

## 2019-07-01 RX ADMIN — AMOXICILLIN AND CLAVULANATE POTASSIUM 1 TABLET: 875; 125 TABLET, FILM COATED ORAL at 01:36

## 2019-07-01 RX ADMIN — PRAZOSIN HYDROCHLORIDE 1 MG: 1 CAPSULE ORAL at 20:45

## 2019-07-01 RX ADMIN — BUSPIRONE HYDROCHLORIDE 15 MG: 15 TABLET ORAL at 08:08

## 2019-07-01 RX ADMIN — GABAPENTIN 1200 MG: 400 CAPSULE ORAL at 20:46

## 2019-07-01 RX ADMIN — GABAPENTIN 1200 MG: 400 CAPSULE ORAL at 13:45

## 2019-07-01 RX ADMIN — INSULIN ASPART 2 UNITS: 100 INJECTION, SOLUTION INTRAVENOUS; SUBCUTANEOUS at 16:51

## 2019-07-01 RX ADMIN — CHLORPROMAZINE HYDROCHLORIDE 200 MG: 100 TABLET, SUGAR COATED ORAL at 08:07

## 2019-07-01 RX ADMIN — METFORMIN HYDROCHLORIDE 1000 MG: 1000 TABLET ORAL at 08:08

## 2019-07-01 RX ADMIN — NICOTINE 1 PATCH: 21 PATCH, EXTENDED RELEASE TRANSDERMAL at 08:07

## 2019-07-01 RX ADMIN — GABAPENTIN 1200 MG: 400 CAPSULE ORAL at 08:08

## 2019-07-01 ASSESSMENT — ACTIVITIES OF DAILY LIVING (ADL)
DRESS: SCRUBS (BEHAVIORAL HEALTH)
LAUNDRY: UNABLE TO COMPLETE
HYGIENE/GROOMING: INDEPENDENT
HYGIENE/GROOMING: INDEPENDENT
DRESS: SCRUBS (BEHAVIORAL HEALTH)
ORAL_HYGIENE: INDEPENDENT
ORAL_HYGIENE: INDEPENDENT

## 2019-07-01 NOTE — PLAN OF CARE
Spoke with Florinda from [facility] - they would like to come and do her CADI screening on Wednesday - she will be here between 10:30 and 11.  Contact information is Florinda - [phone].    Spoke with Becca from [facility] - she reviewed everything and have accepted the patient for placement at [facility] - reviewed her above appointment - she can take her on Wednesday so will plan for afternoon.    Updated patient and team

## 2019-07-01 NOTE — PHARMACY
[facility]    Pharmacy      Antimicrobial Stewardship Note     Current antimicrobial therapy:  Anti-infectives (From now, onward)    Start     Dose/Rate Route Frequency Ordered Stop    06/28/19 1200  amoxicillin-clavulanate (AUGMENTIN) 875-125 MG per tablet 1 tablet      1 tablet Oral EVERY 12 HOURS 06/28/19 1148 07/05/19 1159        Indication: Otitis media     Days of Therapy: 4      Pertinent labs:  Creatinine   Creatinine   Date Value Ref Range Status   06/20/2019 0.49 (L) 0.52 - 1.04 mg/dL Final   06/09/2019 0.58 0.52 - 1.04 mg/dL Final   06/05/2019 0.66 0.52 - 1.04 mg/dL Final     WBC   WBC   Date Value Ref Range Status   06/20/2019 7.7 4.0 - 11.0 10e9/L Final   06/09/2019 8.0 4.0 - 11.0 10e9/L Final   06/08/2019 9.1 4.0 - 11.0 10e9/L Final     Procalcitonin No results found for: PCAL  CRP No results found for: CRP    Culture Results: None     Recommendations/Interventions:  1. No recommendations at this time. Will continue to monitor.     Inocencio Vitale  July 1, 2019

## 2019-07-01 NOTE — PLAN OF CARE
Problem: Adult Behavioral Health Plan of Care  Goal: Patient-Specific Goal (Individualization)  Description  Patient will attend at least 50% of groups while on unit.  Patient will report at least 6-8 hours of sleep at night.  Patient will be compliant with treatment team recommendations.  Patient will be medication compliant while hospitalized.  Patient will have decrease in delusional statements by discharge.         7/1/2019 1043 by Luz Amaya RN  Outcome: Improving     Problem: Thought Process Alteration  Goal: Optimal Thought Clarity  Description  Patient will hold a reality based conversation by discharge.    7/1/2019 1043 by Luz Amaya RN  Outcome: Improving     Problem: Fall Injury Risk  Goal: Absence of Fall and Fall-Related Injury  Description  Pt will not experience a fall while hospitalized.  Patient will have tap bell at bedside.       7/1/2019 1043 by Luz Amaya RN  Outcome: Improving     Problem: Violence Risk or Actual  Goal: Anger and Impulse Control  Description  PT will verbalize frustrations before making verbal threats to staff  PT will remain free from violence to staff or others while inpatient   7/1/2019 1043 by Luz Amaya RN  Outcome: Improving     Patient is up on the unit and is social with her peers. Patient states that she is upset that she gets an injection for birth control and she usually does not have any periods but she got her period today. Patient is otherwise pleasant, denies depression and thoughts of suicide. Patient does have some thinking exhibited that is not linear and is nonsensical at times. Patient ate well for breakfast meal. Allows physical assessment and takes prescribed medications. Patient has had no issues with falls and is steady on her feet.    1255 Urine collected and sent for Hcg ordered test.    1500 Patient complained of feeling her blood sugar is low. Took blood sugar 321.    Face to face end of shift report will be communicated to  evening shift RN.     Luz Amaya  7/1/2019  3:27 PM

## 2019-07-01 NOTE — PLAN OF CARE
Problem: Adult Behavioral Health Plan of Care  Goal: Patient-Specific Goal (Individualization)  Description  Patient will attend at least 50% of groups while on unit.  Patient will report at least 6-8 hours of sleep at night.  Patient will be compliant with treatment team recommendations.  Patient will be medication compliant while hospitalized.  Patient will have decrease in delusional statements by discharge.         7/1/2019 0237 by Ayah Rodriguez RN  Outcome: Improving  Note:   Report received from Arlny lama. Pt observed sleeping in supine position with regular and unlabored respirations.    Pt has been in bed with eyes closed and regular respirations. 15 minute and PRN checks all night. No complaints offered. Will continue to monitor.    Pt woken up for abx and then got up for a snack of 2 sugar free pudding and crackers before returning to bed afterwards. Pt offered no other complaints at that time. Will continue to monitor.    Face to face end of shift report communicated to oncdouglas RN.    July 1, 2019  2:38 AM         Problem: Thought Process Alteration  Goal: Optimal Thought Clarity  Description  Patient will hold a reality based conversation by discharge.    7/1/2019 0237 by Ayah Rodriguez RN  Outcome: Improving  Note:   Pt made no delusional or paranoid statements and was able to maintain a short reality based conversation with this writer.        Problem: Fall Injury Risk  Goal: Absence of Fall and Fall-Related Injury  Description  Pt will not experience a fall while hospitalized.  Patient will have tap bell at bedside.       7/1/2019 0237 by Ayah Rodriguez RN  Outcome: Improving  Note:   Pt is balanced and steady on her feet and has remained free of falls this shift. She is also utilizing unit provided gripper socks for additional safety.      Problem: Violence Risk or Actual  Goal: Anger and Impulse Control  Description  PT will verbalize frustrations before making  verbal threats to staff  PT will remain free from violence to staff or others while inpatient   7/1/2019 0237 by Ayah Rodriguez RN  Outcome: Improving  Note:   Pt has remained free of violence and aggressive behaviors with staff and peers while awake this shift.

## 2019-07-01 NOTE — PLAN OF CARE
BEHAVIORAL TEAM DISCUSSION    Participants:  Loren Zuleta NP,  Dennise Herrera LICSW, Angie Laws LSW,  Becca Chery LSW, Beena Kumar LGSW, Krys Rojo RN   Mady Holman OT, Gena Gomez OT , Jayla Engel RN, Eliza Arndt RN  Progress:  good   Continued Stay Criteria/Rationale: pt baseline, but unsafe to discharge to lesser level of care due to high vulnerability and risk for decomposition and hospital readmit    Medical/Physical: DM, hep C  Precautions:   Falls precaution?: No  Behavioral Orders   Procedures    Code 1 - Restrict to Unit    Routine Programming     As clinically indicated    Status 15     Every 15 minutes.     Plan: OTILIA paperwork to be completed Wed; [facility] will have opening; SW working on discharge and increasing services.  Rationale for change in precautions or plan:     Active Problems:    Mixed hyperlipidemia       Diabetes mellitus, type 2       Schizoaffective disorder, bipolar type (H)    Current Facility-Administered Medications:     alum & mag hydroxide-simethicone (MYLANTA ES/MAALOX  ES) suspension 30 mL, 30 mL, Oral, Q4H PRN, Natalia Fabian APRN CNP    amoxicillin-clavulanate (AUGMENTIN) 875-125 MG per tablet 1 tablet, 1 tablet, Oral, Q12H, Narcisa Solomon APRN CNP, 1 tablet at 07/01/19 0136    artificial saliva (BIOTENE MT) solution 2 spray, 2 spray, Swish & Spit, 4x Daily PRN, Raven Stout, NP, 2 spray at 06/29/19 1436    benzocaine-menthol (CEPACOL) 15-3.6 MG lozenge 1 lozenge, 1 lozenge, Buccal, Q1H PRN, Natalia Fabian APRN CNP    busPIRone (BUSPAR) tablet 15 mg, 15 mg, Oral, TID, Natalia Fabian APRN CNP, 15 mg at 07/01/19 0808    chlorproMAZINE (THORAZINE) tablet 200 mg, 200 mg, Oral, TID, Natalia Fabian APRN CNP, 200 mg at 07/01/19 0807    glucose gel 15-30 g, 15-30 g, Oral, Q15 Min PRN, 30 g at 06/08/19 1339 **OR** dextrose 50 % injection 25-50 mL, 25-50 mL, Intravenous, Q15 Min PRN **OR** glucagon injection 1 mg, 1 mg,  Subcutaneous, Q15 Min PRN, Airam Light NP    gabapentin (NEURONTIN) capsule 1,200 mg, 1,200 mg, Oral, TID, Natalia Fabian APRN CNP, 1,200 mg at 07/01/19 0808    ibuprofen (ADVIL/MOTRIN) tablet 400 mg, 400 mg, Oral, Q6H PRN, Natalia Fabian APRN CNP, 400 mg at 06/29/19 2018    insulin aspart (NovoLOG) inj (RAPID ACTING), 8 Units, Subcutaneous, TID w/meals, Denisse Malin NP, 8 Units at 07/01/19 0806    insulin aspart (NovoLOG) inj (RAPID ACTING), 1-7 Units, Subcutaneous, TID AC, Airam Light NP, 3 Units at 07/01/19 0811    insulin glargine (LANTUS PEN) injection 40 Units, 40 Units, Subcutaneous, At Bedtime, Rocio Tomkpins MD, 40 Units at 06/30/19 2021    magnesium hydroxide (MILK OF MAGNESIA) suspension 30 mL, 30 mL, Oral, Daily PRN, Loren Zuleat NP, 30 mL at 06/07/19 1645    magnesium sulfate (EPSOM SALT) granules 5 g, 5 g, Other, BID PRN, 5 g at 06/30/19 1338 **AND** Patient care order, , , Per Unit Routine, Natalia Fabian APRN CNP    melatonin tablet 3 mg, 3 mg, Oral, At Bedtime PRN, Loren Zuleta NP, 3 mg at 06/28/19 0303    metFORMIN (GLUCOPHAGE) tablet 1,000 mg, 1,000 mg, Oral, BID w/meals, Baljit Stinson MD, 1,000 mg at 07/01/19 0808    nicotine (NICODERM CQ) 21 MG/24HR 24 hr patch 1 patch, 1 patch, Transdermal, Daily, Loren Zuleta NP, 1 patch at 07/01/19 0807    nicotine (NICORETTE) gum 2-4 mg, 2-4 mg, Buccal, Q1H PRN, Raven Stout NP, 4 mg at 05/21/19 0938    nicotine Patch in Place, , Transdermal, Q8H, Loren Zuleta, JACKIE, 1 each at 07/01/19 0140    nicotine patch REMOVAL, , Transdermal, Daily, Loren Zuleta NP, Stopped at 06/01/19 0842    prazosin (MINIPRESS) capsule 1 mg, 1 mg, Oral, At Bedtime, Kuldeep April JACKIE Rebollar, 1 mg at 06/30/19 2020    senna-docusate (SENOKOT-S/PERICOLACE) 8.6-50 MG per tablet 2 tablet, 2 tablet, Oral, At Bedtime, Airam Light NP, 2 tablet at 06/30/19 2020    topiramate (TOPAMAX)  tablet 150 mg, 150 mg, Oral, Daily, Natalia Fabian, LATRICE CNP, 150 mg at 07/01/19 0807

## 2019-07-01 NOTE — PROGRESS NOTES
[facility]  Psychiatric Progress Note    Subjective   This is a 39 year old female with schizoaffective disorder-bipolar type and methamphetamine use disorder severe.    Genie is up and social with peers in the [facility] area. She is looking forward to discharge this week. She feels ready to go and hopes she can stay out of the hospital for an extended period of time. Genie is also concerned that she may be pregnant, she did have sex one time when she was discharged and is unsure if the man used a condom or not. Genie also reports she is spotting, she said her depo shot is due in several days and would like a pregnancy test prior to it. Genie also reports a decrease in ear and jaw pain.     10 point ROS negative other than what is noted in HPI       DIagnoses:   Schizoaffective Disorder, bipolar type  Methamphetamine use disorder, moderate to severe    Right otitis media w/ effusion    Attestation:  Patient has been seen and evaluated by me,  Loren Zuleta NP          Interim History:   The patient's care was discussed with the treatment team and chart notes were reviewed.          Medications:       amoxicillin-clavulanate  1 tablet Oral Q12H     busPIRone  15 mg Oral TID     chlorproMAZINE  200 mg Oral TID     gabapentin  1,200 mg Oral TID     insulin aspart  8 Units Subcutaneous TID w/meals     insulin aspart  1-7 Units Subcutaneous TID AC     insulin glargine  40 Units Subcutaneous At Bedtime     metFORMIN  1,000 mg Oral BID w/meals     nicotine  1 patch Transdermal Daily     nicotine   Transdermal Q8H     nicotine   Transdermal Daily     prazosin  1 mg Oral At Bedtime     senna-docusate  2 tablet Oral At Bedtime     topiramate  150 mg Oral Daily     alum & mag hydroxide-simethicone, artificial saliva, sore throat lozenge, glucose **OR** dextrose **OR** glucagon, ibuprofen, magnesium hydroxide, magnesium sulfate **AND** Patient care order, melatonin, nicotine          Allergies:     Allergies  "  Allergen Reactions     Haloperidol Other (See Comments)     Other reaction(s): Seizures  Patient states, \"I fell down and wasn't able to get up.\"  Patient states, \"I fell down and wasn't able to get up.\"              Psychiatric Examination:   /63   Pulse 105   Temp 99.3  F (37.4  C) (Tympanic)   Resp 15   Ht 1.626 m (5' 4\")   Wt 87 kg (191 lb 14.4 oz)   SpO2 99%   BMI 32.94 kg/m    Weight is 191 lbs 14.4 oz  Body mass index is 32.94 kg/m .    Appearance:  awake, alert  Attitude:  cooperative  Eye Contact:  fair  Mood:  improved unless family is mentioned  Affect:  mood congruent  Speech:  less pressured  Psychomotor Behavior:  no evidence of tardive dyskinesia, dystonia, or tics and intact station, gait and muscle tone  Thought Process:  improving- mostly logical. When upset, delusional context significantly increases  Associations:  loosening of associations present  Thought Content:  no evidence of suicidal ideation or homicidal ideation and AH improved  Insight:  limited  Judgment:  limited  Oriented to:  time, person, and place  Attention Span and Concentration:  limited  Recent and Remote Memory:  fair  Fund of Knowledge: low-normal  Muscle Strength and Tone: normal  Gait and Station: Normal  Perception: appears pre-occupied at times  Right side of face- less swollen, less tenderness noted.         Labs:     Recent Results (from the past 24 hour(s))   Glucose by meter    Collection Time: 06/30/19  4:54 PM   Result Value Ref [facility]    Glucose 272 (H) 70 - 99 mg/dL   Glucose by meter    Collection Time: 06/30/19  7:50 PM   Result Value Ref Range    Glucose 247 (H) 70 - 99 mg/dL   Glucose by meter    Collection Time: 07/01/19  6:52 AM   Result Value Ref Range    Glucose 263 (H) 70 - 99 mg/dL   Glucose by meter    Collection Time: 07/01/19 11:34 AM   Result Value Ref Range    Glucose 363 (H) 70 - 99 mg/dL             Assessment/ Plan:    Continue on current medications. Monitor pain and swelling. "   Provide safe and therapeutic environment. Possible discharge next week.        For all new medications pt was instructed on the drug. Dose route, action and potential side effects. Pt verbalized understanding.

## 2019-07-02 LAB
GLUCOSE BLDC GLUCOMTR-MCNC: 129 MG/DL (ref 70–99)
GLUCOSE BLDC GLUCOMTR-MCNC: 171 MG/DL (ref 70–99)
GLUCOSE BLDC GLUCOMTR-MCNC: 173 MG/DL (ref 70–99)
GLUCOSE BLDC GLUCOMTR-MCNC: 224 MG/DL (ref 70–99)
GLUCOSE BLDC GLUCOMTR-MCNC: 392 MG/DL (ref 70–99)

## 2019-07-02 PROCEDURE — 25000125 ZZHC RX 250: Performed by: INTERNAL MEDICINE

## 2019-07-02 PROCEDURE — 99239 HOSP IP/OBS DSCHRG MGMT >30: CPT | Performed by: NURSE PRACTITIONER

## 2019-07-02 PROCEDURE — 12400000 ZZH R&B MH

## 2019-07-02 PROCEDURE — 25000131 ZZH RX MED GY IP 250 OP 636 PS 637: Performed by: NURSE PRACTITIONER

## 2019-07-02 PROCEDURE — 25000132 ZZH RX MED GY IP 250 OP 250 PS 637: Performed by: NURSE PRACTITIONER

## 2019-07-02 PROCEDURE — 00000146 ZZHCL STATISTIC GLUCOSE BY METER IP

## 2019-07-02 PROCEDURE — 25000132 ZZH RX MED GY IP 250 OP 250 PS 637: Performed by: PSYCHIATRY & NEUROLOGY

## 2019-07-02 RX ORDER — POLYETHYLENE GLYCOL 3350 17 G/17G
17 POWDER, FOR SOLUTION ORAL DAILY
Status: DISCONTINUED | OUTPATIENT
Start: 2019-07-02 | End: 2019-07-03 | Stop reason: HOSPADM

## 2019-07-02 RX ORDER — AMOXICILLIN 250 MG
2 CAPSULE ORAL 2 TIMES DAILY
Qty: 120 TABLET | Refills: 0 | Status: SHIPPED | OUTPATIENT
Start: 2019-07-02 | End: 2019-08-09

## 2019-07-02 RX ORDER — AMOXICILLIN 250 MG
2 CAPSULE ORAL 2 TIMES DAILY
Status: DISCONTINUED | OUTPATIENT
Start: 2019-07-02 | End: 2019-07-03 | Stop reason: HOSPADM

## 2019-07-02 RX ORDER — POLYETHYLENE GLYCOL 3350 17 G/17G
17 POWDER, FOR SOLUTION ORAL DAILY PRN
Qty: 30 PACKET | Refills: 0 | Status: ON HOLD | OUTPATIENT
Start: 2019-07-02 | End: 2019-10-22

## 2019-07-02 RX ADMIN — POLYETHYLENE GLYCOL 3350 17 G: 17 POWDER, FOR SOLUTION ORAL at 12:01

## 2019-07-02 RX ADMIN — BUSPIRONE HYDROCHLORIDE 15 MG: 15 TABLET ORAL at 13:21

## 2019-07-02 RX ADMIN — GABAPENTIN 1200 MG: 400 CAPSULE ORAL at 13:20

## 2019-07-02 RX ADMIN — CHLORPROMAZINE HYDROCHLORIDE 200 MG: 100 TABLET, SUGAR COATED ORAL at 13:20

## 2019-07-02 RX ADMIN — INSULIN ASPART 2 UNITS: 100 INJECTION, SOLUTION INTRAVENOUS; SUBCUTANEOUS at 16:44

## 2019-07-02 RX ADMIN — INSULIN GLARGINE 40 UNITS: 100 INJECTION, SOLUTION SUBCUTANEOUS at 20:07

## 2019-07-02 RX ADMIN — AMOXICILLIN AND CLAVULANATE POTASSIUM 1 TABLET: 875; 125 TABLET, FILM COATED ORAL at 05:54

## 2019-07-02 RX ADMIN — TOPIRAMATE 150 MG: 50 TABLET ORAL at 08:13

## 2019-07-02 RX ADMIN — CHLORPROMAZINE HYDROCHLORIDE 200 MG: 100 TABLET, SUGAR COATED ORAL at 08:13

## 2019-07-02 RX ADMIN — INSULIN ASPART 1 UNITS: 100 INJECTION, SOLUTION INTRAVENOUS; SUBCUTANEOUS at 08:11

## 2019-07-02 RX ADMIN — IBUPROFEN 400 MG: 200 TABLET, FILM COATED ORAL at 19:12

## 2019-07-02 RX ADMIN — SENNOSIDES AND DOCUSATE SODIUM 2 TABLET: 8.6; 5 TABLET ORAL at 20:07

## 2019-07-02 RX ADMIN — AMOXICILLIN AND CLAVULANATE POTASSIUM 1 TABLET: 875; 125 TABLET, FILM COATED ORAL at 17:03

## 2019-07-02 RX ADMIN — GABAPENTIN 1200 MG: 400 CAPSULE ORAL at 20:07

## 2019-07-02 RX ADMIN — NICOTINE 1 PATCH: 21 PATCH, EXTENDED RELEASE TRANSDERMAL at 08:12

## 2019-07-02 RX ADMIN — METFORMIN HYDROCHLORIDE 1000 MG: 1000 TABLET ORAL at 16:44

## 2019-07-02 RX ADMIN — BUSPIRONE HYDROCHLORIDE 15 MG: 15 TABLET ORAL at 08:13

## 2019-07-02 RX ADMIN — BUSPIRONE HYDROCHLORIDE 15 MG: 15 TABLET ORAL at 20:07

## 2019-07-02 RX ADMIN — CHLORPROMAZINE HYDROCHLORIDE 200 MG: 100 TABLET, SUGAR COATED ORAL at 20:07

## 2019-07-02 RX ADMIN — INSULIN ASPART 5 UNITS: 100 INJECTION, SOLUTION INTRAVENOUS; SUBCUTANEOUS at 11:59

## 2019-07-02 RX ADMIN — METFORMIN HYDROCHLORIDE 1000 MG: 1000 TABLET ORAL at 08:13

## 2019-07-02 RX ADMIN — GABAPENTIN 1200 MG: 400 CAPSULE ORAL at 08:13

## 2019-07-02 RX ADMIN — PRAZOSIN HYDROCHLORIDE 1 MG: 1 CAPSULE ORAL at 20:07

## 2019-07-02 ASSESSMENT — ACTIVITIES OF DAILY LIVING (ADL)
DRESS: SCRUBS (BEHAVIORAL HEALTH);INDEPENDENT
DRESS: SCRUBS (BEHAVIORAL HEALTH);INDEPENDENT
HYGIENE/GROOMING: INDEPENDENT
ORAL_HYGIENE: INDEPENDENT
ORAL_HYGIENE: INDEPENDENT
LAUNDRY: UNABLE TO COMPLETE
HYGIENE/GROOMING: INDEPENDENT

## 2019-07-02 NOTE — PHARMACY
[facility]    Pharmacy      Antimicrobial Stewardship Note     Current antimicrobial therapy:  Anti-infectives (From now, onward)    Start     Dose/Rate Route Frequency Ordered Stop    07/02/19 0600  amoxicillin-clavulanate (AUGMENTIN) 875-125 MG per tablet 1 tablet      1 tablet Oral EVERY 12 HOURS 07/02/19 0457 07/05/19 0559    07/02/19 0000  amoxicillin-clavulanate (AUGMENTIN) 875-125 MG tablet      1 tablet Oral EVERY 12 HOURS 07/01/19 1244          Indication: Otitis Media    Days of Therapy: 5      Pertinent labs:  Creatinine   Creatinine   Date Value Ref Range Status   06/20/2019 0.49 (L) 0.52 - 1.04 mg/dL Final   06/09/2019 0.58 0.52 - 1.04 mg/dL Final   06/05/2019 0.66 0.52 - 1.04 mg/dL Final     WBC   WBC   Date Value Ref Range Status   06/20/2019 7.7 4.0 - 11.0 10e9/L Final   06/09/2019 8.0 4.0 - 11.0 10e9/L Final   06/08/2019 9.1 4.0 - 11.0 10e9/L Final     Procalcitonin No results found for: PCAL  CRP No results found for: CRP    Culture Results: None     Recommendations/Interventions:  1. No recommendations at this time. Will continue to monitor.     Inocencio Vitale  July 2, 2019

## 2019-07-02 NOTE — PLAN OF CARE
Problem: Adult Behavioral Health Plan of Care  Goal: Patient-Specific Goal (Individualization)  Description  Patient will attend at least 50% of groups while on unit.  Patient will report at least 6-8 hours of sleep at night.  Patient will be compliant with treatment team recommendations.  Patient will be medication compliant while hospitalized.  Patient will have decrease in delusional statements by discharge.         7/2/2019 0137 by Ayah Rodriguez, RN  Outcome: Improving  Note:   Report received from Jessica lama. Pt observed sleeping in right side lying position with regular and unlabored respirations.     Pt has been in bed with eyes closed and regular respirations. 15 minute and PRN checks all night. No complaints offered. Will continue to monitor.    AM     Face to face end of shift report communicated to oncoming RN.    July 2, 2019  1:37 AM         Problem: Fall Injury Risk  Goal: Absence of Fall and Fall-Related Injury  Description  Pt will not experience a fall while hospitalized.  Patient will have tap bell at bedside.       7/2/2019 0137 by Ayah Rodriguez, RN  Outcome: Improving  Note:   Pt was balanced and steady on her feet upon writer entry to unit. Pt was sleeping by the time report was finished.      Problem: Violence Risk or Actual  Goal: Anger and Impulse Control  Description  PT will verbalize frustrations before making verbal threats to staff  PT will remain free from violence to staff or others while inpatient   Outcome: Improving  Note:   Unable to assess. Pt is sleeping.

## 2019-07-02 NOTE — PLAN OF CARE
Pt is pleasant and cooperative with nursing assessment. She expresses excitement about leaving tomorrow. Says she is looking forward to decorating her apartment and getting a big hamburger somewhere. She continues to make delusional statements during conversation but is easily drawn back to reality. Denies SI, HI, and hallucinations. Hasn't made any complaints of pain. Is compliant with prescribed medication. Will continue to monitor.   1912: PRN Ibuprofen 400 mg po given as requested for headache  Problem: Adult Behavioral Health Plan of Care  Goal: Patient-Specific Goal (Individualization)  Description  Patient will attend at least 50% of groups while on unit.  Patient will report at least 6-8 hours of sleep at night.  Patient will be compliant with treatment team recommendations.  Patient will be medication compliant while hospitalized.  Patient will have decrease in delusional statements by discharge.         7/2/2019 1742 by Sadie Erazo, RN  Outcome: Improving     Problem: Fall Injury Risk  Goal: Absence of Fall and Fall-Related Injury  Description  Pt will not experience a fall while hospitalized.  Patient will have tap bell at bedside.       7/2/2019 1742 by Sadie Erazo, RN  Outcome: Improving     Problem: Violence Risk or Actual  Goal: Anger and Impulse Control  Description  PT will verbalize frustrations before making verbal threats to staff  PT will remain free from violence to staff or others while inpatient   7/2/2019 1742 by Sadie Erazo, RN  Outcome: Improving

## 2019-07-02 NOTE — DISCHARGE SUMMARY
"Psychiatric Discharge Summary    Ginette Wood MRN# [id number]   Age: 39 year old YOB: 1980     Date of Admission:  5/10/2019  Date of Discharge:  7/3/2019  Admitting Physician:  Shawn Wylie MD  Discharge Physician:  Loren Zuleta NP          Event Leading to Hospitalization and Hospital Stay   Admission per April THUY WAGNER on 5/10: Ginette Wood is a 39 year old  female who was just discharged from our hospital 9 days ago.  She has decompensated significantly with agitation, disorganization and is much more delusional she was upon her discharge.  At the last hospitalization she was very eager and interested in completing another type of programs such as an MI CD program then intensive residential treatment facility.     Upon arrival ginette was a bit agitated though was willing to restart her medications. She has been very sedated through the day. I had a difficult time waking her up in the monring to speak iwt her and when I tried to speak with her when she was sitting up and eating lunch she was eating though appeared very sedated and somewhat confused. Could possibly be secondary to possibility of being off of her medications for the past few days vs possibility of elevated ammonia. lfts are normal though will check ammonia. I tried to ask questions and she mumbled incoherently \"let me sleep\". She was very difficult to understand and I have not seen her this sedated in the past. I ask her if she went to her families house after discharge and she shook her head yes. I asked her if she stopped taking her medications at discharge and she shook her head no. Difficult to assess psychosis due to her level of sedation though likely it is persisting.     Hospital course: Please refer to hospitalist notes for medical management of Ginette's diabetic medications. She did have several adjustments made through the course of her stay here. Lauro Lauren was in favor of filing a petition for " commitment during this admission. However a family member could not be contacted to sign the paperwork. In the interim, Ginette did agree to stay until her medications were adjusted. Due to the increased metabolic effects and lack of efficacy, Olanzapine was discontinued and Invega was started, this was not effective in symptoms control. Tegretol was also started to help further target mood lability but this was poorly tolerated. Lithium was also tried again with poor tolerance leading to sedation. Clozaril was tried with poor tolerance, irritability, hyperthermia and tachycardia. Ginette also had an increase in appetite with weight gain, topamax was started for appetite control with adjunctive mood stabilizing benefit. Depakote was slowly discontinued as it was contributing to weight gain and metabolic effect with obviously poor mood control. Thorazine was started as need for mood lability and irritability. This was noted to have an extremely positive effect with little side effect for Genie. She tolerated the medications well and did report feeling good on it. It was scheduled and she is now on 200 mg three times a day. Buspar was restarted and titrated to 15 mg three times a day as well. She tolerated this well. Genie was also continued on gabapentin. She does currently have a guardian through [employer]. Genie is likely at baseline which is slightly grandiose and delusional. Independent living is likely not possible due to vulnerability and poor thought process.            At time of discharge, there is no evidence that patient is in immediate danger of self or others.        DIagnoses:     Schizoaffective disorder, bipolar type   Methamphetamine use disorder, moderate to severe            Labs:     Results for orders placed or performed during the hospital encounter of 05/10/19    Lower Extremity Arterial Duplex Bilateral    Narrative     LOWER EXTREMITY ARTERIAL DUPLEX BILATERAL, 5/20/2019 2:36 PM    History:  Female, age 39 years; Evaluate for PVD, b/l 2nd toe pain    Comparison: None.    Technique: : Grayscale Doppler ultrasound of the lower extremity  arterial structures. .    FINDINGS: The arterial structures of the lower extremities are patent  with normal waveforms and velocities. No evidence that would suggest  stenosis.      Impression    IMPRESSION:   1.  Normal examination.    IFRAH BREWER MD   Glucose by meter   Result Value Ref Range    Glucose 352 (H) 70 - 99 mg/dL   Glucose by meter   Result Value Ref Range    Glucose 391 (H) 70 - 99 mg/dL   Glucose by meter   Result Value Ref Range    Glucose 298 (H) 70 - 99 mg/dL   Ammonia   Result Value Ref Range    Ammonia 39 10 - 50 umol/L   Glucose by meter   Result Value Ref Range    Glucose 308 (H) 70 - 99 mg/dL   Glucose by meter   Result Value Ref Range    Glucose 276 (H) 70 - 99 mg/dL   Glucose by meter   Result Value Ref Range    Glucose 218 (H) 70 - 99 mg/dL   Glucose by meter   Result Value Ref Range    Glucose 265 (H) 70 - 99 mg/dL   Glucose by meter   Result Value Ref Range    Glucose 330 (H) 70 - 99 mg/dL   Glucose by meter   Result Value Ref Range    Glucose 251 (H) 70 - 99 mg/dL   Glucose by meter   Result Value Ref Range    Glucose 234 (H) 70 - 99 mg/dL   Glucose by meter   Result Value Ref Range    Glucose 290 (H) 70 - 99 mg/dL   Glucose by meter   Result Value Ref Range    Glucose 339 (H) 70 - 99 mg/dL   Glucose by meter   Result Value Ref Range    Glucose 341 (H) 70 - 99 mg/dL   Glucose by meter   Result Value Ref Range    Glucose 340 (H) 70 - 99 mg/dL   Glucose by meter   Result Value Ref Range    Glucose 275 (H) 70 - 99 mg/dL   Glucose by meter   Result Value Ref Range    Glucose 312 (H) 70 - 99 mg/dL   Glucose by meter   Result Value Ref Range    Glucose 233 (H) 70 - 99 mg/dL   Glucose by meter   Result Value Ref Range    Glucose 390 (H) 70 - 99 mg/dL   Glucose by meter   Result Value Ref Range    Glucose 273 (H) 70 - 99 mg/dL   Glucose by meter    Result Value Ref Range    Glucose 357 (H) 70 - 99 mg/dL   Glucose by meter   Result Value Ref Range    Glucose 374 (H) 70 - 99 mg/dL   Glucose by meter   Result Value Ref Range    Glucose 293 (H) 70 - 99 mg/dL   Glucose by meter   Result Value Ref Range    Glucose 296 (H) 70 - 99 mg/dL   Valproic acid   Result Value Ref Range    Valproic Acid Level 84 50 - 100 mg/L   Ammonia   Result Value Ref Range    Ammonia <10 (L) 10 - 50 umol/L   Glucose by meter   Result Value Ref Range    Glucose 255 (H) 70 - 99 mg/dL   Glucose by meter   Result Value Ref Range    Glucose 199 (H) 70 - 99 mg/dL   Glucose by meter   Result Value Ref Range    Glucose 201 (H) 70 - 99 mg/dL   Comprehensive metabolic panel   Result Value Ref Range    Sodium 138 133 - 144 mmol/L    Potassium 4.9 3.4 - 5.3 mmol/L    Chloride 106 94 - 109 mmol/L    Carbon Dioxide 27 20 - 32 mmol/L    Anion Gap 5 3 - 14 mmol/L    Glucose 193 (H) 70 - 99 mg/dL    Urea Nitrogen 10 7 - 30 mg/dL    Creatinine 0.49 (L) 0.52 - 1.04 mg/dL    GFR Estimate >90 >60 mL/min/[1.73_m2]    GFR Estimate If Black >90 >60 mL/min/[1.73_m2]    Calcium 9.3 8.5 - 10.1 mg/dL    Bilirubin Total 0.2 0.2 - 1.3 mg/dL    Albumin 3.6 3.4 - 5.0 g/dL    Protein Total 7.4 6.8 - 8.8 g/dL    Alkaline Phosphatase 63 40 - 150 U/L    ALT 15 0 - 50 U/L    AST 3 0 - 45 U/L   CBC with platelets differential   Result Value Ref Range    WBC 9.9 4.0 - 11.0 10e9/L    RBC Count 4.71 3.8 - 5.2 10e12/L    Hemoglobin 13.3 11.7 - 15.7 g/dL    Hematocrit 41.3 35.0 - 47.0 %    MCV 88 78 - 100 fl    MCH 28.2 26.5 - 33.0 pg    MCHC 32.2 31.5 - 36.5 g/dL    RDW 13.1 10.0 - 15.0 %    Platelet Count 195 150 - 450 10e9/L    Diff Method Automated Method     % Neutrophils 59.3 %    % Lymphocytes 29.9 %    % Monocytes 7.8 %    % Eosinophils 0.9 %    % Basophils 0.5 %    % Immature Granulocytes 1.6 %    Nucleated RBCs 0 0 /100    Absolute Neutrophil 5.9 1.6 - 8.3 10e9/L    Absolute Lymphocytes 3.0 0.8 - 5.3 10e9/L    Absolute  Monocytes 0.8 0.0 - 1.3 10e9/L    Absolute Eosinophils 0.1 0.0 - 0.7 10e9/L    Absolute Basophils 0.1 0.0 - 0.2 10e9/L    Abs Immature Granulocytes 0.2 0 - 0.4 10e9/L    Absolute Nucleated RBC 0.0    Glucose by meter   Result Value Ref Range    Glucose 270 (H) 70 - 99 mg/dL   Glucose by meter   Result Value Ref Range    Glucose 302 (H) 70 - 99 mg/dL   Glucose by meter   Result Value Ref Range    Glucose 302 (H) 70 - 99 mg/dL   Glucose by meter   Result Value Ref Range    Glucose 264 (H) 70 - 99 mg/dL   Glucose by meter   Result Value Ref Range    Glucose 297 (H) 70 - 99 mg/dL   Glucose by meter   Result Value Ref Range    Glucose 254 (H) 70 - 99 mg/dL   Glucose by meter   Result Value Ref Range    Glucose 282 (H) 70 - 99 mg/dL   Glucose by meter   Result Value Ref Range    Glucose 265 (H) 70 - 99 mg/dL   Glucose by meter   Result Value Ref Range    Glucose 341 (H) 70 - 99 mg/dL   Glucose by meter   Result Value Ref Range    Glucose 263 (H) 70 - 99 mg/dL   Glucose by meter   Result Value Ref Range    Glucose 278 (H) 70 - 99 mg/dL   UA without Microscopic   Result Value Ref Range    Color Urine Straw     Appearance Urine Clear     Glucose Urine >1000 (A) NEG^Negative mg/dL    Bilirubin Urine Negative NEG^Negative    Ketones Urine 5 (A) NEG^Negative mg/dL    Specific Gravity Urine 1.017 1.003 - 1.035    Blood Urine Small (A) NEG^Negative    pH Urine 6.0 4.7 - 8.0 pH    Protein Albumin Urine Negative NEG^Negative mg/dL    Urobilinogen mg/dL Normal 0.0 - 2.0 mg/dL    Nitrite Urine Negative NEG^Negative    Leukocyte Esterase Urine Negative NEG^Negative    Source Midstream Urine    Glucose by meter   Result Value Ref Range    Glucose 264 (H) 70 - 99 mg/dL   Glucose by meter   Result Value Ref Range    Glucose 291 (H) 70 - 99 mg/dL   Glucose by meter   Result Value Ref Range    Glucose 190 (H) 70 - 99 mg/dL   Glucose by meter   Result Value Ref Range    Glucose 319 (H) 70 - 99 mg/dL   Glucose by meter   Result Value Ref  Range    Glucose 273 (H) 70 - 99 mg/dL   Glucose by meter   Result Value Ref Range    Glucose 281 (H) 70 - 99 mg/dL   Glucose by meter   Result Value Ref Range    Glucose 306 (H) 70 - 99 mg/dL   Glucose by meter   Result Value Ref Range    Glucose 296 (H) 70 - 99 mg/dL   Glucose by meter   Result Value Ref Range    Glucose 333 (H) 70 - 99 mg/dL   Glucose by meter   Result Value Ref Range    Glucose 290 (H) 70 - 99 mg/dL   Glucose by meter   Result Value Ref Range    Glucose 330 (H) 70 - 99 mg/dL   Glucose by meter   Result Value Ref Range    Glucose 210 (H) 70 - 99 mg/dL   Lipid profile   Result Value Ref Range    Cholesterol 164 <200 mg/dL    Triglycerides 137 <150 mg/dL    HDL Cholesterol 39 (L) >49 mg/dL    LDL Cholesterol Calculated 98 <100 mg/dL    Non HDL Cholesterol 125 <130 mg/dL   Glucose by meter   Result Value Ref Range    Glucose 251 (H) 70 - 99 mg/dL   Glucose by meter   Result Value Ref Range    Glucose 285 (H) 70 - 99 mg/dL   Glucose by meter   Result Value Ref Range    Glucose 262 (H) 70 - 99 mg/dL   Glucose by meter   Result Value Ref Range    Glucose 323 (H) 70 - 99 mg/dL   Glucose by meter   Result Value Ref Range    Glucose 233 (H) 70 - 99 mg/dL   Glucose by meter   Result Value Ref Range    Glucose 259 (H) 70 - 99 mg/dL   Glucose by meter   Result Value Ref Range    Glucose 244 (H) 70 - 99 mg/dL   Glucose by meter   Result Value Ref Range    Glucose 212 (H) 70 - 99 mg/dL   Glucose by meter   Result Value Ref Range    Glucose 234 (H) 70 - 99 mg/dL   Glucose by meter   Result Value Ref Range    Glucose 272 (H) 70 - 99 mg/dL   WBC and differential   Result Value Ref Range    WBC 9.0 4.0 - 11.0 10e9/L    Diff Method Automated Method     % Neutrophils 53.4 %    % Lymphocytes 35.0 %    % Monocytes 9.3 %    % Eosinophils 0.9 %    % Basophils 0.6 %    % Immature Granulocytes 0.8 %    Nucleated RBCs 0 0 /100    Absolute Neutrophil 4.8 1.6 - 8.3 10e9/L    Absolute Lymphocytes 3.1 0.8 - 5.3 10e9/L     Absolute Monocytes 0.8 0.0 - 1.3 10e9/L    Absolute Eosinophils 0.1 0.0 - 0.7 10e9/L    Absolute Basophils 0.1 0.0 - 0.2 10e9/L    Abs Immature Granulocytes 0.1 0 - 0.4 10e9/L    Absolute Nucleated RBC 0.0    Glucose by meter   Result Value Ref Range    Glucose 145 (H) 70 - 99 mg/dL   Glucose by meter   Result Value Ref Range    Glucose 228 (H) 70 - 99 mg/dL   Glucose by meter   Result Value Ref Range    Glucose 287 (H) 70 - 99 mg/dL   Glucose by meter   Result Value Ref Range    Glucose 144 (H) 70 - 99 mg/dL   Glucose by meter   Result Value Ref Range    Glucose 273 (H) 70 - 99 mg/dL   Basic metabolic panel   Result Value Ref Range    Sodium 144 133 - 144 mmol/L    Potassium 4.6 3.4 - 5.3 mmol/L    Chloride 113 (H) 94 - 109 mmol/L    Carbon Dioxide 24 20 - 32 mmol/L    Anion Gap 7 3 - 14 mmol/L    Glucose 183 (H) 70 - 99 mg/dL    Urea Nitrogen 16 7 - 30 mg/dL    Creatinine 0.53 0.52 - 1.04 mg/dL    GFR Estimate >90 >60 mL/min/[1.73_m2]    GFR Estimate If Black >90 >60 mL/min/[1.73_m2]    Calcium 8.6 8.5 - 10.1 mg/dL   Glucose by meter   Result Value Ref Range    Glucose 176 (H) 70 - 99 mg/dL   Glucose by meter   Result Value Ref Range    Glucose 179 (H) 70 - 99 mg/dL   Glucose by meter   Result Value Ref Range    Glucose 206 (H) 70 - 99 mg/dL   Glucose by meter   Result Value Ref Range    Glucose 226 (H) 70 - 99 mg/dL   Glucose by meter   Result Value Ref Range    Glucose 177 (H) 70 - 99 mg/dL   Glucose by meter   Result Value Ref Range    Glucose 221 (H) 70 - 99 mg/dL   Glucose by meter   Result Value Ref Range    Glucose 274 (H) 70 - 99 mg/dL     *Note: Due to a large number of results and/or encounters for the requested time period, some results have not been displayed. A complete set of results can be found in Results Review.                  Discharge Medications:     Current Discharge Medication List      START taking these medications    Details   amoxicillin-clavulanate (AUGMENTIN) 875-125 MG tablet Take 1  tablet by mouth every 12 hours  Qty: 3 tablet, Refills: 0    Associated Diagnoses: Acute otitis media, unspecified otitis media type      blood glucose (NO BRAND SPECIFIED) lancets standard Use to test blood sugar 4 times daily or as directed.  Qty: 120 each, Refills: 0    Comments: Or quantity sufficient for 30 days supply  Associated Diagnoses: Type 2 diabetes mellitus with diabetic chronic kidney disease, unspecified CKD stage, unspecified whether long term insulin use (H)      blood glucose (NO BRAND SPECIFIED) test strip Use to test blood sugar 4 times daily or as directed.  Qty: 200 strip, Refills: 0    Associated Diagnoses: Type 2 diabetes mellitus with diabetic chronic kidney disease, unspecified CKD stage, unspecified whether long term insulin use (H)      blood glucose monitoring (NO BRAND SPECIFIED) meter device kit Use to test blood sugar 4 times daily or as directed.  Qty: 1 kit, Refills: 0    Associated Diagnoses: Type 2 diabetes mellitus with diabetic chronic kidney disease, unspecified CKD stage, unspecified whether long term insulin use (H)      chlorproMAZINE 200 MG TABS Take 200 mg by mouth 3 times daily  Qty: 90 tablet, Refills: 0    Associated Diagnoses: Schizoaffective disorder, bipolar type (H)      !! insulin aspart (NOVOLOG PEN) 100 UNIT/ML pen Do Not give Correction Insulin if Pre-Meal BG less than 140.For Pre-Meal -189 1u,190-239 2u, 240-289 3u, 290-339 4u, 340- 399 5u, 400-449 6u,  BG >= 450 give 7u.   To be given with prandial insulin, and based on pre-meal blood glucose.  Qty: 3 mL, Refills: 0    Associated Diagnoses: Type 2 diabetes mellitus with diabetic chronic kidney disease, unspecified CKD stage, unspecified whether long term insulin use (H)      !! insulin aspart (NOVOLOG PEN) 100 UNIT/ML pen Inject 8 Units Subcutaneous 3 times daily (with meals)  Qty: 3 mL, Refills: 0    Associated Diagnoses: Type 2 diabetes mellitus with diabetic chronic kidney disease, unspecified CKD  stage, unspecified whether long term insulin use (H)      insulin glargine (LANTUS SOLOSTAR PEN) 100 UNIT/ML pen Inject 40 Units Subcutaneous At Bedtime  Qty: 3 mL, Refills: 0    Associated Diagnoses: Type 2 diabetes mellitus with diabetic chronic kidney disease, unspecified CKD stage, unspecified whether long term insulin use (H)      insulin pen needle (32G X 6 MM) 32G X 6 MM miscellaneous Use 5 pen needles daily or as directed.  Qty: 100 each, Refills: 1    Associated Diagnoses: Type 2 diabetes mellitus with diabetic chronic kidney disease, unspecified CKD stage, unspecified whether long term insulin use (H)      polyethylene glycol (MIRALAX/GLYCOLAX) packet Take 17 g by mouth daily as needed for constipation  Qty: 30 packet, Refills: 0    Associated Diagnoses: Schizoaffective disorder, bipolar type (H)      senna-docusate (SENOKOT-S/PERICOLACE) 8.6-50 MG tablet Take 2 tablets by mouth 2 times daily  Qty: 120 tablet, Refills: 0    Associated Diagnoses: Schizoaffective disorder, bipolar type (H)      topiramate (TOPAMAX) 50 MG tablet Take 3 tablets (150 mg) by mouth daily  Qty: 90 tablet, Refills: 0    Associated Diagnoses: Schizoaffective disorder, bipolar type (H)       !! - Potential duplicate medications found. Please discuss with provider.      CONTINUE these medications which have CHANGED    Details   busPIRone (BUSPAR) 15 MG tablet Take 1 tablet (15 mg) by mouth 3 times daily  Qty: 90 tablet, Refills: 0    Associated Diagnoses: Schizoaffective disorder, bipolar type (H)      gabapentin (NEURONTIN) 400 MG capsule Take 3 capsules (1,200 mg) by mouth 3 times daily  Qty: 270 capsule, Refills: 0    Associated Diagnoses: Disorganized schizophrenia (H)      metFORMIN (GLUCOPHAGE) 1000 MG tablet Take 1 tablet (1,000 mg) by mouth 2 times daily (with meals)  Qty: 60 tablet, Refills: 0    Associated Diagnoses: Type 2 diabetes mellitus with diabetic chronic kidney disease, unspecified CKD stage, unspecified whether  long term insulin use (H)      prazosin (MINIPRESS) 1 MG capsule Take 1 capsule (1 mg) by mouth At Bedtime  Qty: 30 capsule, Refills: 0    Associated Diagnoses: Disorganized schizophrenia (H)         CONTINUE these medications which have NOT CHANGED    Details   atorvastatin (LIPITOR) 20 MG tablet Take 1 tablet (20 mg) by mouth At Bedtime  Qty: 30 tablet, Refills: 0    Associated Diagnoses: Mixed hyperlipidemia      benztropine (COGENTIN) 1 MG tablet Take 1 tablet (1 mg) by mouth 2 times daily  Qty: 60 tablet, Refills: 0    Associated Diagnoses: Disorganized schizophrenia (H)      paliperidone (INVEGA) 9 MG 24 hr tablet Take 1 tablet (9 mg) by mouth daily  Qty: 30 tablet, Refills: 0    Associated Diagnoses: Disorganized schizophrenia (H)         STOP taking these medications       clonazePAM (KLONOPIN) 0.5 MG tablet Comments:   Reason for Stopping:         divalproex sodium extended-release (DEPAKOTE ER) 500 MG 24 hr tablet Comments:   Reason for Stopping:         glipiZIDE (GLUCOTROL) 10 MG tablet Comments:   Reason for Stopping:         hydrOXYzine (ATARAX) 50 MG tablet Comments:   Reason for Stopping:         hydrOXYzine (ATARAX) 50 MG tablet Comments:   Reason for Stopping:                 Justification for dual anti-psychotic use: Not applicable       Psychiatric Examination:   Appearance:  awake, alert, adequately groomed and dressed in hospital scrubs  Attitude:  cooperative  Eye Contact:  good  Mood:  good  Affect:  intensity is dramatic  Speech:  clear, coherent  Psychomotor Behavior:  no evidence of tardive dyskinesia, dystonia, or tics and intact station, gait and muscle tone  Thought Process:  goal oriented  Associations:  loosening of associations present  Thought Content:  no evidence of suicidal ideation or homicidal ideation and baseline with some grandiose delusional thoughts  Insight:  fair  Judgment:  fair  Oriented to:  time, person, and place  Attention Span and Concentration:  fair  Recent and  Remote Memory:  fair  Fund of Knowledge: low-normal  Muscle Strength and Tone: normal  Gait and Station: Normal  Perception: patient is at baseline functioning       Discharge Plan:     Psychiatry Follow-up:      Guardian - Kerry   Supervisor - Sally Live - [phone]  Office: [phone]  Cell: [phone]     [facility]   [address]  [address]  [place]  [address]  Phone [phone]  Fax - [phone]  Transportation - [phone]     CADI screener - Florinda - [phone]     Referral to [facility]  Phone - [phone]  Fax - [phone]     [facility]  [address] [facility] [address]  Phone- [phone]  Fax- [phone]     [facility] - Chris Yates July 18 @ 2:30, come in at 1:30 for intake paperwork  [address] [place] [address]  Phone: [phone] Fax: [phone]     [facility] - Guillermina Palomaresitzel, NP - establish care appointment - Tuesday July 9 @ 11.  [address] [place] [address]  Phone:  [phone]    Fax: [phone]     [facility]   [facility]   [address] [place] [address]   Marcella: [phone] - this is the lady you met on the unit  Charisse: [phone]        Attestation:  The patient has been seen and evaluated by me,  Loren Zuleta, NP         Discharge Services Provided:    60 minutes spent on discharge services, including:  Final examination of patient.  Review and discussion of Hospital stay.  Instructions for continued outpatient care/goals.  Preparation of discharge records.  Preparation of medications refills and new prescriptions.  Preparation of Applicable referral forms.

## 2019-07-02 NOTE — PLAN OF CARE
Problem: Adult Behavioral Health Plan of Care  Goal: Patient-Specific Goal (Individualization)  Description  Patient will attend at least 50% of groups while on unit.  Patient will report at least 6-8 hours of sleep at night.  Patient will be compliant with treatment team recommendations.  Patient will be medication compliant while hospitalized.  Patient will have decrease in delusional statements by discharge.         7/1/2019 2136 by Jessica Colon, RN  Outcome: Improving  Did attend group this shift. Asked staff for a map of [place] to know who to find her way around when she is discharged.   Denies suicidal ideations. Denies voices. Pleasant and cooperative with nursing assessment. Requested and did foot soak this evening.  Problem: Thought Process Alteration  Goal: Optimal Thought Clarity  Description  Patient will hold a reality based conversation by discharge.    7/1/2019 2136 by Jessica Colon, RN  Outcome: Improving   Able to have a reality based conversation regarding all the items she wants and how the best way to get around [place] and get what she needs.  Problem: Fall Injury Risk  Goal: Absence of Fall and Fall-Related Injury  Description  Pt will not experience a fall while hospitalized.  Patient will have tap bell at bedside.       7/1/2019 2136 by Jessica Colon, RN  Outcome: Improving   Has had no falls or injuries during this hospital stay.     Face to face end of shift report communicated to night shift RN.     Jessica Colon  7/1/2019  10:28 PM

## 2019-07-02 NOTE — PROGRESS NOTES
[facility]  Psychiatric Progress Note    Subjective   This is a 39 year old female with schizoaffective disorder-bipolar type and methamphetamine use disorder severe.  Genie complains of severe constipation today and feels this is still due to the clozaril she was taking several weeks ago. Did discuss with her that this medication is likely out of her system and not causing constipation but any of her other medications could be contributing to her constipation as well as decreased activity and decreased fluid intake. Genie still complains of pain in her ear, tympanic membrane appears to be WNL but she does have some wax build up.      10 point ROS negative other than what is noted in HPI       DIagnoses:   Schizoaffective Disorder, bipolar type  Methamphetamine use disorder, moderate to severe    Right otitis media w/ effusion    Attestation:  Patient has been seen and evaluated by me,  Loren Zuleta NP          Interim History:   The patient's care was discussed with the treatment team and chart notes were reviewed.          Medications:       amoxicillin-clavulanate  1 tablet Oral Q12H     busPIRone  15 mg Oral TID     chlorproMAZINE  200 mg Oral TID     gabapentin  1,200 mg Oral TID     insulin aspart  8 Units Subcutaneous TID w/meals     insulin aspart  1-7 Units Subcutaneous TID AC     insulin glargine  40 Units Subcutaneous At Bedtime     metFORMIN  1,000 mg Oral BID w/meals     nicotine  1 patch Transdermal Daily     nicotine   Transdermal Q8H     nicotine   Transdermal Daily     polyethylene glycol  17 g Oral Daily     prazosin  1 mg Oral At Bedtime     senna-docusate  2 tablet Oral BID     topiramate  150 mg Oral Daily     alum & mag hydroxide-simethicone, artificial saliva, sore throat lozenge, glucose **OR** dextrose **OR** glucagon, ibuprofen, magnesium hydroxide, magnesium sulfate **AND** Patient care order, melatonin, nicotine          Allergies:     Allergies   Allergen Reactions      "Haloperidol Other (See Comments)     Other reaction(s): Seizures  Patient states, \"I fell down and wasn't able to get up.\"  Patient states, \"I fell down and wasn't able to get up.\"              Psychiatric Examination:   /83   Pulse 82   Temp 98.6  F (37  C) (Tympanic)   Resp 14   Ht 1.626 m (5' 4\")   Wt 87 kg (191 lb 14.4 oz)   SpO2 100%   BMI 32.94 kg/m    Weight is 191 lbs 14.4 oz  Body mass index is 32.94 kg/m .    Appearance:  awake, alert  Attitude:  cooperative  Eye Contact:  fair  Mood:  improved unless family is mentioned  Affect:  mood congruent  Speech:  less pressured  Psychomotor Behavior:  no evidence of tardive dyskinesia, dystonia, or tics and intact station, gait and muscle tone  Thought Process:  improving- mostly logical. When upset, delusional context significantly increases  Associations:  loosening of associations present  Thought Content:  no evidence of suicidal ideation or homicidal ideation and AH improved  Insight:  limited  Judgment:  limited  Oriented to:  time, person, and place  Attention Span and Concentration:  limited  Recent and Remote Memory:  fair  Fund of Knowledge: low-normal  Muscle Strength and Tone: normal  Gait and Station: Normal  Perception: appears pre-occupied at times  Right side of face- less swollen, less tenderness noted.         Labs:     Recent Results (from the past 24 hour(s))   Glucose by meter    Collection Time: 07/01/19  3:09 PM   Result Value Ref Range    Glucose 231 (H) 70 - 99 mg/dL   Glucose by meter    Collection Time: 07/01/19  4:47 PM   Result Value Ref Range    Glucose 237 (H) 70 - 99 mg/dL   Glucose by meter    Collection Time: 07/01/19  7:54 PM   Result Value Ref Range    Glucose 159 (H) 70 - 99 mg/dL   Glucose by meter    Collection Time: 07/02/19  5:57 AM   Result Value Ref Range    Glucose 171 (H) 70 - 99 mg/dL   Glucose by meter    Collection Time: 07/02/19 11:27 AM   Result Value Ref Range    Glucose 392 (H) 70 - 99 mg/dL          "    Assessment/ Plan:    Continue on current medications. Monitor pain and swelling.   Discharge tomorrow to [place]

## 2019-07-02 NOTE — PLAN OF CARE
BEHAVIORAL TEAM DISCUSSION    Participants: Loren Zuleta NP, Dennise Herrera LICSW, Angie Laws LSW,  Becca Chery LSW, Beena Kumar LGSW, Krys Rojo RN, Avis Escalante RN, Kae Arndt RN   Progress: Good   Continued Stay Criteria/Rationale: pt baseline, but unsafe to discharge to lesser level of care due to high vulnerability and risk for decomposition and hospital readmit    Medical/Physical: DM, hep C, Left foot pain- follow up with oupt provider   Precautions:   Falls precaution?:N/A  Behavioral Orders   Procedures    Code 1 - Restrict to Unit    Routine Programming     As clinically indicated    Status 15     Every 15 minutes.     Plan: Has guardian, Accepted to [facility] for tomorrow, upcoming CADI screening, follow up services arranged   Rationale for change in precautions or plan: None     Active Problems:    Mixed hyperlipidemia       Diabetes mellitus, type 2      Schizoaffective disorder, bipolar type (H)      Current Facility-Administered Medications:     alum & mag hydroxide-simethicone (MYLANTA ES/MAALOX  ES) suspension 30 mL, 30 mL, Oral, Q4H PRN, BladeelHussein gordona Tammy APRN CNP    amoxicillin-clavulanate (AUGMENTIN) 875-125 MG per tablet 1 tablet, 1 tablet, Oral, Q12H, Shawn Wylie MD, 1 tablet at 07/02/19 0554    artificial saliva (BIOTENE MT) solution 2 spray, 2 spray, Swish & Spit, 4x Daily PRN, Raven Stout, NP, 2 spray at 06/29/19 1436    benzocaine-menthol (CEPACOL) 15-3.6 MG lozenge 1 lozenge, 1 lozenge, Buccal, Q1H PRN, Hussein Fabiana Tammy APRN CNP    busPIRone (BUSPAR) tablet 15 mg, 15 mg, Oral, TID, WoHussein armstronga Tammy APRN CNP, 15 mg at 07/02/19 0813    chlorproMAZINE (THORAZINE) tablet 200 mg, 200 mg, Oral, TID, Woelheidi Natalia Tammy, APRN CNP, 200 mg at 07/02/19 0813    glucose gel 15-30 g, 15-30 g, Oral, Q15 Min PRN, 30 g at 06/08/19 1339 **OR** dextrose 50 % injection 25-50 mL, 25-50 mL, Intravenous, Q15 Min PRN **OR** glucagon injection 1 mg, 1 mg,  Subcutaneous, Q15 Min PRN, Airam Light NP    gabapentin (NEURONTIN) capsule 1,200 mg, 1,200 mg, Oral, TID, Natalia Fabian APRN CNP, 1,200 mg at 07/02/19 0813    ibuprofen (ADVIL/MOTRIN) tablet 400 mg, 400 mg, Oral, Q6H PRN, Natalia Fabian APRN CNP, 400 mg at 06/29/19 2018    insulin aspart (NovoLOG) inj (RAPID ACTING), 8 Units, Subcutaneous, TID w/meals, Denisse Malin NP, 8 Units at 07/02/19 0810    insulin aspart (NovoLOG) inj (RAPID ACTING), 1-7 Units, Subcutaneous, TID AC, Airam Light NP, 1 Units at 07/02/19 0811    insulin glargine (LANTUS PEN) injection 40 Units, 40 Units, Subcutaneous, At Bedtime, Rocio Tompkins MD, 40 Units at 07/01/19 2112    magnesium hydroxide (MILK OF MAGNESIA) suspension 30 mL, 30 mL, Oral, Daily PRN, Loren Zuleta NP, 30 mL at 06/07/19 1645    magnesium sulfate (EPSOM SALT) granules 5 g, 5 g, Other, BID PRN, 5 g at 06/30/19 1338 **AND** Patient care order, , , Per Unit Routine, Natalia Fabian APRN CNP    melatonin tablet 3 mg, 3 mg, Oral, At Bedtime PRN, Loren Zuleta NP, 3 mg at 06/28/19 0303    metFORMIN (GLUCOPHAGE) tablet 1,000 mg, 1,000 mg, Oral, BID w/meals, Baljit Stinson MD, 1,000 mg at 07/02/19 0813    nicotine (NICODERM CQ) 21 MG/24HR 24 hr patch 1 patch, 1 patch, Transdermal, Daily, Loren Zuleta NP, 1 patch at 07/02/19 0812    nicotine (NICORETTE) gum 2-4 mg, 2-4 mg, Buccal, Q1H PRN, Raven Stout NP, 4 mg at 05/21/19 0938    nicotine Patch in Place, , Transdermal, Q8H, Loren Zuleta NP, Stopped at 07/02/19 0451    nicotine patch REMOVAL, , Transdermal, Daily, Loren Zuleta NP, Stopped at 06/01/19 0842    prazosin (MINIPRESS) capsule 1 mg, 1 mg, Oral, At Bedtime, Kuldeep April JACKIE Rebollar, 1 mg at 07/01/19 2045    senna-docusate (SENOKOT-S/PERICOLACE) 8.6-50 MG per tablet 2 tablet, 2 tablet, Oral, At Bedtime, Kuldeep April JACKIE Rebollar, 2 tablet at 07/01/19 2046    topiramate (TOPAMAX)  tablet 150 mg, 150 mg, Oral, Daily, Natalia Fabian, LATRICE CNP, 150 mg at 07/02/19 0844

## 2019-07-02 NOTE — PLAN OF CARE
"Problem: Adult Behavioral Health Plan of Care  Goal: Patient-Specific Goal (Individualization)  Description  Patient will attend at least 50% of groups while on unit.  Patient will report at least 6-8 hours of sleep at night.  Patient will be compliant with treatment team recommendations.  Patient will be medication compliant while hospitalized.  Patient will have decrease in delusional statements by discharge.      7/2/2019 1446 by Edgar Escalante RN  Outcome: No Change  Pt has been up on the unit all shift. Did not attend any groups. Pleasant and cooperative on assessment. Reported pain in the (L) foot--\"hurts like hell.\" Declined offer of PRN Ibuprofen, stating \"I'm not going to take anything until it gets looked at.\" Provider informed. Compliant with medications as prescribed. Slept approximately 7 hours last night. Ate 100% of both breakfast and lunch meal. Preprandial blood sugar 392 mg/dl before lunch. Provider notified of increased blood sugar. Scheduled Novolog and coverage per sliding scale administered.     Problem: Thought Process Alteration  Goal: Optimal Thought Clarity  Description  Patient will hold a reality based conversation by discharge.    7/2/2019 1446 by Edgar Escalante RN  Outcome: No Change  Alert and oriented to person, place, time and situation. Appropriate and able to engage in reality-based conversation--then will begin to ramble and go off on tangents that appear to be delusional in nature. Denied AH and/or VH. Does not appear to be responding to internal stimuli.     Problem: Fall Injury Risk  Goal: Absence of Fall and Fall-Related Injury  Description  Pt will not experience a fall while hospitalized.  Patient will have tap bell at bedside.       7/2/2019 1446 by Edgar Escalante, RN  Outcome: No Change  Gait remains steady and balanced. Has remained free from fall(s) and/or injury this shift.    Problem: Violence Risk or Actual  Goal: Anger and Impulse " Control  Description  PT will verbalize frustrations before making verbal threats to staff  PT will remain free from violence to staff or others while inpatient   7/2/2019 1446 by Edgar Escalante, RN  Outcome: No Change  Behavior has remained appropriate all shift. No violent and/or aggressive behavior(s) demonstrated.    Face to face end of shift report communicated to oncoming RN. [facility]     Edgar Escalante  7/2/2019  3:05 PM

## 2019-07-02 NOTE — PLAN OF CARE
Spoke with Florinda from [facility] again - she is not able to come tomorrow and needs to reschedule until Friday 12 @ 10:30 - will pass this on to [facility] - gave her the address and contact information.  Also left a message for Bceca from [facility] to review.    Met with the patient and Marcella from [facility] - she would qualify for [facility] services and reviewed that it would be good for her to have more professional supports in this community.  She is open to these services.    Spoke with Becca from [place] - they can take her in the morning tomorrow instead if her appointment has been changed - will discharge at 10 and she will meet her over there.

## 2019-07-03 VITALS
DIASTOLIC BLOOD PRESSURE: 82 MMHG | WEIGHT: 191.9 LBS | SYSTOLIC BLOOD PRESSURE: 120 MMHG | HEART RATE: 96 BPM | TEMPERATURE: 97.4 F | RESPIRATION RATE: 14 BRPM | HEIGHT: 64 IN | BODY MASS INDEX: 32.76 KG/M2 | OXYGEN SATURATION: 99 %

## 2019-07-03 LAB — GLUCOSE BLDC GLUCOMTR-MCNC: 202 MG/DL (ref 70–99)

## 2019-07-03 PROCEDURE — 25000132 ZZH RX MED GY IP 250 OP 250 PS 637: Performed by: NURSE PRACTITIONER

## 2019-07-03 PROCEDURE — 25000125 ZZHC RX 250: Performed by: INTERNAL MEDICINE

## 2019-07-03 PROCEDURE — 00000146 ZZHCL STATISTIC GLUCOSE BY METER IP

## 2019-07-03 PROCEDURE — 25000132 ZZH RX MED GY IP 250 OP 250 PS 637: Performed by: PSYCHIATRY & NEUROLOGY

## 2019-07-03 RX ADMIN — NICOTINE 1 PATCH: 21 PATCH, EXTENDED RELEASE TRANSDERMAL at 08:08

## 2019-07-03 RX ADMIN — METFORMIN HYDROCHLORIDE 1000 MG: 1000 TABLET ORAL at 08:08

## 2019-07-03 RX ADMIN — AMOXICILLIN AND CLAVULANATE POTASSIUM 1 TABLET: 875; 125 TABLET, FILM COATED ORAL at 05:33

## 2019-07-03 RX ADMIN — CHLORPROMAZINE HYDROCHLORIDE 200 MG: 100 TABLET, SUGAR COATED ORAL at 08:07

## 2019-07-03 RX ADMIN — SENNOSIDES AND DOCUSATE SODIUM 2 TABLET: 8.6; 5 TABLET ORAL at 08:07

## 2019-07-03 RX ADMIN — TOPIRAMATE 150 MG: 50 TABLET ORAL at 08:08

## 2019-07-03 RX ADMIN — INSULIN ASPART 2 UNITS: 100 INJECTION, SOLUTION INTRAVENOUS; SUBCUTANEOUS at 08:11

## 2019-07-03 RX ADMIN — BUSPIRONE HYDROCHLORIDE 15 MG: 15 TABLET ORAL at 08:07

## 2019-07-03 RX ADMIN — GABAPENTIN 1200 MG: 400 CAPSULE ORAL at 08:07

## 2019-07-03 RX ADMIN — POLYETHYLENE GLYCOL 3350 17 G: 17 POWDER, FOR SOLUTION ORAL at 08:08

## 2019-07-03 ASSESSMENT — ACTIVITIES OF DAILY LIVING (ADL)
DRESS: SCRUBS (BEHAVIORAL HEALTH)
ORAL_HYGIENE: INDEPENDENT
HYGIENE/GROOMING: INDEPENDENT

## 2019-07-03 NOTE — PLAN OF CARE
Problem: Adult Behavioral Health Plan of Care  Goal: Patient-Specific Goal (Individualization)  Description  Patient will attend at least 50% of groups while on unit.  Patient will report at least 6-8 hours of sleep at night.  Patient will be compliant with treatment team recommendations.  Patient will be medication compliant while hospitalized.  Patient will have decrease in delusional statements by discharge.    2340-patient in bed with eyes closed and regular respirations noted.   0010-patient ate a banana for a snack then went to her room to bed.   0510-patient awake in lounge listening to music via headphones.   0602-blood glucose was 202.    Face to face end of shift report communicated to Nancy PERDUE RN.     Olivia Champagne  7/3/2019  6:14 AM            7/2/2019 2643 by Olivia Champagne, RN  Outcome: No Change

## 2019-07-03 NOTE — PLAN OF CARE
Patient to discharge to [facility] today - she is very excited to be leaving.  Faxed discharge information and med list to [facility] - left guardian another message and faxed discharge summary and AVS to her office, faxed discharge information to [facility] for med management follow up.  Patient will be transported by [facility].

## 2019-07-03 NOTE — DISCHARGE INSTRUCTIONS
Behavioral Discharge Planning and Instructions    Summary: Genie came into the ED with increased delusions and voices due to being off her meds    Main Diagnosis: Schizoaffective dsiorder, bipolar type , Methamphetamine use disorder, moderate to severe    Major Treatments, Procedures and Findings: Stabilize with medications, connect with community programs.    Symptoms to Report: feeling more aggressive, increased confusion, losing more sleep, mood getting worse or thoughts of suicide    Lifestyle Adjustment: Take all medications as prescribed, meet with doctor/ medication provider, , as scheduled. Abstain from alcohol or any unprescribed drugs, and follow up with Rule 25 recommendations.     Psychiatry Follow-up:     Guardian - Kerry Bradshaw  Supervisor - Sally Live - [phone]  Office: [phone]  Cell: [phone]  Fax - [phone]    [facility] [address]  [address]  [place]  [address]  Phone [phone]  Fax - [phone] or [phone]  Transportation - [phone]    CADI screener - [facility] - [phone]    Referral to [facility]  Phone - [phone]  Fax - [phone]    [facility]  [address] [facility] [address]  Phone- [phone]  Fax- [phone]    [facility] - Chris Yates July 18 @ 2:30, come in at 1:30 for intake paperwork  [address] [address]  Phone: [phone] Fax: [phone]    [facility] - establish care appointment - Tuesday July 9 @ 11.  [address] [place] [address]  Phone:  [phone]    Fax: [phone]    [facility]   Behavioral Health Home   [address] [place] [address]   Marcella: [phone] - this is the lady you met on the unit  Charisse: [phone]     Resources:   Crisis Intervention: 136.608.1786 or 355-239-5589 (TTY: 996.358.1076).  Call anytime for help.  National [place] on Mental Illness (www.[place].paramjit.org): 139.571.6511 or  "271.788.5428.  Alcoholics Anonymous (www.alcoholics-anonymous.org): Check your phone book for your local chapter.  Suicide Awareness Voices of Education (SAVE) (www.save.org): 982-813-AMPX (5138)  National Suicide Prevention Line (www.mentalhealthmn.org): 377-722-QAYK (2851)  Mental Health Consumer/Survivor Network of [place] (www.mhcsn.net): 439.586.2439 or 509-073-1784  Mental Health Association of [place] (www.mentalhealth.org): 230.914.9299 or 927-131-2843    [place] Area:  [facility], Crisis stabilization [facility]- [phone]  [facility] Crisis Line: 1-168.951.5381  Advocates For Family Peace: 072-2383  Sexual Assault Program [facility]: 249.995.7203 or 1-920.275.2066  [facility] Battered Women's Program: 1-492.284.4798 Ext: 279       Calls answered Mon-Fri-8:00 am--4:30 pm    [place]:  Advocates for Family Peace: 5-683-523-0997  [facility] - 2-722-326-4946  [facility] first call for help: 4-405-374-6750  [facility] Crisis Center:  (426) 663-6200    [place] Area:  Warm Line: 1-158.515.2845       Calls answered Tuesday--Saturday 4:00 pm--10:00 pm  [facility] Crisis Line - 361.334.6591  [facility] Crisis Stabilization 711-472-9024    [place] Statewide:  [place] Crisis and Referral Services: 4-381-137-3787  National Suicide Prevention Lifeline: 8-301-028-TALK (1101)   - wmo6jwlp- Text \"Life\" to 92783  First Call for Help: 2-1-1  [facility]LL[facility] Helpline- 1-368-QJMC-HELP    General Medication Instructions:   See your medication sheet(s) for instructions.   Take all medicines as directed.  Make no changes unless your doctor suggests them.   Go to all your doctor visits.  Be sure to have all your required lab tests. This way, your medicines can be refilled on time.  Do not use any drugs not prescribed by your doctor.  Avoid alcohol.    "

## 2019-07-03 NOTE — PLAN OF CARE
Problem: Adult Behavioral Health Plan of Care  Goal: Plan of Care Review  7/3/2019 0945 by Nancy Stern, RN  Outcome: Adequate for Discharge     Problem: Adult Behavioral Health Plan of Care  Goal: Patient-Specific Goal (Individualization)  Description  Patient will attend at least 50% of groups while on unit.  Patient will report at least 6-8 hours of sleep at night.  Patient will be compliant with treatment team recommendations.  Patient will be medication compliant while hospitalized.  Patient will have decrease in delusional statements by discharge.         7/3/2019 0945 by Nancy Stern, RN  Outcome: Completed   VSS, denies pain. Denies all criteria including:SI, SIB, HI or hallucinations.   States she has anxiety related to discharging. Denies depression.   Discharge discussed in depth including f/unit(s) appt, medications, crisis resources. Pt verbalizes understanding.  Nurse to nurse repot given to [facility]-all questions answered. Pt sent with med rec report of all meds rec'd this AM.     Discharge Note    Patient Discharged to [facility] on 7/3/2019 10:05 AM via No transportation concerns [facility] transports accompanied by [facility] staff to [facility] van.     Patient informed of discharge instructions in AVS. patient verbalizes understanding and denies having any questions pertaining to AVS. Patient stable at time of discharge. Patient denies SI, HI, and thoughts of self harm at time of discharge. All personal belongings returned to patient. Discharge prescriptions sent to [facility] via electronic communication. Psych evaluation, history and physical, AVS, and discharge summary faxed to next level of care- yes per ANDREA Stern  7/3/2019  10:05 AM

## 2019-07-09 ENCOUNTER — AMBULATORY - HEALTHEAST (OUTPATIENT)
Dept: OTHER | Facility: CLINIC | Age: 39
End: 2019-07-09

## 2019-07-09 ENCOUNTER — DOCUMENTATION ONLY (OUTPATIENT)
Dept: OTHER | Facility: CLINIC | Age: 39
End: 2019-07-09

## 2019-07-17 ENCOUNTER — OFFICE VISIT (OUTPATIENT)
Dept: BEHAVIORAL HEALTH | Facility: OTHER | Age: 39
End: 2019-07-17
Attending: SOCIAL WORKER
Payer: COMMERCIAL

## 2019-07-17 DIAGNOSIS — R69 DIAGNOSIS DEFERRED: Primary | ICD-10-CM

## 2019-07-17 NOTE — PROGRESS NOTES
Behavioral Health Home Services  [facility]: Debi Schulz      Social Work Care Navigator Note      Patient: Ginette Wood  Date: July 18, 2019  Preferred Name: Ginette    Previous PHQ-9: No flowsheet data found.  Previous KOKI-7: No flowsheet data found.  MAXI LEVEL:  No flowsheet data found.    Preferred Contact: @[id number]@  Type of Contact Today: Face to Face in Clinic      Data: (subjective / Objective):  Patient Goals Areas: Goal Areas: Health, Mental Health, Financial and Social Service Benefits, Transportation  Patient Stated Goals: Patient stated goals: get phone, eye appointment, see a thearpist, food/clothest support,   Recent ED/IP Admission or Discharge?   None    Patient came in to complete the comprehensive wellness assessment for Behavioral Health Home Services.  See [facility] Flowsheets for details on the assessment.  See [facility], Behavioral Health Home for a copy of the patient's care plan.    Christina Faribanks Social Work Care Coordinator      Referrals/other: rescheduled establish care appointment, [facility] scheduled with [facility] 7[facility]19/19    Needs: Vision appointment, sign up for [employer] phone (proof needed), follow up with  in [facility] to see what benefits patient is currently receiving, special diet form for diabetes, [facility] referral       DIPTI Nichole, JACQUELYNW     Next 5 appointments (look out 90 days)    Jul 19, 2019 10:00 AM CDT  (Arrive by 9:45 AM)  [facility] [address] [place] [id number]  [phone]   Jul 31, 2019  5:20 PM CDT  (Arrive by 5:05 PM)  Office Visit with LATRICE Branch CNP  [facility] ([facility] [address] [place] [address]  [phone]

## 2019-07-18 ENCOUNTER — TRANSFERRED RECORDS (OUTPATIENT)
Dept: HEALTH INFORMATION MANAGEMENT | Facility: CLINIC | Age: 39
End: 2019-07-18

## 2019-07-19 ENCOUNTER — OFFICE VISIT (OUTPATIENT)
Dept: BEHAVIORAL HEALTH | Facility: OTHER | Age: 39
End: 2019-07-19
Attending: SOCIAL WORKER
Payer: COMMERCIAL

## 2019-07-19 DIAGNOSIS — Z53.9 ERRONEOUS ENCOUNTER--DISREGARD: Primary | ICD-10-CM

## 2019-07-19 DIAGNOSIS — R69 DIAGNOSIS DEFERRED: Primary | ICD-10-CM

## 2019-07-19 NOTE — PROGRESS NOTES
Behavioral Health Home Services         Social Work Care Navigator Note      Patient: Ginette Wood  Date: July 19, 2019  Preferred Name: Ginette    Previous PHQ-9: No flowsheet data found.  Previous KOKI-7: No flowsheet data found.  MAXI LEVEL:  No flowsheet data found.    Preferred Contact: @[email or web address]([phone]@  Type of Contact Today: Face to Face in Clinic      Data: (subjective / Objective):  Patient Goals Areas:    Patient Stated Goals:    Recent ED/IP Admission or Discharge?   None  Recent ED/IP Admission or Discharge?   None    Patient Goals:  Goal Areas: Health;Mental Health;Financial and Social Service Benefits;Transportation  Patient stated goals: get phone, eye appointment, see a therapist, food support, primary care,         State mental health facility Core Service Provided:  Comprehensive Care Management: utilized the electronic medical record / patient registry to identify and support patient's health conditions / needs more effectively   Care Transitions: focused on the coordinated and seamless movement of patient between or within different levels of care or settings  Care Coordination: provided care management services/referrals necessary to ensure patient and their identified supports have access to medical, behavioral health, pharmacology and recovery support services.  Ensured that patient's care is integrated across all settings and services.   Health and Wellness Promotion    Current Stressors / Issues / Care Plan Objective Addressed Today:  Tired     Intervention:  Motivational Interviewing: Expressed Empathy/Understanding and Open-ended questions   Target Behavior(s): n/a    Assessment: (Progress on Goals / Homework):  SABRINA CC and SW Intern met with patient today in clinic. Patient made it to her appointment at [facility] yesterday and stated it went well. Patient was falling in and out of sleep at today's appointment, patient stated she did not sleep well last night. Patient agreeable to rescheduling appointment to  next Tuesday afternoon, ride arranged with [facility] Reyes to bring patient back home.     Plan: (Homework, other):  Patient was encouraged to continue to seek condition-related information and education.      Scheduled a Clinic follow up appointment with SABRINA KIM in 1 week     Patient has set self-identified goals and will monitor progress until the next appointment.   Christina Fairbanks, Social Work Care Coordinator             Referrals/other: n/a      ÁNGELA NicholeW, LSW     Next 5 appointments (look out 90 days)    Jul 31, 2019  5:20 PM CDT  (Arrive by 5:05 PM)  Office Visit with LATRICE Branch CNP  [facility] ([facility] ) [address]  [place] [address]  [phone]

## 2019-07-19 NOTE — PROGRESS NOTES
Appointment cancelled as patient was unable to complete appointment requirements. Will reschedule for another date.    Charisse Tadeo, MSW, LICSW, [facility]

## 2019-07-22 ENCOUNTER — TELEPHONE (OUTPATIENT)
Dept: BEHAVIORAL HEALTH | Facility: OTHER | Age: 39
End: 2019-07-22

## 2019-07-22 NOTE — TELEPHONE ENCOUNTER
[facility]  @FLOW([id number])@      Community Health Worker Note      Patient: Ginette Wood  Date: July 22, 2019  Preferred Name: Ginette    Previous PHQ-9: No flowsheet data found.  Previous KOKI-7: No flowsheet data found.  MAXI LEVEL:  No flowsheet data found.    Preferred Contact: @RUTH([id number])@  Type of Contact Today: Phone call (not reached/unavailable)      Data: (subjective / Objective):  Patient Goals Areas: @FLOW([id number])@  Patient Stated Goals: @FLOW([id number])@  Recent ED/IP Admission or Discharge?   None  Attempted to reach patient, left a message that I set up transportation for upcoming appointments (7/23 and 7/31), but was unsuccessful.  Plan to attempt again.  Jaya Winter    Referrals/other:       Next 5 appointments (look out 90 days)    Jul 23, 2019  1:00 PM CDT  (Arrive by 12:45 PM)  Return Visit with NORMA Malik  [facility] ([facility] ) [address] [place] [id number] [phone]   Jul 31, 2019  5:20 PM CDT  (Arrive by 5:05 PM)  Office Visit with LATRICE Branch CNP  [facility] ([facility] ) [address] [place] [address]  [phone]

## 2019-07-23 ENCOUNTER — OFFICE VISIT (OUTPATIENT)
Dept: BEHAVIORAL HEALTH | Facility: OTHER | Age: 39
End: 2019-07-23
Attending: SOCIAL WORKER
Payer: COMMERCIAL

## 2019-07-23 ENCOUNTER — TELEPHONE (OUTPATIENT)
Dept: BEHAVIORAL HEALTH | Facility: OTHER | Age: 39
End: 2019-07-23

## 2019-07-23 DIAGNOSIS — F33.3 MAJOR DEPRESSIVE DISORDER, RECURRENT EPISODE, SEVERE WITH MOOD-CONGRUENT PSYCHOTIC FEATURES (H): ICD-10-CM

## 2019-07-23 DIAGNOSIS — F25.0 SCHIZOAFFECTIVE DISORDER, BIPOLAR TYPE (H): Primary | ICD-10-CM

## 2019-07-23 DIAGNOSIS — F15.20 METHAMPHETAMINE USE DISORDER, SEVERE (H): ICD-10-CM

## 2019-07-23 DIAGNOSIS — R69 DIAGNOSIS DEFERRED: Primary | ICD-10-CM

## 2019-07-23 PROCEDURE — 90791 PSYCH DIAGNOSTIC EVALUATION: CPT | Performed by: SOCIAL WORKER

## 2019-07-23 ASSESSMENT — ANXIETY QUESTIONNAIRES
6. BECOMING EASILY ANNOYED OR IRRITABLE: NEARLY EVERY DAY
GAD7 TOTAL SCORE: 21
2. NOT BEING ABLE TO STOP OR CONTROL WORRYING: NEARLY EVERY DAY
5. BEING SO RESTLESS THAT IT IS HARD TO SIT STILL: NEARLY EVERY DAY
3. WORRYING TOO MUCH ABOUT DIFFERENT THINGS: NEARLY EVERY DAY
7. FEELING AFRAID AS IF SOMETHING AWFUL MIGHT HAPPEN: NEARLY EVERY DAY
1. FEELING NERVOUS, ANXIOUS, OR ON EDGE: NEARLY EVERY DAY

## 2019-07-23 ASSESSMENT — PATIENT HEALTH QUESTIONNAIRE - PHQ9
SUM OF ALL RESPONSES TO PHQ QUESTIONS 1-9: 0
5. POOR APPETITE OR OVEREATING: NEARLY EVERY DAY

## 2019-07-23 NOTE — TELEPHONE ENCOUNTER
[facility]  @FLOW([id number])@      Community Health Worker Note      Patient: Ginette Wood  Date: July 23, 2019  Preferred Name: Ginette    Previous PHQ-9: No flowsheet data found.  Previous KOKI-7: No flowsheet data found.  MAXI LEVEL:  No flowsheet data found.    Preferred Contact: @RUTH([id number])@  Type of Contact Today: Phone call (not reached/unavailable)      Data: (subjective / Objective):  Patient Goals Areas: @FLOW([id number])@  Patient Stated Goals: @FLOW([id number])@  Recent ED/IP Admission or Discharge?   None     Attempted to reach patient, but was unsuccessful. Left a message with the  individual, Lexie, to inform patient that transportation has been set up and will be picking up patient for their appointments today. Plan to attempt again.  Jaya Winter    Referrals/other:       Next 5 appointments (look out 90 days)    Jul 23, 2019  1:00 PM CDT  (Arrive by 12:45 PM)  Return Visit with NORMA Malik  [facility] ([facility] ) [address] [place] [phone]   Jul 31, 2019  5:20 PM CDT  (Arrive by 5:05 PM)  Office Visit with LATRICE Branch CNP  [facility] ([facility] ) [address] [place] [phone]  [phone]
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
## 2019-07-23 NOTE — PROGRESS NOTES
Behavioral [facility] Home Services         Social Work Care Navigator Note      Patient: Ginette Wood  Date: July 23, 2019  Preferred Name: Ginette    Previous PHQ-9: No flowsheet data found.  Previous KOKI-7: No flowsheet data found.  MAXI LEVEL:  No flowsheet data found.    Preferred Contact: @RUTH([id number])@  Type of Contact Today: Face to Face in Clinic      Data: (subjective / Objective):  Patient Goals Areas:    Patient Stated Goals:    Recent ED/IP Admission or Discharge?   None  Recent ED/IP Admission or Discharge?   None    Patient Goals:  Goal Areas: Health;Mental Health;Financial and Social Service Benefits;Transportation  Patient stated goals: get phone, eye appointment, see a therapist, food support, primary care,         [facility] Core Service Provided:  Comprehensive Care Management: utilized the electronic medical record / patient registry to identify and support patient's health conditions / needs more effectively   Care Transitions: focused on the coordinated and seamless movement of patient between or within different levels of care or settings  Care Coordination: provided care management services/referrals necessary to ensure patient and their identified supports have access to medical, behavioral health, pharmacology and recovery support services.  Ensured that patient's care is integrated across all settings and services.   Health and Wellness Promotion    Current Stressors / Issues / Care Plan Objective Addressed Today:  Falling asleep   No phone     Intervention:  Motivational Interviewing: Expressed Empathy/Understanding and Open-ended questions   Target Behavior(s): n/a    Assessment: (Progress on Goals / Homework):   Care Coordinators met with patient today in clinic, patient was a little more alert than previous visit. Patient shared she has been taking a medication in the mornings that continue to make her sleepy, patient signed ABELINO at today's visit for [place]. Patient was unable to get  her Social Security Award letter, patient given bus passes to get to the social security office. Also offered patient with Community Health visits, patient agreeable and will start seeing [facility] CHW on Monday July 7/29/19.     [facility] team will work on getting patient QLINK phone (need Award Letter for this), eye appointment, [facility] referral    Plan: (Homework, other):  Patient was encouraged to continue to seek condition-related information and education.      Scheduled a Community / Home follow up appointment with CHW in 1 week     Patient has set self-identified goals and will monitor progress until the next appointment on: 07/29/19.     Christina Fairbanks, Social Work Care Coordinator             Referrals/other: Next visit, Qlink, Eye appointment, [facility] referral       DIPTI Nichole, LSW     Next 5 appointments (look out 90 days)    Jul 23, 2019  1:00 PM CDT  (Arrive by 12:45 PM)  Return Visit with NORMA Malik  [facility] ([facility] ) [address] [place] [phone]   Jul 31, 2019  5:20 PM CDT  (Arrive by 5:05 PM)  Office Visit with LATRICE Branch CNP  [facility] ([facility] ) [address] [place] [id number]  [phone]

## 2019-07-24 ASSESSMENT — ANXIETY QUESTIONNAIRES: GAD7 TOTAL SCORE: 21

## 2019-07-29 ENCOUNTER — OFFICE VISIT (OUTPATIENT)
Dept: BEHAVIORAL HEALTH | Facility: OTHER | Age: 39
End: 2019-07-29
Attending: FAMILY MEDICINE
Payer: COMMERCIAL

## 2019-07-29 DIAGNOSIS — R69 DIAGNOSIS DEFERRED: Primary | ICD-10-CM

## 2019-07-29 NOTE — PROGRESS NOTES
[facility]         Community Health Worker Note      Patient: Ginette Wood  Date: July 29, 2019  Preferred Name: Ginette    Previous PHQ-9:   PHQ-9 SCORE 7/23/2019   PHQ-9 Total Score 0     Previous KOKI-7:   KOKI-7 SCORE 7/23/2019   Total Score 21     MAXI LEVEL:  MAXI Score (Last Two) 7/23/2019   MAXI Raw Score 29   Activation Score 52.9   MAXI Level 2       Preferred Contact: @[facility]([id number])@  Type of Contact Today: Face to Face in Home / Community      Data: (subjective / Objective):  Patient Goals Areas:    Patient Stated Goals:    Recent ED/IP Admission or Discharge?   None  Recent ED/IP Admission or Discharge?   None    Patient Goals:  Goal Areas: Health;Mental Health;Financial and Social Service Benefits;Transportation  Patient stated goals: get phone, eye appointment, see a therapist, food support, primary care,         [facility] Core Service Provided:  Care Coordination: provided care management services/referrals necessary to ensure patient and their identified supports have access to medical, behavioral health, pharmacology and recovery support services.  Ensured that patient's care is integrated across all settings and services.     Current Stressors / Issues / Care Plan Objective Addressed Today:  N/A    Intervention:  Motivational Interviewing: Expressed Empathy/Understanding and Open-ended questions   Target Behavior(s): N/A    Assessment: (Progress on Goals / Homework):    CHW met with patient at residence. Patient was sleepy as patient was tired. Patient expressed that they are having trouble sleeping and were up from 2AM to 9AM and had just woke up around 2PM. Patient was not sure if they had missed their eye appointment or if it was coming up. CHW is going to look into when that appointment is or was. CHW asked patient if patient would like community visits weekly and patient accepted. CHW will be scheduling community visits every Monday at a consistent time to check in with  patient. Patient was displaying delusional behavior, talking about stories with CHW. Patient expressed going to the Social Security office tomorrow (Tuesday July 30) to receive Award Letter. CHW encouraged patient to reach out if they need anything.    Plan: (Homework, other):  Patient was encouraged to continue to seek condition-related information and education.      Scheduled a Community / Home follow up appointment with CHW in 1 week     Patient has set self-identified goals and will monitor progress until the next appointment on: 08/05/2019.     Jaya Winter, Community Health Worker           Referrals/other:       Next 5 appointments (look out 90 days)    Jul 31, 2019  5:20 PM CDT  (Arrive by 5:05 PM)  Office Visit with LATRICE Branch CNP  [facility] ([facility] ) [address]  [place] [address]  [phone]

## 2019-07-31 ENCOUNTER — TELEPHONE (OUTPATIENT)
Dept: BEHAVIORAL HEALTH | Facility: OTHER | Age: 39
End: 2019-07-31

## 2019-07-31 NOTE — TELEPHONE ENCOUNTER
[facility]  @FLOW([id number])@      Community Health Worker Note      Patient: Ginette Wood  Date: July 31, 2019  Preferred Name: Ginette    Previous PHQ-9:   PHQ-9 SCORE 7/23/2019   PHQ-9 Total Score 0     Previous KOKI-7:   KOKI-7 SCORE 7/23/2019   Total Score 21     MAXI LEVEL:  MAXI Score (Last Two) 7/23/2019   MAXI Raw Score 29   Activation Score 52.9   MAIX Level 2       Preferred Contact: @RUTH([id number])@  Type of Contact Today: Phone call (not reached/unavailable)      Data: (subjective / Objective):  Patient Goals Areas: @FLOW([id number])@  Patient Stated Goals: @FLOW([id number])@  Recent ED/IP Admission or Discharge?   None     Attempted to reach patient, but was unsuccessful. Left message with [facility] staff to remind patient of appointment. CHW set up transportation for today's appointment.  Jaya Winter    Referrals/other:       Next 5 appointments (look out 90 days)    Jul 31, 2019  5:20 PM CDT  (Arrive by 5:05 PM)  Office Visit with LATRICE Branch CNP  [facility] ([facility] ) [address]  [place] [address]  [phone]

## 2019-08-01 PROBLEM — E66.3 OVERWEIGHT (BMI 25.0-29.9): Status: ACTIVE | Noted: 2018-06-04

## 2019-08-05 ENCOUNTER — OFFICE VISIT (OUTPATIENT)
Dept: BEHAVIORAL HEALTH | Facility: OTHER | Age: 39
End: 2019-08-05
Attending: NURSE PRACTITIONER
Payer: COMMERCIAL

## 2019-08-05 ENCOUNTER — TELEPHONE (OUTPATIENT)
Dept: BEHAVIORAL HEALTH | Facility: OTHER | Age: 39
End: 2019-08-05

## 2019-08-05 DIAGNOSIS — R69 DIAGNOSIS DEFERRED: Primary | ICD-10-CM

## 2019-08-05 NOTE — TELEPHONE ENCOUNTER
[facility]  @FLOW([id number])@      Community Health Worker Note      Patient: Ginette Wood  Date: August 5, 2019  Preferred Name: Ginette    Previous PHQ-9:   PHQ-9 SCORE 7/23/2019   PHQ-9 Total Score 0     Previous KOKI-7:   KOKI-7 SCORE 7/23/2019   Total Score 21     MAXI LEVEL:  MAXI Score (Last Two) 7/23/2019   MAXI Raw Score 29   Activation Score 52.9   MAXI Level 2       Preferred Contact: @RUTH([id number])@  Type of Contact Today: Phone call (not reached/unavailable)      Data: (subjective / Objective):  Patient Goals Areas: @FLOW([id number])@  Patient Stated Goals: @FLOW([id number])@  Recent ED/IP Admission or Discharge?   None     Attempted to reach patient, but was unsuccessful. Left message with Hemanth from [facility] to remind patient that I am coming for a community visit today. Jaya Winter CHW    Referrals/other:       Next 5 appointments (look out 90 days)    Aug 08, 2019  2:50 PM CDT  (Arrive by 2:35 PM)  Office Visit with LATRICE Branch CNP  [facility] ([facility] ) [address]  [place] [address]  [phone]

## 2019-08-05 NOTE — PROGRESS NOTES
[facility]         Community Health Worker Note      Patient: Ginette Wood  Date: August 5, 2019  Preferred Name: Ginette    Previous PHQ-9:   PHQ-9 SCORE 7/23/2019   PHQ-9 Total Score 0     Previous KOKI-7:   KOKI-7 SCORE 7/23/2019   Total Score 21     MAXI LEVEL:  MAXI Score (Last Two) 7/23/2019   MAXI Raw Score 29   Activation Score 52.9   MAXI Level 2       Preferred Contact: @[facility]([id number])@  Type of Contact Today: Face to Face in Home / Community      Data: (subjective / Objective):  Patient Goals Areas:    Patient Stated Goals:    Recent ED/IP Admission or Discharge?   None  Recent ED/IP Admission or Discharge?   None    Patient Goals:  Goal Areas: Health;Mental Health;Financial and Social Service Benefits;Transportation  Patient stated goals: get phone, eye appointment, see a therapist, food support, primary care,         [facility] Core Service Provided:  Care Coordination: provided care management services/referrals necessary to ensure patient and their identified supports have access to medical, behavioral health, pharmacology and recovery support services.  Ensured that patient's care is integrated across all settings and services.     Current Stressors / Issues / Care Plan Objective Addressed Today:  N/A    Intervention:  Motivational Interviewing: Expressed Empathy/Understanding and Open-ended questions   Target Behavior(s): N/A    Assessment: (Progress on Goals / Homework):    CHW met with patient at patient's residence. CHW reminded patient of upcoming appointments. CHW verified with Lexie from [facility] that they received patient's upcoming appointments and will be setting up transportation for those appointments. Patient continued to be sleepy, not opening eyes. Patient displayed lethargic behavior. Patient wanted to go back to sleep so the visit ended. CHW will check in with patient in one week.    Plan: (Homework, other):  Patient was encouraged to continue to seek  condition-related information and education.      Scheduled a Community / Home follow up appointment with CHW in 1 week     Patient has set self-identified goals and will monitor progress until the next appointment on: 08/12/2019.     Jaya Winter, Community Health Worker           Referrals/other:       Next 5 appointments (look out 90 days)    Aug 08, 2019  2:50 PM CDT  (Arrive by 2:35 PM)  Office Visit with LATRICE Branch CNP  [facility] ([facility] ) [address]  [place] [address]  [phone]

## 2019-08-08 ENCOUNTER — TELEPHONE (OUTPATIENT)
Dept: BEHAVIORAL HEALTH | Facility: OTHER | Age: 39
End: 2019-08-08

## 2019-08-08 NOTE — TELEPHONE ENCOUNTER
[facility]  @FLOW([id number])@      Community Health Worker Note      Patient: Ginette Wood  Date: August 8, 2019  Preferred Name: Ginette    Previous PHQ-9:   PHQ-9 SCORE 7/23/2019   PHQ-9 Total Score 0     Previous KOKI-7:   KOKI-7 SCORE 7/23/2019   Total Score 21     MAXI LEVEL:  MAXI Score (Last Two) 7/23/2019   MAXI Raw Score 29   Activation Score 52.9   MAXI Level 2       Preferred Contact: @RUTH([id number])@  Type of Contact Today: Phone call (not reached/unavailable)      Data: (subjective / Objective):  Patient Goals Areas: @FLOW([id number])@  Patient Stated Goals: @FLOW([id number])@  Recent ED/IP Admission or Discharge?   None     CHW called [employer] this morning to ensure that transportation was set up and ready for patient for today's appointment. Patient no-showed the appointment. CHW called [employer] [facility] to figure out what had happened and [facility] informed CHW that [employer] had cab transportation ready to go and the paperwork filled out. [employer] informed Patient about appointment however patient would not or did not leave bedroom. Jaya Winter CHW    Referrals/other:

## 2019-08-09 DIAGNOSIS — E11.22 TYPE 2 DIABETES MELLITUS WITH DIABETIC CHRONIC KIDNEY DISEASE, UNSPECIFIED CKD STAGE, UNSPECIFIED WHETHER LONG TERM INSULIN USE (H): Chronic | ICD-10-CM

## 2019-08-09 DIAGNOSIS — F25.0 SCHIZOAFFECTIVE DISORDER, BIPOLAR TYPE (H): ICD-10-CM

## 2019-08-09 DIAGNOSIS — F20.1 DISORGANIZED SCHIZOPHRENIA (H): ICD-10-CM

## 2019-08-09 RX ORDER — GABAPENTIN 400 MG/1
1200 CAPSULE ORAL 3 TIMES DAILY
Qty: 252 CAPSULE | Refills: 0 | Status: SHIPPED | OUTPATIENT
Start: 2019-08-09 | End: 2019-09-09

## 2019-08-09 RX ORDER — CHLORPROMAZINE HYDROCHLORIDE 200 MG/1
200 TABLET, FILM COATED ORAL 3 TIMES DAILY
Qty: 84 TABLET | Refills: 0 | Status: ON HOLD | OUTPATIENT
Start: 2019-08-09 | End: 2019-10-22

## 2019-08-09 RX ORDER — AMOXICILLIN 250 MG
2 CAPSULE ORAL 2 TIMES DAILY
Qty: 112 TABLET | Refills: 0 | Status: SHIPPED | OUTPATIENT
Start: 2019-08-09 | End: 2019-09-09

## 2019-08-09 RX ORDER — PRAZOSIN HYDROCHLORIDE 1 MG/1
1 CAPSULE ORAL AT BEDTIME
Qty: 28 CAPSULE | Refills: 0 | Status: SHIPPED | OUTPATIENT
Start: 2019-08-09 | End: 2019-09-09

## 2019-08-09 RX ORDER — BUSPIRONE HYDROCHLORIDE 15 MG/1
15 TABLET ORAL 3 TIMES DAILY
Qty: 84 TABLET | Refills: 0 | Status: SHIPPED | OUTPATIENT
Start: 2019-08-09 | End: 2019-09-09

## 2019-08-09 RX ORDER — TOPIRAMATE 50 MG/1
150 TABLET, FILM COATED ORAL DAILY
Qty: 84 TABLET | Refills: 0 | Status: SHIPPED | OUTPATIENT
Start: 2019-08-09 | End: 2019-10-14

## 2019-08-09 NOTE — TELEPHONE ENCOUNTER
Pharmacy called. Pt is a resident of [place] and was supposed to have an establish care with Guillermina Sergey yesterday however Guillermina was out. They are needing a one month fill of pt's medications so pt does not run out before she is seen for establish care visit. Please advise. Thank you!

## 2019-08-12 ENCOUNTER — OFFICE VISIT (OUTPATIENT)
Dept: BEHAVIORAL HEALTH | Facility: OTHER | Age: 39
End: 2019-08-12
Attending: NURSE PRACTITIONER
Payer: COMMERCIAL

## 2019-08-12 DIAGNOSIS — R69 DIAGNOSIS DEFERRED: Primary | ICD-10-CM

## 2019-08-12 NOTE — PROGRESS NOTES
"[facility]         Community Health Worker Note      Patient: Ginette Wood  Date: August 12, 2019  Preferred Name: Ginette    Previous PHQ-9:   PHQ-9 SCORE 7/23/2019   PHQ-9 Total Score 0     Previous KOKI-7:   KOKI-7 SCORE 7/23/2019   Total Score 21     MAXI LEVEL:  MAXI Score (Last Two) 7/23/2019   MAXI Raw Score 29   Activation Score 52.9   MAXI Level 2       Preferred Contact: @[facility]([id number])@  Type of Contact Today: Face to Face in Home / Community      Data: (subjective / Objective):  Patient Goals Areas:    Patient Stated Goals:    Recent ED/IP Admission or Discharge?   None     Attempted to reach patient, but was unsuccessful. CHW went to [facility] to meet with patient, patient refused to leave their room stating \"cancel it\" when [facility] informed patient that someone from [facility] was there to see her. CHW spoke with [facility] staff, nurse and manager. Nurse and manager informed CHW that patient is refusing taking medication at times and checking blood sugar. Blood sugar levels are reported to be not in the concerning levels, the lowest level was 70 and highest level was 200. [facility] does not make any concerning decisions until blood sugar levels are at or above 400. Manager informed CHW that patient has \"hitch hiked\" to [place] last week and the prior week. Patient has been reported to be awake towards specific individuals whom reside at [facility]. CHW asked [facility] what their procedure and action plan is from the patient refusing medication and meeting with workers, nurse informed CHW they will contact [facility] and  and see what they would like to do. [facility] followed up with [facility] but have not received a response. [facility] Team discussed and have decided to hold off on community visits at this time. Patient does not have a primary provider with [facility] and [facility] informed CHW that they can try to set up an appointment with patient and provider " from [facility]. Jaya Page    Referrals/other:

## 2019-08-15 ENCOUNTER — TELEPHONE (OUTPATIENT)
Dept: BEHAVIORAL HEALTH | Facility: OTHER | Age: 39
End: 2019-08-15

## 2019-08-15 NOTE — TELEPHONE ENCOUNTER
[facility]  @FLOW([id number])@      Community Health Worker Note      Patient: Ginette Wood  Date: August 15, 2019  Preferred Name: Ginette    Previous PHQ-9:   PHQ-9 SCORE 7/23/2019   PHQ-9 Total Score 0     Previous KOKI-7:   KOKI-7 SCORE 7/23/2019   Total Score 21     MAXI LEVEL:  MAXI Score (Last Two) 7/23/2019   MAXI Raw Score 29   Activation Score 52.9   MAXI Level 2       Preferred Contact: @[facility]([id number])@  Type of Contact Today: Phone call (patient / identified key support person reached)      Data: (subjective / Objective):  Patient Goals Areas: @FLOW([id number])@  Patient Stated Goals: @FLOW([id number])@  Recent ED/IP Admission or Discharge?   None  Recent ED/IP Admission or Discharge?   None    Patient Goals:  Goal Areas: Health;Mental Health;Financial and Social Service Benefits;Transportation  Patient stated goals: get phone, eye appointment, see a therapist, food support, primary care,         [facility] Core Service Provided:  Care Coordination: provided care management services/referrals necessary to ensure patient and their identified supports have access to medical, behavioral health, pharmacology and recovery support services.  Ensured that patient's care is integrated across all settings and services.     Current Stressors / Issues / Care Plan Objective Addressed Today:  N/A    Intervention:  Motivational Interviewing: Expressed Empathy/Understanding and Open-ended questions   Target Behavior(s): N/A    Assessment: (Progress on Goals / Homework):    CHW received phone call from patient. Patient was upset about situation on [facility] where patient is from. Patient expressed concerns with CHW. Patient apologized for missing our appointment on Monday. CHW asked patient if they would like CHW to come this following Monday to meet with patient, patient agreed. CHW reminded patient of eye appointment that is scheduled for tomorrow and upcoming doctors appointment with [facility].  Patient acknowledge and agreed. CHW encouraged patient to reach out if they need anything.     Plan: (Homework, other):  Patient was encouraged to continue to seek condition-related information and education.      Scheduled a Community / Home follow up appointment with CHW in 1 week     Patient has set self-identified goals and will monitor progress until the next appointment on: 08/19/2019.     Jaya Winter, Community Health Worker           Referrals/other:

## 2019-08-16 NOTE — PROGRESS NOTES
Subjective     Ginette Wood is a 39 year old female who presents to clinic today for the following health issues:    HPI   New Patient/Transfer of Care  Diabetes Follow-up      How often are you checking your blood sugar? Four or more times daily    What time of day are you checking your blood sugars (select all that apply)?  Before meals    Have you had any blood sugars above 200?  No    Have you had any blood sugars below 70?  No    What symptoms do you notice when your blood sugar is low?  Shaky, Dizzy, Weak, Lethargy, Blurred vision and Confusion    What concerns do you have today about your diabetes? None and Other: low blood sugars     Do you have any of these symptoms? (Select all that apply)  Numbness in feet and Burning in feet     Have you had a diabetic eye exam in the last 12 months? Yes- Date of last eye exam: 8/2019    BP Readings from Last 2 Encounters:   08/21/19 110/70   04/29/19 118/68     Hemoglobin A1C (%)   Date Value   04/30/2019 8.5 (H)   10/26/2016 9.7 (H)     LDL Cholesterol Calculated (mg/dL)   Date Value   05/21/2019 98   11/09/2016 78       Diabetes Management Resources    Schizoaffective disorder, Bipolar. Genie is followed with [facility] for her medication. She does not follow up with a counselor.   She is on Buspar, chlorpromazine, gabapentin, prazosine    Would like to get her DEPO shot. She states she has missed dosing 1-2 months late. Plan to check urine pregnancy test first.      Patient Active Problem List   Diagnosis     Mixed hyperlipidemia     Diabetes mellitus, type 2     Schizoaffective disorder, bipolar type (H)     Chronic hepatitis C virus infection (H)     Major depressive disorder, recurrent episode, severe with mood-congruent psychotic features (H)     Tobacco abuse     Methamphetamine use disorder, severe (H)     Dental caries     Hypoglycemia     Overweight (BMI 25.0-29.9)     Pain in joint, upper arm     Primary insomnia     Schizoaffective  disorder, chronic condition (H)     Suicidal ideation     Past Surgical History:   Procedure Laterality Date      SECTION       x 3     FOOT SURGERY      Right and left foot fracture repair       Social History     Tobacco Use     Smoking status: Former Smoker     Packs/day: 0.50     Years: 15.00     Pack years: 7.50     Types: Cigarettes     Smokeless tobacco: Never Used   Substance Use Topics     Alcohol use: No     Family History   Problem Relation Age of Onset     C.A.D. Mother      Diabetes Mother      C.A.D. Father      Diabetes Father          Current Outpatient Medications   Medication Sig Dispense Refill     blood glucose (NO BRAND SPECIFIED) lancets standard Use to test blood sugar 4 times daily or as directed. 120 each 0     blood glucose (NO BRAND SPECIFIED) test strip Use to test blood sugar 4 times daily or as directed. 200 strip 0     blood glucose monitoring (NO BRAND SPECIFIED) meter device kit Use to test blood sugar 4 times daily or as directed. 1 kit 0     busPIRone (BUSPAR) 15 MG tablet Take 1 tablet (15 mg) by mouth 3 times daily 84 tablet 0     chlorproMAZINE HCl 200 MG TABS Take 200 mg by mouth 3 times daily 84 tablet 0     gabapentin (NEURONTIN) 400 MG capsule Take 3 capsules (1,200 mg) by mouth 3 times daily 252 capsule 0     insulin aspart (NOVOLOG PEN) 100 UNIT/ML pen Do Not give Correction Insulin if Pre-Meal BG less than 140.For Pre-Meal -189 1u,190-239 2u, 240-289 3u, 290-339 4u, 340- 399 5u, 400-449 6u,  BG >= 450 give 7u.   To be given with prandial insulin, and based on pre-meal blood glucose. 3 mL 0     insulin aspart (NOVOLOG PEN) 100 UNIT/ML pen Inject 8 Units Subcutaneous 3 times daily (with meals) 3 mL 0     insulin glargine (LANTUS SOLOSTAR PEN) 100 UNIT/ML pen Inject 40 Units Subcutaneous At Bedtime 3 mL 0     insulin pen needle (32G X 6 MM) 32G X 6 MM miscellaneous Use 5 pen needles daily or as directed. 100 each 1     metFORMIN (GLUCOPHAGE) 1000 MG tablet Take  "1 tablet (1,000 mg) by mouth 2 times daily (with meals) 56 tablet 0     polyethylene glycol (MIRALAX/GLYCOLAX) packet Take 17 g by mouth daily as needed for constipation 30 packet 0     prazosin (MINIPRESS) 1 MG capsule Take 1 capsule (1 mg) by mouth At Bedtime 28 capsule 0     senna-docusate (SENOKOT-S/PERICOLACE) 8.6-50 MG tablet Take 2 tablets by mouth 2 times daily 112 tablet 0     topiramate (TOPAMAX) 50 MG tablet Take 3 tablets (150 mg) by mouth daily 84 tablet 0     Allergies   Allergen Reactions     Haloperidol Other (See Comments)     Other reaction(s): Seizures  Patient states, \"I fell down and wasn't able to get up.\"  Patient states, \"I fell down and wasn't able to get up.\"       BP Readings from Last 3 Encounters:   08/21/19 110/70   04/29/19 118/68   04/18/19 128/78    Wt Readings from Last 3 Encounters:   08/21/19 80.7 kg (178 lb)   04/29/19 81.6 kg (180 lb)                      Reviewed and updated as needed this visit by Provider         Review of Systems   ROS COMP: CONSTITUTIONAL:feels hot and cold  INTEGUMENTARY/SKIN: multiple scars  EYES: poor vision  ENT/MOUTH: ears popped   RESP:cough and shortness of breath, states she wheezes at times states she stopped smoking   CV: NEGATIVE for chest pain, palpitations or peripheral edema  GI: NEGATIVE for nausea, abdominal pain, heartburn, or change in bowel habits  : normal menstrual cycles and denies dysuria  MUSCULOSKELETAL: chronic pain  NEURO: generalized weakness  ENDOCRINE: Hx diabetes  PSYCHIATRIC: see above      Objective    /70   Pulse 116   Temp 97.9  F (36.6  C) (Tympanic)   Resp 18   Ht 1.626 m (5' 4\")   Wt 80.7 kg (178 lb)   SpO2 100%   BMI 30.55 kg/m    Body mass index is 30.55 kg/m .  Physical Exam   GENERAL: alert and no distress  EYES: Eyes grossly normal to inspection, PERRL and conjunctivae and sclerae normal  HENT: ear canals and TM's normal, nose and mouth without ulcers or lesions  NECK: no adenopathy, no asymmetry, " masses, or scars and thyroid normal to palpation  RESP: lungs clear to auscultation - no rales, rhonchi or wheezes  CV: regular rate and rhythm, normal S1 S2, no S3 or S4, no murmur, click or rub, no peripheral edema and peripheral pulses strong  ABDOMEN: soft, nontender, no hepatosplenomegaly, no masses and bowel sounds normal  MS: no edema, peripheral pulses normal and tenderness to palpation top of left foot   SKIN: multiple scars all over her body  PSYCH: difficult to focus, unable to stop talking appears manic.   LYMPH: normal ant/post cervical, supraclavicular nodes  Diabetic Foot Screen:  Any complaints of increased pain or numbness ?  YES over scar of left foot  Is there a foot ulcer now or a history of foot ulcer? No  Does the foot have an abnormal shape? No  Are the nails thick, too long or ingrown? No  Are there any redness or open areas? No         Sensation Testing done at all points on the diagram with monofilament     Right Foot: Sensation Normal at all points  Left Foot: Sensation Normal at all points     Risk Category: 0- No loss of protective sensation  Performed by LATRICE Johnson CNP              Diagnostic Test Results:  Labs reviewed in Epic  Wants to come back to do labs        Assessment & Plan     1. Type 2 diabetes mellitus with diabetic chronic kidney disease, unspecified CKD stage, unspecified whether long term insulin use (H)  Should go to diabetes education  Waiting on lab results and blood sugars numbers from [facility]. They did not send with tonight or did not answer when called.   - Hemoglobin A1c; Future  - Comprehensive metabolic panel (BMP + Alb, Alk Phos, ALT, AST, Total. Bili, TP); Future  - CBC with platelets and differential; Future  - Albumin Random Urine Quantitative with Creat Ratio; Future    2. Encounter for surveillance of injectable contraceptive  No DEPO today due to no labs done. Genie states she is waiting to have her labs done  - HCG Qual,  "Urine ([id number]); Future    3. Other chronic pain  Genie would like something for pain. She should not be on narcotics due to her past history of addictions.  Waiting for labs as she has a history of liver disease and possible kidney disease with her diabetes.     4. Schizoaffective disorder, bipolar  Continue to follow up with [facility] for medication management       BMI:   Estimated body mass index is 30.55 kg/m  as calculated from the following:    Height as of this encounter: 1.626 m (5' 4\").    Weight as of this encounter: 80.7 kg (178 lb).   Weight management plan: Discussed healthy diet and exercise guidelines    Plan to have [facility] assist with getting report from eye exam later this week. Also Genie states she needs assistance getting a dental appointment.     See Patient Instructions    Return in about 3 months (around 11/21/2019) for diabetes long.    LATRICE Johnson [facility] [place]      "

## 2019-08-19 ENCOUNTER — OFFICE VISIT (OUTPATIENT)
Dept: BEHAVIORAL HEALTH | Facility: OTHER | Age: 39
End: 2019-08-19
Attending: SOCIAL WORKER
Payer: COMMERCIAL

## 2019-08-19 DIAGNOSIS — R69 DIAGNOSIS DEFERRED: Primary | ICD-10-CM

## 2019-08-19 NOTE — PROGRESS NOTES
[facility]         Community Health Worker Note      Patient: Ginette Wood  Date: August 19, 2019  Preferred Name: Ginette    Previous PHQ-9:   PHQ-9 SCORE 7/23/2019   PHQ-9 Total Score 0     Previous KOKI-7:   KOKI-7 SCORE 7/23/2019   Total Score 21     MAXI LEVEL:  MAXI Score (Last Two) 7/23/2019   MAXI Raw Score 29   Activation Score 52.9   MAXI Level 2       Preferred Contact: @[facility]([id number])@  Type of Contact Today: Face to Face in Home / Community      Data: (subjective / Objective):  Patient Goals Areas:    Patient Stated Goals:    Recent ED/IP Admission or Discharge?   None  Recent ED/IP Admission or Discharge?   None    Patient Goals:  Goal Areas: Health;Mental Health;Financial and Social Service Benefits;Transportation  Patient stated goals: get phone, eye appointment, see a therapist, food support, primary care,         [facility] Core Service Provided:  Care Coordination: provided care management services/referrals necessary to ensure patient and their identified supports have access to medical, behavioral health, pharmacology and recovery support services.  Ensured that patient's care is integrated across all settings and services.     Current Stressors / Issues / Care Plan Objective Addressed Today:  N/A    Intervention:  Motivational Interviewing: Expressed Empathy/Understanding and Open-ended questions   Target Behavior(s): N/A    Assessment: (Progress on Goals / Homework):    CHW met with patient at patient's residence. Patient was excited to meet with CHW. CHW and Patient discussed the list that patient had put together that they want to work on and expressed wanting to come into the clinic. CHW will be scheduling a clinic visit for patient to meet with SABRINA KIM and CHW. Patient expressed wanting to look into [employer] [place], getting a copy of their Medical Insurance Card, requesting a new Guardianship, mail forward to [facility] instead of current residence, Award Letter for Supplementary  Security Income, diabetic meter, shower chair, fridge for room, diabetic diet accommodations, [facility] services, Supplementary Security Income card, and a planner to write appointments in. Patient also expressed interest in living at [facility] in [place]. CHW encouraged patient to reach out if they need anything.    Plan: (Homework, other):  Patient was encouraged to continue to seek condition-related information and education.      Scheduled a Community / Home follow up appointment with CHW in 1 week     Patient has set self-identified goals and will monitor progress until the next appointment on: 08/26/2019.     Jaya Winter, Community Health Worker           Referrals/other:

## 2019-08-20 DIAGNOSIS — E11.22 TYPE 2 DIABETES MELLITUS WITH DIABETIC CHRONIC KIDNEY DISEASE, UNSPECIFIED CKD STAGE, UNSPECIFIED WHETHER LONG TERM INSULIN USE (H): Chronic | ICD-10-CM

## 2019-08-21 ENCOUNTER — OFFICE VISIT (OUTPATIENT)
Dept: FAMILY MEDICINE | Facility: OTHER | Age: 39
End: 2019-08-21
Attending: NURSE PRACTITIONER
Payer: COMMERCIAL

## 2019-08-21 VITALS
WEIGHT: 178 LBS | OXYGEN SATURATION: 100 % | BODY MASS INDEX: 30.39 KG/M2 | HEART RATE: 116 BPM | SYSTOLIC BLOOD PRESSURE: 110 MMHG | DIASTOLIC BLOOD PRESSURE: 70 MMHG | TEMPERATURE: 97.9 F | RESPIRATION RATE: 18 BRPM | HEIGHT: 64 IN

## 2019-08-21 DIAGNOSIS — E11.22 TYPE 2 DIABETES MELLITUS WITH DIABETIC CHRONIC KIDNEY DISEASE, UNSPECIFIED CKD STAGE, UNSPECIFIED WHETHER LONG TERM INSULIN USE (H): Primary | Chronic | ICD-10-CM

## 2019-08-21 DIAGNOSIS — Z30.42 ENCOUNTER FOR SURVEILLANCE OF INJECTABLE CONTRACEPTIVE: ICD-10-CM

## 2019-08-21 DIAGNOSIS — F25.0 SCHIZOAFFECTIVE DISORDER, BIPOLAR TYPE (H): ICD-10-CM

## 2019-08-21 DIAGNOSIS — G89.29 OTHER CHRONIC PAIN: ICD-10-CM

## 2019-08-21 PROCEDURE — G0463 HOSPITAL OUTPT CLINIC VISIT: HCPCS

## 2019-08-21 PROCEDURE — 99214 OFFICE O/P EST MOD 30 MIN: CPT | Performed by: NURSE PRACTITIONER

## 2019-08-21 ASSESSMENT — ANXIETY QUESTIONNAIRES
GAD7 TOTAL SCORE: 21
5. BEING SO RESTLESS THAT IT IS HARD TO SIT STILL: NEARLY EVERY DAY
1. FEELING NERVOUS, ANXIOUS, OR ON EDGE: NEARLY EVERY DAY
7. FEELING AFRAID AS IF SOMETHING AWFUL MIGHT HAPPEN: NEARLY EVERY DAY
3. WORRYING TOO MUCH ABOUT DIFFERENT THINGS: NEARLY EVERY DAY
6. BECOMING EASILY ANNOYED OR IRRITABLE: NEARLY EVERY DAY
IF YOU CHECKED OFF ANY PROBLEMS ON THIS QUESTIONNAIRE, HOW DIFFICULT HAVE THESE PROBLEMS MADE IT FOR YOU TO DO YOUR WORK, TAKE CARE OF THINGS AT HOME, OR GET ALONG WITH OTHER PEOPLE: EXTREMELY DIFFICULT
2. NOT BEING ABLE TO STOP OR CONTROL WORRYING: NEARLY EVERY DAY

## 2019-08-21 ASSESSMENT — PATIENT HEALTH QUESTIONNAIRE - PHQ9
5. POOR APPETITE OR OVEREATING: NEARLY EVERY DAY
SUM OF ALL RESPONSES TO PHQ QUESTIONS 1-9: 0

## 2019-08-21 ASSESSMENT — PAIN SCALES - GENERAL: PAINLEVEL: NO PAIN (0)

## 2019-08-21 ASSESSMENT — MIFFLIN-ST. JEOR: SCORE: 1462.4

## 2019-08-21 NOTE — Clinical Note
Scotty,Can you assist in getting Genie to a dentist, her report from her diabetic eye exam (appointment should be this Friday).  Also if she would I would like her to get in with diabetes education.  I did put in for labs for her she wanted to come back to have them done. It was difficult to get her to focus during out appointment. She would be better to be during the day while you were around possible?  I am here Thursday this week. Also can we get notes from [facility]?  They may also want her labs?Guillermina POLLARD FNP-BCFamily Nurse Practitioner

## 2019-08-21 NOTE — NURSING NOTE
"No chief complaint on file.      Initial /70   Pulse 116   Temp 97.9  F (36.6  C) (Tympanic)   Resp 18   Ht 1.626 m (5' 4\")   Wt 80.7 kg (178 lb)   SpO2 100%   BMI 30.55 kg/m   Estimated body mass index is 30.55 kg/m  as calculated from the following:    Height as of this encounter: 1.626 m (5' 4\").    Weight as of this encounter: 80.7 kg (178 lb).  Medication Reconciliation: complete   Nona Schultz LPN    "

## 2019-08-22 ASSESSMENT — ANXIETY QUESTIONNAIRES: GAD7 TOTAL SCORE: 21

## 2019-08-23 ENCOUNTER — TRANSFERRED RECORDS (OUTPATIENT)
Dept: HEALTH INFORMATION MANAGEMENT | Facility: CLINIC | Age: 39
End: 2019-08-23

## 2019-08-26 ENCOUNTER — OFFICE VISIT (OUTPATIENT)
Dept: BEHAVIORAL HEALTH | Facility: OTHER | Age: 39
End: 2019-08-26
Attending: SOCIAL WORKER
Payer: COMMERCIAL

## 2019-08-26 DIAGNOSIS — R69 DIAGNOSIS DEFERRED: Primary | ICD-10-CM

## 2019-08-26 NOTE — PROGRESS NOTES
Subjective     Ginette Wood is a 39 year old female who presents to clinic today for the following health issues:    HPI   Diabetes Follow-up      How often are you checking your blood sugar? Four or more times daily    What time of day are you checking your blood sugars (select all that apply)?  Before and after meals    Have you had any blood sugars above 200?  Yes once     Have you had any blood sugars below 70?  Yes this week a couple in the 60s    What symptoms do you notice when your blood sugar is low?  Shaky    What concerns do you have today about your diabetes? None     Do you have any of these symptoms? (Select all that apply)  Numbness in feet     Have you had a diabetic eye exam in the last 12 months? Yes- Date of last eye exam: see chart    BP Readings from Last 2 Encounters:   08/28/19 112/78   08/21/19 110/70     Hemoglobin A1C (%)   Date Value   04/30/2019 8.5 (H)   10/26/2016 9.7 (H)     LDL Cholesterol Calculated (mg/dL)   Date Value   05/21/2019 98   11/09/2016 78       Diabetes Management Resources      How many servings of fruits and vegetables do you eat daily?  2-3    On average, how many sweetened beverages do you drink each day (soda, juice, sweet tea, etc)?   0  How many days per week do you miss taking your medication? Per patient, Several times because she falls asleep because of the Thorazine.     What makes it hard for you to take your medications?  falls asleep due to another prescribed medication            Patient Active Problem List   Diagnosis     Mixed hyperlipidemia     Diabetes mellitus, type 2     Schizoaffective disorder, bipolar type (H)     Chronic hepatitis C virus infection (H)     Major depressive disorder, recurrent episode, severe with mood-congruent psychotic features (H)     Tobacco abuse     Methamphetamine use disorder, severe (H)     Dental caries     Hypoglycemia     Overweight (BMI 25.0-29.9)     Pain in joint, upper arm     Primary insomnia      Schizoaffective disorder, chronic condition (H)     Suicidal ideation     Past Surgical History:   Procedure Laterality Date      SECTION       x 3     FOOT SURGERY      Right and left foot fracture repair       Social History     Tobacco Use     Smoking status: Former Smoker     Packs/day: 0.50     Years: 15.00     Pack years: 7.50     Types: Cigarettes     Smokeless tobacco: Never Used   Substance Use Topics     Alcohol use: No     Family History   Problem Relation Age of Onset     C.A.D. Mother      Diabetes Mother      C.A.D. Father      Diabetes Father          Current Outpatient Medications   Medication Sig Dispense Refill     blood glucose (NO BRAND SPECIFIED) lancets standard Use to test blood sugar 4 times daily or as directed. 120 each 0     blood glucose monitoring (NO BRAND SPECIFIED) meter device kit Use to test blood sugar 4 times daily or as directed. 1 kit 0     busPIRone (BUSPAR) 15 MG tablet Take 1 tablet (15 mg) by mouth 3 times daily 84 tablet 0     chlorproMAZINE HCl 200 MG TABS Take 200 mg by mouth 3 times daily 84 tablet 0     CONTOUR NEXT TEST test strip USE TO TEST BLOOD SUGAR 4 TIMES DAILY OR AS DIRECTED 100 strip 5     gabapentin (NEURONTIN) 400 MG capsule Take 3 capsules (1,200 mg) by mouth 3 times daily 252 capsule 0     insulin aspart (NOVOLOG PEN) 100 UNIT/ML pen Do Not give Correction Insulin if Pre-Meal BG less than 140.For Pre-Meal -189 1u,190-239 2u, 240-289 3u, 290-339 4u, 340- 399 5u, 400-449 6u,  BG >= 450 give 7u.   To be given with prandial insulin, and based on pre-meal blood glucose. 3 mL 0     insulin aspart (NOVOLOG PEN) 100 UNIT/ML pen Inject 8 Units Subcutaneous 3 times daily (with meals) 3 mL 0     insulin glargine (LANTUS SOLOSTAR PEN) 100 UNIT/ML pen Inject 40 Units Subcutaneous At Bedtime 3 mL 0     insulin pen needle (NOVOFINE) 32G X 6 MM miscellaneous USE 5 PEN NEEDLES DAILY OR AS DIRECTED 100 each 5     metFORMIN (GLUCOPHAGE) 1000 MG tablet Take 1  "tablet (1,000 mg) by mouth 2 times daily (with meals) 56 tablet 0     polyethylene glycol (MIRALAX/GLYCOLAX) packet Take 17 g by mouth daily as needed for constipation 30 packet 0     prazosin (MINIPRESS) 1 MG capsule Take 1 capsule (1 mg) by mouth At Bedtime 28 capsule 0     senna-docusate (SENOKOT-S/PERICOLACE) 8.6-50 MG tablet Take 2 tablets by mouth 2 times daily 112 tablet 0     topiramate (TOPAMAX) 50 MG tablet Take 3 tablets (150 mg) by mouth daily 84 tablet 0     Allergies   Allergen Reactions     Haloperidol Other (See Comments)     Other reaction(s): Seizures  Patient states, \"I fell down and wasn't able to get up.\"  Patient states, \"I fell down and wasn't able to get up.\"       Recent Labs   Lab Test 08/28/19  1245 06/20/19  0550 06/09/19  0834  05/21/19  0541  04/30/19  0524  04/05/19 11/09/16  0602  10/26/16  0652  04/08/14  0529   A1C 6.5*  --   --   --   --   --  8.5*  --   --   --   --   --  9.7*   < >  --    LDL  --   --   --   --  98  --   --   --   --   --  78  --   --   --  71   HDL  --   --   --   --  39*  --   --   --   --   --  26*  --   --   --  32*   TRIG  --   --   --   --  137  --   --   --   --   --  296*  --   --   --  101   ALT 22 67* 298*   < >  --    < >  --    < > 27   < >  --    < > 28   < >  --    CR 0.53 0.49* 0.58   < >  --    < >  --    < > 0.4*  --   --    < > 0.51*   < >  --    GFRESTIMATED >90 >90 >90   < >  --    < >  --    < > >60  --   --   --  >90  Non  GFR Calc     < >  --    GFRESTBLACK >90 >90 >90   < >  --    < >  --    < >  --   --   --   --  >90  African American GFR Calc     < >  --    POTASSIUM 4.6 4.7 4.8   < >  --    < >  --    < > 4.8  --   --    < >  --   --   --    TSH  --   --   --   --   --   --   --   --  1.36  --   --   --  1.12  --   --     < > = values in this interval not displayed.      BP Readings from Last 3 Encounters:   08/28/19 112/78   08/21/19 110/70   07/03/19 120/82    Wt Readings from Last 3 Encounters:   08/28/19 87.1 kg " "(192 lb)   08/21/19 80.7 kg (178 lb)   06/30/19 87 kg (191 lb 14.4 oz)                      Reviewed and updated as needed this visit by Provider         Review of Systems   ROS COMP: CONSTITUTIONAL:NEGATIVE for fever, chills, change in weight  INTEGUMENTARY/SKIN: NEGATIVE for worrisome rashes, moles or lesions  EYES: NEGATIVE for vision changes or irritation  ENT/MOUTH: NEGATIVE for ear, mouth and throat problems  RESP:SOB, smoking 1/2 PPD   CV: NEGATIVE for chest pain, palpitations or peripheral edema  GI: NEGATIVE for nausea, abdominal pain, heartburn, or change in bowel habits  : denies dysuria  MUSCULOSKELETAL: NEGATIVE for significant arthralgias or myalgia  NEURO: dizziness from thorazine - follow with [facility] for medication management  ENDOCRINE: Hx diabetes  PSYCHIATRIC: schizoaffective disorder      Objective    /78   Pulse 93   Temp 96.9  F (36.1  C) (Tympanic)   Resp 18   Ht 1.626 m (5' 4\")   Wt 87.1 kg (192 lb)   SpO2 99%   BMI 32.96 kg/m    Body mass index is 32.96 kg/m .  Physical Exam   GENERAL: healthy, alert and no distress  NECK: no adenopathy, no asymmetry, masses, or scars and thyroid normal to palpation  RESP: lungs clear to auscultation - no rales, rhonchi or wheezes  CV: regular rate and rhythm, normal S1 S2, no S3 or S4, no murmur, click or rub, no peripheral edema and peripheral pulses strong  ABDOMEN: soft, nontender, no hepatosplenomegaly, no masses and bowel sounds normal  PSYCH: mentation appears normal and affect flat  LYMPH: normal ant/post cervical, supraclavicular nodes  Diabetic Foot Screen:  Any complaints of increased pain or numbness ? No  Is there a foot ulcer now or a history of foot ulcer? No  Does the foot have an abnormal shape? No  Are the nails thick, too long or ingrown? No  Are there any redness or open areas? No         Sensation Testing done at all points on the diagram with monofilament     Right Foot: Sensation Normal at all " points  Left Foot: Sensation Normal at all points     Risk Category: 0- No loss of protective sensation  Performed by LATRICE Johnson CNP                   Diagnostic Test Results:  Labs reviewed in Epic  Results for orders placed or performed in visit on 08/28/19 (from the past 24 hour(s))   CBC with platelets and differential   Result Value Ref Range    WBC 6.6 4.0 - 11.0 10e9/L    RBC Count 4.55 3.8 - 5.2 10e12/L    Hemoglobin 13.0 11.7 - 15.7 g/dL    Hematocrit 39.3 35.0 - 47.0 %    MCV 86 78 - 100 fl    MCH 28.6 26.5 - 33.0 pg    MCHC 33.1 31.5 - 36.5 g/dL    RDW 13.2 10.0 - 15.0 %    Platelet Count 235 150 - 450 10e9/L    Diff Method Automated Method     % Neutrophils 65.9 %    % Lymphocytes 27.1 %    % Monocytes 6.1 %    % Eosinophils 0.0 %    % Basophils 0.3 %    % Immature Granulocytes 0.6 %    Nucleated RBCs 0 0 /100    Absolute Neutrophil 4.3 1.6 - 8.3 10e9/L    Absolute Lymphocytes 1.8 0.8 - 5.3 10e9/L    Absolute Monocytes 0.4 0.0 - 1.3 10e9/L    Absolute Eosinophils 0.0 0.0 - 0.7 10e9/L    Absolute Basophils 0.0 0.0 - 0.2 10e9/L    Abs Immature Granulocytes 0.0 0 - 0.4 10e9/L    Absolute Nucleated RBC 0.0    Comprehensive metabolic panel (BMP + Alb, Alk Phos, ALT, AST, Total. Bili, TP)   Result Value Ref Range    Sodium 141 133 - 144 mmol/L    Potassium 4.6 3.4 - 5.3 mmol/L    Chloride 105 94 - 109 mmol/L    Carbon Dioxide 29 20 - 32 mmol/L    Anion Gap 7 3 - 14 mmol/L    Glucose 105 (H) 70 - 99 mg/dL    Urea Nitrogen 6 (L) 7 - 30 mg/dL    Creatinine 0.53 0.52 - 1.04 mg/dL    GFR Estimate >90 >60 mL/min/[1.73_m2]    GFR Estimate If Black >90 >60 mL/min/[1.73_m2]    Calcium 8.5 8.5 - 10.1 mg/dL    Bilirubin Total 0.2 0.2 - 1.3 mg/dL    Albumin 3.7 3.4 - 5.0 g/dL    Protein Total 7.2 6.8 - 8.8 g/dL    Alkaline Phosphatase 59 40 - 150 U/L    ALT 22 0 - 50 U/L    AST 7 0 - 45 U/L   Hemoglobin A1c   Result Value Ref Range    Hemoglobin A1C 6.5 (H) 0 - 5.6 %   Albumin Random Urine Quantitative  "with Creat Ratio   Result Value Ref Range    Creatinine Urine 92 mg/dL    Albumin Urine mg/L 5 mg/L    Albumin Urine mg/g Cr 5.95 0 - 25 mg/g Cr   HCG Qual, Urine ([id number])   Result Value Ref Range    HCG Qual Urine Negative NEG^Negative           Assessment & Plan     1. Encounter for surveillance of injectable contraceptive  Restarting depo today, Negative urine pregnancy   - medroxyPROGESTERone (DEPO-PROVERA) syringe 150 mg    2. Diabetes mellitus type 2  Continue current plan of care.  Reviewed labs today   Plan to have [facility] Jaya call [facility] next week to get BGs at least morning numbers. If she continues to have some low BGs may need to consider lowering Lantus.     BMI:   Estimated body mass index is 32.96 kg/m  as calculated from the following:    Height as of this encounter: 1.626 m (5' 4\").    Weight as of this encounter: 87.1 kg (192 lb).   Weight management plan: Discussed healthy diet and exercise guidelines        See Patient Instructions    Return in about 3 months (around 11/28/2019) for diabetes and PAP.    LATRICE Johnson [facility] [place]      "

## 2019-08-26 NOTE — PROGRESS NOTES
[facility]         Community Health Worker Note      Patient: Ginette Wood  Date: August 26, 2019  Preferred Name: Ginette    Previous PHQ-9:   PHQ-9 SCORE 7/23/2019 8/21/2019   PHQ-9 Total Score 0 0     Previous KOKI-7:   KOKI-7 SCORE 7/23/2019 8/21/2019   Total Score 21 21     MAXI LEVEL:  MAXI Score (Last Two) 7/23/2019   MAXI Raw Score 29   Activation Score 52.9   MAXI Level 2       Preferred Contact: @[facility]([id number])@  Type of Contact Today: Face to Face in Home / Community      Data: (subjective / Objective):  Patient Goals Areas:    Patient Stated Goals:    Recent ED/IP Admission or Discharge?   None  Recent ED/IP Admission or Discharge?   None    Patient Goals:  Goal Areas: Health;Mental Health;Financial and Social Service Benefits;Transportation  Patient stated goals: get phone, eye appointment, see a therapist, food support, primary care,         [facility] Core Service Provided:  Care Coordination: provided care management services/referrals necessary to ensure patient and their identified supports have access to medical, behavioral health, pharmacology and recovery support services.  Ensured that patient's care is integrated across all settings and services.     Current Stressors / Issues / Care Plan Objective Addressed Today:  N/A    Intervention:  Motivational Interviewing: Expressed Empathy/Understanding and Open-ended questions   Target Behavior(s): N/A    Assessment: (Progress on Goals / Homework):    CHW met with patient at patient's residence. Patient was awake and alert, eager to meet with CHW. Patient expressed being able to see now that they have contacts and had their eye appointment. Patient informed CHW that they will be paying for their glasses/contacts this Friday as they are utilizing temporary contacts. Patient acknowledged appointments for Wednesday this week including labs, appointment with Guillermina Rincon, and appointment with SABRINA KIM and CHW. Patient informed CHW that  [facility] [place] [facility] [place] [facility] [place] and will not be back in the area until Monday September 9th. Krystal informed CHW that patient is not going anywhere and this has been mentioned three or four times prior. Patient discussed going to the Social Security office tomorrow with CHW. Patient also discussed Welfare and getting Food [place] and their EBT card with CHW. CHW encouraged patient to reach out if they need anything and that CHW and SW CC will go through patient's list of tasks at upcoming appointment.    Plan: (Homework, other):  Patient was encouraged to continue to seek condition-related information and education.      Scheduled a Community / Home follow up appointment with CHW in 1 week     Patient has set self-identified goals and will monitor progress until the next appointment on: 08/28/2019.     DIPTI Almanzar, Community Health Worker           Referrals/other:       Next 5 appointments (look out 90 days)    Aug 28, 2019  1:00 PM CDT  (Arrive by 12:45 PM)  SHORT with LATRICE Branch CNP  [facility] [place] [place] [facility] [address]  [place] [address]  [phone]   Aug 28, 2019  2:00 PM CDT  (Arrive by 1:45 PM)  Return Visit with NORMA Malik  [facility] [place] ([facility] [place] [facility] [address]  [place] [address]  [phone]   Nov 20, 2019  3:40 PM CST  (Arrive by 3:25 PM)  Office Visit with LATRICE Branch CNP  [facility] ([facility] [place] ) [address]  [place] [address]  [phone]

## 2019-08-28 ENCOUNTER — OFFICE VISIT (OUTPATIENT)
Dept: BEHAVIORAL HEALTH | Facility: OTHER | Age: 39
End: 2019-08-28
Attending: SOCIAL WORKER
Payer: COMMERCIAL

## 2019-08-28 ENCOUNTER — OFFICE VISIT (OUTPATIENT)
Dept: FAMILY MEDICINE | Facility: OTHER | Age: 39
End: 2019-08-28
Attending: NURSE PRACTITIONER
Payer: COMMERCIAL

## 2019-08-28 ENCOUNTER — TELEPHONE (OUTPATIENT)
Dept: BEHAVIORAL HEALTH | Facility: OTHER | Age: 39
End: 2019-08-28

## 2019-08-28 VITALS
DIASTOLIC BLOOD PRESSURE: 78 MMHG | SYSTOLIC BLOOD PRESSURE: 112 MMHG | RESPIRATION RATE: 18 BRPM | BODY MASS INDEX: 32.78 KG/M2 | TEMPERATURE: 96.9 F | WEIGHT: 192 LBS | HEART RATE: 93 BPM | HEIGHT: 64 IN | OXYGEN SATURATION: 99 %

## 2019-08-28 DIAGNOSIS — Z30.42 ENCOUNTER FOR SURVEILLANCE OF INJECTABLE CONTRACEPTIVE: ICD-10-CM

## 2019-08-28 DIAGNOSIS — R69 DIAGNOSIS DEFERRED: Primary | ICD-10-CM

## 2019-08-28 DIAGNOSIS — Z30.42 ENCOUNTER FOR SURVEILLANCE OF INJECTABLE CONTRACEPTIVE: Primary | ICD-10-CM

## 2019-08-28 DIAGNOSIS — E11.22 TYPE 2 DIABETES MELLITUS WITH DIABETIC CHRONIC KIDNEY DISEASE, UNSPECIFIED CKD STAGE, UNSPECIFIED WHETHER LONG TERM INSULIN USE (H): Chronic | ICD-10-CM

## 2019-08-28 LAB
ALBUMIN SERPL-MCNC: 3.7 G/DL (ref 3.4–5)
ALP SERPL-CCNC: 59 U/L (ref 40–150)
ALT SERPL W P-5'-P-CCNC: 22 U/L (ref 0–50)
ANION GAP SERPL CALCULATED.3IONS-SCNC: 7 MMOL/L (ref 3–14)
AST SERPL W P-5'-P-CCNC: 7 U/L (ref 0–45)
BASOPHILS # BLD AUTO: 0 10E9/L (ref 0–0.2)
BASOPHILS NFR BLD AUTO: 0.3 %
BILIRUB SERPL-MCNC: 0.2 MG/DL (ref 0.2–1.3)
BUN SERPL-MCNC: 6 MG/DL (ref 7–30)
CALCIUM SERPL-MCNC: 8.5 MG/DL (ref 8.5–10.1)
CHLORIDE SERPL-SCNC: 105 MMOL/L (ref 94–109)
CO2 SERPL-SCNC: 29 MMOL/L (ref 20–32)
CREAT SERPL-MCNC: 0.53 MG/DL (ref 0.52–1.04)
CREAT UR-MCNC: 92 MG/DL
DIFFERENTIAL METHOD BLD: NORMAL
EOSINOPHIL # BLD AUTO: 0 10E9/L (ref 0–0.7)
EOSINOPHIL NFR BLD AUTO: 0 %
ERYTHROCYTE [DISTWIDTH] IN BLOOD BY AUTOMATED COUNT: 13.2 % (ref 10–15)
EST. AVERAGE GLUCOSE BLD GHB EST-MCNC: 140 MG/DL
GFR SERPL CREATININE-BSD FRML MDRD: >90 ML/MIN/{1.73_M2}
GLUCOSE SERPL-MCNC: 105 MG/DL (ref 70–99)
HBA1C MFR BLD: 6.5 % (ref 0–5.6)
HCG UR QL: NEGATIVE
HCT VFR BLD AUTO: 39.3 % (ref 35–47)
HGB BLD-MCNC: 13 G/DL (ref 11.7–15.7)
IMM GRANULOCYTES # BLD: 0 10E9/L (ref 0–0.4)
IMM GRANULOCYTES NFR BLD: 0.6 %
LYMPHOCYTES # BLD AUTO: 1.8 10E9/L (ref 0.8–5.3)
LYMPHOCYTES NFR BLD AUTO: 27.1 %
MCH RBC QN AUTO: 28.6 PG (ref 26.5–33)
MCHC RBC AUTO-ENTMCNC: 33.1 G/DL (ref 31.5–36.5)
MCV RBC AUTO: 86 FL (ref 78–100)
MICROALBUMIN UR-MCNC: 5 MG/L
MICROALBUMIN/CREAT UR: 5.95 MG/G CR (ref 0–25)
MONOCYTES # BLD AUTO: 0.4 10E9/L (ref 0–1.3)
MONOCYTES NFR BLD AUTO: 6.1 %
NEUTROPHILS # BLD AUTO: 4.3 10E9/L (ref 1.6–8.3)
NEUTROPHILS NFR BLD AUTO: 65.9 %
NRBC # BLD AUTO: 0 10*3/UL
NRBC BLD AUTO-RTO: 0 /100
PLATELET # BLD AUTO: 235 10E9/L (ref 150–450)
POTASSIUM SERPL-SCNC: 4.6 MMOL/L (ref 3.4–5.3)
PROT SERPL-MCNC: 7.2 G/DL (ref 6.8–8.8)
RBC # BLD AUTO: 4.55 10E12/L (ref 3.8–5.2)
SODIUM SERPL-SCNC: 141 MMOL/L (ref 133–144)
WBC # BLD AUTO: 6.6 10E9/L (ref 4–11)

## 2019-08-28 PROCEDURE — 85025 COMPLETE CBC W/AUTO DIFF WBC: CPT | Mod: ZL | Performed by: NURSE PRACTITIONER

## 2019-08-28 PROCEDURE — 36415 COLL VENOUS BLD VENIPUNCTURE: CPT | Mod: ZL | Performed by: NURSE PRACTITIONER

## 2019-08-28 PROCEDURE — 83036 HEMOGLOBIN GLYCOSYLATED A1C: CPT | Mod: ZL | Performed by: NURSE PRACTITIONER

## 2019-08-28 PROCEDURE — 81025 URINE PREGNANCY TEST: CPT | Mod: ZL | Performed by: NURSE PRACTITIONER

## 2019-08-28 PROCEDURE — 99213 OFFICE O/P EST LOW 20 MIN: CPT | Performed by: NURSE PRACTITIONER

## 2019-08-28 PROCEDURE — 40000788 ZZHCL STATISTIC ESTIMATED AVERAGE GLUCOSE: Mod: ZL | Performed by: NURSE PRACTITIONER

## 2019-08-28 PROCEDURE — 80053 COMPREHEN METABOLIC PANEL: CPT | Mod: ZL | Performed by: NURSE PRACTITIONER

## 2019-08-28 PROCEDURE — G0463 HOSPITAL OUTPT CLINIC VISIT: HCPCS

## 2019-08-28 PROCEDURE — 96372 THER/PROPH/DIAG INJ SC/IM: CPT

## 2019-08-28 PROCEDURE — G0463 HOSPITAL OUTPT CLINIC VISIT: HCPCS | Mod: 25

## 2019-08-28 PROCEDURE — 82043 UR ALBUMIN QUANTITATIVE: CPT | Mod: ZL | Performed by: NURSE PRACTITIONER

## 2019-08-28 RX ORDER — MEDROXYPROGESTERONE ACETATE 150 MG/ML
150 INJECTION, SUSPENSION INTRAMUSCULAR
Status: DISCONTINUED | OUTPATIENT
Start: 2019-08-28 | End: 2019-10-22

## 2019-08-28 RX ADMIN — MEDROXYPROGESTERONE ACETATE 150 MG: 150 INJECTION, SUSPENSION INTRAMUSCULAR at 14:36

## 2019-08-28 ASSESSMENT — MIFFLIN-ST. JEOR: SCORE: 1525.91

## 2019-08-28 ASSESSMENT — PAIN SCALES - GENERAL: PAINLEVEL: SEVERE PAIN (7)

## 2019-08-28 NOTE — NURSING NOTE
"Chief Complaint   Patient presents with     Diabetes       Initial /78   Pulse 93   Temp 96.9  F (36.1  C) (Tympanic)   Resp 18   Ht 1.626 m (5' 4\")   Wt 87.1 kg (192 lb)   SpO2 99%   BMI 32.96 kg/m   Estimated body mass index is 32.96 kg/m  as calculated from the following:    Height as of this encounter: 1.626 m (5' 4\").    Weight as of this encounter: 87.1 kg (192 lb).  Medication Reconciliation: complete   Nona Schultz LPN    "

## 2019-08-28 NOTE — TELEPHONE ENCOUNTER
[facility]  @[facility]([id number])@      Community Health Worker Note      Patient: Ginette Wood  Date: August 28, 2019  Preferred Name: Ginette    Previous PHQ-9:   PHQ-9 SCORE 7/23/2019 8/21/2019   PHQ-9 Total Score 0 0     Previous KOKI-7:   KOKI-7 SCORE 7/23/2019 8/21/2019   Total Score 21 21     MAXI LEVEL:  MAXI Score (Last Two) 7/23/2019   MAXI Raw Score 29   Activation Score 52.9   MAXI Level 2       Preferred Contact: @[facility]([id number])@  Type of Contact Today: Phone call (patient / identified key support person reached)      Data: (subjective / Objective):  Patient Goals Areas: @FLOW([id number])@  Patient Stated Goals: @FLOW([id number])@  Recent ED/IP Admission or Discharge?   None  Recent ED/IP Admission or Discharge?   None    Patient Goals:  Goal Areas: Health;Mental Health;Financial and Social Service Benefits;Transportation  Patient stated goals: get phone, eye appointment, see a therapist, food support, primary care,         [facility] Core Service Provided:  Care Coordination: provided care management services/referrals necessary to ensure patient and their identified supports have access to medical, behavioral health, pharmacology and recovery support services.  Ensured that patient's care is integrated across all settings and services.     Current Stressors / Issues / Care Plan Objective Addressed Today:  N/A    Intervention:  Motivational Interviewing: Expressed Empathy/Understanding and Open-ended questions   Target Behavior(s): N/A    Assessment: (Progress on Goals / Homework):    Patient connected with CHW to ask about what time appointments were for the day. CHW reminded patient of appointments and going to lab prior. Patient acknowledged and informed CHW that they will be there. CHW encouraged patient to reach out if they need anything.    Plan: (Homework, other):  Patient was encouraged to continue to seek condition-related information and education.      Scheduled a Clinic follow up  appointment with CHW for today.     Patient has set self-identified goals and will monitor progress until the next appointment on: 08/28/2019.     DIPTI Almanzar, Community Health Worker           Referrals/other:       Next 5 appointments (look out 90 days)    Aug 28, 2019  1:00 PM CDT  (Arrive by 12:45 PM)  SHORT with LATRICE Branch CNP  [facility] [place] ([facility] [place] ) [address]  [place] [address]  [phone]   Aug 28, 2019  2:00 PM CDT  (Arrive by 1:45 PM)  Return Visit with NORMA Malik  [facility] [place] ([facility] [place] ) [address]  [place] [address] [phone]   Nov 20, 2019  3:40 PM CST  (Arrive by 3:25 PM)  Office Visit with LATRICE Branch CNP  [facility] [place] ([facility] [place] ) [address]  [place] [address]  [phone]

## 2019-08-28 NOTE — PROGRESS NOTES
Clinic Administered Medication Documentation    MEDICATION LIST:   Depo Provera Documentation    Prior to injection, verified patient identity using patient's name and date of birth. Medication was administered. Please see MAR and medication order for additional information. Patient instructed to remain in clinic for 15 minutes.    BP: 112/78    LAST PAP/EXAM: No results found for: PAP  URINE HCG:negative    NEXT INJECTION DUE: 11/13/19 - 11/27/19    Was entire vial of medication used? Yes  Vial/Syringe: Single dose vial  Expiration Date:  07/2021

## 2019-08-28 NOTE — PROGRESS NOTES
[facility]         Social Work Care Navigator Note      Patient: Ginette Wood  Date: August 28, 2019  Preferred Name: Ginette    Previous PHQ-9:   PHQ-9 SCORE 7/23/2019 8/21/2019   PHQ-9 Total Score 0 0     Previous KOKI-7:   KOKI-7 SCORE 7/23/2019 8/21/2019   Total Score 21 21     MAXI LEVEL:  MAXI Score (Last Two) 7/23/2019   MAXI Raw Score 29   Activation Score 52.9   MAXI Level 2       Preferred Contact: @[facility]([id number])@  Type of Contact Today: Face to Face in Clinic      Data: (subjective / Objective):  Patient Goals Areas:    Patient Stated Goals:    Recent ED/IP Admission or Discharge?   None  Recent ED/IP Admission or Discharge?   None    Patient Goals:  Goal Areas: Health;Mental Health;Financial and Social Service Benefits;Transportation  Patient stated goals: get phone, eye appointment, see a therapist, food support, primary care,         [facility] Core Service Provided:  Comprehensive Care Management: utilized the electronic medical record / patient registry to identify and support patient's health conditions / needs more effectively   Care Transitions: focused on the coordinated and seamless movement of patient between or within different levels of care or settings  Care Coordination: provided care management services/referrals necessary to ensure patient and their identified supports have access to medical, behavioral health, pharmacology and recovery support services.  Ensured that patient's care is integrated across all settings and services.   Health and Wellness Promotion    Current Stressors / Issues / Care Plan Objective Addressed Today:  Current living situation     Intervention:  Motivational Interviewing: Expressed Empathy/Understanding and Open-ended questions   Target Behavior(s): n/a    Assessment: (Progress on Goals / Homework):  SABRINA KIM and CHW met with patient today in clinic after her appointment with PCP. Patient is unhappy at [place], she says the food is crap and she  isn't fed enough. She also doesn't want to take her medications all the time when they give it to her.     Patient wanting to move to [place] or [place], SABRINA KIM encouraged patient to talk with her guardian/. Patient states her Guardian sells drugs and doesn't help her with anything. Encouraged patient to try and reach out and if no luck [facility] team will help with Guardian.     Patient also asked for a [facility] worker and did not have a preference on any company, patient completed ABELINO for DA to be sent with [facility].     Patient denies any other needs at this time, patient will see CHW in two weeks.     Plan: (Homework, other):  Patient was encouraged to continue to seek condition-related information and education.      Scheduled a Phone follow up appointment with SABRINA KIM in 2 weeks     Patient has set self-identified goals and will monitor progress until the next appointment.     NORMA Malik, Social Work Care Coordinator             Referrals/other: [facility]       Christina Fairbanks, ÁNGELAW, LSW

## 2019-08-29 NOTE — PROGRESS NOTES
[facility]         Community Health Worker Note      Patient: Ginette Wood  Date: August 29, 2019  Preferred Name: Ginette    Previous PHQ-9:   PHQ-9 SCORE 7/23/2019 8/21/2019   PHQ-9 Total Score 0 0     Previous KOKI-7:   KOKI-7 SCORE 7/23/2019 8/21/2019   Total Score 21 21     MAXI LEVEL:  MAXI Score (Last Two) 7/23/2019   MAXI Raw Score 29   Activation Score 52.9   MAXI Level 2       Preferred Contact: @[facility]([id number])@  Type of Contact Today: Face to Face in Clinic      Data: (subjective / Objective):  Patient Goals Areas:    Patient Stated Goals:    Recent ED/IP Admission or Discharge?   None  Recent ED/IP Admission or Discharge?   None    Patient Goals:  Goal Areas: Health;Mental Health;Financial and Social Service Benefits;Transportation  Patient stated goals: get phone, eye appointment, see a therapist, food support, primary care,         [facility] Core Service Provided:  Care Coordination: provided care management services/referrals necessary to ensure patient and their identified supports have access to medical, behavioral health, pharmacology and recovery support services.  Ensured that patient's care is integrated across all settings and services.     Current Stressors / Issues / Care Plan Objective Addressed Today:  N/A    Intervention:  Motivational Interviewing: Expressed Empathy/Understanding and Open-ended questions   Target Behavior(s): N/A    Assessment: (Progress on Goals / Homework):    CHW met with patient during patient's appointment with Guillermina Rincon and then with SABRINA Fairbanks. Patient is doing well. Guillermina Rincon asked CHW to check blood sugar levels in one week and if the levels are consistently low, Guillermina Rincon will want to cut back on Lantis. CHW will bring in documentation of levels next week for Guillermina Rincon to review and change orders if need be. Guillermina Rincon scheduled a 3-month follow up with patient. That appointment will be on  November 27 @ 2PM. Patient received the Depo shot today. Patient discussed food that they receive from residence and expressed not having a valued diet. Patient expressed wanting [place] [facility] [place] [employer] [place] [employer] [place] located in [place]. SABRINA KIM is going to work on [facility] referral for patient. Patient informed SABRINA CC and CHW that they will go to the Social Security office to get their award letter. Patient asked about receiving bus passes and  CC informed patient and CHW that those may need to come from patient's SW from [facility]. Patient mentioned that they will be leaving town on Friday 8/30 until Monday September 9. CHW encouraged patient to reach out if they need anything.    Plan: (Homework, other):  Patient was encouraged to continue to seek condition-related information and education.      Scheduled a Phone follow up appointment with CHW in 1 week     Patient has set self-identified goals and will monitor progress until the next appointment on: 09/09/2019.     DIPTI Almanzar, Community Health Worker           Referrals/other:       Next 5 appointments (look out 90 days)    Nov 27, 2019  2:00 PM CST  (Arrive by 1:45 PM)  Office Visit with LATRICE Branch CNP  [facility] ([facility] ) [address]  [place] [address]  [phone]

## 2019-09-05 ENCOUNTER — TELEPHONE (OUTPATIENT)
Dept: BEHAVIORAL HEALTH | Facility: OTHER | Age: 39
End: 2019-09-05

## 2019-09-05 NOTE — TELEPHONE ENCOUNTER
[facility]  @FLOW([id number])@      Community Health Worker Note      Patient: Ginette Wood  Date: September 5, 2019  Preferred Name: Ginette    Previous PHQ-9:   PHQ-9 SCORE 7/23/2019 8/21/2019   PHQ-9 Total Score 0 0     Previous KOKI-7:   KOKI-7 SCORE 7/23/2019 8/21/2019   Total Score 21 21     MAXI LEVEL:  MAXI Score (Last Two) 7/23/2019   MAXI Raw Score 29   Activation Score 52.9   MAXI Level 2       Preferred Contact: @RUTH([id number])@  Type of Contact Today: Phone call (not reached/unavailable)      Data: (subjective / Objective):  Patient Goals Areas: @FLOW([id number])@  Patient Stated Goals: @FLOW([id number])@  Recent ED/IP Admission or Discharge?   None  Attempted to reach patient, but was unsuccessful. Spoke with Lexie from [place], [facility] and is said to return tomorrow (9/6). Plan to attempt again.  DIPTI Almanzar    Referrals/other:       Next 5 appointments (look out 90 days)    Nov 27, 2019  2:00 PM CST  (Arrive by 1:45 PM)  Office Visit with LATRICE Branch CNP  [facility] ([facility] ) [address]  [place] [address]  [phone]

## 2019-09-09 DIAGNOSIS — F25.0 SCHIZOAFFECTIVE DISORDER, BIPOLAR TYPE (H): ICD-10-CM

## 2019-09-09 DIAGNOSIS — F20.1 DISORGANIZED SCHIZOPHRENIA (H): ICD-10-CM

## 2019-09-09 DIAGNOSIS — E11.22 TYPE 2 DIABETES MELLITUS WITH DIABETIC CHRONIC KIDNEY DISEASE, UNSPECIFIED CKD STAGE, UNSPECIFIED WHETHER LONG TERM INSULIN USE (H): Chronic | ICD-10-CM

## 2019-09-10 ENCOUNTER — TELEPHONE (OUTPATIENT)
Dept: BEHAVIORAL HEALTH | Facility: OTHER | Age: 39
End: 2019-09-10

## 2019-09-10 NOTE — TELEPHONE ENCOUNTER
[facility]  @FLOW([id number])@      Community Health Worker Note      Patient: Ginette Wood  Date: September 10, 2019  Preferred Name: Ginette    Previous PHQ-9:   PHQ-9 SCORE 7/23/2019 8/21/2019   PHQ-9 Total Score 0 0     Previous KOKI-7:   KOKI-7 SCORE 7/23/2019 8/21/2019   Total Score 21 21     MAXI LEVEL:  MAXI Score (Last Two) 7/23/2019   MAXI Raw Score 29   Activation Score 52.9   MAXI Level 2       Preferred Contact: @RUTH([id number])@  Type of Contact Today: Phone call (not reached/unavailable)      Data: (subjective / Objective):  Patient Goals Areas: @FLOW([id number])@  Patient Stated Goals: @FLOW([id number])@  Recent ED/IP Admission or Discharge?   None  Attempted to reach patient, but was unsuccessful.  Plan to attempt again.  DIPTI Almanzar    Referrals/other:       Next 5 appointments (look out 90 days)    Nov 27, 2019  2:00 PM CST  (Arrive by 1:45 PM)  Office Visit with LATRICE Branch CNP  [facility] ([facility] ) [address]  [place] [address]  [phone]

## 2019-09-11 ENCOUNTER — TELEPHONE (OUTPATIENT)
Dept: BEHAVIORAL HEALTH | Facility: OTHER | Age: 39
End: 2019-09-11

## 2019-09-11 RX ORDER — SENNOSIDES AND DOCUSATE SODIUM 8.6; 5 MG/1; MG/1
TABLET ORAL
Qty: 112 TABLET | Refills: 8 | Status: ON HOLD | OUTPATIENT
Start: 2019-09-11 | End: 2019-10-22

## 2019-09-11 RX ORDER — GABAPENTIN 400 MG/1
CAPSULE ORAL
Qty: 252 CAPSULE | Refills: 0 | Status: ON HOLD | OUTPATIENT
Start: 2019-09-11 | End: 2019-10-22

## 2019-09-11 RX ORDER — BUSPIRONE HYDROCHLORIDE 15 MG/1
TABLET ORAL
Qty: 84 TABLET | Refills: 2 | Status: SHIPPED | OUTPATIENT
Start: 2019-09-11 | End: 2019-10-15

## 2019-09-11 RX ORDER — PRAZOSIN HYDROCHLORIDE 1 MG/1
CAPSULE ORAL
Qty: 28 CAPSULE | Refills: 8 | Status: ON HOLD | OUTPATIENT
Start: 2019-09-11 | End: 2019-10-22

## 2019-09-11 NOTE — TELEPHONE ENCOUNTER
[facility]  @FLOW([id number])@      Community Health Worker Note      Patient: Ginette Wood  Date: September 11, 2019  Preferred Name: Ginette    Previous PHQ-9:   PHQ-9 SCORE 7/23/2019 8/21/2019   PHQ-9 Total Score 0 0     Previous KOKI-7:   KOKI-7 SCORE 7/23/2019 8/21/2019   Total Score 21 21     MAXI LEVEL:  MAXI Score (Last Two) 7/23/2019   MAXI Raw Score 29   Activation Score 52.9   MAXI Level 2       Preferred Contact: @RUTH([id number])@  Type of Contact Today: Phone call (not reached/unavailable)      Data: (subjective / Objective):  Patient Goals Areas: @FLOW([id number])@  Patient Stated Goals: @FLOW([id number])@  Recent ED/IP Admission or Discharge?   None  Attempted to reach patient, but was unsuccessful.  Reached out to Lexie, manager from [place], and spoke with her. Patient has not returned from their trip to [facility] and has not been in contact. CHW reached out to [facility] SW Florinda Will and was informed that patient was placed in prison and  was going to order patient to Emergency Room. CHW informed SW to keep CHW and SW in the loop about the situation. Jaya Winter BSW    Referrals/other:       Next 5 appointments (look out 90 days)    Nov 27, 2019  2:00 PM CST  (Arrive by 1:45 PM)  Office Visit with LATRICE Branch CNP  [facility] ([facility] ) [address]  [place] [address]  [phone]

## 2019-09-11 NOTE — TELEPHONE ENCOUNTER
Gabapentin   Last visit: 8.28.19  Last refill: 8.9.19 #252    Future appointments:   Next 5 appointments (look out 90 days)    Nov 27, 2019  2:00 PM CST  (Arrive by 1:45 PM)  Office Visit with LATRICE Branch CNP  [facility] ([facility] ) [address]  [place] [address]  [phone]

## 2019-09-16 ENCOUNTER — TELEPHONE (OUTPATIENT)
Dept: BEHAVIORAL HEALTH | Facility: OTHER | Age: 39
End: 2019-09-16

## 2019-09-16 NOTE — TELEPHONE ENCOUNTER
[facility]  @FLOW([id number])@      Community Health Worker Note      Patient: Ginette Wood  Date: September 16, 2019  Preferred Name: Ginette    Previous PHQ-9:   PHQ-9 SCORE 7/23/2019 8/21/2019   PHQ-9 Total Score 0 0     Previous KOKI-7:   KOKI-7 SCORE 7/23/2019 8/21/2019   Total Score 21 21     MAXI LEVEL:  MAXI Score (Last Two) 7/23/2019   MAXI Raw Score 29   Activation Score 52.9   MAXI Level 2       Preferred Contact: @[facility]([id number])@  Type of Contact Today: Phone call (not reached/unavailable)      Data: (subjective / Objective):  Patient Goals Areas: @FLOW([id number])@  Patient Stated Goals: @FLOW([id number])@  Recent ED/IP Admission or Discharge?   None     Attempted to reach patient, but was unsuccessful. CHW communicated with [place] manager, Lexie. CHW was informed that patient was currently at [facility] in [place] and was anticipated to return to [place] this week. When patient returns, patient will be getting moved to [facility] from [facility] so that patient can be in a locked facility. Plan to attempt again.  DIPTI Almanzar    Referrals/other:       Next 5 appointments (look out 90 days)    Nov 27, 2019  2:00 PM CST  (Arrive by 1:45 PM)  Office Visit with LATRICE Branch CNP  [facility] ([facility] ) [address]  [place] [address]  [phone]

## 2019-09-20 DIAGNOSIS — E11.22 TYPE 2 DIABETES MELLITUS WITH DIABETIC CHRONIC KIDNEY DISEASE, UNSPECIFIED CKD STAGE, UNSPECIFIED WHETHER LONG TERM INSULIN USE (H): Chronic | ICD-10-CM

## 2019-09-23 ENCOUNTER — OFFICE VISIT (OUTPATIENT)
Dept: BEHAVIORAL HEALTH | Facility: OTHER | Age: 39
End: 2019-09-23
Attending: NURSE PRACTITIONER
Payer: COMMERCIAL

## 2019-09-23 ENCOUNTER — TELEPHONE (OUTPATIENT)
Dept: BEHAVIORAL HEALTH | Facility: OTHER | Age: 39
End: 2019-09-23

## 2019-09-23 DIAGNOSIS — R69 DIAGNOSIS DEFERRED: Primary | ICD-10-CM

## 2019-09-23 NOTE — TELEPHONE ENCOUNTER
[facility]  @FLOW([id number])@      Community Health Worker Note      Patient: Ginette Wood  Date: September 23, 2019  Preferred Name: Ginette    Previous PHQ-9:   PHQ-9 SCORE 7/23/2019 8/21/2019   PHQ-9 Total Score 0 0     Previous KOKI-7:   KOKI-7 SCORE 7/23/2019 8/21/2019   Total Score 21 21     MAXI LEVEL:  MAXI Score (Last Two) 7/23/2019   MAXI Raw Score 29   Activation Score 52.9   MAXI Level 2       Preferred Contact: @[facility]([id number])@  Type of Contact Today: Phone call (patient / identified key support person reached)      Data: (subjective / Objective):  Patient Goals Areas: @FLOW([id number])@  Patient Stated Goals: @FLOW([id number])@  Recent ED/IP Admission or Discharge?   None  Recent ED/IP Admission or Discharge?   None    Patient Goals:  Goal Areas: Health;Mental Health;Financial and Social Service Benefits;Transportation  Patient stated goals: get phone, eye appointment, see a therapist, food support, primary care,         [facility] Core Service Provided:  Care Coordination: provided care management services/referrals necessary to ensure patient and their identified supports have access to medical, behavioral health, pharmacology and recovery support services.  Ensured that patient's care is integrated across all settings and services.     Current Stressors / Issues / Care Plan Objective Addressed Today:    N/A    Intervention:  Motivational Interviewing: Expressed Empathy/Understanding and Open-ended questions   Target Behavior(s): N/A    Assessment: (Progress on Goals / Homework):    CHW received a phone call from patient, patient has now been moved to [facility] and asked if CHW could come today to visit. CHW agreed and will be seeing patient at 2PM today. CHW encouraged patient to reach out if they need anything.    Plan: (Homework, other):  Patient was encouraged to continue to seek condition-related information and education.      Scheduled a Phone follow up appointment with CHW in  1 week     Patient has set self-identified goals and will monitor progress until the next appointment on: 09/23/2019.     DIPTI Almanzar, Community Health Worker           Referrals/other:       Next 5 appointments (look out 90 days)    Nov 27, 2019  2:00 PM CST  (Arrive by 1:45 PM)  Office Visit with LATRICE Branch CNP  [facility] ([facility] ) [address]  [place] [address]  [phone]

## 2019-09-23 NOTE — PROGRESS NOTES
[facility] Home [facility]         Community Health Worker Note      Patient: Ginette Wood  Date: September 23, 2019  Preferred Name: Ginette    Previous PHQ-9:   PHQ-9 SCORE 7/23/2019 8/21/2019   PHQ-9 Total Score 0 0     Previous KOKI-7:   KOKI-7 SCORE 7/23/2019 8/21/2019   Total Score 21 21     MAXI LEVEL:  MAXI Score (Last Two) 7/23/2019   MAXI Raw Score 29   Activation Score 52.9   MAXI Level 2       Preferred Contact: @[facility]([id number])@  Type of Contact Today: Face to Face in Home / Community      Data: (subjective / Objective):  Patient Goals Areas:    Patient Stated Goals:    Recent ED/IP Admission or Discharge?   None  Recent ED/IP Admission or Discharge?   None    Patient Goals:  Goal Areas: Health;Mental Health;Financial and Social Service Benefits;Transportation  Patient stated goals: get phone, eye appointment, see a therapist, food support, primary care,         [facility] Core Service Provided:  Care Coordination: provided care management services/referrals necessary to ensure patient and their identified supports have access to medical, behavioral health, pharmacology and recovery support services.  Ensured that patient's care is integrated across all settings and services.     Current Stressors / Issues / Care Plan Objective Addressed Today:  N/A    Intervention:  Motivational Interviewing: Expressed Empathy/Understanding and Open-ended questions   Target Behavior(s): N/A    Assessment: (Progress on Goals / Homework):    CHW met with patient at patient's residence. Patient expressed to CHW that their trip/visit to [place] did not go well and was placed in care home. Since patient did not have medication, patient expressed that from care home they were sent to [facility] in [place]. Patient was alert and prepared for visit with CHW. Patient had a list of updated information ready to give to CHW. Patient's guardian has changed to Kayla Live in [place] and patient is being transferred from  services in [facility] to [place]. Patient expressed that their C[employer] Workers, Florinda and Raven, are going to be coming to do a Cadi Waiver Assessment meeting but does not know when. Patient informed CHW that they have court in [place] on October 4, 2019 at 2PM and will be requesting to appear by video or phone. Patient also mentioned that they will be asking for a continuance. Patient reported to CHW that their medical insurance will be getting transferred to [place]. Patient's medical insurance did not cover [facility] appointment and patient is looking into coverage for costs once medical insurance is transferred. Patient's contact information has changed, phone number is now [phone]. Patient asked about getting their Supplemental Security Income Bank Card that was discussed last with CHW and SABRINA KIM. CHW scheduled a meeting between SABRINA KIM and Patient to go over updates and this bank card opportunity. CHW informed patient that they will need to collect their Award Letter from the Social Security office. CHW informed patient of upcoming appointments including the [facility] appointments and [facility] which is scheduled for October 1, 2019 at 11:20AM. CHW and patient discussed transportation. Patient asked CHW for bus passes and it was discussed that CHW nor [facility] can supply bus passes. CHW encouraged patient to reach out to residential facility for support as they have assisted in the past. CHW encouraged patient to reach out if they need anything.    Plan: (Homework, other):  Patient was encouraged to continue to seek condition-related information and education.      Scheduled a Community / Home follow up appointment with CHW in 1 week     Patient has set self-identified goals and will monitor progress until the next appointment on: 10/02/2019.     DIPTI Almanzar, Community Health Worker           Referrals/other:       Next 5 appointments (look out 90 days)     Sep 27, 2019 10:00 AM CDT  (Arrive by 9:45 AM)  Return Visit with NORMA Malik  [facility] ) [address] [place], [place] [address] [place] [address] [phone]   Nov 27, 2019  2:00 PM CST  (Arrive by 1:45 PM)  Office Visit with LATRICE Branch CNP  [facility] ([facility] ) [address] [place] [address]  [place] [address]  [phone]

## 2019-09-25 ENCOUNTER — TELEPHONE (OUTPATIENT)
Dept: BEHAVIORAL HEALTH | Facility: OTHER | Age: 39
End: 2019-09-25

## 2019-09-25 NOTE — TELEPHONE ENCOUNTER
Behavior[facility] Home Services  @FLOW([id number])@      Community Health Worker Note      Patient: Ginette Wood  Date: September 25, 2019  Preferred Name: Ginette    Previous PHQ-9:   PHQ-9 SCORE 7/23/2019 8/21/2019   PHQ-9 Total Score 0 0     Previous KOKI-7:   KOKI-7 SCORE 7/23/2019 8/21/2019   Total Score 21 21     AMXI LEVEL:  MAXI Score (Last Two) 7/23/2019   MAXI Raw Score 29   Activation Score 52.9   MAXI Level 2       Preferred Contact: @[facility]([id number])@  Type of Contact Today: Phone call (not reached/unavailable)      Data: (subjective / Objective):  Patient Goals Areas: @FLOW([id number])@  Patient Stated Goals: @FLOW([id number])@  Recent ED/IP Admission or Discharge?   None  Attempted to reach patient, but was unsuccessful.  Plan to attempt again.  DIPTI Almanzar    Referrals/other:       Next 5 appointments (look out 90 days)    Sep 27, 2019 10:00 AM CDT  (Arrive by 9:45 AM)  Return Visit with NORMA Malik  [facility] ([facility] ) [address] [place], [place] [address] [place] [address] [phone]   Nov 27, 2019  2:00 PM CST  (Arrive by 1:45 PM)  Office Visit with LATRICE Branch CNP  [facility] ([facility] ) [address] [place] [address]  [place] [address]  [phone]

## 2019-09-26 ENCOUNTER — TELEPHONE (OUTPATIENT)
Dept: BEHAVIORAL HEALTH | Facility: OTHER | Age: 39
End: 2019-09-26

## 2019-09-26 NOTE — TELEPHONE ENCOUNTER
[facility]  @FLOW([id number])@      Community Health Worker Note      Patient: Ginette Wood  Date: September 26, 2019  Preferred Name: Ginette    Previous PHQ-9:   PHQ-9 SCORE 7/23/2019 8/21/2019   PHQ-9 Total Score 0 0     Previous KOKI-7:   KOKI-7 SCORE 7/23/2019 8/21/2019   Total Score 21 21     MAXI LEVEL:  MAXI Score (Last Two) 7/23/2019   MAXI Raw Score 29   Activation Score 52.9   MAXI Level 2       Preferred Contact: @[facility]([id number])@  Type of Contact Today: Phone call (not reached/unavailable)      Data: (subjective / Objective):  Patient Goals Areas: @FLOW([id number])@  Patient Stated Goals: @FLOW([id number])@  Recent ED/IP Admission or Discharge?   None     Attempted to reach patient, but was unsuccessful. CHW connected with [facility] staff. Patient does not have transportation and [facility] is struggling to accomplish this task. Patient is in the process of being transferred to [facility] and medical insurance will be getting changed. In the meantime, CHW expressed importance for patient to attend appointments including Diabetic, Primary Care, and [facility]. [facility] staff acknowledged and will be looking into finding temporary solution. Patient has court in the [place] area next week and [facility] staff have communicated that they will not be able to accomplish transportation and staff cannot accompany patient. CHW encouraged [facility] staff to connect with [facility] and Public Nurse whom have been working with patient. CHW encouraged staff to communicate and keep CHW up to date on information. Plan to attempt again.  DIPTI Almanzar    Referrals/other:       Next 5 appointments (look out 90 days)    Nov 27, 2019  2:00 PM CST  (Arrive by 1:45 PM)  Office Visit with LATRICE Branch CNP  [facility] ([facility] ) [address]  [place] [address]  [phone]

## 2019-10-02 ENCOUNTER — OFFICE VISIT (OUTPATIENT)
Dept: BEHAVIORAL HEALTH | Facility: OTHER | Age: 39
End: 2019-10-02
Attending: SOCIAL WORKER
Payer: COMMERCIAL

## 2019-10-02 DIAGNOSIS — R69 DIAGNOSIS DEFERRED: Primary | ICD-10-CM

## 2019-10-02 NOTE — PROGRESS NOTES
[facility]         Community Health Worker Note      Patient: Ginette Wood  Date: October 2, 2019  Preferred Name: Ginette    Previous PHQ-9:   PHQ-9 SCORE 7/23/2019 8/21/2019   PHQ-9 Total Score 0 0     Previous KOKI-7:   KOKI-7 SCORE 7/23/2019 8/21/2019   Total Score 21 21     MAXI LEVEL:  MAXI Score (Last Two) 7/23/2019   MAXI Raw Score 29   Activation Score 52.9   MAXI Level 2       Preferred Contact: @[facility]([id number])@  Type of Contact Today: Face to Face in Home / Community      Data: (subjective / Objective):  Patient Goals Areas:    Patient Stated Goals:    Recent ED/IP Admission or Discharge?   None  Recent ED/IP Admission or Discharge?   None    Patient Goals:  Goal Areas: Health;Mental Health;Financial and Social Service Benefits;Transportation  Patient stated goals: get phone, eye appointment, see a therapist, food support, primary care,         [facility] Core Service Provided:  Care Coordination: provided care management services/referrals necessary to ensure patient and their identified supports have access to medical, behavioral health, pharmacology and recovery support services.  Ensured that patient's care is integrated across all settings and services.     Current Stressors / Issues / Care Plan Objective Addressed Today:    N/A    Intervention:  Motivational Interviewing: Expressed Empathy/Understanding and Open-ended questions   Target Behavior(s): N/A    Assessment: (Progress on Goals / Homework):    CHW met with patient at patient's residence. Patient observed to be manic and having delusional conversations with CHW. Patient was observed to be quite upset about current residence, lack of transportation, missing appointments, and lack of communication with professionals and support members. Patient expressed wanting to leave their current residence whether by getting admitted on the Behavioral unit at [facility] or attacking someone and getting arrested by law enforcement. CHW  encouraged patient to calm down and CHW would work to figure out current situation and how to help patient with what they need. Patient informed CHW that they are taking the following medications prescribed to patient: Bhuspar, Thorazin, and Medforman. However patient expressed being out of and not taking their mood stabilizer. Patient has missed [facility] appointment that was scheduled for October 1st, 2019 due to no transportation. Patient expressed concern about not being able to leave their residence and not being able to get to court that is in [facility] for this Friday October 4, 2019. Patient expressed being upset that they cannot leave for outings and cannot smoke due to not having any cigarettes. Patient expressed being upset about someone hacking their Facebook and Facebook upsetting the patient. CHW encouraged patient to remove the application to help calm patient down. CHW connected with [facility] staff, Sally, to get an update of what is going on at the facility where the patient resides. Sally acknowledged and stated the same information that CHW was given by patient. Sally also informed CHW that patient has been having manic and delusional conversations on a daily basis. Sally and CHW discussed the transportation struggle as patient is required to have a staff member go with them when they leave the facility however there is not a staff member whom can escort the patient. Sally informed CHW that transportation cannot be established as they do not have the funds and have been trying to get ahold of Florinda Montez and Raven Sinclair from [facility] who work with the patient. Sally reports that noone is getting back to her. CHW also attempted to contact Florinda Bonilla and Raven Sinclair and left voice mails to both contacts. Patient reported that they have a new guardian however there is no documents supporting this report. CHW is attempting to try to get ahold of support for patient  from [place] and will consult with SABRINA KIM. CHW encouraged patient to reach out if they need anything.    Plan: (Homework, other):  Patient was encouraged to continue to seek condition-related information and education.      Scheduled a Community / Home follow up appointment with CHW in 1 week     Patient has set self-identified goals and will monitor progress until the next appointment.     DIPTI Almanzar, Community Health Worker           Referrals/other:       Next 5 appointments (look out 90 days)    Nov 27, 2019  2:00 PM CST  (Arrive by 1:45 PM)  Office Visit with LATRICE Branch CNP  [facility] ([facility] ) [address]  [place] [address]  [phone]

## 2019-10-03 ENCOUNTER — TELEPHONE (OUTPATIENT)
Dept: BEHAVIORAL HEALTH | Facility: OTHER | Age: 39
End: 2019-10-03

## 2019-10-03 NOTE — TELEPHONE ENCOUNTER
"[facility]  @FLOW([id number])@      Community Health Worker Note      Patient: Ginette Wood  Date: October 3, 2019  Preferred Name: Ginette    Previous PHQ-9:   PHQ-9 SCORE 7/23/2019 8/21/2019   PHQ-9 Total Score 0 0     Previous KOKI-7:   KOKI-7 SCORE 7/23/2019 8/21/2019   Total Score 21 21     MAXI LEVEL:  MAXI Score (Last Two) 7/23/2019   MAXI Raw Score 29   Activation Score 52.9   MAXI Level 2       Preferred Contact: @RUTH([id number])@  Type of Contact Today: Phone call (not reached/unavailable)      Data: (subjective / Objective):  Patient Goals Areas: @FLOW([id number])@  Patient Stated Goals: @FLOW([id number])@  Recent ED/IP Admission or Discharge?   None     CHW connected with [facility] staff, Sally, and Raven Sinclair from [place]. Patient has been approved to appear by phone for court tomorrow and staff member from [facility] will sit with her during that time. Sally informed CHW that [facility], Chris Kolb, will be setting up video chat sessions with patients that are currently residing at [facility]. Patient will be able to utilize this opportunity. CHW connected with [facility] agency whom has guardianship of patient. Patient is not being transferred to Adult Services in [facility] and will be continuing to get services through [facility]. Guardians do not believe that patient is at the right \"state-of-mind\" to be going out to appointments as they are a \"flight risk\" and they would like patient's psych appointments to be addressed first before attending diabetic and primary follow-up appointments. CHW will be following up with [facility] to ensure that patients vitals and blood sugars are being maintained appropriately. Guardians of patient stated that they are \"fine\" with patient being admitted to the hospital to be evaluated if [facility] makes the determination that this is best for patient's health. Guardians of patient are currently trying to " figure out the financial piece for patient. CHW will continue to stay connected with the Guardians and [facility] staff to determine what is going to happen next for patient. CHW encouraged all the support professionals to reach out if they have any questions, updates, or need anything. DIPTI Almanzar    Referrals/other:       Next 5 appointments (look out 90 days)    Nov 27, 2019  2:00 PM CST  (Arrive by 1:45 PM)  Office Visit with LATRICE Branch CNP  [facility] ([facility] ) [address]  [place] [address]  [phone]

## 2019-10-14 DIAGNOSIS — F25.0 SCHIZOAFFECTIVE DISORDER, BIPOLAR TYPE (H): ICD-10-CM

## 2019-10-14 DIAGNOSIS — F25.9 SCHIZOAFFECTIVE DISORDER, CHRONIC CONDITION (H): Primary | ICD-10-CM

## 2019-10-15 NOTE — TELEPHONE ENCOUNTER
Gabapentin      Last Written Prescription Date:  09/20/19 per pharmacy  Last Fill Quantity: unknown, requesting 240,   # refills: unknown  Last Office Visit: 08/28/19  Future Office visit:    Next 5 appointments (look out 90 days)    Nov 27, 2019  2:00 PM CST  (Arrive by 1:45 PM)  Office Visit with LATRICE Branch CNP  [facility] ([facility] ) [address]  [place] [address]  [phone]           Routing refill request to provider for review/approval because:  Drug not active on patient's medication list, med list states 400 mg 3 tabs tid

## 2019-10-17 RX ORDER — GABAPENTIN 600 MG/1
1200 TABLET ORAL 2 TIMES DAILY
Qty: 240 TABLET | Refills: 1 | Status: ON HOLD | OUTPATIENT
Start: 2019-10-17 | End: 2019-10-22

## 2019-10-17 RX ORDER — BUSPIRONE HYDROCHLORIDE 15 MG/1
TABLET ORAL
Qty: 84 TABLET | Refills: 2 | Status: ON HOLD | OUTPATIENT
Start: 2019-10-17 | End: 2019-10-22

## 2019-10-17 RX ORDER — TOPIRAMATE 50 MG/1
TABLET, FILM COATED ORAL
Qty: 30 TABLET | Refills: 0 | Status: ON HOLD | OUTPATIENT
Start: 2019-10-17 | End: 2019-10-22

## 2019-10-22 ENCOUNTER — HOSPITAL ENCOUNTER (INPATIENT)
Facility: HOSPITAL | Age: 39
LOS: 13 days | Discharge: NURSING, REHAB OR LONG-TERM CARE | DRG: 885 | End: 2019-11-04
Attending: FAMILY MEDICINE | Admitting: PSYCHIATRY & NEUROLOGY
Payer: COMMERCIAL

## 2019-10-22 DIAGNOSIS — F30.9 MANIA (H): ICD-10-CM

## 2019-10-22 DIAGNOSIS — F25.0 SCHIZOAFFECTIVE DISORDER, BIPOLAR TYPE (H): Primary | ICD-10-CM

## 2019-10-22 LAB
ALBUMIN SERPL-MCNC: 4 G/DL (ref 3.4–5)
ALBUMIN UR-MCNC: 10 MG/DL
ALP SERPL-CCNC: 77 U/L (ref 40–150)
ALT SERPL W P-5'-P-CCNC: 23 U/L (ref 0–50)
AMPHETAMINES UR QL: NOT DETECTED NG/ML
ANION GAP SERPL CALCULATED.3IONS-SCNC: 6 MMOL/L (ref 3–14)
APAP SERPL-MCNC: <2 MG/L (ref 10–20)
APPEARANCE UR: CLEAR
AST SERPL W P-5'-P-CCNC: 12 U/L (ref 0–45)
BACTERIA #/AREA URNS HPF: ABNORMAL /HPF
BARBITURATES UR QL SCN: NOT DETECTED NG/ML
BASOPHILS # BLD AUTO: 0 10E9/L (ref 0–0.2)
BASOPHILS NFR BLD AUTO: 0.2 %
BENZODIAZ UR QL SCN: NOT DETECTED NG/ML
BILIRUB SERPL-MCNC: 0.3 MG/DL (ref 0.2–1.3)
BILIRUB UR QL STRIP: NEGATIVE
BUN SERPL-MCNC: 12 MG/DL (ref 7–30)
BUPRENORPHINE UR QL: NOT DETECTED NG/ML
CALCIUM SERPL-MCNC: 8.7 MG/DL (ref 8.5–10.1)
CANNABINOIDS UR QL: NOT DETECTED NG/ML
CHLORIDE SERPL-SCNC: 109 MMOL/L (ref 94–109)
CO2 SERPL-SCNC: 23 MMOL/L (ref 20–32)
COCAINE UR QL SCN: NOT DETECTED NG/ML
COLOR UR AUTO: YELLOW
CREAT SERPL-MCNC: 0.56 MG/DL (ref 0.52–1.04)
D-METHAMPHET UR QL: NOT DETECTED NG/ML
DIFFERENTIAL METHOD BLD: NORMAL
EOSINOPHIL # BLD AUTO: 0 10E9/L (ref 0–0.7)
EOSINOPHIL NFR BLD AUTO: 0.3 %
ERYTHROCYTE [DISTWIDTH] IN BLOOD BY AUTOMATED COUNT: 12.4 % (ref 10–15)
ETHANOL SERPL-MCNC: <0.01 G/DL
GFR SERPL CREATININE-BSD FRML MDRD: >90 ML/MIN/{1.73_M2}
GLUCOSE BLDC GLUCOMTR-MCNC: 191 MG/DL (ref 70–99)
GLUCOSE BLDC GLUCOMTR-MCNC: 217 MG/DL (ref 70–99)
GLUCOSE SERPL-MCNC: 161 MG/DL (ref 70–99)
GLUCOSE UR STRIP-MCNC: NEGATIVE MG/DL
HCG UR QL: NEGATIVE
HCT VFR BLD AUTO: 38.8 % (ref 35–47)
HGB BLD-MCNC: 13.1 G/DL (ref 11.7–15.7)
HGB UR QL STRIP: NEGATIVE
IMM GRANULOCYTES # BLD: 0 10E9/L (ref 0–0.4)
IMM GRANULOCYTES NFR BLD: 0.5 %
KETONES UR STRIP-MCNC: 10 MG/DL
LEUKOCYTE ESTERASE UR QL STRIP: NEGATIVE
LYMPHOCYTES # BLD AUTO: 1.8 10E9/L (ref 0.8–5.3)
LYMPHOCYTES NFR BLD AUTO: 19.9 %
MCH RBC QN AUTO: 28.7 PG (ref 26.5–33)
MCHC RBC AUTO-ENTMCNC: 33.8 G/DL (ref 31.5–36.5)
MCV RBC AUTO: 85 FL (ref 78–100)
METHADONE UR QL SCN: NOT DETECTED NG/ML
MONOCYTES # BLD AUTO: 0.5 10E9/L (ref 0–1.3)
MONOCYTES NFR BLD AUTO: 5.2 %
MUCOUS THREADS #/AREA URNS LPF: PRESENT /LPF
NEUTROPHILS # BLD AUTO: 6.5 10E9/L (ref 1.6–8.3)
NEUTROPHILS NFR BLD AUTO: 73.9 %
NITRATE UR QL: NEGATIVE
NRBC # BLD AUTO: 0 10*3/UL
NRBC BLD AUTO-RTO: 0 /100
OPIATES UR QL SCN: NOT DETECTED NG/ML
OXYCODONE UR QL SCN: NOT DETECTED NG/ML
PCP UR QL SCN: NOT DETECTED NG/ML
PH UR STRIP: 5.5 PH (ref 4.7–8)
PLATELET # BLD AUTO: 299 10E9/L (ref 150–450)
POTASSIUM SERPL-SCNC: 3.9 MMOL/L (ref 3.4–5.3)
PROPOXYPH UR QL: NOT DETECTED NG/ML
PROT SERPL-MCNC: 7.7 G/DL (ref 6.8–8.8)
RBC # BLD AUTO: 4.57 10E12/L (ref 3.8–5.2)
RBC #/AREA URNS AUTO: 1 /HPF (ref 0–2)
SALICYLATES SERPL-MCNC: 4 MG/DL
SODIUM SERPL-SCNC: 138 MMOL/L (ref 133–144)
SOURCE: ABNORMAL
SP GR UR STRIP: 1.03 (ref 1–1.03)
SQUAMOUS #/AREA URNS AUTO: 6 /HPF (ref 0–1)
TRICYCLICS UR QL SCN: NOT DETECTED NG/ML
UROBILINOGEN UR STRIP-MCNC: NORMAL MG/DL (ref 0–2)
WBC # BLD AUTO: 8.8 10E9/L (ref 4–11)
WBC #/AREA URNS AUTO: 1 /HPF (ref 0–5)

## 2019-10-22 PROCEDURE — 36415 COLL VENOUS BLD VENIPUNCTURE: CPT | Performed by: FAMILY MEDICINE

## 2019-10-22 PROCEDURE — 25000128 H RX IP 250 OP 636: Performed by: NURSE PRACTITIONER

## 2019-10-22 PROCEDURE — 25000132 ZZH RX MED GY IP 250 OP 250 PS 637: Performed by: NURSE PRACTITIONER

## 2019-10-22 PROCEDURE — 99283 EMERGENCY DEPT VISIT LOW MDM: CPT | Mod: Z6 | Performed by: FAMILY MEDICINE

## 2019-10-22 PROCEDURE — 80306 DRUG TEST PRSMV INSTRMNT: CPT | Performed by: FAMILY MEDICINE

## 2019-10-22 PROCEDURE — 00000146 ZZHCL STATISTIC GLUCOSE BY METER IP

## 2019-10-22 PROCEDURE — 80320 DRUG SCREEN QUANTALCOHOLS: CPT | Performed by: FAMILY MEDICINE

## 2019-10-22 PROCEDURE — 80329 ANALGESICS NON-OPIOID 1 OR 2: CPT | Performed by: FAMILY MEDICINE

## 2019-10-22 PROCEDURE — 85025 COMPLETE CBC W/AUTO DIFF WBC: CPT | Performed by: FAMILY MEDICINE

## 2019-10-22 PROCEDURE — 81001 URINALYSIS AUTO W/SCOPE: CPT | Performed by: FAMILY MEDICINE

## 2019-10-22 PROCEDURE — 12400000 ZZH R&B MH

## 2019-10-22 PROCEDURE — 25000131 ZZH RX MED GY IP 250 OP 636 PS 637: Performed by: NURSE PRACTITIONER

## 2019-10-22 PROCEDURE — 25000132 ZZH RX MED GY IP 250 OP 250 PS 637: Performed by: FAMILY MEDICINE

## 2019-10-22 PROCEDURE — 81025 URINE PREGNANCY TEST: CPT | Performed by: FAMILY MEDICINE

## 2019-10-22 PROCEDURE — 99285 EMERGENCY DEPT VISIT HI MDM: CPT

## 2019-10-22 PROCEDURE — 80053 COMPREHEN METABOLIC PANEL: CPT | Performed by: FAMILY MEDICINE

## 2019-10-22 RX ORDER — CHLORPROMAZINE HYDROCHLORIDE 10 MG/1
10 TABLET, FILM COATED ORAL ONCE
Status: COMPLETED | OUTPATIENT
Start: 2019-10-22 | End: 2019-10-22

## 2019-10-22 RX ORDER — BUSPIRONE HYDROCHLORIDE 10 MG/1
20 TABLET ORAL ONCE
Status: COMPLETED | OUTPATIENT
Start: 2019-10-22 | End: 2019-10-22

## 2019-10-22 RX ORDER — OLANZAPINE 2.5 MG/1
2.5 TABLET, FILM COATED ORAL 3 TIMES DAILY PRN
Status: DISCONTINUED | OUTPATIENT
Start: 2019-10-22 | End: 2019-10-22

## 2019-10-22 RX ORDER — ALUMINA, MAGNESIA, AND SIMETHICONE 2400; 2400; 240 MG/30ML; MG/30ML; MG/30ML
30 SUSPENSION ORAL EVERY 4 HOURS PRN
Status: DISCONTINUED | OUTPATIENT
Start: 2019-10-22 | End: 2019-11-04 | Stop reason: HOSPADM

## 2019-10-22 RX ORDER — DEXTROSE MONOHYDRATE 25 G/50ML
25-50 INJECTION, SOLUTION INTRAVENOUS
Status: DISCONTINUED | OUTPATIENT
Start: 2019-10-22 | End: 2019-11-04 | Stop reason: HOSPADM

## 2019-10-22 RX ORDER — GABAPENTIN 600 MG/1
1200 TABLET ORAL ONCE
Status: COMPLETED | OUTPATIENT
Start: 2019-10-22 | End: 2019-10-22

## 2019-10-22 RX ORDER — SENNOSIDES 8.6 MG
2 TABLET ORAL 2 TIMES DAILY
Status: DISCONTINUED | OUTPATIENT
Start: 2019-10-22 | End: 2019-10-26

## 2019-10-22 RX ORDER — ACETAMINOPHEN 325 MG/1
650 TABLET ORAL EVERY 4 HOURS PRN
Status: DISCONTINUED | OUTPATIENT
Start: 2019-10-22 | End: 2019-11-04 | Stop reason: HOSPADM

## 2019-10-22 RX ORDER — NICOTINE POLACRILEX 4 MG
15-30 LOZENGE BUCCAL
Status: DISCONTINUED | OUTPATIENT
Start: 2019-10-22 | End: 2019-11-04 | Stop reason: HOSPADM

## 2019-10-22 RX ORDER — SENNOSIDES A AND B 8.6 MG/1
2 TABLET, FILM COATED ORAL 2 TIMES DAILY
Status: ON HOLD | COMMUNITY
End: 2019-11-04

## 2019-10-22 RX ORDER — PRAZOSIN HYDROCHLORIDE 1 MG/1
1 CAPSULE ORAL AT BEDTIME
Status: DISCONTINUED | OUTPATIENT
Start: 2019-10-22 | End: 2019-11-04 | Stop reason: HOSPADM

## 2019-10-22 RX ORDER — TRAZODONE HYDROCHLORIDE 50 MG/1
50 TABLET, FILM COATED ORAL
Status: DISCONTINUED | OUTPATIENT
Start: 2019-10-22 | End: 2019-10-24

## 2019-10-22 RX ORDER — OLANZAPINE 10 MG/2ML
10 INJECTION, POWDER, FOR SOLUTION INTRAMUSCULAR 3 TIMES DAILY PRN
Status: DISCONTINUED | OUTPATIENT
Start: 2019-10-22 | End: 2019-10-31

## 2019-10-22 RX ORDER — IBUPROFEN 200 MG
200 TABLET ORAL ONCE
Status: COMPLETED | OUTPATIENT
Start: 2019-10-22 | End: 2019-10-22

## 2019-10-22 RX ORDER — POLYETHYLENE GLYCOL 3350 17 G/17G
1 POWDER, FOR SOLUTION ORAL DAILY PRN
Status: ON HOLD | COMMUNITY
End: 2020-02-14

## 2019-10-22 RX ORDER — NICOTINE POLACRILEX 4 MG
15-30 LOZENGE BUCCAL
Status: DISCONTINUED | OUTPATIENT
Start: 2019-10-22 | End: 2019-10-23

## 2019-10-22 RX ORDER — LORAZEPAM 1 MG/1
1 TABLET ORAL ONCE
Status: COMPLETED | OUTPATIENT
Start: 2019-10-22 | End: 2019-10-22

## 2019-10-22 RX ORDER — BISACODYL 10 MG
10 SUPPOSITORY, RECTAL RECTAL DAILY PRN
Status: DISCONTINUED | OUTPATIENT
Start: 2019-10-22 | End: 2019-10-26

## 2019-10-22 RX ORDER — PRAZOSIN HYDROCHLORIDE 1 MG/1
1 CAPSULE ORAL AT BEDTIME
COMMUNITY
End: 2019-11-22

## 2019-10-22 RX ORDER — HYDROXYZINE HYDROCHLORIDE 25 MG/1
50-100 TABLET, FILM COATED ORAL EVERY 4 HOURS PRN
Status: DISCONTINUED | OUTPATIENT
Start: 2019-10-22 | End: 2019-10-23

## 2019-10-22 RX ORDER — OLANZAPINE 10 MG/2ML
10 INJECTION, POWDER, FOR SOLUTION INTRAMUSCULAR 3 TIMES DAILY PRN
Status: DISCONTINUED | OUTPATIENT
Start: 2019-10-22 | End: 2019-10-22

## 2019-10-22 RX ORDER — OLANZAPINE 10 MG/1
10 TABLET ORAL 3 TIMES DAILY PRN
Status: DISCONTINUED | OUTPATIENT
Start: 2019-10-22 | End: 2019-10-31

## 2019-10-22 RX ORDER — DEXTROSE MONOHYDRATE 25 G/50ML
25-50 INJECTION, SOLUTION INTRAVENOUS
Status: DISCONTINUED | OUTPATIENT
Start: 2019-10-22 | End: 2019-10-23

## 2019-10-22 RX ADMIN — GABAPENTIN 1200 MG: 600 TABLET, FILM COATED ORAL at 20:32

## 2019-10-22 RX ADMIN — CHLORPROMAZINE HYDROCHLORIDE 10 MG: 10 TABLET, SUGAR COATED ORAL at 20:32

## 2019-10-22 RX ADMIN — INSULIN GLARGINE 40 UNITS: 100 INJECTION, SOLUTION SUBCUTANEOUS at 20:03

## 2019-10-22 RX ADMIN — OLANZAPINE 2.5 MG: 2.5 TABLET, FILM COATED ORAL at 13:21

## 2019-10-22 RX ADMIN — IBUPROFEN 200 MG: 200 TABLET, FILM COATED ORAL at 09:48

## 2019-10-22 RX ADMIN — TRAZODONE HYDROCHLORIDE 50 MG: 50 TABLET ORAL at 19:58

## 2019-10-22 RX ADMIN — PRAZOSIN HYDROCHLORIDE 1 MG: 1 CAPSULE ORAL at 19:57

## 2019-10-22 RX ADMIN — BUSPIRONE HYDROCHLORIDE 20 MG: 10 TABLET ORAL at 20:32

## 2019-10-22 RX ADMIN — HYDROXYZINE HYDROCHLORIDE 100 MG: 25 TABLET ORAL at 13:21

## 2019-10-22 RX ADMIN — OLANZAPINE 10 MG: 10 TABLET, FILM COATED ORAL at 20:32

## 2019-10-22 RX ADMIN — SENNOSIDES 2 TABLET: 8.6 TABLET, FILM COATED ORAL at 19:56

## 2019-10-22 RX ADMIN — LORAZEPAM 1 MG: 1 TABLET ORAL at 09:48

## 2019-10-22 ASSESSMENT — ACTIVITIES OF DAILY LIVING (ADL)
ORAL_HYGIENE: INDEPENDENT
HYGIENE/GROOMING: INDEPENDENT
FALL_HISTORY_WITHIN_LAST_SIX_MONTHS: NO
SWALLOWING: 0-->SWALLOWS FOODS/LIQUIDS WITHOUT DIFFICULTY
BATHING: 0-->INDEPENDENT
COGNITION: 0 - NO COGNITION ISSUES REPORTED
DRESS: INDEPENDENT
RETIRED_COMMUNICATION: 0-->UNDERSTANDS/COMMUNICATES WITHOUT DIFFICULTY
LAUNDRY: UNABLE TO COMPLETE
ORAL_HYGIENE: INDEPENDENT
TOILETING: 0-->INDEPENDENT
TRANSFERRING: 0-->INDEPENDENT
HYGIENE/GROOMING: INDEPENDENT
DRESS: 0-->INDEPENDENT
RETIRED_EATING: 0-->INDEPENDENT
AMBULATION: 0-->INDEPENDENT
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
## 2019-10-22 NOTE — PROGRESS NOTES
10/22/19 1251   Patient Belongings   Did you bring any home meds/supplements to the hospital?  No   Patient Belongings locker;sent to security per site process   Patient Belongings Remaining with Patient glasses   Patient Belongings Put in Hospital Secure Location (Security or Locker, etc.) cash/credit card;clothing   Belongings Search Yes   Clothing Search Yes   Second Staff Radha RN   Comment 1 pair of glasses, 1 pair of slippers, 1 pair renetta pink aand white slippers, 1 bra, 1 hair scruncie, 1 green zip up hoodie, 1 black hoodie, 1 pair of pj bottoms, 1 sleeveless t-shirt   List items sent to safe: 1 Mastercard and 1 MNid  All other belongings put in assigned cubby in belongings room.       I have reviewed my belongings list on admission and verify that it is correct.     Patient signature_______________________________    Second staff witness (if patient unable to sign) ______________________________       I have received all my belongings at discharge.    Patient signature________________________________    Hilda   10/22/2019  12:54 PM
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
## 2019-10-22 NOTE — ED NOTES
Carolyn from central intake updated on pt that we are going to do a direct admit, just awaiting final labs and then  will contact central intake and give clinical
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
## 2019-10-23 LAB
GLUCOSE BLDC GLUCOMTR-MCNC: 132 MG/DL (ref 70–99)
GLUCOSE BLDC GLUCOMTR-MCNC: 138 MG/DL (ref 70–99)
GLUCOSE BLDC GLUCOMTR-MCNC: 156 MG/DL (ref 70–99)

## 2019-10-23 PROCEDURE — 25000132 ZZH RX MED GY IP 250 OP 250 PS 637: Performed by: NURSE PRACTITIONER

## 2019-10-23 PROCEDURE — 25000131 ZZH RX MED GY IP 250 OP 636 PS 637: Performed by: NURSE PRACTITIONER

## 2019-10-23 PROCEDURE — 12400000 ZZH R&B MH

## 2019-10-23 PROCEDURE — 99222 1ST HOSP IP/OBS MODERATE 55: CPT | Performed by: NURSE PRACTITIONER

## 2019-10-23 PROCEDURE — 00000146 ZZHCL STATISTIC GLUCOSE BY METER IP

## 2019-10-23 RX ORDER — HYDROXYZINE HYDROCHLORIDE 25 MG/1
100 TABLET, FILM COATED ORAL 2 TIMES DAILY PRN
Status: DISCONTINUED | OUTPATIENT
Start: 2019-10-23 | End: 2019-10-24

## 2019-10-23 RX ORDER — BENZTROPINE MESYLATE 1 MG/1
1 TABLET ORAL 2 TIMES DAILY
Status: DISCONTINUED | OUTPATIENT
Start: 2019-10-23 | End: 2019-11-04 | Stop reason: HOSPADM

## 2019-10-23 RX ORDER — BUSPIRONE HYDROCHLORIDE 10 MG/1
20 TABLET ORAL 3 TIMES DAILY
Status: DISCONTINUED | OUTPATIENT
Start: 2019-10-23 | End: 2019-10-29

## 2019-10-23 RX ORDER — GABAPENTIN 600 MG/1
1200 TABLET ORAL 3 TIMES DAILY
Status: DISCONTINUED | OUTPATIENT
Start: 2019-10-23 | End: 2019-11-04 | Stop reason: HOSPADM

## 2019-10-23 RX ORDER — POLYETHYLENE GLYCOL 3350 17 G/17G
17 POWDER, FOR SOLUTION ORAL DAILY PRN
Status: DISCONTINUED | OUTPATIENT
Start: 2019-10-23 | End: 2019-11-04 | Stop reason: HOSPADM

## 2019-10-23 RX ORDER — DIVALPROEX SODIUM 250 MG/1
250 TABLET, EXTENDED RELEASE ORAL AT BEDTIME
Status: DISCONTINUED | OUTPATIENT
Start: 2019-10-23 | End: 2019-10-24

## 2019-10-23 RX ORDER — CHLORPROMAZINE HYDROCHLORIDE 25 MG/1
100 TABLET, FILM COATED ORAL 3 TIMES DAILY
Status: DISCONTINUED | OUTPATIENT
Start: 2019-10-23 | End: 2019-10-25

## 2019-10-23 RX ADMIN — INSULIN GLARGINE 40 UNITS: 100 INJECTION, SOLUTION SUBCUTANEOUS at 20:12

## 2019-10-23 RX ADMIN — INSULIN ASPART 1 UNITS: 100 INJECTION, SOLUTION INTRAVENOUS; SUBCUTANEOUS at 08:24

## 2019-10-23 RX ADMIN — OLANZAPINE 10 MG: 10 TABLET, FILM COATED ORAL at 22:44

## 2019-10-23 RX ADMIN — INSULIN ASPART 8 UNITS: 100 INJECTION, SOLUTION INTRAVENOUS; SUBCUTANEOUS at 12:10

## 2019-10-23 RX ADMIN — SENNOSIDES 2 TABLET: 8.6 TABLET, FILM COATED ORAL at 08:25

## 2019-10-23 RX ADMIN — INSULIN ASPART 8 UNITS: 100 INJECTION, SOLUTION INTRAVENOUS; SUBCUTANEOUS at 08:25

## 2019-10-23 RX ADMIN — METFORMIN HYDROCHLORIDE 1000 MG: 500 TABLET, FILM COATED ORAL at 16:38

## 2019-10-23 RX ADMIN — BENZTROPINE MESYLATE 1 MG: 1 TABLET ORAL at 20:10

## 2019-10-23 RX ADMIN — CHLORPROMAZINE HYDROCHLORIDE 100 MG: 25 TABLET, FILM COATED ORAL at 12:11

## 2019-10-23 RX ADMIN — BENZTROPINE MESYLATE 1 MG: 1 TABLET ORAL at 12:11

## 2019-10-23 RX ADMIN — CHLORPROMAZINE HYDROCHLORIDE 100 MG: 25 TABLET, FILM COATED ORAL at 16:39

## 2019-10-23 RX ADMIN — SENNOSIDES 2 TABLET: 8.6 TABLET, FILM COATED ORAL at 12:11

## 2019-10-23 RX ADMIN — CHLORPROMAZINE HYDROCHLORIDE 100 MG: 25 TABLET, FILM COATED ORAL at 20:10

## 2019-10-23 RX ADMIN — BUSPIRONE HYDROCHLORIDE 20 MG: 10 TABLET ORAL at 20:10

## 2019-10-23 RX ADMIN — GABAPENTIN 1200 MG: 600 TABLET, FILM COATED ORAL at 16:38

## 2019-10-23 RX ADMIN — DIVALPROEX SODIUM 250 MG: 250 TABLET, EXTENDED RELEASE ORAL at 20:10

## 2019-10-23 RX ADMIN — BUSPIRONE HYDROCHLORIDE 20 MG: 10 TABLET ORAL at 12:11

## 2019-10-23 RX ADMIN — GABAPENTIN 1200 MG: 600 TABLET, FILM COATED ORAL at 20:10

## 2019-10-23 RX ADMIN — PRAZOSIN HYDROCHLORIDE 1 MG: 1 CAPSULE ORAL at 20:10

## 2019-10-23 RX ADMIN — INSULIN ASPART 8 UNITS: 100 INJECTION, SOLUTION INTRAVENOUS; SUBCUTANEOUS at 17:32

## 2019-10-23 RX ADMIN — METFORMIN HYDROCHLORIDE 1000 MG: 500 TABLET, FILM COATED ORAL at 08:25

## 2019-10-23 RX ADMIN — BUSPIRONE HYDROCHLORIDE 20 MG: 10 TABLET ORAL at 16:39

## 2019-10-23 ASSESSMENT — ACTIVITIES OF DAILY LIVING (ADL)
HYGIENE/GROOMING: INDEPENDENT
ORAL_HYGIENE: INDEPENDENT
DRESS: SCRUBS (BEHAVIORAL HEALTH);INDEPENDENT
ORAL_HYGIENE: INDEPENDENT
HYGIENE/GROOMING: INDEPENDENT

## 2019-10-23 NOTE — PLAN OF CARE
Problem: Adult Behavioral Health Plan of Care  Goal: Patient-Specific Goal (Individualization)  10/23/2019 0003 by Melissa Flowers RN  Outcome: No Change  Note:          Problem: Thought Process Alteration  Goal: Optimal Thought Clarity  Description  Pt will not make any delusional statements throughout the shift.      10/23/2019 0003 by Melissa Flowers RN  Outcome: No Change     Problem: Violence Risk or Actual  Goal: Anger and Impulse Control  Description  Pt will keep impulses under control.  Pt will refrain from violent behaviors towards peers and staff.    10/23/2019 0003 by Melissa Flowers RN  Outcome: No Change     Face to face shift report received from Annita MAK. Rounding completed, pt observed.    Pt appeared to be sleeping most of this shift, normal respirations and position changes noted. Pt did not have any episodes of violence this shift.     Face to face report will be communicated to oncoming RN.    Melissa Flowers RN  10/23/2019  5:53 AM
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
## 2019-10-23 NOTE — PLAN OF CARE
Spoke with Jaya with [facility] and reports that pt's will not be transferred to [facility] and her guardian are through [place] with the contact information of:     Florinda Montez [phone]  Sally Live [phone]  [place] (OBDULIO)  [phone]

## 2019-10-23 NOTE — PLAN OF CARE
BEHAVIORAL TEAM DISCUSSION    Participants: Natalia Fabian NP, Angie Laws LSW, Becca Chery LSW, Jim Barrios LSW, Mady Holman OT, Natalia Costello OT, Gena Gomez OT, Ion Morales RN, Brook Altman RN, [facility]  Progress: new admit  Continued Stay Criteria/Rationale: delusional, psychosis, labile, disorganized  Medical/Physical: DM  Precautions:   Falls precaution?: No  Behavioral Orders   Procedures    Code 1 - Restrict to Unit    Routine Programming     As clinically indicated    Status 15     Every 15 minutes.     Plan: NP and SW to assess - determine legal status, continue to stabilize, possible guardianship through [facility]  Rationale for change in precautions or plan: None    Active Problems:    Psychosis (H)       Current Facility-Administered Medications:     acetaminophen (TYLENOL) tablet 650 mg, 650 mg, Oral, Q4H PRN, Gokul Hathaway NP    alum & mag hydroxide-simethicone (MYLANTA ES/MAALOX  ES) suspension 30 mL, 30 mL, Oral, Q4H PRN, Gokul Hathaway NP    bisacodyl (DULCOLAX) Suppository 10 mg, 10 mg, Rectal, Daily PRN, Gokul Hathaway NP    glucose gel 15-30 g, 15-30 g, Oral, Q15 Min PRN **OR** dextrose 50 % injection 25-50 mL, 25-50 mL, Intravenous, Q15 Min PRN **OR** glucagon injection 1 mg, 1 mg, Subcutaneous, Q15 Min PRN, Gokul Hathaway NP    glucose gel 15-30 g, 15-30 g, Oral, Q15 Min PRN **OR** dextrose 50 % injection 25-50 mL, 25-50 mL, Intravenous, Q15 Min PRN **OR** glucagon injection 1 mg, 1 mg, Subcutaneous, Q15 Min PRN, Gokul Hathaway NP    hydrOXYzine (ATARAX) tablet  mg,  mg, Oral, Q4H PRN, Gokul Hathaway NP, 100 mg at 10/22/19 1321    insulin aspart (NovoLOG) inj (RAPID ACTING), 8 Units, Subcutaneous, TID w/meals, Gokul Hathaway NP, 8 Units at 10/23/19 0825    insulin aspart (NovoLOG) inj (RAPID ACTING), 1-7 Units, Subcutaneous, TID AC, Gokul Hathaway, NP, 1 Units at 10/23/19 0824    insulin glargine (LANTUS PEN) injection 40  Units, 40 Units, Subcutaneous, At Bedtime, Gokul Hathaway NP, 40 Units at 10/22/19 2003    magnesium hydroxide (MILK OF MAGNESIA) suspension 30 mL, 30 mL, Oral, At Bedtime PRN, Gokul Hathaway NP    metFORMIN (GLUCOPHAGE) tablet 1,000 mg, 1,000 mg, Oral, BID w/meals, Gokul Hathaway NP, 1,000 mg at 10/23/19 0825    nicotine (NICORETTE) gum 2-4 mg, 2-4 mg, Buccal, Q1H PRN, Gokul Hathaway NP    OLANZapine (zyPREXA) injection 10 mg, 10 mg, Intramuscular, TID PRN **OR** OLANZapine (zyPREXA) tablet 10 mg, 10 mg, Oral, TID PRN, Raven Stout NP, 10 mg at 10/22/19 2032    prazosin (MINIPRESS) capsule 1 mg, 1 mg, Oral, At Bedtime, Gokul Hathaway NP, 1 mg at 10/22/19 1957    sennosides (SENOKOT) tablet 2 tablet, 2 tablet, Oral, BID, Gokul Hathaway NP, 2 tablet at 10/23/19 0825    traZODone (DESYREL) tablet 50 mg, 50 mg, Oral, At Bedtime PRN, Gokul Hathaway NP, 50 mg at 10/22/19 1958

## 2019-10-23 NOTE — PLAN OF CARE
Problem: Adult Behavioral Health Plan of Care  Goal: Plan of Care Review  Outcome: No Change   Pt pleasant and cooperative. Polite with writer. Asked to wean out to unit which she is at this time. Went to group without needing encouragement. Coloring with markers and pencils  Problem: Thought Process Alteration  Goal: Optimal Thought Clarity  Description  Pt will not make any delusional statements throughout the shift.    Guardian Florinda Will called- consent received for treatment  2020-   10/23/2019 1618 by Annita Cheng, RN  Outcome: No Change   Pt able to have a reality based conversation with writer. No talk of pregnancy . No bizarre behavior noted  Problem: Violence Risk or Actual  Goal: Anger and Impulse Control  Description  Pt will keep impulses under control.  Pt will refrain from violent behaviors towards peers and staff.    10/23/2019 1618 by Annita Cheng, RN  Outcome: Improving   Pt is not violent or misbehaving. Cooperative  Pt has weaned out of MICU off and on during the shift. Has gone to groups but does not socialize with peers.   2245- pt requested and received Zyprexa 10 mg po c/o agitation    Face to face end of shift report communicated to RN.     Annita Cheng RN  10/23/2019  11:34 PM
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
## 2019-10-24 LAB
GLUCOSE BLDC GLUCOMTR-MCNC: 116 MG/DL (ref 70–99)
GLUCOSE BLDC GLUCOMTR-MCNC: 139 MG/DL (ref 70–99)
GLUCOSE BLDC GLUCOMTR-MCNC: 179 MG/DL (ref 70–99)
GLUCOSE BLDC GLUCOMTR-MCNC: 230 MG/DL (ref 70–99)

## 2019-10-24 PROCEDURE — 25000132 ZZH RX MED GY IP 250 OP 250 PS 637: Performed by: NURSE PRACTITIONER

## 2019-10-24 PROCEDURE — 99232 SBSQ HOSP IP/OBS MODERATE 35: CPT | Performed by: NURSE PRACTITIONER

## 2019-10-24 PROCEDURE — 12400000 ZZH R&B MH

## 2019-10-24 PROCEDURE — 00000146 ZZHCL STATISTIC GLUCOSE BY METER IP

## 2019-10-24 RX ORDER — DIVALPROEX SODIUM 500 MG/1
500 TABLET, EXTENDED RELEASE ORAL AT BEDTIME
Status: DISCONTINUED | OUTPATIENT
Start: 2019-10-24 | End: 2019-10-28

## 2019-10-24 RX ORDER — CLONAZEPAM 1 MG/1
1 TABLET ORAL AT BEDTIME
Status: DISCONTINUED | OUTPATIENT
Start: 2019-10-24 | End: 2019-11-04 | Stop reason: HOSPADM

## 2019-10-24 RX ORDER — HYDROXYZINE HYDROCHLORIDE 25 MG/1
50-100 TABLET, FILM COATED ORAL 3 TIMES DAILY PRN
Status: DISCONTINUED | OUTPATIENT
Start: 2019-10-24 | End: 2019-10-29

## 2019-10-24 RX ADMIN — BENZTROPINE MESYLATE 1 MG: 1 TABLET ORAL at 08:07

## 2019-10-24 RX ADMIN — INSULIN ASPART 8 UNITS: 100 INJECTION, SOLUTION INTRAVENOUS; SUBCUTANEOUS at 08:05

## 2019-10-24 RX ADMIN — GABAPENTIN 1200 MG: 600 TABLET, FILM COATED ORAL at 13:25

## 2019-10-24 RX ADMIN — GABAPENTIN 1200 MG: 600 TABLET, FILM COATED ORAL at 08:06

## 2019-10-24 RX ADMIN — SENNOSIDES 2 TABLET: 8.6 TABLET, FILM COATED ORAL at 21:12

## 2019-10-24 RX ADMIN — DIVALPROEX SODIUM 500 MG: 500 TABLET, FILM COATED, EXTENDED RELEASE ORAL at 21:28

## 2019-10-24 RX ADMIN — CHLORPROMAZINE HYDROCHLORIDE 100 MG: 25 TABLET, FILM COATED ORAL at 21:13

## 2019-10-24 RX ADMIN — HYDROXYZINE HYDROCHLORIDE 100 MG: 25 TABLET, FILM COATED ORAL at 13:33

## 2019-10-24 RX ADMIN — METFORMIN HYDROCHLORIDE 1000 MG: 500 TABLET, FILM COATED ORAL at 08:06

## 2019-10-24 RX ADMIN — INSULIN ASPART 8 UNITS: 100 INJECTION, SOLUTION INTRAVENOUS; SUBCUTANEOUS at 18:11

## 2019-10-24 RX ADMIN — BUSPIRONE HYDROCHLORIDE 20 MG: 10 TABLET ORAL at 21:12

## 2019-10-24 RX ADMIN — CLONAZEPAM 1 MG: 1 TABLET ORAL at 21:12

## 2019-10-24 RX ADMIN — GABAPENTIN 1200 MG: 600 TABLET, FILM COATED ORAL at 21:12

## 2019-10-24 RX ADMIN — NICOTINE POLACRILEX 4 MG: 2 GUM, CHEWING ORAL at 23:09

## 2019-10-24 RX ADMIN — OLANZAPINE 10 MG: 10 TABLET, FILM COATED ORAL at 13:33

## 2019-10-24 RX ADMIN — INSULIN GLARGINE 40 UNITS: 100 INJECTION, SOLUTION SUBCUTANEOUS at 21:26

## 2019-10-24 RX ADMIN — BUSPIRONE HYDROCHLORIDE 20 MG: 10 TABLET ORAL at 08:07

## 2019-10-24 RX ADMIN — INSULIN ASPART 2 UNITS: 100 INJECTION, SOLUTION INTRAVENOUS; SUBCUTANEOUS at 08:04

## 2019-10-24 RX ADMIN — CHLORPROMAZINE HYDROCHLORIDE 100 MG: 25 TABLET, FILM COATED ORAL at 08:06

## 2019-10-24 RX ADMIN — INSULIN ASPART 8 UNITS: 100 INJECTION, SOLUTION INTRAVENOUS; SUBCUTANEOUS at 12:10

## 2019-10-24 RX ADMIN — SENNOSIDES 2 TABLET: 8.6 TABLET, FILM COATED ORAL at 08:06

## 2019-10-24 RX ADMIN — BENZTROPINE MESYLATE 1 MG: 1 TABLET ORAL at 21:12

## 2019-10-24 RX ADMIN — INSULIN ASPART 1 UNITS: 100 INJECTION, SOLUTION INTRAVENOUS; SUBCUTANEOUS at 18:10

## 2019-10-24 RX ADMIN — CHLORPROMAZINE HYDROCHLORIDE 100 MG: 25 TABLET, FILM COATED ORAL at 13:26

## 2019-10-24 RX ADMIN — BUSPIRONE HYDROCHLORIDE 20 MG: 10 TABLET ORAL at 13:25

## 2019-10-24 RX ADMIN — METFORMIN HYDROCHLORIDE 1000 MG: 500 TABLET, FILM COATED ORAL at 16:11

## 2019-10-24 RX ADMIN — PRAZOSIN HYDROCHLORIDE 1 MG: 1 CAPSULE ORAL at 21:12

## 2019-10-24 ASSESSMENT — ACTIVITIES OF DAILY LIVING (ADL)
HYGIENE/GROOMING: INDEPENDENT
DRESS: SCRUBS (BEHAVIORAL HEALTH);INDEPENDENT
LAUNDRY: UNABLE TO COMPLETE
ORAL_HYGIENE: INDEPENDENT
ORAL_HYGIENE: INDEPENDENT
HYGIENE/GROOMING: INDEPENDENT

## 2019-10-24 NOTE — PLAN OF CARE
Problem: Adult Behavioral Health Plan of Care  Goal: Patient-Specific Goal (Individualization)  Description  Pt will follow recommendations of treatment team.  Pt will be compliant with medications.  Pt will sleep 6- 8 hours each night.  10/23/19 Pt may wean to the open unit per staff discretion, providing that pt is demonstrating appropriate behavior(s) and is able to follow directions/be redirected. Treatment team to reassess daily.   Outcome: Improving  Note:          Problem: Adult Behavioral Health Plan of Care  Goal: Adheres to Safety Considerations for Self and Others  Outcome: Improving     Problem: Adult Behavioral Health Plan of Care  Goal: Optimized Coping Skills in Response to Life Stressors  Outcome: Improving     Problem: Thought Process Alteration  Goal: Optimal Thought Clarity  Description  Pt will not make any delusional statements throughout the shift.      Outcome: Improving     Problem: Violence Risk or Actual  Goal: Anger and Impulse Control  Description  Pt will keep impulses under control.  Pt will refrain from violent behaviors towards peers and staff.    Outcome: Improving    Patient is seen in the lounge for the most part today, she keeps to herself, but interact when spoken to. Patient does attend group, and does do her numbers in her notebook. Patient does report some anxiety this afternoon, she states she is worried about her sister, and this is getting her anxiety to go high. Patient is cooperative and pleasant t/o the shift. Patient denies HI, SI, reports depression d/t her situation. Patient is out for meals and does eat well.     End of shift hand off report to be given to oncoming RN

## 2019-10-24 NOTE — PLAN OF CARE
BEHAVIORAL TEAM DISCUSSION    Participants: Natalia Fabian NP,  Dennise Herrera [facility], Angie Laws LSW,  Becca Chery LSW, Jim Barrios LSW, Yaneth Ron RN, Ion Morales RN, [facility], Mady Holman OT, Natalia Costello OT.   Progress: Attendeing groups, does need redirection as patient is not always on topic.   Continued Stay Criteria/Rationale: Pressured. delusional, psychosis, labile, disorganized  Medical/Physical: DM  Precautions:   Falls precaution?: No  Behavioral Orders   Procedures    Code 1 - Restrict to Unit    Routine Programming     As clinically indicated    Status 15     Every 15 minutes.     Plan: Doesn't want to return to [facility]. Patient [facility] to a [facility] shelter. Will need to contact asael about this. Guardian are through [place] with the contact information of:    Florinda Montez [phone]  Sally Calos [phone]  Raven (RN)  [phone]   Rationale for change in precautions or plan: none    Current Facility-Administered Medications:     acetaminophen (TYLENOL) tablet 650 mg, 650 mg, Oral, Q4H PRN, Gokul Hathaway NP    alum & mag hydroxide-simethicone (MYLANTA ES/MAALOX  ES) suspension 30 mL, 30 mL, Oral, Q4H PRN, Gokul Hathaway NP    benztropine (COGENTIN) tablet 1 mg, 1 mg, Oral, BID, WoNatalia armstrong APRN CNP, 1 mg at 10/24/19 0807    bisacodyl (DULCOLAX) Suppository 10 mg, 10 mg, Rectal, Daily PRN, Gokul Hathaway NP    busPIRone (BUSPAR) tablet 20 mg, 20 mg, Oral, TID, Natalia Fabian APRN CNP, 20 mg at 10/24/19 0807    chlorproMAZINE (THORAZINE) tablet 100 mg, 100 mg, Oral, TID, Natalia Fabian APRN CNP, 100 mg at 10/24/19 0806    glucose gel 15-30 g, 15-30 g, Oral, Q15 Min PRN **OR** dextrose 50 % injection 25-50 mL, 25-50 mL, Intravenous, Q15 Min PRN **OR** glucagon injection 1 mg, 1 mg, Subcutaneous, Q15 Min PRN, Gokul Hathaway, NP    divalproex sodium extended-release (DEPAKOTE ER) 24 hr tablet 250 mg, 250 mg, Oral,  [place] ED to IP Report (EDIP)    Mental Status     Alert and oriented    Safety     None    Mobility    Independent    Protocols  None    ISAR          Isolation  None    Additional Information:   At Bedtime, Natalia Fabian APRN CNP, 250 mg at 10/23/19 2010    gabapentin (NEURONTIN) tablet 1,200 mg, 1,200 mg, Oral, TID, Natalia Fabian APRN CNP, 1,200 mg at 10/24/19 0806    hydrOXYzine (ATARAX) tablet 100 mg, 100 mg, Oral, BID PRN, Natalia Fabian APRN CNP    insulin aspart (NovoLOG) inj (RAPID ACTING), 8 Units, Subcutaneous, TID w/meals, Gokul Hathaway NP, 8 Units at 10/24/19 0805    insulin aspart (NovoLOG) inj (RAPID ACTING), 1-7 Units, Subcutaneous, TID AC, Gokul Hathaway NP, 2 Units at 10/24/19 0804    insulin glargine (LANTUS PEN) injection 40 Units, 40 Units, Subcutaneous, At Bedtime, Gokul Hathaway NP, 40 Units at 10/23/19 2012    magnesium hydroxide (MILK OF MAGNESIA) suspension 30 mL, 30 mL, Oral, At Bedtime PRN, Gokul Hathaway NP    metFORMIN (GLUCOPHAGE) tablet 1,000 mg, 1,000 mg, Oral, BID w/meals, Gokul Hathaway NP, 1,000 mg at 10/24/19 0806    nicotine (NICORETTE) gum 2-4 mg, 2-4 mg, Buccal, Q1H PRN, Gokul Hathaway NP    OLANZapine (zyPREXA) injection 10 mg, 10 mg, Intramuscular, TID PRN **OR** OLANZapine (zyPREXA) tablet 10 mg, 10 mg, Oral, TID PRN, Raven Stout NP, 10 mg at 10/23/19 2244    polyethylene glycol (MIRALAX/GLYCOLAX) Packet 17 g, 17 g, Oral, Daily PRN, Natalia Fabian APRN CNP    prazosin (MINIPRESS) capsule 1 mg, 1 mg, Oral, At Bedtime, Gokul Hathaway NP, 1 mg at 10/23/19 2010    sennosides (SENOKOT) tablet 2 tablet, 2 tablet, Oral, BID, Gokul Hathaway NP, 2 tablet at 10/24/19 0806    traZODone (DESYREL) tablet 50 mg, 50 mg, Oral, At Bedtime PRN, Gokul Hathaway, NP, 50 mg at 10/22/19 1958  Active Problems:    Psychosis (H)
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
## 2019-10-24 NOTE — PLAN OF CARE
Problem: Adult Behavioral Health Plan of Care  Goal: Patient-Specific Goal (Individualization)  Description  Pt will follow recommendations of treatment team.  Pt will be compliant with medications.  Pt will sleep 6- 8 hours each night.  10/23/19 Pt may wean to the open unit per staff discretion, providing that pt is demonstrating appropriate behavior(s) and is able to follow directions/be redirected. Treatment team to reassess daily.   10/23/2019 2356 by Melissa Flowers RN  Outcome: No Change  Note:          Problem: Thought Process Alteration  Goal: Optimal Thought Clarity  Description  Pt will not make any delusional statements throughout the shift.      10/23/2019 2356 by Melissa Flowers RN  Outcome: No Change     Problem: Violence Risk or Actual  Goal: Anger and Impulse Control  Description  Pt will keep impulses under control.  Pt will refrain from violent behaviors towards peers and staff.    10/23/2019 2356 by Melissa Flowers, RN  Outcome: No Change     Face to face shift report received from Annita MAK. Rounding completed, pt observed.     Pt appeared to be sleeping at the beginning of this shift, normal respirations and position changes noted. Pt awake after 0230, in room writing in her journal. Pt pleasant and did not have any episodes of violence towards staff or peers.     Face to face report will be communicated to oncdouglas RN.    Melissa Flowers RN  10/24/2019  6:01 AM
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
## 2019-10-24 NOTE — PLAN OF CARE
Problem: Adult Behavioral Health Plan of Care  Goal: Plan of Care Review  Outcome: Improving   Pt out in [facility] making a calendar. Talkative and pleasant with writer, explained her writings she made. Requested to have a room out on unit. Natalia MEJIA called and ok'd move if room available. Moved pt to room 562 with another peer- pt spoke with her and asked her if she could be her roommate and peer consented. Chen 179- stated she wants her insulin after dinner as this is what she does at home,agreed to take Metformin prior to meal.   Problem: Violence Risk or Actual  Goal: Anger and Impulse Control  Description  Pt will keep impulses under control.  Pt will refrain from violent behaviors towards peers and staff.    10/24/2019 1724 by Annita Cheng, RN  Outcome: Improving   Calm and cooperative polite and pleasant to writer. Patient when waiting for things as days ago pt would become upset if things were not done quickly. No violent or aggressive behavior seen.
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
## 2019-10-25 ENCOUNTER — TELEPHONE (OUTPATIENT)
Dept: BEHAVIORAL HEALTH | Facility: OTHER | Age: 39
End: 2019-10-25

## 2019-10-25 LAB
CHOLEST SERPL-MCNC: 184 MG/DL
GLUCOSE BLDC GLUCOMTR-MCNC: 138 MG/DL (ref 70–99)
GLUCOSE BLDC GLUCOMTR-MCNC: 186 MG/DL (ref 70–99)
GLUCOSE BLDC GLUCOMTR-MCNC: 217 MG/DL (ref 70–99)
GLUCOSE BLDC GLUCOMTR-MCNC: 246 MG/DL (ref 70–99)
GLUCOSE BLDC GLUCOMTR-MCNC: 280 MG/DL (ref 70–99)
HDLC SERPL-MCNC: 30 MG/DL
LDLC SERPL CALC-MCNC: 98 MG/DL
NONHDLC SERPL-MCNC: 154 MG/DL
TRIGL SERPL-MCNC: 281 MG/DL

## 2019-10-25 PROCEDURE — 80061 LIPID PANEL: CPT | Performed by: NURSE PRACTITIONER

## 2019-10-25 PROCEDURE — 00000146 ZZHCL STATISTIC GLUCOSE BY METER IP

## 2019-10-25 PROCEDURE — 36415 COLL VENOUS BLD VENIPUNCTURE: CPT | Performed by: NURSE PRACTITIONER

## 2019-10-25 PROCEDURE — 99232 SBSQ HOSP IP/OBS MODERATE 35: CPT | Performed by: NURSE PRACTITIONER

## 2019-10-25 PROCEDURE — 12400000 ZZH R&B MH

## 2019-10-25 PROCEDURE — 25000132 ZZH RX MED GY IP 250 OP 250 PS 637: Performed by: NURSE PRACTITIONER

## 2019-10-25 RX ORDER — NICOTINE 21 MG/24HR
1 PATCH, TRANSDERMAL 24 HOURS TRANSDERMAL DAILY
Status: DISCONTINUED | OUTPATIENT
Start: 2019-10-25 | End: 2019-11-04 | Stop reason: HOSPADM

## 2019-10-25 RX ORDER — SALIVA STIMULANT COMB. NO.3
2 SPRAY, NON-AEROSOL (ML) MUCOUS MEMBRANE 4 TIMES DAILY PRN
Status: DISCONTINUED | OUTPATIENT
Start: 2019-10-25 | End: 2019-11-04 | Stop reason: HOSPADM

## 2019-10-25 RX ADMIN — INSULIN ASPART 8 UNITS: 100 INJECTION, SOLUTION INTRAVENOUS; SUBCUTANEOUS at 12:28

## 2019-10-25 RX ADMIN — BENZTROPINE MESYLATE 1 MG: 1 TABLET ORAL at 08:15

## 2019-10-25 RX ADMIN — GABAPENTIN 1200 MG: 600 TABLET, FILM COATED ORAL at 13:19

## 2019-10-25 RX ADMIN — INSULIN ASPART 8 UNITS: 100 INJECTION, SOLUTION INTRAVENOUS; SUBCUTANEOUS at 08:15

## 2019-10-25 RX ADMIN — DIVALPROEX SODIUM 500 MG: 500 TABLET, FILM COATED, EXTENDED RELEASE ORAL at 20:33

## 2019-10-25 RX ADMIN — ACETAMINOPHEN 650 MG: 325 TABLET, FILM COATED ORAL at 08:15

## 2019-10-25 RX ADMIN — SENNOSIDES 2 TABLET: 8.6 TABLET, FILM COATED ORAL at 20:31

## 2019-10-25 RX ADMIN — NICOTINE 1 PATCH: 21 PATCH, EXTENDED RELEASE TRANSDERMAL at 12:28

## 2019-10-25 RX ADMIN — BUSPIRONE HYDROCHLORIDE 20 MG: 10 TABLET ORAL at 08:15

## 2019-10-25 RX ADMIN — BUSPIRONE HYDROCHLORIDE 20 MG: 10 TABLET ORAL at 20:32

## 2019-10-25 RX ADMIN — CLONAZEPAM 1 MG: 1 TABLET ORAL at 20:32

## 2019-10-25 RX ADMIN — BUSPIRONE HYDROCHLORIDE 20 MG: 10 TABLET ORAL at 13:19

## 2019-10-25 RX ADMIN — BENZTROPINE MESYLATE 1 MG: 1 TABLET ORAL at 20:32

## 2019-10-25 RX ADMIN — CHLORPROMAZINE HYDROCHLORIDE 100 MG: 25 TABLET, FILM COATED ORAL at 08:15

## 2019-10-25 RX ADMIN — CHLORPROMAZINE HYDROCHLORIDE 150 MG: 100 TABLET, SUGAR COATED ORAL at 20:31

## 2019-10-25 RX ADMIN — GABAPENTIN 1200 MG: 600 TABLET, FILM COATED ORAL at 08:15

## 2019-10-25 RX ADMIN — HYDROXYZINE HYDROCHLORIDE 100 MG: 25 TABLET, FILM COATED ORAL at 13:18

## 2019-10-25 RX ADMIN — METFORMIN HYDROCHLORIDE 1000 MG: 500 TABLET, FILM COATED ORAL at 08:15

## 2019-10-25 RX ADMIN — SENNOSIDES 2 TABLET: 8.6 TABLET, FILM COATED ORAL at 08:15

## 2019-10-25 RX ADMIN — CHLORPROMAZINE HYDROCHLORIDE 150 MG: 100 TABLET, SUGAR COATED ORAL at 13:19

## 2019-10-25 RX ADMIN — GABAPENTIN 1200 MG: 600 TABLET, FILM COATED ORAL at 20:31

## 2019-10-25 RX ADMIN — METFORMIN HYDROCHLORIDE 1000 MG: 500 TABLET, FILM COATED ORAL at 16:41

## 2019-10-25 RX ADMIN — PRAZOSIN HYDROCHLORIDE 1 MG: 1 CAPSULE ORAL at 20:32

## 2019-10-25 RX ADMIN — HYDROXYZINE HYDROCHLORIDE 100 MG: 25 TABLET, FILM COATED ORAL at 08:15

## 2019-10-25 RX ADMIN — INSULIN ASPART 3 UNITS: 100 INJECTION, SOLUTION INTRAVENOUS; SUBCUTANEOUS at 16:47

## 2019-10-25 RX ADMIN — INSULIN ASPART 8 UNITS: 100 INJECTION, SOLUTION INTRAVENOUS; SUBCUTANEOUS at 16:48

## 2019-10-25 RX ADMIN — INSULIN ASPART 2 UNITS: 100 INJECTION, SOLUTION INTRAVENOUS; SUBCUTANEOUS at 08:19

## 2019-10-25 RX ADMIN — NICOTINE POLACRILEX 4 MG: 2 GUM, CHEWING ORAL at 18:07

## 2019-10-25 RX ADMIN — INSULIN GLARGINE 40 UNITS: 100 INJECTION, SOLUTION SUBCUTANEOUS at 20:31

## 2019-10-25 ASSESSMENT — ACTIVITIES OF DAILY LIVING (ADL)
ORAL_HYGIENE: INDEPENDENT
ORAL_HYGIENE: INDEPENDENT
DRESS: INDEPENDENT
LAUNDRY: UNABLE TO COMPLETE
HYGIENE/GROOMING: INDEPENDENT
HYGIENE/GROOMING: INDEPENDENT

## 2019-10-25 NOTE — PLAN OF CARE
Problem: Adult Inpatient Plan of Care  Goal: Patient-Specific Goal (Individualization)  Description  Pt will maintain appropriate behaviors.  Pt will have a linear conversation throughout the day.  Pt will attend groups 50 % of the day.  Pt will get 6-8 hrs of sleep each night.     10/22/2019-- Provider to assess patient for weaning care plan   Outcome: No Change     Pt has been in bed with eyes closed and regular respirations for total of 5 hours this noc shift. Pt. Having difficultly falling asleep. 15 minute and PRN checks all night. No complaints offered. Will continue to monitor.    Face to face end of shift report to be communicated to oncoming RN.     Eliza Arndt RN  10/25/2019
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
## 2019-10-25 NOTE — TELEPHONE ENCOUNTER
CHW met with patient today on the Behavioral Unit. Patient was awaiting lunch. Patient stated that they have court on December 6th or 9th and would like CHW to be present with patient as patient will still be on the unit by that date. Patient stated that they are on the unit until their medication is adjusted. Patient requested their belongings from [place] [place] [facility] to be sent to the Behavioral Unit. CHW informed patient that there was a policy about belongings and would need to clarify what patient could or could not have. Patient stated that the Behavioral Unit could store their belongings for them. These belongings include clothing and personal hygiene products like shampoo and body wash. Patient [facility] [place] [facility] as well including their DVD player and TV. Patient insisted that if they could not have their belongings on the unit, to give their coffee pot with filters, coffee, and tea to their friend Shawn. Patient asked CHW to look into patient's case being transferred to [facility] and their [facility] being transferred. Patient stated that the staff on the unit are looking into glasses for patient however patient asked CHW to look into that as well. Patient asked CHW about the next upcoming appointment with Guillermina Rincon, CHW stated that they will look into this however they believe it is in November. Patient asked to see Sergey sooner. Patient asked CHW where patient would be going after they get discharged from the unit and asked for CHW's opinion. CHW reassured patient that it was not CHW's decision on where patient goes. CHW reassured patient that this would more likely be a discussion during their discharge planning. Patient insisted that they want to go back to the [facility] after being discharged. CHW encouraged patient to reach out if they need anything.    Jaya Winter, BSW  Community Health Worker

## 2019-10-25 NOTE — PLAN OF CARE
"Face to face end of shift report received from Eliza ALVAREZ RN. Rounding completed. Patient observed in lounge.     Radha Guerinolph  10/25/2019  10:23 AM    Problem: Adult Inpatient Plan of Care  Goal: Patient-Specific Goal (Individualization)  Description  Pt will maintain appropriate behaviors.  Pt will have a linear conversation throughout the day.  Pt will attend groups 50 % of the day.  Pt will get 6-8 hrs of sleep each night.     Outcome: Improving     Pt has been calm, cooperative this shift. She has been out in the lounge for most of the morning coloring. She denies any SI, HI, and hallucinations. She states \"I can't believe I'm not hearing voices anymore. That stuff I took last night worked so well I can't believe how good I slept\" Pt is social and appropriate with peers. She takes medications as prescribed. She does request \"everything you got for PRNs\" this morning. Pt does report having generalized pain--received Tylenol for this. She was offered Atarax as well. She stated she had a decrease in pain from the Tylenol. Pt continued to sit in the lounge for the remainder of the shift. Pt requests Atarax this afternoon for increased anxiety.     Pt upset over room change and states that she wants to continue having peer as roommate. This was approved as behaviors continue to be appropriate between the two and they appear to get along well.     Problem: Thought Process Alteration  Goal: Optimal Thought Clarity  Description  Pt will not make any delusional statements throughout the shift.      Outcome: Improving    Pt has not made any delusional statements this shift.     Problem: Violence Risk or Actual  Goal: Anger and Impulse Control  Description  Pt will keep impulses under control.  Pt will refrain from violent behaviors towards peers and staff.    Outcome: Improving    Pt has not had any violent behaviors this shift. She has not had any impulsive behaviors.      Face to face end of shift report will " be given to PM shift RN.     Radha Broderick  10/25/2019  10:23 AM
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
## 2019-10-25 NOTE — PLAN OF CARE
Problem: Adult Behavioral Health Plan of Care  Goal: Plan of Care Review  10/25/2019 1546 by Annita Cheng, RN  Outcome: Improving   Pt cooperative and pleasant with writer. Denies depression, anxiety or hallucinations. Polite and patient  Problem: Violence Risk or Actual  Goal: Anger and Impulse Control  Description  Pt will keep impulses under control.  Pt will refrain from violent behaviors towards peers and staff.    10/25/2019 1546 by Annita Cheng RN  Outcome: Improving   No violent or aggressive behavior noted  Problem: Thought Process Alteration  Goal: Optimal Thought Clarity  Description  Pt will not make any delusional statements throughout the shift.    pt allowed writer to give insulin prior to dinner- has been wanting it after- accucheck 246 with regular  scheduled and sliding scale insulin.  2040- blood sugar via accucheck 186. PM Lantus given.   10/25/2019 1546 by Annita Cheng, RN  Outcome: No Change   Pt speaks quickly and softly. Sometimes hard to understand what she's saying. Stated she has many children scattered around- didn't exactly know how many.  Requested not to be  from her roommate - doesn't want to move. They get along well.    2130-Pt has remained pleasant and cooperative throughout shift with no delusional statements or violent behavior.   Face to face end of shift report communicated to OBDULIO Cheng RN  10/25/2019  11:21 PM

## 2019-10-25 NOTE — PLAN OF CARE
BEHAVIORAL TEAM DISCUSSION    Participants: Airam Light NP, Angie Laws LSW,  Becca Chery LSW, Jim Barrios LSW,  Mady Holman OT, Jabari Triana [facility], Karley Ron RN, Ira Anaya RN   Progress: Attendeing groups, does need redirection as patient is not always on topic.   Continued Stay Criteria/Rationale: Pressured. delusional, psychosis, labile, disorganized  Medical/Physical: DM  Precautions:   Falls precaution?: No       Behavioral Orders   Procedures    Code 1 - Restrict to Unit    Routine Programming       As clinically indicated    Status 15       Every 15 minutes.      Plan: Doesn't want to return to [facility]. Patient [facility] to a [facility] shelter. Will need to contact gaurdian about this. Guardian are through [place] with the contact information of:    Florinda Montez [phone]  Sally Calos [phone]  Raven (RN)  [phone]   Rationale for change in precautions or plan: none     Current Facility-Administered Medications:     acetaminophen (TYLENOL) tablet 650 mg, 650 mg, Oral, Q4H PRN, Gokul Hathaway NP    alum & mag hydroxide-simethicone (MYLANTA ES/MAALOX  ES) suspension 30 mL, 30 mL, Oral, Q4H PRN, Gokul Hathaway NP    benztropine (COGENTIN) tablet 1 mg, 1 mg, Oral, BID, WoNatalia armstrong APRN CNP, 1 mg at 10/24/19 0807    bisacodyl (DULCOLAX) Suppository 10 mg, 10 mg, Rectal, Daily PRN, Gokul Hathaway NP    busPIRone (BUSPAR) tablet 20 mg, 20 mg, Oral, TID, Natalia Fabian APRN CNP, 20 mg at 10/24/19 0807    chlorproMAZINE (THORAZINE) tablet 100 mg, 100 mg, Oral, TID, Natalia Fabian APRN CNP, 100 mg at 10/24/19 0806    glucose gel 15-30 g, 15-30 g, Oral, Q15 Min PRN **OR** dextrose 50 % injection 25-50 mL, 25-50 mL, Intravenous, Q15 Min PRN **OR** glucagon injection 1 mg, 1 mg, Subcutaneous, Q15 Min PRN, Gokul Hathaway, NP    divalproex sodium extended-release (DEPAKOTE ER) 24 hr tablet 250 mg, 250 mg, Oral, At Bedtime, Natalia Fabian, APRN CNP,  250 mg at 10/23/19 2010    gabapentin (NEURONTIN) tablet 1,200 mg, 1,200 mg, Oral, TID, Natalia Fabian APRN CNP, 1,200 mg at 10/24/19 0806    hydrOXYzine (ATARAX) tablet 100 mg, 100 mg, Oral, BID PRN, Natalia Fabian APRN CNP    insulin aspart (NovoLOG) inj (RAPID ACTING), 8 Units, Subcutaneous, TID w/meals, Gokul Hathaway NP, 8 Units at 10/24/19 0805    insulin aspart (NovoLOG) inj (RAPID ACTING), 1-7 Units, Subcutaneous, TID AC, Gokul Hathaway NP, 2 Units at 10/24/19 0804    insulin glargine (LANTUS PEN) injection 40 Units, 40 Units, Subcutaneous, At Bedtime, Gokul Hathaway NP, 40 Units at 10/23/19 2012    magnesium hydroxide (MILK OF MAGNESIA) suspension 30 mL, 30 mL, Oral, At Bedtime PRN, Gokul Hathaway NP    metFORMIN (GLUCOPHAGE) tablet 1,000 mg, 1,000 mg, Oral, BID w/meals, Gokul Hathaway NP, 1,000 mg at 10/24/19 0806    nicotine (NICORETTE) gum 2-4 mg, 2-4 mg, Buccal, Q1H PRN, Gkoul Hathaway NP    OLANZapine (zyPREXA) injection 10 mg, 10 mg, Intramuscular, TID PRN **OR** OLANZapine (zyPREXA) tablet 10 mg, 10 mg, Oral, TID PRN, Raven Stout NP, 10 mg at 10/23/19 2244    polyethylene glycol (MIRALAX/GLYCOLAX) Packet 17 g, 17 g, Oral, Daily PRN, Natalia Fabian APRN CNP    prazosin (MINIPRESS) capsule 1 mg, 1 mg, Oral, At Bedtime, Gokul Hathaway NP, 1 mg at 10/23/19 2010    sennosides (SENOKOT) tablet 2 tablet, 2 tablet, Oral, BID, Gokul Hathaway NP, 2 tablet at 10/24/19 0806    traZODone (DESYREL) tablet 50 mg, 50 mg, Oral, At Bedtime PRN, Gokul Hathaway NP, 50 mg at 10/22/19 1958  Active Problems:    Psychosis (H)

## 2019-10-26 LAB
GLUCOSE BLDC GLUCOMTR-MCNC: 111 MG/DL (ref 70–99)
GLUCOSE BLDC GLUCOMTR-MCNC: 152 MG/DL (ref 70–99)
GLUCOSE BLDC GLUCOMTR-MCNC: 193 MG/DL (ref 70–99)
GLUCOSE BLDC GLUCOMTR-MCNC: 271 MG/DL (ref 70–99)

## 2019-10-26 PROCEDURE — 12400000 ZZH R&B MH

## 2019-10-26 PROCEDURE — 00000146 ZZHCL STATISTIC GLUCOSE BY METER IP

## 2019-10-26 PROCEDURE — 99232 SBSQ HOSP IP/OBS MODERATE 35: CPT | Performed by: NURSE PRACTITIONER

## 2019-10-26 PROCEDURE — 25000132 ZZH RX MED GY IP 250 OP 250 PS 637: Performed by: NURSE PRACTITIONER

## 2019-10-26 RX ORDER — CHLORPROMAZINE HYDROCHLORIDE 25 MG/1
100 TABLET, FILM COATED ORAL 3 TIMES DAILY
Status: DISCONTINUED | OUTPATIENT
Start: 2019-10-26 | End: 2019-10-26

## 2019-10-26 RX ORDER — BISACODYL 5 MG
5 TABLET, DELAYED RELEASE (ENTERIC COATED) ORAL DAILY PRN
Status: DISCONTINUED | OUTPATIENT
Start: 2019-10-26 | End: 2019-11-04 | Stop reason: HOSPADM

## 2019-10-26 RX ADMIN — METFORMIN HYDROCHLORIDE 1000 MG: 500 TABLET, FILM COATED ORAL at 08:35

## 2019-10-26 RX ADMIN — NICOTINE POLACRILEX 4 MG: 2 GUM, CHEWING ORAL at 11:23

## 2019-10-26 RX ADMIN — INSULIN ASPART 3 UNITS: 100 INJECTION, SOLUTION INTRAVENOUS; SUBCUTANEOUS at 12:37

## 2019-10-26 RX ADMIN — HYDROXYZINE HYDROCHLORIDE 100 MG: 25 TABLET, FILM COATED ORAL at 08:36

## 2019-10-26 RX ADMIN — BUSPIRONE HYDROCHLORIDE 20 MG: 10 TABLET ORAL at 13:06

## 2019-10-26 RX ADMIN — OLANZAPINE 10 MG: 10 TABLET, FILM COATED ORAL at 11:22

## 2019-10-26 RX ADMIN — CHLORPROMAZINE HYDROCHLORIDE 150 MG: 100 TABLET, SUGAR COATED ORAL at 08:35

## 2019-10-26 RX ADMIN — NICOTINE 1 PATCH: 21 PATCH, EXTENDED RELEASE TRANSDERMAL at 08:35

## 2019-10-26 RX ADMIN — INSULIN GLARGINE 40 UNITS: 100 INJECTION, SOLUTION SUBCUTANEOUS at 20:43

## 2019-10-26 RX ADMIN — INSULIN ASPART 1 UNITS: 100 INJECTION, SOLUTION INTRAVENOUS; SUBCUTANEOUS at 17:06

## 2019-10-26 RX ADMIN — METFORMIN HYDROCHLORIDE 1000 MG: 500 TABLET, FILM COATED ORAL at 17:07

## 2019-10-26 RX ADMIN — SENNOSIDES 2 TABLET: 8.6 TABLET, FILM COATED ORAL at 08:36

## 2019-10-26 RX ADMIN — INSULIN ASPART 2 UNITS: 100 INJECTION, SOLUTION INTRAVENOUS; SUBCUTANEOUS at 08:36

## 2019-10-26 RX ADMIN — BENZTROPINE MESYLATE 1 MG: 1 TABLET ORAL at 08:35

## 2019-10-26 RX ADMIN — INSULIN ASPART 8 UNITS: 100 INJECTION, SOLUTION INTRAVENOUS; SUBCUTANEOUS at 17:07

## 2019-10-26 RX ADMIN — CHLORPROMAZINE HYDROCHLORIDE 150 MG: 25 TABLET, FILM COATED ORAL at 20:44

## 2019-10-26 RX ADMIN — GABAPENTIN 1200 MG: 600 TABLET, FILM COATED ORAL at 08:35

## 2019-10-26 RX ADMIN — INSULIN ASPART 8 UNITS: 100 INJECTION, SOLUTION INTRAVENOUS; SUBCUTANEOUS at 12:37

## 2019-10-26 RX ADMIN — INSULIN ASPART 8 UNITS: 100 INJECTION, SOLUTION INTRAVENOUS; SUBCUTANEOUS at 08:36

## 2019-10-26 RX ADMIN — BUSPIRONE HYDROCHLORIDE 20 MG: 10 TABLET ORAL at 08:36

## 2019-10-26 RX ADMIN — GABAPENTIN 1200 MG: 600 TABLET, FILM COATED ORAL at 20:45

## 2019-10-26 RX ADMIN — GABAPENTIN 1200 MG: 600 TABLET, FILM COATED ORAL at 13:06

## 2019-10-26 RX ADMIN — BUSPIRONE HYDROCHLORIDE 20 MG: 10 TABLET ORAL at 20:43

## 2019-10-26 RX ADMIN — ACETAMINOPHEN 650 MG: 325 TABLET, FILM COATED ORAL at 08:35

## 2019-10-26 RX ADMIN — CLONAZEPAM 1 MG: 1 TABLET ORAL at 20:44

## 2019-10-26 RX ADMIN — CHLORPROMAZINE HYDROCHLORIDE 150 MG: 25 TABLET, FILM COATED ORAL at 13:06

## 2019-10-26 RX ADMIN — PRAZOSIN HYDROCHLORIDE 1 MG: 1 CAPSULE ORAL at 20:44

## 2019-10-26 RX ADMIN — BENZTROPINE MESYLATE 1 MG: 1 TABLET ORAL at 20:44

## 2019-10-26 RX ADMIN — DIVALPROEX SODIUM 500 MG: 500 TABLET, FILM COATED, EXTENDED RELEASE ORAL at 20:44

## 2019-10-26 ASSESSMENT — ACTIVITIES OF DAILY LIVING (ADL)
DRESS: INDEPENDENT
HYGIENE/GROOMING: INDEPENDENT
HYGIENE/GROOMING: INDEPENDENT
ORAL_HYGIENE: INDEPENDENT
ORAL_HYGIENE: INDEPENDENT
LAUNDRY: UNABLE TO COMPLETE

## 2019-10-26 NOTE — PLAN OF CARE
"Face to face end of shift report received from Becca MICHEL RN. Rounding completed. Patient observed in bed, awake.     Radha Broderick  10/26/2019  7:35 AM    Problem: Adult Inpatient Plan of Care  Goal: Patient-Specific Goal (Individualization)  Description  Pt will maintain appropriate behaviors.  Pt will have a linear conversation throughout the day.  Pt will attend groups 50 % of the day.  Pt will get 6-8 hrs of sleep each night.     Outcome: Improving    Pt has been calm, cooperative this shift. Pt has rambling speech, flight of ideas this morning. She is able to hold a linear conversation with this writer, but while this writer was emptying pills into med cup was when she started to ramble. \"My father is a multi-billionaire. He won the [employer]. He played music with [employer], [employer]\". Pt continued to ramble bizarre things. She sits in the lunge throughout the day and colors. She denies hallucinations, SI, HI. She states that she slept very good again last night and didn't have any nightmares \"I actually had good dreams\". Pt has a bright affect. Takes medications as prescribed. Pt accucheck at lunch 271--- she stated that she needs to be more careful and admits ate things she shouldn't have this morning. After lunch pt stated \"I'm going to take a nap. Wake me up for dinner\". Pt observed in bed, appears asleep, respirations observed for the remainder of the afternoon.        Problem: Thought Process Alteration  Goal: Optimal Thought Clarity  Description  Pt will not make any delusional statements throughout the shift.      10/26/2019 0735 by Radha Broderick  Outcome: Improving    Pt makes delusional statement this morning during medication pass.     Problem: Violence Risk or Actual  Goal: Anger and Impulse Control  Description  Pt will keep impulses under control.  Pt will refrain from violent behaviors towards peers and staff.    10/26/2019 0735 by Radha Broderick  Outcome: " Improving    No violent behaviors this shift.   Able to keep impulses under control.     Face to face end of shift report will be given to PM shift RN.     Radha Broderick  10/26/2019  7:36 AM

## 2019-10-26 NOTE — PLAN OF CARE
Face to face shift report received from OBDULIO Ariza.     Problem: Adult Behavioral Health Plan of Care  Goal: Patient-Specific Goal (Individualization)  Description  Pt will follow recommendations of treatment team.  Pt will be compliant with medications.  Pt will sleep 6- 8 hours each night.  10/23/19 Pt may wean to the open unit per staff discretion, providing that pt is demonstrating appropriate behavior(s) and is able to follow directions/be redirected. Treatment team to reassess daily.    Outcome: Improving  Note: Up at beginning of shift, sitting in lounge area. Pleasant during conversation. Pt reports missing family and wanting to go home. Went to bed shortly after appeared to sleep remainder of night. Pt has been in bed with eyes closed and regular respirations. 15 minute and PRN checks all night. No complaints offered. Will continue to monitor. AM Accucheck: 193       Problem: Thought Process Alteration  Goal: Optimal Thought Clarity  Description  Pt will not make any delusional statements throughout the shift.      10/26/2019 0057 by Becca Mcnulty RN  Outcome: Improving   No delusional or inappropriate statements during conversation.     Problem: Violence Risk or Actual  Goal: Anger and Impulse Control  Description  Pt will keep impulses under control.  Pt will refrain from violent behaviors towards peers and staff.    10/26/2019 0057 by Becca Mcnulty RN  Outcome: Improving  No violent behaviors this shift.      Face to face end of shift report to be communicated to oncoming RN.
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
## 2019-10-26 NOTE — PLAN OF CARE
Problem: Adult Behavioral Health Plan of Care  Goal: Plan of Care Review  Outcome: Improving   Pt pleasant and cooperative. Calm with no complaints. Denies suicidal thoughts nor depression. No anxiety stated nor suicidal thoughts. No problems with having a roommate- states she is fine with current roommate.   Problem: Thought Process Alteration  Goal: Optimal Thought Clarity  Description  Pt will not make any delusional statements throughout the shift.      10/26/2019 1642 by Annita Cheng, RN  Outcome: Improving   Pt is not making any delusional conversation at this time  Face to face end of shift report communicated to OBDULIO Cheng RN  10/26/2019  11:30 PM

## 2019-10-27 LAB
GLUCOSE BLDC GLUCOMTR-MCNC: 124 MG/DL (ref 70–99)
GLUCOSE BLDC GLUCOMTR-MCNC: 160 MG/DL (ref 70–99)
GLUCOSE BLDC GLUCOMTR-MCNC: 194 MG/DL (ref 70–99)
GLUCOSE BLDC GLUCOMTR-MCNC: 93 MG/DL (ref 70–99)
GLUCOSE BLDC GLUCOMTR-MCNC: 95 MG/DL (ref 70–99)

## 2019-10-27 PROCEDURE — 00000146 ZZHCL STATISTIC GLUCOSE BY METER IP

## 2019-10-27 PROCEDURE — 25000132 ZZH RX MED GY IP 250 OP 250 PS 637: Performed by: NURSE PRACTITIONER

## 2019-10-27 PROCEDURE — 12400000 ZZH R&B MH

## 2019-10-27 RX ADMIN — BUSPIRONE HYDROCHLORIDE 20 MG: 10 TABLET ORAL at 08:15

## 2019-10-27 RX ADMIN — GABAPENTIN 1200 MG: 600 TABLET, FILM COATED ORAL at 20:45

## 2019-10-27 RX ADMIN — CLONAZEPAM 1 MG: 1 TABLET ORAL at 20:34

## 2019-10-27 RX ADMIN — BENZTROPINE MESYLATE 1 MG: 1 TABLET ORAL at 08:15

## 2019-10-27 RX ADMIN — BUSPIRONE HYDROCHLORIDE 20 MG: 10 TABLET ORAL at 20:34

## 2019-10-27 RX ADMIN — NICOTINE POLACRILEX 4 MG: 2 GUM, CHEWING ORAL at 14:04

## 2019-10-27 RX ADMIN — METFORMIN HYDROCHLORIDE 1000 MG: 500 TABLET, FILM COATED ORAL at 08:15

## 2019-10-27 RX ADMIN — CHLORPROMAZINE HYDROCHLORIDE 150 MG: 25 TABLET, FILM COATED ORAL at 20:33

## 2019-10-27 RX ADMIN — INSULIN ASPART 8 UNITS: 100 INJECTION, SOLUTION INTRAVENOUS; SUBCUTANEOUS at 17:06

## 2019-10-27 RX ADMIN — CHLORPROMAZINE HYDROCHLORIDE 150 MG: 25 TABLET, FILM COATED ORAL at 14:03

## 2019-10-27 RX ADMIN — BUSPIRONE HYDROCHLORIDE 20 MG: 10 TABLET ORAL at 14:02

## 2019-10-27 RX ADMIN — INSULIN ASPART 8 UNITS: 100 INJECTION, SOLUTION INTRAVENOUS; SUBCUTANEOUS at 08:16

## 2019-10-27 RX ADMIN — INSULIN ASPART 8 UNITS: 100 INJECTION, SOLUTION INTRAVENOUS; SUBCUTANEOUS at 12:52

## 2019-10-27 RX ADMIN — NICOTINE 1 PATCH: 21 PATCH, EXTENDED RELEASE TRANSDERMAL at 08:14

## 2019-10-27 RX ADMIN — METFORMIN HYDROCHLORIDE 1000 MG: 500 TABLET, FILM COATED ORAL at 17:02

## 2019-10-27 RX ADMIN — GABAPENTIN 1200 MG: 600 TABLET, FILM COATED ORAL at 14:03

## 2019-10-27 RX ADMIN — INSULIN GLARGINE 40 UNITS: 100 INJECTION, SOLUTION SUBCUTANEOUS at 20:34

## 2019-10-27 RX ADMIN — BENZTROPINE MESYLATE 1 MG: 1 TABLET ORAL at 20:34

## 2019-10-27 RX ADMIN — PRAZOSIN HYDROCHLORIDE 1 MG: 1 CAPSULE ORAL at 20:34

## 2019-10-27 RX ADMIN — CHLORPROMAZINE HYDROCHLORIDE 150 MG: 25 TABLET, FILM COATED ORAL at 08:15

## 2019-10-27 RX ADMIN — GABAPENTIN 1200 MG: 600 TABLET, FILM COATED ORAL at 08:15

## 2019-10-27 RX ADMIN — DIVALPROEX SODIUM 500 MG: 500 TABLET, FILM COATED, EXTENDED RELEASE ORAL at 20:34

## 2019-10-27 NOTE — PLAN OF CARE
Face to face shift report received from OBDULIO Ariza.     Problem: Adult Behavioral Health Plan of Care  Goal: Patient-Specific Goal (Individualization)  Description  Pt will follow recommendations of treatment team.  Pt will be compliant with medications.  Pt will sleep 6- 8 hours each night.  10/23/19 Pt may wean to the open unit per staff discretion, providing that pt is demonstrating appropriate behavior(s) and is able to follow directions/be redirected. Treatment team to reassess daily.    Outcome: No Change  Note: Up in lounge until 0200 coloring and socializing with peers. Compliant with going to room when encouraged by staff. Since then pt has been in bed with eyes closed and regular respirations. 15 minute and PRN checks all night. No complaints offered. Will continue to monitor. AM accucheck: 95     Problem: Thought Process Alteration  Goal: Optimal Thought Clarity  Description  Pt will not make any delusional statements throughout the shift.      10/27/2019 0035 by Becca Mcnutly RN  Outcome: No Change   No delusional statements during conversation.     Problem: Violence Risk or Actual  Goal: Anger and Impulse Control  Description  Pt will keep impulses under control.  Pt will refrain from violent behaviors towards peers and staff.    10/27/2019 0035 by Becca Mcnulty RN  Outcome: Improving  No violent behaviors and/or outbursts this shift.      Face to face end of shift report to be communicated to oncoming RN.

## 2019-10-27 NOTE — PLAN OF CARE
"  Problem: Adult Behavioral Health Plan of Care  Goal: Patient-Specific Goal (Individualization)  Description  Pt will follow recommendations of treatment team.  Pt will be compliant with medications.  Pt will sleep 6- 8 hours each night.     10/27/2019 1657 by Annita Cheng RN  Outcome: No Change  Note: pt occupies self by making calendars and is now writing out her xmas list. States she has millions of dollars and likes to spend it on presents for people. Cooperative and pleasant. Denies feeling depressed or anxious. No suicidal thoughts or plan. BS via accucheck ,160 with no sliding scale- regular scheduled insulin given.          Problem: Thought Process Alteration  Goal: Optimal Thought Clarity  Description  Pt will not make any delusional statements throughout the shift.      10/27/2019 1657 by Annita Cheng RN  Outcome: No Change   Pt talks softly and quickly - hard to understand most of conversation. Rambling on about her [place] she's from and drawing diagrams about them \"owning the whole [place] the [place]\".   Problem: Violence Risk or Actual  Goal: Anger and Impulse Control  Description  Pt will keep impulses under control.  Pt will refrain from violent behaviors towards peers and staff.    10/27/2019 1657 by Annita hCeng, RN  Outcome: Improving   Pt pleasant , patient and cooperative  Face to face end of shift report communicated to OBDULIO Cheng RN  10/27/2019  11:02 PM        "
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
## 2019-10-28 LAB
GLUCOSE BLDC GLUCOMTR-MCNC: 155 MG/DL (ref 70–99)
GLUCOSE BLDC GLUCOMTR-MCNC: 213 MG/DL (ref 70–99)
GLUCOSE BLDC GLUCOMTR-MCNC: 301 MG/DL (ref 70–99)
GLUCOSE BLDC GLUCOMTR-MCNC: 76 MG/DL (ref 70–99)

## 2019-10-28 PROCEDURE — 12400000 ZZH R&B MH

## 2019-10-28 PROCEDURE — 00000146 ZZHCL STATISTIC GLUCOSE BY METER IP

## 2019-10-28 PROCEDURE — 99232 SBSQ HOSP IP/OBS MODERATE 35: CPT | Performed by: NURSE PRACTITIONER

## 2019-10-28 PROCEDURE — 25000132 ZZH RX MED GY IP 250 OP 250 PS 637: Performed by: NURSE PRACTITIONER

## 2019-10-28 RX ADMIN — OLANZAPINE 10 MG: 10 TABLET, FILM COATED ORAL at 14:14

## 2019-10-28 RX ADMIN — NICOTINE POLACRILEX 4 MG: 2 GUM, CHEWING ORAL at 21:29

## 2019-10-28 RX ADMIN — INSULIN ASPART 8 UNITS: 100 INJECTION, SOLUTION INTRAVENOUS; SUBCUTANEOUS at 17:01

## 2019-10-28 RX ADMIN — INSULIN ASPART 8 UNITS: 100 INJECTION, SOLUTION INTRAVENOUS; SUBCUTANEOUS at 08:57

## 2019-10-28 RX ADMIN — BENZTROPINE MESYLATE 1 MG: 1 TABLET ORAL at 08:50

## 2019-10-28 RX ADMIN — DIVALPROEX SODIUM 750 MG: 500 TABLET, FILM COATED, EXTENDED RELEASE ORAL at 20:22

## 2019-10-28 RX ADMIN — HYDROXYZINE HYDROCHLORIDE 100 MG: 25 TABLET, FILM COATED ORAL at 21:29

## 2019-10-28 RX ADMIN — OLANZAPINE 10 MG: 10 TABLET, FILM COATED ORAL at 21:33

## 2019-10-28 RX ADMIN — BUSPIRONE HYDROCHLORIDE 20 MG: 10 TABLET ORAL at 13:32

## 2019-10-28 RX ADMIN — CHLORPROMAZINE HYDROCHLORIDE 150 MG: 25 TABLET, FILM COATED ORAL at 20:22

## 2019-10-28 RX ADMIN — BUSPIRONE HYDROCHLORIDE 20 MG: 10 TABLET ORAL at 20:22

## 2019-10-28 RX ADMIN — INSULIN ASPART 4 UNITS: 100 INJECTION, SOLUTION INTRAVENOUS; SUBCUTANEOUS at 11:45

## 2019-10-28 RX ADMIN — CLONAZEPAM 1 MG: 1 TABLET ORAL at 20:37

## 2019-10-28 RX ADMIN — NICOTINE 1 PATCH: 21 PATCH, EXTENDED RELEASE TRANSDERMAL at 08:50

## 2019-10-28 RX ADMIN — NICOTINE POLACRILEX 4 MG: 2 GUM, CHEWING ORAL at 14:14

## 2019-10-28 RX ADMIN — INSULIN ASPART 2 UNITS: 100 INJECTION, SOLUTION INTRAVENOUS; SUBCUTANEOUS at 17:01

## 2019-10-28 RX ADMIN — PRAZOSIN HYDROCHLORIDE 1 MG: 1 CAPSULE ORAL at 20:22

## 2019-10-28 RX ADMIN — GABAPENTIN 1200 MG: 600 TABLET, FILM COATED ORAL at 20:21

## 2019-10-28 RX ADMIN — CHLORPROMAZINE HYDROCHLORIDE 150 MG: 25 TABLET, FILM COATED ORAL at 08:50

## 2019-10-28 RX ADMIN — INSULIN GLARGINE 40 UNITS: 100 INJECTION, SOLUTION SUBCUTANEOUS at 20:25

## 2019-10-28 RX ADMIN — ACETAMINOPHEN 650 MG: 325 TABLET, FILM COATED ORAL at 14:04

## 2019-10-28 RX ADMIN — CHLORPROMAZINE HYDROCHLORIDE 150 MG: 25 TABLET, FILM COATED ORAL at 13:32

## 2019-10-28 RX ADMIN — HYDROXYZINE HYDROCHLORIDE 100 MG: 25 TABLET, FILM COATED ORAL at 17:04

## 2019-10-28 RX ADMIN — BENZTROPINE MESYLATE 1 MG: 1 TABLET ORAL at 20:22

## 2019-10-28 RX ADMIN — METFORMIN HYDROCHLORIDE 1000 MG: 500 TABLET, FILM COATED ORAL at 17:00

## 2019-10-28 RX ADMIN — OLANZAPINE 10 MG: 10 TABLET, FILM COATED ORAL at 00:33

## 2019-10-28 RX ADMIN — GABAPENTIN 1200 MG: 600 TABLET, FILM COATED ORAL at 13:32

## 2019-10-28 RX ADMIN — INSULIN ASPART 8 UNITS: 100 INJECTION, SOLUTION INTRAVENOUS; SUBCUTANEOUS at 11:45

## 2019-10-28 RX ADMIN — BUSPIRONE HYDROCHLORIDE 20 MG: 10 TABLET ORAL at 08:50

## 2019-10-28 RX ADMIN — GABAPENTIN 1200 MG: 600 TABLET, FILM COATED ORAL at 08:50

## 2019-10-28 RX ADMIN — METFORMIN HYDROCHLORIDE 1000 MG: 500 TABLET, FILM COATED ORAL at 08:50

## 2019-10-28 ASSESSMENT — ACTIVITIES OF DAILY LIVING (ADL)
ORAL_HYGIENE: INDEPENDENT
DRESS: SCRUBS (BEHAVIORAL HEALTH)
ORAL_HYGIENE: INDEPENDENT
HYGIENE/GROOMING: INDEPENDENT
HYGIENE/GROOMING: INDEPENDENT
DRESS: SCRUBS (BEHAVIORAL HEALTH)

## 2019-10-28 NOTE — PLAN OF CARE
"  Problem: Adult Behavioral Health Plan of Care  Goal: Patient-Specific Goal (Individualization)  Description  Pt will follow recommendations of treatment team.  Pt will be compliant with medications.  Pt will sleep 6- 8 hours each night.     10/28/2019 1435 by Nancy Stern, RN  Outcome: Improving  Note:   Shift [place]:  VSS, chronic pain to lower back 8/10-tylenol rec'd per request. Denies all criteria including: SI, SIB, HI or hallucinations. Able to have linear conversation and complete nursing assessments before pt talks about some of her fixed delusions--able to bring pt back to original conversation.   Out in lounge making her \"graphs/lists\". Behavior appropriate with staff and peers.     Patient's Stated Goal for Shift:  groups    Goal Status:  In Process    Face to face end of shift report communicated to oncoming shift.      Nancy Stern RN  10/28/2019  2:39 PM             Problem: Thought Process Alteration  Goal: Optimal Thought Clarity  Description  Pt will not make any delusional statements throughout the shift.      10/28/2019 1435 by Nancy Stern, RN  Outcome: Improving     Problem: Violence Risk or Actual  Goal: Anger and Impulse Control  Description  Pt will keep impulses under control.  Pt will refrain from violent behaviors towards peers and staff.    10/28/2019 1435 by Nancy Stern, RN  Outcome: Improving     "
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
## 2019-10-28 NOTE — PLAN OF CARE
"Spoke with pt this afternoon in the [facility], pt states that she is \"feeling much better\" today and states she is interested in \"treatment at [place] or [place] I will need a Rule 25\" pt reported she has been \"sleeping six hours and I wake up feeling groggy, but I think i've improved like 80%\" pt states she saw Jaya from [facility] \"yesterday\" - will check in with [facility]. Pt states she is still wondering how she will obtain her belongings from [place] and states \"if I have to stay at a homeless shelter I will I know they are going to discharge me soon from here\" pt talked about the upcoming court dates and \"I still need to pay the fine that $100\" pt was pleasant and did not make any delusional statements during conversation.   "

## 2019-10-28 NOTE — PLAN OF CARE
BEHAVIORAL TEAM DISCUSSION     Participants:Natalia Fabian NP, Dennise Herrera [facility], Angie Laws LSW,  Becca Chery LSW, Jim Barrios LSW, Keeley Richardson [facility], Krys Rojo RN, [facility], Mady Holman OT, Natalia Costello OT, Gena Gomez OT, Jabari Triana [facility]   Progress: Attendeing groups, does need redirection as patient is not always on topic.   Continued Stay Criteria/Rationale: Pressured. delusional, psychosis, labile, disorganized  Medical/Physical: DM  Precautions:   Falls precaution?: No          Behavioral Orders   Procedures    Code 1 - Restrict to Unit    Routine Programming       As clinically indicated    Status 15       Every 15 minutes.      Plan: Doesn't want to return to [facility]. Increase Depakote, Patient wants to go to a womans shelter. Will need to contact kylean about this. Guardian are through [facility] with the contact information of:    Florinda Montez [phone]  Sally Live [phone]  Raven (RN)  [phone]   Rationale for change in precautions or plan: none       Current Facility-Administered Medications:     acetaminophen (TYLENOL) tablet 650 mg, 650 mg, Oral, Q4H PRN, Gokul Hathaway NP, 650 mg at 10/26/19 0835    alum & mag hydroxide-simethicone (MYLANTA ES/MAALOX  ES) suspension 30 mL, 30 mL, Oral, Q4H PRN, Gokul Hathaway, NP    artificial saliva (BIOTENE MT) solution 2 spray, 2 spray, Swish & Spit, 4x Daily PRN, Natalia Fabian APRN CNP    benztropine (COGENTIN) tablet 1 mg, 1 mg, Oral, BID, Natalia Fabian APRN CNP, 1 mg at 10/28/19 0850    bisacodyl (DULCOLAX) EC tablet 5 mg, 5 mg, Oral, Daily PRN, Natalia Fabian APRN CNP    busPIRone (BUSPAR) tablet 20 mg, 20 mg, Oral, TID, Natalia Fabian APRN CNP, 20 mg at 10/28/19 0850    chlorproMAZINE (THORAZINE) tablet 150 mg, 150 mg, Oral, TID, Natalia Fabian, LATRICE CNP, 150 mg at 10/28/19 0850    clonazePAM (klonoPIN) tablet 1 mg, 1 mg, Oral, At Bedtime,  Natalia Fabian APRN CNP, 1 mg at 10/27/19 2034    glucose gel 15-30 g, 15-30 g, Oral, Q15 Min PRN **OR** dextrose 50 % injection 25-50 mL, 25-50 mL, Intravenous, Q15 Min PRN **OR** glucagon injection 1 mg, 1 mg, Subcutaneous, Q15 Min PRN, Gokul Hathaway NP    divalproex sodium extended-release (DEPAKOTE ER) 24 hr tablet 500 mg, 500 mg, Oral, At Bedtime, Natalia Fabian APRN CNP, 500 mg at 10/27/19 2034    gabapentin (NEURONTIN) tablet 1,200 mg, 1,200 mg, Oral, TID, Natalia Fabian APRN CNP, 1,200 mg at 10/28/19 0850    hydrOXYzine (ATARAX) tablet  mg,  mg, Oral, TID PRN, Natalia Fabian APRN CNP, 100 mg at 10/26/19 0836    insulin aspart (NovoLOG) inj (RAPID ACTING), 8 Units, Subcutaneous, TID w/meals, Gokul Hathaway NP, 8 Units at 10/28/19 0857    insulin aspart (NovoLOG) inj (RAPID ACTING), 1-7 Units, Subcutaneous, TID AC, Gokul Hathaway NP, 1 Units at 10/26/19 1706    insulin glargine (LANTUS PEN) injection 40 Units, 40 Units, Subcutaneous, At Bedtime, Gokul Hathaway NP, 40 Units at 10/27/19 2034    magnesium hydroxide (MILK OF MAGNESIA) suspension 30 mL, 30 mL, Oral, At Bedtime PRN, Gokul Hathaway NP    metFORMIN (GLUCOPHAGE) tablet 1,000 mg, 1,000 mg, Oral, BID w/meals, Gokul Hathaway NP, 1,000 mg at 10/28/19 0850    nicotine (NICODERM CQ) 21 MG/24HR 24 hr patch 1 patch, 1 patch, Transdermal, Daily, Natalia Fabian APRN CNP, 1 patch at 10/28/19 0850    nicotine (NICORETTE) gum 2-4 mg, 2-4 mg, Buccal, Q1H PRN, Gokul Hathaway NP, 4 mg at 10/27/19 1404    nicotine Patch in Place, , Transdermal, Q8H, Natalia Fabian APRN CNP, Stopped at 10/28/19 0245    nicotine patch REMOVAL, , Transdermal, Daily, Natalia Fabian APRN CNP    OLANZapine (zyPREXA) injection 10 mg, 10 mg, Intramuscular, TID PRN **OR** OLANZapine (zyPREXA) tablet 10 mg, 10 mg, Oral, TID PRN, Raven Stout, NP, 10 mg at 10/28/19 0033    polyethylene glycol  (MIRALAX/GLYCOLAX) Packet 17 g, 17 g, Oral, Daily PRN, Natalia Fabian, APRN CNP    prazosin (MINIPRESS) capsule 1 mg, 1 mg, Oral, At Bedtime, Gokul Hathaway NP, 1 mg at 10/27/19 2034

## 2019-10-28 NOTE — PLAN OF CARE
Problem: Adult Behavioral Health Plan of Care  Goal: Patient-Specific Goal (Individualization)  Description  Pt will follow recommendations of treatment team.  Pt will be compliant with medications.  Pt will sleep 6- 8 hours each night.    Pt awake at start of shift. Pt c/o anxiety r/t difficulty falling asleep. Pt requesting prn; offered prn hydroxyzine. Pt requested prn zyprexa instead. Prn zyprexa administered at 0033. Pt then went to rest in bed. Pt appears to be sleeping comfortably with regular respirations. Pt slept approx. 3 hours this shift. No complaints at this time. Will continue to monitor.    0635 Blood sugar 76     Outcome: No Change    Problem: Thought Process Alteration  Goal: Optimal Thought Clarity  Description  Pt will not make any delusional statements throughout the shift.     Outcome: No Change    Problem: Violence Risk or Actual  Goal: Anger and Impulse Control  Description  Pt will keep impulses under control.  Pt will refrain from violent behaviors towards peers and staff.      Outcome: Improving    Face to face end of shift report communicated to oncoming RN.     Sally Sumner RN  10/28/2019  5:56 AM

## 2019-10-29 LAB
GLUCOSE BLDC GLUCOMTR-MCNC: 203 MG/DL (ref 70–99)
GLUCOSE BLDC GLUCOMTR-MCNC: 207 MG/DL (ref 70–99)
GLUCOSE BLDC GLUCOMTR-MCNC: 207 MG/DL (ref 70–99)
GLUCOSE BLDC GLUCOMTR-MCNC: 209 MG/DL (ref 70–99)
GLUCOSE BLDC GLUCOMTR-MCNC: 241 MG/DL (ref 70–99)

## 2019-10-29 PROCEDURE — 25000132 ZZH RX MED GY IP 250 OP 250 PS 637: Performed by: NURSE PRACTITIONER

## 2019-10-29 PROCEDURE — 00000146 ZZHCL STATISTIC GLUCOSE BY METER IP

## 2019-10-29 PROCEDURE — 25000128 H RX IP 250 OP 636: Performed by: NURSE PRACTITIONER

## 2019-10-29 PROCEDURE — 12400000 ZZH R&B MH

## 2019-10-29 RX ORDER — BENZTROPINE MESYLATE 1 MG/ML
1 INJECTION, SOLUTION INTRAMUSCULAR; INTRAVENOUS ONCE
Status: COMPLETED | OUTPATIENT
Start: 2019-10-29 | End: 2019-10-29

## 2019-10-29 RX ORDER — BENZTROPINE MESYLATE 1 MG/ML
1 INJECTION, SOLUTION INTRAMUSCULAR; INTRAVENOUS 2 TIMES DAILY PRN
Status: DISCONTINUED | OUTPATIENT
Start: 2019-10-29 | End: 2019-10-29 | Stop reason: CLARIF

## 2019-10-29 RX ORDER — HYDROXYZINE HYDROCHLORIDE 25 MG/1
50 TABLET, FILM COATED ORAL 3 TIMES DAILY PRN
Status: DISCONTINUED | OUTPATIENT
Start: 2019-10-29 | End: 2019-11-04 | Stop reason: HOSPADM

## 2019-10-29 RX ORDER — BUSPIRONE HYDROCHLORIDE 7.5 MG/1
15 TABLET ORAL 3 TIMES DAILY
Status: DISCONTINUED | OUTPATIENT
Start: 2019-10-29 | End: 2019-11-04 | Stop reason: HOSPADM

## 2019-10-29 RX ORDER — CHLORPROMAZINE HYDROCHLORIDE 100 MG/1
100 TABLET, FILM COATED ORAL 3 TIMES DAILY
Status: DISCONTINUED | OUTPATIENT
Start: 2019-10-29 | End: 2019-11-04 | Stop reason: HOSPADM

## 2019-10-29 RX ADMIN — BENZTROPINE MESYLATE 1 MG: 1 INJECTION INTRAMUSCULAR; INTRAVENOUS at 17:22

## 2019-10-29 RX ADMIN — METFORMIN HYDROCHLORIDE 1000 MG: 500 TABLET, FILM COATED ORAL at 17:20

## 2019-10-29 RX ADMIN — INSULIN ASPART 8 UNITS: 100 INJECTION, SOLUTION INTRAVENOUS; SUBCUTANEOUS at 12:14

## 2019-10-29 RX ADMIN — METFORMIN HYDROCHLORIDE 1000 MG: 500 TABLET, FILM COATED ORAL at 08:15

## 2019-10-29 RX ADMIN — INSULIN ASPART 8 UNITS: 100 INJECTION, SOLUTION INTRAVENOUS; SUBCUTANEOUS at 08:14

## 2019-10-29 RX ADMIN — ACETAMINOPHEN 650 MG: 325 TABLET, FILM COATED ORAL at 18:51

## 2019-10-29 RX ADMIN — GABAPENTIN 1200 MG: 600 TABLET, FILM COATED ORAL at 20:40

## 2019-10-29 RX ADMIN — CHLORPROMAZINE HYDROCHLORIDE 150 MG: 25 TABLET, FILM COATED ORAL at 13:32

## 2019-10-29 RX ADMIN — CHLORPROMAZINE HYDROCHLORIDE 100 MG: 100 TABLET, SUGAR COATED ORAL at 20:41

## 2019-10-29 RX ADMIN — NICOTINE POLACRILEX 4 MG: 2 GUM, CHEWING ORAL at 22:08

## 2019-10-29 RX ADMIN — DIVALPROEX SODIUM 750 MG: 500 TABLET, FILM COATED, EXTENDED RELEASE ORAL at 20:40

## 2019-10-29 RX ADMIN — CLONAZEPAM 1 MG: 1 TABLET ORAL at 20:41

## 2019-10-29 RX ADMIN — INSULIN ASPART 2 UNITS: 100 INJECTION, SOLUTION INTRAVENOUS; SUBCUTANEOUS at 12:14

## 2019-10-29 RX ADMIN — BENZTROPINE MESYLATE 1 MG: 1 TABLET ORAL at 08:16

## 2019-10-29 RX ADMIN — CHLORPROMAZINE HYDROCHLORIDE 150 MG: 25 TABLET, FILM COATED ORAL at 08:15

## 2019-10-29 RX ADMIN — BENZTROPINE MESYLATE 1 MG: 1 TABLET ORAL at 20:41

## 2019-10-29 RX ADMIN — INSULIN ASPART 3 UNITS: 100 INJECTION, SOLUTION INTRAVENOUS; SUBCUTANEOUS at 17:21

## 2019-10-29 RX ADMIN — NICOTINE 1 PATCH: 21 PATCH, EXTENDED RELEASE TRANSDERMAL at 08:16

## 2019-10-29 RX ADMIN — BUSPIRONE HYDROCHLORIDE 20 MG: 10 TABLET ORAL at 13:32

## 2019-10-29 RX ADMIN — GABAPENTIN 1200 MG: 600 TABLET, FILM COATED ORAL at 13:32

## 2019-10-29 RX ADMIN — GABAPENTIN 1200 MG: 600 TABLET, FILM COATED ORAL at 08:15

## 2019-10-29 RX ADMIN — PRAZOSIN HYDROCHLORIDE 1 MG: 1 CAPSULE ORAL at 20:41

## 2019-10-29 RX ADMIN — BUSPIRONE HYDROCHLORIDE 20 MG: 10 TABLET ORAL at 08:16

## 2019-10-29 RX ADMIN — INSULIN ASPART 8 UNITS: 100 INJECTION, SOLUTION INTRAVENOUS; SUBCUTANEOUS at 17:20

## 2019-10-29 RX ADMIN — INSULIN ASPART 2 UNITS: 100 INJECTION, SOLUTION INTRAVENOUS; SUBCUTANEOUS at 08:14

## 2019-10-29 RX ADMIN — INSULIN GLARGINE 40 UNITS: 100 INJECTION, SOLUTION SUBCUTANEOUS at 20:41

## 2019-10-29 RX ADMIN — BUSPIRONE HYDROCHLORIDE 15 MG: 7.5 TABLET ORAL at 20:41

## 2019-10-29 ASSESSMENT — ACTIVITIES OF DAILY LIVING (ADL)
ORAL_HYGIENE: INDEPENDENT
DRESS: SCRUBS (BEHAVIORAL HEALTH)
HYGIENE/GROOMING: INDEPENDENT

## 2019-10-29 NOTE — PLAN OF CARE
Face to face end of shift report obtained from OBDULIO Cruz. Pt observed in lobby.     DAIANA COFFEY RN  10/29/2019  12:13 AM

## 2019-10-29 NOTE — PLAN OF CARE
Problem: Adult Behavioral Health Plan of Care  Goal: Patient-Specific Goal (Individualization)  Description  Pt will follow recommendations of treatment team.  Pt will be compliant with medications.  Pt will sleep 6- 8 hours each night.     Outcome: No Change  Note:   Shift Summery:  VSS, denies pain this shift. Pt sleeps much of shift-difficult to wake. Reports pt had poor sleep and was awake a big part of the night. Denies all criteria including: SI, SIB, HI or hallucinations. Able to maintain  short linear conversations.       Patient's Stated Goal for Shift:  attend a group    Goal Status:  in process.       Problem: Thought Process Alteration  Goal: Optimal Thought Clarity  Description  Pt will not make any delusional statements throughout the shift.      10/29/2019 1220 by Nancy Stern, RN  Outcome: No Change     Problem: Violence Risk or Actual  Goal: Anger and Impulse Control  Description  Pt will keep impulses under control.  Pt will refrain from violent behaviors towards peers and staff.    10/29/2019 1220 by Nancy Stern, RN  Outcome: Improving    Face to face end of shift report communicated to *oncoming shift     Nancy Stern RN  10/29/2019  12:33 PM

## 2019-10-29 NOTE — PLAN OF CARE
"Face to face end of shift report communicated to Mandy Cano RN  10/29/2019  11:02 PM    Patient compliant with treatment team recommendations.  Patient compliant with medications \"it's either I'm unhealthy and have diabetes or take this medication which increases my issue and makes me cloudy so tired\"  \"they want me to try it again this Zyprexa I've been on it before\"    Blood sugar prior to dinner is 241 \"oh that's not bad\"  Questioned patient about the temperature of her room \"well I like it like this\"  Patient's temp has been slightly elevated.  When re-evaluated in [facility] temp is decreased.    Patient also stated \"wow no one told me what I look like\" patient in bathroom fixing hair and washing face.    Patient in lounge asks RN in her proximity \"look at my hand, look what it does\"  Patient presents cog wheeling.    Reported patient was up a majority of night with roommate.  Patient taking a nap this shift until supper time.    One time order Cogentin for the cog wheeling.  PRN:  Cogentin 1 mg IM at 1722  Upon reassessment of IM about 5 min after admin patient states \"wow I really do feel better, that medicine really works\"   PRN:  Tylenol 650 mg @ 1851 for mild side pain, relief noted.   Patient endorses AH and VH are present but cannot describe exactly what they mean.  Patient denies SI and HI.    Patient in lounge for the remainder of this writer's shift listening to headphones and working on paper work.         Problem: Adult Behavioral Health Plan of Care  Goal: Patient-Specific Goal (Individualization)  Description  Pt will follow recommendations of treatment team.  Pt will be compliant with medications.  Pt will sleep 6- 8 hours each night.     10/29/2019 1634 by Sherry Cano RN  Outcome: No Change  Note:   Problem: Thought Process Alteration  Goal: Optimal Thought Clarity  Description  Pt will not make any delusional statements throughout the shift.    "   10/29/2019 1634 by Sherry Cano, RN  Outcome: No Change

## 2019-10-29 NOTE — PLAN OF CARE
Problem: Adult Behavioral Health Plan of Care  Goal: Plan of Care Review  Outcome: No Change     Problem: Thought Process Alteration  Goal: Optimal Thought Clarity  Description  Pt will not make any delusional statements throughout the shift.      10/28/2019 2150 by Nancy Stern, RN  Outcome: No Change    VSS, denies pain. Continues to deny all criteria including: SI, SIB, HI or hallucinations. Endorses high anxiety requests Atarax-rec'd. Later in shift pt states she feels agitated-requests Zyprexa-rec'd. Out in lounge most of shift-talkative with roommate. Continues with making her charts/lists.   Cooperative with nursing assessment. Behavior appropriate with staff and peers.   BG-213 at dinner and 155 at HS.      Problem: Violence Risk or Actual  Goal: Anger and Impulse Control  Description  Pt will keep impulses under control.  Pt will refrain from violent behaviors towards peers and staff.    10/28/2019 2150 by Nancy Stern, RN    Outcome: No Change  Face to face end of shift report communicated to oncoming shift.     Nancy Stern RN  10/28/2019  9:56 PM

## 2019-10-29 NOTE — PLAN OF CARE
Face to face end of shift report will be communicated to oncoming RN.     Problem: Adult Inpatient Plan of Care  Goal: Patient-Specific Goal (Individualization)  Description  Pt will maintain appropriate behaviors.  Pt will have a linear conversation throughout the day.  Pt will attend groups 50 % of the day.  Pt will get 6-8 hrs of sleep each night.        Outcome: No Change     Problem: Thought Process Alteration  Goal: Optimal Thought Clarity  Description  Pt will not make any delusional statements throughout the shift.      10/29/2019 0243 by Mandy Montoya RN  Outcome: No Change     Problem: Violence Risk or Actual  Goal: Anger and Impulse Control  Description  Pt will keep impulses under control.  Pt will refrain from violent behaviors towards peers and staff.    10/29/2019 0243 by Mandy Montoya RN  Outcome: Improving   Pt coloring/witting in lobby with peers for hour or so at beginning of shift. At 0010 pt request BS check as she felt BS was low. . Pt went to lay in bed at around 0115 with no further concerns or requests. No delusional comments or manic behaviors noted or reported so far this shift. Will continue to monitor.   0230- Pt appears to be sleeping.   0545- Pt slept approximately 3 hours and counting with no further complains. Will continue to monitor.   0651- .

## 2019-10-29 NOTE — PLAN OF CARE
BEHAVIORAL TEAM DISCUSSION     Participants:Natalia Fabian NP, Dennise Herrera LICSW, Angie Laws LSW,  Becca Chery LSW, Jim Barrios LSW, melyssa Ron RN, Jenny Cardenas RN, Natalia Costello OT, Gena Gomez OT, Jabari Triana [facility]   Progress: Attendeing groups, does need redirection as patient is not always on topic.   Continued Stay Criteria/Rationale: Pressured. delusional, psychosis, labile, disorganized  Medical/Physical: DM  Precautions:   Falls precaution?: No          Behavioral Orders   Procedures    Code 1 - Restrict to Unit    Routine Programming       As clinically indicated    Status 15       Every 15 minutes.      Plan: Increased Depakote, Guardian did not agree to women's shelter and work on safe discharge plan when stable   Rationale for change in precautions or plan: none        Current Facility-Administered Medications:     acetaminophen (TYLENOL) tablet 650 mg, 650 mg, Oral, Q4H PRN, Gokul Hathaway NP, 650 mg at 10/26/19 0835    alum & mag hydroxide-simethicone (MYLANTA ES/MAALOX  ES) suspension 30 mL, 30 mL, Oral, Q4H PRN, Gokul Hathaway NP    artificial saliva (BIOTENE MT) solution 2 spray, 2 spray, Swish & Spit, 4x Daily PRN, Natalia Fabian APRN CNP    benztropine (COGENTIN) tablet 1 mg, 1 mg, Oral, BID, Natalia Fabian APRN CNP, 1 mg at 10/28/19 0850    bisacodyl (DULCOLAX) EC tablet 5 mg, 5 mg, Oral, Daily PRN, Natalia Fabian APRN CNP    busPIRone (BUSPAR) tablet 20 mg, 20 mg, Oral, TID, Natalia Fabian APRN CNP, 20 mg at 10/28/19 0850    chlorproMAZINE (THORAZINE) tablet 150 mg, 150 mg, Oral, TID, Natalia Fabian APRN CNP, 150 mg at 10/28/19 0850    clonazePAM (klonoPIN) tablet 1 mg, 1 mg, Oral, At Bedtime, Natalia Fabian APRN CNP, 1 mg at 10/27/19 2034    glucose gel 15-30 g, 15-30 g, Oral, Q15 Min PRN **OR** dextrose 50 % injection 25-50 mL, 25-50 mL, Intravenous, Q15 Min PRN **OR** glucagon injection 1 mg, 1 mg, Subcutaneous, Q15 Min PRN,  Gokul Hathaway NP    divalproex sodium extended-release (DEPAKOTE ER) 24 hr tablet 500 mg, 500 mg, Oral, At Bedtime, Natalia Fabian APRN CNP, 500 mg at 10/27/19 2034    gabapentin (NEURONTIN) tablet 1,200 mg, 1,200 mg, Oral, TID, Natalia Fabian APRN CNP, 1,200 mg at 10/28/19 0850    hydrOXYzine (ATARAX) tablet  mg,  mg, Oral, TID PRN, Natalia Fabian APRN CNP, 100 mg at 10/26/19 0836    insulin aspart (NovoLOG) inj (RAPID ACTING), 8 Units, Subcutaneous, TID w/meals, Gokul Hathaway NP, 8 Units at 10/28/19 0857    insulin aspart (NovoLOG) inj (RAPID ACTING), 1-7 Units, Subcutaneous, TID AC, Gokul Hathaway NP, 1 Units at 10/26/19 1706    insulin glargine (LANTUS PEN) injection 40 Units, 40 Units, Subcutaneous, At Bedtime, Gokul Hathaway NP, 40 Units at 10/27/19 2034    magnesium hydroxide (MILK OF MAGNESIA) suspension 30 mL, 30 mL, Oral, At Bedtime PRN, Gokul Hathaway NP    metFORMIN (GLUCOPHAGE) tablet 1,000 mg, 1,000 mg, Oral, BID w/meals, Gokul Hathaway NP, 1,000 mg at 10/28/19 0850    nicotine (NICODERM CQ) 21 MG/24HR 24 hr patch 1 patch, 1 patch, Transdermal, Daily, Natalia Fabian APRN CNP, 1 patch at 10/28/19 0850    nicotine (NICORETTE) gum 2-4 mg, 2-4 mg, Buccal, Q1H PRN, Gokul Hathaway NP, 4 mg at 10/27/19 1404    nicotine Patch in Place, , Transdermal, Q8H, Natalia Fabian APRN CNP, Stopped at 10/28/19 0245    nicotine patch REMOVAL, , Transdermal, Daily, Natalia Fabian APRN CNP    OLANZapine (zyPREXA) injection 10 mg, 10 mg, Intramuscular, TID PRN **OR** OLANZapine (zyPREXA) tablet 10 mg, 10 mg, Oral, TID PRN, Raven Stout NP, 10 mg at 10/28/19 0033    polyethylene glycol (MIRALAX/GLYCOLAX) Packet 17 g, 17 g, Oral, Daily PRN, Natalia Fabian APRN CNP    prazosin (MINIPRESS) capsule 1 mg, 1 mg, Oral, At Bedtime, Gokul Hathaway NP, 1 mg at 10/27/19 2034

## 2019-10-30 LAB
GLUCOSE BLDC GLUCOMTR-MCNC: 102 MG/DL (ref 70–99)
GLUCOSE BLDC GLUCOMTR-MCNC: 122 MG/DL (ref 70–99)
GLUCOSE BLDC GLUCOMTR-MCNC: 154 MG/DL (ref 70–99)
GLUCOSE BLDC GLUCOMTR-MCNC: 159 MG/DL (ref 70–99)

## 2019-10-30 PROCEDURE — 99232 SBSQ HOSP IP/OBS MODERATE 35: CPT | Performed by: NURSE PRACTITIONER

## 2019-10-30 PROCEDURE — 25000132 ZZH RX MED GY IP 250 OP 250 PS 637: Performed by: NURSE PRACTITIONER

## 2019-10-30 PROCEDURE — 00000146 ZZHCL STATISTIC GLUCOSE BY METER IP

## 2019-10-30 PROCEDURE — 25000131 ZZH RX MED GY IP 250 OP 636 PS 637: Performed by: NURSE PRACTITIONER

## 2019-10-30 PROCEDURE — 12400000 ZZH R&B MH

## 2019-10-30 PROCEDURE — 25000125 ZZHC RX 250: Performed by: NURSE PRACTITIONER

## 2019-10-30 RX ADMIN — INSULIN GLARGINE 40 UNITS: 100 INJECTION, SOLUTION SUBCUTANEOUS at 20:53

## 2019-10-30 RX ADMIN — CLONAZEPAM 1 MG: 1 TABLET ORAL at 20:51

## 2019-10-30 RX ADMIN — BUSPIRONE HYDROCHLORIDE 15 MG: 7.5 TABLET ORAL at 20:50

## 2019-10-30 RX ADMIN — CHLORPROMAZINE HYDROCHLORIDE 100 MG: 100 TABLET, SUGAR COATED ORAL at 08:54

## 2019-10-30 RX ADMIN — INSULIN ASPART 8 UNITS: 100 INJECTION, SOLUTION INTRAVENOUS; SUBCUTANEOUS at 11:59

## 2019-10-30 RX ADMIN — INSULIN ASPART 8 UNITS: 100 INJECTION, SOLUTION INTRAVENOUS; SUBCUTANEOUS at 17:02

## 2019-10-30 RX ADMIN — INSULIN ASPART 1 UNITS: 100 INJECTION, SOLUTION INTRAVENOUS; SUBCUTANEOUS at 11:59

## 2019-10-30 RX ADMIN — DIVALPROEX SODIUM 750 MG: 500 TABLET, FILM COATED, EXTENDED RELEASE ORAL at 20:50

## 2019-10-30 RX ADMIN — NICOTINE POLACRILEX 4 MG: 2 GUM, CHEWING ORAL at 08:57

## 2019-10-30 RX ADMIN — HYDROXYZINE HYDROCHLORIDE 50 MG: 25 TABLET ORAL at 03:21

## 2019-10-30 RX ADMIN — METFORMIN HYDROCHLORIDE 1000 MG: 500 TABLET, FILM COATED ORAL at 17:01

## 2019-10-30 RX ADMIN — BUSPIRONE HYDROCHLORIDE 15 MG: 7.5 TABLET ORAL at 08:53

## 2019-10-30 RX ADMIN — GABAPENTIN 1200 MG: 600 TABLET, FILM COATED ORAL at 13:53

## 2019-10-30 RX ADMIN — CHLORPROMAZINE HYDROCHLORIDE 100 MG: 100 TABLET, SUGAR COATED ORAL at 20:50

## 2019-10-30 RX ADMIN — PRAZOSIN HYDROCHLORIDE 1 MG: 1 CAPSULE ORAL at 20:50

## 2019-10-30 RX ADMIN — BENZTROPINE MESYLATE 1 MG: 1 TABLET ORAL at 20:51

## 2019-10-30 RX ADMIN — METFORMIN HYDROCHLORIDE 1000 MG: 500 TABLET, FILM COATED ORAL at 08:53

## 2019-10-30 RX ADMIN — BUSPIRONE HYDROCHLORIDE 15 MG: 7.5 TABLET ORAL at 13:53

## 2019-10-30 RX ADMIN — CHLORPROMAZINE HYDROCHLORIDE 100 MG: 100 TABLET, SUGAR COATED ORAL at 13:53

## 2019-10-30 RX ADMIN — ACETAMINOPHEN 650 MG: 325 TABLET, FILM COATED ORAL at 03:21

## 2019-10-30 RX ADMIN — GABAPENTIN 1200 MG: 600 TABLET, FILM COATED ORAL at 08:53

## 2019-10-30 RX ADMIN — GABAPENTIN 1200 MG: 600 TABLET, FILM COATED ORAL at 20:50

## 2019-10-30 RX ADMIN — NICOTINE 1 PATCH: 21 PATCH, EXTENDED RELEASE TRANSDERMAL at 08:54

## 2019-10-30 RX ADMIN — BENZTROPINE MESYLATE 1 MG: 1 TABLET ORAL at 08:54

## 2019-10-30 ASSESSMENT — ACTIVITIES OF DAILY LIVING (ADL)
DRESS: SCRUBS (BEHAVIORAL HEALTH)
HYGIENE/GROOMING: INDEPENDENT
DRESS: SCRUBS (BEHAVIORAL HEALTH)
ORAL_HYGIENE: INDEPENDENT
HYGIENE/GROOMING: INDEPENDENT
ORAL_HYGIENE: INDEPENDENT

## 2019-10-30 NOTE — PLAN OF CARE
1313 Received patient by transfer from [address] [place] [address] to patient room [facility].

## 2019-10-30 NOTE — PROGRESS NOTES
"[facility]  Psychiatric Progress Note      Impression:   Genie is up and about on the unit. She is social with peers and is participating in unit activity. She is bright and less irritable than she has been on previous admits. During conversation she does become a bit tangential and rambles. She is asking to be discharged to a snf house in [place], or a homeless shelter in [place]. Genie does not think she needs to be here. She also does not want to return to [place]. She would prefer to leave the [place] area. 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 She otherwise feels good and does like her medications  As they help her not \"feel angry all the time\".     Educated regarding medication indications, risks, benefits, side effects, contraindications and possible interactions. Verbally expressed understanding.        DIagnoses:   Schizoaffective disorder, bipolar type   PTSD, by history  Methamphetamine use disorder, in remission             Plan:   Continue current medications    Valproic level lab ordered for 11/1    Consider re-starting Topamax instead of Depakote - patient declines at this point    ELOS:  >5 days for medication adjustments, mood stability, and safe discharge planning      Attestation:  Patient has been seen and evaluated by me,  Loren Zuleta NP          Interim History:   The patient's care was discussed with the treatment team and chart notes were reviewed.          Medications:     Current Facility-Administered Medications Ordered in Epic   Medication Dose Route Frequency Last Rate Last Dose     acetaminophen (TYLENOL) tablet 650 mg  650 mg Oral Q4H PRN   650 mg at 10/30/19 0321     alum & mag hydroxide-simethicone (MYLANTA ES/MAALOX  ES) suspension 30 mL  30 mL Oral Q4H PRN         artificial saliva (BIOTENE MT) solution 2 spray  2 spray Swish & Spit 4x " Daily PRN         benztropine (COGENTIN) tablet 1 mg  1 mg Oral BID   1 mg at 10/30/19 0854     bisacodyl (DULCOLAX) EC tablet 5 mg  5 mg Oral Daily PRN         busPIRone (BUSPAR) tablet 15 mg  15 mg Oral TID   15 mg at 10/30/19 0853     chlorproMAZINE (THORAZINE) tablet 100 mg  100 mg Oral TID   100 mg at 10/30/19 0854     clonazePAM (klonoPIN) tablet 1 mg  1 mg Oral At Bedtime   1 mg at 10/29/19 2041     glucose gel 15-30 g  15-30 g Oral Q15 Min PRN        Or     dextrose 50 % injection 25-50 mL  25-50 mL Intravenous Q15 Min PRN        Or     glucagon injection 1 mg  1 mg Subcutaneous Q15 Min PRN         divalproex sodium extended-release (DEPAKOTE ER) 24 hr tablet 750 mg  750 mg Oral At Bedtime   750 mg at 10/29/19 2040     gabapentin (NEURONTIN) tablet 1,200 mg  1,200 mg Oral TID   1,200 mg at 10/30/19 0853     hydrOXYzine (ATARAX) tablet 50 mg  50 mg Oral TID PRN   50 mg at 10/30/19 0321     insulin aspart (NovoLOG) inj (RAPID ACTING)  8 Units Subcutaneous TID w/meals   8 Units at 10/30/19 1159     insulin aspart (NovoLOG) inj (RAPID ACTING)  1-7 Units Subcutaneous TID AC   1 Units at 10/30/19 1159     insulin glargine (LANTUS PEN) injection 40 Units  40 Units Subcutaneous At Bedtime   40 Units at 10/29/19 2041     magnesium hydroxide (MILK OF MAGNESIA) suspension 30 mL  30 mL Oral At Bedtime PRN         metFORMIN (GLUCOPHAGE) tablet 1,000 mg  1,000 mg Oral BID w/meals   1,000 mg at 10/30/19 0853     nicotine (NICODERM CQ) 21 MG/24HR 24 hr patch 1 patch  1 patch Transdermal Daily   1 patch at 10/30/19 0854     nicotine (NICORETTE) gum 2-4 mg  2-4 mg Buccal Q1H PRN   4 mg at 10/30/19 0857     nicotine Patch in Place   Transdermal Q8H         nicotine patch REMOVAL   Transdermal Daily         OLANZapine (zyPREXA) tablet 10 mg  10 mg Oral TID PRN   10 mg at 10/28/19 2133    Or     OLANZapine (zyPREXA) injection 10 mg  10 mg Intramuscular TID PRN         polyethylene glycol (MIRALAX/GLYCOLAX) Packet 17 g  17 g Oral  "Daily PRN         prazosin (MINIPRESS) capsule 1 mg  1 mg Oral At Bedtime   1 mg at 10/29/19 2041     No current Epic-ordered outpatient medications on file.          10 point ROS - see H&P       Allergies:     Allergies   Allergen Reactions     Haloperidol Other (See Comments)     Other reaction(s): Seizures  Patient states, \"I fell down and wasn't able to get up.\"  Patient states, \"I fell down and wasn't able to get up.\"              Psychiatric Examination:   /92   Pulse 100   Temp 98.2  F (36.8  C) (Tympanic)   Resp 16   Wt 87.2 kg (192 lb 3.2 oz)   SpO2 98%   BMI 32.99 kg/m    Weight is 192 lbs 3.2 oz  Body mass index is 32.99 kg/m .    Appearance:  awake, alert  Attitude:  cooperative  Eye Contact:  fair  Mood:  better  Affect:  mood congruent and intensity is blunted  Speech:  clear, coherent  Psychomotor Behavior:  no evidence of tardive dyskinesia, dystonia, or tics  Thought Process:  logical  Associations:  no loose associations  Thought Content:  no evidence of suicidal ideation or homicidal ideation and no evidence of psychotic thought  Insight:  fair  Judgment:  fair  Oriented to:  time, person, and place  Attention Span and Concentration:  fair  Recent and Remote Memory:  fair  Fund of Knowledge: appropriate  Muscle Strength and Tone: normal  Gait and Station: Normal           Labs:     Results for orders placed or performed during the hospital encounter of 10/22/19   CBC with platelets differential   Result Value Ref Range    WBC 8.8 4.0 - 11.0 10e9/L    RBC Count 4.57 3.8 - 5.2 10e12/L    Hemoglobin 13.1 11.7 - 15.7 g/dL    Hematocrit 38.8 35.0 - 47.0 %    MCV 85 78 - 100 fl    MCH 28.7 26.5 - 33.0 pg    MCHC 33.8 31.5 - 36.5 g/dL    RDW 12.4 10.0 - 15.0 %    Platelet Count 299 150 - 450 10e9/L    Diff Method Automated Method     % Neutrophils 73.9 %    % Lymphocytes 19.9 %    % Monocytes 5.2 %    % Eosinophils 0.3 %    % Basophils 0.2 %    % Immature Granulocytes 0.5 %    Nucleated RBCs " 0 0 /100    Absolute Neutrophil 6.5 1.6 - 8.3 10e9/L    Absolute Lymphocytes 1.8 0.8 - 5.3 10e9/L    Absolute Monocytes 0.5 0.0 - 1.3 10e9/L    Absolute Eosinophils 0.0 0.0 - 0.7 10e9/L    Absolute Basophils 0.0 0.0 - 0.2 10e9/L    Abs Immature Granulocytes 0.0 0 - 0.4 10e9/L    Absolute Nucleated RBC 0.0    Comprehensive metabolic panel   Result Value Ref Range    Sodium 138 133 - 144 mmol/L    Potassium 3.9 3.4 - 5.3 mmol/L    Chloride 109 94 - 109 mmol/L    Carbon Dioxide 23 20 - 32 mmol/L    Anion Gap 6 3 - 14 mmol/L    Glucose 161 (H) 70 - 99 mg/dL    Urea Nitrogen 12 7 - 30 mg/dL    Creatinine 0.56 0.52 - 1.04 mg/dL    GFR Estimate >90 >60 mL/min/[1.73_m2]    GFR Estimate If Black >90 >60 mL/min/[1.73_m2]    Calcium 8.7 8.5 - 10.1 mg/dL    Bilirubin Total 0.3 0.2 - 1.3 mg/dL    Albumin 4.0 3.4 - 5.0 g/dL    Protein Total 7.7 6.8 - 8.8 g/dL    Alkaline Phosphatase 77 40 - 150 U/L    ALT 23 0 - 50 U/L    AST 12 0 - 45 U/L   Alcohol ethyl   Result Value Ref Range    Ethanol g/dL <0.01 0.01 g/dL   HCG qualitative urine (UPT)   Result Value Ref Range    HCG Qual Urine Negative NEG^Negative   UA reflex to Microscopic   Result Value Ref Range    Color Urine Yellow     Appearance Urine Clear     Glucose Urine Negative NEG^Negative mg/dL    Bilirubin Urine Negative NEG^Negative    Ketones Urine 10 (A) NEG^Negative mg/dL    Specific Gravity Urine 1.028 1.003 - 1.035    Blood Urine Negative NEG^Negative    pH Urine 5.5 4.7 - 8.0 pH    Protein Albumin Urine 10 (A) NEG^Negative mg/dL    Urobilinogen mg/dL Normal 0.0 - 2.0 mg/dL    Nitrite Urine Negative NEG^Negative    Leukocyte Esterase Urine Negative NEG^Negative    Source Unspecified Urine     RBC Urine 1 0 - 2 /HPF    WBC Urine 1 0 - 5 /HPF    Bacteria Urine None (A) NEG^Negative /HPF    Squamous Epithelial /HPF Urine 6 (H) 0 - 1 /HPF    Mucous Urine Present (A) NEG^Negative /LPF   Urine Drugs of Abuse Screen Panel 13   Result Value Ref Range    Cannabinoids  (10-rgx-4-carboxy-9-THC) Not Detected NDET^Not Detected ng/mL    Phencyclidine (Phencyclidine) Not Detected NDET^Not Detected ng/mL    Cocaine (Benzoylecgonine) Not Detected NDET^Not Detected ng/mL    Methamphetamine (d-Methamphetamine) Not Detected NDET^Not Detected ng/mL    Opiates (Morphine) Not Detected NDET^Not Detected ng/mL    Amphetamine (d-Amphetamine) Not Detected NDET^Not Detected ng/mL    Benzodiazepines (Nordiazepam) Not Detected NDET^Not Detected ng/mL    Tricyclic Antidepressants (Desipramine) Not Detected NDET^Not Detected ng/mL    Methadone (Methadone) Not Detected NDET^Not Detected ng/mL    Barbiturates (Butalbital) Not Detected NDET^Not Detected ng/mL    Oxycodone (Oxycodone) Not Detected NDET^Not Detected ng/mL    Propoxyphene (Norpropoxyphene) Not Detected NDET^Not Detected ng/mL    Buprenorphine (Buprenorphine) Not Detected NDET^Not Detected ng/mL   Acetaminophen level   Result Value Ref Range    Acetaminophen Level <2 mg/L   Salicylate level   Result Value Ref Range    Salicylate Level 4 mg/dL   Glucose by meter   Result Value Ref Range    Glucose 217 (H) 70 - 99 mg/dL   Glucose by meter   Result Value Ref Range    Glucose 191 (H) 70 - 99 mg/dL   Glucose by meter   Result Value Ref Range    Glucose 156 (H) 70 - 99 mg/dL   Glucose by meter   Result Value Ref Range    Glucose 138 (H) 70 - 99 mg/dL   Glucose by meter   Result Value Ref Range    Glucose 132 (H) 70 - 99 mg/dL   Glucose by meter   Result Value Ref Range    Glucose 230 (H) 70 - 99 mg/dL   Glucose by meter   Result Value Ref Range    Glucose 116 (H) 70 - 99 mg/dL   Glucose by meter   Result Value Ref Range    Glucose 179 (H) 70 - 99 mg/dL   Glucose by meter   Result Value Ref Range    Glucose 139 (H) 70 - 99 mg/dL   Lipid Profile   Result Value Ref Range    Cholesterol 184 <200 mg/dL    Triglycerides 281 (H) <150 mg/dL    HDL Cholesterol 30 (L) >49 mg/dL    LDL Cholesterol Calculated 98 <100 mg/dL    Non HDL Cholesterol 154 (H) <130  mg/dL   Glucose by meter   Result Value Ref Range    Glucose 217 (H) 70 - 99 mg/dL   Glucose by meter   Result Value Ref Range    Glucose 138 (H) 70 - 99 mg/dL   Glucose by meter   Result Value Ref Range    Glucose 246 (H) 70 - 99 mg/dL   Glucose by meter   Result Value Ref Range    Glucose 186 (H) 70 - 99 mg/dL   Glucose by meter   Result Value Ref Range    Glucose 280 (H) 70 - 99 mg/dL   Glucose by meter   Result Value Ref Range    Glucose 193 (H) 70 - 99 mg/dL   Glucose by meter   Result Value Ref Range    Glucose 271 (H) 70 - 99 mg/dL   Glucose by meter   Result Value Ref Range    Glucose 152 (H) 70 - 99 mg/dL   Glucose by meter   Result Value Ref Range    Glucose 111 (H) 70 - 99 mg/dL   Glucose by meter   Result Value Ref Range    Glucose 95 70 - 99 mg/dL   Glucose by meter   Result Value Ref Range    Glucose 93 70 - 99 mg/dL   Glucose by meter   Result Value Ref Range    Glucose 124 (H) 70 - 99 mg/dL   Glucose by meter   Result Value Ref Range    Glucose 160 (H) 70 - 99 mg/dL   Glucose by meter   Result Value Ref Range    Glucose 194 (H) 70 - 99 mg/dL   Glucose by meter   Result Value Ref Range    Glucose 76 70 - 99 mg/dL   Glucose by meter   Result Value Ref Range    Glucose 301 (H) 70 - 99 mg/dL   Glucose by meter   Result Value Ref Range    Glucose 213 (H) 70 - 99 mg/dL   Glucose by meter   Result Value Ref Range    Glucose 155 (H) 70 - 99 mg/dL   Glucose by meter   Result Value Ref Range    Glucose 207 (H) 70 - 99 mg/dL   Glucose by meter   Result Value Ref Range    Glucose 209 (H) 70 - 99 mg/dL   Glucose by meter   Result Value Ref Range    Glucose 207 (H) 70 - 99 mg/dL   Glucose by meter   Result Value Ref Range    Glucose 241 (H) 70 - 99 mg/dL   Glucose by meter   Result Value Ref Range    Glucose 203 (H) 70 - 99 mg/dL   Glucose by meter   Result Value Ref Range    Glucose 122 (H) 70 - 99 mg/dL   Glucose by meter   Result Value Ref Range    Glucose 154 (H) 70 - 99 mg/dL

## 2019-10-30 NOTE — PLAN OF CARE
"Face to face end of shift report will be communicated to oncoming RN.     Problem: Adult Inpatient Plan of Care  Goal: Patient-Specific Goal (Individualization)  Description  Pt will maintain appropriate behaviors.  Pt will have a linear conversation throughout the day.  Pt will attend groups 50 % of the day.  Pt will get 6-8 hrs of sleep each night.        Outcome: No Change     Problem: Adult Behavioral Health Plan of Care  Goal: Patient-Specific Goal (Individualization)  Description  Pt will follow recommendations of treatment team.  Pt will be compliant with medications.  Pt will sleep 6- 8 hours each night.     10/30/2019 0123 by Mandy Montoya RN  Outcome: No Change     Problem: Thought Process Alteration  Goal: Optimal Thought Clarity  Description  Pt will not make any delusional statements throughout the shift.      10/30/2019 0123 by Mandy Montoya RN  Outcome: No Change     Problem: Violence Risk or Actual  Goal: Anger and Impulse Control  Description  Pt will keep impulses under control.  Pt will refrain from violent behaviors towards peers and staff.    10/30/2019 0123 by Mandy Montoya RN  Outcome: No Change  0120- Pt coloring/witting in lobby with no complains or requests since start of shift. Will continue to monitor.    0321- Pt still up, states doesn't want to sleep yet. C/o 10/10 pain \"All over\" and anxiety. Jaw slightly clinched. Atarax 50 mg and tylenol 650 mg given. Will continue to monitor.    No violent behavior noted or reported so far this shift. Will continue to monitor.   0600- Pt up all night witting in lobby. Pain decrease slightly with no further c/o anxiety. Pt calmed and appropriate.   "

## 2019-10-30 NOTE — PLAN OF CARE
Message left for Xavier at the [facility] as pt states she is interested in this. Will call and speak with pt's guardian after writer speaks with Xavier.

## 2019-10-30 NOTE — PLAN OF CARE
Face to face end of shift report obtained from OBDULIO Powell. Pt observed in [facility].    DAIANA COFFEY RN  10/30/2019  12:28 AM

## 2019-10-30 NOTE — PLAN OF CARE
BEHAVIORAL TEAM DISCUSSION    Participants: Loren Zuleta NP, Angie Laws LSW,  Becca Chery LSW, Jim Barrios LSW,  Krys Rojo RN, [facility], Mady Holman OT, Natalia Costello OT, Gena Gomez OT, Jabari Triana [facility], Jenny Cardenas RN, Mary Bojorquez RN  Progress: minimal   Continued Stay Criteria/Rationale: Pressured. delusional, psychosis, labile, disorganized   Medical/Physical: DM  Precautions:   Falls precaution?: No                 Behavioral Orders   Procedures    Code 1 - Restrict to Unit     Routine Programming        As clinically indicated     Status 15        Every 15 minutes.       Plan: continue with medications adjustments and work on discharge plan   Rationale for change in precautions or plan: none        Current Facility-Administered Medications:     acetaminophen (TYLENOL) tablet 650 mg, 650 mg, Oral, Q4H PRN, Gokul Hathaway NP, 650 mg at 10/26/19 0835    alum & mag hydroxide-simethicone (MYLANTA ES/MAALOX  ES) suspension 30 mL, 30 mL, Oral, Q4H PRN, Gokul Hathaway NP    artificial saliva (BIOTENE MT) solution 2 spray, 2 spray, Swish & Spit, 4x Daily PRN, Natalia Fabian APRN CNP    benztropine (COGENTIN) tablet 1 mg, 1 mg, Oral, BID, WoNatalia armstrong APRN CNP, 1 mg at 10/28/19 0850    bisacodyl (DULCOLAX) EC tablet 5 mg, 5 mg, Oral, Daily PRN, Natalia Fabian APRN CNP    busPIRone (BUSPAR) tablet 20 mg, 20 mg, Oral, TID, Hussein Fabiana Tammy APRN CNP, 20 mg at 10/28/19 0850    chlorproMAZINE (THORAZINE) tablet 150 mg, 150 mg, Oral, TID, Natalia Fabian APRN CNP, 150 mg at 10/28/19 0850    clonazePAM (klonoPIN) tablet 1 mg, 1 mg, Oral, At Bedtime, WoHussein armstronga Tammy APRN CNP, 1 mg at 10/27/19 2034    glucose gel 15-30 g, 15-30 g, Oral, Q15 Min PRN **OR** dextrose 50 % injection 25-50 mL, 25-50 mL, Intravenous, Q15 Min PRN **OR** glucagon injection 1 mg, 1 mg, Subcutaneous, Q15 Min PRN, Gokul Hathaway, NP    divalproex sodium  extended-release (DEPAKOTE ER) 24 hr tablet 500 mg, 500 mg, Oral, At Bedtime, Natalia Fabian APRN CNP, 500 mg at 10/27/19 2034    gabapentin (NEURONTIN) tablet 1,200 mg, 1,200 mg, Oral, TID, Natalia Fabian APRN CNP, 1,200 mg at 10/28/19 0850    hydrOXYzine (ATARAX) tablet  mg,  mg, Oral, TID PRN, Natalia Fabian APRN CNP, 100 mg at 10/26/19 0836    insulin aspart (NovoLOG) inj (RAPID ACTING), 8 Units, Subcutaneous, TID w/meals, Gokul Hathaway NP, 8 Units at 10/28/19 0857    insulin aspart (NovoLOG) inj (RAPID ACTING), 1-7 Units, Subcutaneous, TID AC, Gokul Hathaway NP, 1 Units at 10/26/19 1706    insulin glargine (LANTUS PEN) injection 40 Units, 40 Units, Subcutaneous, At Bedtime, Gokul Hathaway NP, 40 Units at 10/27/19 2034    magnesium hydroxide (MILK OF MAGNESIA) suspension 30 mL, 30 mL, Oral, At Bedtime PRN, Gokul Hathaway NP    metFORMIN (GLUCOPHAGE) tablet 1,000 mg, 1,000 mg, Oral, BID w/meals, Gokul Hathaway NP, 1,000 mg at 10/28/19 0850    nicotine (NICODERM CQ) 21 MG/24HR 24 hr patch 1 patch, 1 patch, Transdermal, Daily, Natalia Fabian APRN CNP, 1 patch at 10/28/19 0850    nicotine (NICORETTE) gum 2-4 mg, 2-4 mg, Buccal, Q1H PRN, Gokul Hathaway NP, 4 mg at 10/27/19 1404    nicotine Patch in Place, , Transdermal, Q8H, Natalia Fabian APRN CNP, Stopped at 10/28/19 0245    nicotine patch REMOVAL, , Transdermal, Daily, Natalia Fabian APRN CNP    OLANZapine (zyPREXA) injection 10 mg, 10 mg, Intramuscular, TID PRN **OR** OLANZapine (zyPREXA) tablet 10 mg, 10 mg, Oral, TID PRN, Raven Stout, JACKIE, 10 mg at 10/28/19 0033    polyethylene glycol (MIRALAX/GLYCOLAX) Packet 17 g, 17 g, Oral, Daily PRN, Natalia Fabian, APRN CNP    prazosin (MINIPRESS) capsule 1 mg, 1 mg, Oral, At Bedtime, Gokul Hathaway NP, 1 mg at 10/27/19 2034

## 2019-10-30 NOTE — PLAN OF CARE
Problem: Adult Behavioral Health Plan of Care  Goal: Patient-Specific Goal (Individualization)  Description  Pt will follow recommendations of treatment team.  Pt will be compliant with medications.  Pt will sleep 6- 8 hours each night.     10/30/2019 1245 by Nancy Stern, RN  Outcome: Improving  Note:   Shift [place]y: VSS, hr-100/min and afebrile this AM. No S&S cog-wheeling/akesthesia noted. In lounge most of shift using headphones and making her calendars. Behavior appropriate with staff and peers. Appears tired--Pt states she is trying to stay up so she can sleep tonight.  Denies all criteria including SI, SIB, HI or hallucinations. Endorses high anxiety and moderate depression related to hospitalization. Able to hold short linear conversation before she reverting to fixed delusions of her childhood and having a baby at 2 yrs of age. Pt expresses desire to discharge to a homeless shelter-this writer tells her that does not sound safe for her.   cooperative with nursing assessment and behavior appropriate with staff and peers.       Patient's Stated Goal for Shift:      Goal Status:  In Process       Problem: Violence Risk or Actual  Goal: Anger and Impulse Control  Description  Pt will keep impulses under control.  Pt will refrain from violent behaviors towards peers and staff.    10/30/2019 1245 by Nancy Stern, RN  Outcome: Improving     Problem: Thought Process Alteration  Goal: Optimal Thought Clarity  Description  Pt will not make any delusional statements throughout the shift.      10/30/2019 1245 by Nancy Stern, RN  Outcome: No Change   Report given to Luz HINOJOSA on [facility].

## 2019-10-31 LAB
ALBUMIN SERPL-MCNC: 3.4 G/DL (ref 3.4–5)
ALP SERPL-CCNC: 70 U/L (ref 40–150)
ALT SERPL W P-5'-P-CCNC: 21 U/L (ref 0–50)
ANION GAP SERPL CALCULATED.3IONS-SCNC: 6 MMOL/L (ref 3–14)
AST SERPL W P-5'-P-CCNC: 9 U/L (ref 0–45)
BILIRUB DIRECT SERPL-MCNC: <0.1 MG/DL (ref 0–0.2)
BILIRUB SERPL-MCNC: 0.1 MG/DL (ref 0.2–1.3)
BUN SERPL-MCNC: 14 MG/DL (ref 7–30)
CALCIUM SERPL-MCNC: 8.7 MG/DL (ref 8.5–10.1)
CHLORIDE SERPL-SCNC: 109 MMOL/L (ref 94–109)
CO2 SERPL-SCNC: 25 MMOL/L (ref 20–32)
CREAT SERPL-MCNC: 0.6 MG/DL (ref 0.52–1.04)
GFR SERPL CREATININE-BSD FRML MDRD: >90 ML/MIN/{1.73_M2}
GLUCOSE BLDC GLUCOMTR-MCNC: 139 MG/DL (ref 70–99)
GLUCOSE BLDC GLUCOMTR-MCNC: 140 MG/DL (ref 70–99)
GLUCOSE BLDC GLUCOMTR-MCNC: 192 MG/DL (ref 70–99)
GLUCOSE BLDC GLUCOMTR-MCNC: 93 MG/DL (ref 70–99)
GLUCOSE SERPL-MCNC: 170 MG/DL (ref 70–99)
POTASSIUM SERPL-SCNC: 4.2 MMOL/L (ref 3.4–5.3)
PROT SERPL-MCNC: 7 G/DL (ref 6.8–8.8)
SODIUM SERPL-SCNC: 140 MMOL/L (ref 133–144)
VALPROATE SERPL-MCNC: 36 MG/L (ref 50–100)

## 2019-10-31 PROCEDURE — 82248 BILIRUBIN DIRECT: CPT | Performed by: NURSE PRACTITIONER

## 2019-10-31 PROCEDURE — 00000146 ZZHCL STATISTIC GLUCOSE BY METER IP

## 2019-10-31 PROCEDURE — 12400000 ZZH R&B MH

## 2019-10-31 PROCEDURE — 25000132 ZZH RX MED GY IP 250 OP 250 PS 637: Performed by: NURSE PRACTITIONER

## 2019-10-31 PROCEDURE — 25000125 ZZHC RX 250: Performed by: NURSE PRACTITIONER

## 2019-10-31 PROCEDURE — 80164 ASSAY DIPROPYLACETIC ACD TOT: CPT | Performed by: NURSE PRACTITIONER

## 2019-10-31 PROCEDURE — 36415 COLL VENOUS BLD VENIPUNCTURE: CPT | Performed by: NURSE PRACTITIONER

## 2019-10-31 PROCEDURE — 80053 COMPREHEN METABOLIC PANEL: CPT | Performed by: NURSE PRACTITIONER

## 2019-10-31 RX ORDER — OLANZAPINE 10 MG/2ML
5 INJECTION, POWDER, FOR SOLUTION INTRAMUSCULAR 3 TIMES DAILY PRN
Status: DISCONTINUED | OUTPATIENT
Start: 2019-10-31 | End: 2019-11-04 | Stop reason: HOSPADM

## 2019-10-31 RX ORDER — OLANZAPINE 5 MG/1
5 TABLET ORAL 3 TIMES DAILY PRN
Status: DISCONTINUED | OUTPATIENT
Start: 2019-10-31 | End: 2019-11-04 | Stop reason: HOSPADM

## 2019-10-31 RX ADMIN — METFORMIN HYDROCHLORIDE 1000 MG: 500 TABLET, FILM COATED ORAL at 17:14

## 2019-10-31 RX ADMIN — INSULIN ASPART 8 UNITS: 100 INJECTION, SOLUTION INTRAVENOUS; SUBCUTANEOUS at 17:15

## 2019-10-31 RX ADMIN — HYDROXYZINE HYDROCHLORIDE 50 MG: 25 TABLET ORAL at 02:56

## 2019-10-31 RX ADMIN — ACETAMINOPHEN 650 MG: 325 TABLET, FILM COATED ORAL at 16:03

## 2019-10-31 RX ADMIN — BUSPIRONE HYDROCHLORIDE 15 MG: 7.5 TABLET ORAL at 11:13

## 2019-10-31 RX ADMIN — BENZTROPINE MESYLATE 1 MG: 1 TABLET ORAL at 11:15

## 2019-10-31 RX ADMIN — BUSPIRONE HYDROCHLORIDE 15 MG: 7.5 TABLET ORAL at 21:19

## 2019-10-31 RX ADMIN — DIVALPROEX SODIUM 750 MG: 500 TABLET, FILM COATED, EXTENDED RELEASE ORAL at 21:18

## 2019-10-31 RX ADMIN — BENZTROPINE MESYLATE 1 MG: 1 TABLET ORAL at 21:18

## 2019-10-31 RX ADMIN — CHLORPROMAZINE HYDROCHLORIDE 100 MG: 100 TABLET, SUGAR COATED ORAL at 11:14

## 2019-10-31 RX ADMIN — INSULIN GLARGINE 40 UNITS: 100 INJECTION, SOLUTION SUBCUTANEOUS at 21:21

## 2019-10-31 RX ADMIN — GABAPENTIN 1200 MG: 600 TABLET, FILM COATED ORAL at 21:19

## 2019-10-31 RX ADMIN — OLANZAPINE 10 MG: 10 TABLET, FILM COATED ORAL at 02:56

## 2019-10-31 RX ADMIN — GABAPENTIN 1200 MG: 600 TABLET, FILM COATED ORAL at 11:13

## 2019-10-31 RX ADMIN — ACETAMINOPHEN 650 MG: 325 TABLET, FILM COATED ORAL at 02:56

## 2019-10-31 RX ADMIN — INSULIN ASPART 8 UNITS: 100 INJECTION, SOLUTION INTRAVENOUS; SUBCUTANEOUS at 12:25

## 2019-10-31 RX ADMIN — CHLORPROMAZINE HYDROCHLORIDE 100 MG: 100 TABLET, SUGAR COATED ORAL at 17:12

## 2019-10-31 RX ADMIN — PRAZOSIN HYDROCHLORIDE 1 MG: 1 CAPSULE ORAL at 21:19

## 2019-10-31 RX ADMIN — BUSPIRONE HYDROCHLORIDE 15 MG: 7.5 TABLET ORAL at 17:11

## 2019-10-31 RX ADMIN — NICOTINE 1 PATCH: 21 PATCH, EXTENDED RELEASE TRANSDERMAL at 11:11

## 2019-10-31 RX ADMIN — CLONAZEPAM 1 MG: 1 TABLET ORAL at 21:19

## 2019-10-31 RX ADMIN — GABAPENTIN 1200 MG: 600 TABLET, FILM COATED ORAL at 17:08

## 2019-10-31 RX ADMIN — CHLORPROMAZINE HYDROCHLORIDE 100 MG: 100 TABLET, SUGAR COATED ORAL at 21:19

## 2019-10-31 RX ADMIN — METFORMIN HYDROCHLORIDE 1000 MG: 500 TABLET, FILM COATED ORAL at 11:14

## 2019-10-31 ASSESSMENT — ACTIVITIES OF DAILY LIVING (ADL)
DRESS: INDEPENDENT
DRESS: INDEPENDENT
HYGIENE/GROOMING: INDEPENDENT
HYGIENE/GROOMING: INDEPENDENT
ORAL_HYGIENE: INDEPENDENT
ORAL_HYGIENE: INDEPENDENT

## 2019-10-31 NOTE — PLAN OF CARE
Face to face shift report received from Julia DANIEL RN. Patient observed.    Problem: Adult Inpatient Plan of Care  Goal: Patient-Specific Goal (Individualization)  Description  Pt will maintain appropriate behaviors.  Pt will have a linear conversation throughout the day.  Pt will attend groups 50 % of the day.  Pt will get 6-8 hrs of sleep each night  Pt lays in bed awake, she gets up and comes out to the lounge at 0150. Pt colors in the lounge, she then returns to her room where she continues to color and fill in numbers on her chart.   0259 patient receives Zyprexa 10 mg, Hydroxyzine 50 mg, and Tylenol 650 mg. Pt states she is agitated and anxious.   Outcome: No Change   The face to face report will be communicated to on coming RN.

## 2019-10-31 NOTE — PLAN OF CARE
BEHAVIORAL TEAM DISCUSSION    Participants: Loren Zuleta NP,  Dennise Herrera [facility], Angie Laws LSW,  Becca Chery LSW, Jim Barrios LSW, Radha Bills RN, Melissa RN,  [facility], Mady Holman OT, Natalia Costello OT, Jabari Triana [facility]  Progress: Minimal  Continued Stay Criteria/Rationale: Pressured. delusional, psychosis, labile, disorganized   Medical/Physical: DM  Precautions:   Falls precaution?: No  Behavioral Orders   Procedures    Code 1 - Restrict to Unit    Routine Programming     As clinically indicated    Status 15     Every 15 minutes.     Plan: Message left for Xavier at the [facility] as pt states she is interested in this. Will call and speak with pt's guardian after writer speaks with Xavier.  Rationale for change in precautions or plan: none    Current Facility-Administered Medications:     acetaminophen (TYLENOL) tablet 650 mg, 650 mg, Oral, Q4H PRN, Gokul Hathaway NP, 650 mg at 10/31/19 0256    alum & mag hydroxide-simethicone (MYLANTA ES/MAALOX  ES) suspension 30 mL, 30 mL, Oral, Q4H PRN, Gokul Hathaway NP    artificial saliva (BIOTENE MT) solution 2 spray, 2 spray, Swish & Spit, 4x Daily PRN, Natalia Fabian APRN CNP    benztropine (COGENTIN) tablet 1 mg, 1 mg, Oral, BID, WoelperNatalia farias APRN CNP, 1 mg at 10/30/19 2051    bisacodyl (DULCOLAX) EC tablet 5 mg, 5 mg, Oral, Daily PRN, Natalia Fabian APRN CNP    busPIRone (BUSPAR) tablet 15 mg, 15 mg, Oral, TID, WoNatalia armstrong APRN CNP, 15 mg at 10/30/19 2050    chlorproMAZINE (THORAZINE) tablet 100 mg, 100 mg, Oral, TID, Natalia Fabian APRN CNP, 100 mg at 10/30/19 2050    clonazePAM (klonoPIN) tablet 1 mg, 1 mg, Oral, At Bedtime, Natalia Fabian APRN CNP, 1 mg at 10/30/19 2051    glucose gel 15-30 g, 15-30 g, Oral, Q15 Min PRN **OR** dextrose 50 % injection 25-50 mL, 25-50 mL, Intravenous, Q15 Min PRN **OR** glucagon injection 1 mg, 1 mg, Subcutaneous, Q15 Min PRN,  Gokul Hathaway NP    divalproex sodium extended-release (DEPAKOTE ER) 24 hr tablet 750 mg, 750 mg, Oral, At Bedtime, Natalia Fabian APRN CNP, 750 mg at 10/30/19 2050    gabapentin (NEURONTIN) tablet 1,200 mg, 1,200 mg, Oral, TID, WoNatalia armstrong APRN CNP, 1,200 mg at 10/30/19 2050    hydrOXYzine (ATARAX) tablet 50 mg, 50 mg, Oral, TID PRN, Natalia Fabian APRN CNP, 50 mg at 10/31/19 0256    insulin aspart (NovoLOG) inj (RAPID ACTING), 8 Units, Subcutaneous, TID w/meals, Gokul Hathaway NP, 8 Units at 10/30/19 1702    insulin aspart (NovoLOG) inj (RAPID ACTING), 1-7 Units, Subcutaneous, TID AC, Gokul Hathaway NP, 1 Units at 10/30/19 1159    insulin glargine (LANTUS PEN) injection 40 Units, 40 Units, Subcutaneous, At Bedtime, Gokul Hathaway NP, 40 Units at 10/30/19 2053    magnesium hydroxide (MILK OF MAGNESIA) suspension 30 mL, 30 mL, Oral, At Bedtime PRN, Gokul Hathaway NP    metFORMIN (GLUCOPHAGE) tablet 1,000 mg, 1,000 mg, Oral, BID w/meals, Gokul Hathaway NP, 1,000 mg at 10/30/19 1701    nicotine (NICODERM CQ) 21 MG/24HR 24 hr patch 1 patch, 1 patch, Transdermal, Daily, Natalia Fabian APRN CNP, 1 patch at 10/30/19 0854    nicotine (NICORETTE) gum 2-4 mg, 2-4 mg, Buccal, Q1H PRN, Gokul Hathaway NP, 4 mg at 10/30/19 0857    nicotine Patch in Place, , Transdermal, Q8H, Natalia Fabian APRN CNP    nicotine patch REMOVAL, , Transdermal, Daily, Natalia Fabian APRN CNP    OLANZapine (zyPREXA) injection 10 mg, 10 mg, Intramuscular, TID PRN **OR** OLANZapine (zyPREXA) tablet 10 mg, 10 mg, Oral, TID PRN, Raven Stout NP, 10 mg at 10/31/19 0256    polyethylene glycol (MIRALAX/GLYCOLAX) Packet 17 g, 17 g, Oral, Daily PRN, Natalia Fabian APRN CNP    prazosin (MINIPRESS) capsule 1 mg, 1 mg, Oral, At Bedtime, Gokul Hathaway NP, 1 mg at 10/30/19 2050  Active Problems:    Psychosis (H)

## 2019-10-31 NOTE — PLAN OF CARE
"  Problem: Adult Behavioral Health Plan of Care  Goal: Plan of Care Review  Outcome: No Change     Problem: Adult Behavioral Health Plan of Care  Goal: Patient-Specific Goal (Individualization)  Description  Pt will follow recommendations of treatment team.  Pt will be compliant with medications.  Pt will sleep 6- 8 hours each night.     10/30/2019 2127 by Julia Covington RN  Outcome: No Change    Problem: Thought Process Alteration  Goal: Optimal Thought Clarity  Description  Pt will not make any delusional statements throughout the shift.      10/30/2019 2127 by Julia Covington RN  Outcome: No Change     Problem: Adult Inpatient Plan of Care  Goal: Plan of Care Review  10/30/2019 1041 by Becca Chery LSW  Outcome: Improving   Patient has been in and out of [facility]. Did not attend any evening groups. Speech is rambling and mumbling. Does not interact with others. Denies any hallucinations. Denies any homicidal thoughts. States she is just tired and wants to rest. States her \"feet ache because she was walking through shit at [place].\" States \"a woman shit all over the place.\"Did not want to return there. Will encourage patient to stay in [facility] and to attend some groups.Did not want any tylenol  at this time.   Face to face end of shift report will be communicated to Nightshift Rn.     Julia Covington RN  10/30/2019  9:50 PM       "

## 2019-10-31 NOTE — PLAN OF CARE
Face to face shift report received from Ayah MARIA RN. Rounding completed, pt observed.  Radha Bills RN  10/31/2019  7:56 AM  Problem: Adult Behavioral Health Plan of Care  Goal: Patient-Specific Goal (Individualization)  Description  Pt will follow recommendations of treatment team.  Pt will be compliant with medications.  Pt will sleep 6- 8 hours each night.     10/31/2019 0751 by Radha Bills RN  Outcome: No Change  Patient was asleep in bed at the start of this shift.  Patient snoring and would not rouse to nurses voice or her name.  Patient did not eat breakfast so neither sliding scale or scheduled 8 units of Novolog administered; NP Loren aware.  Oral medications were held until patient was awake at a little after 1100.  Patient started off with mumbling speech but that improved as she was awake longer.  Patient cooperative with blood glucose check and insulin administration before lunch.     1400:  Patient back in bed after lunch.  Nurse spoke to Bushra in pharmacy.  Okay to delay 1400 scheduled medications to 1700 so as not to over sedate patient.    Problem: Thought Process Alteration  Goal: Optimal Thought Clarity  Description  Pt will be free of any delusional statements by discharge.      10/31/2019 0751 by Radha Bills RN  Outcome: No Change   Patient is oriented x3.  Was unsure of time.  Patient has made no delusional statements this shift but has been asleep half of this shift.    Problem: Violence Risk or Actual  Goal: Anger and Impulse Control  Description  Pt will keep impulses under control.  Pt will refrain from violent behaviors towards peers and staff.    10/31/2019 0751 by Radha Bills RN  Outcome: Improving  No aggression.  Mood is calm.     Face to face end of shift report will be communicated to oncoming shift.

## 2019-11-01 LAB
GLUCOSE BLDC GLUCOMTR-MCNC: 199 MG/DL (ref 70–99)
GLUCOSE BLDC GLUCOMTR-MCNC: 200 MG/DL (ref 70–99)
GLUCOSE BLDC GLUCOMTR-MCNC: 233 MG/DL (ref 70–99)

## 2019-11-01 PROCEDURE — 25000131 ZZH RX MED GY IP 250 OP 636 PS 637: Performed by: NURSE PRACTITIONER

## 2019-11-01 PROCEDURE — 25000132 ZZH RX MED GY IP 250 OP 250 PS 637: Performed by: NURSE PRACTITIONER

## 2019-11-01 PROCEDURE — 00000146 ZZHCL STATISTIC GLUCOSE BY METER IP

## 2019-11-01 PROCEDURE — 12400000 ZZH R&B MH

## 2019-11-01 PROCEDURE — 25000125 ZZHC RX 250: Performed by: NURSE PRACTITIONER

## 2019-11-01 PROCEDURE — 99232 SBSQ HOSP IP/OBS MODERATE 35: CPT | Performed by: NURSE PRACTITIONER

## 2019-11-01 RX ADMIN — GABAPENTIN 1200 MG: 600 TABLET, FILM COATED ORAL at 14:01

## 2019-11-01 RX ADMIN — METFORMIN HYDROCHLORIDE 1000 MG: 500 TABLET, FILM COATED ORAL at 16:26

## 2019-11-01 RX ADMIN — BUSPIRONE HYDROCHLORIDE 15 MG: 7.5 TABLET ORAL at 20:23

## 2019-11-01 RX ADMIN — INSULIN ASPART 8 UNITS: 100 INJECTION, SOLUTION INTRAVENOUS; SUBCUTANEOUS at 12:02

## 2019-11-01 RX ADMIN — METFORMIN HYDROCHLORIDE 1000 MG: 500 TABLET, FILM COATED ORAL at 08:31

## 2019-11-01 RX ADMIN — CHLORPROMAZINE HYDROCHLORIDE 100 MG: 100 TABLET, SUGAR COATED ORAL at 20:22

## 2019-11-01 RX ADMIN — INSULIN ASPART 2 UNITS: 100 INJECTION, SOLUTION INTRAVENOUS; SUBCUTANEOUS at 12:00

## 2019-11-01 RX ADMIN — DIVALPROEX SODIUM 750 MG: 500 TABLET, FILM COATED, EXTENDED RELEASE ORAL at 20:22

## 2019-11-01 RX ADMIN — BENZTROPINE MESYLATE 1 MG: 1 TABLET ORAL at 08:31

## 2019-11-01 RX ADMIN — CHLORPROMAZINE HYDROCHLORIDE 100 MG: 100 TABLET, SUGAR COATED ORAL at 14:01

## 2019-11-01 RX ADMIN — GABAPENTIN 1200 MG: 600 TABLET, FILM COATED ORAL at 08:30

## 2019-11-01 RX ADMIN — INSULIN ASPART 8 UNITS: 100 INJECTION, SOLUTION INTRAVENOUS; SUBCUTANEOUS at 16:31

## 2019-11-01 RX ADMIN — NICOTINE 1 PATCH: 21 PATCH, EXTENDED RELEASE TRANSDERMAL at 08:30

## 2019-11-01 RX ADMIN — HYDROXYZINE HYDROCHLORIDE 50 MG: 25 TABLET ORAL at 02:00

## 2019-11-01 RX ADMIN — INSULIN ASPART 2 UNITS: 100 INJECTION, SOLUTION INTRAVENOUS; SUBCUTANEOUS at 16:31

## 2019-11-01 RX ADMIN — CHLORPROMAZINE HYDROCHLORIDE 100 MG: 100 TABLET, SUGAR COATED ORAL at 08:31

## 2019-11-01 RX ADMIN — CLONAZEPAM 1 MG: 1 TABLET ORAL at 20:22

## 2019-11-01 RX ADMIN — BUSPIRONE HYDROCHLORIDE 15 MG: 7.5 TABLET ORAL at 08:31

## 2019-11-01 RX ADMIN — BUSPIRONE HYDROCHLORIDE 15 MG: 7.5 TABLET ORAL at 14:01

## 2019-11-01 RX ADMIN — GABAPENTIN 1200 MG: 600 TABLET, FILM COATED ORAL at 20:22

## 2019-11-01 RX ADMIN — INSULIN ASPART 2 UNITS: 100 INJECTION, SOLUTION INTRAVENOUS; SUBCUTANEOUS at 08:34

## 2019-11-01 RX ADMIN — INSULIN ASPART 8 UNITS: 100 INJECTION, SOLUTION INTRAVENOUS; SUBCUTANEOUS at 08:32

## 2019-11-01 RX ADMIN — OLANZAPINE 5 MG: 5 TABLET, FILM COATED ORAL at 02:00

## 2019-11-01 RX ADMIN — BENZTROPINE MESYLATE 1 MG: 1 TABLET ORAL at 20:22

## 2019-11-01 RX ADMIN — PRAZOSIN HYDROCHLORIDE 1 MG: 1 CAPSULE ORAL at 20:22

## 2019-11-01 RX ADMIN — INSULIN GLARGINE 40 UNITS: 100 INJECTION, SOLUTION SUBCUTANEOUS at 20:23

## 2019-11-01 ASSESSMENT — ACTIVITIES OF DAILY LIVING (ADL)
LAUNDRY: UNABLE TO COMPLETE
HYGIENE/GROOMING: INDEPENDENT
ORAL_HYGIENE: INDEPENDENT
DRESS: INDEPENDENT;SCRUBS (BEHAVIORAL HEALTH)
HYGIENE/GROOMING: INDEPENDENT
ORAL_HYGIENE: INDEPENDENT
DRESS: INDEPENDENT;SCRUBS (BEHAVIORAL HEALTH)

## 2019-11-01 NOTE — PLAN OF CARE
"  Problem: Adult Behavioral Health Plan of Care  Goal: Patient-Specific Goal (Individualization)  Description  Pt will follow recommendations of treatment team.  Pt will be compliant with medications.  Pt will sleep 6- 8 hours each night.     11/1/2019 0909 by Luz Amaya RN  Outcome: Improving  Note:   Shift Summery:  Patient up on the unit. Patient is pleasant, sits by herself and makes lists of items. Patient denies depression, pain, suicidal thoughts and auditory hallucinations. At times speech is nonsensical and mumbly. Patient ate well for morning meal. She reports that she has spoken to her  and she will not have a warrant on her if she is released to the [facility] or to the homeless shelter. Patient shows animation when discussing this. She is hopeful for a discharge soon. Makes good eye contact.    Face to face end of shift report communicated to evening shift RN.     Luz Amaya RN  11/1/2019  9:15 AM       Patient's Stated Goal for Shift:  \"find a place to discharge to\"    Goal Status:  In Process    11/1/2019 0354 by Yaneth Ron RN  Outcome: No Change  Note:   Patient is in room sitting on bed working on her calender. She comes out to the lounge and asks to have Zyprexa and atarax as she reports feeling agitated.   Zyprexa 5 mg and atarax 50 mg administers.   Patient ask for a snack she eats gram crackers and sugar free pudding.   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 She then talks about making opioids out of poppy seeds and using morphine and other drugs made from opium before they came to the [place]. She continues to talk delusional to nurse.   Pt lays back down in her bed.     Luz Amaya RN  11/1/2019  9:14 AM       10/31/2019 1934 by Julia Covington RN  Outcome: No Change     Problem: Thought Process Alteration  Goal: Optimal Thought " Clarity  Description  Pt will be free of any delusional statements by discharge.      11/1/2019 0909 by Luz Amaya, RN  Outcome: Improving     Problem: Violence Risk or Actual  Goal: Anger and Impulse Control  Description  Pt will keep impulses under control.  Pt will refrain from violent behaviors towards peers and staff.    11/1/2019 0909 by Luz Amaya, RN  Outcome: Improving

## 2019-11-01 NOTE — PLAN OF CARE
Problem: Adult Behavioral Health Plan of Care  Goal: Patient-Specific Goal (Individualization)  Description  Pt will follow recommendations of treatment team.  Pt will be compliant with medications.  Pt will sleep 6- 8 hours each night.     10/31/2019 1934 by Julia Covington RN  Outcome: No Change     Problem: Thought Process Alteration  Goal: Optimal Thought Clarity  Description  Pt will be free of any delusional statements by discharge.      10/31/2019 1934 by Julia Covington RN  Outcome: No Change     Problem: Violence Risk or Actual  Goal: Anger and Impulse Control  Description  Pt will keep impulses under control.  Pt will refrain from violent behaviors towards peers and staff.    10/31/2019 1934 by Julia Covington RN  Outcome: Improving   Patient has been in [employer] [place] area all evening Refused groups but does work on [employer]. Does not interact with others. Has rambling , mumbling speech. Remains delusional. Talked with staff about having to land an airplane and having to tie off another persons blood vessels to save them. States she had the training to do that.  Denies any hallucinations. Has no complaints of pain at this time.  Has had no outbursts.   Face to face end of shift report will be  communicated to nightshift RN..     Julia Covington RN  10/31/2019  7:39 PM

## 2019-11-01 NOTE — PLAN OF CARE
Face to face shift report received from Shital DANIEL RN. Patient observed.      Problem: Adult Behavioral Health Plan of Care  Goal: Patient-Specific Goal (Individualization)  Description  Pt will follow recommendations of treatment team.  Pt will be compliant with medications.  Pt will sleep 6- 8 hours each night.     11/1/2019 0354 by Yaneth Ron RN  Outcome: No Change  Note:   Patient is in room sitting on bed working on her calender. [facility] and asks to have Zyprexa and atarax as she reports feeling agitated.   0200 Zyprexa 5 mg and atarax 50 mg administers.   Patient ask for a snack she eats gram crackers and sugar free pudding.   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 She then talks about making opioids out of poppy seeds and using morphine and other drugs made from op[place] before they came to the [place]. She continues to talk delusional to nurse.   Pt lays back down in her bed.      The face to face report will be communicated to on coming RN.

## 2019-11-01 NOTE — PLAN OF CARE
BEHAVIORAL TEAM DISCUSSION    Participants: , Loren Zuleta NP, Angie Laws LSW,  Jim Barrios LSW, Nancy RN, Jenny RN, Krys Rojo RN,  Mady Holman OT, Natalia Costello OT, Jabari Triana [facility]   Progress: Minimal  Continued Stay Criteria/Rationale: Pressured. delusional, psychosis, labile, disorganized  Medical/Physical: DM  Precautions:   Falls precaution?: No  Behavioral Orders   Procedures     Code 1 - Restrict to Unit     Routine Programming     As clinically indicated     Status 15     Every 15 minutes.     Plan: If Stabilized potentially look at return to [place].   Rationale for change in precautions or plan: none    Current Facility-Administered Medications:      acetaminophen (TYLENOL) tablet 650 mg, 650 mg, Oral, Q4H PRN, Gokul Hathaway NP, 650 mg at 10/31/19 1603     alum & mag hydroxide-simethicone (MYLANTA ES/MAALOX  ES) suspension 30 mL, 30 mL, Oral, Q4H PRN, Gkoul Hathaway NP     artificial saliva (BIOTENE MT) solution 2 spray, 2 spray, Swish & Spit, 4x Daily PRN, Natalia Fabian APRN CNP     benztropine (COGENTIN) tablet 1 mg, 1 mg, Oral, BID, Natalia Fabian APRN CNP, 1 mg at 11/01/19 0831     bisacodyl (DULCOLAX) EC tablet 5 mg, 5 mg, Oral, Daily PRN, WoelHussein gordona Tammy APRN CNP     busPIRone (BUSPAR) tablet 15 mg, 15 mg, Oral, TID, WoNatalia armstrong APRN CNP, 15 mg at 11/01/19 0831     chlorproMAZINE (THORAZINE) tablet 100 mg, 100 mg, Oral, TID, WoNatalia armstrong APRN CNP, 100 mg at 11/01/19 0831     clonazePAM (klonoPIN) tablet 1 mg, 1 mg, Oral, At Bedtime, Natalia Fabian APRN CNP, 1 mg at 10/31/19 2119     glucose gel 15-30 g, 15-30 g, Oral, Q15 Min PRN **OR** dextrose 50 % injection 25-50 mL, 25-50 mL, Intravenous, Q15 Min PRN **OR** glucagon injection 1 mg, 1 mg, Subcutaneous, Q15 Min PRN, Gokul Hathaway, JACKIE     divalproex sodium extended-release (DEPAKOTE ER) 24 hr tablet 750 mg, 750 mg, Oral, At Bedtime, Natalia Fabian, APRN CNP, 750 mg  at 10/31/19 2118     gabapentin (NEURONTIN) tablet 1,200 mg, 1,200 mg, Oral, TID, Natalia Fabian APRN CNP, 1,200 mg at 11/01/19 0830     hydrOXYzine (ATARAX) tablet 50 mg, 50 mg, Oral, TID PRN, Natalia Fabian APRN CNP, 50 mg at 11/01/19 0200     insulin aspart (NovoLOG) inj (RAPID ACTING), 8 Units, Subcutaneous, TID w/meals, Gokul Hathaway NP, 8 Units at 11/01/19 0832     insulin aspart (NovoLOG) inj (RAPID ACTING), 1-7 Units, Subcutaneous, TID AC, Gokul Hathaway NP, 2 Units at 11/01/19 0834     insulin glargine (LANTUS PEN) injection 40 Units, 40 Units, Subcutaneous, At Bedtime, Gokul Hathaway NP, 40 Units at 10/31/19 2121     magnesium hydroxide (MILK OF MAGNESIA) suspension 30 mL, 30 mL, Oral, At Bedtime PRN, Gokul Hathaway NP     metFORMIN (GLUCOPHAGE) tablet 1,000 mg, 1,000 mg, Oral, BID w/meals, Gokul Hathaway NP, 1,000 mg at 11/01/19 0831     nicotine (NICODERM CQ) 21 MG/24HR 24 hr patch 1 patch, 1 patch, Transdermal, Daily, Natalia Fabian APRN CNP, 1 patch at 11/01/19 0830     nicotine (NICORETTE) gum 2-4 mg, 2-4 mg, Buccal, Q1H PRN, Gokul Hathaway NP, 4 mg at 10/30/19 0857     nicotine Patch in Place, , Transdermal, Q8H, Natalia Fabian APRN CNP     nicotine patch REMOVAL, , Transdermal, Daily, Natalia Fabian APRN CNP, Stopped at 10/31/19 1053     OLANZapine (zyPREXA) injection 5 mg, 5 mg, Intramuscular, TID PRN **OR** OLANZapine (zyPREXA) tablet 5 mg, 5 mg, Oral, TID PRN, Loren Zuleta, NP, 5 mg at 11/01/19 0200     polyethylene glycol (MIRALAX/GLYCOLAX) Packet 17 g, 17 g, Oral, Daily PRN, Natalia Fabian, APRN CNP     prazosin (MINIPRESS) capsule 1 mg, 1 mg, Oral, At Bedtime, Gokul Hathaway NP, 1 mg at 10/31/19 3149  Active Problems:    Psychosis (H)

## 2019-11-01 NOTE — PLAN OF CARE
"Received call from Mary Rosen [phone] from [facility] - she informs staff that Florinda Caldera is no longer with them and not to be contacted. Provided Mary with update on pt as requested. Mary states that pt \"needs to go back to [facility] whether she wants to or not\"  as the guardian they confirm that this will remain to be the plan.     Mary provides staff with contact info for other members of the guardianship team regarding pt- anyone one of these members can be contacted regarding pt.       Raven- nurse [phone]    Keryr-Adult Protection Worker- [phone]    Sally Perry- supervisor  [phone]    Mary Rosen [phone]      Provided Mary with contact info for Becca at [facility] as requested- She states she will contact Becca to confirm that pt can return to [facility] on discharge.    Updated pt, pt's nurse, and NP.    Spoke with pt this afternoon and informed her of conversation with Mary from [place]. Pt states she is fine with going back to [facility], but then asks if she can go to [facility] in [place]- informed pt that her guardian would need to agree to this. Pt continually asks about discharging back to [place] today- informed pt that she would be here through the weekend and that her guardian is still confirming details with Becca from [facility].      "

## 2019-11-01 NOTE — PROGRESS NOTES
[facility]  Psychiatric Progress Note      Impression:   Genie is in the [facility] area. She has some social interaction with peers. She is social with staff. Genie does not often attend groups but she is cooperative with [facility] rules. Genie is asking when she can be discharged and how long she will have to be here. She does feel as though the guardian she has may be different from the one listed. She asks staff to make sure the correct guardian was contacted. Genie is hoping to be discharged to the [facility] and does not want to return to [place]. She reports she is no longer hearing voices and feels the medications are working well. She continues to have some grandiose delusional thinking but this is quite baseline for Genie.   Educated regarding medication indications, risks, benefits, side effects, contraindications and possible interactions. Verbally expressed understanding.        DIagnoses:   Schizoaffective disorder, bipolar type   PTSD, by history  Methamphetamine use disorder, in remission             Plan:   Continue current medications    Valproic level lab ordered for 11/1    Consider re-starting Topamax instead of Depakote - patient declines at this point    ELOS:  >5 days for medication adjustments, mood stability, and safe discharge planning      Attestation:  Patient has been seen and evaluated by me,  Loren Zuleta, JACKIE          Interim History:   The patient's care was discussed with the treatment team and chart notes were reviewed.          Medications:     Current Facility-Administered Medications Ordered in Epic   Medication Dose Route Frequency Last Rate Last Dose     acetaminophen (TYLENOL) tablet 650 mg  650 mg Oral Q4H PRN   650 mg at 10/31/19 1603     alum & mag hydroxide-simethicone (MYLANTA ES/MAALOX  ES) suspension 30 mL  30 mL Oral Q4H PRN         artificial saliva (BIOTENE MT) solution 2 spray  2 spray Swish & Spit 4x Daily PRN         benztropine (COGENTIN) tablet 1 mg  1  mg Oral BID   1 mg at 11/01/19 0831     bisacodyl (DULCOLAX) EC tablet 5 mg  5 mg Oral Daily PRN         busPIRone (BUSPAR) tablet 15 mg  15 mg Oral TID   15 mg at 11/01/19 0831     chlorproMAZINE (THORAZINE) tablet 100 mg  100 mg Oral TID   100 mg at 11/01/19 0831     clonazePAM (klonoPIN) tablet 1 mg  1 mg Oral At Bedtime   1 mg at 10/31/19 2119     glucose gel 15-30 g  15-30 g Oral Q15 Min PRN        Or     dextrose 50 % injection 25-50 mL  25-50 mL Intravenous Q15 Min PRN        Or     glucagon injection 1 mg  1 mg Subcutaneous Q15 Min PRN         divalproex sodium extended-release (DEPAKOTE ER) 24 hr tablet 750 mg  750 mg Oral At Bedtime   750 mg at 10/31/19 2118     gabapentin (NEURONTIN) tablet 1,200 mg  1,200 mg Oral TID   1,200 mg at 11/01/19 0830     hydrOXYzine (ATARAX) tablet 50 mg  50 mg Oral TID PRN   50 mg at 11/01/19 0200     insulin aspart (NovoLOG) inj (RAPID ACTING)  8 Units Subcutaneous TID w/meals   8 Units at 11/01/19 0832     insulin aspart (NovoLOG) inj (RAPID ACTING)  1-7 Units Subcutaneous TID AC   2 Units at 11/01/19 0834     insulin glargine (LANTUS PEN) injection 40 Units  40 Units Subcutaneous At Bedtime   40 Units at 10/31/19 2121     magnesium hydroxide (MILK OF MAGNESIA) suspension 30 mL  30 mL Oral At Bedtime PRN         metFORMIN (GLUCOPHAGE) tablet 1,000 mg  1,000 mg Oral BID w/meals   1,000 mg at 11/01/19 0831     nicotine (NICODERM CQ) 21 MG/24HR 24 hr patch 1 patch  1 patch Transdermal Daily   1 patch at 11/01/19 0830     nicotine (NICORETTE) gum 2-4 mg  2-4 mg Buccal Q1H PRN   4 mg at 10/30/19 0857     nicotine Patch in Place   Transdermal Q8H         nicotine patch REMOVAL   Transdermal Daily   Stopped at 10/31/19 1053     OLANZapine (zyPREXA) injection 5 mg  5 mg Intramuscular TID PRN        Or     OLANZapine (zyPREXA) tablet 5 mg  5 mg Oral TID PRN   5 mg at 11/01/19 0200     polyethylene glycol (MIRALAX/GLYCOLAX) Packet 17 g  17 g Oral Daily PRN         prazosin  "(MINIPRESS) capsule 1 mg  1 mg Oral At Bedtime   1 mg at 10/31/19 2119     No current Epic-ordered outpatient medications on file.          10 point ROS - see H&P       Allergies:     Allergies   Allergen Reactions     Haloperidol Other (See Comments)     Other reaction(s): Seizures  Patient states, \"I fell down and wasn't able to get up.\"  Patient states, \"I fell down and wasn't able to get up.\"              Psychiatric Examination:   /70   Pulse 96   Temp 99.2  F (37.3  C) (Tympanic)   Resp 14   Wt 87.2 kg (192 lb 3.2 oz)   SpO2 98%   BMI 32.99 kg/m    Weight is 192 lbs 3.2 oz  Body mass index is 32.99 kg/m .    Appearance:  awake, alert  Attitude:  cooperative  Eye Contact:  fair  Mood:  better  Affect:  mood congruent and intensity is blunted  Speech:  clear, coherent  Psychomotor Behavior:  no evidence of tardive dyskinesia, dystonia, or tics  Thought Process:  logical  Associations:  no loose associations  Thought Content:  no evidence of suicidal ideation or homicidal ideation and no evidence of psychotic thought  Insight:  fair  Judgment:  fair  Oriented to:  time, person, and place  Attention Span and Concentration:  fair  Recent and Remote Memory:  fair  Fund of Knowledge: appropriate  Muscle Strength and Tone: normal  Gait and Station: Normal           Labs:     Results for orders placed or performed during the hospital encounter of 10/22/19   CBC with platelets differential     Status: None   Result Value Ref Range    WBC 8.8 4.0 - 11.0 10e9/L    RBC Count 4.57 3.8 - 5.2 10e12/L    Hemoglobin 13.1 11.7 - 15.7 g/dL    Hematocrit 38.8 35.0 - 47.0 %    MCV 85 78 - 100 fl    MCH 28.7 26.5 - 33.0 pg    MCHC 33.8 31.5 - 36.5 g/dL    RDW 12.4 10.0 - 15.0 %    Platelet Count 299 150 - 450 10e9/L    Diff Method Automated Method     % Neutrophils 73.9 %    % Lymphocytes 19.9 %    % Monocytes 5.2 %    % Eosinophils 0.3 %    % Basophils 0.2 %    % Immature Granulocytes 0.5 %    Nucleated RBCs 0 0 /100    " Absolute Neutrophil 6.5 1.6 - 8.3 10e9/L    Absolute Lymphocytes 1.8 0.8 - 5.3 10e9/L    Absolute Monocytes 0.5 0.0 - 1.3 10e9/L    Absolute Eosinophils 0.0 0.0 - 0.7 10e9/L    Absolute Basophils 0.0 0.0 - 0.2 10e9/L    Abs Immature Granulocytes 0.0 0 - 0.4 10e9/L    Absolute Nucleated RBC 0.0    Comprehensive metabolic panel     Status: Abnormal   Result Value Ref Range    Sodium 138 133 - 144 mmol/L    Potassium 3.9 3.4 - 5.3 mmol/L    Chloride 109 94 - 109 mmol/L    Carbon Dioxide 23 20 - 32 mmol/L    Anion Gap 6 3 - 14 mmol/L    Glucose 161 (H) 70 - 99 mg/dL    Urea Nitrogen 12 7 - 30 mg/dL    Creatinine 0.56 0.52 - 1.04 mg/dL    GFR Estimate >90 >60 mL/min/[1.73_m2]    GFR Estimate If Black >90 >60 mL/min/[1.73_m2]    Calcium 8.7 8.5 - 10.1 mg/dL    Bilirubin Total 0.3 0.2 - 1.3 mg/dL    Albumin 4.0 3.4 - 5.0 g/dL    Protein Total 7.7 6.8 - 8.8 g/dL    Alkaline Phosphatase 77 40 - 150 U/L    ALT 23 0 - 50 U/L    AST 12 0 - 45 U/L   Alcohol ethyl     Status: None   Result Value Ref Range    Ethanol g/dL <0.01 0.01 g/dL   HCG qualitative urine (UPT)     Status: None   Result Value Ref Range    HCG Qual Urine Negative NEG^Negative   UA reflex to Microscopic     Status: Abnormal   Result Value Ref Range    Color Urine Yellow     Appearance Urine Clear     Glucose Urine Negative NEG^Negative mg/dL    Bilirubin Urine Negative NEG^Negative    Ketones Urine 10 (A) NEG^Negative mg/dL    Specific Gravity Urine 1.028 1.003 - 1.035    Blood Urine Negative NEG^Negative    pH Urine 5.5 4.7 - 8.0 pH    Protein Albumin Urine 10 (A) NEG^Negative mg/dL    Urobilinogen mg/dL Normal 0.0 - 2.0 mg/dL    Nitrite Urine Negative NEG^Negative    Leukocyte Esterase Urine Negative NEG^Negative    Source Unspecified Urine     RBC Urine 1 0 - 2 /HPF    WBC Urine 1 0 - 5 /HPF    Bacteria Urine None (A) NEG^Negative /HPF    Squamous Epithelial /HPF Urine 6 (H) 0 - 1 /HPF    Mucous Urine Present (A) NEG^Negative /LPF   Urine Drugs of Abuse  Screen Panel 13     Status: None   Result Value Ref Range    Cannabinoids (33-awf-4-carboxy-9-THC) Not Detected NDET^Not Detected ng/mL    Phencyclidine (Phencyclidine) Not Detected NDET^Not Detected ng/mL    Cocaine (Benzoylecgonine) Not Detected NDET^Not Detected ng/mL    Methamphetamine (d-Methamphetamine) Not Detected NDET^Not Detected ng/mL    Opiates (Morphine) Not Detected NDET^Not Detected ng/mL    Amphetamine (d-Amphetamine) Not Detected NDET^Not Detected ng/mL    Benzodiazepines (Nordiazepam) Not Detected NDET^Not Detected ng/mL    Tricyclic Antidepressants (Desipramine) Not Detected NDET^Not Detected ng/mL    Methadone (Methadone) Not Detected NDET^Not Detected ng/mL    Barbiturates (Butalbital) Not Detected NDET^Not Detected ng/mL    Oxycodone (Oxycodone) Not Detected NDET^Not Detected ng/mL    Propoxyphene (Norpropoxyphene) Not Detected NDET^Not Detected ng/mL    Buprenorphine (Buprenorphine) Not Detected NDET^Not Detected ng/mL   Acetaminophen level     Status: None   Result Value Ref Range    Acetaminophen Level <2 mg/L   Salicylate level     Status: None   Result Value Ref Range    Salicylate Level 4 mg/dL   Glucose by meter     Status: Abnormal   Result Value Ref Range    Glucose 217 (H) 70 - 99 mg/dL   Glucose by meter     Status: Abnormal   Result Value Ref Range    Glucose 191 (H) 70 - 99 mg/dL   Glucose by meter     Status: Abnormal   Result Value Ref Range    Glucose 156 (H) 70 - 99 mg/dL   Glucose by meter     Status: Abnormal   Result Value Ref Range    Glucose 138 (H) 70 - 99 mg/dL   Glucose by meter     Status: Abnormal   Result Value Ref Range    Glucose 132 (H) 70 - 99 mg/dL   Glucose by meter     Status: Abnormal   Result Value Ref Range    Glucose 230 (H) 70 - 99 mg/dL   Glucose by meter     Status: Abnormal   Result Value Ref Range    Glucose 116 (H) 70 - 99 mg/dL   Glucose by meter     Status: Abnormal   Result Value Ref Range    Glucose 179 (H) 70 - 99 mg/dL   Glucose by meter      Status: Abnormal   Result Value Ref Range    Glucose 139 (H) 70 - 99 mg/dL   Lipid Profile     Status: Abnormal   Result Value Ref Range    Cholesterol 184 <200 mg/dL    Triglycerides 281 (H) <150 mg/dL    HDL Cholesterol 30 (L) >49 mg/dL    LDL Cholesterol Calculated 98 <100 mg/dL    Non HDL Cholesterol 154 (H) <130 mg/dL   Glucose by meter     Status: Abnormal   Result Value Ref Range    Glucose 217 (H) 70 - 99 mg/dL   Glucose by meter     Status: Abnormal   Result Value Ref Range    Glucose 138 (H) 70 - 99 mg/dL   Glucose by meter     Status: Abnormal   Result Value Ref Range    Glucose 246 (H) 70 - 99 mg/dL   Glucose by meter     Status: Abnormal   Result Value Ref Range    Glucose 186 (H) 70 - 99 mg/dL   Glucose by meter     Status: Abnormal   Result Value Ref Range    Glucose 280 (H) 70 - 99 mg/dL   Glucose by meter     Status: Abnormal   Result Value Ref Range    Glucose 193 (H) 70 - 99 mg/dL   Glucose by meter     Status: Abnormal   Result Value Ref Range    Glucose 271 (H) 70 - 99 mg/dL   Glucose by meter     Status: Abnormal   Result Value Ref Range    Glucose 152 (H) 70 - 99 mg/dL   Glucose by meter     Status: Abnormal   Result Value Ref Range    Glucose 111 (H) 70 - 99 mg/dL   Glucose by meter     Status: None   Result Value Ref Range    Glucose 95 70 - 99 mg/dL   Glucose by meter     Status: None   Result Value Ref Range    Glucose 93 70 - 99 mg/dL   Glucose by meter     Status: Abnormal   Result Value Ref Range    Glucose 124 (H) 70 - 99 mg/dL   Glucose by meter     Status: Abnormal   Result Value Ref Range    Glucose 160 (H) 70 - 99 mg/dL   Glucose by meter     Status: Abnormal   Result Value Ref Range    Glucose 194 (H) 70 - 99 mg/dL   Glucose by meter     Status: None   Result Value Ref Range    Glucose 76 70 - 99 mg/dL   Glucose by meter     Status: Abnormal   Result Value Ref Range    Glucose 301 (H) 70 - 99 mg/dL   Glucose by meter     Status: Abnormal   Result Value Ref Range    Glucose 213 (H)  70 - 99 mg/dL   Glucose by meter     Status: Abnormal   Result Value Ref Range    Glucose 155 (H) 70 - 99 mg/dL   Glucose by meter     Status: Abnormal   Result Value Ref Range    Glucose 207 (H) 70 - 99 mg/dL   Glucose by meter     Status: Abnormal   Result Value Ref Range    Glucose 209 (H) 70 - 99 mg/dL   Glucose by meter     Status: Abnormal   Result Value Ref Range    Glucose 207 (H) 70 - 99 mg/dL   Glucose by meter     Status: Abnormal   Result Value Ref Range    Glucose 241 (H) 70 - 99 mg/dL   Glucose by meter     Status: Abnormal   Result Value Ref Range    Glucose 203 (H) 70 - 99 mg/dL   Glucose by meter     Status: Abnormal   Result Value Ref Range    Glucose 122 (H) 70 - 99 mg/dL   Glucose by meter     Status: Abnormal   Result Value Ref Range    Glucose 154 (H) 70 - 99 mg/dL   Glucose by meter     Status: Abnormal   Result Value Ref Range    Glucose 102 (H) 70 - 99 mg/dL   Glucose by meter     Status: Abnormal   Result Value Ref Range    Glucose 159 (H) 70 - 99 mg/dL   Glucose by meter     Status: Abnormal   Result Value Ref Range    Glucose 140 (H) 70 - 99 mg/dL   Glucose by meter     Status: None   Result Value Ref Range    Glucose 93 70 - 99 mg/dL   Valproic acid     Status: Abnormal   Result Value Ref Range    Valproic Acid Level 36 (L) 50 - 100 mg/L   Comprehensive metabolic panel     Status: Abnormal   Result Value Ref Range    Sodium 140 133 - 144 mmol/L    Potassium 4.2 3.4 - 5.3 mmol/L    Chloride 109 94 - 109 mmol/L    Carbon Dioxide 25 20 - 32 mmol/L    Anion Gap 6 3 - 14 mmol/L    Glucose 170 (H) 70 - 99 mg/dL    Urea Nitrogen 14 7 - 30 mg/dL    Creatinine 0.60 0.52 - 1.04 mg/dL    GFR Estimate >90 >60 mL/min/[1.73_m2]    GFR Estimate If Black >90 >60 mL/min/[1.73_m2]    Calcium 8.7 8.5 - 10.1 mg/dL    Bilirubin Total 0.1 (L) 0.2 - 1.3 mg/dL    Albumin 3.4 3.4 - 5.0 g/dL    Protein Total 7.0 6.8 - 8.8 g/dL    Alkaline Phosphatase 70 40 - 150 U/L    ALT 21 0 - 50 U/L    AST 9 0 - 45 U/L    Bilirubin direct     Status: None   Result Value Ref Range    Bilirubin Direct <0.1 0.0 - 0.2 mg/dL   Glucose by meter     Status: Abnormal   Result Value Ref Range    Glucose 139 (H) 70 - 99 mg/dL   Glucose by meter     Status: Abnormal   Result Value Ref Range    Glucose 192 (H) 70 - 99 mg/dL   Glucose by meter     Status: Abnormal   Result Value Ref Range    Glucose 199 (H) 70 - 99 mg/dL

## 2019-11-01 NOTE — PLAN OF CARE
Pt sleepy Takes meds without incidence  Isolative  Voicing no SI  No acting out  Eating well for supper  Offers no c/o pain

## 2019-11-02 LAB
GLUCOSE BLDC GLUCOMTR-MCNC: 105 MG/DL (ref 70–99)
GLUCOSE BLDC GLUCOMTR-MCNC: 193 MG/DL (ref 70–99)
GLUCOSE BLDC GLUCOMTR-MCNC: 194 MG/DL (ref 70–99)
GLUCOSE BLDC GLUCOMTR-MCNC: 292 MG/DL (ref 70–99)

## 2019-11-02 PROCEDURE — 25000132 ZZH RX MED GY IP 250 OP 250 PS 637: Performed by: NURSE PRACTITIONER

## 2019-11-02 PROCEDURE — 12400000 ZZH R&B MH

## 2019-11-02 PROCEDURE — 25000125 ZZHC RX 250: Performed by: NURSE PRACTITIONER

## 2019-11-02 PROCEDURE — 00000146 ZZHCL STATISTIC GLUCOSE BY METER IP

## 2019-11-02 RX ADMIN — INSULIN ASPART 2 UNITS: 100 INJECTION, SOLUTION INTRAVENOUS; SUBCUTANEOUS at 16:09

## 2019-11-02 RX ADMIN — CHLORPROMAZINE HYDROCHLORIDE 100 MG: 100 TABLET, SUGAR COATED ORAL at 08:21

## 2019-11-02 RX ADMIN — INSULIN ASPART 8 UNITS: 100 INJECTION, SOLUTION INTRAVENOUS; SUBCUTANEOUS at 08:25

## 2019-11-02 RX ADMIN — GABAPENTIN 1200 MG: 600 TABLET, FILM COATED ORAL at 20:32

## 2019-11-02 RX ADMIN — CHLORPROMAZINE HYDROCHLORIDE 100 MG: 100 TABLET, SUGAR COATED ORAL at 20:32

## 2019-11-02 RX ADMIN — METFORMIN HYDROCHLORIDE 1000 MG: 500 TABLET, FILM COATED ORAL at 08:21

## 2019-11-02 RX ADMIN — BUSPIRONE HYDROCHLORIDE 15 MG: 7.5 TABLET ORAL at 13:41

## 2019-11-02 RX ADMIN — HYDROXYZINE HYDROCHLORIDE 50 MG: 25 TABLET ORAL at 05:21

## 2019-11-02 RX ADMIN — CHLORPROMAZINE HYDROCHLORIDE 100 MG: 100 TABLET, SUGAR COATED ORAL at 13:41

## 2019-11-02 RX ADMIN — BUSPIRONE HYDROCHLORIDE 15 MG: 7.5 TABLET ORAL at 08:21

## 2019-11-02 RX ADMIN — GABAPENTIN 1200 MG: 600 TABLET, FILM COATED ORAL at 13:41

## 2019-11-02 RX ADMIN — CLONAZEPAM 1 MG: 1 TABLET ORAL at 20:32

## 2019-11-02 RX ADMIN — BUSPIRONE HYDROCHLORIDE 15 MG: 7.5 TABLET ORAL at 20:31

## 2019-11-02 RX ADMIN — METFORMIN HYDROCHLORIDE 1000 MG: 500 TABLET, FILM COATED ORAL at 16:02

## 2019-11-02 RX ADMIN — NICOTINE 1 PATCH: 21 PATCH, EXTENDED RELEASE TRANSDERMAL at 08:21

## 2019-11-02 RX ADMIN — PRAZOSIN HYDROCHLORIDE 1 MG: 1 CAPSULE ORAL at 20:31

## 2019-11-02 RX ADMIN — INSULIN ASPART 8 UNITS: 100 INJECTION, SOLUTION INTRAVENOUS; SUBCUTANEOUS at 12:18

## 2019-11-02 RX ADMIN — DIVALPROEX SODIUM 750 MG: 500 TABLET, FILM COATED, EXTENDED RELEASE ORAL at 20:31

## 2019-11-02 RX ADMIN — INSULIN ASPART 2 UNITS: 100 INJECTION, SOLUTION INTRAVENOUS; SUBCUTANEOUS at 12:18

## 2019-11-02 RX ADMIN — GABAPENTIN 1200 MG: 600 TABLET, FILM COATED ORAL at 08:21

## 2019-11-02 RX ADMIN — INSULIN ASPART 8 UNITS: 100 INJECTION, SOLUTION INTRAVENOUS; SUBCUTANEOUS at 16:10

## 2019-11-02 RX ADMIN — BENZTROPINE MESYLATE 1 MG: 1 TABLET ORAL at 08:21

## 2019-11-02 RX ADMIN — INSULIN GLARGINE 40 UNITS: 100 INJECTION, SOLUTION SUBCUTANEOUS at 20:33

## 2019-11-02 RX ADMIN — BENZTROPINE MESYLATE 1 MG: 1 TABLET ORAL at 20:31

## 2019-11-02 ASSESSMENT — ACTIVITIES OF DAILY LIVING (ADL)
LAUNDRY: UNABLE TO COMPLETE
ORAL_HYGIENE: INDEPENDENT
LAUNDRY: UNABLE TO COMPLETE
HYGIENE/GROOMING: INDEPENDENT
DRESS: SCRUBS (BEHAVIORAL HEALTH)
DRESS: SCRUBS (BEHAVIORAL HEALTH)
HYGIENE/GROOMING: INDEPENDENT
ORAL_HYGIENE: INDEPENDENT

## 2019-11-02 NOTE — PLAN OF CARE
Problem: Adult Behavioral Health Plan of Care  Goal: Patient-Specific Goal (Individualization)  Description  Pt will follow recommendations of treatment team.  Pt will be compliant with medications.  Pt will sleep 6- 8 hours each night.     11/2/2019 0312 by Yaneth Ron, RN  Outcome: No Change  Note:   Face to face shift report received from Betsy ALVAREZ RN. Patient observed.    Patient has been in bed with eyes closed and regular respirations throughout the night. 15 minute checks completed all night. No complaints offered. Will continue to monitor.    The face to face report will be communicated to on coming RN.

## 2019-11-02 NOTE — PLAN OF CARE
Problem: Adult Behavioral Health Plan of Care  Goal: Patient-Specific Goal (Individualization)  Description  Pt will follow recommendations of treatment team.  Pt will be compliant with medications.  Pt will sleep 6- 8 hours each night.     11/2/2019 0914 by Bianca Mckeon, RN  Outcome: No Change   Patient denies SI, HI, kirk., depression and anxiety. She contracts for safety. She states that she is having pain in both of her feet and is requesting to have epsom salt foot soaks. Patient does have dry feet with some peeling skin. Sticky note left for provider. Patient is pleasant and cooperative. She is noted to ramble and make delusional statements. Patient talks about being a millionaire and living with the royal family in [place]. Patient states that she is going to rest in her bed this morning.  1430: Patient has been in the [facility] area this afternoon. She has been writing and listening to music. She is social with this writer and continues to be compliant with medications.   Problem: Thought Process Alteration  Goal: Optimal Thought Clarity  Description  Pt will be free of any delusional statements by discharge.      Outcome: No Change   Patient conversations with this writer are often delusional. She talks about being a millionaire and living with the royal family in [place].  Problem: Violence Risk or Actual  Goal: Anger and Impulse Control  Description  Pt will keep impulses under control.  Pt will refrain from violent behaviors towards peers and staff.    Outcome: Improving   Patient behaviors have been appropriate this shift.     Face to face end of shift report communicated to on oncoming shift.

## 2019-11-02 NOTE — PLAN OF CARE
Pt has had no acting out  Problem: Adult Behavioral Health Plan of Care  Goal: Plan of Care Review  Outcome: Improving     Problem: Adult Behavioral Health Plan of Care  Goal: Patient-Specific Goal (Individualization)  Description  Pt will follow recommendations of treatment team.  Pt will be compliant with medications.  Pt will sleep 6- 8 hours each night.     11/2/2019 1638 by Lisbet Donald, RN  Outcome: Improving   Pt denies hearing voices  Hard to understand at times due to mumbling and rambling speech  Pt in [employer] doing lists of things to get at store  Denies SI admits to some depression  Refuses groups  Seems a little less groggy than yesterday

## 2019-11-03 LAB
GLUCOSE BLDC GLUCOMTR-MCNC: 108 MG/DL (ref 70–99)
GLUCOSE BLDC GLUCOMTR-MCNC: 124 MG/DL (ref 70–99)
GLUCOSE BLDC GLUCOMTR-MCNC: 160 MG/DL (ref 70–99)
GLUCOSE BLDC GLUCOMTR-MCNC: 180 MG/DL (ref 70–99)
GLUCOSE BLDC GLUCOMTR-MCNC: 97 MG/DL (ref 70–99)

## 2019-11-03 PROCEDURE — 00000146 ZZHCL STATISTIC GLUCOSE BY METER IP

## 2019-11-03 PROCEDURE — 12400000 ZZH R&B MH

## 2019-11-03 PROCEDURE — 99232 SBSQ HOSP IP/OBS MODERATE 35: CPT | Performed by: NURSE PRACTITIONER

## 2019-11-03 PROCEDURE — 25000132 ZZH RX MED GY IP 250 OP 250 PS 637: Performed by: NURSE PRACTITIONER

## 2019-11-03 PROCEDURE — 25000125 ZZHC RX 250: Performed by: NURSE PRACTITIONER

## 2019-11-03 RX ADMIN — NICOTINE 1 PATCH: 21 PATCH, EXTENDED RELEASE TRANSDERMAL at 09:26

## 2019-11-03 RX ADMIN — BENZTROPINE MESYLATE 1 MG: 1 TABLET ORAL at 08:56

## 2019-11-03 RX ADMIN — BENZTROPINE MESYLATE 1 MG: 1 TABLET ORAL at 20:45

## 2019-11-03 RX ADMIN — CHLORPROMAZINE HYDROCHLORIDE 100 MG: 100 TABLET, SUGAR COATED ORAL at 13:26

## 2019-11-03 RX ADMIN — INSULIN ASPART 8 UNITS: 100 INJECTION, SOLUTION INTRAVENOUS; SUBCUTANEOUS at 08:57

## 2019-11-03 RX ADMIN — BUSPIRONE HYDROCHLORIDE 15 MG: 7.5 TABLET ORAL at 13:26

## 2019-11-03 RX ADMIN — GABAPENTIN 1200 MG: 600 TABLET, FILM COATED ORAL at 13:26

## 2019-11-03 RX ADMIN — DIVALPROEX SODIUM 750 MG: 500 TABLET, FILM COATED, EXTENDED RELEASE ORAL at 20:45

## 2019-11-03 RX ADMIN — BUSPIRONE HYDROCHLORIDE 15 MG: 7.5 TABLET ORAL at 08:56

## 2019-11-03 RX ADMIN — INSULIN GLARGINE 40 UNITS: 100 INJECTION, SOLUTION SUBCUTANEOUS at 20:45

## 2019-11-03 RX ADMIN — INSULIN ASPART 8 UNITS: 100 INJECTION, SOLUTION INTRAVENOUS; SUBCUTANEOUS at 11:57

## 2019-11-03 RX ADMIN — GABAPENTIN 1200 MG: 600 TABLET, FILM COATED ORAL at 20:45

## 2019-11-03 RX ADMIN — CLONAZEPAM 1 MG: 1 TABLET ORAL at 20:45

## 2019-11-03 RX ADMIN — CHLORPROMAZINE HYDROCHLORIDE 100 MG: 100 TABLET, SUGAR COATED ORAL at 08:56

## 2019-11-03 RX ADMIN — INSULIN ASPART 1 UNITS: 100 INJECTION, SOLUTION INTRAVENOUS; SUBCUTANEOUS at 16:43

## 2019-11-03 RX ADMIN — INSULIN ASPART 8 UNITS: 100 INJECTION, SOLUTION INTRAVENOUS; SUBCUTANEOUS at 16:42

## 2019-11-03 RX ADMIN — INSULIN ASPART 1 UNITS: 100 INJECTION, SOLUTION INTRAVENOUS; SUBCUTANEOUS at 11:56

## 2019-11-03 RX ADMIN — ACETAMINOPHEN 650 MG: 325 TABLET, FILM COATED ORAL at 01:24

## 2019-11-03 RX ADMIN — OLANZAPINE 5 MG: 5 TABLET, FILM COATED ORAL at 01:24

## 2019-11-03 RX ADMIN — BUSPIRONE HYDROCHLORIDE 15 MG: 7.5 TABLET ORAL at 20:45

## 2019-11-03 RX ADMIN — METFORMIN HYDROCHLORIDE 1000 MG: 500 TABLET, FILM COATED ORAL at 08:56

## 2019-11-03 RX ADMIN — HYDROXYZINE HYDROCHLORIDE 50 MG: 25 TABLET ORAL at 01:24

## 2019-11-03 RX ADMIN — METFORMIN HYDROCHLORIDE 1000 MG: 500 TABLET, FILM COATED ORAL at 16:41

## 2019-11-03 RX ADMIN — CHLORPROMAZINE HYDROCHLORIDE 100 MG: 100 TABLET, SUGAR COATED ORAL at 20:45

## 2019-11-03 RX ADMIN — GABAPENTIN 1200 MG: 600 TABLET, FILM COATED ORAL at 08:56

## 2019-11-03 RX ADMIN — PRAZOSIN HYDROCHLORIDE 1 MG: 1 CAPSULE ORAL at 20:45

## 2019-11-03 RX ADMIN — HYDROXYZINE HYDROCHLORIDE 50 MG: 25 TABLET ORAL at 09:00

## 2019-11-03 ASSESSMENT — ACTIVITIES OF DAILY LIVING (ADL)
ORAL_HYGIENE: INDEPENDENT
DRESS: SCRUBS (BEHAVIORAL HEALTH)
LAUNDRY: UNABLE TO COMPLETE
LAUNDRY: UNABLE TO COMPLETE
DRESS: SCRUBS (BEHAVIORAL HEALTH);INDEPENDENT
HYGIENE/GROOMING: INDEPENDENT
ORAL_HYGIENE: INDEPENDENT
HYGIENE/GROOMING: INDEPENDENT

## 2019-11-03 NOTE — PLAN OF CARE
Face to face shift report received from Betsy ALVAREZ RN. Patient observed.      Problem: Adult Behavioral Health Plan of Care  Goal: Patient-Specific Goal (Individualization)  Description  Pt will follow recommendations of treatment team.  Pt will be compliant with medications.  Pt will sleep 6- 8 hours each night.     11/3/2019 0217 by Yaneth Ron RN  Outcome: No Change  Note:   Patient has been in bed with eyes closed and regular respirations throughout the night. 15 minute checks completed all night. No complaints offered. Will continue to monitor.    The face to face report will be communicated to on coming RN.

## 2019-11-03 NOTE — PROGRESS NOTES
[facility]  Psychiatric Progress Note      Impression:   Genie is hoping to discharge this week. She has been doing well. She remains a bit grandiose with her delusional thinking. However this is baseline for her. She is less aggressive and irritable with her delusional thoughts which is the improvement that is sought with stabilization of symptoms for her. Per her guardian she will be returning to [facility], her guardian was going to contact them to make sure this is still an option. Genie is happy to return to [place]. She does ask that she can have a pass to go to the store however to purchase some items she wants. She is less irritable and edgy than she has been.           DIagnoses:   Schizoaffective disorder, bipolar type   PTSD, by history  Methamphetamine use disorder, in remission             Plan:   Continue current medications      ELOS:  2-3 days for safe discharge planning      Attestation:  Patient has been seen and evaluated by me,  Loren Zuleta, NP          Interim History:   The patient's care was discussed with the treatment team and chart notes were reviewed.          Medications:     Current Facility-Administered Medications Ordered in Epic   Medication Dose Route Frequency Last Rate Last Dose     acetaminophen (TYLENOL) tablet 650 mg  650 mg Oral Q4H PRN   650 mg at 11/03/19 0124     alum & mag hydroxide-simethicone (MYLANTA ES/MAALOX  ES) suspension 30 mL  30 mL Oral Q4H PRN         artificial saliva (BIOTENE MT) solution 2 spray  2 spray Swish & Spit 4x Daily PRN         benztropine (COGENTIN) tablet 1 mg  1 mg Oral BID   1 mg at 11/02/19 2031     bisacodyl (DULCOLAX) EC tablet 5 mg  5 mg Oral Daily PRN         busPIRone (BUSPAR) tablet 15 mg  15 mg Oral TID   15 mg at 11/02/19 2031     chlorproMAZINE (THORAZINE) tablet 100 mg  100 mg Oral TID   100 mg at 11/02/19 2032     clonazePAM (klonoPIN) tablet 1 mg  1 mg Oral At Bedtime   1 mg at 11/02/19 2032     glucose gel 15-30 g   "15-30 g Oral Q15 Min PRN        Or     dextrose 50 % injection 25-50 mL  25-50 mL Intravenous Q15 Min PRN        Or     glucagon injection 1 mg  1 mg Subcutaneous Q15 Min PRN         divalproex sodium extended-release (DEPAKOTE ER) 24 hr tablet 750 mg  750 mg Oral At Bedtime   750 mg at 11/02/19 2031     gabapentin (NEURONTIN) tablet 1,200 mg  1,200 mg Oral TID   1,200 mg at 11/02/19 2032     hydrOXYzine (ATARAX) tablet 50 mg  50 mg Oral TID PRN   50 mg at 11/03/19 0124     insulin aspart (NovoLOG) inj (RAPID ACTING)  8 Units Subcutaneous TID w/meals   8 Units at 11/02/19 1610     insulin aspart (NovoLOG) inj (RAPID ACTING)  1-7 Units Subcutaneous TID AC   2 Units at 11/02/19 1609     insulin glargine (LANTUS PEN) injection 40 Units  40 Units Subcutaneous At Bedtime   40 Units at 11/02/19 2033     magnesium hydroxide (MILK OF MAGNESIA) suspension 30 mL  30 mL Oral At Bedtime PRN         metFORMIN (GLUCOPHAGE) tablet 1,000 mg  1,000 mg Oral BID w/meals   1,000 mg at 11/02/19 1602     nicotine (NICODERM CQ) 21 MG/24HR 24 hr patch 1 patch  1 patch Transdermal Daily   1 patch at 11/02/19 0821     nicotine (NICORETTE) gum 2-4 mg  2-4 mg Buccal Q1H PRN   4 mg at 10/30/19 0857     nicotine Patch in Place   Transdermal Q8H         nicotine patch REMOVAL   Transdermal Daily   Stopped at 10/31/19 1053     OLANZapine (zyPREXA) injection 5 mg  5 mg Intramuscular TID PRN        Or     OLANZapine (zyPREXA) tablet 5 mg  5 mg Oral TID PRN   5 mg at 11/03/19 0124     polyethylene glycol (MIRALAX/GLYCOLAX) Packet 17 g  17 g Oral Daily PRN         prazosin (MINIPRESS) capsule 1 mg  1 mg Oral At Bedtime   1 mg at 11/02/19 2031     No current Epic-ordered outpatient medications on file.          10 point ROS - see H&P       Allergies:     Allergies   Allergen Reactions     Haloperidol Other (See Comments)     Other reaction(s): Seizures  Patient states, \"I fell down and wasn't able to get up.\"  Patient states, \"I fell down and wasn't " "able to get up.\"              Psychiatric Examination:   /77   Pulse 95   Temp 98.1  F (36.7  C) (Tympanic)   Resp 14   Wt 87.4 kg (192 lb 11.2 oz)   SpO2 98%   BMI 33.08 kg/m    Weight is 192 lbs 11.2 oz  Body mass index is 33.08 kg/m .    Appearance:  awake, alert  Attitude:  cooperative  Eye Contact:  fair  Mood:  better  Affect:  mood congruent and intensity is blunted  Speech:  clear, coherent  Psychomotor Behavior:  no evidence of tardive dyskinesia, dystonia, or tics  Thought Process:  logical  Associations:  no loose associations  Thought Content:  no evidence of suicidal ideation or homicidal ideation and no evidence of psychotic thought  Insight:  fair  Judgment:  fair  Oriented to:  time, person, and place  Attention Span and Concentration:  fair  Recent and Remote Memory:  fair  Fund of Knowledge: appropriate  Muscle Strength and Tone: normal  Gait and Station: Normal           Labs:     Results for orders placed or performed during the hospital encounter of 10/22/19   CBC with platelets differential     Status: None   Result Value Ref Range    WBC 8.8 4.0 - 11.0 10e9/L    RBC Count 4.57 3.8 - 5.2 10e12/L    Hemoglobin 13.1 11.7 - 15.7 g/dL    Hematocrit 38.8 35.0 - 47.0 %    MCV 85 78 - 100 fl    MCH 28.7 26.5 - 33.0 pg    MCHC 33.8 31.5 - 36.5 g/dL    RDW 12.4 10.0 - 15.0 %    Platelet Count 299 150 - 450 10e9/L    Diff Method Automated Method     % Neutrophils 73.9 %    % Lymphocytes 19.9 %    % Monocytes 5.2 %    % Eosinophils 0.3 %    % Basophils 0.2 %    % Immature Granulocytes 0.5 %    Nucleated RBCs 0 0 /100    Absolute Neutrophil 6.5 1.6 - 8.3 10e9/L    Absolute Lymphocytes 1.8 0.8 - 5.3 10e9/L    Absolute Monocytes 0.5 0.0 - 1.3 10e9/L    Absolute Eosinophils 0.0 0.0 - 0.7 10e9/L    Absolute Basophils 0.0 0.0 - 0.2 10e9/L    Abs Immature Granulocytes 0.0 0 - 0.4 10e9/L    Absolute Nucleated RBC 0.0    Comprehensive metabolic panel     Status: Abnormal   Result Value Ref Range    " Sodium 138 133 - 144 mmol/L    Potassium 3.9 3.4 - 5.3 mmol/L    Chloride 109 94 - 109 mmol/L    Carbon Dioxide 23 20 - 32 mmol/L    Anion Gap 6 3 - 14 mmol/L    Glucose 161 (H) 70 - 99 mg/dL    Urea Nitrogen 12 7 - 30 mg/dL    Creatinine 0.56 0.52 - 1.04 mg/dL    GFR Estimate >90 >60 mL/min/[1.73_m2]    GFR Estimate If Black >90 >60 mL/min/[1.73_m2]    Calcium 8.7 8.5 - 10.1 mg/dL    Bilirubin Total 0.3 0.2 - 1.3 mg/dL    Albumin 4.0 3.4 - 5.0 g/dL    Protein Total 7.7 6.8 - 8.8 g/dL    Alkaline Phosphatase 77 40 - 150 U/L    ALT 23 0 - 50 U/L    AST 12 0 - 45 U/L   Alcohol ethyl     Status: None   Result Value Ref Range    Ethanol g/dL <0.01 0.01 g/dL   HCG qualitative urine (UPT)     Status: None   Result Value Ref Range    HCG Qual Urine Negative NEG^Negative   UA reflex to Microscopic     Status: Abnormal   Result Value Ref Range    Color Urine Yellow     Appearance Urine Clear     Glucose Urine Negative NEG^Negative mg/dL    Bilirubin Urine Negative NEG^Negative    Ketones Urine 10 (A) NEG^Negative mg/dL    Specific Gravity Urine 1.028 1.003 - 1.035    Blood Urine Negative NEG^Negative    pH Urine 5.5 4.7 - 8.0 pH    Protein Albumin Urine 10 (A) NEG^Negative mg/dL    Urobilinogen mg/dL Normal 0.0 - 2.0 mg/dL    Nitrite Urine Negative NEG^Negative    Leukocyte Esterase Urine Negative NEG^Negative    Source Unspecified Urine     RBC Urine 1 0 - 2 /HPF    WBC Urine 1 0 - 5 /HPF    Bacteria Urine None (A) NEG^Negative /HPF    Squamous Epithelial /HPF Urine 6 (H) 0 - 1 /HPF    Mucous Urine Present (A) NEG^Negative /LPF   Urine Drugs of Abuse Screen Panel 13     Status: None   Result Value Ref Range    Cannabinoids (15-qoe-2-carboxy-9-THC) Not Detected NDET^Not Detected ng/mL    Phencyclidine (Phencyclidine) Not Detected NDET^Not Detected ng/mL    Cocaine (Benzoylecgonine) Not Detected NDET^Not Detected ng/mL    Methamphetamine (d-Methamphetamine) Not Detected NDET^Not Detected ng/mL    Opiates (Morphine) Not  Detected NDET^Not Detected ng/mL    Amphetamine (d-Amphetamine) Not Detected NDET^Not Detected ng/mL    Benzodiazepines (Nordiazepam) Not Detected NDET^Not Detected ng/mL    Tricyclic Antidepressants (Desipramine) Not Detected NDET^Not Detected ng/mL    Methadone (Methadone) Not Detected NDET^Not Detected ng/mL    Barbiturates (Butalbital) Not Detected NDET^Not Detected ng/mL    Oxycodone (Oxycodone) Not Detected NDET^Not Detected ng/mL    Propoxyphene (Norpropoxyphene) Not Detected NDET^Not Detected ng/mL    Buprenorphine (Buprenorphine) Not Detected NDET^Not Detected ng/mL   Acetaminophen level     Status: None   Result Value Ref Range    Acetaminophen Level <2 mg/L   Salicylate level     Status: None   Result Value Ref Range    Salicylate Level 4 mg/dL   Glucose by meter     Status: Abnormal   Result Value Ref Range    Glucose 217 (H) 70 - 99 mg/dL   Glucose by meter     Status: Abnormal   Result Value Ref Range    Glucose 191 (H) 70 - 99 mg/dL   Glucose by meter     Status: Abnormal   Result Value Ref Range    Glucose 156 (H) 70 - 99 mg/dL   Glucose by meter     Status: Abnormal   Result Value Ref Range    Glucose 138 (H) 70 - 99 mg/dL   Glucose by meter     Status: Abnormal   Result Value Ref Range    Glucose 132 (H) 70 - 99 mg/dL   Glucose by meter     Status: Abnormal   Result Value Ref Range    Glucose 230 (H) 70 - 99 mg/dL   Glucose by meter     Status: Abnormal   Result Value Ref Range    Glucose 116 (H) 70 - 99 mg/dL   Glucose by meter     Status: Abnormal   Result Value Ref Range    Glucose 179 (H) 70 - 99 mg/dL   Glucose by meter     Status: Abnormal   Result Value Ref Range    Glucose 139 (H) 70 - 99 mg/dL   Lipid Profile     Status: Abnormal   Result Value Ref Range    Cholesterol 184 <200 mg/dL    Triglycerides 281 (H) <150 mg/dL    HDL Cholesterol 30 (L) >49 mg/dL    LDL Cholesterol Calculated 98 <100 mg/dL    Non HDL Cholesterol 154 (H) <130 mg/dL   Glucose by meter     Status: Abnormal   Result  Value Ref Range    Glucose 217 (H) 70 - 99 mg/dL   Glucose by meter     Status: Abnormal   Result Value Ref Range    Glucose 138 (H) 70 - 99 mg/dL   Glucose by meter     Status: Abnormal   Result Value Ref Range    Glucose 246 (H) 70 - 99 mg/dL   Glucose by meter     Status: Abnormal   Result Value Ref Range    Glucose 186 (H) 70 - 99 mg/dL   Glucose by meter     Status: Abnormal   Result Value Ref Range    Glucose 280 (H) 70 - 99 mg/dL   Glucose by meter     Status: Abnormal   Result Value Ref Range    Glucose 193 (H) 70 - 99 mg/dL   Glucose by meter     Status: Abnormal   Result Value Ref Range    Glucose 271 (H) 70 - 99 mg/dL   Glucose by meter     Status: Abnormal   Result Value Ref Range    Glucose 152 (H) 70 - 99 mg/dL   Glucose by meter     Status: Abnormal   Result Value Ref Range    Glucose 111 (H) 70 - 99 mg/dL   Glucose by meter     Status: None   Result Value Ref Range    Glucose 95 70 - 99 mg/dL   Glucose by meter     Status: None   Result Value Ref Range    Glucose 93 70 - 99 mg/dL   Glucose by meter     Status: Abnormal   Result Value Ref Range    Glucose 124 (H) 70 - 99 mg/dL   Glucose by meter     Status: Abnormal   Result Value Ref Range    Glucose 160 (H) 70 - 99 mg/dL   Glucose by meter     Status: Abnormal   Result Value Ref Range    Glucose 194 (H) 70 - 99 mg/dL   Glucose by meter     Status: None   Result Value Ref Range    Glucose 76 70 - 99 mg/dL   Glucose by meter     Status: Abnormal   Result Value Ref Range    Glucose 301 (H) 70 - 99 mg/dL   Glucose by meter     Status: Abnormal   Result Value Ref Range    Glucose 213 (H) 70 - 99 mg/dL   Glucose by meter     Status: Abnormal   Result Value Ref Range    Glucose 155 (H) 70 - 99 mg/dL   Glucose by meter     Status: Abnormal   Result Value Ref Range    Glucose 207 (H) 70 - 99 mg/dL   Glucose by meter     Status: Abnormal   Result Value Ref Range    Glucose 209 (H) 70 - 99 mg/dL   Glucose by meter     Status: Abnormal   Result Value Ref Range     Glucose 207 (H) 70 - 99 mg/dL   Glucose by meter     Status: Abnormal   Result Value Ref Range    Glucose 241 (H) 70 - 99 mg/dL   Glucose by meter     Status: Abnormal   Result Value Ref Range    Glucose 203 (H) 70 - 99 mg/dL   Glucose by meter     Status: Abnormal   Result Value Ref Range    Glucose 122 (H) 70 - 99 mg/dL   Glucose by meter     Status: Abnormal   Result Value Ref Range    Glucose 154 (H) 70 - 99 mg/dL   Glucose by meter     Status: Abnormal   Result Value Ref Range    Glucose 102 (H) 70 - 99 mg/dL   Glucose by meter     Status: Abnormal   Result Value Ref Range    Glucose 159 (H) 70 - 99 mg/dL   Glucose by meter     Status: Abnormal   Result Value Ref Range    Glucose 140 (H) 70 - 99 mg/dL   Glucose by meter     Status: None   Result Value Ref Range    Glucose 93 70 - 99 mg/dL   Valproic acid     Status: Abnormal   Result Value Ref Range    Valproic Acid Level 36 (L) 50 - 100 mg/L   Comprehensive metabolic panel     Status: Abnormal   Result Value Ref Range    Sodium 140 133 - 144 mmol/L    Potassium 4.2 3.4 - 5.3 mmol/L    Chloride 109 94 - 109 mmol/L    Carbon Dioxide 25 20 - 32 mmol/L    Anion Gap 6 3 - 14 mmol/L    Glucose 170 (H) 70 - 99 mg/dL    Urea Nitrogen 14 7 - 30 mg/dL    Creatinine 0.60 0.52 - 1.04 mg/dL    GFR Estimate >90 >60 mL/min/[1.73_m2]    GFR Estimate If Black >90 >60 mL/min/[1.73_m2]    Calcium 8.7 8.5 - 10.1 mg/dL    Bilirubin Total 0.1 (L) 0.2 - 1.3 mg/dL    Albumin 3.4 3.4 - 5.0 g/dL    Protein Total 7.0 6.8 - 8.8 g/dL    Alkaline Phosphatase 70 40 - 150 U/L    ALT 21 0 - 50 U/L    AST 9 0 - 45 U/L   Bilirubin direct     Status: None   Result Value Ref Range    Bilirubin Direct <0.1 0.0 - 0.2 mg/dL   Glucose by meter     Status: Abnormal   Result Value Ref Range    Glucose 139 (H) 70 - 99 mg/dL   Glucose by meter     Status: Abnormal   Result Value Ref Range    Glucose 192 (H) 70 - 99 mg/dL   Glucose by meter     Status: Abnormal   Result Value Ref Range    Glucose 199  (H) 70 - 99 mg/dL   Glucose by meter     Status: Abnormal   Result Value Ref Range    Glucose 200 (H) 70 - 99 mg/dL   Glucose by meter     Status: Abnormal   Result Value Ref Range    Glucose 233 (H) 70 - 99 mg/dL   Glucose by meter     Status: Abnormal   Result Value Ref Range    Glucose 105 (H) 70 - 99 mg/dL   Glucose by meter     Status: Abnormal   Result Value Ref Range    Glucose 194 (H) 70 - 99 mg/dL   Glucose by meter     Status: Abnormal   Result Value Ref Range    Glucose 193 (H) 70 - 99 mg/dL   Glucose by meter     Status: Abnormal   Result Value Ref Range    Glucose 292 (H) 70 - 99 mg/dL   Glucose by meter     Status: None   Result Value Ref Range    Glucose 97 70 - 99 mg/dL   Glucose by meter     Status: Abnormal   Result Value Ref Range    Glucose 108 (H) 70 - 99 mg/dL

## 2019-11-03 NOTE — PLAN OF CARE
Problem: Adult Behavioral Health Plan of Care  Goal: Patient-Specific Goal (Individualization)  Description  Pt will follow recommendations of treatment team.  Pt will be compliant with medications.  Pt will sleep 6- 8 hours each night.     11/3/2019 1336 by Viridiana Dotson RN  Note:   Pt was cooperative with nursing assessment and medications.  She is withdrawn.  She sat in day room and listened to music most of day shift. She was working on her paper charts.  Pt has tangential, rambling, pressured talking.  She is delusional in her stories, but able to answer writer's questions.         Problem: Thought Process Alteration  Goal: Optimal Thought Clarity  Description  Pt will be free of any delusional statements by discharge.      Note:   Pt is still delusional when telling her stories.  She was talking about being an octagon fighter and there was 300 of them.  They also attacked tigers, and she was a bear.  They do this in [place].  She mumbles and talks rapidly, so it's hard to keep up when she is telling a story.       Problem: Violence Risk or Actual  Goal: Anger and Impulse Control  Description  Pt will keep impulses under control.  Pt will refrain from violent behaviors towards peers and staff.    Note:   Pt was in control of her behaviors this day shift.  She was not angry or aggressive when speaking or acting.

## 2019-11-03 NOTE — PLAN OF CARE
Problem: Adult Behavioral Health Plan of Care  Goal: Patient-Specific Goal (Individualization)  Description  Pt will follow recommendations of treatment team.  Pt will be compliant with medications.  Pt will sleep 6- 8 hours each night.     11/3/2019 1719 by Lisbet Donald, RN  Outcome: No Change  Note:   Pt working on paper project  Denies SI  Does appear a little depressed  Pt gives own insulin  Stays on fringes of group Compliant with meds          Problem: Thought Process Alteration  Goal: Optimal Thought Clarity  Description  Pt will be free of any delusional statements by discharge.      11/3/2019 1719 by Lisbet Donald, RN  Outcome: No Change  Continues to have delusions of grandeur

## 2019-11-04 VITALS
HEART RATE: 106 BPM | SYSTOLIC BLOOD PRESSURE: 113 MMHG | TEMPERATURE: 98.9 F | DIASTOLIC BLOOD PRESSURE: 63 MMHG | WEIGHT: 192.7 LBS | OXYGEN SATURATION: 99 % | RESPIRATION RATE: 15 BRPM | BODY MASS INDEX: 33.08 KG/M2

## 2019-11-04 LAB
GLUCOSE BLDC GLUCOMTR-MCNC: 110 MG/DL (ref 70–99)
GLUCOSE BLDC GLUCOMTR-MCNC: 163 MG/DL (ref 70–99)
GLUCOSE BLDC GLUCOMTR-MCNC: 191 MG/DL (ref 70–99)

## 2019-11-04 PROCEDURE — 25000132 ZZH RX MED GY IP 250 OP 250 PS 637: Performed by: NURSE PRACTITIONER

## 2019-11-04 PROCEDURE — 25000125 ZZHC RX 250: Performed by: NURSE PRACTITIONER

## 2019-11-04 PROCEDURE — 99239 HOSP IP/OBS DSCHRG MGMT >30: CPT | Performed by: NURSE PRACTITIONER

## 2019-11-04 PROCEDURE — 00000146 ZZHCL STATISTIC GLUCOSE BY METER IP

## 2019-11-04 RX ORDER — SALIVA STIMULANT COMB. NO.3
2 SPRAY, NON-AEROSOL (ML) MUCOUS MEMBRANE 4 TIMES DAILY PRN
Qty: 1 BOTTLE | Refills: 0 | Status: ON HOLD | OUTPATIENT
Start: 2019-11-04 | End: 2020-01-08

## 2019-11-04 RX ORDER — DIVALPROEX SODIUM 250 MG/1
750 TABLET, EXTENDED RELEASE ORAL AT BEDTIME
Qty: 90 TABLET | Refills: 0 | Status: SHIPPED | OUTPATIENT
Start: 2019-11-04 | End: 2019-11-22

## 2019-11-04 RX ORDER — BENZTROPINE MESYLATE 1 MG/1
1 TABLET ORAL 2 TIMES DAILY
Qty: 60 TABLET | Refills: 0 | Status: SHIPPED | OUTPATIENT
Start: 2019-11-04 | End: 2019-11-22

## 2019-11-04 RX ORDER — BUSPIRONE HYDROCHLORIDE 15 MG/1
15 TABLET ORAL 3 TIMES DAILY
Qty: 90 TABLET | Refills: 0 | Status: SHIPPED | OUTPATIENT
Start: 2019-11-04 | End: 2019-11-22

## 2019-11-04 RX ORDER — CLONAZEPAM 1 MG/1
1 TABLET ORAL AT BEDTIME
Qty: 30 TABLET | Refills: 0 | Status: SHIPPED | OUTPATIENT
Start: 2019-11-04 | End: 2019-11-22

## 2019-11-04 RX ORDER — HYDROXYZINE HYDROCHLORIDE 50 MG/1
50 TABLET, FILM COATED ORAL 3 TIMES DAILY PRN
Qty: 90 TABLET | Refills: 0 | Status: ON HOLD | OUTPATIENT
Start: 2019-11-04 | End: 2020-02-14

## 2019-11-04 RX ADMIN — INSULIN ASPART 8 UNITS: 100 INJECTION, SOLUTION INTRAVENOUS; SUBCUTANEOUS at 08:20

## 2019-11-04 RX ADMIN — INSULIN ASPART 8 UNITS: 100 INJECTION, SOLUTION INTRAVENOUS; SUBCUTANEOUS at 12:04

## 2019-11-04 RX ADMIN — OLANZAPINE 5 MG: 5 TABLET, FILM COATED ORAL at 02:36

## 2019-11-04 RX ADMIN — BUSPIRONE HYDROCHLORIDE 15 MG: 7.5 TABLET ORAL at 08:21

## 2019-11-04 RX ADMIN — BENZTROPINE MESYLATE 1 MG: 1 TABLET ORAL at 08:21

## 2019-11-04 RX ADMIN — INSULIN ASPART 2 UNITS: 100 INJECTION, SOLUTION INTRAVENOUS; SUBCUTANEOUS at 12:04

## 2019-11-04 RX ADMIN — CHLORPROMAZINE HYDROCHLORIDE 100 MG: 100 TABLET, SUGAR COATED ORAL at 08:22

## 2019-11-04 RX ADMIN — GABAPENTIN 1200 MG: 600 TABLET, FILM COATED ORAL at 08:22

## 2019-11-04 RX ADMIN — METFORMIN HYDROCHLORIDE 1000 MG: 500 TABLET, FILM COATED ORAL at 08:22

## 2019-11-04 RX ADMIN — NICOTINE 1 PATCH: 21 PATCH, EXTENDED RELEASE TRANSDERMAL at 08:21

## 2019-11-04 RX ADMIN — HYDROXYZINE HYDROCHLORIDE 50 MG: 25 TABLET ORAL at 02:36

## 2019-11-04 RX ADMIN — ACETAMINOPHEN 650 MG: 325 TABLET, FILM COATED ORAL at 02:36

## 2019-11-04 ASSESSMENT — ACTIVITIES OF DAILY LIVING (ADL)
ORAL_HYGIENE: INDEPENDENT
DRESS: SCRUBS (BEHAVIORAL HEALTH)
HYGIENE/GROOMING: INDEPENDENT

## 2019-11-04 NOTE — PLAN OF CARE
BEHAVIORAL TEAM DISCUSSION     Participants: Khushi Reynolds NP,  Dennise Herrera LICSW, Jim Barrios LSW, Krys Rojo RN, [facility], Mady Holman OT, Natalia Costello OT, Gena Gomez OT, Jabari [facility]   Progress: Back to baseline  Continued Stay Criteria/Rationale: Pressured. delusional, psychosis, labile, disorganized   Medical/Physical: DM  Precautions:   Falls precaution?: No       Behavioral Orders   Procedures    Code 1 - Restrict to Unit    Routine Programming       As clinically indicated    Status 15       Every 15 minutes.      Plan: Message left for Xavier at the [facility] as pt states she is interested in this. Will call and speak with pt's guardian after writer speaks with Xavier.  Rationale for change in precautions or plan: none       Current Facility-Administered Medications:     acetaminophen (TYLENOL) tablet 650 mg, 650 mg, Oral, Q4H PRN, Gokul Hathaway NP, 650 mg at 11/04/19 0236    alum & mag hydroxide-simethicone (MYLANTA ES/MAALOX  ES) suspension 30 mL, 30 mL, Oral, Q4H PRN, Gokul Hathaway NP    artificial saliva (BIOTENE MT) solution 2 spray, 2 spray, Swish & Spit, 4x Daily PRN, Natalia Fabian APRN CNP    benztropine (COGENTIN) tablet 1 mg, 1 mg, Oral, BID, WoNatalia armstrong APRN CNP, 1 mg at 11/04/19 0821    bisacodyl (DULCOLAX) EC tablet 5 mg, 5 mg, Oral, Daily PRN, Natalia Fabian APRN CNP    busPIRone (BUSPAR) tablet 15 mg, 15 mg, Oral, TID, WoNatalia armstrong APRN CNP, 15 mg at 11/04/19 0821    chlorproMAZINE (THORAZINE) tablet 100 mg, 100 mg, Oral, TID, Natalia Fabian APRN CNP, 100 mg at 11/04/19 0822    clonazePAM (klonoPIN) tablet 1 mg, 1 mg, Oral, At Bedtime, Natalia Fabian APRN CNP, 1 mg at 11/03/19 2045    glucose gel 15-30 g, 15-30 g, Oral, Q15 Min PRN **OR** dextrose 50 % injection 25-50 mL, 25-50 mL, Intravenous, Q15 Min PRN **OR** glucagon injection 1 mg, 1 mg, Subcutaneous, Q15 Min PRN, Gokul Hathaway, NP     divalproex sodium extended-release (DEPAKOTE ER) 24 hr tablet 750 mg, 750 mg, Oral, At Bedtime, Natalia Fabian APRN CNP, 750 mg at 11/03/19 2045    gabapentin (NEURONTIN) tablet 1,200 mg, 1,200 mg, Oral, TID, Natalia Fabian APRN CNP, 1,200 mg at 11/04/19 0822    hydrOXYzine (ATARAX) tablet 50 mg, 50 mg, Oral, TID PRN, Natalia Fabian APRN CNP, 50 mg at 11/04/19 0236    insulin aspart (NovoLOG) inj (RAPID ACTING), 8 Units, Subcutaneous, TID w/meals, Gokul Hathaway NP, 8 Units at 11/04/19 0820    insulin aspart (NovoLOG) inj (RAPID ACTING), 1-7 Units, Subcutaneous, TID AC, Gokul Hathaway NP, 1 Units at 11/03/19 1643    insulin glargine (LANTUS PEN) injection 40 Units, 40 Units, Subcutaneous, At Bedtime, Gokul Hathaway NP, 40 Units at 11/03/19 2045    magnesium hydroxide (MILK OF MAGNESIA) suspension 30 mL, 30 mL, Oral, At Bedtime PRN, Gokul Hathaway NP    metFORMIN (GLUCOPHAGE) tablet 1,000 mg, 1,000 mg, Oral, BID w/meals, Gokul Hathaway NP, 1,000 mg at 11/04/19 0822    nicotine (NICODERM CQ) 21 MG/24HR 24 hr patch 1 patch, 1 patch, Transdermal, Daily, Natalia Fabian APRN CNP, 1 patch at 11/04/19 0821    nicotine (NICORETTE) gum 2-4 mg, 2-4 mg, Buccal, Q1H PRN, Gokul Hathaway NP, 4 mg at 10/30/19 0857    nicotine Patch in Place, , Transdermal, Q8H, Natalia Fabian APRN CNP    nicotine patch REMOVAL, , Transdermal, Daily, Natalia Fabian APRN CNP, Stopped at 10/31/19 1053    OLANZapine (zyPREXA) injection 5 mg, 5 mg, Intramuscular, TID PRN **OR** OLANZapine (zyPREXA) tablet 5 mg, 5 mg, Oral, TID PRN, Loren Zuleta NP, 5 mg at 11/04/19 0236    polyethylene glycol (MIRALAX/GLYCOLAX) Packet 17 g, 17 g, Oral, Daily PRN, Natalia Fabian, APRN CNP    prazosin (MINIPRESS) capsule 1 mg, 1 mg, Oral, At Bedtime, Gokul Hathaway NP, 1 mg at 11/03/19 2045

## 2019-11-04 NOTE — PLAN OF CARE
"Patient to discharge back to [facility] today - reports she is feeling much better and states that now that she is on the \"right meds\" she feels she is ready to go back to [facility].  Will return today - called and left messages for guardian supervisor and nurse.  Also notified [facility] - Discharge instructions, including; demographic sheet, psychiatric evaluation, discharge summary, and AVS were faxed to these next level of care providers per hospital staff..  "
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
## 2019-11-04 NOTE — PLAN OF CARE
"  Problem: Adult Behavioral Health Plan of Care  Goal: Patient-Specific Goal (Individualization)  Description  Pt will follow recommendations of treatment team.  Pt will be compliant with medications.  Pt will sleep 6- 8 hours each night.     11/4/2019 1031 by Luz Amaya, RN  Outcome: Improving  Note:   Shift [place]:  Patient is up on the unit and ate well for breakfast meal. Patient denies pain and other physical issues. Patient resting after breakfast.   Discharge Note    Patient Discharged to assisted living on 11/4/2019 1302 PM via [facility] accompanied by [facility] Staff.     Patient informed of discharge instructions in AVS. patient verbalizes understanding and denies having any questions pertaining to AVS. Patient stable at time of discharge. Patient denies SI, HI, and thoughts of self harm at time of discharge. All personal belongings returned to patient. Discharge prescriptions sent to [facility] pharmacy via electronic communication. Psych evaluation, history and physical, AVS, and discharge summary faxed to [facility].     Luz Amaya RN  11/4/2019  1:22 PM    Patient's Stated Goal for Shift:  \"no stated goal\"    Goal Status:  Not Met       Problem: Thought Process Alteration  Goal: Optimal Thought Clarity  Description  Pt will be free of any delusional statements by discharge.      Outcome: Improving     Problem: Violence Risk or Actual  Goal: Anger and Impulse Control  Description  Pt will keep impulses under control.  Pt will refrain from violent behaviors towards peers and staff.    Outcome: Improving     "

## 2019-11-04 NOTE — PLAN OF CARE
Face to face shift report received from Betsy ALVAREZ RN. Patient observed.      Problem: Adult Behavioral Health Plan of Care  Goal: Patient-Specific Goal (Individualization)  Description  Pt will follow recommendations of treatment team.  Pt will be compliant with medications.  Pt will sleep 6- 8 hours each night.     11/4/2019 0509 by Yaneth Ron RN  Outcome: No Change  Note:   Patient has been in bed with eyes closed and regular respirations throughout the night. 15 minute checks completed all night. No complaints offered. Will continue to monitor.    The face to face report will be communicated to on coming RN.

## 2019-11-04 NOTE — DISCHARGE INSTRUCTIONS
Behavioral Discharge Planning and Instructions    Summary: Genie was admitted to  with increased symptoms of psychosis and wendy     Main Diagnosis: Schizoaffective disorder, bipolar type , PTSD, by history, Methamphetamine use disorder, in remission    Major Treatments, Procedures and Findings: Stabilize with medications, connect with community programs.    Symptoms to Report: feeling more aggressive, increased confusion, losing more sleep, mood getting worse or thoughts of suicide    Lifestyle Adjustment: Take all medications as prescribed, meet with doctor/ medication provider, out patient therapist, , and [facility] worker as scheduled. Abstain from alcohol or any unprescribed drugs.    Psychiatry Follow-up:     [facility]   Med management - Chris Kolb -  Nov 14 @ 11:40am  [address] [address]  Phone: [phone] Fax: [phone]    [facility]   Marcella -  As arranged   Jaya - As arranged   [address] [place] [address]   Marcella: [phone]  Charisse: [phone]     [facility]  PCP - Guillermina Rincon - Nov 27th @[facility]   [phone] Fax: [phone]           [address] [address]   Phone: [phone] Fax: [phone]    [facility]/Guardianship   Contact any of the following regarding pt   Raven- nurse [phone]  Crystal-Adult Protection Worker- [phone]  Sally Perry- supervisor  [phone]  Mary Rosen [phone]      Resources:   Crisis Intervention: 396.607.5364 or 362-683-2738 (TTY: 609.955.6613).  Call anytime for help.  [place] on Mental Illness (www.[place].paramjit.org): 356.642.5728 or 184-538-5450.  Alcoholics Anonymous (www.alcoholics-anonymous.org): Check your phone book for your local chapter.  Suicide Awareness Voices of Education (SAVE) (www.save.org): 899-526-KHJG (3655)  National Suicide Prevention Line (www.mentalhealthmn.org):  "923-419-VLTT (3948)  Mental Health Consumer/Survivor Network of [place] (www.mhcsn.net): 930.980.5917 or 079-701-2751  Mental Health Association of [place] (www.mentalhealth.org): 155.228.6323 or 622-857-0730    General Medication Instructions:   See your medication sheet(s) for instructions.   Take all medicines as directed.  Make no changes unless your doctor suggests them.   Go to all your doctor visits.  Be sure to have all your required lab tests. This way, your medicines can be refilled on time.  Do not use any drugs not prescribed by your doctor.  Avoid alcohol.  [place] Area:  [facility] [place], Crisis stabilization [facility]- [phone]  [facility] Crisis Line: 1-834.510.4704  [facility]ates For Family Peace: 763-6439  Sexual Assault Program [facility]: [phone] or 1-624.374.8221  [facility]attered [facility]'s [facility]: 1-860.489.6379 Ext: 279       Calls answered Mon-Fri-8:00 am--4:30 pm    [place]:  [facility] for Family Peace: 4-315-252-3447  [facility] - 2-035-417-1871  [facility] first call for help: [phone]  Located within [facility] Crisis Center:  [phone]      [place] Area:  Warm Line: 1-772.703.5862       Calls answered Tuesday--Saturday 4:00 pm--10:00 pm  [facility] Crisis Line - [phone]  [facility] [employer] Crisis Stabilization 224-570-5934    [place] Statewide:  [place] Crisis and Referral Services: 7-899-503-9906  National Suicide Prevention Lifeline: 4-894-384-TALK (3879)   - dxj9rgfu- Text \"Life\" to 08809  First Call for Help: 2-1-1  [facility] Helpline- 2-896-ZMVM-HELP      "

## 2019-11-06 ENCOUNTER — HOSPITAL ENCOUNTER (OUTPATIENT)
Dept: EDUCATION SERVICES | Facility: HOSPITAL | Age: 39
Discharge: HOME | End: 2019-11-06
Attending: NURSE PRACTITIONER | Admitting: NURSE PRACTITIONER
Payer: MEDICAID

## 2019-11-06 VITALS
HEART RATE: 120 BPM | OXYGEN SATURATION: 97 % | RESPIRATION RATE: 16 BRPM | BODY MASS INDEX: 31.75 KG/M2 | SYSTOLIC BLOOD PRESSURE: 104 MMHG | DIASTOLIC BLOOD PRESSURE: 72 MMHG | WEIGHT: 190.6 LBS | HEIGHT: 65 IN

## 2019-11-06 DIAGNOSIS — Z79.4 TYPE 2 DIABETES MELLITUS WITHOUT COMPLICATION, WITH LONG-TERM CURRENT USE OF INSULIN (H): Primary | ICD-10-CM

## 2019-11-06 DIAGNOSIS — E11.9 TYPE 2 DIABETES MELLITUS WITHOUT COMPLICATION, WITH LONG-TERM CURRENT USE OF INSULIN (H): Primary | ICD-10-CM

## 2019-11-06 PROCEDURE — G0108 DIAB MANAGE TRN  PER INDIV: HCPCS | Performed by: DIETITIAN, REGISTERED

## 2019-11-06 ASSESSMENT — PAIN SCALES - GENERAL: PAINLEVEL: WORST PAIN (10)

## 2019-11-06 ASSESSMENT — MIFFLIN-ST. JEOR: SCORE: 1535.44

## 2019-11-06 NOTE — PROGRESS NOTES
"Diabetes Self-Management Education & Support    Diabetes Education Self Management & Training    SUBJECTIVE/OBJECTIVE:  Presents for: Individual review  Accompanied by: (Kayla - works at home where she lives)  Diabetes education in the past 24mo: No  Focus of Visit: Patient Unsure  Diabetes type: Type 2  Date of diagnosis: Unsure  Disease course: Improving  How confident are you filling out medical forms by yourself:: Somewhat  Diabetes management related comments/concerns: Concerned she may die from diabetes  Transportation concerns: Yes(Needs rides to appointments)  Other concerns:: Pt has schizophrenia - recently hospitalized.   Cultural Influences/Ethnic Background:  American    Diabetes Symptoms & Complications  Blurred vision: No  Fatigue: Yes  Neuropathy: No  Foot ulcerations: No  Polydipsia: No  Polyphagia: No  Polyuria: No  Visual change: No  Weakness: No  Weight loss: No  Slow healing wounds: No  Recent Infection(s): No  Symptom course: Stable  Weight trend: Increasing steadily - up approximately 15# since May 2019  Autonomic neuropathy: No  CVA: No  Heart disease: No  Nephropathy: No  Peripheral neuropathy: No  Peripheral Vascular Disease: No  Retinopathy: No    Patient Problem List and Family Medical History reviewed for relevant medical history, current medical status, and diabetes risk factors.    Vitals:  /72   Pulse 120   Resp 16   Ht 1.651 m (5' 5\")   Wt 86.5 kg (190 lb 9.6 oz)   SpO2 97%   BMI 31.72 kg/m    Estimated body mass index is 31.72 kg/m  as calculated from the following:    Height as of this encounter: 1.651 m (5' 5\").    Weight as of this encounter: 86.5 kg (190 lb 9.6 oz).   Last 3 BP:   BP Readings from Last 3 Encounters:   11/06/19 104/72   11/03/19 113/63   08/28/19 112/78       History   Smoking Status     Former Smoker     Packs/day: 0.50     Years: 15.00     Types: Cigarettes   Smokeless Tobacco     Never Used       Labs:  Lab Results   Component Value Date    A1C 6.5 " 08/28/2019     Lab Results   Component Value Date     10/31/2019     Lab Results   Component Value Date    LDL 98 10/25/2019     HDL Cholesterol   Date Value Ref Range Status   10/25/2019 30 (L) >49 mg/dL Final   ]  GFR Estimate   Date Value Ref Range Status   10/31/2019 >90 >60 mL/min/[1.73_m2] Final     Comment:     Non  GFR Calc  Starting 12/18/2018, serum creatinine based estimated GFR (eGFR) will be   calculated using the Chronic Kidney Disease Epidemiology Collaboration   (CKD-EPI) equation.       GFR Estimate If Black   Date Value Ref Range Status   10/31/2019 >90 >60 mL/min/[1.73_m2] Final     Comment:      GFR Calc  Starting 12/18/2018, serum creatinine based estimated GFR (eGFR) will be   calculated using the Chronic Kidney Disease Epidemiology Collaboration   (CKD-EPI) equation.       Lab Results   Component Value Date    CR 0.60 10/31/2019     No results found for: MICROALBUMIN    Healthy Eating  Healthy Eating Assessed Today: Yes  Cultural/Orthodoxy diet restrictions?: No  Patient on a regular basis: Eats 3 meals a day(Meals are provided at [facility])  Breakfast: saltine crackers, coffee - pt only eats breakfast if oatmeal is offered   Lunch: Eats lunch at [facility]  Dinner: Eats supper at [facility]   Snacks: Unsure  Beverages: Coffee, Tea, Soda, Juice, Water  Has patient met with a dietitian in the past?: (Unknown)    Being Active  Being Active Assessed Today: No  Exercise:: Currently not exercising  Barrier to exercise: Physical limitation    Monitoring  Monitoring Assessed Today: Yes  Did patient bring glucose meter to appointment? : No  Blood Glucose Meter: Accu-check  Home Glucose (Sugar) Monitoring: 3-4 times per day  Staff reports last two days fasting 94, 92 and before lunch 89, 96.    Taking Medications  Diabetes Medication(s)     Biguanides       metFORMIN (GLUCOPHAGE) 1000 MG tablet    Take 1,000 mg by mouth 2 times daily (with meals)    Insulin        insulin aspart (NOVOLOG FLEXPEN) 100 UNIT/ML pen    Inject 8 Units Subcutaneous 3 times daily (with meals) Plus sliding scale    BG <140 No coverage  141-189 1 unit  190-239 2 units  240-289 3 units  290-339 4 units  340-399 5 units  400-449 6 units  BG > 450 7 units     insulin glargine (LANTUS PEN) 100 UNIT/ML pen    Inject 40 Units Subcutaneous At Bedtime        Taking Medication Assessed Today: Yes  Current Treatments: Diet, Insulin Injections, Oral Agent (monotherapy)(Lantus 40 units daily, Novolog 8 units with meals + correction : <140 0 units, 140-189 1 unit, 190-239 2 units, 240-289 3 units, 290-339 4 units, 340-399 5 units, 400-449 6 units, >450 7 units, Metformin 1000 mg bid. )  Dose schedule: pre-breakfast, pre-lunch, pre-dinner, at bedtime  Problems taking diabetes medications regularly?: No([facility] makes sure pt takes medications.)    Problem Solving  Problem Solving Assessed Today: Yes  Hypoglycemia Frequency: (Pt denies hypoglycemia)     Reducing Risks  Reducing Risks Assessed Today: No    Healthy Coping  Healthy Coping Assessed Today: No  Difficulty affording diabetes management supplies?: No  Patient Activation Measure Survey Score:  MAXI Score (Last Two) 7/23/2019   MAXI Raw Score 29   Activation Score 52.9   MAXI Level 2     ASSESSMENT:  Last A1c in August was in target at 6.5%.  Pt is very difficult to understand.  Most information during session provided via [employer] staff.  Weight is up 15# since May 2019.   Pt seems to be doing well in controlled setting where diabetes medications and meals can be provided.     Patient's most recent   Lab Results   Component Value Date    A1C 6.5 08/28/2019    is meeting goal of <7.0    INTERVENTION:   Diabetes knowledge and skills assessment:     Patient is knowledgeable in diabetes management concepts related to: Pt needs review in all areas.      Patient needs further education on the following diabetes management concepts: Healthy Eating, Being Active,  Monitoring, Taking Medication, Problem Solving, Reducing Risks and Healthy Coping    Based on learning assessment above, most appropriate setting for further diabetes education would be: Individual setting.    Education provided today on:  AADE Self-Care Behaviors:  Monitoring: frequency of monitoring  Taking Medication: Change in Novolog    Opportunities for ongoing education and support in diabetes-self management were discussed.    Pt verbalized understanding of concepts discussed and recommendations provided today.     Education Materials Provided:  No materials provided today.     PLAN:  Continue diabetic considerations diet at [facility].  Test glucose 4x/day - before all meals and before bed.  Okay for additional tests if patient desires.   Continue current doses of Lantus and Metformin.   Continue Novolog 8 units prior to meals + correction but do not give 8 units if meal is not consumed - just give correction.    See Patient Instructions for co-developed, patient-stated behavior change goals.  AVS printed and provided to patient today.   [facility] will fax glucose levels in one week for review.     Time Spent: 45 minutes  Encounter Type: Individual    Any diabetes medication dose changes were made via the CDE Protocol and Collaborative Practice Agreement with the patient's referring provider. A copy of this encounter was shared with the provider.

## 2019-11-06 NOTE — PATIENT INSTRUCTIONS
-Test glucose 4x/day -before breakfast, before lunch, before supper and before bed.  Additional tests if patient requests are acceptable.  -Continue current dose of Lantus.   -Continue current dose of Metformin.  -Change Novolog to 8 units 3x/day prior to meals if meal is consumed + current correction scale.  Do not give 8 units if meal is not eaten - just give correction scale.  Times for Novolog should be 8:00 am prior to breakfast, 11:45 prior to lunch and 4:45 prior to supper.   -Fax glucose levels for review in one week - MADISON Paredes, CDE   Ph:  [phone]  Fax:  [phone]

## 2019-11-06 NOTE — LETTER
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   Cultural Influences/Ethnic Background:  American    Diabetes Symptoms & Complications  Blurred vision: No  Fatigue: Yes  Neuropathy: No  Foot ulcerations: No  Polydipsia: No  Polyphagia: No  Polyuria: No  Visual change: No  Weakness: No  Weight loss: No  Slow healing wounds: No  Recent Infection(s): No  Symptom course: Stable  Weight trend: Increasing steadily - up approximately 15# since May 2019  Autonomic neuropathy: No  CVA: No  Heart disease: No  Nephropathy: No  Peripheral neuropathy: No  Peripheral Vascular Disease: No  Retinopathy: No    Patient Problem List and Family Medical History reviewed for relevant medical history, current medical status, and diabetes risk factors.    Vitals:  /72   Pulse 120   Resp 16   Ht 1.651 m (5' 5\")   Wt 86.5 kg (190 lb 9.6 oz)   SpO2 97%   BMI 31.72 kg/m     Estimated body mass index is 31.72 kg/m  as calculated from the following:    Height as of this encounter: 1.651 m (5' 5\").    Weight as of this encounter: 86.5 kg (190 lb 9.6 oz).   Last 3 BP:   BP Readings from Last 3 Encounters:   11/06/19 104/72   11/03/19 113/63   08/28/19 112/78       History   Smoking Status     Former Smoker     Packs/day: 0.50     Years: 15.00 "     Types: Cigarettes   Smokeless Tobacco     Never Used       Labs:  Lab Results   Component Value Date    A1C 6.5 08/28/2019     Lab Results   Component Value Date     10/31/2019     Lab Results   Component Value Date    LDL 98 10/25/2019     HDL Cholesterol   Date Value Ref Range Status   10/25/2019 30 (L) >49 mg/dL Final   ]  GFR Estimate   Date Value Ref Range Status   10/31/2019 >90 >60 mL/min/[1.73_m2] Final     Comment:     Non  GFR Calc  Starting 12/18/2018, serum creatinine based estimated GFR (eGFR) will be   calculated using the Chronic Kidney Disease Epidemiology Collaboration   (CKD-EPI) equation.       GFR Estimate If Black   Date Value Ref Range Status   10/31/2019 >90 >60 mL/min/[1.73_m2] Final     Comment:      GFR Calc  Starting 12/18/2018, serum creatinine based estimated GFR (eGFR) will be   calculated using the Chronic Kidney Disease Epidemiology Collaboration   (CKD-EPI) equation.       Lab Results   Component Value Date    CR 0.60 10/31/2019     No results found for: MICROALBUMIN    Healthy Eating  Healthy Eating Assessed Today: Yes  Cultural/Anglican diet restrictions?: No  Patient on a regular basis: Eats 3 meals a day(Meals are provided at [facility])  Breakfast: saltine crackers, coffee - pt only eats breakfast if oatmeal is offered   Lunch: Eats lunch at [facility]  Dinner: Eats supper at [facility]   Snacks: Unsure  Beverages: Coffee, Tea, Soda, Juice, Water  Has patient met with a dietitian in the past?: (Unknown)    Being Active  Being Active Assessed Today: No  Exercise:: Currently not exercising  Barrier to exercise: Physical limitation    Monitoring  Monitoring Assessed Today: Yes  Did patient bring glucose meter to appointment? : No  Blood Glucose Meter: Accu-check  Home Glucose (Sugar) Monitoring: 3-4 times per day  Staff reports last two days fasting 94, 92 and before lunch 89, 96.    Taking Medications  Diabetes Medication(s)     Biguanides        metFORMIN (GLUCOPHAGE) 1000 MG tablet    Take 1,000 mg by mouth 2 times daily (with meals)    Insulin       insulin aspart (NOVOLOG FLEXPEN) 100 UNIT/ML pen    Inject 8 Units Subcutaneous 3 times daily (with meals) Plus sliding scale    BG <140 No coverage  141-189 1 unit  190-239 2 units  240-289 3 units  290-339 4 units  340-399 5 units  400-449 6 units  BG > 450 7 units     insulin glargine (LANTUS PEN) 100 UNIT/ML pen    Inject 40 Units Subcutaneous At Bedtime        Taking Medication Assessed Today: Yes  Current Treatments: Diet, Insulin Injections, Oral Agent (monotherapy)(Lantus 40 units daily, Novolog 8 units with meals + correction : <140 0 units, 140-189 1 unit, 190-239 2 units, 240-289 3 units, 290-339 4 units, 340-399 5 units, 400-449 6 units, >450 7 units, Metformin 1000 mg bid. )  Dose schedule: pre-breakfast, pre-lunch, pre-dinner, at bedtime  Problems taking diabetes medications regularly?: No([facility] makes sure pt takes medications.)    Problem Solving  Problem Solving Assessed Today: Yes  Hypoglycemia Frequency: (Pt denies hypoglycemia)     Reducing Risks  Reducing Risks Assessed Today: No    Healthy Coping  Healthy Coping Assessed Today: No  Difficulty affording diabetes management supplies?: No  Patient Activation Measure Survey Score:  MAXI Score (Last Two) 7/23/2019   MAXI Raw Score 29   Activation Score 52.9   MAXI Level 2     ASSESSMENT:  Last A1c in August was in target at 6.5%.  Pt is very difficult to understand.  Most information during session provided via [facility] staff.  Weight is up 15# since May 2019.   Pt seems to be doing well in controlled setting where diabetes medications and meals can be provided.     Patient's most recent   Lab Results   Component Value Date    A1C 6.5 08/28/2019    is meeting goal of <7.0    INTERVENTION:   Diabetes knowledge and skills assessment:     Patient is knowledgeable in diabetes management concepts related to: Pt needs review in all areas.       Patient needs further education on the following diabetes management concepts: Healthy Eating, Being Active, Monitoring, Taking Medication, Problem Solving, Reducing Risks and Healthy Coping    Based on learning assessment above, most appropriate setting for further diabetes education would be: Individual setting.    Education provided today on:  AADE Self-Care Behaviors:  Monitoring: frequency of monitoring  Taking Medication: Change in Novolog    Opportunities for ongoing education and support in diabetes-self management were discussed.    Pt verbalized understanding of concepts discussed and recommendations provided today.     Education Materials Provided:  No materials provided today.     PLAN:  Continue diabetic considerations diet at [facility].  Test glucose 4x/day - before all meals and before bed.  Okay for additional tests if patient desires.   Continue current doses of Lantus and Metformin.   Continue Novolog 8 units prior to meals + correction but do not give 8 units if meal is not consumed - just give correction.    See Patient Instructions for co-developed, patient-stated behavior change goals.  AVS printed and provided to patient today.   [facility] will fax glucose levels in one week for review.     Time Spent: 45 minutes  Encounter Type: Individual    Any diabetes medication dose changes were made via the CDE Protocol and Collaborative Practice Agreement with the patient's referring provider. A copy of this encounter was shared with the provider.        Sincerely,        Olivia Palomino RD

## 2019-11-19 ENCOUNTER — OFFICE VISIT (OUTPATIENT)
Dept: BEHAVIORAL HEALTH | Facility: OTHER | Age: 39
End: 2019-11-19
Attending: SOCIAL WORKER
Payer: MEDICAID

## 2019-11-19 DIAGNOSIS — R69 DIAGNOSIS DEFERRED: Primary | ICD-10-CM
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
## 2019-11-21 DIAGNOSIS — F25.0 SCHIZOAFFECTIVE DISORDER, BIPOLAR TYPE (H): ICD-10-CM

## 2019-11-21 NOTE — TELEPHONE ENCOUNTER
Arlyn from [facility] called requesting meds be filled and sent to [facility] for the patient.    Med is historical    GABAPENTIN PO      Last Written Prescription Date:    Last Fill Quantity: ,   # refills:   Last Office Visit: 8/28/19  Future Office visit:    Next 5 appointments (look out 90 days)    Nov 27, 2019  2:00 PM CST  (Arrive by 1:45 PM)  Office Visit with LATRICE Branch CNP  [facility] [place] ([facility] [place] ) [address]  [place] [address]  [phone]           Routing refill request to provider for review/approval because:  Medication is reported/historical      clonazePAM (KLONOPIN) 1 MG tablet      Last Written Prescription Date:  11/4/19  Last Fill Quantity: 30,   # refills: 0  Last Office Visit: 8/28/19  Future Office visit:    Next 5 appointments (look out 90 days)    Nov 27, 2019  2:00 PM CST  (Arrive by 1:45 PM)  Office Visit with LATRICE Branch CNP  [facility] [place] ([facility] [place] ) [address]  [place] [address]  [phone]           Routing refill request to provider for review/approval because:  Medication is reported/historical      divalproex sodium extended-release (DEPAKOTE ER) 250 MG 24 hr tablet      Last Written Prescription Date:  11/4/19  Last Fill Quantity: 90,   # refills: 0  Last Office Visit: 8/28/19  Future Office visit:    Next 5 appointments (look out 90 days)    Nov 27, 2019  2:00 PM CST  (Arrive by 1:45 PM)  Office Visit with LATRICE Branch CNP  [facility] [place] ([facility] [place] ) [address]  [place] [address]  [phone]           Routing refill request to provider for review/approval because:  Medication is reported/historical    benztropine (COGENTIN) 1 MG tablet      Last Written Prescription Date:  11/4/19  Last Fill Quantity: 60,   # refills: 0  Last Office Visit: 8/28/19  Future Office visit:    Next 5  appointments (look out 90 days)    Nov 27, 2019  2:00 PM CST  (Arrive by 1:45 PM)  Office Visit with LATRICE Branch CNP  [facility] [place] ([facility] [place] ) [address]  [place] [address]  [phone]           Routing refill request to provider for review/approval because:  Drug not on the G, P or  Health refill protocol or controlled substance    Med is historical  prazosin (MINIPRESS) 1 MG capsule      Last Written Prescription Date:    Last Fill Quantity: ,   # refills:   Last Office Visit: 8/28/19  Future Office visit:    Next 5 appointments (look out 90 days)    Nov 27, 2019  2:00 PM CST  (Arrive by 1:45 PM)  Office Visit with LATRICE Branch CNP  [facility] [place] ([facility] [place] ) [address]  [place] [address]  [phone]           Routing refill request to provider for review/approval because:  Medication is reported/historical    Med is histoeical  metFORMIN (GLUCOPHAGE) 1000 MG tablet      Last Written Prescription Date:    Last Fill Quantity: ,   # refills:   Last Office Visit: 8/28/19  Future Office visit:    Next 5 appointments (look out 90 days)    Nov 27, 2019  2:00 PM CST  (Arrive by 1:45 PM)  Office Visit with LATRICE Branch CNP  [facility] [place] ([facility] [place] ) [address]  [place] [address]  [phone]           Routing refill request to provider for review/approval because:  Medication is reported/historical    Med is historical  CHLORPROMAZINE HCL PO      Last Written Prescription Date:    Last Fill Quantity: ,   # refills:   Last Office Visit: 8/28/19  Future Office visit:    Next 5 appointments (look out 90 days)    Nov 27, 2019  2:00 PM CST  (Arrive by 1:45 PM)  Office Visit with LATRICE Branch CNP  [facility] [place] ([facility] [place] ) [address] [place]  [address]  [phone]           Routing refill request to provider for review/approval because:  Medication is reported/historical      busPIRone (BUSPAR) 15 MG tablet      Last Written Prescription Date:  11/4/19  Last Fill Quantity: 90,   # refills: 0  Last Office Visit: 8/28/19  Future Office visit:    Next 5 appointments (look out 90 days)    Nov 27, 2019  2:00 PM CST  (Arrive by 1:45 PM)  Office Visit with LATRICE Branch CNP  [facility] [place] ([facility] [place] ) [address]  [place] [address]  [phone]           Routing refill request to provider for review/approval because:  Medication is reported/historical

## 2019-11-22 RX ORDER — CLONAZEPAM 1 MG/1
1 TABLET ORAL AT BEDTIME
Qty: 30 TABLET | Refills: 0 | Status: SHIPPED | OUTPATIENT
Start: 2019-11-22 | End: 2019-12-16

## 2019-11-22 RX ORDER — BENZTROPINE MESYLATE 1 MG/1
1 TABLET ORAL 2 TIMES DAILY
Qty: 60 TABLET | Refills: 0 | Status: SHIPPED | OUTPATIENT
Start: 2019-11-22 | End: 2019-12-16

## 2019-11-22 RX ORDER — PRAZOSIN HYDROCHLORIDE 1 MG/1
1 CAPSULE ORAL AT BEDTIME
Qty: 30 CAPSULE | Refills: 1 | Status: ON HOLD | OUTPATIENT
Start: 2019-11-22 | End: 2020-01-09

## 2019-11-22 RX ORDER — DIVALPROEX SODIUM 250 MG/1
750 TABLET, EXTENDED RELEASE ORAL AT BEDTIME
Qty: 90 TABLET | Refills: 0 | Status: SHIPPED | OUTPATIENT
Start: 2019-11-22 | End: 2019-12-16

## 2019-11-22 RX ORDER — BUSPIRONE HYDROCHLORIDE 15 MG/1
15 TABLET ORAL 3 TIMES DAILY
Qty: 90 TABLET | Refills: 0 | Status: SHIPPED | OUTPATIENT
Start: 2019-11-22 | End: 2019-12-16

## 2019-11-22 RX ORDER — GABAPENTIN 600 MG/1
1200 TABLET ORAL 3 TIMES DAILY
Qty: 180 TABLET | Refills: 1 | Status: ON HOLD | OUTPATIENT
Start: 2019-11-22 | End: 2020-01-09

## 2019-11-22 RX ORDER — CHLORPROMAZINE HYDROCHLORIDE 100 MG/1
100 TABLET, FILM COATED ORAL 3 TIMES DAILY
Qty: 90 TABLET | Refills: 1 | Status: ON HOLD | OUTPATIENT
Start: 2019-11-22 | End: 2020-01-09

## 2019-11-22 NOTE — TELEPHONE ENCOUNTER
Topiramate 50 mg     (sig per pharmacy fax)  Last Written Prescription Date:    Last Fill Quantity: ,   # refills:   Last Office Visit: 08/28/2019    Senexon-S (sig and dose per pharmacy fax)      Last Written Prescription Date:    Last Fill Quantity: ,   # refills:   Last Office Visit: 08/28/2019  Future Office visit:    Next 5 appointments (look out 90 days)    Nov 27, 2019  2:00 PM CST  (Arrive by 1:45 PM)  Office Visit with LATRICE Branch CNP  [facility] [place] ([facility] [place] ) [address]  [place] [address]  [phone]           Routing refill request to provider for review/approval because:  Drug not active on patient's medication list

## 2019-11-26 DIAGNOSIS — F25.0 SCHIZOAFFECTIVE DISORDER, BIPOLAR TYPE (H): ICD-10-CM

## 2019-11-26 DIAGNOSIS — F25.9 SCHIZOAFFECTIVE DISORDER, CHRONIC CONDITION (H): ICD-10-CM

## 2019-11-26 RX ORDER — AMOXICILLIN 250 MG
2 CAPSULE ORAL 2 TIMES DAILY
Qty: 120 TABLET | Refills: 0 | OUTPATIENT
Start: 2019-11-26

## 2019-11-26 RX ORDER — TOPIRAMATE 50 MG/1
75 TABLET, FILM COATED ORAL 2 TIMES DAILY
Qty: 90 TABLET | Refills: 0 | OUTPATIENT
Start: 2019-11-26

## 2019-11-26 NOTE — TELEPHONE ENCOUNTER
Attempted to call [facility] x2 regarding pt's current medications. No answer and no voicemail. Spoke with [facility]. Senna was last ordered by a Dr Warren. Topamax was last ordered by RN under PCP on 10/17/19. Med was discontinued by hospital RN for reason- med rec clean up. Please advise. Thank you!

## 2019-11-26 NOTE — TELEPHONE ENCOUNTER
I do not believe patient is on these medications.  Please find out who last ordered.     Guillermina POLLARD FNP-BC  Family Nurse Practitioner

## 2019-11-26 NOTE — TELEPHONE ENCOUNTER
Senna was stopped when she was in the hospital and she was not on Topamax at that time. She does not need at this time.     Guillermina POLLARD FNP-BC  Family Nurse Practitioner

## 2019-11-27 ENCOUNTER — TELEPHONE (OUTPATIENT)
Dept: BEHAVIORAL HEALTH | Facility: OTHER | Age: 39
End: 2019-11-27

## 2019-11-27 ENCOUNTER — OFFICE VISIT (OUTPATIENT)
Dept: FAMILY MEDICINE | Facility: OTHER | Age: 39
End: 2019-11-27
Attending: NURSE PRACTITIONER
Payer: MEDICAID

## 2019-11-27 VITALS
OXYGEN SATURATION: 99 % | TEMPERATURE: 98.1 F | HEART RATE: 105 BPM | BODY MASS INDEX: 31.62 KG/M2 | DIASTOLIC BLOOD PRESSURE: 72 MMHG | WEIGHT: 190 LBS | SYSTOLIC BLOOD PRESSURE: 102 MMHG

## 2019-11-27 DIAGNOSIS — Z00.00 ROUTINE GENERAL MEDICAL EXAMINATION AT A HEALTH CARE FACILITY: Primary | ICD-10-CM

## 2019-11-27 DIAGNOSIS — R68.2 DRY MOUTH: ICD-10-CM

## 2019-11-27 DIAGNOSIS — E11.22 TYPE 2 DIABETES MELLITUS WITH DIABETIC CHRONIC KIDNEY DISEASE, UNSPECIFIED CKD STAGE, UNSPECIFIED WHETHER LONG TERM INSULIN USE (H): Chronic | ICD-10-CM

## 2019-11-27 DIAGNOSIS — F25.0 SCHIZOAFFECTIVE DISORDER, BIPOLAR TYPE (H): ICD-10-CM

## 2019-11-27 PROCEDURE — G0123 SCREEN CERV/VAG THIN LAYER: HCPCS | Mod: ZL | Performed by: NURSE PRACTITIONER

## 2019-11-27 PROCEDURE — G0476 HPV COMBO ASSAY CA SCREEN: HCPCS | Mod: ZL | Performed by: NURSE PRACTITIONER

## 2019-11-27 PROCEDURE — 99396 PREV VISIT EST AGE 40-64: CPT | Performed by: NURSE PRACTITIONER

## 2019-11-27 RX ORDER — SALIVA STIMULANT COMB. NO.3
1-2 SPRAY, NON-AEROSOL (ML) MUCOUS MEMBRANE PRN
Qty: 44.3 ML | Refills: 3 | Status: ON HOLD | OUTPATIENT
Start: 2019-11-27 | End: 2020-02-14

## 2019-11-27 ASSESSMENT — PAIN SCALES - GENERAL: PAINLEVEL: WORST PAIN (10)

## 2019-11-27 NOTE — LETTER
December 11, 2019      Ginette Yu  [address]  [place] [address]        Dear ,    We are writing to inform you of your test results.    Patient  should have a repeat PAP in 1 year and repeat mammogram in 6 months    Guillermina Jelaniitzel POLLARD [facility]-BC  Family Nurse Practitioner    Resulted Orders   A pap thin layer screen with  HPV - recommended age 30 - 65 years (select HPV order below)   Result Value Ref Range    PAP LSIL (A)     Copath Report         Patient Name: GINETTE YU  MR#: [id number]  Specimen #: [id number]  Collected: 11/27/2019  Received: 12/3/2019  Reported: 12/5/2019 14:47  Ordering Phy(s): GUILLERMINA CONTRERAS    For improved result formatting, select 'View Enhanced Report Format' under   Linked Documents section.    SPECIMEN/STAIN PROCESS:  Pap thin layer prep screening (Surepath)       Pap-Cyto x 1, HPV ordered x 1    SOURCE: Cervical  ----------------------------------------------------------------   Pap thin layer prep screening (Surepath)  SPECIMEN ADEQUACY:  Satisfactory for evaluation.  -Transformation zone component absent.    CYTOLOGIC INTERPRETATION:    Epithelial cell abnormality:  squamous cell:  low-grade squamous   intraepithelial lesion (LSIL)    Electronically signed out by:    Akira Ansari M.D.    CLINICAL HISTORY:    Papanicolaou Test Limitations:  Cervical cytology is a screening test with   limited sensitivity; regular  screening is critical for cancer prevention; Pap tests are prim arily   effective for the diagnosis/prevention of  squamous cell carcinoma, not adenocarcinomas or other cancers.    COLLECTION SITE:  Client:  [facility]  Location: [facility] (B)    The technical component of this testing was completed at the [facility], with the  professional component performed at the [facility], [address] [place] [address], [place] [address]  [phone])       HPV High Risk Types DNA Cervical    Result Value Ref Range    HPV Source SurePath     HPV 16 DNA Negative NEG^Negative    HPV 18 DNA Negative NEG^Negative    Other HR HPV Negative NEG^Negative    Final Diagnosis This patient's sample is negative for HPV DNA.       Comment:      This test was developed and its performance characteristics determined by the   [facility], [facility]. It   has not been cleared or approved by the FDA. The laboratory is regulated under   CLIA as qualified to perform high-complexity testing. This test is used for   clinical purposes. It should not be regarded as investigational or for   research.  (Note)  METHODOLOGY:  The Roche sinan 4800 system uses automated extraction,   simultaneous amplification of HPV (L1 region) and beta-globin,    followed by  real time detection of fluorescent labeled HPV and beta   globin using specific oligonucleotide probes . The test specifically   identifies types HPV 16 DNA and HPV 18 DNA while concurrently   detecting the rest of the high risk types (31, 33, 35, 39, 45, 51,   52, 56, 58, 59, 66 or 68).  COMMENTS:  This test is not intended for use as a screening device   for women under age 30 with normal cervical cytology.  Results should   be correl  ated with cytologic and histologic findings. Close clinical   followup is recommended.      Specimen Description Cervical Cells       Comment:      [id number]       If you have any questions or concerns, please call the clinic at the number listed above.       Sincerely,        LATRICE Johnson CNP

## 2019-11-27 NOTE — TELEPHONE ENCOUNTER
[facility]  @[facility]([id number])@      Community Health Worker Note      Patient: Ginette Wood  Date: November 27, 2019  Preferred Name: Ginette    Previous PHQ-9:   PHQ-9 SCORE 7/23/2019 8/21/2019   PHQ-9 Total Score 0 0     Previous KOKI-7:   KOKI-7 SCORE 7/23/2019 8/21/2019   Total Score 21 21     MAXI LEVEL:  MAXI Score (Last Two) 7/23/2019   MAXI Raw Score 29   Activation Score 52.9   MAXI Level 2       Preferred Contact: @[facility]([id number])@  Type of Contact Today: Phone call (patient / identified key support person reached)      Data: (subjective / Objective):  Patient Goals Areas: @FLOW([id number])@  Patient Stated Goals: @FLOW([id number])@  Recent ED/IP Admission or Discharge?   None  Recent ED/IP Admission or Discharge?   None    Patient Goals:  Goal Areas: Health;Mental Health;Financial and Social Service Benefits;Transportation  Patient stated goals: get phone, eye appointment, see a therapist, food support, primary care,         [facility] Core Service Provided:  Care Coordination: provided care management services/referrals necessary to ensure patient and their identified supports have access to medical, behavioral health, pharmacology and recovery support services.  Ensured that patient's care is integrated across all settings and services.     Current Stressors / Issues / Care Plan Objective Addressed Today:  N/A    Intervention:  Motivational Interviewing: Expressed Empathy/Understanding and Open-ended questions   Target Behavior(s): N/A    Assessment: (Progress on Goals / Homework):    CHW connected with [facility] to remind of patient's appointment today and verified that patient would be able to make it to appointment. [facility] staff stated that they will be able to bring her to it.    Plan: (Homework, other):  Patient was encouraged to continue to seek condition-related information and education.      Scheduled a Phone follow up appointment with CHW in 1 week     Patient has set  self-identified goals and will monitor progress until the next appointment.     DIPTI Almanzar, Community Health Worker           Referrals/other:       Next 5 appointments (look out 90 days)    Nov 27, 2019  2:00 PM CST  (Arrive by 1:45 PM)  PHYSICAL with LATRICE Branch CNP  [facility] [place] ([facility] [place] ) [address]  [place] [address]  [phone]

## 2019-11-27 NOTE — NURSING NOTE
"Chief Complaint   Patient presents with     Physical     Diabetes       Initial /72   Pulse 105   Temp 98.1  F (36.7  C) (Tympanic)   Wt 86.2 kg (190 lb)   SpO2 99%   BMI 31.62 kg/m   Estimated body mass index is 31.62 kg/m  as calculated from the following:    Height as of 11/6/19: 1.651 m (5' 5\").    Weight as of this encounter: 86.2 kg (190 lb).  Medication Reconciliation: complete  Marcella Segundo LPN  "

## 2019-11-29 NOTE — TELEPHONE ENCOUNTER
Not on med listl, please advise.    topiramate (TOPAMAX) 50 MG tablet (Discontinued)      Last Written Prescription Date:  10/17/19-10/22/19  Last Fill Quantity: 30,   # refills: 0  Last Office Visit: 11/27/19  Future Office visit:       Routing refill request to provider for review/approval because:  Drug not on the [facility], P or [facility] refill protocol or controlled substance

## 2019-12-02 ENCOUNTER — OFFICE VISIT (OUTPATIENT)
Dept: BEHAVIORAL HEALTH | Facility: OTHER | Age: 39
End: 2019-12-02
Attending: SOCIAL WORKER
Payer: MEDICAID

## 2019-12-02 DIAGNOSIS — R69 DIAGNOSIS DEFERRED: Primary | ICD-10-CM

## 2019-12-02 RX ORDER — TOPIRAMATE 50 MG/1
TABLET, FILM COATED ORAL
Qty: 84 TABLET | Refills: 0 | OUTPATIENT
Start: 2019-12-02

## 2019-12-02 NOTE — TELEPHONE ENCOUNTER
Looks like it was discontinued by psychiatry. Can we get a med list from [facility]?    Guillermina POLLARD FNP-BC  Family Nurse Practitioner

## 2019-12-02 NOTE — PROGRESS NOTES
[facility]         Community Health Worker Note      Patient: Ginette Wood  Date: December 2, 2019  Preferred Name: Ginette    Previous PHQ-9:   PHQ-9 SCORE 7/23/2019 8/21/2019   PHQ-9 Total Score 0 0     Previous KOKI-7:   KOKI-7 SCORE 7/23/2019 8/21/2019   Total Score 21 21     MAXI LEVEL:  MAXI Score (Last Two) 7/23/2019   MAXI Raw Score 29   Activation Score 52.9   MAXI Level 2       Preferred Contact: @[facility]([id number])@  Type of Contact Today: Face to Face in Home / Community      Data: (subjective / Objective):  Patient Goals Areas:    Patient Stated Goals:    Recent ED/IP Admission or Discharge?   None  Recent ED/IP Admission or Discharge?   None    Patient Goals:  Goal Areas: Health;Mental Health;Financial and Social Service Benefits;Transportation  Patient stated goals: get phone, eye appointment, see a therapist, food support, primary care,         [facility] Core Service Provided:  Care Coordination: provided care management services/referrals necessary to ensure patient and their identified supports have access to medical, behavioral health, pharmacology and recovery support services.  Ensured that patient's care is integrated across all settings and services.     Current Stressors / Issues / Care Plan Objective Addressed Today:    Patient recently smoked methamphetamine and is currently refusing to take any of their medications or have their accu checks completed.    Intervention:  Motivational Interviewing: Expressed Empathy/Understanding and Open-ended questions   Target Behavior(s): N/A    Assessment: (Progress on Goals / Homework):    CHW went to go see patient at their residence. When CHW arrived, CHW stopped to talk with one of the staff members to check in how patient has been doing. Staff member informed CHW that patient has been having a male visit her and two days ago patient snuck this male into their window of their room and gave him money to buy pizza as that is what they told  the staff. However, staff stated that later when staff went into patient's room, patient was standing towards the wall and talking to it. Staff found the methamphetamine pipe. CHW asked staff what their policy and procedure is and staff stated that they are a no drug/alcohol facility and informed the  and left the decision in their hands on what to do with patient. CHW asked staff if [facility] guardians about the situation however staff members have not notified them. Patient has been refusing to take their medications or allow their accu checks to be completed. Patient admitted to staff right away that they smoked methamphetamine however now patient is denying it. Patient is reported to be sleeping and not doing anything. CHW knocked on patient's door and opened the door to see if patient wanted to visit today however patient did not get out of bed and stated that they were sick and did not want to visit. CHW encouraged patient to reach out if they need anything.    Plan: (Homework, other):  Patient was encouraged to continue to seek condition-related information and education.      Scheduled a Community / Home follow up appointment with CHW in 2 weeks     Patient has set self-identified goals and will monitor progress until the next appointment.     ÁNGELA AlmanzarW, Community Health Worker           Referrals/other:

## 2019-12-03 ENCOUNTER — HOSPITAL ENCOUNTER (EMERGENCY)
Facility: HOSPITAL | Age: 39
Discharge: HOME | End: 2019-12-03
Attending: EMERGENCY MEDICINE | Admitting: EMERGENCY MEDICINE
Payer: MEDICAID

## 2019-12-03 VITALS
OXYGEN SATURATION: 98 % | RESPIRATION RATE: 16 BRPM | SYSTOLIC BLOOD PRESSURE: 109 MMHG | TEMPERATURE: 99.3 F | DIASTOLIC BLOOD PRESSURE: 65 MMHG

## 2019-12-03 DIAGNOSIS — F25.9 SCHIZOAFFECTIVE DISORDER, UNSPECIFIED TYPE (H): ICD-10-CM

## 2019-12-03 DIAGNOSIS — Z91.148 NONCOMPLIANCE WITH MEDICATION REGIMEN: ICD-10-CM

## 2019-12-03 DIAGNOSIS — F22 PARANOIA (H): ICD-10-CM

## 2019-12-03 LAB
ALBUMIN SERPL-MCNC: 3.8 G/DL (ref 3.4–5)
ALP SERPL-CCNC: 70 U/L (ref 40–150)
ALT SERPL W P-5'-P-CCNC: 23 U/L (ref 0–50)
ANION GAP SERPL CALCULATED.3IONS-SCNC: 9 MMOL/L (ref 3–14)
APAP SERPL-MCNC: <2 MG/L (ref 10–20)
AST SERPL W P-5'-P-CCNC: 10 U/L (ref 0–45)
B-HCG SERPL-ACNC: <1 IU/L (ref 0–5)
BASOPHILS # BLD AUTO: 0.1 10E9/L (ref 0–0.2)
BASOPHILS NFR BLD AUTO: 0.4 %
BILIRUB SERPL-MCNC: 0.4 MG/DL (ref 0.2–1.3)
BUN SERPL-MCNC: 10 MG/DL (ref 7–30)
CALCIUM SERPL-MCNC: 8.9 MG/DL (ref 8.5–10.1)
CHLORIDE SERPL-SCNC: 112 MMOL/L (ref 94–109)
CO2 SERPL-SCNC: 21 MMOL/L (ref 20–32)
CREAT SERPL-MCNC: 0.6 MG/DL (ref 0.52–1.04)
DIFFERENTIAL METHOD BLD: ABNORMAL
EOSINOPHIL # BLD AUTO: 0.1 10E9/L (ref 0–0.7)
EOSINOPHIL NFR BLD AUTO: 0.8 %
ERYTHROCYTE [DISTWIDTH] IN BLOOD BY AUTOMATED COUNT: 13.3 % (ref 10–15)
ETHANOL SERPL-MCNC: <0.01 G/DL
GFR SERPL CREATININE-BSD FRML MDRD: >90 ML/MIN/{1.73_M2}
GLUCOSE SERPL-MCNC: 133 MG/DL (ref 70–99)
HCT VFR BLD AUTO: 41.2 % (ref 35–47)
HGB BLD-MCNC: 13.9 G/DL (ref 11.7–15.7)
IMM GRANULOCYTES # BLD: 0.1 10E9/L (ref 0–0.4)
IMM GRANULOCYTES NFR BLD: 0.4 %
LYMPHOCYTES # BLD AUTO: 1.9 10E9/L (ref 0.8–5.3)
LYMPHOCYTES NFR BLD AUTO: 16.1 %
MCH RBC QN AUTO: 28 PG (ref 26.5–33)
MCHC RBC AUTO-ENTMCNC: 33.7 G/DL (ref 31.5–36.5)
MCV RBC AUTO: 83 FL (ref 78–100)
MONOCYTES # BLD AUTO: 0.8 10E9/L (ref 0–1.3)
MONOCYTES NFR BLD AUTO: 6.7 %
NEUTROPHILS # BLD AUTO: 8.9 10E9/L (ref 1.6–8.3)
NEUTROPHILS NFR BLD AUTO: 75.6 %
NRBC # BLD AUTO: 0 10*3/UL
NRBC BLD AUTO-RTO: 0 /100
PLATELET # BLD AUTO: 286 10E9/L (ref 150–450)
POTASSIUM SERPL-SCNC: 3.2 MMOL/L (ref 3.4–5.3)
PROT SERPL-MCNC: 7.8 G/DL (ref 6.8–8.8)
RBC # BLD AUTO: 4.96 10E12/L (ref 3.8–5.2)
SALICYLATES SERPL-MCNC: <2 MG/DL
SODIUM SERPL-SCNC: 142 MMOL/L (ref 133–144)
TROPONIN I SERPL-MCNC: <0.015 UG/L (ref 0–0.04)
TSH SERPL DL<=0.005 MIU/L-ACNC: 1.43 MU/L (ref 0.4–4)
WBC # BLD AUTO: 11.7 10E9/L (ref 4–11)

## 2019-12-03 PROCEDURE — 80329 ANALGESICS NON-OPIOID 1 OR 2: CPT | Mod: 59 | Performed by: EMERGENCY MEDICINE

## 2019-12-03 PROCEDURE — 80320 DRUG SCREEN QUANTALCOHOLS: CPT | Performed by: EMERGENCY MEDICINE

## 2019-12-03 PROCEDURE — 80329 ANALGESICS NON-OPIOID 1 OR 2: CPT | Performed by: EMERGENCY MEDICINE

## 2019-12-03 PROCEDURE — 80053 COMPREHEN METABOLIC PANEL: CPT | Performed by: EMERGENCY MEDICINE

## 2019-12-03 PROCEDURE — 84484 ASSAY OF TROPONIN QUANT: CPT | Performed by: EMERGENCY MEDICINE

## 2019-12-03 PROCEDURE — 25000132 ZZH RX MED GY IP 250 OP 250 PS 637: Performed by: EMERGENCY MEDICINE

## 2019-12-03 PROCEDURE — 84443 ASSAY THYROID STIM HORMONE: CPT | Performed by: EMERGENCY MEDICINE

## 2019-12-03 PROCEDURE — 85025 COMPLETE CBC W/AUTO DIFF WBC: CPT | Performed by: EMERGENCY MEDICINE

## 2019-12-03 PROCEDURE — 99284 EMERGENCY DEPT VISIT MOD MDM: CPT

## 2019-12-03 PROCEDURE — 25000125 ZZHC RX 250: Performed by: EMERGENCY MEDICINE

## 2019-12-03 PROCEDURE — 36415 COLL VENOUS BLD VENIPUNCTURE: CPT | Performed by: EMERGENCY MEDICINE

## 2019-12-03 PROCEDURE — 93010 ELECTROCARDIOGRAM REPORT: CPT | Performed by: INTERNAL MEDICINE

## 2019-12-03 PROCEDURE — 93005 ELECTROCARDIOGRAM TRACING: CPT

## 2019-12-03 PROCEDURE — 84702 CHORIONIC GONADOTROPIN TEST: CPT | Performed by: EMERGENCY MEDICINE

## 2019-12-03 PROCEDURE — 99284 EMERGENCY DEPT VISIT MOD MDM: CPT | Mod: Z6 | Performed by: EMERGENCY MEDICINE

## 2019-12-03 RX ORDER — BENZTROPINE MESYLATE 1 MG/1
1 TABLET ORAL 2 TIMES DAILY
Status: DISCONTINUED | OUTPATIENT
Start: 2019-12-03 | End: 2019-12-03 | Stop reason: HOSPADM

## 2019-12-03 RX ORDER — GABAPENTIN 600 MG/1
1200 TABLET ORAL 3 TIMES DAILY
Status: DISCONTINUED | OUTPATIENT
Start: 2019-12-03 | End: 2019-12-03 | Stop reason: HOSPADM

## 2019-12-03 RX ORDER — HYDROXYZINE HYDROCHLORIDE 25 MG/1
50 TABLET, FILM COATED ORAL 3 TIMES DAILY PRN
Status: DISCONTINUED | OUTPATIENT
Start: 2019-12-03 | End: 2019-12-03 | Stop reason: HOSPADM

## 2019-12-03 RX ORDER — LORAZEPAM 1 MG/1
1 TABLET ORAL ONCE
Status: COMPLETED | OUTPATIENT
Start: 2019-12-03 | End: 2019-12-03

## 2019-12-03 RX ORDER — CHLORPROMAZINE HYDROCHLORIDE 100 MG/1
100 TABLET, FILM COATED ORAL 3 TIMES DAILY
Status: DISCONTINUED | OUTPATIENT
Start: 2019-12-03 | End: 2019-12-03 | Stop reason: HOSPADM

## 2019-12-03 RX ORDER — BUSPIRONE HYDROCHLORIDE 15 MG/1
15 TABLET ORAL 3 TIMES DAILY
Status: DISCONTINUED | OUTPATIENT
Start: 2019-12-03 | End: 2019-12-03 | Stop reason: HOSPADM

## 2019-12-03 RX ORDER — POTASSIUM CHLORIDE 1.5 G/1.58G
20 POWDER, FOR SOLUTION ORAL 2 TIMES DAILY
Status: DISCONTINUED | OUTPATIENT
Start: 2019-12-03 | End: 2019-12-03 | Stop reason: HOSPADM

## 2019-12-03 RX ADMIN — BUSPIRONE HYDROCHLORIDE 15 MG: 15 TABLET ORAL at 10:46

## 2019-12-03 RX ADMIN — METFORMIN HYDROCHLORIDE 1000 MG: 1000 TABLET, FILM COATED ORAL at 10:47

## 2019-12-03 RX ADMIN — HYDROXYZINE HYDROCHLORIDE 50 MG: 25 TABLET ORAL at 10:45

## 2019-12-03 RX ADMIN — BENZTROPINE MESYLATE 1 MG: 1 TABLET ORAL at 10:47

## 2019-12-03 RX ADMIN — LORAZEPAM 1 MG: 1 TABLET ORAL at 10:47

## 2019-12-03 RX ADMIN — CHLORPROMAZINE HYDROCHLORIDE 100 MG: 100 TABLET, SUGAR COATED ORAL at 10:55

## 2019-12-03 RX ADMIN — GABAPENTIN 1200 MG: 600 TABLET, FILM COATED ORAL at 10:46

## 2019-12-03 RX ADMIN — POTASSIUM CHLORIDE 20 MEQ: 1.5 POWDER, FOR SOLUTION ORAL at 10:46

## 2019-12-03 NOTE — ED PROVIDER NOTES
"  History     Chief Complaint   Patient presents with     Addiction Problem     HPI  Ginette Wood is a 39 year old female who presents with police and EMS personnel from her place of residence.  [facility].  These were called to the scene as patient had a \" meth pipe unde her pillow\" and was acting \"strange\".  Police noted there was no concerns from themselves regarding the meth pipe. The patient was not abusive or agitated, however staff told paramedics that the patient has been \"throwing things and pulling things off the wall\".     Upon EMS arrival to the ER patient is cooperative and requesting Ativan.    ED ER staff contacted [facility] in [place] assisted living indicates patient has not had her medications for last 3 days as patient is been refusing such.    There is been no trauma.  Patient prehospital is amatory cooperative with slight psychomotor agitation temp of 100.1 pulse 66 blood pressure 90/68 and blood glucose 124 with blood sugar 95%.    She has not been homicidal or suicidal.      Upon my assessment patient states \"can I have some fucking Ativan\".  She denies being homicidal or suicidal.  She also denies not taking her medications for last 3 days.  Patient has tangential rapid pressured speech.  She is a limited historian as such.  When asked patient denies any headache, chest pain, shortness of breath.  Denies abdominal pain.  Reports she has had an ovarian cyst.  She denies being pregnant.  She denies being abused or threatened.  She does note that \"someone watch me sleep\".    Allergies:  Allergies   Allergen Reactions     Haloperidol Other (See Comments)     Other reaction(s): Seizures  Patient states, \"I fell down and wasn't able to get up.\"  Patient states, \"I fell down and wasn't able to get up.\"         Problem List:    Patient Active Problem List    Diagnosis Date Noted     Psychosis (H) 10/22/2019     Priority: Medium     Methamphetamine use " disorder, severe (H) 2019     Priority: Medium     Chronic hepatitis C virus infection (H) 2019     Priority: Medium     Overview:   Miya + although last viral load neg.       Major depressive disorder, recurrent episode, severe with mood-congruent psychotic features (H) 2019     Priority: Medium     Hypoglycemia 2018     Priority: Medium     Overweight (BMI 25.0-29.9) 2018     Priority: Medium     Suicidal ideation 2016     Priority: Medium     Schizoaffective disorder, chronic condition (H) 2014     Priority: Medium     Pain in joint, upper arm 10/14/2014     Priority: Medium     Dental caries 09/15/2014     Priority: Medium     Schizoaffective disorder, bipolar type (H) 2014     Priority: Medium     Tobacco abuse 2013     Priority: Medium     Primary insomnia 11/15/2013     Priority: Medium     Mixed hyperlipidemia 2013     Priority: Medium     Diabetes mellitus, type 2 2013     Priority: Medium        Past Medical History:    Past Medical History:   Diagnosis Date     ADD (attention deficit disorder)      Arthritis of left foot      Depression 10/25/2016     Diabetes mellitus, type 2 (H) 2013     Hepatitis C      History of psychiatric hospitalization      Hx of substance abuse (H)      Hyperlipidemia      Marijuana dependence (H)      Obesity      Paranoid schizophrenia (H)      PTSD (post-traumatic stress disorder)      Restless leg syndrome      Treatment 2014       Past Surgical History:    Past Surgical History:   Procedure Laterality Date      SECTION       x 3     FOOT SURGERY      Right and left foot fracture repair       Family History:    Family History   Problem Relation Age of Onset     C.A.D. Mother      Diabetes Mother      C.A.D. Father      Diabetes Father        Social History:  Marital Status:   [2]  Social History     Tobacco Use     Smoking status: Former Smoker     Packs/day: 0.50     Years: 15.00      Pack years: 7.50     Types: Cigarettes     Smokeless tobacco: Never Used   Substance Use Topics     Alcohol use: No     Drug use: Yes     Types: Marijuana        Medications:    benztropine (COGENTIN) 1 MG tablet  busPIRone (BUSPAR) 15 MG tablet  chlorproMAZINE (THORAZINE) 100 MG tablet  clonazePAM (KLONOPIN) 1 MG tablet  divalproex sodium extended-release (DEPAKOTE ER) 250 MG 24 hr tablet  gabapentin (NEURONTIN) 600 MG tablet  insulin aspart (NOVOLOG FLEXPEN) 100 UNIT/ML pen  insulin glargine (LANTUS PEN) 100 UNIT/ML pen  metFORMIN (GLUCOPHAGE) 1000 MG tablet  prazosin (MINIPRESS) 1 MG capsule  ACCU-CHEK ABDIRAHMAN PLUS test strip  Alcohol Swabs (ALCOHOL PREP) PADS  artificial saliva (BIOTENE MT) SOLN solution  artificial saliva (BIOTENE MT) SOLN solution  blood glucose monitoring (SOFTCLIX) lancets  hydrOXYzine (ATARAX) 50 MG tablet  nicotine (NICORETTE) 2 MG gum  polyethylene glycol (MIRALAX/GLYCOLAX) packet      Med list is included upon patient presentation and patient has not had medications for last 3 days.  Please see written med list which I reviewed with the ED RN today.    Review of Systems     Per HPI, limited due to status.   When asked patient denies any headache, neck pain, jaw pain, chest pain, shortness of breath, abdominal pain.  She also does admit she has not had her medication for last few days.  She is not homicidal suicidal.  She is not psychotic.    Physical Exam   BP: 132/97  Heart Rate: (!) 122  Temp: 100  F (37.8  C)  Resp: 16  SpO2: 99 %      Physical Exam    Nursing note and vitals reviewed.  Constitutional: The patient appears well-developed.  She is seated in examination cart comfortably when I walked in the room she has pressured tangential speech.  She is requesting Ativan.  HENT:   Mouth/Throat: Oropharynx is clear and moist.        Normal inspection   Eyes: Pupils are equal, round, and reactive to light.  4 mm.  No nystagmus.  Anicteric.    Neck: Trachea normal. Neck supple. No  rigidity. No tracheal deviation present.  Moves neck freely and easily in all directions.  Cardiovascular: Tachycardic rate, regular rhythm, normal heart sounds and intact distal pulses.    No murmur heard.  Pulmonary/Chest: Effort normal and breath sounds normal. No stridor. No respiratory distress.   Abdominal: Soft. Bowel sounds are normal. The patient exhibits no pulsatile midline mass. There is no tenderness.   Musculoskeletal: Normal range of motion.        No signs of swelling.   Neurological: The patient is alert. No cranial nerve deficit.  Symmetric smile.  EOMs intact.  No nystagmus.  Normal tongue elevation.  Intact hearing.  Normal shoulder shrug.  Patient has no focal arm or leg weakness presents amatory with a stable independent gait.  There is no clonus, no rigors or tremors.  No tongue fasciculations.  Skin: Skin is warm and dry. No rash noted.   Psychiatric: The patient's behavior is cooperative.  She has tangential rapid speech.  She denies being homicidal or suicidal.  She denies any alcohol or drug ingestions.  She also denies not having taken her medications for 3 days.  She does not appear to have insight into her current situation.  Of note per ED nurse patient apparently is a guardian.    ED Course     ED Course as of Dec 03 1452   Tue Dec 03, 2019   1048 Patient taking her po home medications.  Resting comfortable. No psychomotor agitation.       1048 Patient taking po, box lunch      1103 EKG: 10:23 AM.  Sinus tachycardia.  LAFB.  V rate 121.  AK interval 136.  QRS duration 94 MS.  .  No ST changes.  No EKG for comparison.  No STEMI      1111 [facility] records reviewed from 10/2019: She had been admitted for similar behaviors and here medications were adjusted.  She had decrease, but not resolution of her manic behaviors and was discharged back to her current care environment with ongoing baseline mental health abnormalities with ongoing paranoia.  Patient is followed by mental health  workers, she had a visit yesterday per my chart review.      1137 Patient refused urinalysis.  Repeat vital signs are reassuring without evidence of elevated temperature, hypotension and heart rate is improving.  I believe the patient's tachycardia is due to methamphetamine use.  In the ER patient is medically stable for disposition.  I do not see any medical indication to admit.  Patient also is not acutely psychotic, acutely delusional, homicidal or suicidal.  She is in a structured living environment for her underlying chronic mental health issues and is followed by outpatient care team for such.  She has been cooperative in the ER and has reinitiated her outpatient medications for diabetes and her chronic psychiatric illness.  If patient's condition changes or worsens or there are other concerns we are here in the ER for further assessment.  EMTALA stable.  No indication for 72 hour hold. Patient is cooperative in ED and willing to take her medications.        1142 Patients care team at [facility] has accepted for discharge.  At this time I do feel patient is stable for such.  Patient has insight at this time and is taking her medications in the ED and po orally.  She has refused to have a urinalysis obtained.  Patient is not having ongoing fever, flank pain or urinary frequency to suggest a UTI.  I do not feel I can obtain a cath specimen without patient's permission.  There is no indication for 72-hour hold.  The current care environment asked if we have done a drug screen.  We informed that we have not, as patient did not  want to provide a urine specimen .  We are unable to obtain a specimen without patient's permission. (cath UA-which was offered.) At this time I do feel patient stable for discharge back to her care environment.  They have accepted and she will be discharged back to her assisted living via police transport in a cooperative fashion.      1148 Take medications as  prescribed.  Eat regular meals.  Do not use drugs of Methamphetamine.      Return to the ED if you have any additional concerns/we are here 24 hours a day/7 days a week.       1205 Patient is resting comfortably.  There is no ongoing psychomotor agitation.  She is pleasant cooperative and medically stable.         Procedures                 Results for orders placed or performed during the hospital encounter of 12/03/19 (from the past 24 hour(s))   CBC with platelets differential   Result Value Ref Range    WBC 11.7 (H) 4.0 - 11.0 10e9/L    RBC Count 4.96 3.8 - 5.2 10e12/L    Hemoglobin 13.9 11.7 - 15.7 g/dL    Hematocrit 41.2 35.0 - 47.0 %    MCV 83 78 - 100 fl    MCH 28.0 26.5 - 33.0 pg    MCHC 33.7 31.5 - 36.5 g/dL    RDW 13.3 10.0 - 15.0 %    Platelet Count 286 150 - 450 10e9/L    Diff Method Automated Method     % Neutrophils 75.6 %    % Lymphocytes 16.1 %    % Monocytes 6.7 %    % Eosinophils 0.8 %    % Basophils 0.4 %    % Immature Granulocytes 0.4 %    Nucleated RBCs 0 0 /100    Absolute Neutrophil 8.9 (H) 1.6 - 8.3 10e9/L    Absolute Lymphocytes 1.9 0.8 - 5.3 10e9/L    Absolute Monocytes 0.8 0.0 - 1.3 10e9/L    Absolute Eosinophils 0.1 0.0 - 0.7 10e9/L    Absolute Basophils 0.1 0.0 - 0.2 10e9/L    Abs Immature Granulocytes 0.1 0 - 0.4 10e9/L    Absolute Nucleated RBC 0.0    Comprehensive metabolic panel   Result Value Ref Range    Sodium 142 133 - 144 mmol/L    Potassium 3.2 (L) 3.4 - 5.3 mmol/L    Chloride 112 (H) 94 - 109 mmol/L    Carbon Dioxide 21 20 - 32 mmol/L    Anion Gap 9 3 - 14 mmol/L    Glucose 133 (H) 70 - 99 mg/dL    Urea Nitrogen 10 7 - 30 mg/dL    Creatinine 0.60 0.52 - 1.04 mg/dL    GFR Estimate >90 >60 mL/min/[1.73_m2]    GFR Estimate If Black >90 >60 mL/min/[1.73_m2]    Calcium 8.9 8.5 - 10.1 mg/dL    Bilirubin Total 0.4 0.2 - 1.3 mg/dL    Albumin 3.8 3.4 - 5.0 g/dL    Protein Total 7.8 6.8 - 8.8 g/dL    Alkaline Phosphatase 70 40 - 150 U/L    ALT 23 0 - 50 U/L    AST 10 0 - 45 U/L   TSH  with free T4 reflex   Result Value Ref Range    TSH 1.43 0.40 - 4.00 mU/L   Salicylate level   Result Value Ref Range    Salicylate Level <2 mg/dL   HCG quantitative pregnancy (blood)   Result Value Ref Range    HCG Quantitative Serum <1 0 - 5 IU/L   Alcohol ethyl   Result Value Ref Range    Ethanol g/dL <0.01 0.01 g/dL   Troponin I   Result Value Ref Range    Troponin I ES <0.015 0.000 - 0.045 ug/L   Acetaminophen level   Result Value Ref Range    Acetaminophen Level <2 mg/L       Medications   LORazepam (ATIVAN) tablet 1 mg (1 mg Oral Given 12/3/19 1047)     She was also given her Cogentin, BuSpar, Thorazine, Klonopin, Depakote, gabapentin, oral diabetic medications.  Gave her based on her medication list all of her a.m. scheduled medications.  She also received her p.m. Depakote.  Her care environment was updated with medication in the ER today.  And next scheduled doses.  Assessments & Plan (with Medical Decision Making)   I reviewed the patient's history from paramedics and please myself.  I reviewed the old medical records.  I discussed the intake history with the ER nurse.  I perform serial assessments and reassessments.  ER trend is positive patient has taken her routine medications voluntarily and eaten food.  Her temperature is normal on recheck and her heart rate is improved.  I do suspect she has used methamphetamine.  She per my chart review did admit this to her care manager last day.  Patient however has no signs of endorgan damage or any other acute emergent medical pathology.  She has been quite cooperative.  She does have baseline have schizoaffective disorder and baseline congenital speech with some degree of paranoia.  She is however not holdable and cooperative here.  Patient's current assisted living care environment has accept the patient back for discharge and I feel patient is medically stable for such.  This does not preclude patient from having a problem  In the future. if status were to  change return to the ER.  His heart rate improved in the emerge department and repeat temperature is normal.  AT Time of discharge patient is quite cooperative she has been resting and no longer has psychomotor agitation or pressured speech.    Discharge Medication List as of 12/3/2019 11:50 AM          Final diagnoses:   Schizoaffective disorder, unspecified type (H)   Paranoia (H)   Noncompliance with medication regimen     Guillermina Rincon APRN CNP  [address] [facility] [address]  [phone]    In 3 days      [facility]  [address] [place] [address] [id number] [phone]    If symptoms worsen      12/3/2019   [facility]     Barney Oconnell MD  12/03/19 7121

## 2019-12-03 NOTE — DISCHARGE INSTRUCTIONS
Take medications as prescribed.  Eat regular meals.  Do not use drugs of Methamphetamine.      Return to the ED if you have any additional concerns/we are here 24 hours a day/7 days a week.

## 2019-12-03 NOTE — ED NOTES
Called and updated [facility] on pt's condition and they she is good to come back. They were just wondering if she gave a urine and it was positive for meth however she refused a urine sample and just explained they wasn't imperative to the pt's care at this time.

## 2019-12-03 NOTE — ED NOTES
Called [facility] to verify medications with them and was told she's been refusing for three days.

## 2019-12-03 NOTE — ED AVS SNAPSHOT
[facility]  [address] [address]  Phone:  [phone]                                    Ginette Wood   MRN: [id number]    Department:  [facility]   Date of Visit:  12/3/2019           After Visit Summary Signature Page    I have received my discharge instructions, and my questions have been answered. I have discussed any challenges I see with this plan with the nurse or doctor.    ..........................................................................................................................................  Patient/Patient Representative Signature      ..........................................................................................................................................  Patient Representative Print Name and Relationship to Patient    ..................................................               ................................................  Date                                   Time    ..........................................................................................................................................  Reviewed by Signature/Title    ...................................................              ..............................................  Date                                               Time          22EPIC Rev 08/18

## 2019-12-03 NOTE — ED TRIAGE NOTES
EMS called out for a patient using Meth all night. She is unable to answer any questions at this time as she's very focused on the facility and how she feels she's being touched but alzheimer pt's and that no one there has a degree.  Per PD they were not going to do anything as she was just laying in bed. Per EMS they were told by the facility that the pt was ripping things off the walls and throwing things.

## 2019-12-05 ENCOUNTER — ANCILLARY PROCEDURE (OUTPATIENT)
Dept: MAMMOGRAPHY | Facility: OTHER | Age: 39
End: 2019-12-05
Attending: NURSE PRACTITIONER
Payer: MEDICAID

## 2019-12-05 DIAGNOSIS — Z00.00 ROUTINE GENERAL MEDICAL EXAMINATION AT A HEALTH CARE FACILITY: ICD-10-CM

## 2019-12-05 DIAGNOSIS — Z12.31 VISIT FOR SCREENING MAMMOGRAM: ICD-10-CM

## 2019-12-05 LAB
COPATH REPORT: ABNORMAL
PAP: ABNORMAL

## 2019-12-05 PROCEDURE — 77063 BREAST TOMOSYNTHESIS BI: CPT | Mod: TC

## 2019-12-06 LAB
FINAL DIAGNOSIS: NORMAL
HPV HR 12 DNA CVX QL NAA+PROBE: NEGATIVE
HPV16 DNA SPEC QL NAA+PROBE: NEGATIVE
HPV18 DNA SPEC QL NAA+PROBE: NEGATIVE
SPECIMEN DESCRIPTION: NORMAL
SPECIMEN SOURCE CVX/VAG CYTO: NORMAL

## 2019-12-09 RX ORDER — SENNOSIDES A AND B 8.6 MG/1
2 TABLET, FILM COATED ORAL 2 TIMES DAILY
Qty: 84 TABLET | Refills: 0 | OUTPATIENT
Start: 2019-12-09

## 2019-12-10 ENCOUNTER — TELEPHONE (OUTPATIENT)
Dept: BEHAVIORAL HEALTH | Facility: OTHER | Age: 39
End: 2019-12-10

## 2019-12-10 NOTE — TELEPHONE ENCOUNTER
[facility]  @[facility]([id number])@      Community Health Worker Note      Patient: Ginette Wood  Date: December 10, 2019  Preferred Name: Ginette    Previous PHQ-9:   PHQ-9 SCORE 7/23/2019 8/21/2019   PHQ-9 Total Score 0 0     Previous KOKI-7:   KOKI-7 SCORE 7/23/2019 8/21/2019   Total Score 21 21     MAXI LEVEL:  MAXI Score (Last Two) 7/23/2019   MAXI Raw Score 29   Activation Score 52.9   MAXI Level 2       Preferred Contact: @[facility]([phone])@  Type of Contact Today: Phone call (patient / identified key support person reached)      Data: (subjective / Objective):  Patient Goals Areas: @FLOW([id number])@  Patient Stated Goals: @FLOW([id number])@  Recent ED/IP Admission or Discharge?   None  Recent ED/IP Admission or Discharge?   None    Patient Goals:  Goal Areas: Health;Mental Health;Financial and Social Service Benefits;Transportation  Patient stated goals: get phone, eye appointment, see a therapist, food support, primary care,         [facility] Core Service Provided:  Care Coordination: provided care management services/referrals necessary to ensure patient and their identified supports have access to medical, behavioral health, pharmacology and recovery support services.  Ensured that patient's care is integrated across all settings and services.     Current Stressors / Issues / Care Plan Objective Addressed Today:    N/A    Intervention:  Motivational Interviewing: Expressed Empathy/Understanding and Open-ended questions   Target Behavior(s): N/A    Assessment: (Progress on Goals / Homework):    CHW called [facility] to check on patient and if there had been any discussion of if patient can continue to reside at this residence. [facility] staff Sally stated that patient was doing alright this morning but continues to refuse medications or accu checks. There has not been a decision make yet regarding patients ability to remain at the residence and Sally stated that it has not been on the top priority  to discuss about. CHW asked if patient's guardians had been notified about the situation and Sally stated that they had been trying to get a hold of [facility] and [facility] but have not spoken with anyone, they have left messages informing them about the methamphetamine usage and the police bringing the patient into the Emergency Department. CHW called Raven Pedrode and Kayla Live from [facility] and [facility] and spoke with them to inform them of the situation. They report that they were not notified of this from [place].    Plan: (Homework, other):  Patient was encouraged to continue to seek condition-related information and education.      Scheduled a Phone follow up appointment with CHW in 1 week     Patient has set self-identified goals and will monitor progress until the next appointment on: 12/16/2019.     DIPTI Almanzar, Community Health Worker           Referrals/other:

## 2019-12-16 DIAGNOSIS — F25.0 SCHIZOAFFECTIVE DISORDER, BIPOLAR TYPE (H): ICD-10-CM

## 2019-12-16 DIAGNOSIS — F25.9 SCHIZOAFFECTIVE DISORDER, CHRONIC CONDITION (H): ICD-10-CM

## 2019-12-20 RX ORDER — CLONAZEPAM 1 MG/1
TABLET ORAL
Qty: 30 TABLET | Refills: 0 | Status: ON HOLD | OUTPATIENT
Start: 2019-12-20 | End: 2020-02-14

## 2019-12-20 RX ORDER — TOPIRAMATE 50 MG/1
TABLET, FILM COATED ORAL
Qty: 63 TABLET | Refills: 0 | Status: SHIPPED | OUTPATIENT
Start: 2019-12-20 | End: 2020-01-07

## 2019-12-20 RX ORDER — BUSPIRONE HYDROCHLORIDE 15 MG/1
TABLET ORAL
Qty: 90 TABLET | Refills: 0 | Status: ON HOLD | OUTPATIENT
Start: 2019-12-20 | End: 2020-01-09

## 2019-12-20 RX ORDER — DIVALPROEX SODIUM 250 MG/1
TABLET, EXTENDED RELEASE ORAL
Qty: 90 TABLET | Refills: 0 | Status: ON HOLD | OUTPATIENT
Start: 2019-12-20 | End: 2020-01-09

## 2019-12-20 RX ORDER — BENZTROPINE MESYLATE 1 MG/1
TABLET ORAL
Qty: 60 TABLET | Refills: 0 | Status: ON HOLD | OUTPATIENT
Start: 2019-12-20 | End: 2020-01-09

## 2019-12-20 NOTE — TELEPHONE ENCOUNTER
Not on med list.    topiramate (TOPAMAX) 50 MG tablet (Discontinued      Last Written Prescription Date:  10/17/19-10/22/19  Last Fill Quantity: 30,   # refills: 0  Last Office Visit: 11/27/19  Future Office visit:       Routing refill request to provider for review/approval because:  Drug not on the [facility], [facility] or [facility] refill protocol or controlled substance      Not on protocol    clonazePAM (KLONOPIN) 1 MG tablet      Last Written Prescription Date:  11/22/19  Last Fill Quantity: 30,   # refills: 0  Last Office Visit: 11/27/19  Future Office visit:       Routing refill request to provider for review/approval because:  Drug not on the [facility], [facility] or [facility] refill protocol or controlled substance

## 2019-12-26 ENCOUNTER — TELEPHONE (OUTPATIENT)
Dept: FAMILY MEDICINE | Facility: OTHER | Age: 39
End: 2019-12-26

## 2019-12-26 DIAGNOSIS — E11.22 TYPE 2 DIABETES MELLITUS WITH DIABETIC CHRONIC KIDNEY DISEASE, UNSPECIFIED CKD STAGE, UNSPECIFIED WHETHER LONG TERM INSULIN USE (H): Primary | Chronic | ICD-10-CM

## 2019-12-26 NOTE — TELEPHONE ENCOUNTER
Accu-Chek softclix lancet      Last Written Prescription Date:  unknown  Last Fill Quantity: N/A,   # refills: N/A  Last Office Visit: 11/27/19  Future Office visit:       Routing refill request to provider for review/approval because:  Patient is requesting a new lancet pen due to losing her other one. She is wondering if this can be replaced and have a new order put in. Last prescribed my Ilan Mtz according to fax received. Ashley A. Lechevalier, LPN on 12/26/2019 at 9:34 AM

## 2019-12-30 ENCOUNTER — OFFICE VISIT (OUTPATIENT)
Dept: BEHAVIORAL HEALTH | Facility: OTHER | Age: 39
End: 2019-12-30
Attending: NURSE PRACTITIONER
Payer: MEDICAID

## 2019-12-30 DIAGNOSIS — R69 DIAGNOSIS DEFERRED: Primary | ICD-10-CM

## 2019-12-30 NOTE — PROGRESS NOTES
[facility]         Community Health Worker Note      Patient: Ginette Wood  Date: December 30, 2019  Preferred Name: Ginette    Previous PHQ-9:   PHQ-9 SCORE 7/23/2019 8/21/2019   PHQ-9 Total Score 0 0     Previous KOKI-7:   KOKI-7 SCORE 7/23/2019 8/21/2019   Total Score 21 21     MAXI LEVEL:  MAXI Score (Last Two) 7/23/2019   MAXI Raw Score 29   Activation Score 52.9   MAXI Level 2       Preferred Contact: @[facility]([phone])@  Type of Contact Today: Face to Face in Home / Community      Data: (subjective / Objective):  Patient Goals Areas:    Patient Stated Goals:    Recent ED/IP Admission or Discharge?   None  Recent ED/IP Admission or Discharge?   None    Patient Goals:  Goal Areas: Health;Mental Health;Financial and Social Service Benefits;Transportation  Patient stated goals: get phone, eye appointment, see a therapist, food support, primary care,         [facility] Core Service Provided:  Care Coordination: provided care management services/referrals necessary to ensure patient and their identified supports have access to medical, behavioral health, pharmacology and recovery support services.  Ensured that patient's care is integrated across all settings and services.     Current Stressors / Issues / Care Plan Objective Addressed Today:    N/A    Intervention:  Motivational Interviewing: Expressed Empathy/Understanding and Open-ended questions   Target Behavior(s): N/A    Assessment: (Progress on Goals / Homework):    CHW met with patient at patient's residence. Patient was asleep and could not stay awake to visit with CHW. Patient stated that they are getting money tomorrow, December 31, from their  and Supplemental Security Income. CHW informed patient that CHW will attempt to see patient next week as patient continued to sleep. CHW spoke with staff member, patient has been sleeping consistently for two weeks. CHW asked staff if patient's medications had changed, staff member stated that  their medication has stayed the same and at times patient will get tired like this. Patient has been polite and respectful to the staff lately and. CHW encouraged patient to reach out if they need anything.    Plan: (Homework, other):  Patient was encouraged to continue to seek condition-related information and education.      Scheduled a Community / Home follow up appointment with CHW in 1 week     Patient has set self-identified goals and will monitor progress until the next appointment.     DIPTI Almanzar, Community Health Worker           Referrals/other:

## 2019-12-31 DIAGNOSIS — B18.2 CHRONIC HEPATITIS C VIRUS INFECTION (H): Primary | ICD-10-CM

## 2019-12-31 NOTE — TELEPHONE ENCOUNTER
[facility]  Last visit date with prescribing provider: 11-  Last refill date: [facility], 12-6-2019 per pharmacy  Quantity: [id number], Refills: 0    Nely Flowers LPN

## 2020-01-06 DIAGNOSIS — F25.0 SCHIZOAFFECTIVE DISORDER, BIPOLAR TYPE (H): ICD-10-CM

## 2020-01-06 RX ORDER — SENNOSIDES A AND B 8.6 MG/1
TABLET, FILM COATED ORAL
Qty: 112 TABLET | Refills: 0 | OUTPATIENT
Start: 2020-01-06

## 2020-01-08 ENCOUNTER — HOSPITAL ENCOUNTER (INPATIENT)
Facility: HOSPITAL | Age: 40
LOS: 38 days | Discharge: NURSING, REHAB OR LONG-TERM CARE | DRG: 885 | End: 2020-02-15
Attending: NURSE PRACTITIONER | Admitting: PSYCHIATRY & NEUROLOGY
Payer: COMMERCIAL

## 2020-01-08 DIAGNOSIS — F25.0 SCHIZOAFFECTIVE DISORDER, BIPOLAR TYPE (H): ICD-10-CM

## 2020-01-08 DIAGNOSIS — F25.9 SCHIZOAFFECTIVE DISORDER (H): ICD-10-CM

## 2020-01-08 DIAGNOSIS — Z72.0 TOBACCO ABUSE: ICD-10-CM

## 2020-01-08 DIAGNOSIS — E11.22 TYPE 2 DIABETES MELLITUS WITH DIABETIC CHRONIC KIDNEY DISEASE, UNSPECIFIED CKD STAGE, UNSPECIFIED WHETHER LONG TERM INSULIN USE (H): Primary | Chronic | ICD-10-CM

## 2020-01-08 DIAGNOSIS — F22 DELUSION (H): ICD-10-CM

## 2020-01-08 PROBLEM — F29 PSYCHOSIS (H): Status: ACTIVE | Noted: 2019-10-22

## 2020-01-08 PROBLEM — B18.2 CHRONIC HEPATITIS C VIRUS INFECTION (H): Status: ACTIVE | Noted: 2019-05-11

## 2020-01-08 PROBLEM — E16.2 HYPOGLYCEMIA: Status: RESOLVED | Noted: 2018-07-06 | Resolved: 2020-01-08

## 2020-01-08 PROBLEM — F15.20 METHAMPHETAMINE USE DISORDER, SEVERE (H): Status: ACTIVE | Noted: 2019-07-23

## 2020-01-08 PROBLEM — E16.2 HYPOGLYCEMIA: Status: ACTIVE | Noted: 2018-07-06

## 2020-01-08 LAB
ANION GAP SERPL CALCULATED.3IONS-SCNC: 5 MMOL/L (ref 3–14)
BASOPHILS # BLD AUTO: 0 10E9/L (ref 0–0.2)
BASOPHILS NFR BLD AUTO: 0.4 %
BUN SERPL-MCNC: 12 MG/DL (ref 7–30)
CALCIUM SERPL-MCNC: 8.8 MG/DL (ref 8.5–10.1)
CHLORIDE SERPL-SCNC: 109 MMOL/L (ref 94–109)
CO2 SERPL-SCNC: 29 MMOL/L (ref 20–32)
CREAT SERPL-MCNC: 0.52 MG/DL (ref 0.52–1.04)
DIFFERENTIAL METHOD BLD: NORMAL
EOSINOPHIL # BLD AUTO: 0.1 10E9/L (ref 0–0.7)
EOSINOPHIL NFR BLD AUTO: 1.4 %
ERYTHROCYTE [DISTWIDTH] IN BLOOD BY AUTOMATED COUNT: 13.4 % (ref 10–15)
EST. AVERAGE GLUCOSE BLD GHB EST-MCNC: 120 MG/DL
GFR SERPL CREATININE-BSD FRML MDRD: >90 ML/MIN/{1.73_M2}
GLUCOSE BLDC GLUCOMTR-MCNC: 103 MG/DL (ref 70–99)
GLUCOSE BLDC GLUCOMTR-MCNC: 127 MG/DL (ref 70–99)
GLUCOSE SERPL-MCNC: 83 MG/DL (ref 70–99)
HBA1C MFR BLD: 5.8 % (ref 0–5.6)
HCT VFR BLD AUTO: 39.3 % (ref 35–47)
HGB BLD-MCNC: 13.2 G/DL (ref 11.7–15.7)
IMM GRANULOCYTES # BLD: 0 10E9/L (ref 0–0.4)
IMM GRANULOCYTES NFR BLD: 0.4 %
LYMPHOCYTES # BLD AUTO: 1.8 10E9/L (ref 0.8–5.3)
LYMPHOCYTES NFR BLD AUTO: 24.7 %
MCH RBC QN AUTO: 29 PG (ref 26.5–33)
MCHC RBC AUTO-ENTMCNC: 33.6 G/DL (ref 31.5–36.5)
MCV RBC AUTO: 86 FL (ref 78–100)
MONOCYTES # BLD AUTO: 0.6 10E9/L (ref 0–1.3)
MONOCYTES NFR BLD AUTO: 7.7 %
NEUTROPHILS # BLD AUTO: 4.8 10E9/L (ref 1.6–8.3)
NEUTROPHILS NFR BLD AUTO: 65.4 %
NRBC # BLD AUTO: 0 10*3/UL
NRBC BLD AUTO-RTO: 0 /100
PLATELET # BLD AUTO: 281 10E9/L (ref 150–450)
POTASSIUM SERPL-SCNC: 3.5 MMOL/L (ref 3.4–5.3)
RBC # BLD AUTO: 4.55 10E12/L (ref 3.8–5.2)
SODIUM SERPL-SCNC: 143 MMOL/L (ref 133–144)
WBC # BLD AUTO: 7.4 10E9/L (ref 4–11)

## 2020-01-08 PROCEDURE — 85025 COMPLETE CBC W/AUTO DIFF WBC: CPT | Performed by: NURSE PRACTITIONER

## 2020-01-08 PROCEDURE — 40000788 ZZHCL STATISTIC ESTIMATED AVERAGE GLUCOSE: Performed by: NURSE PRACTITIONER

## 2020-01-08 PROCEDURE — 99285 EMERGENCY DEPT VISIT HI MDM: CPT

## 2020-01-08 PROCEDURE — 00000146 ZZHCL STATISTIC GLUCOSE BY METER IP

## 2020-01-08 PROCEDURE — 80171 DRUG SCREEN QUANT GABAPENTIN: CPT | Performed by: NURSE PRACTITIONER

## 2020-01-08 PROCEDURE — 36415 COLL VENOUS BLD VENIPUNCTURE: CPT | Performed by: NURSE PRACTITIONER

## 2020-01-08 PROCEDURE — 80307 DRUG TEST PRSMV CHEM ANLYZR: CPT | Performed by: NURSE PRACTITIONER

## 2020-01-08 PROCEDURE — 12400000 ZZH R&B MH

## 2020-01-08 PROCEDURE — 99231 SBSQ HOSP IP/OBS SF/LOW 25: CPT | Performed by: NURSE PRACTITIONER

## 2020-01-08 PROCEDURE — 80306 DRUG TEST PRSMV INSTRMNT: CPT | Performed by: NURSE PRACTITIONER

## 2020-01-08 PROCEDURE — 80326 AMPHETAMINES 5 OR MORE: CPT | Performed by: NURSE PRACTITIONER

## 2020-01-08 PROCEDURE — 99284 EMERGENCY DEPT VISIT MOD MDM: CPT | Mod: Z6 | Performed by: NURSE PRACTITIONER

## 2020-01-08 PROCEDURE — 80048 BASIC METABOLIC PNL TOTAL CA: CPT | Performed by: NURSE PRACTITIONER

## 2020-01-08 PROCEDURE — 81001 URINALYSIS AUTO W/SCOPE: CPT | Performed by: NURSE PRACTITIONER

## 2020-01-08 PROCEDURE — 25000132 ZZH RX MED GY IP 250 OP 250 PS 637: Performed by: NURSE PRACTITIONER

## 2020-01-08 PROCEDURE — 99223 1ST HOSP IP/OBS HIGH 75: CPT | Performed by: NURSE PRACTITIONER

## 2020-01-08 PROCEDURE — 25000131 ZZH RX MED GY IP 250 OP 636 PS 637: Performed by: NURSE PRACTITIONER

## 2020-01-08 PROCEDURE — 80359 METHYLENEDIOXYAMPHETAMINES: CPT | Performed by: NURSE PRACTITIONER

## 2020-01-08 PROCEDURE — 83036 HEMOGLOBIN GLYCOSYLATED A1C: CPT | Performed by: NURSE PRACTITIONER

## 2020-01-08 RX ORDER — HYDROXYZINE HYDROCHLORIDE 25 MG/1
50-100 TABLET, FILM COATED ORAL EVERY 4 HOURS PRN
Status: DISCONTINUED | OUTPATIENT
Start: 2020-01-08 | End: 2020-02-15 | Stop reason: HOSPADM

## 2020-01-08 RX ORDER — DOCUSATE SODIUM 50MG AND SENNOSIDES 8.6MG 8.6; 5 MG/1; MG/1
TABLET, FILM COATED ORAL
Status: ON HOLD | COMMUNITY
Start: 2019-12-06 | End: 2020-01-08

## 2020-01-08 RX ORDER — ACETAMINOPHEN 325 MG/1
650 TABLET ORAL EVERY 4 HOURS PRN
Status: DISCONTINUED | OUTPATIENT
Start: 2020-01-08 | End: 2020-02-15 | Stop reason: HOSPADM

## 2020-01-08 RX ORDER — GABAPENTIN 600 MG/1
1200 TABLET ORAL 3 TIMES DAILY
Status: DISCONTINUED | OUTPATIENT
Start: 2020-01-08 | End: 2020-02-15 | Stop reason: HOSPADM

## 2020-01-08 RX ORDER — CLONAZEPAM 1 MG/1
1 TABLET ORAL ONCE
Status: COMPLETED | OUTPATIENT
Start: 2020-01-08 | End: 2020-01-08

## 2020-01-08 RX ORDER — CHLORPROMAZINE HYDROCHLORIDE 100 MG/1
100 TABLET, FILM COATED ORAL 3 TIMES DAILY
Status: DISCONTINUED | OUTPATIENT
Start: 2020-01-08 | End: 2020-01-31

## 2020-01-08 RX ORDER — NICOTINE POLACRILEX 4 MG
15-30 LOZENGE BUCCAL
Status: DISCONTINUED | OUTPATIENT
Start: 2020-01-08 | End: 2020-02-15 | Stop reason: HOSPADM

## 2020-01-08 RX ORDER — POLYETHYLENE GLYCOL 3350 17 G/17G
17 POWDER, FOR SOLUTION ORAL DAILY PRN
Status: DISCONTINUED | OUTPATIENT
Start: 2020-01-08 | End: 2020-02-15 | Stop reason: HOSPADM

## 2020-01-08 RX ORDER — BENZTROPINE MESYLATE 1 MG/1
1 TABLET ORAL 2 TIMES DAILY
Status: DISCONTINUED | OUTPATIENT
Start: 2020-01-08 | End: 2020-02-15 | Stop reason: HOSPADM

## 2020-01-08 RX ORDER — DEXTROSE MONOHYDRATE 25 G/50ML
25-50 INJECTION, SOLUTION INTRAVENOUS
Status: DISCONTINUED | OUTPATIENT
Start: 2020-01-08 | End: 2020-02-15 | Stop reason: HOSPADM

## 2020-01-08 RX ORDER — SALIVA STIMULANT COMB. NO.3
1-2 SPRAY, NON-AEROSOL (ML) MUCOUS MEMBRANE 4 TIMES DAILY PRN
Status: DISCONTINUED | OUTPATIENT
Start: 2020-01-08 | End: 2020-02-15 | Stop reason: HOSPADM

## 2020-01-08 RX ORDER — OLANZAPINE 10 MG/1
10 TABLET ORAL 3 TIMES DAILY PRN
Status: DISCONTINUED | OUTPATIENT
Start: 2020-01-08 | End: 2020-01-22

## 2020-01-08 RX ORDER — PRAZOSIN HYDROCHLORIDE 1 MG/1
1 CAPSULE ORAL AT BEDTIME
Status: DISCONTINUED | OUTPATIENT
Start: 2020-01-08 | End: 2020-02-15 | Stop reason: HOSPADM

## 2020-01-08 RX ORDER — ALUMINA, MAGNESIA, AND SIMETHICONE 2400; 2400; 240 MG/30ML; MG/30ML; MG/30ML
30 SUSPENSION ORAL EVERY 4 HOURS PRN
Status: DISCONTINUED | OUTPATIENT
Start: 2020-01-08 | End: 2020-02-15 | Stop reason: HOSPADM

## 2020-01-08 RX ORDER — TRAZODONE HYDROCHLORIDE 50 MG/1
50 TABLET, FILM COATED ORAL
Status: DISCONTINUED | OUTPATIENT
Start: 2020-01-08 | End: 2020-02-15 | Stop reason: HOSPADM

## 2020-01-08 RX ORDER — CLONAZEPAM 1 MG/1
1 TABLET ORAL AT BEDTIME
Status: DISCONTINUED | OUTPATIENT
Start: 2020-01-08 | End: 2020-02-15 | Stop reason: HOSPADM

## 2020-01-08 RX ORDER — OLANZAPINE 10 MG/2ML
10 INJECTION, POWDER, FOR SOLUTION INTRAMUSCULAR 3 TIMES DAILY PRN
Status: DISCONTINUED | OUTPATIENT
Start: 2020-01-08 | End: 2020-01-22

## 2020-01-08 RX ORDER — BISACODYL 10 MG
10 SUPPOSITORY, RECTAL RECTAL DAILY PRN
Status: DISCONTINUED | OUTPATIENT
Start: 2020-01-08 | End: 2020-02-15 | Stop reason: HOSPADM

## 2020-01-08 RX ADMIN — PRAZOSIN HYDROCHLORIDE 1 MG: 1 CAPSULE ORAL at 20:43

## 2020-01-08 RX ADMIN — CLONAZEPAM 1 MG: 1 TABLET ORAL at 10:17

## 2020-01-08 RX ADMIN — CHLORPROMAZINE HYDROCHLORIDE 100 MG: 100 TABLET, SUGAR COATED ORAL at 14:21

## 2020-01-08 RX ADMIN — GABAPENTIN 1200 MG: 600 TABLET, FILM COATED ORAL at 20:43

## 2020-01-08 RX ADMIN — INSULIN ASPART 8 UNITS: 100 INJECTION, SOLUTION INTRAVENOUS; SUBCUTANEOUS at 17:10

## 2020-01-08 RX ADMIN — DIVALPROEX SODIUM 750 MG: 500 TABLET, EXTENDED RELEASE ORAL at 20:42

## 2020-01-08 RX ADMIN — INSULIN GLARGINE 40 UNITS: 100 INJECTION, SOLUTION SUBCUTANEOUS at 20:39

## 2020-01-08 RX ADMIN — GABAPENTIN 1200 MG: 600 TABLET, FILM COATED ORAL at 14:21

## 2020-01-08 RX ADMIN — BUSPIRONE HYDROCHLORIDE 15 MG: 10 TABLET ORAL at 14:21

## 2020-01-08 RX ADMIN — BENZTROPINE MESYLATE 1 MG: 1 TABLET ORAL at 20:45

## 2020-01-08 RX ADMIN — CLONAZEPAM 1 MG: 1 TABLET ORAL at 20:46

## 2020-01-08 RX ADMIN — METFORMIN HYDROCHLORIDE 1000 MG: 500 TABLET, FILM COATED ORAL at 17:09

## 2020-01-08 RX ADMIN — BUSPIRONE HYDROCHLORIDE 15 MG: 10 TABLET ORAL at 20:41

## 2020-01-08 RX ADMIN — CHLORPROMAZINE HYDROCHLORIDE 100 MG: 100 TABLET, SUGAR COATED ORAL at 20:44

## 2020-01-08 ASSESSMENT — ENCOUNTER SYMPTOMS
FEVER: 0
DIZZINESS: 0
DIFFICULTY URINATING: 0
HALLUCINATIONS: 0
DYSURIA: 0
HEMATURIA: 0
HEADACHES: 0
DIARRHEA: 0
CHILLS: 0
NERVOUS/ANXIOUS: 1
VOMITING: 0
NAUSEA: 0
LIGHT-HEADEDNESS: 0
SHORTNESS OF BREATH: 0
APPETITE CHANGE: 0
FATIGUE: 0
ABDOMINAL PAIN: 0
FREQUENCY: 0

## 2020-01-08 ASSESSMENT — ACTIVITIES OF DAILY LIVING (ADL)
COGNITION: 0 - NO COGNITION ISSUES REPORTED
FALL_HISTORY_WITHIN_LAST_SIX_MONTHS: YES
NUMBER_OF_TIMES_PATIENT_HAS_FALLEN_WITHIN_LAST_SIX_MONTHS: 1
TOILETING: 0-->INDEPENDENT
DRESS: 0-->INDEPENDENT
RETIRED_EATING: 0-->INDEPENDENT
BATHING: 0-->INDEPENDENT
ORAL_HYGIENE: INDEPENDENT
RETIRED_COMMUNICATION: 0-->UNDERSTANDS/COMMUNICATES WITHOUT DIFFICULTY
SWALLOWING: 0-->SWALLOWS FOODS/LIQUIDS WITHOUT DIFFICULTY
AMBULATION: 0-->INDEPENDENT
HYGIENE/GROOMING: INDEPENDENT
LAUNDRY: UNABLE TO COMPLETE
DRESS: SCRUBS (BEHAVIORAL HEALTH);INDEPENDENT
TRANSFERRING: 0-->INDEPENDENT

## 2020-01-08 ASSESSMENT — MIFFLIN-ST. JEOR: SCORE: 1467.4

## 2020-01-08 NOTE — ED NOTES
updating pt's guardian at this time.   Nurse to nurse hand off report given to Melissa MAK on [address].
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
## 2020-01-08 NOTE — PLAN OF CARE
"  Problem: Adult Behavioral Health Plan of Care  Goal: Patient-Specific Goal (Individualization)  Description  Pt will follow recommendations of treatment team.  Pt will be compliant with medications.  Pt will attend 50 % of groups.  Pt will sleep 6-8 hours each night.  Pt will complete ADL's independently.   Outcome: No Change  Note:          Problem: Fall Injury Risk  Goal: Absence of Fall and Fall-Related Injury  Description  Pt will wear non skid slippers.  Pt will remain free from falls or injuries during hospitalization.   Outcome: Improving     ADMISSION NOTE    Reason for admission delusional behavior.  Safety concerns- fall risk.  Risk for or history of violence hx of aggressive behaviors.  Skin- no areas of concern     Patient arrived on unit from [facility] accompanied by staff and security on 1/8/2020  12:50 PM.   Status on arrival: Cooperative  /82   Temp 98.8  F (37.1  C) (Tympanic)   Resp 20   Ht 1.651 m (5' 5\")   Wt 79.7 kg (175 lb 9.6 oz)   SpO2 98%   BMI 29.22 kg/m    Patient given tour of unit and Welcome to  unit papers given to patient, wanding completed, belongings inventoried, and admission assessment completed.   Patient's legal status on arrival is voluntary with guardian. Appropriate legal rights discussed with and copy given to patient. Patient Bill of Rights discussed with and copy given to patient.   Patient denies SI, HI, and thoughts of self harm and contracts for safety while on unit.        Pt brought to ER by ambulance due to delusional behaviors. Meth pipe was found in pt room at [facility] which is endangering other residents due to her smoking in the facility. Pt calm and cooperative with admission. Pt does use multiple swear words in her sentences which is her norm. Pt refused to sign ABELINO at this time. Verbal consent received for treatment from Kary Juarez ([facility] Nurse/pt guardian).          Face to face report will be communicated to oncoming " RN.    Melissa Flowers RN  1/8/2020  1:50 PM

## 2020-01-08 NOTE — PROGRESS NOTES
01/08/20 1321   Patient Belongings   Did you bring any home meds/supplements to the hospital?  No   Patient Belongings remains with patient;locker;sent to security per site process   Patient Belongings Remaining with Patient glasses   Patient Belongings Put in Hospital Secure Location (Security or Locker, etc.) cell phone/electronics;clothing;shoes   Belongings Search Yes   Clothing Search Yes   Second Staff Hilda   Comment pink slippers, blue swimsuit top, black pants, purple underwear, black tshirt, white zip up hoody, hair clip, (remaining with patient: black framed glasses), white ear buds, chapstick    List items sent to safe: black lg phone with no cracks    01/09/20- A [place] and a [employer] debit Mastercard were added to patients belongings     All other belongings put in assigned cubby in belongings room.       I have reviewed my belongings list on admission and verify that it is correct.     Patient signature_______________________________    Second staff witness (if patient unable to sign) ______________________________       I have received all my belongings at discharge.    Patient signature________________________________    Ca   1/8/2020  1:25 PM

## 2020-01-08 NOTE — PLAN OF CARE
Prior to Admission Medication Reconciliation:     Medications added:   [x] None: MAR/ RX verified if on PTA medlist  [] As listed below:    Medications deleted:   [] None  [x] As listed below:    Olanzapine 20 mg    humalog    senexon-s    topamax 50 mg  None of the above were on her [facility] med list/ MAR.  Topamax was filled on 12/06/19 qty # 63, but not on her [facility] information sent to me.     Changes made to existing medications:   [x] None  [] As listed below:    Last times/dates taken verified with patient:  [x] Yes- completed myself: note from [facility] stated she received all her 8:00 am meds  [] Nurse completed no changes made  [] Unable to review with patient:  [] Nurse completed/changes made:     Allergy modifications:    []Did not review  []Patient verified NKA  [x][facility] verified current existing allergies: no changes made  []New allergies: listed below    Medication reconciliation sources:   []Patient  []Patient family member/emergency contact: **  [][facility] Report Review  []Epic Chart Review  []Care Everywhere review  [x]Pharmacy med list: [facility]  []Pharmacy phone call  [x]Outside meds dispense report  [x]Nursing home MAR: [facility] [phone]  []Other: **    Is patient on coumadin?  [x]No    Time spent on medication reconciliation:   []5-20 mins  [x]20-40 mins  []> 40 mins    Discrepancies: [] No  [x]Yes: listed below    Discrepancy on [facility] Medication and treatment record for clonazepam. They have it recorded as a TID medication. Pharmacy records and quantity dispensed verifies it should be 1 tab (1 mg) nightly. I tried to call [facility] to verify. See notes below.     Requests for consultation by provider or pharmacist:   [] Patient understands why all of their meds were prescribed and how to take them. No questions.   [] Fill dates coincide with compliancy for all maintenance meds.   [] Fill dates do not coincide with compliancy with maintenance meds. See notes in PTA  medlist about how patient is taking.   [] Patient has questions about the following:    Comments: Did not receive an MAR for all meds as requested, just for meds given today so I could not enter times taken for yesterday or her insulin. When I tried to call and verify those things, whoever answered stated they weren't [facility].       Aby Mcmanus on 1/8/2020 at 1:30 PM     Issues completing PTA medication reconciliation:  [] On hold for a long time  [] Waited for a call back  [] Fax didn't come through  [x] Fax took a long time: had an issue with our fax machine, called again for a resend, then they sent it to the wrong fax (I still tracked it down but it wasn't to the number I gave them), to which they only sent an MAR with today's meds on it so I had to call again, sent the same MAR plus a Medication  & Treatment Record, but not a complete MAR. The information they sent me wasn't the same as pharmacy so I called yet again and then was told they weren't [facility] anymore.   [x] Other: see notes above

## 2020-01-08 NOTE — PROGRESS NOTES
Left message for Sally Live with [facility].  Writer also provided update to Raven Juarez with [facility].
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
## 2020-01-08 NOTE — ED PROVIDER NOTES
"  History     Chief Complaint   Patient presents with     Psychiatric Evaluation     pt's guardian called the ED, concerned pt is delusional and using meth again. [facility] reported to guardian they found paraphenalia in pt's room. no aggressive behavior at [facility]. pt dislikes [facility] \"I don't want to go back to that place.\"      HPI     Ginette Wood is a 39 year old female who presents via EMS from [facility] with concerns of delusions and use of meth. Patient denies using meth. She says the pipe found in her room was not hers. Denies suicidal ideations. She does admit that there are about 70 people she knows who she would like to \"kick their ass.\" She has delusions about people taking her things. She told the nurse she has stomach cancer and people are taking her medications (I cannot find in her history that she has stomach cancer).               Allergies:  Allergies   Allergen Reactions     Haloperidol Other (See Comments)     Other reaction(s): Seizures  Patient states, \"I fell down and wasn't able to get up.\"  Patient states, \"I fell down and wasn't able to get up.\"         Problem List:    Patient Active Problem List    Diagnosis Date Noted     Hx of substance abuse (H)      Priority: Medium     ADD (attention deficit disorder)      Priority: Medium     Psychosis (H) 10/22/2019     Priority: Medium     Methamphetamine use disorder, severe (H) 07/23/2019     Priority: Medium     Chronic hepatitis C virus infection (H) 05/11/2019     Priority: Medium     Overview:   Miya + although last viral load neg.       Major depressive disorder, recurrent episode, severe with mood-congruent psychotic features (H) 05/11/2019     Priority: Medium     Overweight (BMI 25.0-29.9) 06/04/2018     Priority: Medium     Suicidal ideation 05/09/2016     Priority: Medium     Schizoaffective disorder, chronic condition (H) 12/12/2014     Priority: Medium     Pain in joint, upper arm 10/14/2014     " Priority: Medium     Dental caries 09/15/2014     Priority: Medium     Schizoaffective disorder, bipolar type (H) 2014     Priority: Medium     Tobacco abuse 2013     Priority: Medium     Primary insomnia 11/15/2013     Priority: Medium     Mixed hyperlipidemia 2013     Priority: Medium     Diabetes mellitus, type 2 2013     Priority: Medium        Past Medical History:    Past Medical History:   Diagnosis Date     ADD (attention deficit disorder)      Arthritis of left foot      Chronic hepatitis C virus infection (H) 2019     Dental caries 9/15/2014     Depression 10/25/2016     Diabetes mellitus, type 2 (H) 2013     Hepatitis C      History of psychiatric hospitalization      Hx of substance abuse (H)      Hyperlipidemia      Hypoglycemia 2018     Major depressive disorder, recurrent episode, severe with mood-congruent psychotic features (H) 2019     Marijuana dependence (H)      Methamphetamine use disorder, severe (H) 2019     Mixed hyperlipidemia 2013     Obesity      Overweight (BMI 25.0-29.9) 2018     Pain in joint, upper arm 10/14/2014     Paranoid schizophrenia (H)      Primary insomnia 11/15/2013     Psychosis (H) 10/22/2019     PTSD (post-traumatic stress disorder)      Restless leg syndrome      Schizoaffective disorder, bipolar type (H) 2014     Schizoaffective disorder, chronic condition (H) 2014     Suicidal ideation 2016     Tobacco abuse 2013     Treatment 2014       Past Surgical History:    Past Surgical History:   Procedure Laterality Date      SECTION       x 3     FOOT SURGERY      Right and left foot fracture repair       Family History:    Family History   Problem Relation Age of Onset     C.A.D. Mother      Diabetes Mother      C.A.D. Father      Diabetes Father        Social History:  Marital Status:   [2]  Social History     Tobacco Use     Smoking status: Former Smoker     Packs/day: 0.50      "Years: 15.00     Pack years: 7.50     Types: Cigarettes     Smokeless tobacco: Never Used   Substance Use Topics     Alcohol use: No     Drug use: Yes     Types: Marijuana        Medications:    benztropine (COGENTIN) 1 MG tablet  busPIRone (BUSPAR) 15 MG tablet  chlorproMAZINE (THORAZINE) 100 MG tablet  divalproex sodium extended-release (DEPAKOTE ER) 250 MG 24 hr tablet  gabapentin (NEURONTIN) 600 MG tablet  prazosin (MINIPRESS) 1 MG capsule          Review of Systems   Constitutional: Negative for appetite change, chills, fatigue and fever.   Respiratory: Negative for shortness of breath.    Cardiovascular: Negative for chest pain.   Gastrointestinal: Negative for abdominal pain, diarrhea, nausea and vomiting.   Genitourinary: Negative for difficulty urinating, dysuria, frequency and hematuria.   Neurological: Negative for dizziness, light-headedness and headaches.   Psychiatric/Behavioral: Negative for hallucinations (denies) and suicidal ideas. The patient is nervous/anxious.        Physical Exam   BP: 112/73  Pulse: 93  Heart Rate: 96  Temp: 96.9  F (36.1  C)  Resp: 18  Height: 165.1 cm (5' 5\")  Weight: 79.7 kg (175 lb 9.6 oz)  SpO2: 100 %      Physical Exam  Constitutional:       General: She is not in acute distress.     Appearance: She is not ill-appearing, toxic-appearing or diaphoretic.   Cardiovascular:      Rate and Rhythm: Normal rate and regular rhythm.      Heart sounds: S1 normal and S2 normal. No murmur. No friction rub. No gallop.    Pulmonary:      Effort: Pulmonary effort is normal.      Breath sounds: Normal breath sounds. No wheezing, rhonchi or rales.   Skin:     General: Skin is warm and dry.      Capillary Refill: Capillary refill takes less than 2 seconds.      Coloration: Skin is not pale.   Neurological:      Mental Status: She is alert.   Psychiatric:         Mood and Affect: Mood is anxious.         Speech: Speech is slurred.         Behavior: Behavior is cooperative.         Thought " Content: Thought content is paranoid and delusional.         ED Course     ED Course as of Jan 13 1011   Wed Jan 08, 2020   1121 Report given to DEC. Concern is for uncontrolled symptoms of schizoaffective disorder- especially since she has not been taking medications. Awaiting UDS. They will evaluate patient.   Bianca Mitchell CNP on 1/8/2020 at 11:22 AM      1148 DEC assessment complete. Recommendation for inpatient stay. Given her agitation, not taking medications as directed, and possible meth use (UDS pending) I do agree with this recommendation.  Bianca Mitchell CNP on 1/8/2020 at 11:49 AM          Procedures         Results for orders placed or performed during the hospital encounter of 01/08/20 (from the past 24 hour(s))   Glucose by meter   Result Value Ref Range    Glucose 108 (H) 70 - 99 mg/dL   Glucose by meter   Result Value Ref Range    Glucose 106 (H) 70 - 99 mg/dL   Glucose by meter   Result Value Ref Range    Glucose 73 70 - 99 mg/dL   Glucose by meter   Result Value Ref Range    Glucose 143 (H) 70 - 99 mg/dL   Glucose by meter   Result Value Ref Range    Glucose 192 (H) 70 - 99 mg/dL       Medications   hydrOXYzine (ATARAX) tablet  mg (has no administration in time range)   acetaminophen (TYLENOL) tablet 650 mg (has no administration in time range)   alum & mag hydroxide-simethicone (MYLANTA ES/MAALOX  ES) suspension 30 mL (has no administration in time range)   magnesium hydroxide (MILK OF MAGNESIA) suspension 30 mL (has no administration in time range)   bisacodyl (DULCOLAX) Suppository 10 mg (has no administration in time range)   traZODone (DESYREL) tablet 50 mg (has no administration in time range)   OLANZapine (zyPREXA) tablet 10 mg (has no administration in time range)     Or   OLANZapine (zyPREXA) injection 10 mg (has no administration in time range)   artificial saliva (BIOTENE MT) solution 1-2 spray (has no administration in time range)   benztropine (COGENTIN) tablet 1 mg (1 mg  "Oral Given 1/13/20 0810)   busPIRone (BUSPAR) tablet 15 mg (15 mg Oral Given 1/13/20 0810)   chlorproMAZINE (THORAZINE) tablet 100 mg (100 mg Oral Given 1/13/20 0810)   clonazePAM (klonoPIN) tablet 1 mg (1 mg Oral Given 1/12/20 2035)   divalproex sodium extended-release (DEPAKOTE ER) 24 hr tablet 750 mg (750 mg Oral Given 1/12/20 2035)   gabapentin (NEURONTIN) tablet 1,200 mg (1,200 mg Oral Given 1/13/20 0809)   polyethylene glycol (MIRALAX/GLYCOLAX) Packet 17 g (has no administration in time range)   prazosin (MINIPRESS) capsule 1 mg (1 mg Oral Given 1/12/20 2035)   insulin glargine (LANTUS PEN) injection 40 Units (40 Units Subcutaneous Given 1/12/20 2037)   metFORMIN (GLUCOPHAGE) tablet 1,000 mg (1,000 mg Oral Given 1/13/20 0810)   nicotine (NICORETTE) gum 2 mg (has no administration in time range)   glucose gel 15-30 g (has no administration in time range)     Or   dextrose 50 % injection 25-50 mL (has no administration in time range)     Or   glucagon injection 1 mg (has no administration in time range)   insulin aspart (NovoLOG) inj (RAPID ACTING) (8 Units Subcutaneous Given 1/13/20 0749)   insulin aspart (NovoLOG) inj (RAPID ACTING) (2 Units Subcutaneous Given 1/13/20 0749)   insulin aspart (NovoLOG) inj (RAPID ACTING) (1 Units Subcutaneous Not Given 1/12/20 2043)   clonazePAM (klonoPIN) tablet 1 mg (1 mg Oral Given 1/8/20 1017)       Assessments & Plan (with Medical Decision Making)     I have reviewed the nursing notes.    I have reviewed the findings, diagnosis, plan and need for follow up with the patient.  (F25.9) Schizoaffective disorder (H)  (F22) Delusion (H)    Plan: 39-year old female presents via EMS from [facility] with concerns of delusional behavior. Staff found a pipe in her room and also concerned she is using meth again. Patient denies suicidal ideations. Speech is somewhat slurred and tangential. She states she has a list of people she who she would like to \"kick their ass\" but " does not have a plan to do so. DEC assessment complete. Recommendation for inpatient admission. I do think this is best for her given delusions and reports of not taking her medications. She refuses to give a urine sample.     Lauro Archibald) is patient's Guardian. [phone]. Social work has spoke with them and were the ones concerned about patient and wanting evaluation.    Patient admitted to inpatient behavioral health for further evaluation/management.     Current Discharge Medication List          Final diagnoses:   Schizoaffective disorder (H)   Delusion (H)     Bianca Mitchell CNP   1/8/2020   [facility]     Bianca Mitchell CNP  01/13/20 1012       Bianca Mitchell CNP  01/13/20 [id number]
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
## 2020-01-08 NOTE — ED NOTES
Pt refused to give urine sample. Pt is aware of plan of care.   Pt transferred to behavioral [facility] at this time.   Cooperative with admission.

## 2020-01-08 NOTE — ED NOTES
Patient has been accepted [facility].  Provider is asking that a tox screen be added onto the blood work as patient is refusing to urinate.

## 2020-01-08 NOTE — PROGRESS NOTES
"[facility]    History and Physical  Medical Services       Date of Admission:  1/8/2020  Date of Service (when I saw the patient): 01/08/20    Assessment & Plan     Principal Problem:    Psychosis (H)  Active Problems:    Diabetes mellitus, type 2- continue home dose insulin and novolog, A1C pending, QID blood glucose checks, moderate constant carb diet    Chronic hepatitis C virus infection (H)    Tobacco abuse    Methamphetamine use disorder, severe (H)    Hx of substance abuse (H)    ADD (attention deficit disorder)      Code Status: Full Code    Cassy Sandhu CNP    Primary Care Physician   Guillermina Rincon    Chief Complaint    pt's guardian called the ED, concerned pt is delusional and using meth again. [facility] reported to guardian they found paraphenalia in pt's room. no aggressive behavior at [facility]. pt dislikes [facility] \"I don't want to go back to that place.\"     Unable to obtain a history from the patient due to mental status    History of Present Illness      Ginette Wood is a 39 year old female who presents via EMS from [facility] with concerns of delusions and use of meth. Patient denies using meth. She says the pipe found in her room was not hers. Denies suicidal ideations. She does admit that there are about 70 people she knows who she would like to \"kick their ass.\" She has delusions about people taking her things. She told the nurse she has stomach cancer and people are taking her medications (I cannot find in her history that she has stomach cancer).     Past Medical History    I have reviewed this patient's medical history and updated it with pertinent information if needed.   Past Medical History:   Diagnosis Date     ADD (attention deficit disorder)      Arthritis of left foot      Chronic hepatitis C virus infection (H) 5/11/2019    Overview:  Miya + although last viral load neg.     Dental caries 9/15/2014     Depression 10/25/2016     Diabetes " mellitus, type 2 (H) 2013     Hepatitis C      History of psychiatric hospitalization     Last hosp. 5 mos ago at [facility] for 13 days     Hx of substance abuse (H)      Hyperlipidemia      Hypoglycemia 2018     Major depressive disorder, recurrent episode, severe with mood-congruent psychotic features (H) 2019     Marijuana dependence (H)      Methamphetamine use disorder, severe (H) 2019     Mixed hyperlipidemia 2013     Obesity      Overweight (BMI 25.0-29.9) 2018     Pain in joint, upper arm 10/14/2014     Paranoid schizophrenia (H)      Primary insomnia 11/15/2013     Psychosis (H) 10/22/2019     PTSD (post-traumatic stress disorder)      Restless leg syndrome      Schizoaffective disorder, bipolar type (H) 2014     Schizoaffective disorder, chronic condition (H) 2014     Suicidal ideation 2016     Tobacco abuse 2013     Treatment 2014    CD treatment for THC x 2 months this summer       Past Surgical History   I have reviewed this patient's surgical history and updated it with pertinent information if needed.  Past Surgical History:   Procedure Laterality Date      SECTION       x 3     FOOT SURGERY      Right and left foot fracture repair       Prior to Admission Medications   Prior to Admission Medications   Prescriptions Last Dose Informant Patient Reported? Taking?   ACCU-CHEK ABDIRAHMAN PLUS test strip  Guardian No No   Sig: TEST BEFORE MEALS AND AT BEDTIME AS DIRECTED   artificial saliva (BIOTENE MT) SOLN solution Unknown at Unknown time  No No   Sig: Swish and spit 1-2 mLs (1-2 sprays) in mouth as needed for dry mouth   benztropine (COGENTIN) 1 MG tablet 2020 at 0800  No Yes   Sig: TAKE (1) TABLET BY MOUTH TWICE A DAY.   blood glucose monitoring (NO BRAND SPECIFIED) meter device kit   No No   Sig: Use to test blood sugar as directed.   blood glucose monitoring (SOFTCLIX) lancets  Guardian No No   Sig: TEST BEFORE MEALS AND AT BEDTIME AS  DIRECTED   busPIRone (BUSPAR) 15 MG tablet 1/8/2020 at 0800  No Yes   Sig: TAKE (1) TABLET BY MOUTH 3 TIMES DAILY   chlorproMAZINE (THORAZINE) 100 MG tablet 1/8/2020 at 0800 Guardian No Yes   Sig: Take 1 tablet (100 mg) by mouth 3 times daily   clonazePAM (KLONOPIN) 1 MG tablet 1/7/2020 at Unknown time  No Yes   Sig: TAKE ONE TABLET BY MOUTH AT BEDTIME.   divalproex sodium extended-release (DEPAKOTE ER) 250 MG 24 hr tablet 1/7/2020 at Unknown time  No Yes   Sig: TAKE 3 TABLETS BY MOUTH AT BEDTIME.   gabapentin (NEURONTIN) 600 MG tablet 1/8/2020 at 0800 Guardian No Yes   Sig: Take 2 tablets (1,200 mg) by mouth 3 times daily   hydrOXYzine (ATARAX) 50 MG tablet Unknown at Unknown time Guardian No No   Sig: Take 1 tablet (50 mg) by mouth 3 times daily as needed for anxiety   insulin aspart (NOVOLOG FLEXPEN) 100 UNIT/ML pen Unknown at Unknown time Guardian Yes No   Sig: Inject 8 Units Subcutaneous 3 times daily (with meals) Plus sliding scale  BG <140 No coverage (141-189: 1 unit, 190-239: 2 units, 240-289: 3 units, 290-339: 4 units, 340-399: 5 units, 400-449: 6 units, BG > 450: 7 units)   insulin glargine (LANTUS PEN) 100 UNIT/ML pen Unknown at Unknown time Guardian Yes No   Sig: Inject 40 Units Subcutaneous At Bedtime   metFORMIN (GLUCOPHAGE) 1000 MG tablet 1/8/2020 at 0800 Guardian No Yes   Sig: Take 1 tablet (1,000 mg) by mouth 2 times daily (with meals)   nicotine (NICORETTE) 2 MG gum Unknown at Unknown time Guardian Yes No   Sig: Place 2 mg inside cheek every 2 hours as needed for smoking cessation   polyethylene glycol (MIRALAX/GLYCOLAX) packet Unknown at Unknown time Guardian Yes No   Sig: Take 1 packet by mouth daily as needed for constipation   prazosin (MINIPRESS) 1 MG capsule 1/7/2020 at 2000 Guardian No Yes   Sig: Take 1 capsule (1 mg) by mouth At Bedtime      Facility-Administered Medications: None     Allergies   Allergies   Allergen Reactions     Haloperidol Other (See Comments)     Other reaction(s):  "Seizures  Patient states, \"I fell down and wasn't able to get up.\"  Patient states, \"I fell down and wasn't able to get up.\"         Social History   I have reviewed this patient's social history and updated it with pertinent information if needed. Ginette Wood  reports that she has quit smoking. Her smoking use included cigarettes. She has a 7.50 pack-year smoking history. She has never used smokeless tobacco. She reports current drug use. Drug: Marijuana. She reports that she does not drink alcohol.    Family History   I have reviewed this patient's family history and updated it with pertinent information if needed.   Family History   Problem Relation Age of Onset     C.A.D. Mother      Diabetes Mother      C.A.D. Father      Diabetes Father        Review of Systems   Review of systems not obtainable due to patient factors - abnormal mental status    Physical Exam   Temp: 98.8  F (37.1  C) Temp src: Tympanic BP: 113/82   Heart Rate: 96 Resp: 20 SpO2: 98 % O2 Device: None (Room air)    Vital Signs with Ranges  Temp:  [96.9  F (36.1  C)-98.8  F (37.1  C)] 98.8  F (37.1  C)  Heart Rate:  [96] 96  Resp:  [18-20] 20  BP: (102-113)/(64-82) 113/82  SpO2:  [98 %-100 %] 98 %  175 lbs 9.6 oz    Constitutional: pt sleeping, unable to wake. NAD  Respiratory: CTA, symmetric rise and fall of chest, NAD, good effort  Cardiovascular: RRR, S1, S2, no extra heart sounds, no edema  GI: normoactive bowel sounds x 4, no guarding   Other- limited assessment due to somnolence, unable to respond, inability to follow commands. Pt is protecting airway. No respiratory compromise.     Data   Data reviewed today:   Recent Labs   Lab 01/08/20  1021   WBC 7.4   HGB 13.2   MCV 86         POTASSIUM 3.5   CHLORIDE 109   CO2 29   BUN 12   CR 0.52   ANIONGAP 5   MARIE 8.8   GLC 83       No results found for this or any previous visit (from the past 24 hour(s)).    "

## 2020-01-08 NOTE — ED NOTES
Patient presents to emergency room via [facility] ambulance with c/o delusional behavior and substance abuse. Received a call from Raven from [facility] that pt resides at [facility] and has been abusing methamphetamine and found paraphernalia in her room. Concerned that she is showing signs of delusional behaviors and having slurred speech. No aggressive behavior per Raven from [facility]. Raven is requesting pt be evaluated because she is endangering others at [facility] by smoking in the facility.

## 2020-01-08 NOTE — TELEPHONE ENCOUNTER
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    B: BIB EMS   Resides in assisted living @ [facility]  Hx of schizo affective d/o   Hx of meth use - possible meth induced psychosis, pipe found in her room, pt denies it is hers and denies meth use  Pt reports delusional thinking, paranoia, agitation, rambling, slurring/disorganized speech, all over the place - tangent   Utox refusing   Not med compliant - refusal at assisted living and ED  Hx of aggression -   Pt denies SI or HI, potentially threatening to some individuals pt has a list  Medically cleared - type 2 diabetes, insulin compliant, no pump   Ambulating independently     A: [facility]   Legal guardian - Jocelyn agrees to IP     R: Called [facility] on call [facility] @1202pm to present pt    Blood Tox screen needs to be added to labs  Once that is compete, [facility] will accept     5N up for admit    Unit notified @ 1205pm  ED notified @ 1208pm

## 2020-01-08 NOTE — PROGRESS NOTES
Spoke with Raven Juarez with [facility].  She advised that there is not a specific individual assigned to [facility] as a guardian and that anyone at [facility]  can give consent for [facility].

## 2020-01-09 LAB
ALBUMIN SERPL-MCNC: 3.3 G/DL (ref 3.4–5)
ALBUMIN UR-MCNC: 30 MG/DL
ALP SERPL-CCNC: 65 U/L (ref 40–150)
ALT SERPL W P-5'-P-CCNC: 25 U/L (ref 0–50)
AMPHETAMINES UR QL: ABNORMAL NG/ML
APPEARANCE UR: ABNORMAL
AST SERPL W P-5'-P-CCNC: 8 U/L (ref 0–45)
BACTERIA #/AREA URNS HPF: ABNORMAL /HPF
BARBITURATES UR QL SCN: NOT DETECTED NG/ML
BENZODIAZ UR QL SCN: NOT DETECTED NG/ML
BILIRUB DIRECT SERPL-MCNC: <0.1 MG/DL (ref 0–0.2)
BILIRUB SERPL-MCNC: 0.2 MG/DL (ref 0.2–1.3)
BILIRUB UR QL STRIP: NEGATIVE
BUPRENORPHINE UR QL: NOT DETECTED NG/ML
CANNABINOIDS UR QL: NOT DETECTED NG/ML
COCAINE UR QL SCN: NOT DETECTED NG/ML
COLOR UR AUTO: YELLOW
D-METHAMPHET UR QL: ABNORMAL NG/ML
GLUCOSE BLDC GLUCOMTR-MCNC: 189 MG/DL (ref 70–99)
GLUCOSE BLDC GLUCOMTR-MCNC: 200 MG/DL (ref 70–99)
GLUCOSE BLDC GLUCOMTR-MCNC: 212 MG/DL (ref 70–99)
GLUCOSE BLDC GLUCOMTR-MCNC: 85 MG/DL (ref 70–99)
GLUCOSE BLDC GLUCOMTR-MCNC: 95 MG/DL (ref 70–99)
GLUCOSE UR STRIP-MCNC: NEGATIVE MG/DL
HGB UR QL STRIP: NEGATIVE
KETONES UR STRIP-MCNC: 10 MG/DL
LEUKOCYTE ESTERASE UR QL STRIP: NEGATIVE
METHADONE UR QL SCN: NOT DETECTED NG/ML
MUCOUS THREADS #/AREA URNS LPF: PRESENT /LPF
NITRATE UR QL: NEGATIVE
OPIATES UR QL SCN: NOT DETECTED NG/ML
OXYCODONE UR QL SCN: NOT DETECTED NG/ML
PCP UR QL SCN: NOT DETECTED NG/ML
PH UR STRIP: 6 PH (ref 4.7–8)
PROPOXYPH UR QL: NOT DETECTED NG/ML
PROT SERPL-MCNC: 6.9 G/DL (ref 6.8–8.8)
RBC #/AREA URNS AUTO: 2 /HPF (ref 0–2)
SOURCE: ABNORMAL
SP GR UR STRIP: 1.03 (ref 1–1.03)
SQUAMOUS #/AREA URNS AUTO: 3 /HPF (ref 0–1)
TRICYCLICS UR QL SCN: NOT DETECTED NG/ML
UROBILINOGEN UR STRIP-MCNC: NORMAL MG/DL (ref 0–2)
VALPROATE SERPL-MCNC: 44 MG/L (ref 50–100)
WBC #/AREA URNS AUTO: 7 /HPF (ref 0–5)

## 2020-01-09 PROCEDURE — 36415 COLL VENOUS BLD VENIPUNCTURE: CPT | Performed by: NURSE PRACTITIONER

## 2020-01-09 PROCEDURE — 80076 HEPATIC FUNCTION PANEL: CPT | Performed by: NURSE PRACTITIONER

## 2020-01-09 PROCEDURE — 00000146 ZZHCL STATISTIC GLUCOSE BY METER IP

## 2020-01-09 PROCEDURE — 99232 SBSQ HOSP IP/OBS MODERATE 35: CPT | Performed by: NURSE PRACTITIONER

## 2020-01-09 PROCEDURE — 80164 ASSAY DIPROPYLACETIC ACD TOT: CPT | Performed by: NURSE PRACTITIONER

## 2020-01-09 PROCEDURE — 25000132 ZZH RX MED GY IP 250 OP 250 PS 637: Performed by: NURSE PRACTITIONER

## 2020-01-09 PROCEDURE — 12400000 ZZH R&B MH

## 2020-01-09 RX ORDER — CHLORPROMAZINE HYDROCHLORIDE 100 MG/1
TABLET, FILM COATED ORAL
Qty: 84 TABLET | Refills: 0 | Status: SHIPPED | OUTPATIENT
Start: 2020-01-09 | End: 2020-02-14

## 2020-01-09 RX ORDER — BENZTROPINE MESYLATE 1 MG/1
TABLET ORAL
Qty: 56 TABLET | Refills: 1 | Status: SHIPPED | OUTPATIENT
Start: 2020-01-09 | End: 2020-02-14

## 2020-01-09 RX ORDER — PRAZOSIN HYDROCHLORIDE 1 MG/1
CAPSULE ORAL
Qty: 28 CAPSULE | Refills: 1 | Status: SHIPPED | OUTPATIENT
Start: 2020-01-09 | End: 2020-02-14

## 2020-01-09 RX ORDER — GABAPENTIN 600 MG/1
TABLET ORAL
Qty: 168 TABLET | Refills: 0 | Status: SHIPPED | OUTPATIENT
Start: 2020-01-09 | End: 2020-02-14

## 2020-01-09 RX ORDER — DIVALPROEX SODIUM 250 MG/1
TABLET, EXTENDED RELEASE ORAL
Qty: 84 TABLET | Refills: 1 | Status: SHIPPED | OUTPATIENT
Start: 2020-01-09 | End: 2020-02-14

## 2020-01-09 RX ORDER — BUSPIRONE HYDROCHLORIDE 15 MG/1
TABLET ORAL
Qty: 84 TABLET | Refills: 1 | Status: SHIPPED | OUTPATIENT
Start: 2020-01-09 | End: 2020-02-14

## 2020-01-09 RX ADMIN — GABAPENTIN 1200 MG: 600 TABLET, FILM COATED ORAL at 20:06

## 2020-01-09 RX ADMIN — INSULIN ASPART 8 UNITS: 100 INJECTION, SOLUTION INTRAVENOUS; SUBCUTANEOUS at 08:04

## 2020-01-09 RX ADMIN — DIVALPROEX SODIUM 750 MG: 500 TABLET, EXTENDED RELEASE ORAL at 20:06

## 2020-01-09 RX ADMIN — GABAPENTIN 1200 MG: 600 TABLET, FILM COATED ORAL at 08:03

## 2020-01-09 RX ADMIN — METFORMIN HYDROCHLORIDE 1000 MG: 500 TABLET, FILM COATED ORAL at 08:03

## 2020-01-09 RX ADMIN — BENZTROPINE MESYLATE 1 MG: 1 TABLET ORAL at 08:04

## 2020-01-09 RX ADMIN — INSULIN ASPART 8 UNITS: 100 INJECTION, SOLUTION INTRAVENOUS; SUBCUTANEOUS at 12:02

## 2020-01-09 RX ADMIN — BUSPIRONE HYDROCHLORIDE 15 MG: 10 TABLET ORAL at 08:03

## 2020-01-09 RX ADMIN — CLONAZEPAM 1 MG: 1 TABLET ORAL at 20:06

## 2020-01-09 RX ADMIN — CHLORPROMAZINE HYDROCHLORIDE 100 MG: 100 TABLET, SUGAR COATED ORAL at 08:04

## 2020-01-09 RX ADMIN — PRAZOSIN HYDROCHLORIDE 1 MG: 1 CAPSULE ORAL at 20:06

## 2020-01-09 RX ADMIN — CHLORPROMAZINE HYDROCHLORIDE 100 MG: 100 TABLET, SUGAR COATED ORAL at 13:05

## 2020-01-09 RX ADMIN — BENZTROPINE MESYLATE 1 MG: 1 TABLET ORAL at 20:07

## 2020-01-09 RX ADMIN — INSULIN ASPART 8 UNITS: 100 INJECTION, SOLUTION INTRAVENOUS; SUBCUTANEOUS at 16:15

## 2020-01-09 RX ADMIN — CHLORPROMAZINE HYDROCHLORIDE 100 MG: 100 TABLET, SUGAR COATED ORAL at 20:07

## 2020-01-09 RX ADMIN — METFORMIN HYDROCHLORIDE 1000 MG: 500 TABLET, FILM COATED ORAL at 16:13

## 2020-01-09 RX ADMIN — BUSPIRONE HYDROCHLORIDE 15 MG: 10 TABLET ORAL at 13:05

## 2020-01-09 RX ADMIN — GABAPENTIN 1200 MG: 600 TABLET, FILM COATED ORAL at 13:05

## 2020-01-09 RX ADMIN — BUSPIRONE HYDROCHLORIDE 15 MG: 10 TABLET ORAL at 20:07

## 2020-01-09 RX ADMIN — INSULIN GLARGINE 40 UNITS: 100 INJECTION, SOLUTION SUBCUTANEOUS at 20:08

## 2020-01-09 ASSESSMENT — ACTIVITIES OF DAILY LIVING (ADL)
HYGIENE/GROOMING: INDEPENDENT
DRESS: SCRUBS (BEHAVIORAL HEALTH)
ORAL_HYGIENE: INDEPENDENT

## 2020-01-09 NOTE — PLAN OF CARE
"Observed pt lying in a supine position - eyes closed - non-labored breathing noted.  At 0230 obtained glucose level - was 85 - wnl pt did relay \"that's low for me\" and \"im hungry\"  gave her vj crackers, non-sugar pudding and juice per her request - denies pain or discomfort at this time.  At 0250 urine sample obtained and sent to lab as ordered.  Will continue to monitor pt condition.  Poor sleep maybe 4 hours at best - blood glucose - 95 this am - Face to face end of shift report communicated to oncoming [facility].     Selina Siegel RN  1/9/2020  6:55 AM        "

## 2020-01-09 NOTE — PLAN OF CARE
Problem: Adult Behavioral Health Plan of Care  Goal: Patient-Specific Goal (Individualization)  Description  Pt will follow recommendations of treatment team.  Pt will be compliant with medications.  Pt will attend 50 % of groups.  Pt will sleep 6-8 hours each night.  Pt will complete ADL's independently.   Outcome: No Change  Note:   Pt. Willing to follow treatment team recommendations so far, compliant with taking prescribed medications, has not attended group therapy yet this shift, slept 4 hours last night, is independent with ADL's, Pt. Has delusions and paranoia, speech has flight of ideas, rapid and rambling.       Problem: Fall Injury Risk  Goal: Absence of Fall and Fall-Related Injury  Description  Pt will wear non skid slippers.  Pt will remain free from falls or injuries during hospitalization.   Outcome: Improving   Pt. Wearing non skid slippers, has remained free from falls, gait is balanced and steady.    Face to face end of shift report will becommunicated to oncoming afternoon shift RN.     Ayah Matos RN  1/9/2020  10:57 AM

## 2020-01-09 NOTE — PLAN OF CARE
"  Problem: Adult Behavioral Health Plan of Care  Goal: Patient-Specific Goal (Individualization)  Description  Pt will follow recommendations of treatment team.  Pt will be compliant with medications.  Pt will attend 50 % of groups.  Pt will sleep 6-8 hours each night.  Pt will complete ADL's independently.   1/9/2020 1531 by Jenny Cardenas, RN  Note:   Patient up on unit coloring and attending some groups for majority of shift. Speech is pressured with flight of ideas during conversation with this writer. Tangential during assessment attempts, making statements like \"I don't want to go back to [facility]. I told her and him to send me to treatment\". Patient also has noticeable jaw clenching with speech making it more difficult to understand than usual at times.   Blood sugar was 189 before dinner and 200 this evening. Cooperative with lab draws, valproic acid was 44. Compliant with all medications. Laying down to rest around 2100 for remainder of shift. Continue to monitor at this time.      Problem: Thought Process Alteration  Goal: Optimal Thought Clarity  Description  Patient will hold reality based conversations prior to discharge.    Note:   Continue to monitor at this time.     Face to face end of shift report communicated to on oncoming FRANCIA Cardenas, RN  1/9/2020  11:16 PM        "

## 2020-01-09 NOTE — PROGRESS NOTES
"[facility]    History and Physical  Medical Services       Date of Admission:  1/8/2020  Date of Service (when I saw the patient): 01/09/20    Assessment & Plan     Principal Problem:    Psychosis (H)  Active Problems:    Diabetes mellitus, type 2- continue home dose insulin and novolog, A1C pending, QID blood glucose checks, moderate constant carb diet, chart reviewed and blood sugars are well controlled with insulin.     Chronic hepatitis C virus infection (H)    Tobacco abuse    Methamphetamine use disorder, severe (H)    Hx of substance abuse (H)    ADD (attention deficit disorder)       Code Status: Full Code    Cassy Sandhu, CNP    Primary Care Physician   Guillermina Rincon    Chief Complaint   pt's guardian called the ED, concerned pt is delusional and using meth again. [facility] reported to guardian they found paraphenalia in pt's room. no aggressive behavior at [facility]. pt dislikes [facility] \"I don't want to go back to that place.\"    History is obtained from the patient    History of Present Illness   Ginette Wood is a 39 year old female who presents via EMS from [facility] with concerns of delusions and use of meth. Patient denies using meth. She says the pipe found in her room was not hers. Denies suicidal ideations. She does admit that there are about 70 people she knows who she would like to \"kick their ass.\" She has delusions about people taking her things. She told the nurse she has stomach cancer and people are taking her medications (I cannot find in her history that she has stomach cancer).     Past Medical History    I have reviewed this patient's medical history and updated it with pertinent information if needed.   Past Medical History:   Diagnosis Date     ADD (attention deficit disorder)      Arthritis of left foot      Chronic hepatitis C virus infection (H) 5/11/2019    Overview:  Miya + although last viral load neg.     Dental caries 9/15/2014     " Depression 10/25/2016     Diabetes mellitus, type 2 (H) 2013     Hepatitis C      History of psychiatric hospitalization     Last hosp. 5 mos ago at [facility] for 13 days     Hx of substance abuse (H)      Hyperlipidemia      Hypoglycemia 2018     Major depressive disorder, recurrent episode, severe with mood-congruent psychotic features (H) 2019     Marijuana dependence (H)      Methamphetamine use disorder, severe (H) 2019     Mixed hyperlipidemia 2013     Obesity      Overweight (BMI 25.0-29.9) 2018     Pain in joint, upper arm 10/14/2014     Paranoid schizophrenia (H)      Primary insomnia 11/15/2013     Psychosis (H) 10/22/2019     PTSD (post-traumatic stress disorder)      Restless leg syndrome      Schizoaffective disorder, bipolar type (H) 2014     Schizoaffective disorder, chronic condition (H) 2014     Suicidal ideation 2016     Tobacco abuse 2013     Treatment 2014    [facility] treatment for THC x 2 months this summer       Past Surgical History   I have reviewed this patient's surgical history and updated it with pertinent information if needed.  Past Surgical History:   Procedure Laterality Date      SECTION       x 3     FOOT SURGERY      Right and left foot fracture repair       Prior to Admission Medications   Prior to Admission Medications   Prescriptions Last Dose Informant Patient Reported? Taking?   ACCU-CHEK ABDIRAHMAN PLUS test strip  Guardian No No   Sig: TEST BEFORE MEALS AND AT BEDTIME AS DIRECTED   artificial saliva (BIOTENE MT) SOLN solution Unknown at Unknown time  No No   Sig: Swish and spit 1-2 mLs (1-2 sprays) in mouth as needed for dry mouth   benztropine (COGENTIN) 1 MG tablet   No No   Sig: TAKE (1) TABLET BY MOUTH TWICE A DAY.   blood glucose monitoring (NO BRAND SPECIFIED) meter device kit   No No   Sig: Use to test blood sugar as directed.   blood glucose monitoring (SOFTCLIX) lancets  Guardian No No   Sig: TEST BEFORE MEALS  "AND AT BEDTIME AS DIRECTED   busPIRone (BUSPAR) 15 MG tablet   No No   Sig: TAKE (1) TABLET BY MOUTH 3 TIMES DAILY   chlorproMAZINE (THORAZINE) 100 MG tablet 1/8/2020 at 0800 Guardian No Yes   Sig: Take 1 tablet (100 mg) by mouth 3 times daily   clonazePAM (KLONOPIN) 1 MG tablet 1/7/2020 at Unknown time  No Yes   Sig: TAKE ONE TABLET BY MOUTH AT BEDTIME.   divalproex sodium extended-release (DEPAKOTE ER) 250 MG 24 hr tablet   No No   Sig: TAKE 3 TABLETS BY MOUTH AT BEDTIME.   gabapentin (NEURONTIN) 600 MG tablet 1/8/2020 at 0800 Guardian No Yes   Sig: Take 2 tablets (1,200 mg) by mouth 3 times daily   hydrOXYzine (ATARAX) 50 MG tablet Unknown at Unknown time Guardian No No   Sig: Take 1 tablet (50 mg) by mouth 3 times daily as needed for anxiety   insulin aspart (NOVOLOG FLEXPEN) 100 UNIT/ML pen Unknown at Unknown time Guardian Yes No   Sig: Inject 8 Units Subcutaneous 3 times daily (with meals) Plus sliding scale  BG <140 No coverage (141-189: 1 unit, 190-239: 2 units, 240-289: 3 units, 290-339: 4 units, 340-399: 5 units, 400-449: 6 units, BG > 450: 7 units)   insulin glargine (LANTUS PEN) 100 UNIT/ML pen Unknown at Unknown time Guardian Yes No   Sig: Inject 40 Units Subcutaneous At Bedtime   metFORMIN (GLUCOPHAGE) 1000 MG tablet 1/8/2020 at 0800 Guardian No Yes   Sig: Take 1 tablet (1,000 mg) by mouth 2 times daily (with meals)   nicotine (NICORETTE) 2 MG gum Unknown at Unknown time Guardian Yes No   Sig: Place 2 mg inside cheek every 2 hours as needed for smoking cessation   polyethylene glycol (MIRALAX/GLYCOLAX) packet Unknown at Unknown time Guardian Yes No   Sig: Take 1 packet by mouth daily as needed for constipation   prazosin (MINIPRESS) 1 MG capsule   No No   Sig: TAKE 1 CAPSULE BY MOUTH DAILY AT BEDTIME.      Facility-Administered Medications: None     Allergies   Allergies   Allergen Reactions     Haloperidol Other (See Comments)     Other reaction(s): Seizures  Patient states, \"I fell down and wasn't " "able to get up.\"  Patient states, \"I fell down and wasn't able to get up.\"         Social History   I have reviewed this patient's social history and updated it with pertinent information if needed. Ginette Wood  reports that she has quit smoking. Her smoking use included cigarettes. She has a 7.50 pack-year smoking history. She has never used smokeless tobacco. She reports current drug use. Drug: Marijuana. She reports that she does not drink alcohol.    Family History   I have reviewed this patient's family history and updated it with pertinent information if needed.   Family History   Problem Relation Age of Onset     C.A.D. Mother      Diabetes Mother      C.A.D. Father      Diabetes Father        Review of Systems   CONSTITUTIONAL:  negative for  fevers, chills, sweats, fatigue, malaise, anorexia and weight loss  HEENT:  negative  RESPIRATORY:  negative  CARDIOVASCULAR:  negative  GASTROINTESTINAL:  negative  ENDOCRINE:  negative for diabetic symptoms including polyuria, polydipsia, blurred vision, polyphagia, skin dryness, pruritus, weight loss and neuropathy  MUSCULOSKELETAL:  negative    Physical Exam   Temp: 98.3  F (36.8  C) Temp src: Tympanic BP: 106/66   Heart Rate: 80 Resp: 14 SpO2: 97 % O2 Device: None (Room air)    Vital Signs with Ranges  Temp:  [98  F (36.7  C)-98.8  F (37.1  C)] 98.3  F (36.8  C)  Heart Rate:  [80-96] 80  Resp:  [14-20] 14  BP: (102-113)/(64-82) 106/66  SpO2:  [97 %-99 %] 97 %  175 lbs 9.6 oz    Constitutional: awake, well developed, disheveled, malodorous  HEENT: atraumatic, normocephalic, PERRLA, bilateral tm normal, nasal septum normal, no lymphadenopathy  Respiratory: CTA, no wheezes, rales, rhonchi, symmetric rise and fall of chest, good effort  Cardiovascular: RRR, S1, S2, no extra heart sounds, no edema   GI: normoactive bowel sounds x 4, nontender, no guarding, no masses, no organmegaly   Skin: warm, dry, intact, no rashes, no open wounds, 2 healed lumbar back " scars noted from previous back surgery.  Musculoskeletal: no abnormalities, FROM, normal gait   Neurologic: alert   Psychiatric: calm, hyperverbal, conversation is not linear     Data   Data reviewed today:   Recent Labs   Lab 01/08/20  1021   WBC 7.4   HGB 13.2   MCV 86         POTASSIUM 3.5   CHLORIDE 109   CO2 29   BUN 12   CR 0.52   ANIONGAP 5   MARIE 8.8   GLC 83       No results found for this or any previous visit (from the past 24 hour(s)).

## 2020-01-09 NOTE — TELEPHONE ENCOUNTER
PCP Guillermina Rincon    Gabapentin   600 MG   Last Written Prescription Date:  11/22/19  Last Fill Quantity: 180,   # refills: 1  Last Office Visit: 11/27/19  Future Office visit:       Routing refill request to provider for review/approval because:  Drug not on the [facility], [facility] or [facility] refill protocol or controlled substance    Chlorpromazine      Last Written Prescription Date:  11/22/19  Last Fill Quantity: 90,   # refills: 1  Last Office Visit: 11/27/19  Future Office visit:       Routing refill request to provider for review/approval because:  Drug not on the [facility], [facility] or [facility] refill protocol or controlled substance

## 2020-01-09 NOTE — PLAN OF CARE
"Face to face end of shift report communicated to oncoming shift.     Sherry Cano RN  1/8/2020  10:22 PM    Patient remains in bed the majority of this shift.  Patient up on unit for dinner, eats 50% of her dinner, compliant with medication admin.  Attempted to collect urine sample from patient \"well I don't have to go right now\"  Tried 2 more times to collect, will try again soon.    Patient wears non skid socks, remains free from falls and injury this shift.      Blood sugar 103 @ 1630  Blood sugar 127 @ 2000  Patient sleeps in between cares, when questioned regarding AH, VH, SI and HI Patient just mumbles, unable to understand.      2100:  Patient requested a snack and some orange juice, this was brought to patient, although patient did not eat it.  2200:  Attempted to collect urine sample from patient, patient reports \"I still do not have to piss\"      Face to face start of shift report received from Melissa JENNINGS RN   Patient in bed sleeping at this time.      Problem: Adult Behavioral Health Plan of Care  Goal: Patient-Specific Goal (Individualization)  Description  Pt will follow recommendations of treatment team.  Pt will be compliant with medications.  Pt will attend 50 % of groups.  Pt will sleep 6-8 hours each night.  Pt will complete ADL's independently.   1/8/2020 1848 by Sherry Cano RN  Outcome: No Change  Note:   Problem: Fall Injury Risk  Goal: Absence of Fall and Fall-Related Injury  Description  Pt will wear non skid slippers.  Pt will remain free from falls or injuries during hospitalization.   1/8/2020 1848 by Sherry Cano RN  Outcome: No Change     "

## 2020-01-09 NOTE — PLAN OF CARE
"Called and spoke with Raven and Sally from [facility] who are pt's guardians - they stated that they are not surprised pt is not welcome back at [facility] and stated \"Ananda from [facility] has contacted us to inform us of her behavior and that she has brought drugs into the facility\" Both Sally and Raven recommend that pt's needs a secure setting as they both indicate \"she will run\" from \"wherever she is placed if it is not secure\" Raven stated she personally believes that pt needs a \"dual diagnosis treatment setting that is secure [facility] is not appropriate for her\"           "

## 2020-01-09 NOTE — PROGRESS NOTES
"[facility]  Psychiatric Progress Note      Impression:   Ginette Wood is a 39 year old female who presented via [facility] ED brought in by ambulance after they received a call from [facility] tthe patient resides at [facility] and has been abusing methamphetamine. Paraphernalia was found in her room. Some eivdience of delusional behaviors and slurred speech. Raven, who is one of her guardians from [place], requested an evaluation for concerns that she is endangering other residents at [facility] by smoking meth in the facility and has not been taking her medications. She also expressed having a list of people whose \"asses\" she wants to \"kick.\" Cooperative in the ED. She did refuse to do a urine drug screen so a serum toxicology was ordered. No aggression. Admitted voluntary via Guardian. Note that Raven Juarez is not specifically assigned to [facility] as her guardian. Anyone at [place] may give consent for [facility].     Today Ginette presented overtly delusional, not the same as when she was assessed yesterday. She said that she slept very well last night but nursing report indicated about 4 hours. She admitted that she did wake every time someone came and checked on her. Conversation was tangential, nonsensical, and not reality based. She was able to respond appropriately to questions, indicating that she does not want to go back to [facility] and would like CD treatment. Reported an inability to stop using methamphetamine because she is \"bored.\" Stated she hates \"this [employer] town, everyone that works here, and every [facility],\" so she uses methamphetamine. When left to spontaneous conversation, she is unable to maintain linear, logical thought. She told this provider that she knows how to fly. When asked what she meant by this, she responded, \"when Keeley Birch , me and this black dude landed by the car - I mean, they said that the 's license was , but what else are you supposed " "to do in [place] besides roam the streets and do drugs? Unless you are on a  base and you learn how to fly. I was in the . That was fun.\"     She is saying that she needs more Thorazine. When she was discharged, she was at baseline, which is still delusional, and on 200mg of Thorazine three times daily. Unclear why this was decreased or by whom. Secondary to her jaw clenching, which is not typical, I am reluctant to increase the Thorazine at this time. Will give it a couple of days to see if this resolves with clearance of methamphetamines and amphetamines, which she was found to be positive for. In addition, she may have been refusing her medications - had been in the ED a couple of times because of meth use and medication refusal, but med recs were signed off by [facility], so this is difficult to determine. Will draw a Depakote level as well.     Previous medication trials documented:  Zyprexa - weight gain, not fully effective  Invega - ineffective  Tegretol - poorly tolerated  Lithium - poorly tolerated, severe sedation  Clozaril - irritability, hyperthermia, and persistent tachycardia  Depakote - Weight gain and poor mood control (this was still restarted during admission in 11/19)  Thorazine - notable improvement on 200mg tid, decreased in 11/19 on an outpatient visit, resulting in readmission.  Gabapentin   Buspar  Klonopin  Cogentin           Diagnoses:     Schizoaffective Disorder, Bipolar Type  PTSD, by history  R/O Other stimulant use disorder, methamphetamine, with induced perceptual disorder    Attestation:  Patient has been seen and evaluated by me,  Gokul Hathaway NP          Interim History:   The patient's care was discussed with the treatment team and chart notes were reviewed.    BEHAVIORAL TEAM DISCUSSION    Progress: Decompensated  Continued Stay Criteria/Rationale: Delusional, poor sleep, no safe discharge plan, medication adjustments, methamphetamine " withdrawal  Medical/Physical: Diabetes Mellitus Type 2  Precautions: No   Falls precaution?: Yes  Behavioral Orders   Procedures     Code 1 - Restrict to Unit     Routine Programming     As clinically indicated     Status 15     Every 15 minutes.     Plan: Continue current medications with consideration to increase Thorazine once methamphetamine clears system and we are better able to assess for cause of bruxism and trismus. Depakote level and hepatic enzymes ordered for tonight. Guardians would like placement in a more secure setting secondary to ongoing drug use, flight risk, and non-compliance with medications. [facility] do not feel she is appropriate for their facility.  Rationale for change in precautions or plan: Methamphetamine use in the last 3 days - still clearing system. Will consider further changes once this resolves.       Participants: Gokul Hathaway NP,  Dennise Herrera LICSW, Angie Laws LSW,  Bceca Chery LSW, Jim Barrios LSW, Mady Holman OT, Natalia Costello OT, Nursing          Medications:   I have reviewed this patient's current medications  Prescription Medications as of 1/9/2020       Rx Number Disp Refills Start End Last Dispensed Date Next Fill Date Owning Pharmacy    ACCU-CHEK ABDIRAHMAN PLUS test strip  100 strip 3 11/13/2019    [address]    Sig: TEST BEFORE MEALS AND AT BEDTIME AS DIRECTED    Class: E-Prescribe    artificial saliva (BIOTENE MT) SOLN solution  44.3 mL 3 11/27/2019    [address]    Sig: Swish and spit 1-2 mLs (1-2 sprays) in mouth as needed for dry mouth    Class: E-Prescribe    Route: Swish & Spit    benztropine (COGENTIN) 1 MG tablet  56 tablet 1 1/9/2020    [address]    Sig: TAKE (1) TABLET BY MOUTH TWICE A DAY.    Class: E-Prescribe    blood glucose monitoring (NO BRAND SPECIFIED) meter device kit  1 kit 0 12/26/2019     [address]    Sig: Use to test blood sugar as directed.    Class: E-Prescribe    blood glucose monitoring (SOFTCLIX) lancets  100 each 11 11/11/2019    [address]    Sig: TEST BEFORE MEALS AND AT BEDTIME AS DIRECTED    Class: E-Prescribe    busPIRone (BUSPAR) 15 MG tablet  84 tablet 1 1/9/2020    [address]    Sig: TAKE (1) TABLET BY MOUTH 3 TIMES DAILY    Class: E-Prescribe    chlorproMAZINE (THORAZINE) 100 MG tablet  84 tablet 0 1/9/2020    [address]    Sig: TAKE (1) TABLET BY MOUTH 3 TIMES DAILY    Class: E-Prescribe    clonazePAM (KLONOPIN) 1 MG tablet  30 tablet 0 12/20/2019    [address]    Sig: TAKE ONE TABLET BY MOUTH AT BEDTIME.    Class: E-Prescribe    divalproex sodium extended-release (DEPAKOTE ER) 250 MG 24 hr tablet  84 tablet 1 1/9/2020    [address]    Sig: TAKE 3 TABLETS BY MOUTH AT BEDTIME.    Class: E-Prescribe    gabapentin (NEURONTIN) 600 MG tablet  168 tablet 0 1/9/2020    [address]    Sig: TAKE (2) TABLETS BY MOUTH THREE TIMES DAILY.    Class: E-Prescribe    hydrOXYzine (ATARAX) 50 MG tablet  90 tablet 0 11/4/2019    [place] [address]    Sig: Take 1 tablet (50 mg) by mouth 3 times daily as needed for anxiety    Class: E-Prescribe    Route: Oral    insulin aspart (NOVOLOG FLEXPEN) 100 UNIT/ML pen        [place] [address]    Sig: Inject 8 Units Subcutaneous 3 times daily (with meals) Plus sliding scale  BG <140 No coverage (141-189: 1 unit, 190-239: 2 units, 240-289: 3 units, 290-339: 4 units, 340-399: 5 units, 400-449: 6 units, BG > 450: 7 units)    Class: Historical    Route:  Subcutaneous    insulin glargine (LANTUS PEN) 100 UNIT/ML pen        [address]    Sig: Inject 40 Units Subcutaneous At Bedtime    Class: Historical    Notes to Pharmacy: If Lantus is not covered by insurance, may substitute Basaglar at same dose and frequency.      Route: Subcutaneous    metFORMIN (GLUCOPHAGE) 1000 MG tablet  60 tablet 3 11/22/2019    [address]    Sig: Take 1 tablet (1,000 mg) by mouth 2 times daily (with meals)    Class: E-Prescribe    Route: Oral    nicotine (NICORETTE) 2 MG gum        [address]    Sig: Place 2 mg inside cheek every 2 hours as needed for smoking cessation    Class: Historical    Route: Buccal    polyethylene glycol (MIRALAX/GLYCOLAX) packet        [address]    Sig: Take 1 packet by mouth daily as needed for constipation    Class: Historical    Route: Oral    prazosin (MINIPRESS) 1 MG capsule  28 capsule 1 1/9/2020    [address]    Sig: TAKE 1 CAPSULE BY MOUTH DAILY AT BEDTIME.    Class: E-Prescribe      Hospital Medications as of 1/9/2020       Dose Frequency Start End    acetaminophen (TYLENOL) tablet 650 mg 650 mg EVERY 4 HOURS PRN 1/8/2020     Admin Instructions: Do not use if the patient has significant liver disease. MAX acetaminophen = 4000 mg/24 hrs.  MAX acetaminophen <3000 mg/24 hrs for patients > or = 65 years old.<BR>Maximum acetaminophen dose from all sources = 75 mg/kg/day not to exceed 4 grams/day.    Class: E-Prescribe    Route: Oral    alum & mag hydroxide-simethicone (MYLANTA ES/MAALOX  ES) suspension 30 mL 30 mL EVERY 4 HOURS PRN 1/8/2020     Admin Instructions: Shake well.    Class: E-Prescribe    Route: Oral    artificial saliva (BIOTENE MT) solution 1-2 spray 1-2 spray 4 TIMES DAILY PRN 1/8/2020     Class: E-Prescribe     "Route: Swish & Spit    benztropine (COGENTIN) tablet 1 mg 1 mg 2 TIMES DAILY 1/8/2020     Class: E-Prescribe    Route: Oral    bisacodyl (DULCOLAX) Suppository 10 mg 10 mg DAILY PRN 1/8/2020     Admin Instructions: Hold for loose stools.    Class: E-Prescribe    Route: Rectal    busPIRone (BUSPAR) tablet 15 mg 15 mg 3 TIMES DAILY 1/8/2020     Class: E-Prescribe    Route: Oral    chlorproMAZINE (THORAZINE) tablet 100 mg 100 mg 3 TIMES DAILY 1/8/2020     Class: E-Prescribe    Route: Oral    clonazePAM (klonoPIN) tablet 1 mg 1 mg AT BEDTIME 1/8/2020     Class: E-Prescribe    Route: Oral    dextrose 50 % injection 25-50 mL 25-50 mL EVERY 15 MIN PRN 1/8/2020     Admin Instructions: Use if have IV access, BG less than 70 mg/dL and meet dose criteria below:<BR>Dose if conscious and alert (or disorientated) and NPO = 25 mL<BR>Dose if unconscious / not alert = 50 mL<BR><BR>Give first dose for initial blood glucose less than 70  mg/dL.  If blood glucose at 15 minute recheck is less than or equal to 100 mg/dL continue to administer carbohydrate treatment every 15 minutes, as needed, based on blood glucose and assessment parameters until blood glucose level is above 100 mg/dL.<BR>Vesicant. For ordered doses up to 25 g, give IV Push undiluted. Give each 5g over 1 minute.    Class: E-Prescribe    Route: Intravenous    Linked Group 1:  \"Or\" Linked Group Details        divalproex sodium extended-release (DEPAKOTE ER) 24 hr tablet 750 mg 750 mg AT BEDTIME 1/8/2020     Admin Instructions: DO NOT CRUSH.    Class: E-Prescribe    Route: Oral    gabapentin (NEURONTIN) tablet 1,200 mg 1,200 mg 3 TIMES DAILY 1/8/2020     Class: E-Prescribe    Route: Oral    glucagon injection 1 mg 1 mg EVERY 15 MIN PRN 1/8/2020     Admin Instructions: May give SQ or IM. ONLY use glucagon IF patient has NO IV access AND is UNABLE to swallow AND blood glucose is LESS than or EQUAL to 50 mg/dL.<BR>If ordered IV, give IV Push over 1 minute. Reconstitute with " "1mL sterile water.    Class: E-Prescribe    Route: Subcutaneous    Linked Group 1:  \"Or\" Linked Group Details        glucose gel 15-30 g 15-30 g EVERY 15 MIN PRN 1/8/2020     Admin Instructions: Give first dose for initial blood glucose less than 70 mg/dL per the dosing instructions below. <BR>If blood glucose at 15 minute rechecks is still less than or equal to 100 mg/dL, continue to administer doses per blood glucose parameters every 15 minutes, as needed, until blood glucose level is above 100 mg/dL.<BR><BR>Dosing Instructions:<BR>~If patient is conscious and able to swallow and NO enteral tube<BR>For initial BG 51-69mg/dL OR 15 minute recheck BG 51- 100 mg/dL - give 15 g<BR>For BG less than or equal to 50 mg/dL - give 30 g<BR>~ If Enteral tube<BR>For initial BG 51-69mg/dL OR 15 minute recheck BG 51- 100 mg/dL - give apple juice 120 mL (4 oz or 15 g of CHO) via enteral tube<BR>For BG less than or equal to 50 mg/dL - Give apple juice 240 mL (8 oz or 30 g of CHO) via enteral tube<BR>~Oral gel is preferable for conscious and able to swallow patient. <BR>~IF gel unavailable or patient refuses may provide apple juice per Enteral tube dosing instructions.<BR>Document juice on I and O flowsheet.    Class: E-Prescribe    Route: Oral    Linked Group 1:  \"Or\" Linked Group Details        hydrOXYzine (ATARAX) tablet  mg  mg EVERY 4 HOURS PRN 1/8/2020     Admin Instructions: Administer only if there is no other oral medication ordered prn for anxiety.    Class: E-Prescribe    Route: Oral    insulin aspart (NovoLOG) inj (RAPID ACTING) 8 Units 3 TIMES DAILY WITH MEALS 1/8/2020     Admin Instructions: Do not give if pre-prandial glucose is less than 60 mg/dL.<BR>If given at mealtime, administer within 30 minutes of start of meal    Class: E-Prescribe    Route: Subcutaneous    insulin aspart (NovoLOG) inj (RAPID ACTING) 1-7 Units 3 TIMES DAILY BEFORE MEALS 1/8/2020     Admin Instructions: Correction Scale - MEDIUM " INSULIN RESISTANCE DOSING   <BR>Do Not give Correction Insulin if Pre-Meal BG less than 140. <BR>For Pre-Meal  - 189 give 1 unit. <BR>For Pre-Meal  - 239 give 2 units. <BR>For Pre-Meal  - 289 give 3 units. <BR>For Pre-Meal  - 339 give 4 units. <BR>For Pre-Meal - 399 give 5 units. <BR>For Pre-Meal -449 give 6 units<BR>For Pre-Meal BG greater than or equal to 450 give 7 units. <BR>To be given with prandial insulin, and based on pre-meal blood glucose.  <BR>Notify provider if glucose greater than or equal to 350 mg/dL after administration of correction dose.<BR>If given at mealtime, administer within 30 minutes of start of meal    Class: E-Prescribe    Route: Subcutaneous    insulin aspart (NovoLOG) inj (RAPID ACTING) 1-5 Units AT BEDTIME 1/8/2020     Admin Instructions: MEDIUM INSULIN RESISTANCE DOSING  <BR>Do Not give Bedtime Correction Insulin if BG less than  200. <BR>For  - 249 give 1 units. <BR>For  - 299 give 2 units. <BR>For  - 349 give 3 units. <BR>For  -399 give 4 units. <BR>For BG greater than or equal to 400 give 5 units.<BR>Notify provider if glucose greater than or equal to 350 mg/dL after administration of correction dose.<BR>If given at mealtime, administer within 30 minutes of start of meal    Class: E-Prescribe    Route: Subcutaneous    insulin glargine (LANTUS PEN) injection 40 Units 40 Units AT BEDTIME 1/8/2020     Class: E-Prescribe    Route: Subcutaneous    magnesium hydroxide (MILK OF MAGNESIA) suspension 30 mL 30 mL AT BEDTIME PRN 1/8/2020     Admin Instructions: Shake well.<BR>Hold for loose stools.    Class: E-Prescribe    Route: Oral    metFORMIN (GLUCOPHAGE) tablet 1,000 mg 1,000 mg 2 TIMES DAILY WITH MEALS 1/8/2020     Admin Instructions: If the patient receives intravenous, iodinated contrast and patient GFR is greater than 60 mL/min/1.7m2, continue metformin.  Contact provider for 'hold' or 'no hold' instructions if no GFR or if  "GFR is less than 60 mL/min/1.7m2.    Class: E-Prescribe    Route: Oral    nicotine (NICORETTE) gum 2 mg 2 mg EVERY 2 HOURS PRN 1/8/2020     Admin Instructions: Gum should be chewed slowly until it tingles, then placed between cheek and gum:  when tingle gone, repeat process until tingle gone (about 30 minutes).    Class: E-Prescribe    Route: Buccal    OLANZapine (zyPREXA) injection 10 mg 10 mg 3 TIMES DAILY PRN 1/8/2020     Admin Instructions: Dissolve the contents of the 10 mg vial using 2.1 mL of Sterile Water for Injection to provide a solution containing 5 mg/mL of olanzapine. Withdraw the ordered dose from vial. Use immediately (within 1 hour) after reconstitution.  Discard any unused portion.    Class: E-Prescribe    Route: Intramuscular    Linked Group 2:  \"Or\" Linked Group Details        OLANZapine (zyPREXA) tablet 10 mg 10 mg 3 TIMES DAILY PRN 1/8/2020     Admin Instructions: Combined IM and PO doses may significantly increase the risk of orthostatic hypotension at 30 mg per day or higher.    Class: E-Prescribe    Route: Oral    Linked Group 2:  \"Or\" Linked Group Details        polyethylene glycol (MIRALAX/GLYCOLAX) Packet 17 g 17 g DAILY PRN 1/8/2020     Admin Instructions: 1 Packet = 17 grams. Mix each gram with at least 1/2 ounce (15 mL) of water - <BR>8 ounces  for 17 g dose, 4 ounces for 8.5 g dose, 2 ounces for 4 g dose.<BR>Follow with the same volume of water.<BR>Hold for loose stools.<BR>    Class: E-Prescribe    Route: Oral    prazosin (MINIPRESS) capsule 1 mg 1 mg AT BEDTIME 1/8/2020     Class: E-Prescribe    Route: Oral    traZODone (DESYREL) tablet 50 mg 50 mg AT BEDTIME PRN 1/8/2020     Admin Instructions: May repeat x 1    Class: E-Prescribe    Route: Oral         10 point ROS positive for bruxism and trismus       Allergies:     Allergies   Allergen Reactions     Haloperidol Other (See Comments)     Other reaction(s): Seizures  Patient states, \"I fell down and wasn't able to get " "up.\"  Patient states, \"I fell down and wasn't able to get up.\"              Psychiatric Examination:   /66   Temp 98.3  F (36.8  C) (Tympanic)   Resp 14   Ht 1.651 m (5' 5\")   Wt 79.7 kg (175 lb 9.6 oz)   SpO2 97%   BMI 29.22 kg/m    Weight is 175 lbs 9.6 oz  Body mass index is 29.22 kg/m .    Appearance:  awake, alert, poorly groomed and disheveled   Attitude:  cooperative  Eye Contact:  good  Mood:  labile  Affect:  labile  Speech:  clear, coherent but loud, pressured, illogical, and tangential  Psychomotor Behavior:  Trismus and bruxism, no other involuntary movements noted, no agitation or restlessness  Thought Process:  Goal oriented, able to answer questions in a linear fashion; however, unable to maintain linear spontaneous conversation; illogical and tangential   Associations:  JUSTA noted at times  Thought Content:  No SI/HI, Delusional content  Insight:  limited  Judgment:  limited  Oriented to:  time, person, and place  Attention Span and Concentration:  limited  Recent and Remote Memory:  fair - impacted by delusional beliefs and drug use  Fund of Knowledge: appropriate  Muscle Strength and Tone: normal  Gait and Station: Normal           Labs:     Results for orders placed or performed during the hospital encounter of 01/08/20   CBC with platelets differential     Status: None   Result Value Ref Range    WBC 7.4 4.0 - 11.0 10e9/L    RBC Count 4.55 3.8 - 5.2 10e12/L    Hemoglobin 13.2 11.7 - 15.7 g/dL    Hematocrit 39.3 35.0 - 47.0 %    MCV 86 78 - 100 fl    MCH 29.0 26.5 - 33.0 pg    MCHC 33.6 31.5 - 36.5 g/dL    RDW 13.4 10.0 - 15.0 %    Platelet Count 281 150 - 450 10e9/L    Diff Method Automated Method     % Neutrophils 65.4 %    % Lymphocytes 24.7 %    % Monocytes 7.7 %    % Eosinophils 1.4 %    % Basophils 0.4 %    % Immature Granulocytes 0.4 %    Nucleated RBCs 0 0 /100    Absolute Neutrophil 4.8 1.6 - 8.3 10e9/L    Absolute Lymphocytes 1.8 0.8 - 5.3 10e9/L    Absolute Monocytes 0.6 0.0 " - 1.3 10e9/L    Absolute Eosinophils 0.1 0.0 - 0.7 10e9/L    Absolute Basophils 0.0 0.0 - 0.2 10e9/L    Abs Immature Granulocytes 0.0 0 - 0.4 10e9/L    Absolute Nucleated RBC 0.0    Basic metabolic panel     Status: None   Result Value Ref Range    Sodium 143 133 - 144 mmol/L    Potassium 3.5 3.4 - 5.3 mmol/L    Chloride 109 94 - 109 mmol/L    Carbon Dioxide 29 20 - 32 mmol/L    Anion Gap 5 3 - 14 mmol/L    Glucose 83 70 - 99 mg/dL    Urea Nitrogen 12 7 - 30 mg/dL    Creatinine 0.52 0.52 - 1.04 mg/dL    GFR Estimate >90 >60 mL/min/[1.73_m2]    GFR Estimate If Black >90 >60 mL/min/[1.73_m2]    Calcium 8.8 8.5 - 10.1 mg/dL   UA reflex to Microscopic and Culture     Status: Abnormal   Result Value Ref Range    Color Urine Yellow     Appearance Urine Slightly Cloudy     Glucose Urine Negative NEG^Negative mg/dL    Bilirubin Urine Negative NEG^Negative    Ketones Urine 10 (A) NEG^Negative mg/dL    Specific Gravity Urine 1.031 1.003 - 1.035    Blood Urine Negative NEG^Negative    pH Urine 6.0 4.7 - 8.0 pH    Protein Albumin Urine 30 (A) NEG^Negative mg/dL    Urobilinogen mg/dL Normal 0.0 - 2.0 mg/dL    Nitrite Urine Negative NEG^Negative    Leukocyte Esterase Urine Negative NEG^Negative    Source Midstream Urine     RBC Urine 2 0 - 2 /HPF    WBC Urine 7 (H) 0 - 5 /HPF    Bacteria Urine None (A) NEG^Negative /HPF    Squamous Epithelial /HPF Urine 3 (H) 0 - 1 /HPF    Mucous Urine Present (A) NEG^Negative /LPF   Urine Drugs of Abuse Screen Panel 13     Status: Abnormal   Result Value Ref Range    Cannabinoids (07-kfl-9-carboxy-9-THC) Not Detected NDET^Not Detected ng/mL    Phencyclidine (Phencyclidine) Not Detected NDET^Not Detected ng/mL    Cocaine (Benzoylecgonine) Not Detected NDET^Not Detected ng/mL    Methamphetamine (d-Methamphetamine) Detected, Abnormal Result (A) NDET^Not Detected ng/mL    Opiates (Morphine) Not Detected NDET^Not Detected ng/mL    Amphetamine (d-Amphetamine) Detected, Abnormal Result (A) NDET^Not  Detected ng/mL    Benzodiazepines (Nordiazepam) Not Detected NDET^Not Detected ng/mL    Tricyclic Antidepressants (Desipramine) Not Detected NDET^Not Detected ng/mL    Methadone (Methadone) Not Detected NDET^Not Detected ng/mL    Barbiturates (Butalbital) Not Detected NDET^Not Detected ng/mL    Oxycodone (Oxycodone) Not Detected NDET^Not Detected ng/mL    Propoxyphene (Norpropoxyphene) Not Detected NDET^Not Detected ng/mL    Buprenorphine (Buprenorphine) Not Detected NDET^Not Detected ng/mL   Hemoglobin A1c     Status: Abnormal   Result Value Ref Range    Hemoglobin A1C 5.8 (H) 0 - 5.6 %   Estimated Average Glucose     Status: None   Result Value Ref Range    Estimated Average Glucose 120 mg/dL   Glucose by meter     Status: Abnormal   Result Value Ref Range    Glucose 103 (H) 70 - 99 mg/dL   Glucose by meter     Status: Abnormal   Result Value Ref Range    Glucose 127 (H) 70 - 99 mg/dL   Glucose by meter     Status: None   Result Value Ref Range    Glucose 85 70 - 99 mg/dL   Glucose by meter     Status: None   Result Value Ref Range    Glucose 95 70 - 99 mg/dL   Glucose by meter     Status: Abnormal   Result Value Ref Range    Glucose 212 (H) 70 - 99 mg/dL          Plan:   Continue current medications:  Cogentin 1mg twice daily  Buspar 15mg three times daily  Thorazine 100mg three times daily  Klonopin 1mg at bedtime  Depakote ER 750mg at bed  Gabapentin 1200 three times daily  Metformin 1000mg twice daily with meals  Prazosin 1mg at bed  Sliding scale insulin with coverage per medical  Obtain VPA and Hepatic enzymes tonight.  Monitor jaw clenching.  ELOS: >5days secondary to ongoing delusions, medication non-compliance, and methamphetamine abuse contributing to inability to discharge to a lower level of care than a secure setting. Current facility of residence unwilling to take her back.

## 2020-01-10 LAB
GLUCOSE BLDC GLUCOMTR-MCNC: 119 MG/DL (ref 70–99)
GLUCOSE BLDC GLUCOMTR-MCNC: 137 MG/DL (ref 70–99)
GLUCOSE BLDC GLUCOMTR-MCNC: 164 MG/DL (ref 70–99)
GLUCOSE BLDC GLUCOMTR-MCNC: 168 MG/DL (ref 70–99)
GLUCOSE BLDC GLUCOMTR-MCNC: 96 MG/DL (ref 70–99)

## 2020-01-10 PROCEDURE — 25000132 ZZH RX MED GY IP 250 OP 250 PS 637: Performed by: NURSE PRACTITIONER

## 2020-01-10 PROCEDURE — 00000146 ZZHCL STATISTIC GLUCOSE BY METER IP

## 2020-01-10 PROCEDURE — 12400000 ZZH R&B MH

## 2020-01-10 PROCEDURE — 99231 SBSQ HOSP IP/OBS SF/LOW 25: CPT | Performed by: NURSE PRACTITIONER

## 2020-01-10 RX ADMIN — CHLORPROMAZINE HYDROCHLORIDE 100 MG: 100 TABLET, SUGAR COATED ORAL at 20:10

## 2020-01-10 RX ADMIN — INSULIN ASPART 8 UNITS: 100 INJECTION, SOLUTION INTRAVENOUS; SUBCUTANEOUS at 08:16

## 2020-01-10 RX ADMIN — INSULIN ASPART 8 UNITS: 100 INJECTION, SOLUTION INTRAVENOUS; SUBCUTANEOUS at 12:37

## 2020-01-10 RX ADMIN — INSULIN GLARGINE 40 UNITS: 100 INJECTION, SOLUTION SUBCUTANEOUS at 20:23

## 2020-01-10 RX ADMIN — CHLORPROMAZINE HYDROCHLORIDE 100 MG: 100 TABLET, SUGAR COATED ORAL at 08:15

## 2020-01-10 RX ADMIN — GABAPENTIN 1200 MG: 600 TABLET, FILM COATED ORAL at 13:10

## 2020-01-10 RX ADMIN — INSULIN ASPART 8 UNITS: 100 INJECTION, SOLUTION INTRAVENOUS; SUBCUTANEOUS at 16:52

## 2020-01-10 RX ADMIN — CLONAZEPAM 1 MG: 1 TABLET ORAL at 20:10

## 2020-01-10 RX ADMIN — DIVALPROEX SODIUM 750 MG: 500 TABLET, EXTENDED RELEASE ORAL at 20:10

## 2020-01-10 RX ADMIN — GABAPENTIN 1200 MG: 600 TABLET, FILM COATED ORAL at 08:14

## 2020-01-10 RX ADMIN — CHLORPROMAZINE HYDROCHLORIDE 100 MG: 100 TABLET, SUGAR COATED ORAL at 13:10

## 2020-01-10 RX ADMIN — BUSPIRONE HYDROCHLORIDE 15 MG: 10 TABLET ORAL at 20:10

## 2020-01-10 RX ADMIN — METFORMIN HYDROCHLORIDE 1000 MG: 500 TABLET, FILM COATED ORAL at 16:49

## 2020-01-10 RX ADMIN — BUSPIRONE HYDROCHLORIDE 15 MG: 10 TABLET ORAL at 13:10

## 2020-01-10 RX ADMIN — BENZTROPINE MESYLATE 1 MG: 1 TABLET ORAL at 20:10

## 2020-01-10 RX ADMIN — BENZTROPINE MESYLATE 1 MG: 1 TABLET ORAL at 08:15

## 2020-01-10 RX ADMIN — PRAZOSIN HYDROCHLORIDE 1 MG: 1 CAPSULE ORAL at 20:11

## 2020-01-10 RX ADMIN — BUSPIRONE HYDROCHLORIDE 15 MG: 10 TABLET ORAL at 08:14

## 2020-01-10 RX ADMIN — METFORMIN HYDROCHLORIDE 1000 MG: 500 TABLET, FILM COATED ORAL at 08:14

## 2020-01-10 RX ADMIN — GABAPENTIN 1200 MG: 600 TABLET, FILM COATED ORAL at 20:11

## 2020-01-10 ASSESSMENT — ACTIVITIES OF DAILY LIVING (ADL)
DRESS: SCRUBS (BEHAVIORAL HEALTH);INDEPENDENT
HYGIENE/GROOMING: INDEPENDENT
ORAL_HYGIENE: INDEPENDENT

## 2020-01-10 NOTE — PLAN OF CARE
"Face to face end of shift report communicated to oncoming shift.     Sherry Cano RN  1/10/2020  11:18 A    Patient compliant with all medication admin this shift.  Patient does not attend groups.  Patient sleeps between cares, Patient completes ADL's  Patient's conversation is minimal, pertains to the present, but very difficult to understand  Patient denies SI, HI, AH and VH appears responding.    Patient continues to sleep for remainder of the morning, up on the unit for lunch, eats 25%, grabs coloring supplies and sits at table in lounge, asks this writer to contact provider for her to speak with.  It was noted provider was here when she was sleeping with no response, notified provider, provider back on unit to talk with patient.    Patient says \"thank you so much, I really just want to get out of here\"    Face to face start of shift report received from Selina CHEUNG RN  patient in bed sleeping at this time.    Will continue to monitor.       Problem: Adult Behavioral Health Plan of Care  Goal: Patient-Specific Goal (Individualization)  Description  Pt will follow recommendations of treatment team.  Pt will be compliant with medications.  Pt will attend 50 % of groups.  Pt will sleep 6-8 hours each night.  Pt will complete ADL's independently.   Outcome: No Change  Note:   Problem: Thought Process Alteration  Goal: Optimal Thought Clarity  Description  Patient will be able to hold reality based conversations prior to discharge.     Outcome: No Change     "

## 2020-01-10 NOTE — PROGRESS NOTES
[facility]      Impression:   Ginette is laying in bed this morning and is quite somnolent. She rouses briefly when her name is called. She reports the cancer is making her tired. When asked what cancer she mumbles something about water her mother made her drink and how she cannot smoke weed because it induces a painful cough. Shortly after muttering this sentence she fell back asleep and roused again when her name was called. She reports she is finally sleeping well after not sleeping for several days. She is difficult to maintain a meaningful conversation with due to the somnolence. Will not adjust medications today.     Previous medication trials documented:  Zyprexa - weight gain, not fully effective  Invega - ineffective  Tegretol - poorly tolerated  Lithium - poorly tolerated, severe sedation  Clozaril - irritability, hyperthermia, and persistent tachycardia  Depakote - Weight gain and poor mood control (this was still restarted during admission in 11/19)  Thorazine - notable improvement on 200mg tid, decreased in 11/19 on an outpatient visit, resulting in readmission.  Gabapentin   Buspar  Klonopin  Cogentin           Diagnoses:     Schizoaffective Disorder, Bipolar Type  PTSD, by history  R/O Other stimulant use disorder, methamphetamine, with induced perceptual disorder    Attestation:  Patient has been seen and evaluated by me,  Loren Zuleta NP          Interim History:   The patient's care was discussed with the treatment team and chart notes were reviewed.    BEHAVIORAL TEAM DISCUSSION    Progress: Decompensated  Continued Stay Criteria/Rationale: Delusional, poor sleep, no safe discharge plan, medication adjustments, methamphetamine withdrawal  Medical/Physical: Diabetes Mellitus Type 2  Precautions: No   Falls precaution?: Yes  Behavioral Orders   Procedures     Code 1 - Restrict to Unit     Routine Programming     As clinically indicated     Status 15      Every 15 minutes.     Plan: Continue current medications with consideration to increase Thorazine once methamphetamine clears system and we are better able to assess for cause of bruxism and trismus. Guardians would like placement in a more secure setting secondary to ongoing drug use, flight risk, and non-compliance with medications. [facility] do not feel she is appropriate for their facility.  Rationale for change in precautions or plan: Methamphetamine use in the last 3 days - still clearing system. Will consider further changes once this resolves.       Participants: Gokul aHthaway NP,  Dennise Herrera LICSW, Angie Laws LSW,  Becca Chery LSW, Jim Barrios LSW, Mady Holman OT, Natalia Costello OT, Nursing          Medications:   I have reviewed this patient's current medications  Prescription Medications as of 1/10/2020       Rx Number Disp Refills Start End Last Dispensed Date Next Fill Date Owning Pharmacy    ACCU-CHEK ABDIRAHMAN PLUS test strip  100 strip 3 11/13/2019    [address]    Sig: TEST BEFORE MEALS AND AT BEDTIME AS DIRECTED    Class: E-Prescribe    artificial saliva (BIOTENE MT) SOLN solution  44.3 mL 3 11/27/2019    [address]    Sig: Swish and spit 1-2 mLs (1-2 sprays) in mouth as needed for dry mouth    Class: E-Prescribe    Route: Swish & Spit    benztropine (COGENTIN) 1 MG tablet  56 tablet 1 1/9/2020    [address]    Sig: TAKE (1) TABLET BY MOUTH TWICE A DAY.    Class: E-Prescribe    blood glucose monitoring (NO BRAND SPECIFIED) meter device kit  1 kit 0 12/26/2019    [address]    Sig: Use to test blood sugar as directed.    Class: E-Prescribe    blood glucose monitoring (SOFTCLIX) lancets  100 each 11 11/11/2019    [address]    Sig: TEST BEFORE  MEALS AND AT BEDTIME AS DIRECTED    Class: E-Prescribe    busPIRone (BUSPAR) 15 MG tablet  84 tablet 1 1/9/2020    [address]    Sig: TAKE (1) TABLET BY MOUTH 3 TIMES DAILY    Class: E-Prescribe    chlorproMAZINE (THORAZINE) 100 MG tablet  84 tablet 0 1/9/2020    [address]    Sig: TAKE (1) TABLET BY MOUTH 3 TIMES DAILY    Class: E-Prescribe    clonazePAM (KLONOPIN) 1 MG tablet  30 tablet 0 12/20/2019    [address]    Sig: TAKE ONE TABLET BY MOUTH AT BEDTIME.    Class: E-Prescribe    divalproex sodium extended-release (DEPAKOTE ER) 250 MG 24 hr tablet  84 tablet 1 1/9/2020    [address]    Sig: TAKE 3 TABLETS BY MOUTH AT BEDTIME.    Class: E-Prescribe    gabapentin (NEURONTIN) 600 MG tablet  168 tablet 0 1/9/2020    [address]    Sig: TAKE (2) TABLETS BY MOUTH THREE TIMES DAILY.    Class: E-Prescribe    hydrOXYzine (ATARAX) 50 MG tablet  90 tablet 0 11/4/2019    [address]    Sig: Take 1 tablet (50 mg) by mouth 3 times daily as needed for anxiety    Class: E-Prescribe    Route: Oral    insulin aspart (NOVOLOG FLEXPEN) 100 UNIT/ML pen        [address]    Sig: Inject 8 Units Subcutaneous 3 times daily (with meals) Plus sliding scale  BG <140 No coverage (141-189: 1 unit, 190-239: 2 units, 240-289: 3 units, 290-339: 4 units, 340-399: 5 units, 400-449: 6 units, BG > 450: 7 units)    Class: Historical    Route: Subcutaneous    insulin glargine (LANTUS PEN) 100 UNIT/ML pen        [address]    Sig: Inject 40 Units Subcutaneous At Bedtime    Class: Historical    Notes to Pharmacy: If Lantus is not covered by insurance, may substitute Basaglar at  same dose and frequency.      Route: Subcutaneous    metFORMIN (GLUCOPHAGE) 1000 MG tablet  60 tablet 3 11/22/2019    [address]    Sig: Take 1 tablet (1,000 mg) by mouth 2 times daily (with meals)    Class: E-Prescribe    Route: Oral    nicotine (NICORETTE) 2 MG gum        [address]    Sig: Place 2 mg inside cheek every 2 hours as needed for smoking cessation    Class: Historical    Route: Buccal    polyethylene glycol (MIRALAX/GLYCOLAX) packet        [facility] [place] [id number]    Sig: Take 1 packet by mouth daily as needed for constipation    Class: Historical    Route: Oral    prazosin (MINIPRESS) 1 MG capsule  28 capsule 1 1/9/2020    [address]    Sig: TAKE 1 CAPSULE BY MOUTH DAILY AT BEDTIME.    Class: E-Prescribe      Hospital Medications as of 1/10/2020       Dose Frequency Start End    acetaminophen (TYLENOL) tablet 650 mg 650 mg EVERY 4 HOURS PRN 1/8/2020     Admin Instructions: Do not use if the patient has significant liver disease. MAX acetaminophen = 4000 mg/24 hrs.  MAX acetaminophen <3000 mg/24 hrs for patients > or = 65 years old.Maximum acetaminophen dose from all sources = 75 mg/kg/day not to exceed 4 grams/day.    Class: E-Prescribe    Route: Oral    alum & mag hydroxide-simethicone (MYLANTA ES/MAALOX  ES) suspension 30 mL 30 mL EVERY 4 HOURS PRN 1/8/2020     Admin Instructions: Shake well.    Class: E-Prescribe    Route: Oral    artificial saliva (BIOTENE MT) solution 1-2 spray 1-2 spray 4 TIMES DAILY PRN 1/8/2020     Class: E-Prescribe    Route: Swish & Spit    benztropine (COGENTIN) tablet 1 mg 1 mg 2 TIMES DAILY 1/8/2020     Class: E-Prescribe    Route: Oral    bisacodyl (DULCOLAX) Suppository 10 mg 10 mg DAILY PRN 1/8/2020     Admin Instructions: Hold for loose stools.    Class: E-Prescribe    Route: Rectal     "busPIRone (BUSPAR) tablet 15 mg 15 mg 3 TIMES DAILY 1/8/2020     Class: E-Prescribe    Route: Oral    chlorproMAZINE (THORAZINE) tablet 100 mg 100 mg 3 TIMES DAILY 1/8/2020     Class: E-Prescribe    Route: Oral    clonazePAM (klonoPIN) tablet 1 mg 1 mg AT BEDTIME 1/8/2020     Class: E-Prescribe    Route: Oral    dextrose 50 % injection 25-50 mL 25-50 mL EVERY 15 MIN PRN 1/8/2020     Admin Instructions: Use if have IV access, BG less than 70 mg/dL and meet dose criteria below:Dose if conscious and alert (or disorientated) and NPO = 25 mLDose if unconscious / not alert = 50 mLGive first dose for initial blood glucose less than 70  mg/dL.  If blood glucose at 15 minute recheck is less than or equal to 100 mg/dL continue to administer carbohydrate treatment every 15 minutes, as needed, based on blood glucose and assessment parameters until blood glucose level is above 100 mg/dL.Vesicant. For ordered doses up to 25 g, give IV Push undiluted. Give each 5g over 1 minute.    Class: E-Prescribe    Route: Intravenous    Linked Group 1:  \"Or\" Linked Group Details        divalproex sodium extended-release (DEPAKOTE ER) 24 hr tablet 750 mg 750 mg AT BEDTIME 1/8/2020     Admin Instructions: DO NOT CRUSH.    Class: E-Prescribe    Route: Oral    gabapentin (NEURONTIN) tablet 1,200 mg 1,200 mg 3 TIMES DAILY 1/8/2020     Class: E-Prescribe    Route: Oral    glucagon injection 1 mg 1 mg EVERY 15 MIN PRN 1/8/2020     Admin Instructions: May give SQ or IM. ONLY use glucagon IF patient has NO IV access AND is UNABLE to swallow AND blood glucose is LESS than or EQUAL to 50 mg/dL.If ordered IV, give IV Push over 1 minute. Reconstitute with 1mL sterile water.    Class: E-Prescribe    Route: Subcutaneous    Linked Group 1:  \"Or\" Linked Group Details        glucose gel 15-30 g 15-30 g EVERY 15 MIN PRN 1/8/2020     Admin Instructions: Give first dose for initial blood glucose less than 70 mg/dL per the dosing instructions below. If blood " "glucose at 15 minute rechecks is still less than or equal to 100 mg/dL, continue to administer doses per blood glucose parameters every 15 minutes, as needed, until blood glucose level is above 100 mg/dL.Dosing Instructions:~If patient is conscious and able to swallow and NO enteral tubeFor initial BG 51-69mg/dL OR 15 minute recheck BG 51- 100 mg/dL - give 15 gFor BG less than or equal to 50 mg/dL - give 30 g~ If Enteral tubeFor initial BG 51-69mg/dL OR 15 minute recheck BG 51- 100 mg/dL - give apple juice 120 mL (4 oz or 15 g of CHO) via enteral tubeFor BG less than or equal to 50 mg/dL - Give apple juice 240 mL (8 oz or 30 g of CHO) via enteral tube~Oral gel is preferable for conscious and able to swallow patient. ~IF gel unavailable or patient refuses may provide apple juice per Enteral tube dosing instructions.Document juice on I and O flowsheet.    Class: E-Prescribe    Route: Oral    Linked Group 1:  \"Or\" Linked Group Details        hydrOXYzine (ATARAX) tablet  mg  mg EVERY 4 HOURS PRN 1/8/2020     Admin Instructions: Administer only if there is no other oral medication ordered prn for anxiety.    Class: E-Prescribe    Route: Oral    insulin aspart (NovoLOG) inj (RAPID ACTING) 8 Units 3 TIMES DAILY WITH MEALS 1/8/2020     Admin Instructions: Do not give if pre-prandial glucose is less than 60 mg/dL.If given at mealtime, administer within 30 minutes of start of meal    Class: E-Prescribe    Route: Subcutaneous    insulin aspart (NovoLOG) inj (RAPID ACTING) 1-7 Units 3 TIMES DAILY BEFORE MEALS 1/8/2020     Admin Instructions: Correction Scale - MEDIUM INSULIN RESISTANCE DOSING   Do Not give Correction Insulin if Pre-Meal BG less than 140. For Pre-Meal  - 189 give 1 unit. For Pre-Meal  - 239 give 2 units. For Pre-Meal  - 289 give 3 units. For Pre-Meal  - 339 give 4 units. For Pre-Meal - 399 give 5 units. For Pre-Meal -449 give 6 unitsFor Pre-Meal BG greater than or " equal to 450 give 7 units. To be given with prandial insulin, and based on pre-meal blood glucose.  Notify provider if glucose greater than or equal to 350 mg/dL after administration of correction dose.If given at mealtime, administer within 30 minutes of start of meal    Class: E-Prescribe    Route: Subcutaneous    insulin aspart (NovoLOG) inj (RAPID ACTING) 1-5 Units AT BEDTIME 1/8/2020     Admin Instructions: MEDIUM INSULIN RESISTANCE DOSING  Do Not give Bedtime Correction Insulin if BG less than  200. For  - 249 give 1 units. For  - 299 give 2 units. For  - 349 give 3 units. For  -399 give 4 units. For BG greater than or equal to 400 give 5 units.Notify provider if glucose greater than or equal to 350 mg/dL after administration of correction dose.If given at mealtime, administer within 30 minutes of start of meal    Class: E-Prescribe    Route: Subcutaneous    insulin glargine (LANTUS PEN) injection 40 Units 40 Units AT BEDTIME 1/8/2020     Class: E-Prescribe    Route: Subcutaneous    magnesium hydroxide (MILK OF MAGNESIA) suspension 30 mL 30 mL AT BEDTIME PRN 1/8/2020     Admin Instructions: Shake well.Hold for loose stools.    Class: E-Prescribe    Route: Oral    metFORMIN (GLUCOPHAGE) tablet 1,000 mg 1,000 mg 2 TIMES DAILY WITH MEALS 1/8/2020     Admin Instructions: If the patient receives intravenous, iodinated contrast and patient GFR is greater than 60 mL/min/1.7m2, continue metformin.  Contact provider for 'hold' or 'no hold' instructions if no GFR or if GFR is less than 60 mL/min/1.7m2.    Class: E-Prescribe    Route: Oral    nicotine (NICORETTE) gum 2 mg 2 mg EVERY 2 HOURS PRN 1/8/2020     Admin Instructions: Gum should be chewed slowly until it tingles, then placed between cheek and gum:  when tingle gone, repeat process until tingle gone (about 30 minutes).    Class: E-Prescribe    Route: Buccal    OLANZapine (zyPREXA) injection 10 mg 10 mg 3 TIMES DAILY PRN 1/8/2020     Admin  "Instructions: Dissolve the contents of the 10 mg vial using 2.1 mL of Sterile Water for Injection to provide a solution containing 5 mg/mL of olanzapine. Withdraw the ordered dose from vial. Use immediately (within 1 hour) after reconstitution.  Discard any unused portion.    Class: E-Prescribe    Route: Intramuscular    Linked Group 2:  \"Or\" Linked Group Details        OLANZapine (zyPREXA) tablet 10 mg 10 mg 3 TIMES DAILY PRN 1/8/2020     Admin Instructions: Combined IM and PO doses may significantly increase the risk of orthostatic hypotension at 30 mg per day or higher.    Class: E-Prescribe    Route: Oral    Linked Group 2:  \"Or\" Linked Group Details        polyethylene glycol (MIRALAX/GLYCOLAX) Packet 17 g 17 g DAILY PRN 1/8/2020     Admin Instructions: 1 Packet = 17 grams. Mix each gram with at least 1/2 ounce (15 mL) of water - 8 ounces  for 17 g dose, 4 ounces for 8.5 g dose, 2 ounces for 4 g dose.Follow with the same volume of water.Hold for loose stools.    Class: E-Prescribe    Route: Oral    prazosin (MINIPRESS) capsule 1 mg 1 mg AT BEDTIME 1/8/2020     Class: E-Prescribe    Route: Oral    traZODone (DESYREL) tablet 50 mg 50 mg AT BEDTIME PRN 1/8/2020     Admin Instructions: May repeat x 1    Class: E-Prescribe    Route: Oral         10 point ROS positive for bruxism and trismus       Allergies:     Allergies   Allergen Reactions     Haloperidol Other (See Comments)     Other reaction(s): Seizures  Patient states, \"I fell down and wasn't able to get up.\"  Patient states, \"I fell down and wasn't able to get up.\"              Psychiatric Examination:   BP 99/52   Pulse 84   Temp 97.8  F (36.6  C) (Tympanic)   Resp 16   Ht 1.651 m (5' 5\")   Wt 79.7 kg (175 lb 9.6 oz)   SpO2 96%   BMI 29.22 kg/m    Weight is 175 lbs 9.6 oz  Body mass index is 29.22 kg/m .    Appearance:  awake, alert, poorly groomed and disheveled   Attitude: somewhat  cooperative  Eye Contact:  poor  Mood:  labile  Affect:  " labile  Speech:  mumbled  Psychomotor Behavior:  Trismus and bruxism, no other involuntary movements noted, no agitation or restlessness  Thought Process:  Goal oriented, able to answer questions in a linear fashion; however, unable to maintain linear spontaneous conversation; illogical and tangential   Associations:  JUSTA noted at times  Thought Content:  No SI/HI, Delusional content  Insight:  limited  Judgment:  limited  Oriented to:  time, person, and place  Attention Span and Concentration:  limited  Recent and Remote Memory:  fair - impacted by delusional beliefs and drug use  Fund of Knowledge: appropriate  Muscle Strength and Tone: normal  Gait and Station: Normal           Labs:     Results for orders placed or performed during the hospital encounter of 01/08/20   CBC with platelets differential     Status: None   Result Value Ref Range    WBC 7.4 4.0 - 11.0 10e9/L    RBC Count 4.55 3.8 - 5.2 10e12/L    Hemoglobin 13.2 11.7 - 15.7 g/dL    Hematocrit 39.3 35.0 - 47.0 %    MCV 86 78 - 100 fl    MCH 29.0 26.5 - 33.0 pg    MCHC 33.6 31.5 - 36.5 g/dL    RDW 13.4 10.0 - 15.0 %    Platelet Count 281 150 - 450 10e9/L    Diff Method Automated Method     % Neutrophils 65.4 %    % Lymphocytes 24.7 %    % Monocytes 7.7 %    % Eosinophils 1.4 %    % Basophils 0.4 %    % Immature Granulocytes 0.4 %    Nucleated RBCs 0 0 /100    Absolute Neutrophil 4.8 1.6 - 8.3 10e9/L    Absolute Lymphocytes 1.8 0.8 - 5.3 10e9/L    Absolute Monocytes 0.6 0.0 - 1.3 10e9/L    Absolute Eosinophils 0.1 0.0 - 0.7 10e9/L    Absolute Basophils 0.0 0.0 - 0.2 10e9/L    Abs Immature Granulocytes 0.0 0 - 0.4 10e9/L    Absolute Nucleated RBC 0.0    Basic metabolic panel     Status: None   Result Value Ref Range    Sodium 143 133 - 144 mmol/L    Potassium 3.5 3.4 - 5.3 mmol/L    Chloride 109 94 - 109 mmol/L    Carbon Dioxide 29 20 - 32 mmol/L    Anion Gap 5 3 - 14 mmol/L    Glucose 83 70 - 99 mg/dL    Urea Nitrogen 12 7 - 30 mg/dL    Creatinine 0.52  0.52 - 1.04 mg/dL    GFR Estimate >90 >60 mL/min/[1.73_m2]    GFR Estimate If Black >90 >60 mL/min/[1.73_m2]    Calcium 8.8 8.5 - 10.1 mg/dL   UA reflex to Microscopic and Culture     Status: Abnormal   Result Value Ref Range    Color Urine Yellow     Appearance Urine Slightly Cloudy     Glucose Urine Negative NEG^Negative mg/dL    Bilirubin Urine Negative NEG^Negative    Ketones Urine 10 (A) NEG^Negative mg/dL    Specific Gravity Urine 1.031 1.003 - 1.035    Blood Urine Negative NEG^Negative    pH Urine 6.0 4.7 - 8.0 pH    Protein Albumin Urine 30 (A) NEG^Negative mg/dL    Urobilinogen mg/dL Normal 0.0 - 2.0 mg/dL    Nitrite Urine Negative NEG^Negative    Leukocyte Esterase Urine Negative NEG^Negative    Source Midstream Urine     RBC Urine 2 0 - 2 /HPF    WBC Urine 7 (H) 0 - 5 /HPF    Bacteria Urine None (A) NEG^Negative /HPF    Squamous Epithelial /HPF Urine 3 (H) 0 - 1 /HPF    Mucous Urine Present (A) NEG^Negative /LPF   Urine Drugs of Abuse Screen Panel 13     Status: Abnormal   Result Value Ref Range    Cannabinoids (74-dyx-6-carboxy-9-THC) Not Detected NDET^Not Detected ng/mL    Phencyclidine (Phencyclidine) Not Detected NDET^Not Detected ng/mL    Cocaine (Benzoylecgonine) Not Detected NDET^Not Detected ng/mL    Methamphetamine (d-Methamphetamine) Detected, Abnormal Result (A) NDET^Not Detected ng/mL    Opiates (Morphine) Not Detected NDET^Not Detected ng/mL    Amphetamine (d-Amphetamine) Detected, Abnormal Result (A) NDET^Not Detected ng/mL    Benzodiazepines (Nordiazepam) Not Detected NDET^Not Detected ng/mL    Tricyclic Antidepressants (Desipramine) Not Detected NDET^Not Detected ng/mL    Methadone (Methadone) Not Detected NDET^Not Detected ng/mL    Barbiturates (Butalbital) Not Detected NDET^Not Detected ng/mL    Oxycodone (Oxycodone) Not Detected NDET^Not Detected ng/mL    Propoxyphene (Norpropoxyphene) Not Detected NDET^Not Detected ng/mL    Buprenorphine (Buprenorphine) Not Detected NDET^Not Detected  ng/mL   Hemoglobin A1c     Status: Abnormal   Result Value Ref Range    Hemoglobin A1C 5.8 (H) 0 - 5.6 %   Estimated Average Glucose     Status: None   Result Value Ref Range    Estimated Average Glucose 120 mg/dL   Glucose by meter     Status: Abnormal   Result Value Ref Range    Glucose 103 (H) 70 - 99 mg/dL   Glucose by meter     Status: Abnormal   Result Value Ref Range    Glucose 127 (H) 70 - 99 mg/dL   Glucose by meter     Status: None   Result Value Ref Range    Glucose 85 70 - 99 mg/dL   Glucose by meter     Status: None   Result Value Ref Range    Glucose 95 70 - 99 mg/dL   Glucose by meter     Status: Abnormal   Result Value Ref Range    Glucose 212 (H) 70 - 99 mg/dL   Valproic acid     Status: Abnormal   Result Value Ref Range    Valproic Acid Level 44 (L) 50 - 100 mg/L   Hepatic panel     Status: Abnormal   Result Value Ref Range    Bilirubin Direct <0.1 0.0 - 0.2 mg/dL    Bilirubin Total 0.2 0.2 - 1.3 mg/dL    Albumin 3.3 (L) 3.4 - 5.0 g/dL    Protein Total 6.9 6.8 - 8.8 g/dL    Alkaline Phosphatase 65 40 - 150 U/L    ALT 25 0 - 50 U/L    AST 8 0 - 45 U/L   Glucose by meter     Status: Abnormal   Result Value Ref Range    Glucose 189 (H) 70 - 99 mg/dL   Glucose by meter     Status: Abnormal   Result Value Ref Range    Glucose 200 (H) 70 - 99 mg/dL   Glucose by meter     Status: Abnormal   Result Value Ref Range    Glucose 168 (H) 70 - 99 mg/dL   Glucose by meter     Status: Abnormal   Result Value Ref Range    Glucose 119 (H) 70 - 99 mg/dL   Glucose by meter     Status: None   Result Value Ref Range    Glucose 96 70 - 99 mg/dL          Plan:   Continue current medications:  Monitor jaw clenching.  ELOS: >5days secondary to ongoing delusions, medication non-compliance, and methamphetamine abuse contributing to inability to discharge to a lower level of care than a secure setting. Current facility of residence unwilling to take her back.
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
## 2020-01-10 NOTE — PROGRESS NOTES
Chart reviewed, labs reviewed, vitals stable.   Diabetes mellitus, type 2- continue home dose insulin and novolog, QID blood glucose checks, moderate constant carb diet, chart reviewed and blood sugars are well controlled with insulin. A1C 5.8.     Pt medically stable, no acute medical concerns. Chronic medical problems stable. Will sign off. Please consult for any new medical issues or concerns.

## 2020-01-10 NOTE — PLAN OF CARE
Observed pt lying in prone position - eyes closed - non-labored breathing noted.  Will obtain blood glucose at 0200 and in the am.Face to face end of shift report communicated to oncoming RN.     Selina Siegel RN  1/10/2020  6:57 AM

## 2020-01-11 LAB
GLUCOSE BLDC GLUCOMTR-MCNC: 102 MG/DL (ref 70–99)
GLUCOSE BLDC GLUCOMTR-MCNC: 145 MG/DL (ref 70–99)
GLUCOSE BLDC GLUCOMTR-MCNC: 163 MG/DL (ref 70–99)
GLUCOSE BLDC GLUCOMTR-MCNC: 172 MG/DL (ref 70–99)
GLUCOSE BLDC GLUCOMTR-MCNC: 175 MG/DL (ref 70–99)

## 2020-01-11 PROCEDURE — 25000132 ZZH RX MED GY IP 250 OP 250 PS 637: Performed by: NURSE PRACTITIONER

## 2020-01-11 PROCEDURE — 00000146 ZZHCL STATISTIC GLUCOSE BY METER IP

## 2020-01-11 PROCEDURE — 12400000 ZZH R&B MH

## 2020-01-11 RX ADMIN — BUSPIRONE HYDROCHLORIDE 15 MG: 10 TABLET ORAL at 13:15

## 2020-01-11 RX ADMIN — METFORMIN HYDROCHLORIDE 1000 MG: 500 TABLET, FILM COATED ORAL at 08:19

## 2020-01-11 RX ADMIN — CHLORPROMAZINE HYDROCHLORIDE 100 MG: 100 TABLET, SUGAR COATED ORAL at 20:38

## 2020-01-11 RX ADMIN — INSULIN ASPART 8 UNITS: 100 INJECTION, SOLUTION INTRAVENOUS; SUBCUTANEOUS at 18:06

## 2020-01-11 RX ADMIN — GABAPENTIN 1200 MG: 600 TABLET, FILM COATED ORAL at 20:37

## 2020-01-11 RX ADMIN — INSULIN GLARGINE 40 UNITS: 100 INJECTION, SOLUTION SUBCUTANEOUS at 20:41

## 2020-01-11 RX ADMIN — GABAPENTIN 1200 MG: 600 TABLET, FILM COATED ORAL at 08:19

## 2020-01-11 RX ADMIN — BUSPIRONE HYDROCHLORIDE 15 MG: 10 TABLET ORAL at 08:19

## 2020-01-11 RX ADMIN — BUSPIRONE HYDROCHLORIDE 15 MG: 10 TABLET ORAL at 20:38

## 2020-01-11 RX ADMIN — BENZTROPINE MESYLATE 1 MG: 1 TABLET ORAL at 20:39

## 2020-01-11 RX ADMIN — CLONAZEPAM 1 MG: 1 TABLET ORAL at 20:38

## 2020-01-11 RX ADMIN — PRAZOSIN HYDROCHLORIDE 1 MG: 1 CAPSULE ORAL at 20:38

## 2020-01-11 RX ADMIN — INSULIN ASPART 8 UNITS: 100 INJECTION, SOLUTION INTRAVENOUS; SUBCUTANEOUS at 08:19

## 2020-01-11 RX ADMIN — BENZTROPINE MESYLATE 1 MG: 1 TABLET ORAL at 08:19

## 2020-01-11 RX ADMIN — DIVALPROEX SODIUM 750 MG: 500 TABLET, EXTENDED RELEASE ORAL at 20:37

## 2020-01-11 RX ADMIN — INSULIN ASPART 8 UNITS: 100 INJECTION, SOLUTION INTRAVENOUS; SUBCUTANEOUS at 12:01

## 2020-01-11 RX ADMIN — GABAPENTIN 1200 MG: 600 TABLET, FILM COATED ORAL at 13:15

## 2020-01-11 RX ADMIN — METFORMIN HYDROCHLORIDE 1000 MG: 500 TABLET, FILM COATED ORAL at 18:06

## 2020-01-11 RX ADMIN — CHLORPROMAZINE HYDROCHLORIDE 100 MG: 100 TABLET, SUGAR COATED ORAL at 08:19

## 2020-01-11 RX ADMIN — CHLORPROMAZINE HYDROCHLORIDE 100 MG: 100 TABLET, SUGAR COATED ORAL at 13:15

## 2020-01-11 ASSESSMENT — ACTIVITIES OF DAILY LIVING (ADL)
HYGIENE/GROOMING: INDEPENDENT
LAUNDRY: UNABLE TO COMPLETE
ORAL_HYGIENE: INDEPENDENT
DRESS: SCRUBS (BEHAVIORAL HEALTH);INDEPENDENT
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
## 2020-01-11 NOTE — PLAN OF CARE
"Face to face end of shift report communicated to oncoming shift.     Sherry Cano RN  1/11/2020  10:45 PM    Patient follows recommendations of treatment team.  Compliant with all medications ordered.  Patient attends groups this shift.  Patient reports no difficulties with sleep.  Patient complete's ADL's independently.    Patient's conversation is reality based to her needs and her wishes to want to leave here and not go back to [facility].    Patient will become agitated and start to ramble and become tangential with her speech.    Patient denies SI, HI, AH and VH     Appears responding at times.    Patient does state \"I'm annoyed that I have to wait an entire day again to find out anything, and where I am going\"      Face to face start of shift report report received from Nazia IVORY RN  Patient sleeping at this time.      Problem: Adult Behavioral Health Plan of Care  Goal: Patient-Specific Goal (Individualization)  Description  Pt will follow recommendations of treatment team.  Pt will be compliant with medications.  Pt will attend 50 % of groups.  Pt will sleep 6-8 hours each night.  Pt will complete ADL's independently.   1/11/2020 1554 by Sherry Cano, RN  Outcome: No Change  Note:   Problem: Thought Process Alteration  Goal: Optimal Thought Clarity  Description  Patient will be able to hold reality based conversations prior to discharge.     1/11/2020 155 by Sherry Cano RN  Outcome: No Change     "

## 2020-01-11 NOTE — PLAN OF CARE
Observed pt lying in a supine position - on top of covers - eyes closed - non-labored breathing noted - cup of orange juice with small amt of ice at bedside as pt has tendency to get thirsty and have low glucose levels during the night - acu check will be done at approx. 0200.

## 2020-01-12 LAB
GLUCOSE BLDC GLUCOMTR-MCNC: 106 MG/DL (ref 70–99)
GLUCOSE BLDC GLUCOMTR-MCNC: 108 MG/DL (ref 70–99)
GLUCOSE BLDC GLUCOMTR-MCNC: 148 MG/DL (ref 70–99)
GLUCOSE BLDC GLUCOMTR-MCNC: 210 MG/DL (ref 70–99)
GLUCOSE BLDC GLUCOMTR-MCNC: 73 MG/DL (ref 70–99)

## 2020-01-12 PROCEDURE — 25000132 ZZH RX MED GY IP 250 OP 250 PS 637: Performed by: NURSE PRACTITIONER

## 2020-01-12 PROCEDURE — 00000146 ZZHCL STATISTIC GLUCOSE BY METER IP

## 2020-01-12 PROCEDURE — 12400000 ZZH R&B MH

## 2020-01-12 RX ADMIN — BUSPIRONE HYDROCHLORIDE 15 MG: 10 TABLET ORAL at 13:17

## 2020-01-12 RX ADMIN — CHLORPROMAZINE HYDROCHLORIDE 100 MG: 100 TABLET, SUGAR COATED ORAL at 13:17

## 2020-01-12 RX ADMIN — BENZTROPINE MESYLATE 1 MG: 1 TABLET ORAL at 08:22

## 2020-01-12 RX ADMIN — INSULIN ASPART 8 UNITS: 100 INJECTION, SOLUTION INTRAVENOUS; SUBCUTANEOUS at 12:00

## 2020-01-12 RX ADMIN — CHLORPROMAZINE HYDROCHLORIDE 100 MG: 100 TABLET, SUGAR COATED ORAL at 20:35

## 2020-01-12 RX ADMIN — METFORMIN HYDROCHLORIDE 1000 MG: 500 TABLET, FILM COATED ORAL at 17:02

## 2020-01-12 RX ADMIN — GABAPENTIN 1200 MG: 600 TABLET, FILM COATED ORAL at 20:35

## 2020-01-12 RX ADMIN — GABAPENTIN 1200 MG: 600 TABLET, FILM COATED ORAL at 08:22

## 2020-01-12 RX ADMIN — CLONAZEPAM 1 MG: 1 TABLET ORAL at 20:35

## 2020-01-12 RX ADMIN — PRAZOSIN HYDROCHLORIDE 1 MG: 1 CAPSULE ORAL at 20:35

## 2020-01-12 RX ADMIN — GABAPENTIN 1200 MG: 600 TABLET, FILM COATED ORAL at 13:17

## 2020-01-12 RX ADMIN — INSULIN ASPART 8 UNITS: 100 INJECTION, SOLUTION INTRAVENOUS; SUBCUTANEOUS at 17:02

## 2020-01-12 RX ADMIN — METFORMIN HYDROCHLORIDE 1000 MG: 500 TABLET, FILM COATED ORAL at 08:22

## 2020-01-12 RX ADMIN — CHLORPROMAZINE HYDROCHLORIDE 100 MG: 100 TABLET, SUGAR COATED ORAL at 08:22

## 2020-01-12 RX ADMIN — BENZTROPINE MESYLATE 1 MG: 1 TABLET ORAL at 20:34

## 2020-01-12 RX ADMIN — DIVALPROEX SODIUM 750 MG: 500 TABLET, EXTENDED RELEASE ORAL at 20:35

## 2020-01-12 RX ADMIN — BUSPIRONE HYDROCHLORIDE 15 MG: 10 TABLET ORAL at 20:35

## 2020-01-12 RX ADMIN — INSULIN ASPART 8 UNITS: 100 INJECTION, SOLUTION INTRAVENOUS; SUBCUTANEOUS at 07:56

## 2020-01-12 RX ADMIN — BUSPIRONE HYDROCHLORIDE 15 MG: 10 TABLET ORAL at 08:22

## 2020-01-12 RX ADMIN — INSULIN GLARGINE 40 UNITS: 100 INJECTION, SOLUTION SUBCUTANEOUS at 20:37

## 2020-01-12 ASSESSMENT — ACTIVITIES OF DAILY LIVING (ADL)
DRESS: SCRUBS (BEHAVIORAL HEALTH)
HYGIENE/GROOMING: INDEPENDENT
ORAL_HYGIENE: INDEPENDENT
LAUNDRY: UNABLE TO COMPLETE
HYGIENE/GROOMING: INDEPENDENT
ORAL_HYGIENE: INDEPENDENT
DRESS: SCRUBS (BEHAVIORAL HEALTH)

## 2020-01-12 ASSESSMENT — MIFFLIN-ST. JEOR: SCORE: 1518.2
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
## 2020-01-12 NOTE — PLAN OF CARE
Observed patient lying in a supine position - eyes closed - non-labored breathing noted. Will obtain blood glucose as ordered.   Slept all noc without difficulty - obtained blood glucose level at 0200 it was 210  And at 0600 it was 148 pt slept well.  Am leaving a cup or orange juice and ice at bedside. Face to face end of shift report communicated to oncoming RN.    Selina Siegel RN  1/12/2020  6:51 AM

## 2020-01-13 LAB
GLUCOSE BLDC GLUCOMTR-MCNC: 133 MG/DL (ref 70–99)
GLUCOSE BLDC GLUCOMTR-MCNC: 142 MG/DL (ref 70–99)
GLUCOSE BLDC GLUCOMTR-MCNC: 143 MG/DL (ref 70–99)
GLUCOSE BLDC GLUCOMTR-MCNC: 162 MG/DL (ref 70–99)
GLUCOSE BLDC GLUCOMTR-MCNC: 192 MG/DL (ref 70–99)

## 2020-01-13 PROCEDURE — 25000132 ZZH RX MED GY IP 250 OP 250 PS 637: Performed by: NURSE PRACTITIONER

## 2020-01-13 PROCEDURE — 99231 SBSQ HOSP IP/OBS SF/LOW 25: CPT | Performed by: NURSE PRACTITIONER

## 2020-01-13 PROCEDURE — 00000146 ZZHCL STATISTIC GLUCOSE BY METER IP

## 2020-01-13 PROCEDURE — 12400000 ZZH R&B MH

## 2020-01-13 RX ADMIN — INSULIN ASPART 8 UNITS: 100 INJECTION, SOLUTION INTRAVENOUS; SUBCUTANEOUS at 07:49

## 2020-01-13 RX ADMIN — BUSPIRONE HYDROCHLORIDE 15 MG: 10 TABLET ORAL at 08:10

## 2020-01-13 RX ADMIN — PRAZOSIN HYDROCHLORIDE 1 MG: 1 CAPSULE ORAL at 20:52

## 2020-01-13 RX ADMIN — CHLORPROMAZINE HYDROCHLORIDE 100 MG: 100 TABLET, SUGAR COATED ORAL at 13:19

## 2020-01-13 RX ADMIN — METFORMIN HYDROCHLORIDE 1000 MG: 500 TABLET, FILM COATED ORAL at 17:04

## 2020-01-13 RX ADMIN — GABAPENTIN 1200 MG: 600 TABLET, FILM COATED ORAL at 20:52

## 2020-01-13 RX ADMIN — METFORMIN HYDROCHLORIDE 1000 MG: 500 TABLET, FILM COATED ORAL at 08:10

## 2020-01-13 RX ADMIN — BENZTROPINE MESYLATE 1 MG: 1 TABLET ORAL at 20:52

## 2020-01-13 RX ADMIN — BENZTROPINE MESYLATE 1 MG: 1 TABLET ORAL at 08:10

## 2020-01-13 RX ADMIN — BUSPIRONE HYDROCHLORIDE 15 MG: 10 TABLET ORAL at 20:52

## 2020-01-13 RX ADMIN — CHLORPROMAZINE HYDROCHLORIDE 100 MG: 100 TABLET, SUGAR COATED ORAL at 08:10

## 2020-01-13 RX ADMIN — DIVALPROEX SODIUM 750 MG: 500 TABLET, EXTENDED RELEASE ORAL at 20:52

## 2020-01-13 RX ADMIN — GABAPENTIN 1200 MG: 600 TABLET, FILM COATED ORAL at 13:19

## 2020-01-13 RX ADMIN — GABAPENTIN 1200 MG: 600 TABLET, FILM COATED ORAL at 08:09

## 2020-01-13 RX ADMIN — INSULIN ASPART 8 UNITS: 100 INJECTION, SOLUTION INTRAVENOUS; SUBCUTANEOUS at 11:38

## 2020-01-13 RX ADMIN — INSULIN ASPART 8 UNITS: 100 INJECTION, SOLUTION INTRAVENOUS; SUBCUTANEOUS at 17:04

## 2020-01-13 RX ADMIN — BUSPIRONE HYDROCHLORIDE 15 MG: 10 TABLET ORAL at 13:19

## 2020-01-13 RX ADMIN — CHLORPROMAZINE HYDROCHLORIDE 100 MG: 100 TABLET, SUGAR COATED ORAL at 20:53

## 2020-01-13 RX ADMIN — CLONAZEPAM 1 MG: 1 TABLET ORAL at 20:52

## 2020-01-13 RX ADMIN — INSULIN GLARGINE 40 UNITS: 100 INJECTION, SOLUTION SUBCUTANEOUS at 20:50

## 2020-01-13 ASSESSMENT — ACTIVITIES OF DAILY LIVING (ADL)
DRESS: SCRUBS (BEHAVIORAL HEALTH)
HYGIENE/GROOMING: INDEPENDENT
HYGIENE/GROOMING: INDEPENDENT
ORAL_HYGIENE: INDEPENDENT
LAUNDRY: UNABLE TO COMPLETE
ORAL_HYGIENE: INDEPENDENT
DRESS: SCRUBS (BEHAVIORAL HEALTH);INDEPENDENT

## 2020-01-13 NOTE — PLAN OF CARE
Problem: Adult Behavioral Health Plan of Care  Goal: Patient-Specific Goal (Individualization)  Description  Pt will follow recommendations of treatment team.  Pt will be compliant with medications.  Pt will attend 50 % of groups.  Pt will sleep 6-8 hours each night.  Pt will complete ADL's independently.   1/12/2020 2048 by Bianca Mckeon, RN  Outcome: No Change  Note:   Patient denies SI, HI, kirk., pain. She contracts for safety. She endorses anxiety and depression. She requested and received shower supplies. She has been in bed much of this shift. She woke up at snack time and has been awake since.       Problem: Thought Process Alteration  Goal: Optimal Thought Clarity  Description  Patient will be able to hold reality based conversations prior to discharge.     1/12/2020 2048 by Bianca Mckeon, RN  Outcome: No Change   Patient conversation has been reality based so far this shift.

## 2020-01-13 NOTE — PLAN OF CARE
"Called and spoke with pt's guardian, Raven, to check on pt's CADI waiver. Raven stated pt had a CADI waiver about a year ago, but has not been approved yet stated \"the county is dragging their feet on getting it pushed through.\" Writer asked Raven how [facility] was being funded and Raven stated \"[employer] hasn't been paid yet since the CADI hasn't been approved\" - Raven gave writer the name of pt's CADI worker. Called and spoke with pt's CADI worker, Magen Cheek.                 "

## 2020-01-13 NOTE — PLAN OF CARE
Observed pt lying in a supine position - eyes closed - non-labored breathing noted - will obtain blood glucose level at 0200.

## 2020-01-13 NOTE — PLAN OF CARE
"  Problem: Adult Behavioral Health Plan of Care  Goal: Patient-Specific Goal (Individualization)  Description  Pt will follow recommendations of treatment team.  Pt will be compliant with medications.  Pt will attend 50 % of groups.  Pt will sleep 6-8 hours each night.  Pt will complete ADL's independently.   Outcome: Improving  Note:   Shift Summery:  VSS, states she has 10/10 pain to R-wrist but denies need to tx. Denies all criteria including: SI, SIB, HI or hallucinations. Mood is improved from previous day. More talkative, less isolative-out in lounge much of shift-interacting with peers. Observed smiling and talking with peers. States \"just send me somewhere, they can do the cadi waiver once I'm there. I just don't want to go back to [facility].\"   Cooperative with nursing assessment.   BG-193 this AM and 142 at lunch.     Face to face end of shift report communicated to oncoming shift.     Nancy Stern RN  1/13/2020  1:54 PM                     "

## 2020-01-13 NOTE — PLAN OF CARE
"  Problem: Adult Behavioral Health Plan of Care  Goal: Patient-Specific Goal (Individualization)  Description  Pt will follow recommendations of treatment team.  Pt will be compliant with medications.  Pt will attend 50 % of groups.  Pt will sleep 6-8 hours each night.  Pt will complete ADL's independently.   1/13/2020 1623 by Nazia Mcmahon RN  Outcome: No Change     End of shift report received from previous shift RN. Pt observed, appears to be resting in bed. Out to common areas briefly and returns to room. Dinner accu 162. Pt denies pain and anxiety and dismisses writer during assessment because she \"just wants to sleep right now\".  Compliant with medications. Out to common areas for dinner, sits with peers - doesn't engage in conversation.   1945 - Pt out for groups. Delusional in conversation. HS accu 133.  2045 - Pt compliant with medications. No complaints offered at this time. Pt in bed resting.  Face to face end of shift report communicated to oncoming RN.     Nazia Mcmahon RN  1/13/2020  9:02 PM        Problem: Thought Process Alteration  Goal: Optimal Thought Clarity  Description  Patient will be able to hold reality based conversations prior to discharge.     1/13/2020 1623 by Nazia Mcmahon RN  Outcome: No Change   Unable to hold reality based conversation.    Problem: Fall Injury Risk  Goal: Absence of Fall and Fall-Related Injury  Description  Pt will wear non skid slippers.  Pt will remain free from falls or injuries during hospitalization.   1/13/2020 1623 by Nazia Mcmahon, RN  Outcome: Improving   Pt remains free from falls this shift.  "

## 2020-01-13 NOTE — PROGRESS NOTES
[facility]  Psychiatric Progress Note      Impression:   Ginette is laying in bed. She is rather somnolent but rouses easily. Genie asks about discharge planning. Informed her she will likely need a CADI waiver or funding of some sort for placement. Genie reports she just had a CADI screen and waiver recently. She reports her CADI screener was [facility]. Genie verifies she does not have to go back to [place]. Did discuss that she will likely be here a couple weeks until appropriate placement is found for her. She was not delusional or rambling at present but did just have a short conversation about placement. According to chart review, she remains highly disorganized and delusional. Will likely increase thorazine when she is less sedated during the day. She does sleep much of the morning. Less bruxism noted at present, the reaction was likely due to methamphetamine.     Previous medication trials documented:  Zyprexa - weight gain, not fully effective  Invega - ineffective  Tegretol - poorly tolerated  Lithium - poorly tolerated, severe sedation  Clozaril - irritability, hyperthermia, and persistent tachycardia  Depakote - Weight gain and poor mood control (this was still restarted during admission in 11/19)  Thorazine - notable improvement on 200mg tid, decreased in 11/19 on an outpatient visit, resulting in readmission.  Gabapentin   Buspar  Armanionopin  Cogentin           Diagnoses:     Schizoaffective Disorder, Bipolar Type  PTSD, by history  R/O Other stimulant use disorder, methamphetamine, with induced perceptual disorder    Attestation:  Patient has been seen and evaluated by me,  Loren Zuleta NP          Interim History:   The patient's care was discussed with the treatment team and chart notes were reviewed.    BEHAVIORAL TEAM DISCUSSION    Progress: Decompensated  Continued Stay Criteria/Rationale: Delusional, poor sleep, no safe discharge plan, medication adjustments, methamphetamine  withdrawal  Medical/Physical: Diabetes Mellitus Type 2  Precautions: No   Falls precaution?: Yes  Behavioral Orders   Procedures     Code 1 - Restrict to Unit     Routine Programming     As clinically indicated     Status 15     Every 15 minutes.     Plan: Continue current medications with consideration to increase Thorazine once methamphetamine clears system, less bruxism and trismus noted, remains lethargic so she may still have some lingering methamphetamine in her system. [facility] would like placement in a more secure setting secondary to ongoing drug use, flight risk, and non-compliance with medications. [facility] do not feel she is appropriate for their facility. Pursuing CADI waiver.  Rationale for change in precautions or plan: Methamphetamine use in the last 3 days - still clearing system. Will consider further changes once this resolves.       Participants: Loren Zuleta RN,  Dennise Herrera LICSW, Angie Laws LSW,  Becca Chery LSW, Jim Barrios LSW, Mady Holman OT, Natalia Costello OT, Nursing  Gena Gomez OT          Medications:   I have reviewed this patient's current medications  Prescription Medications as of 1/13/2020       Rx Number Disp Refills Start End Last Dispensed Date Next Fill Date Owning Pharmacy    ACCU-CHEK ABDIRAHMAN PLUS test strip  100 strip 3 11/13/2019    [address]    Sig: TEST BEFORE MEALS AND AT BEDTIME AS DIRECTED    Class: E-Prescribe    artificial saliva (BIOTENE MT) SOLN solution  44.3 mL 3 11/27/2019    [address]    Sig: Swish and spit 1-2 mLs (1-2 sprays) in mouth as needed for dry mouth    Class: E-Prescribe    Route: Swish & Spit    benztropine (COGENTIN) 1 MG tablet  56 tablet 1 1/9/2020    [address]    Sig: TAKE (1) TABLET BY MOUTH TWICE A DAY.    Class: E-Prescribe    blood glucose monitoring (NO BRAND SPECIFIED)  meter device kit  1 kit 0 12/26/2019    [address]    Sig: Use to test blood sugar as directed.    Class: E-Prescribe    blood glucose monitoring (SOFTCLIX) lancets  100 each 11 11/11/2019    [address]    Sig: TEST BEFORE MEALS AND AT BEDTIME AS DIRECTED    Class: E-Prescribe    busPIRone (BUSPAR) 15 MG tablet  84 tablet 1 1/9/2020    [address]    Sig: TAKE (1) TABLET BY MOUTH 3 TIMES DAILY    Class: E-Prescribe    chlorproMAZINE (THORAZINE) 100 MG tablet  84 tablet 0 1/9/2020    [address]    Sig: TAKE (1) TABLET BY MOUTH 3 TIMES DAILY    Class: E-Prescribe    clonazePAM (KLONOPIN) 1 MG tablet  30 tablet 0 12/20/2019    [address]    Sig: TAKE ONE TABLET BY MOUTH AT BEDTIME.    Class: E-Prescribe    divalproex sodium extended-release (DEPAKOTE ER) 250 MG 24 hr tablet  84 tablet 1 1/9/2020    [address]    Sig: TAKE 3 TABLETS BY MOUTH AT BEDTIME.    Class: E-Prescribe    gabapentin (NEURONTIN) 600 MG tablet  168 tablet 0 1/9/2020    [address]    Sig: TAKE (2) TABLETS BY MOUTH THREE TIMES DAILY.    Class: E-Prescribe    hydrOXYzine (ATARAX) 50 MG tablet  90 tablet 0 11/4/2019    [place] [address]    Sig: Take 1 tablet (50 mg) by mouth 3 times daily as needed for anxiety    Class: E-Prescribe    Route: Oral    insulin aspart (NOVOLOG FLEXPEN) 100 UNIT/ML pen        [place] [address]    Sig: Inject 8 Units Subcutaneous 3 times daily (with meals) Plus sliding scale  BG <140 No coverage (141-189: 1 unit, 190-239: 2 units, 240-289: 3 units, 290-339: 4 units, 340-399: 5 units, 400-449: 6 units, BG > 450: 7  units)    Class: Historical    Route: Subcutaneous    insulin glargine (LANTUS PEN) 100 UNIT/ML pen        [address]    Sig: Inject 40 Units Subcutaneous At Bedtime    Class: Historical    Notes to Pharmacy: If Lantus is not covered by insurance, may substitute Basaglar at same dose and frequency.      Route: Subcutaneous    metFORMIN (GLUCOPHAGE) 1000 MG tablet  60 tablet 3 11/22/2019    [address]    Sig: Take 1 tablet (1,000 mg) by mouth 2 times daily (with meals)    Class: E-Prescribe    Route: Oral    nicotine (NICORETTE) 2 MG gum        [address]    Sig: Place 2 mg inside cheek every 2 hours as needed for smoking cessation    Class: Historical    Route: Buccal    polyethylene glycol (MIRALAX/GLYCOLAX) packet        [address]    Sig: Take 1 packet by mouth daily as needed for constipation    Class: Historical    Route: Oral    prazosin (MINIPRESS) 1 MG capsule  28 capsule 1 1/9/2020    [address]    Sig: TAKE 1 CAPSULE BY MOUTH DAILY AT BEDTIME.    Class: E-Prescribe      Hospital Medications as of 1/13/2020       Dose Frequency Start End    acetaminophen (TYLENOL) tablet 650 mg 650 mg EVERY 4 HOURS PRN 1/8/2020     Admin Instructions: Do not use if the patient has significant liver disease. MAX acetaminophen = 4000 mg/24 hrs.  MAX acetaminophen <3000 mg/24 hrs for patients > or = 65 years old.Maximum acetaminophen dose from all sources = 75 mg/kg/day not to exceed 4 grams/day.    Class: E-Prescribe    Route: Oral    alum & mag hydroxide-simethicone (MYLANTA ES/MAALOX  ES) suspension 30 mL 30 mL EVERY 4 HOURS PRN 1/8/2020     Admin Instructions: Shake well.    Class: E-Prescribe    Route: Oral    artificial saliva (BIOTENE MT) solution 1-2 spray 1-2 spray 4 TIMES DAILY PRN  "1/8/2020     Class: E-Prescribe    Route: Swish & Spit    benztropine (COGENTIN) tablet 1 mg 1 mg 2 TIMES DAILY 1/8/2020     Class: E-Prescribe    Route: Oral    bisacodyl (DULCOLAX) Suppository 10 mg 10 mg DAILY PRN 1/8/2020     Admin Instructions: Hold for loose stools.    Class: E-Prescribe    Route: Rectal    busPIRone (BUSPAR) tablet 15 mg 15 mg 3 TIMES DAILY 1/8/2020     Class: E-Prescribe    Route: Oral    chlorproMAZINE (THORAZINE) tablet 100 mg 100 mg 3 TIMES DAILY 1/8/2020     Class: E-Prescribe    Route: Oral    clonazePAM (klonoPIN) tablet 1 mg 1 mg AT BEDTIME 1/8/2020     Class: E-Prescribe    Route: Oral    dextrose 50 % injection 25-50 mL 25-50 mL EVERY 15 MIN PRN 1/8/2020     Admin Instructions: Use if have IV access, BG less than 70 mg/dL and meet dose criteria below:Dose if conscious and alert (or disorientated) and NPO = 25 mLDose if unconscious / not alert = 50 mLGive first dose for initial blood glucose less than 70  mg/dL.  If blood glucose at 15 minute recheck is less than or equal to 100 mg/dL continue to administer carbohydrate treatment every 15 minutes, as needed, based on blood glucose and assessment parameters until blood glucose level is above 100 mg/dL.Vesicant. For ordered doses up to 25 g, give IV Push undiluted. Give each 5g over 1 minute.    Class: E-Prescribe    Route: Intravenous    Linked Group 1:  \"Or\" Linked Group Details        divalproex sodium extended-release (DEPAKOTE ER) 24 hr tablet 750 mg 750 mg AT BEDTIME 1/8/2020     Admin Instructions: DO NOT CRUSH.    Class: E-Prescribe    Route: Oral    gabapentin (NEURONTIN) tablet 1,200 mg 1,200 mg 3 TIMES DAILY 1/8/2020     Class: E-Prescribe    Route: Oral    glucagon injection 1 mg 1 mg EVERY 15 MIN PRN 1/8/2020     Admin Instructions: May give SQ or IM. ONLY use glucagon IF patient has NO IV access AND is UNABLE to swallow AND blood glucose is LESS than or EQUAL to 50 mg/dL.If ordered IV, give IV Push over 1 minute. " "Reconstitute with 1mL sterile water.    Class: E-Prescribe    Route: Subcutaneous    Linked Group 1:  \"Or\" Linked Group Details        glucose gel 15-30 g 15-30 g EVERY 15 MIN PRN 1/8/2020     Admin Instructions: Give first dose for initial blood glucose less than 70 mg/dL per the dosing instructions below. If blood glucose at 15 minute rechecks is still less than or equal to 100 mg/dL, continue to administer doses per blood glucose parameters every 15 minutes, as needed, until blood glucose level is above 100 mg/dL.Dosing Instructions:~If patient is conscious and able to swallow and NO enteral tubeFor initial BG 51-69mg/dL OR 15 minute recheck BG 51- 100 mg/dL - give 15 gFor BG less than or equal to 50 mg/dL - give 30 g~ If Enteral tubeFor initial BG 51-69mg/dL OR 15 minute recheck BG 51- 100 mg/dL - give apple juice 120 mL (4 oz or 15 g of CHO) via enteral tubeFor BG less than or equal to 50 mg/dL - Give apple juice 240 mL (8 oz or 30 g of CHO) via enteral tube~Oral gel is preferable for conscious and able to swallow patient. ~IF gel unavailable or patient refuses may provide apple juice per Enteral tube dosing instructions.Document juice on I and O flowsheet.    Class: E-Prescribe    Route: Oral    Linked Group 1:  \"Or\" Linked Group Details        hydrOXYzine (ATARAX) tablet  mg  mg EVERY 4 HOURS PRN 1/8/2020     Admin Instructions: Administer only if there is no other oral medication ordered prn for anxiety.    Class: E-Prescribe    Route: Oral    insulin aspart (NovoLOG) inj (RAPID ACTING) 8 Units 3 TIMES DAILY WITH MEALS 1/8/2020     Admin Instructions: Do not give if pre-prandial glucose is less than 60 mg/dL.If given at mealtime, administer within 30 minutes of start of meal    Class: E-Prescribe    Route: Subcutaneous    insulin aspart (NovoLOG) inj (RAPID ACTING) 1-7 Units 3 TIMES DAILY BEFORE MEALS 1/8/2020     Admin Instructions: Correction Scale - MEDIUM INSULIN RESISTANCE DOSING   Do Not " give Correction Insulin if Pre-Meal BG less than 140. For Pre-Meal  - 189 give 1 unit. For Pre-Meal  - 239 give 2 units. For Pre-Meal  - 289 give 3 units. For Pre-Meal  - 339 give 4 units. For Pre-Meal - 399 give 5 units. For Pre-Meal -449 give 6 unitsFor Pre-Meal BG greater than or equal to 450 give 7 units. To be given with prandial insulin, and based on pre-meal blood glucose.  Notify provider if glucose greater than or equal to 350 mg/dL after administration of correction dose.If given at mealtime, administer within 30 minutes of start of meal    Class: E-Prescribe    Route: Subcutaneous    insulin aspart (NovoLOG) inj (RAPID ACTING) 1-5 Units AT BEDTIME 1/8/2020     Admin Instructions: MEDIUM INSULIN RESISTANCE DOSING  Do Not give Bedtime Correction Insulin if BG less than  200. For  - 249 give 1 units. For  - 299 give 2 units. For  - 349 give 3 units. For  -399 give 4 units. For BG greater than or equal to 400 give 5 units.Notify provider if glucose greater than or equal to 350 mg/dL after administration of correction dose.If given at mealtime, administer within 30 minutes of start of meal    Class: E-Prescribe    Route: Subcutaneous    insulin glargine (LANTUS PEN) injection 40 Units 40 Units AT BEDTIME 1/8/2020     Class: E-Prescribe    Route: Subcutaneous    magnesium hydroxide (MILK OF MAGNESIA) suspension 30 mL 30 mL AT BEDTIME PRN 1/8/2020     Admin Instructions: Shake well.Hold for loose stools.    Class: E-Prescribe    Route: Oral    metFORMIN (GLUCOPHAGE) tablet 1,000 mg 1,000 mg 2 TIMES DAILY WITH MEALS 1/8/2020     Admin Instructions: If the patient receives intravenous, iodinated contrast and patient GFR is greater than 60 mL/min/1.7m2, continue metformin.  Contact provider for 'hold' or 'no hold' instructions if no GFR or if GFR is less than 60 mL/min/1.7m2.    Class: E-Prescribe    Route: Oral    nicotine (NICORETTE) gum 2 mg 2 mg EVERY  "2 HOURS PRN 1/8/2020     Admin Instructions: Gum should be chewed slowly until it tingles, then placed between cheek and gum:  when tingle gone, repeat process until tingle gone (about 30 minutes).    Class: E-Prescribe    Route: Buccal    OLANZapine (zyPREXA) injection 10 mg 10 mg 3 TIMES DAILY PRN 1/8/2020     Admin Instructions: Dissolve the contents of the 10 mg vial using 2.1 mL of Sterile Water for Injection to provide a solution containing 5 mg/mL of olanzapine. Withdraw the ordered dose from vial. Use immediately (within 1 hour) after reconstitution.  Discard any unused portion.    Class: E-Prescribe    Route: Intramuscular    Linked Group 2:  \"Or\" Linked Group Details        OLANZapine (zyPREXA) tablet 10 mg 10 mg 3 TIMES DAILY PRN 1/8/2020     Admin Instructions: Combined IM and PO doses may significantly increase the risk of orthostatic hypotension at 30 mg per day or higher.    Class: E-Prescribe    Route: Oral    Linked Group 2:  \"Or\" Linked Group Details        polyethylene glycol (MIRALAX/GLYCOLAX) Packet 17 g 17 g DAILY PRN 1/8/2020     Admin Instructions: 1 Packet = 17 grams. Mix each gram with at least 1/2 ounce (15 mL) of water - 8 ounces  for 17 g dose, 4 ounces for 8.5 g dose, 2 ounces for 4 g dose.Follow with the same volume of water.Hold for loose stools.    Class: E-Prescribe    Route: Oral    prazosin (MINIPRESS) capsule 1 mg 1 mg AT BEDTIME 1/8/2020     Class: E-Prescribe    Route: Oral    traZODone (DESYREL) tablet 50 mg 50 mg AT BEDTIME PRN 1/8/2020     Admin Instructions: May repeat x 1    Class: E-Prescribe    Route: Oral         10 point ROS positive for bruxism and trismus       Allergies:     Allergies   Allergen Reactions     Haloperidol Other (See Comments)     Other reaction(s): Seizures  Patient states, \"I fell down and wasn't able to get up.\"  Patient states, \"I fell down and wasn't able to get up.\"              Psychiatric Examination:   /73   Pulse 80   Temp 97.6  F " "(36.4  C) (Tympanic)   Resp 18   Ht 1.651 m (5' 5\")   Wt 84.7 kg (186 lb 12.8 oz)   SpO2 98%   BMI 31.09 kg/m    Weight is 186 lbs 12.8 oz  Body mass index is 31.09 kg/m .    Appearance:  awake, alert, poorly groomed and disheveled   Attitude: somewhat  cooperative  Eye Contact:  poor  Mood:  better  Affect:  Less irritable  Speech:  mumbled  Psychomotor Behavior:  Slight bruxism  Thought Process:  Goal oriented, able to answer questions in a linear fashion; however, unable to maintain linear spontaneous conversation; illogical and tangential   Associations:  JUSTA noted at times  Thought Content:  No SI/HI, Delusional content  Insight:  limited  Judgment:  limited  Oriented to:  time, person, and place  Attention Span and Concentration:  limited  Recent and Remote Memory:  fair - impacted by delusional beliefs and drug use  Fund of Knowledge: appropriate  Muscle Strength and Tone: normal  Gait and Station: Normal           Labs:     Results for orders placed or performed during the hospital encounter of 01/08/20 (from the past 48 hour(s))   Glucose by meter   Result Value Ref Range    Glucose 175 (H) 70 - 99 mg/dL   Glucose by meter   Result Value Ref Range    Glucose 102 (H) 70 - 99 mg/dL   Glucose by meter   Result Value Ref Range    Glucose 163 (H) 70 - 99 mg/dL   Glucose by meter   Result Value Ref Range    Glucose 210 (H) 70 - 99 mg/dL   Glucose by meter   Result Value Ref Range    Glucose 148 (H) 70 - 99 mg/dL   Glucose by meter   Result Value Ref Range    Glucose 108 (H) 70 - 99 mg/dL   Glucose by meter   Result Value Ref Range    Glucose 106 (H) 70 - 99 mg/dL   Glucose by meter   Result Value Ref Range    Glucose 73 70 - 99 mg/dL   Glucose by meter   Result Value Ref Range    Glucose 143 (H) 70 - 99 mg/dL   Glucose by meter   Result Value Ref Range    Glucose 192 (H) 70 - 99 mg/dL          Plan:   Continue current medications:  ELOS: >5days secondary to ongoing delusions, medication non-compliance, and " methamphetamine abuse contributing to inability to discharge to a lower level of care than a secure setting. Current facility of residence unwilling to take her back.

## 2020-01-14 LAB
GLUCOSE BLDC GLUCOMTR-MCNC: 100 MG/DL (ref 70–99)
GLUCOSE BLDC GLUCOMTR-MCNC: 112 MG/DL (ref 70–99)
GLUCOSE BLDC GLUCOMTR-MCNC: 139 MG/DL (ref 70–99)
GLUCOSE BLDC GLUCOMTR-MCNC: 197 MG/DL (ref 70–99)
GLUCOSE BLDC GLUCOMTR-MCNC: 213 MG/DL (ref 70–99)

## 2020-01-14 PROCEDURE — 12400000 ZZH R&B MH

## 2020-01-14 PROCEDURE — 25000132 ZZH RX MED GY IP 250 OP 250 PS 637: Performed by: NURSE PRACTITIONER

## 2020-01-14 PROCEDURE — 00000146 ZZHCL STATISTIC GLUCOSE BY METER IP

## 2020-01-14 PROCEDURE — 99231 SBSQ HOSP IP/OBS SF/LOW 25: CPT | Performed by: NURSE PRACTITIONER

## 2020-01-14 RX ADMIN — DIVALPROEX SODIUM 750 MG: 500 TABLET, EXTENDED RELEASE ORAL at 20:48

## 2020-01-14 RX ADMIN — METFORMIN HYDROCHLORIDE 1000 MG: 500 TABLET, FILM COATED ORAL at 17:15

## 2020-01-14 RX ADMIN — GABAPENTIN 1200 MG: 600 TABLET, FILM COATED ORAL at 08:22

## 2020-01-14 RX ADMIN — PRAZOSIN HYDROCHLORIDE 1 MG: 1 CAPSULE ORAL at 20:49

## 2020-01-14 RX ADMIN — BUSPIRONE HYDROCHLORIDE 15 MG: 10 TABLET ORAL at 13:39

## 2020-01-14 RX ADMIN — INSULIN ASPART 8 UNITS: 100 INJECTION, SOLUTION INTRAVENOUS; SUBCUTANEOUS at 07:50

## 2020-01-14 RX ADMIN — BENZTROPINE MESYLATE 1 MG: 1 TABLET ORAL at 20:48

## 2020-01-14 RX ADMIN — CHLORPROMAZINE HYDROCHLORIDE 100 MG: 100 TABLET, SUGAR COATED ORAL at 20:48

## 2020-01-14 RX ADMIN — GABAPENTIN 1200 MG: 600 TABLET, FILM COATED ORAL at 20:49

## 2020-01-14 RX ADMIN — BENZTROPINE MESYLATE 1 MG: 1 TABLET ORAL at 08:23

## 2020-01-14 RX ADMIN — BUSPIRONE HYDROCHLORIDE 15 MG: 10 TABLET ORAL at 20:48

## 2020-01-14 RX ADMIN — GABAPENTIN 1200 MG: 600 TABLET, FILM COATED ORAL at 13:38

## 2020-01-14 RX ADMIN — INSULIN ASPART 8 UNITS: 100 INJECTION, SOLUTION INTRAVENOUS; SUBCUTANEOUS at 11:38

## 2020-01-14 RX ADMIN — INSULIN ASPART 8 UNITS: 100 INJECTION, SOLUTION INTRAVENOUS; SUBCUTANEOUS at 17:16

## 2020-01-14 RX ADMIN — BUSPIRONE HYDROCHLORIDE 15 MG: 10 TABLET ORAL at 08:23

## 2020-01-14 RX ADMIN — CHLORPROMAZINE HYDROCHLORIDE 100 MG: 100 TABLET, SUGAR COATED ORAL at 13:39

## 2020-01-14 RX ADMIN — METFORMIN HYDROCHLORIDE 1000 MG: 500 TABLET, FILM COATED ORAL at 08:22

## 2020-01-14 RX ADMIN — CHLORPROMAZINE HYDROCHLORIDE 100 MG: 100 TABLET, SUGAR COATED ORAL at 08:23

## 2020-01-14 RX ADMIN — INSULIN GLARGINE 40 UNITS: 100 INJECTION, SOLUTION SUBCUTANEOUS at 20:47

## 2020-01-14 RX ADMIN — CLONAZEPAM 1 MG: 1 TABLET ORAL at 20:48

## 2020-01-14 ASSESSMENT — ACTIVITIES OF DAILY LIVING (ADL)
ORAL_HYGIENE: INDEPENDENT
DRESS: SCRUBS (BEHAVIORAL HEALTH)
LAUNDRY: UNABLE TO COMPLETE
HYGIENE/GROOMING: INDEPENDENT
ORAL_HYGIENE: INDEPENDENT
DRESS: SCRUBS (BEHAVIORAL HEALTH);INDEPENDENT
HYGIENE/GROOMING: INDEPENDENT
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
## 2020-01-14 NOTE — PLAN OF CARE
Problem: Adult Behavioral Health Plan of Care  Face to face end of shift report received from Nazia MAK. Rounding completed. Patient observed.     Eliza Arndt RN  1/13/2020       Goal: Patient-Specific Goal (Individualization)  Description  Pt will follow recommendations of treatment team.  Pt will be compliant with medications.  Pt will attend 50 % of groups.  Pt will sleep 6-8 hours each night.  Pt will complete ADL's independently.   1/14/2020 0412 by Eliza Arndt, RN  Outcome: No Change  Note:     Pt has been in bed with eyes closed and regular respirations x 4.5 hours this noc shift. 15 minute and PRN checks all night. No complaints offered. Will continue to monitor.      Face to face end of shift report to be communicated to oncoming RN.     Eliza Arndt RN  1/14/2020  4:12 AM

## 2020-01-14 NOTE — PLAN OF CARE
"  Problem: Adult Behavioral Health Plan of Care  Goal: Patient-Specific Goal (Individualization)  Description  Pt will follow recommendations of treatment team.  Pt will be compliant with medications.  Pt will attend 50 % of groups.  Pt will sleep 6-8 hours each night.  Pt will complete ADL's independently.   1/14/2020 1705 by Nazia Mcmahon RN  Outcome: No Change    Pt out to common areas sporadically during shift.  Pressured, illogical, tangential during assessment. Doesn't answer assessment questions. Fixated on leaving facility. \"You have 72 hours to get me outta here or I'm busting out\". Talks about calling \"the mayor, the senator, Trump\". Unable to be redirected from fixated thoughts at this time.    [address] accu, 112 HS accu.   2100 - No complaints with HS meds. Pt denies anxiety, pain. No complaints offered.   Face to face end of shift report communicated to oncoming RN.     Nazia Mcmahon RN  1/14/2020  9:25 PM        Problem: Thought Process Alteration  Goal: Optimal Thought Clarity  Description  Patient will be able to hold reality based conversations prior to discharge.    1/14/2020 1705 by Nazia Mcmahon RN  Outcome: No Change  Able to answer yes no questions although is erratic in conversation. Talks delusional.    Problem: Fall Injury Risk  Goal: Absence of Fall and Fall-Related Injury  Description  Pt will wear non skid slippers.  Pt will remain free from falls or injuries during hospitalization.   1/14/2020 1705 by Nazia Mcmahon RN  Outcome: Improving   Pt remains free from falls this shift. Gait balanced and steady.  "

## 2020-01-14 NOTE — PROGRESS NOTES
[facility]  Psychiatric Progress Note      Impression:   Ginette is in bed again, saying she is finally sleeping well because she feels safe here. She continues tohave some bruxism, however when asked about this, she reports it is due to her jaw being broken so often and the bear eating her face. Genie remains delusional. However due to the uncertainty if the jaw clenching is pathological as a result of neuroleptics, or due to physical or psychotic factors, increasing the neuroleptic will again be deferred.     Previous medication trials documented:  Zyprexa - weight gain, not fully effective  Invega - ineffective  Tegretol - poorly tolerated  Lithium - poorly tolerated, severe sedation  Clozaril - irritability, hyperthermia, and persistent tachycardia  Depakote - Weight gain and poor mood control (this was still restarted during admission in 11/19)  Thorazine - notable improvement on 200mg tid, decreased in 11/19 on an outpatient visit, resulting in readmission.  Gabapentin   Buspar  Klonopin  Cogentin           Diagnoses:     Schizoaffective Disorder, Bipolar Type  PTSD, by history  R/O Other stimulant use disorder, methamphetamine, with induced perceptual disorder    Attestation:  Patient has been seen and evaluated by me,  Loren Zuleta NP          Interim History:   The patient's care was discussed with the treatment team and chart notes were reviewed.    BEHAVIORAL TEAM DISCUSSION    Progress: Decompensated  Continued Stay Criteria/Rationale: Delusional, poor sleep, no safe discharge plan, medication adjustments, methamphetamine withdrawal  Medical/Physical: Diabetes Mellitus Type 2  Precautions: No   Falls precaution?: Yes  Behavioral Orders   Procedures     Code 1 - Restrict to Unit     Routine Programming     As clinically indicated     Status 15     Every 15 minutes.     Plan: Continue current medications with consideration to increase Thorazine once methamphetamine clears system, less  bruxism and trismus noted, remains lethargic so she may still have some lingering methamphetamine in her system. Guardians would like placement in a more secure setting secondary to ongoing drug use, flight risk, and non-compliance with medications. [facility] do not feel she is appropriate for their facility. Pursuing CADI waiver.  Rationale for change in precautions or plan: Methamphetamine use in the last 3 days - still clearing system. Will consider further changes once this resolves.       Participants: Loren Zuleta RN,  Dennise Herrera [facility], Angie Laws LSW,  Becca Chery LSW, Jim Barrios LSW, Mady Holman OT, Natalia Costello OT, Nursing  Gena Gomez OT          Medications:   I have reviewed this patient's current medications  Prescription Medications as of 1/14/2020       Rx Number Disp Refills Start End Last Dispensed Date Next Fill Date Owning Pharmacy    ACCU-CHEK ABDIRAHMAN PLUS test strip  100 strip 3 11/13/2019    [address]    Sig: TEST BEFORE MEALS AND AT BEDTIME AS DIRECTED    Class: E-Prescribe    artificial saliva (BIOTENE MT) SOLN solution  44.3 mL 3 11/27/2019    [address]    Sig: Swish and spit 1-2 mLs (1-2 sprays) in mouth as needed for dry mouth    Class: E-Prescribe    Route: Swish & Spit    benztropine (COGENTIN) 1 MG tablet  56 tablet 1 1/9/2020    [address]    Sig: TAKE (1) TABLET BY MOUTH TWICE A DAY.    Class: E-Prescribe    blood glucose monitoring (NO BRAND SPECIFIED) meter device kit  1 kit 0 12/26/2019    [address]    Sig: Use to test blood sugar as directed.    Class: E-Prescribe    blood glucose monitoring (SOFTCLIX) lancets  100 each 11 11/11/2019    [address]    Sig: TEST BEFORE MEALS AND AT BEDTIME AS DIRECTED    Class:  E-Prescribe    busPIRone (BUSPAR) 15 MG tablet  84 tablet 1 1/9/2020    [address]    Sig: TAKE (1) TABLET BY MOUTH 3 TIMES DAILY    Class: E-Prescribe    chlorproMAZINE (THORAZINE) 100 MG tablet  84 tablet 0 1/9/2020    [address]    Sig: TAKE (1) TABLET BY MOUTH 3 TIMES DAILY    Class: E-Prescribe    clonazePAM (KLONOPIN) 1 MG tablet  30 tablet 0 12/20/2019    [address]    Sig: TAKE ONE TABLET BY MOUTH AT BEDTIME.    Class: E-Prescribe    divalproex sodium extended-release (DEPAKOTE ER) 250 MG 24 hr tablet  84 tablet 1 1/9/2020    [address]    Sig: TAKE 3 TABLETS BY MOUTH AT BEDTIME.    Class: E-Prescribe    gabapentin (NEURONTIN) 600 MG tablet  168 tablet 0 1/9/2020    [address]    Sig: TAKE (2) TABLETS BY MOUTH THREE TIMES DAILY.    Class: E-Prescribe    hydrOXYzine (ATARAX) 50 MG tablet  90 tablet 0 11/4/2019    [address]    Sig: Take 1 tablet (50 mg) by mouth 3 times daily as needed for anxiety    Class: E-Prescribe    Route: Oral    insulin aspart (NOVOLOG FLEXPEN) 100 UNIT/ML [address]    Sig: Inject 8 Units Subcutaneous 3 times daily (with meals) Plus sliding scale  BG <140 No coverage (141-189: 1 unit, 190-239: 2 units, 240-289: 3 units, 290-339: 4 units, 340-399: 5 units, 400-449: 6 units, BG > 450: 7 units)    Class: Historical    Route: Subcutaneous    insulin glargine (LANTUS PEN) 100 UNIT/ML [address]    Sig: Inject 40 Units Subcutaneous At Bedtime    Class: Historical    Notes to Pharmacy: If Lantus is not covered by insurance, may substitute Basaglar at same dose and frequency.      Route:  Subcutaneous    metFORMIN (GLUCOPHAGE) 1000 MG tablet  60 tablet 3 11/22/2019    [address]    Sig: Take 1 tablet (1,000 mg) by mouth 2 times daily (with meals)    Class: E-Prescribe    Route: Oral    nicotine (NICORETTE) 2 MG gum        [address]    Sig: Place 2 mg inside cheek every 2 hours as needed for smoking cessation    Class: Historical    Route: Buccal    polyethylene glycol (MIRALAX/GLYCOLAX) packet        [facility] [place] [id number]    Sig: Take 1 packet by mouth daily as needed for constipation    Class: Historical    Route: Oral    prazosin (MINIPRESS) 1 MG capsule  28 capsule 1 1/9/2020    [address]    Sig: TAKE 1 CAPSULE BY MOUTH DAILY AT BEDTIME.    Class: E-Prescribe      Hospital Medications as of 1/14/2020       Dose Frequency Start End    acetaminophen (TYLENOL) tablet 650 mg 650 mg EVERY 4 HOURS PRN 1/8/2020     Admin Instructions: Do not use if the patient has significant liver disease. MAX acetaminophen = 4000 mg/24 hrs.  MAX acetaminophen <3000 mg/24 hrs for patients > or = 65 years old.Maximum acetaminophen dose from all sources = 75 mg/kg/day not to exceed 4 grams/day.    Class: E-Prescribe    Route: Oral    alum & mag hydroxide-simethicone (MYLANTA ES/MAALOX  ES) suspension 30 mL 30 mL EVERY 4 HOURS PRN 1/8/2020     Admin Instructions: Shake well.    Class: E-Prescribe    Route: Oral    artificial saliva (BIOTENE MT) solution 1-2 spray 1-2 spray 4 TIMES DAILY PRN 1/8/2020     Class: E-Prescribe    Route: Swish & Spit    benztropine (COGENTIN) tablet 1 mg 1 mg 2 TIMES DAILY 1/8/2020     Class: E-Prescribe    Route: Oral    bisacodyl (DULCOLAX) Suppository 10 mg 10 mg DAILY PRN 1/8/2020     Admin Instructions: Hold for loose stools.    Class: E-Prescribe    Route: Rectal    busPIRone (BUSPAR) tablet 15 mg 15 mg 3  "TIMES DAILY 1/8/2020     Class: E-Prescribe    Route: Oral    chlorproMAZINE (THORAZINE) tablet 100 mg 100 mg 3 TIMES DAILY 1/8/2020     Class: E-Prescribe    Route: Oral    clonazePAM (klonoPIN) tablet 1 mg 1 mg AT BEDTIME 1/8/2020     Class: E-Prescribe    Route: Oral    dextrose 50 % injection 25-50 mL 25-50 mL EVERY 15 MIN PRN 1/8/2020     Admin Instructions: Use if have IV access, BG less than 70 mg/dL and meet dose criteria below:Dose if conscious and alert (or disorientated) and NPO = 25 mLDose if unconscious / not alert = 50 mLGive first dose for initial blood glucose less than 70  mg/dL.  If blood glucose at 15 minute recheck is less than or equal to 100 mg/dL continue to administer carbohydrate treatment every 15 minutes, as needed, based on blood glucose and assessment parameters until blood glucose level is above 100 mg/dL.Vesicant. For ordered doses up to 25 g, give IV Push undiluted. Give each 5g over 1 minute.    Class: E-Prescribe    Route: Intravenous    Linked Group 1:  \"Or\" Linked Group Details        divalproex sodium extended-release (DEPAKOTE ER) 24 hr tablet 750 mg 750 mg AT BEDTIME 1/8/2020     Admin Instructions: DO NOT CRUSH.    Class: E-Prescribe    Route: Oral    gabapentin (NEURONTIN) tablet 1,200 mg 1,200 mg 3 TIMES DAILY 1/8/2020     Class: E-Prescribe    Route: Oral    glucagon injection 1 mg 1 mg EVERY 15 MIN PRN 1/8/2020     Admin Instructions: May give SQ or IM. ONLY use glucagon IF patient has NO IV access AND is UNABLE to swallow AND blood glucose is LESS than or EQUAL to 50 mg/dL.If ordered IV, give IV Push over 1 minute. Reconstitute with 1mL sterile water.    Class: E-Prescribe    Route: Subcutaneous    Linked Group 1:  \"Or\" Linked Group Details        glucose gel 15-30 g 15-30 g EVERY 15 MIN PRN 1/8/2020     Admin Instructions: Give first dose for initial blood glucose less than 70 mg/dL per the dosing instructions below. If blood glucose at 15 minute rechecks is still less " "than or equal to 100 mg/dL, continue to administer doses per blood glucose parameters every 15 minutes, as needed, until blood glucose level is above 100 mg/dL.Dosing Instructions:~If patient is conscious and able to swallow and NO enteral tubeFor initial BG 51-69mg/dL OR 15 minute recheck BG 51- 100 mg/dL - give 15 gFor BG less than or equal to 50 mg/dL - give 30 g~ If Enteral tubeFor initial BG 51-69mg/dL OR 15 minute recheck BG 51- 100 mg/dL - give apple juice 120 mL (4 oz or 15 g of CHO) via enteral tubeFor BG less than or equal to 50 mg/dL - Give apple juice 240 mL (8 oz or 30 g of CHO) via enteral tube~Oral gel is preferable for conscious and able to swallow patient. ~IF gel unavailable or patient refuses may provide apple juice per Enteral tube dosing instructions.Document juice on I and O flowsheet.    Class: E-Prescribe    Route: Oral    Linked Group 1:  \"Or\" Linked Group Details        hydrOXYzine (ATARAX) tablet  mg  mg EVERY 4 HOURS PRN 1/8/2020     Admin Instructions: Administer only if there is no other oral medication ordered prn for anxiety.    Class: E-Prescribe    Route: Oral    insulin aspart (NovoLOG) inj (RAPID ACTING) 8 Units 3 TIMES DAILY WITH MEALS 1/8/2020     Admin Instructions: Do not give if pre-prandial glucose is less than 60 mg/dL.If given at mealtime, administer within 30 minutes of start of meal    Class: E-Prescribe    Route: Subcutaneous    insulin aspart (NovoLOG) inj (RAPID ACTING) 1-7 Units 3 TIMES DAILY BEFORE MEALS 1/8/2020     Admin Instructions: Correction Scale - MEDIUM INSULIN RESISTANCE DOSING   Do Not give Correction Insulin if Pre-Meal BG less than 140. For Pre-Meal  - 189 give 1 unit. For Pre-Meal  - 239 give 2 units. For Pre-Meal  - 289 give 3 units. For Pre-Meal  - 339 give 4 units. For Pre-Meal - 399 give 5 units. For Pre-Meal -449 give 6 unitsFor Pre-Meal BG greater than or equal to 450 give 7 units. To be given with " prandial insulin, and based on pre-meal blood glucose.  Notify provider if glucose greater than or equal to 350 mg/dL after administration of correction dose.If given at mealtime, administer within 30 minutes of start of meal    Class: E-Prescribe    Route: Subcutaneous    insulin aspart (NovoLOG) inj (RAPID ACTING) 1-5 Units AT BEDTIME 1/8/2020     Admin Instructions: MEDIUM INSULIN RESISTANCE DOSING  Do Not give Bedtime Correction Insulin if BG less than  200. For  - 249 give 1 units. For  - 299 give 2 units. For  - 349 give 3 units. For  -399 give 4 units. For BG greater than or equal to 400 give 5 units.Notify provider if glucose greater than or equal to 350 mg/dL after administration of correction dose.If given at mealtime, administer within 30 minutes of start of meal    Class: E-Prescribe    Route: Subcutaneous    insulin glargine (LANTUS PEN) injection 40 Units 40 Units AT BEDTIME 1/8/2020     Class: E-Prescribe    Route: Subcutaneous    magnesium hydroxide (MILK OF MAGNESIA) suspension 30 mL 30 mL AT BEDTIME PRN 1/8/2020     Admin Instructions: Shake well.Hold for loose stools.    Class: E-Prescribe    Route: Oral    metFORMIN (GLUCOPHAGE) tablet 1,000 mg 1,000 mg 2 TIMES DAILY WITH MEALS 1/8/2020     Admin Instructions: If the patient receives intravenous, iodinated contrast and patient GFR is greater than 60 mL/min/1.7m2, continue metformin.  Contact provider for 'hold' or 'no hold' instructions if no GFR or if GFR is less than 60 mL/min/1.7m2.    Class: E-Prescribe    Route: Oral    nicotine (NICORETTE) gum 2 mg 2 mg EVERY 2 HOURS PRN 1/8/2020     Admin Instructions: Gum should be chewed slowly until it tingles, then placed between cheek and gum:  when tingle gone, repeat process until tingle gone (about 30 minutes).    Class: E-Prescribe    Route: Buccal    OLANZapine (zyPREXA) injection 10 mg 10 mg 3 TIMES DAILY PRN 1/8/2020     Admin Instructions: Dissolve the contents of the  "10 mg vial using 2.1 mL of Sterile Water for Injection to provide a solution containing 5 mg/mL of olanzapine. Withdraw the ordered dose from vial. Use immediately (within 1 hour) after reconstitution.  Discard any unused portion.    Class: E-Prescribe    Route: Intramuscular    Linked Group 2:  \"Or\" Linked Group Details        OLANZapine (zyPREXA) tablet 10 mg 10 mg 3 TIMES DAILY PRN 1/8/2020     Admin Instructions: Combined IM and PO doses may significantly increase the risk of orthostatic hypotension at 30 mg per day or higher.    Class: E-Prescribe    Route: Oral    Linked Group 2:  \"Or\" Linked Group Details        polyethylene glycol (MIRALAX/GLYCOLAX) Packet 17 g 17 g DAILY PRN 1/8/2020     Admin Instructions: 1 Packet = 17 grams. Mix each gram with at least 1/2 ounce (15 mL) of water - 8 ounces  for 17 g dose, 4 ounces for 8.5 g dose, 2 ounces for 4 g dose.Follow with the same volume of water.Hold for loose stools.    Class: E-Prescribe    Route: Oral    prazosin (MINIPRESS) capsule 1 mg 1 mg AT BEDTIME 1/8/2020     Class: E-Prescribe    Route: Oral    traZODone (DESYREL) tablet 50 mg 50 mg AT BEDTIME PRN 1/8/2020     Admin Instructions: May repeat x 1    Class: E-Prescribe    Route: Oral         10 point ROS positive for bruxism and trismus       Allergies:     Allergies   Allergen Reactions     Haloperidol Other (See Comments)     Other reaction(s): Seizures  Patient states, \"I fell down and wasn't able to get up.\"  Patient states, \"I fell down and wasn't able to get up.\"              Psychiatric Examination:   /63   Pulse 88   Temp 98.6  F (37  C) (Tympanic)   Resp 12   Ht 1.651 m (5' 5\")   Wt 84.7 kg (186 lb 12.8 oz)   SpO2 95%   BMI 31.09 kg/m    Weight is 186 lbs 12.8 oz  Body mass index is 31.09 kg/m .    Appearance:  awake, alert, poorly groomed and disheveled   Attitude: somewhat  cooperative  Eye Contact:  poor  Mood:  better  Affect:  Less irritable  Speech:  mumbled  Psychomotor " Behavior:  Slight bruxism  Thought Process:  Goal oriented, able to answer questions in a linear fashion; however, unable to maintain linear spontaneous conversation; illogical and tangential   Associations:  JUSTA noted at times  Thought Content:  No SI/HI, Delusional content  Insight:  limited  Judgment:  limited  Oriented to:  time, person, and place  Attention Span and Concentration:  limited  Recent and Remote Memory:  fair - impacted by delusional beliefs and drug use  Fund of Knowledge: appropriate  Muscle Strength and Tone: normal  Gait and Station: Normal           Labs:     Results for orders placed or performed during the hospital encounter of 01/08/20 (from the past 48 hour(s))   Glucose by meter   Result Value Ref Range    Glucose 106 (H) 70 - 99 mg/dL   Glucose by meter   Result Value Ref Range    Glucose 73 70 - 99 mg/dL   Glucose by meter   Result Value Ref Range    Glucose 143 (H) 70 - 99 mg/dL   Glucose by meter   Result Value Ref Range    Glucose 192 (H) 70 - 99 mg/dL   Glucose by meter   Result Value Ref Range    Glucose 142 (H) 70 - 99 mg/dL   Glucose by meter   Result Value Ref Range    Glucose 162 (H) 70 - 99 mg/dL   Glucose by meter   Result Value Ref Range    Glucose 133 (H) 70 - 99 mg/dL   Glucose by meter   Result Value Ref Range    Glucose 213 (H) 70 - 99 mg/dL   Glucose by meter   Result Value Ref Range    Glucose 139 (H) 70 - 99 mg/dL   Glucose by meter   Result Value Ref Range    Glucose 100 (H) 70 - 99 mg/dL          Plan:   Continue current medications:  ELOS: >5days secondary to ongoing delusions, medication non-compliance, and methamphetamine abuse contributing to inability to discharge to a lower level of care than a secure setting. Current facility of residence unwilling to take her back.

## 2020-01-15 LAB
AMPHET UR QL CFM: 57 NG/ML
AMPHETAMINES SPEC-MCNC: POSITIVE NG/ML
APAP BLD-MCNC: NEGATIVE UG/ML
BARBITURATES SPEC-MCNC: NEGATIVE UG/ML
BENZODIAZ SPEC-MCNC: NEGATIVE NG/ML
BUPRENORPHINE SERPLBLD-MCNC: NEGATIVE NG/ML
CARBOXYTHC BLD-MCNC: NEGATIVE NG/ML
CARISOPRODOL IA: NEGATIVE UG/ML
COCAINE METABOLITE IA: NEGATIVE NG/ML
DECLARED MEDICATIONS: ABNORMAL
DIETHYLPROPION: NEGATIVE NG/ML
EPHEDRINE: NEGATIVE NG/ML
ETHANOL BLD-MCNC: NEGATIVE GM/DL
FENTANYL IA: NEGATIVE NG/ML
GABAPENTIN CONFIRM: POSITIVE
GABAPENTIN IA: POSITIVE UG/ML
GABAPENTIN: 9.8 UG/ML
GLUCOSE BLDC GLUCOMTR-MCNC: 116 MG/DL (ref 70–99)
GLUCOSE BLDC GLUCOMTR-MCNC: 165 MG/DL (ref 70–99)
GLUCOSE BLDC GLUCOMTR-MCNC: 194 MG/DL (ref 70–99)
GLUCOSE BLDC GLUCOMTR-MCNC: 199 MG/DL (ref 70–99)
GLUCOSE BLDC GLUCOMTR-MCNC: 210 MG/DL (ref 70–99)
MDA SERPL CFM-MCNC: NEGATIVE NG/ML
MDEA SERPL CFM-MCNC: NEGATIVE NG/ML
MDMA SERPL CFM-MCNC: NEGATIVE NG/ML
MEPERIDINE SERPLBLD-MCNC: NEGATIVE NG/ML
METHADONE BLD-MCNC: NEGATIVE NG/ML
METHAMPHET SERPL CFM-MCNC: 417 NG/ML
OPIATES SPEC-MCNC: NEGATIVE NG/ML
OTHER DRUGS DETECTED: ABNORMAL
OXYCODONE SERPLBLD SCN-MCNC: NEGATIVE NG/ML
PCP SPEC-MCNC: NEGATIVE NG/ML
PHENDIMETRAZINE: NEGATIVE NG/ML
PHENTERMINE BLD-MCNC: NEGATIVE NG/ML
PHENYLPROPANOLAMINE: NEGATIVE NG/ML
PROPOXYPH SPEC-MCNC: NEGATIVE NG/ML
PSEUDOEPHEDRINE: NEGATIVE NG/ML
SYMPATHOMIMETICS CF: POSITIVE
TRAMADOL BLD-MCNC: NEGATIVE NG/ML

## 2020-01-15 PROCEDURE — 00000146 ZZHCL STATISTIC GLUCOSE BY METER IP

## 2020-01-15 PROCEDURE — 25000132 ZZH RX MED GY IP 250 OP 250 PS 637: Performed by: NURSE PRACTITIONER

## 2020-01-15 PROCEDURE — 99232 SBSQ HOSP IP/OBS MODERATE 35: CPT | Performed by: NURSE PRACTITIONER

## 2020-01-15 PROCEDURE — 12400000 ZZH R&B MH

## 2020-01-15 RX ORDER — NICOTINE 21 MG/24HR
1 PATCH, TRANSDERMAL 24 HOURS TRANSDERMAL DAILY
Status: DISCONTINUED | OUTPATIENT
Start: 2020-01-15 | End: 2020-02-15 | Stop reason: HOSPADM

## 2020-01-15 RX ADMIN — INSULIN ASPART 8 UNITS: 100 INJECTION, SOLUTION INTRAVENOUS; SUBCUTANEOUS at 08:29

## 2020-01-15 RX ADMIN — INSULIN GLARGINE 40 UNITS: 100 INJECTION, SOLUTION SUBCUTANEOUS at 20:12

## 2020-01-15 RX ADMIN — BUSPIRONE HYDROCHLORIDE 15 MG: 10 TABLET ORAL at 13:12

## 2020-01-15 RX ADMIN — HYDROXYZINE HYDROCHLORIDE 100 MG: 25 TABLET, FILM COATED ORAL at 16:17

## 2020-01-15 RX ADMIN — METFORMIN HYDROCHLORIDE 1000 MG: 500 TABLET, FILM COATED ORAL at 08:28

## 2020-01-15 RX ADMIN — INSULIN ASPART 8 UNITS: 100 INJECTION, SOLUTION INTRAVENOUS; SUBCUTANEOUS at 17:10

## 2020-01-15 RX ADMIN — BENZTROPINE MESYLATE 1 MG: 1 TABLET ORAL at 20:15

## 2020-01-15 RX ADMIN — INSULIN ASPART 8 UNITS: 100 INJECTION, SOLUTION INTRAVENOUS; SUBCUTANEOUS at 12:32

## 2020-01-15 RX ADMIN — PRAZOSIN HYDROCHLORIDE 1 MG: 1 CAPSULE ORAL at 20:16

## 2020-01-15 RX ADMIN — CHLORPROMAZINE HYDROCHLORIDE 100 MG: 100 TABLET, SUGAR COATED ORAL at 20:15

## 2020-01-15 RX ADMIN — NICOTINE 1 PATCH: 21 PATCH, EXTENDED RELEASE TRANSDERMAL at 16:09

## 2020-01-15 RX ADMIN — CHLORPROMAZINE HYDROCHLORIDE 100 MG: 100 TABLET, SUGAR COATED ORAL at 08:27

## 2020-01-15 RX ADMIN — GABAPENTIN 1200 MG: 600 TABLET, FILM COATED ORAL at 20:15

## 2020-01-15 RX ADMIN — BUSPIRONE HYDROCHLORIDE 15 MG: 10 TABLET ORAL at 08:28

## 2020-01-15 RX ADMIN — DIVALPROEX SODIUM 750 MG: 500 TABLET, EXTENDED RELEASE ORAL at 20:15

## 2020-01-15 RX ADMIN — GABAPENTIN 1200 MG: 600 TABLET, FILM COATED ORAL at 13:12

## 2020-01-15 RX ADMIN — GABAPENTIN 1200 MG: 600 TABLET, FILM COATED ORAL at 08:27

## 2020-01-15 RX ADMIN — METFORMIN HYDROCHLORIDE 1000 MG: 500 TABLET, FILM COATED ORAL at 17:09

## 2020-01-15 RX ADMIN — CLONAZEPAM 1 MG: 1 TABLET ORAL at 20:15

## 2020-01-15 RX ADMIN — BENZTROPINE MESYLATE 1 MG: 1 TABLET ORAL at 08:28

## 2020-01-15 RX ADMIN — CHLORPROMAZINE HYDROCHLORIDE 100 MG: 100 TABLET, SUGAR COATED ORAL at 13:12

## 2020-01-15 RX ADMIN — BUSPIRONE HYDROCHLORIDE 15 MG: 10 TABLET ORAL at 20:15

## 2020-01-15 ASSESSMENT — ACTIVITIES OF DAILY LIVING (ADL)
LAUNDRY: UNABLE TO COMPLETE
HYGIENE/GROOMING: INDEPENDENT
DRESS: INDEPENDENT
ORAL_HYGIENE: INDEPENDENT
ORAL_HYGIENE: INDEPENDENT
DRESS: SCRUBS (BEHAVIORAL HEALTH);INDEPENDENT
HYGIENE/GROOMING: INDEPENDENT

## 2020-01-15 NOTE — PROGRESS NOTES
"[facility]  Psychiatric Progress Note      Impression:     Patient is up on the unit, talkative as mainly linear/logical - talks about how she is worried about her things at [facility] and wants to know where she is going from here.  She denies using illicit drugs, tells me she just \"had a pipe\" and it was empty - even though her lab is positive for amphetamine.  Patient reports sleeping good last night, she presents with mainly blunted affect, although able to provide full range and appropriate.  She reports she remains compliant with her medications and does not think we need to change anything.  Patient does request nicotine patch.      Previous medication trials documented:  Zyprexa - weight gain, not fully effective  Invega - ineffective  Tegretol - poorly tolerated  Lithium - poorly tolerated, severe sedation  Clozaril - irritability, hyperthermia, and persistent tachycardia  Depakote - Weight gain and poor mood control (this was still restarted during admission in 11/19)  Thorazine - notable improvement on 200mg tid, decreased in 11/19 on an outpatient visit, resulting in readmission.  Gabapentin   Franc Lo           Diagnoses:     Schizoaffective Disorder, Bipolar Type  PTSD, by history  R/O Other stimulant use disorder, methamphetamine, with induced perceptual disorder    Attestation:  Patient has been seen and evaluated by me,  LATRICE Du CNP          Interim History:   The patient's care was discussed with the treatment team and chart notes were reviewed.    BEHAVIORAL TEAM DISCUSSION    Progress: Decompensated  Continued Stay Criteria/Rationale: Delusional, poor sleep, no safe discharge plan, medication adjustments, methamphetamine withdrawal  Medical/Physical: Diabetes Mellitus Type 2  Precautions: No   Falls precaution?: Yes  Behavioral Orders   Procedures     Code 1 - Restrict to Unit     Routine Programming     As clinically indicated     Status 15     " Every 15 minutes.     Plan: Continue current medications, she presents with less bruxism and trismus, no longer lethargic. Per Social work,  K[facility] is willing to take patient back when stable.   Rationale for change in precautions or plan: Methamphetamine use in the last 3 days - still clearing system. Will consider further changes once this resolves.       Participants: Loren Zuleta RN,  Dennise Herrera LICSW, Angie Laws LSW,  Becca Chery LSW, Jim Barrios LSW, Mady Holman OT, Natalia Costello OT, Nursing  Gena Gomez OT          Medications:   I have reviewed this patient's current medications  Prescription Medications as of 1/15/2020       Rx Number Disp Refills Start End Last Dispensed Date Next Fill Date Owning Pharmacy    ACCU-CHEK ABDIRAHMAN PLUS test strip  100 strip 3 11/13/2019    [address]    Sig: TEST BEFORE MEALS AND AT BEDTIME AS DIRECTED    Class: E-Prescribe    artificial saliva (BIOTENE MT) SOLN solution  44.3 mL 3 11/27/2019    [address]    Sig: Swish and spit 1-2 mLs (1-2 sprays) in mouth as needed for dry mouth    Class: E-Prescribe    Route: Swish & Spit    benztropine (COGENTIN) 1 MG tablet  56 tablet 1 1/9/2020    [address]    Sig: TAKE (1) TABLET BY MOUTH TWICE A DAY.    Class: E-Prescribe    blood glucose monitoring (NO BRAND SPECIFIED) meter device kit  1 kit 0 12/26/2019    [address]    Sig: Use to test blood sugar as directed.    Class: E-Prescribe    blood glucose monitoring (SOFTCLIX) lancets  100 each 11 11/11/2019    [address]    Sig: TEST BEFORE MEALS AND AT BEDTIME AS DIRECTED    Class: E-Prescribe    busPIRone (BUSPAR) 15 MG tablet  84 tablet 1 1/9/2020    [address]    Sig:  TAKE (1) TABLET BY MOUTH 3 TIMES DAILY    Class: E-Prescribe    chlorproMAZINE (THORAZINE) 100 MG tablet  84 tablet 0 1/9/2020    [address]    Sig: TAKE (1) TABLET BY MOUTH 3 TIMES DAILY    Class: E-Prescribe    clonazePAM (KLONOPIN) 1 MG tablet  30 tablet 0 12/20/2019    [address]    Sig: TAKE ONE TABLET BY MOUTH AT BEDTIME.    Class: E-Prescribe    divalproex sodium extended-release (DEPAKOTE ER) 250 MG 24 hr tablet  84 tablet 1 1/9/2020    [address]    Sig: TAKE 3 TABLETS BY MOUTH AT BEDTIME.    Class: E-Prescribe    gabapentin (NEURONTIN) 600 MG tablet  168 tablet 0 1/9/2020    [address]    Sig: TAKE (2) TABLETS BY MOUTH THREE TIMES DAILY.    Class: E-Prescribe    hydrOXYzine (ATARAX) 50 MG tablet  90 tablet 0 11/4/2019    [address]    Sig: Take 1 tablet (50 mg) by mouth 3 times daily as needed for anxiety    Class: E-Prescribe    Route: Oral    insulin aspart (NOVOLOG FLEXPEN) 100 UNIT/ML pen        [address]    Sig: Inject 8 Units Subcutaneous 3 times daily (with meals) Plus sliding scale  BG <140 No coverage (141-189: 1 unit, 190-239: 2 units, 240-289: 3 units, 290-339: 4 units, 340-399: 5 units, 400-449: 6 units, BG > 450: 7 units)    Class: Historical    Route: Subcutaneous    insulin glargine (LANTUS PEN) 100 UNIT/ML pen        [address]    Sig: Inject 40 Units Subcutaneous At Bedtime    Class: Historical    Notes to Pharmacy: If Lantus is not covered by insurance, may substitute Basaglar at same dose and frequency.      Route: Subcutaneous    metFORMIN (GLUCOPHAGE) 1000 MG tablet  60 tablet 3 11/22/2019    [facility] [place] [facility] [place] [address]     Sig: Take 1 tablet (1,000 mg) by mouth 2 times daily (with meals)    Class: E-Prescribe    Route: Oral    nicotine (NICORETTE) 2 MG gum        [address]    Sig: Place 2 mg inside cheek every 2 hours as needed for smoking cessation    Class: Historical    Route: Buccal    polyethylene glycol (MIRALAX/GLYCOLAX) packet        [facility] [address]    Sig: Take 1 packet by mouth daily as needed for constipation    Class: Historical    Route: Oral    prazosin (MINIPRESS) 1 MG capsule  28 capsule 1 1/9/2020    [address]    Sig: TAKE 1 CAPSULE BY MOUTH DAILY AT BEDTIME.    Class: E-Prescribe      Hospital Medications as of 1/15/2020       Dose Frequency Start End    acetaminophen (TYLENOL) tablet 650 mg 650 mg EVERY 4 HOURS PRN 1/8/2020     Admin Instructions: Do not use if the patient has significant liver disease. MAX acetaminophen = 4000 mg/24 hrs.  MAX acetaminophen <3000 mg/24 hrs for patients > or = 65 years old.Maximum acetaminophen dose from all sources = 75 mg/kg/day not to exceed 4 grams/day.    Class: E-Prescribe    Route: Oral    alum & mag hydroxide-simethicone (MYLANTA ES/MAALOX  ES) suspension 30 mL 30 mL EVERY 4 HOURS PRN 1/8/2020     Admin Instructions: Shake well.    Class: E-Prescribe    Route: Oral    artificial saliva (BIOTENE MT) solution 1-2 spray 1-2 spray 4 TIMES DAILY PRN 1/8/2020     Class: E-Prescribe    Route: Swish & Spit    benztropine (COGENTIN) tablet 1 mg 1 mg 2 TIMES DAILY 1/8/2020     Class: E-Prescribe    Route: Oral    bisacodyl (DULCOLAX) Suppository 10 mg 10 mg DAILY PRN 1/8/2020     Admin Instructions: Hold for loose stools.    Class: E-Prescribe    Route: Rectal    busPIRone (BUSPAR) tablet 15 mg 15 mg 3 TIMES DAILY 1/8/2020     Class: E-Prescribe    Route: Oral    chlorproMAZINE (THORAZINE) tablet 100 mg 100 mg 3 TIMES DAILY 1/8/2020     Class:  "E-Prescribe    Route: Oral    clonazePAM (klonoPIN) tablet 1 mg 1 mg AT BEDTIME 1/8/2020     Class: E-Prescribe    Route: Oral    dextrose 50 % injection 25-50 mL 25-50 mL EVERY 15 MIN PRN 1/8/2020     Admin Instructions: Use if have IV access, BG less than 70 mg/dL and meet dose criteria below:Dose if conscious and alert (or disorientated) and NPO = 25 mLDose if unconscious / not alert = 50 mLGive first dose for initial blood glucose less than 70  mg/dL.  If blood glucose at 15 minute recheck is less than or equal to 100 mg/dL continue to administer carbohydrate treatment every 15 minutes, as needed, based on blood glucose and assessment parameters until blood glucose level is above 100 mg/dL.Vesicant. For ordered doses up to 25 g, give IV Push undiluted. Give each 5g over 1 minute.    Class: E-Prescribe    Route: Intravenous    Linked Group 1:  \"Or\" Linked Group Details        divalproex sodium extended-release (DEPAKOTE ER) 24 hr tablet 750 mg 750 mg AT BEDTIME 1/8/2020     Admin Instructions: DO NOT CRUSH.    Class: E-Prescribe    Route: Oral    gabapentin (NEURONTIN) tablet 1,200 mg 1,200 mg 3 TIMES DAILY 1/8/2020     Class: E-Prescribe    Route: Oral    glucagon injection 1 mg 1 mg EVERY 15 MIN PRN 1/8/2020     Admin Instructions: May give SQ or IM. ONLY use glucagon IF patient has NO IV access AND is UNABLE to swallow AND blood glucose is LESS than or EQUAL to 50 mg/dL.If ordered IV, give IV Push over 1 minute. Reconstitute with 1mL sterile water.    Class: E-Prescribe    Route: Subcutaneous    Linked Group 1:  \"Or\" Linked Group Details        glucose gel 15-30 g 15-30 g EVERY 15 MIN PRN 1/8/2020     Admin Instructions: Give first dose for initial blood glucose less than 70 mg/dL per the dosing instructions below. If blood glucose at 15 minute rechecks is still less than or equal to 100 mg/dL, continue to administer doses per blood glucose parameters every 15 minutes, as needed, until blood glucose level is " "above 100 mg/dL.Dosing Instructions:~If patient is conscious and able to swallow and NO enteral tubeFor initial BG 51-69mg/dL OR 15 minute recheck BG 51- 100 mg/dL - give 15 gFor BG less than or equal to 50 mg/dL - give 30 g~ If Enteral tubeFor initial BG 51-69mg/dL OR 15 minute recheck BG 51- 100 mg/dL - give apple juice 120 mL (4 oz or 15 g of CHO) via enteral tubeFor BG less than or equal to 50 mg/dL - Give apple juice 240 mL (8 oz or 30 g of CHO) via enteral tube~Oral gel is preferable for conscious and able to swallow patient. ~IF gel unavailable or patient refuses may provide apple juice per Enteral tube dosing instructions.Document juice on I and O flowsheet.    Class: E-Prescribe    Route: Oral    Linked Group 1:  \"Or\" Linked Group Details        hydrOXYzine (ATARAX) tablet  mg  mg EVERY 4 HOURS PRN 1/8/2020     Admin Instructions: Administer only if there is no other oral medication ordered prn for anxiety.    Class: E-Prescribe    Route: Oral    insulin aspart (NovoLOG) inj (RAPID ACTING) 8 Units 3 TIMES DAILY WITH MEALS 1/8/2020     Admin Instructions: Do not give if pre-prandial glucose is less than 60 mg/dL.If given at mealtime, administer within 30 minutes of start of meal    Class: E-Prescribe    Route: Subcutaneous    insulin aspart (NovoLOG) inj (RAPID ACTING) 1-7 Units 3 TIMES DAILY BEFORE MEALS 1/8/2020     Admin Instructions: Correction Scale - MEDIUM INSULIN RESISTANCE DOSING   Do Not give Correction Insulin if Pre-Meal BG less than 140. For Pre-Meal  - 189 give 1 unit. For Pre-Meal  - 239 give 2 units. For Pre-Meal  - 289 give 3 units. For Pre-Meal  - 339 give 4 units. For Pre-Meal - 399 give 5 units. For Pre-Meal -449 give 6 unitsFor Pre-Meal BG greater than or equal to 450 give 7 units. To be given with prandial insulin, and based on pre-meal blood glucose.  Notify provider if glucose greater than or equal to 350 mg/dL after administration of " correction dose.If given at mealtime, administer within 30 minutes of start of meal    Class: E-Prescribe    Route: Subcutaneous    insulin aspart (NovoLOG) inj (RAPID ACTING) 1-5 Units AT BEDTIME 1/8/2020     Admin Instructions: MEDIUM INSULIN RESISTANCE DOSING  Do Not give Bedtime Correction Insulin if BG less than  200. For  - 249 give 1 units. For  - 299 give 2 units. For  - 349 give 3 units. For  -399 give 4 units. For BG greater than or equal to 400 give 5 units.Notify provider if glucose greater than or equal to 350 mg/dL after administration of correction dose.If given at mealtime, administer within 30 minutes of start of meal    Class: E-Prescribe    Route: Subcutaneous    insulin glargine (LANTUS PEN) injection 40 Units 40 Units AT BEDTIME 1/8/2020     Class: E-Prescribe    Route: Subcutaneous    magnesium hydroxide (MILK OF MAGNESIA) suspension 30 mL 30 mL AT BEDTIME PRN 1/8/2020     Admin Instructions: Shake well.Hold for loose stools.    Class: E-Prescribe    Route: Oral    metFORMIN (GLUCOPHAGE) tablet 1,000 mg 1,000 mg 2 TIMES DAILY WITH MEALS 1/8/2020     Admin Instructions: If the patient receives intravenous, iodinated contrast and patient GFR is greater than 60 mL/min/1.7m2, continue metformin.  Contact provider for 'hold' or 'no hold' instructions if no GFR or if GFR is less than 60 mL/min/1.7m2.    Class: E-Prescribe    Route: Oral    nicotine (NICODERM CQ) 21 MG/24HR 24 hr patch 1 patch 1 patch DAILY 1/15/2020     Admin Instructions: Reminder: Remove previous patch before applying new patch.    Class: E-Prescribe    Route: Transdermal    nicotine (NICORETTE) gum 2 mg 2 mg EVERY 2 HOURS PRN 1/8/2020     Admin Instructions: Gum should be chewed slowly until it tingles, then placed between cheek and gum:  when tingle gone, repeat process until tingle gone (about 30 minutes).    Class: E-Prescribe    Route: Buccal    nicotine Patch in Place  EVERY 8 HOURS 1/15/2020     Admin  "Instructions: Chart every shift, confirming that patch is still in place on patient (no barcode scan needed). See patch order for dose information.    Class: E-Prescribe    Route: Transdermal    OLANZapine (zyPREXA) injection 10 mg 10 mg 3 TIMES DAILY PRN 1/8/2020     Admin Instructions: Dissolve the contents of the 10 mg vial using 2.1 mL of Sterile Water for Injection to provide a solution containing 5 mg/mL of olanzapine. Withdraw the ordered dose from vial. Use immediately (within 1 hour) after reconstitution.  Discard any unused portion.    Class: E-Prescribe    Route: Intramuscular    Linked Group 2:  \"Or\" Linked Group Details        OLANZapine (zyPREXA) tablet 10 mg 10 mg 3 TIMES DAILY PRN 1/8/2020     Admin Instructions: Combined IM and PO doses may significantly increase the risk of orthostatic hypotension at 30 mg per day or higher.    Class: E-Prescribe    Route: Oral    Linked Group 2:  \"Or\" Linked Group Details        polyethylene glycol (MIRALAX/GLYCOLAX) Packet 17 g 17 g DAILY PRN 1/8/2020     Admin Instructions: 1 Packet = 17 grams. Mix each gram with at least 1/2 ounce (15 mL) of water - 8 ounces  for 17 g dose, 4 ounces for 8.5 g dose, 2 ounces for 4 g dose.Follow with the same volume of water.Hold for loose stools.    Class: E-Prescribe    Route: Oral    prazosin (MINIPRESS) capsule 1 mg 1 mg AT BEDTIME 1/8/2020     Class: E-Prescribe    Route: Oral    traZODone (DESYREL) tablet 50 mg 50 mg AT BEDTIME PRN 1/8/2020     Admin Instructions: May repeat x 1    Class: E-Prescribe    Route: Oral         10 point ROS positive for bruxism and trismus       Allergies:     Allergies   Allergen Reactions     Haloperidol Other (See Comments)     Other reaction(s): Seizures  Patient states, \"I fell down and wasn't able to get up.\"  Patient states, \"I fell down and wasn't able to get up.\"              Psychiatric Examination:   /63   Pulse 89   Temp 98.1  F (36.7  C) (Tympanic)   Resp 14   Ht 1.651 m " "(5' 5\")   Wt 84.7 kg (186 lb 12.8 oz)   SpO2 97%   BMI 31.09 kg/m    Weight is 186 lbs 12.8 oz  Body mass index is 31.09 kg/m .    Appearance:  awake, alert and unkempt  Attitude:  cooperative  Eye Contact:  Good  Mood:  better  Affect:  Less irritable  Speech:  Remains mumbled, slight bruxism  Psychomotor Behavior:  Slight bruxism  Thought Process:  Goal oriented, able to answer questions in a linear fashion; however, remains somewhat tangential   Associations:  JUSTA noted at times  Thought Content:  No SI/HI, Delusional content  Insight:  limited  Judgment:  limited  Oriented to:  time, person, and place  Attention Span and Concentration:  limited  Recent and Remote Memory:  fair - impacted by delusional beliefs and drug use  Fund of Knowledge: appropriate  Muscle Strength and Tone: normal  Gait and Station: Normal           Labs:     Results for orders placed or performed during the hospital encounter of 01/08/20 (from the past 48 hour(s))   Glucose by meter   Result Value Ref Range    Glucose 162 (H) 70 - 99 mg/dL   Glucose by meter   Result Value Ref Range    Glucose 133 (H) 70 - 99 mg/dL   Glucose by meter   Result Value Ref Range    Glucose 213 (H) 70 - 99 mg/dL   Glucose by meter   Result Value Ref Range    Glucose 139 (H) 70 - 99 mg/dL   Glucose by meter   Result Value Ref Range    Glucose 100 (H) 70 - 99 mg/dL   Glucose by meter   Result Value Ref Range    Glucose 197 (H) 70 - 99 mg/dL   Glucose by meter   Result Value Ref Range    Glucose 112 (H) 70 - 99 mg/dL   Glucose by meter   Result Value Ref Range    Glucose 210 (H) 70 - 99 mg/dL   Glucose by meter   Result Value Ref Range    Glucose 199 (H) 70 - 99 mg/dL   Glucose by meter   Result Value Ref Range    Glucose 116 (H) 70 - 99 mg/dL          Plan:     Continue current medications:  ELOS: 2-3 days - May return to [facility] if continues to stabilize    "

## 2020-01-15 NOTE — PLAN OF CARE
"Called and spoke with Marcella from [facility] - states they have one current opening for a female writer will fax down referral for review. Spoke with Magen who is the supervisor for CADI managers in [facility] - Magen stated pt is not under a CADI with [facility] and stated \"[facility] would be the ones to manage her CADI\" Magen stated he would check to make sure and give writer a call back, but at this time pt is not under any services through [facility] as Magen stated \"there are some complicated issues with this case\" and indicated pt's services are all through [facility].     Received a call from Kiah with [facility] - stated she received a call from [facility] stating they were \"upset\" to get a call this morning from writer regarding pt's CADI being through [facility]. Kiah stated they were \"waiting for [facility] to process her MA and they did that on Monday and her CADI is now open\" Kiah asked if [facility] has started the eviction process, informed Kiah writer spoke with Lizzy last week and explained to Lizzy that they needed to go through the legal eviction process. Kiah states she will contact [place] to initiate this.     Informed Kiah writer spoke with Jeff this morning and a referral was faxed - Kiah stated pt is not able to go to any place other than a \"locked and secure setting\" and [facility] and she would el[facility]\" Kiah recommended \"a customized living facility there is one in [place] called [facility]\" Kiah provided writer with their contact info and message was left. Attempted to explain to Kiah that pt cannot live in the hospital until a bed is available at a locked facility and that an unlocked adult foster care may need to be considered until a locked bed is available.     Kiah also questioned if a PURDY has been discussed for pt as \"this would be best for her\" - assured " "Kiah this has most likely been discussed at some point for pt before, but pt would need to be agreeable for this or need to have a hart order - Kiah stated \"but we are her guardians\" attempted to explain they are not able to force medications on pt as her guardians that pt would need to have a court order to force medicate and nobody was willing to file for commitment on pt and that is why guardianship was pursued.     Pt states she is interested in returning back to [facility] and called and spoke with pt's guardian who report they are okay with pt returning back to [facility]. Will discuss discharge tomorrow with treatment team     Received a call from Gerda with [facility] who stated that Sally (manager at [facility]) \"misspoke\" and stated their \" confirmed we are not taking her back\" Gerda stated they are going to be giving pt an \"immediate order of eviction due to an unsafe environment\" directed Gerda to call and speak with Kiah from [facility] and she can discuss eviction and termination of services with Kiah.     "

## 2020-01-15 NOTE — PLAN OF CARE
"  Problem: Adult Behavioral Health Plan of Care  Goal: Patient-Specific Goal (Individualization)  Description  Pt will follow recommendations of treatment team.  Pt will be compliant with medications.  Pt will attend 50 % of groups.  Pt will sleep 6-8 hours each night.  Pt will complete ADL's independently.   1/15/2020 1036 by Crow Leyva, RN  Outcome: No Change  Note:     This AM pt has been irritable with staff. Pt at times has a tense stare. Pt was dismissive with staff when preforming nursing assessment. Pt has been compliant with medications at this time. Blood glucose monitored through the shift and insulin given per orders. Pt did attend one group for a short period of time then told a staff member that she was feeling \"bitchy\" today and left group. Pt has pressured speech.       Problem: Thought Process Alteration  Goal: Optimal Thought Clarity  Description  Patient will be able to hold reality based conversations prior to discharge.     1/15/2020 1036 by Crow Leyva, RN  Outcome: No Change   Will continue to monitor.     Problem: Fall Injury Risk  Goal: Absence of Fall and Fall-Related Injury  Description  Pt will wear non skid slippers.  Pt will remain free from falls or injuries during hospitalization.   1/15/2020 1036 by Crow Leyva, RN  Outcome: Improving   Will continue to monitor.   "

## 2020-01-15 NOTE — PLAN OF CARE
Problem: Adult Behavioral Health Plan of Care  Goal: Patient-Specific Goal (Individualization)  Description  Pt will follow recommendations of treatment team.  Pt will be compliant with medications.  Pt will attend 50 % of groups.  Pt will sleep 6-8 hours each night.  Pt will complete ADL's independently.   1/15/2020 1630 by Nazia Mcmahon, RN  Outcome: No Change    Pt out in common areas at start of shift. More appropriate in conversation than yesterday. Able to have linear, goal based conversation. Doesn't talk of delusions at this time. 5PM accu 194. Compliant with medications. New order for nicotine patch 21mg ordered and placed on pt. Pt describes anxiety as 10/10, requested and administered atarax. Denies pain, depression, SI/HI, hallucinations.   1730 - Bety from [facility] in to unit to serve patient with Notice of Termination of Home Care Services. Bety from [facility] explained that pt has been evicted but her belonging would be held until for her. Pt didn't have any questions. Copy of notice placed in pt's paper chart.    Raudel patch removed at HS  Problem: Fall Injury Risk  Goal: Absence of Fall and Fall-Related Injury  Description  Pt will wear non skid slippers.  Pt will remain free from falls or injuries during hospitalization.   1/15/2020 1630 by Nazia Mcmahon, RN  Outcome: Improving     Problem: Thought Process Alteration  Goal: Optimal Thought Clarity  Description  Patient will be able to hold reality based conversations prior to discharge.     1/15/2020 1630 by Nazia Mcmahon, RN  Outcome: Improving

## 2020-01-15 NOTE — PLAN OF CARE
"Face to face end of shift report will be communicated to oncoming RN.     Problem: Adult Behavioral Health Plan of Care  Goal: Patient-Specific Goal (Individualization)  Description  Pt will follow recommendations of treatment team.  Pt will be compliant with medications.  Pt will attend 50 % of groups.  Pt will sleep 6-8 hours each night.  Pt will complete ADL's independently.   1/15/2020 0110 by Mandy Montoya RN  Outcome: No Change     Problem: Fall Injury Risk  Goal: Absence of Fall and Fall-Related Injury  Description  Pt will wear non skid slippers.  Pt will remain free from falls or injuries during hospitalization.   1/15/2020 0110 by Mandy Montoya RN  Outcome: Improving     Problem: Thought Process Alteration  Goal: Optimal Thought Clarity  Description  Patient will be able to hold reality based conversations prior to discharge.     1/15/2020 0110 by Mandy Montoya RN  Outcome: No Change   Face to face end of shift report obtained from OBDULIO Mandujano, Pt observed resting in bed.   0209- . Pt has been restless. Declined any PRN and states \"I am fine\" when asked. No delusional statements made. Kept communication to staff in one/few word answers. Pt appeared to be responding but remains calm and pleasant toward staff.   Pt appears to be sleeping in bed with eyes closed, having regular respirations and position changes since 0215. No falls noted or reported so far this shift. 15 minutes and PRN safety checks completed with no noted complains. Will continue to monitor.   "

## 2020-01-16 LAB
GLUCOSE BLDC GLUCOMTR-MCNC: 113 MG/DL (ref 70–99)
GLUCOSE BLDC GLUCOMTR-MCNC: 115 MG/DL (ref 70–99)
GLUCOSE BLDC GLUCOMTR-MCNC: 125 MG/DL (ref 70–99)
GLUCOSE BLDC GLUCOMTR-MCNC: 128 MG/DL (ref 70–99)
GLUCOSE BLDC GLUCOMTR-MCNC: 130 MG/DL (ref 70–99)

## 2020-01-16 PROCEDURE — 12400000 ZZH R&B MH

## 2020-01-16 PROCEDURE — 25000132 ZZH RX MED GY IP 250 OP 250 PS 637: Performed by: NURSE PRACTITIONER

## 2020-01-16 PROCEDURE — 00000146 ZZHCL STATISTIC GLUCOSE BY METER IP

## 2020-01-16 PROCEDURE — 25000131 ZZH RX MED GY IP 250 OP 636 PS 637: Performed by: NURSE PRACTITIONER

## 2020-01-16 PROCEDURE — 99232 SBSQ HOSP IP/OBS MODERATE 35: CPT | Performed by: NURSE PRACTITIONER

## 2020-01-16 RX ORDER — CHLORPROMAZINE HYDROCHLORIDE 25 MG/1
25 TABLET, FILM COATED ORAL 3 TIMES DAILY PRN
Status: DISCONTINUED | OUTPATIENT
Start: 2020-01-16 | End: 2020-02-04

## 2020-01-16 RX ADMIN — INSULIN GLARGINE 40 UNITS: 100 INJECTION, SOLUTION SUBCUTANEOUS at 20:30

## 2020-01-16 RX ADMIN — INSULIN ASPART 8 UNITS: 100 INJECTION, SOLUTION INTRAVENOUS; SUBCUTANEOUS at 17:14

## 2020-01-16 RX ADMIN — METFORMIN HYDROCHLORIDE 1000 MG: 500 TABLET, FILM COATED ORAL at 08:05

## 2020-01-16 RX ADMIN — BENZTROPINE MESYLATE 1 MG: 1 TABLET ORAL at 20:32

## 2020-01-16 RX ADMIN — PRAZOSIN HYDROCHLORIDE 1 MG: 1 CAPSULE ORAL at 20:32

## 2020-01-16 RX ADMIN — INSULIN ASPART 8 UNITS: 100 INJECTION, SOLUTION INTRAVENOUS; SUBCUTANEOUS at 12:49

## 2020-01-16 RX ADMIN — CHLORPROMAZINE HYDROCHLORIDE 100 MG: 100 TABLET, SUGAR COATED ORAL at 20:32

## 2020-01-16 RX ADMIN — CLONAZEPAM 1 MG: 1 TABLET ORAL at 20:32

## 2020-01-16 RX ADMIN — GABAPENTIN 1200 MG: 600 TABLET, FILM COATED ORAL at 14:14

## 2020-01-16 RX ADMIN — CHLORPROMAZINE HYDROCHLORIDE 100 MG: 100 TABLET, SUGAR COATED ORAL at 08:07

## 2020-01-16 RX ADMIN — BUSPIRONE HYDROCHLORIDE 15 MG: 10 TABLET ORAL at 20:31

## 2020-01-16 RX ADMIN — DIVALPROEX SODIUM 750 MG: 500 TABLET, EXTENDED RELEASE ORAL at 20:32

## 2020-01-16 RX ADMIN — BUSPIRONE HYDROCHLORIDE 15 MG: 10 TABLET ORAL at 14:14

## 2020-01-16 RX ADMIN — METFORMIN HYDROCHLORIDE 1000 MG: 500 TABLET, FILM COATED ORAL at 17:14

## 2020-01-16 RX ADMIN — CHLORPROMAZINE HYDROCHLORIDE 100 MG: 100 TABLET, SUGAR COATED ORAL at 14:14

## 2020-01-16 RX ADMIN — GABAPENTIN 1200 MG: 600 TABLET, FILM COATED ORAL at 08:07

## 2020-01-16 RX ADMIN — CHLORPROMAZINE HYDROCHLORIDE 25 MG: 25 TABLET, SUGAR COATED ORAL at 15:57

## 2020-01-16 RX ADMIN — NICOTINE 1 PATCH: 21 PATCH, EXTENDED RELEASE TRANSDERMAL at 08:04

## 2020-01-16 RX ADMIN — GABAPENTIN 1200 MG: 600 TABLET, FILM COATED ORAL at 20:32

## 2020-01-16 RX ADMIN — INSULIN ASPART 8 UNITS: 100 INJECTION, SOLUTION INTRAVENOUS; SUBCUTANEOUS at 08:03

## 2020-01-16 RX ADMIN — BUSPIRONE HYDROCHLORIDE 15 MG: 10 TABLET ORAL at 08:06

## 2020-01-16 RX ADMIN — BENZTROPINE MESYLATE 1 MG: 1 TABLET ORAL at 08:07

## 2020-01-16 RX ADMIN — HYDROXYZINE HYDROCHLORIDE 100 MG: 25 TABLET, FILM COATED ORAL at 10:19

## 2020-01-16 NOTE — PLAN OF CARE
"Face to face end of shift report communicated to oncoming shift.    Sherry Cano RN  1/16/2020  2:56 PM    Patient's conversation is reality based, fixated on plan for discharge.  Makes multiple phone calls to facilities asking if they will take her and if they will what needs to be done.  Patient reported \"I need a rule 25 before this place will take me, and this other one is a definite no, people need to be working on this\"    Patient complete's ADL's independently.    Patient reports no difficulties with sleep.  Patient does not attend groups this shift.    Patient is heard telling staff \"I went to school in [place], my 25 year old takes care of my 9 year old and drags her down\"    Patient denies SI, HI, and VH, Endorses AH \"at times, not much\" does appear responding at times.      Face to face start of shift report received from Mandy CHOUDHURY RN  Patient in room at this time.      Problem: Adult Behavioral Health Plan of Care  Goal: Patient-Specific Goal (Individualization)  Description  Pt will follow recommendations of treatment team.  Pt will be compliant with medications.  Pt will attend 50 % of groups.  Pt will sleep 6-8 hours each night.  Pt will complete ADL's independently.   1/16/2020 1125 by Sherry Cano, RN  Outcome: Improving  Note:   Problem: Thought Process Alteration  Goal: Optimal Thought Clarity  Description  Patient will be able to hold reality based conversations prior to discharge.     1/16/2020 1125 by Sherry Cano, RN  Outcome: Improving     "

## 2020-01-16 NOTE — PLAN OF CARE
"  Problem: Adult Behavioral Health Plan of Care  Goal: Patient-Specific Goal (Individualization)  Description  Pt will follow recommendations of treatment team.  Pt will be compliant with medications.  Pt will attend 50 % of groups.  Pt will sleep 6-8 hours each night.  Pt will complete ADL's independently.   1/16/2020 1624 by Nazia Mcmahon, RN  Outcome: No Change      Pt out in lobby at start of shift. Fixated on discharge plan and getting a Rule 25. Denies SI/HI. States depression and anxiety are both 10/10. Pain in left hand from \"getting shot\" - declines medications. Able to hold reality based conversation although not goal based. Distractable and fixated. Takes left sock off and states she was wrapped in barbwire. No visible scarring or evidence present.  1557 - PRN thorazine 25mg administered for anxiety. Pt reports thorazine effective.     Dinner accu 125.  HS accu 113. Eats half sandwich and juice. Nicotine patch removed.  Pt requests thorazine shortly after HS med pass, pt encouraged to use coping skills, states \"mouth is dry\" encouraged to drink water, chew gum. Pt agreeable. No further complaints offered.  Face to face end of shift report communicated to oncoming RN.     Nazia Mcmahon, RN  1/16/2020  10:50 PM        Problem: Thought Process Alteration  Goal: Optimal Thought Clarity  Description  Patient will be able to hold reality based conversations prior to discharge.     1/16/2020 1624 by Nazia Mcmahon, RN  Outcome: No Change   Holds reality based conversation at times during shift.   Problem: Fall Injury Risk  Goal: Absence of Fall and Fall-Related Injury  Description  Pt will wear non skid slippers.  Pt will remain free from falls or injuries during hospitalization.   Outcome: Improving     "

## 2020-01-16 NOTE — PLAN OF CARE
Face to face end of shift report will be communicated to oncoming RN.     Problem: Adult Behavioral Health Plan of Care  Goal: Patient-Specific Goal (Individualization)  Description  Pt will follow recommendations of treatment team.  Pt will be compliant with medications.  Pt will attend 50 % of groups.  Pt will sleep 6-8 hours each night.  Pt will complete ADL's independently.   1/16/2020 0045 by Mandy Montoya RN  Outcome: No Change     Problem: Fall Injury Risk  Goal: Absence of Fall and Fall-Related Injury  Description  Pt will wear non skid slippers.  Pt will remain free from falls or injuries during hospitalization.   1/16/2020 0045 by Mandy Montoya RN  Outcome: Improving     Problem: Thought Process Alteration  Goal: Optimal Thought Clarity  Description  Patient will be able to hold reality based conversations prior to discharge.     1/16/2020 0045 by Mandy Montoya RN  Outcome: No Change   Face to face end of shift report obtained from OBDULIO Mandujano. Pt observed resting in bed.   Pt appears to be sleeping in bed with eyes closed, having regular respirations and position changes. 15 minutes and PRN safety checks completed with no noted complains. No falls noted or reported so far this shift.  Will continue to monitor.   0630- Pt slept 6 hours.

## 2020-01-16 NOTE — PLAN OF CARE
"[facility] staff stopped by this morning to drop of a \"notice of termination of home care services\" their staff stated they came by last night to give pt a copy and have pt sign the paperwork. Message and email left for Adele Peñaloza and Hunter Turner. Have spoken with pt multiple times this morning regarding referrals to [facility] and have reminded pt several times writer faxed information and is waiting for a call back. Pt has been tense and slightly irritable and is becoming somewhat impatient as well, but has remained appropriate and has not had any behaviors.   "

## 2020-01-16 NOTE — PROGRESS NOTES
[facility]  Psychiatric Progress Note      Impression:     Patient is sitting at the table, she talks about several places she would be able to discharge to but they need additional information.  Inform her we just met as a team and everything is being worked on today, so advised patient to try and stay calm and patient during this process.  She presents a bit tense initially, then able to relax a bit more and does state it is hard to just sit here and wait.  We discuss maybe adding something else for anxiety/irritability and she would like low dose thorazine for the days and maybe to help with sleep later.  She is able maintain reality based discussion, although continues to talk about [facility] or the place in [place].  Later, she shows me she could get into a different place if she gets a Rule 25 - remind patient social work has been working on it.  Patient denies si/hi, denies a/v/h, and outside of being anxious about discharging, she is doing fairly well.    Previous medication trials documented:  Zyprexa - weight gain, not fully effective  Invega - ineffective  Tegretol - poorly tolerated  Lithium - poorly tolerated, severe sedation  Clozaril - irritability, hyperthermia, and persistent tachycardia  Depakote - Weight gain and poor mood control (this was still restarted during admission in 11/19)  Thorazine - notable improvement on 200mg tid, decreased in 11/19 on an outpatient visit, resulting in readmission.  Gabapentin   Buspar  Armanionopin  Cogentin           Diagnoses:     Schizoaffective Disorder, Bipolar Type  PTSD, by history  R/O Other stimulant use disorder, methamphetamine, with induced perceptual disorder    Attestation:  Patient has been seen and evaluated by me,  LATRICE Du CNP          Interim History:   The patient's care was discussed with the treatment team and chart notes were reviewed.    BEHAVIORAL TEAM DISCUSSION    Progress: Decompensated  Continued Stay  Criteria/Rationale: Delusional, poor sleep, no safe discharge plan, medication adjustments, methamphetamine withdrawal  Medical/Physical: Diabetes Mellitus Type 2  Precautions: No   Falls precaution?: Yes  Behavioral Orders   Procedures     Code 1 - Restrict to Unit     Routine Programming     As clinically indicated     Status 15     Every 15 minutes.     Plan: Continue current medications, she presents with less bruxism and trismus, no longer lethargic. Per Social work,  [facility] is willing to take patient back when stable.   Rationale for change in precautions or plan: Methamphetamine use in the last 3 days - still clearing system. Will consider further changes once this resolves.       Participants: Loren Zuleta RN,  Dennise Herrera LICSW, Angie Laws LSW,  Becca Cehry LSW, Jim Barrios LSW, Mady Holman OT, Natalia Costello OT, Nursing  Gena Gomez OT          Medications:   I have reviewed this patient's current medications  Prescription Medications as of 1/16/2020       Rx Number Disp Refills Start End Last Dispensed Date Next Fill Date Owning Pharmacy    ACCU-CHEK ABDIRAHMAN PLUS test strip  100 strip 3 11/13/2019    [address]    Sig: TEST BEFORE MEALS AND AT BEDTIME AS DIRECTED    Class: E-Prescribe    artificial saliva (BIOTENE MT) SOLN solution  44.3 mL 3 11/27/2019    [address]    Sig: Swish and spit 1-2 mLs (1-2 sprays) in mouth as needed for dry mouth    Class: E-Prescribe    Route: Swish & Spit    benztropine (COGENTIN) 1 MG tablet  56 tablet 1 1/9/2020    [address]    Sig: TAKE (1) TABLET BY MOUTH TWICE A DAY.    Class: E-Prescribe    blood glucose monitoring (NO BRAND SPECIFIED) meter device kit  1 kit 0 12/26/2019    [address]    Sig: Use to test blood sugar as directed.    Class: E-Prescribe     blood glucose monitoring (SOFTCLIX) lancets  100 each 11 11/11/2019    [address]    Sig: TEST BEFORE MEALS AND AT BEDTIME AS DIRECTED    Class: E-Prescribe    busPIRone (BUSPAR) 15 MG tablet  84 tablet 1 1/9/2020    [address]    Sig: TAKE (1) TABLET BY MOUTH 3 TIMES DAILY    Class: E-Prescribe    chlorproMAZINE (THORAZINE) 100 MG tablet  84 tablet 0 1/9/2020    [address]    Sig: TAKE (1) TABLET BY MOUTH 3 TIMES DAILY    Class: E-Prescribe    clonazePAM (KLONOPIN) 1 MG tablet  30 tablet 0 12/20/2019    [address]    Sig: TAKE ONE TABLET BY MOUTH AT BEDTIME.    Class: E-Prescribe    divalproex sodium extended-release (DEPAKOTE ER) 250 MG 24 hr tablet  84 tablet 1 1/9/2020    [address]    Sig: TAKE 3 TABLETS BY MOUTH AT BEDTIME.    Class: E-Prescribe    gabapentin (NEURONTIN) 600 MG tablet  168 tablet 0 1/9/2020    [address]    Sig: TAKE (2) TABLETS BY MOUTH THREE TIMES DAILY.    Class: E-Prescribe    hydrOXYzine (ATARAX) 50 MG tablet  90 tablet 0 11/4/2019    [address]    Sig: Take 1 tablet (50 mg) by mouth 3 times daily as needed for anxiety    Class: E-Prescribe    Route: Oral    insulin aspart (NOVOLOG FLEXPEN) 100 UNIT/ML pen        [address]    Sig: Inject 8 Units Subcutaneous 3 times daily (with meals) Plus sliding scale  BG <140 No coverage (141-189: 1 unit, 190-239: 2 units, 240-289: 3 units, 290-339: 4 units, 340-399: 5 units, 400-449: 6 units, BG > 450: 7 units)    Class: Historical    Route: Subcutaneous    insulin glargine (LANTUS PEN) 100 UNIT/ML pen        [facility] - [place], [place] - [address]     Sig: Inject 40 Units Subcutaneous At Bedtime    Class: Historical    Notes to Pharmacy: If Lantus is not covered by insurance, may substitute Basaglar at same dose and frequency.      Route: Subcutaneous    metFORMIN (GLUCOPHAGE) 1000 MG tablet  60 tablet 3 11/22/2019    [address]    Sig: Take 1 tablet (1,000 mg) by mouth 2 times daily (with meals)    Class: E-Prescribe    Route: Oral    nicotine (NICORETTE) 2 MG gum        [address]    Sig: Place 2 mg inside cheek every 2 hours as needed for smoking cessation    Class: Historical    Route: Buccal    polyethylene glycol (MIRALAX/GLYCOLAX) packet        [address]    Sig: Take 1 packet by mouth daily as needed for constipation    Class: Historical    Route: Oral    prazosin (MINIPRESS) 1 MG capsule  28 capsule 1 1/9/2020    [address]    Sig: TAKE 1 CAPSULE BY MOUTH DAILY AT BEDTIME.    Class: E-Prescribe      Hospital Medications as of 1/16/2020       Dose Frequency Start End    acetaminophen (TYLENOL) tablet 650 mg 650 mg EVERY 4 HOURS PRN 1/8/2020     Admin Instructions: Do not use if the patient has significant liver disease. MAX acetaminophen = 4000 mg/24 hrs.  MAX acetaminophen <3000 mg/24 hrs for patients > or = 65 years old.Maximum acetaminophen dose from all sources = 75 mg/kg/day not to exceed 4 grams/day.    Class: E-Prescribe    Route: Oral    alum & mag hydroxide-simethicone (MYLANTA ES/MAALOX  ES) suspension 30 mL 30 mL EVERY 4 HOURS PRN 1/8/2020     Admin Instructions: Shake well.    Class: E-Prescribe    Route: Oral    artificial saliva (BIOTENE MT) solution 1-2 spray 1-2 spray 4 TIMES DAILY PRN 1/8/2020     Class: E-Prescribe    Route: Swish & Spit    benztropine (COGENTIN) tablet 1 mg 1 mg 2 TIMES DAILY 1/8/2020     Class: E-Prescribe    Route: Oral     "bisacodyl (DULCOLAX) Suppository 10 mg 10 mg DAILY PRN 1/8/2020     Admin Instructions: Hold for loose stools.    Class: E-Prescribe    Route: Rectal    busPIRone (BUSPAR) tablet 15 mg 15 mg 3 TIMES DAILY 1/8/2020     Class: E-Prescribe    Route: Oral    chlorproMAZINE (THORAZINE) tablet 100 mg 100 mg 3 TIMES DAILY 1/8/2020     Class: E-Prescribe    Route: Oral    clonazePAM (klonoPIN) tablet 1 mg 1 mg AT BEDTIME 1/8/2020     Class: E-Prescribe    Route: Oral    dextrose 50 % injection 25-50 mL 25-50 mL EVERY 15 MIN PRN 1/8/2020     Admin Instructions: Use if have IV access, BG less than 70 mg/dL and meet dose criteria below:Dose if conscious and alert (or disorientated) and NPO = 25 mLDose if unconscious / not alert = 50 mLGive first dose for initial blood glucose less than 70  mg/dL.  If blood glucose at 15 minute recheck is less than or equal to 100 mg/dL continue to administer carbohydrate treatment every 15 minutes, as needed, based on blood glucose and assessment parameters until blood glucose level is above 100 mg/dL.Vesicant. For ordered doses up to 25 g, give IV Push undiluted. Give each 5g over 1 minute.    Class: E-Prescribe    Route: Intravenous    Linked Group 1:  \"Or\" Linked Group Details        divalproex sodium extended-release (DEPAKOTE ER) 24 hr tablet 750 mg 750 mg AT BEDTIME 1/8/2020     Admin Instructions: DO NOT CRUSH.    Class: E-Prescribe    Route: Oral    gabapentin (NEURONTIN) tablet 1,200 mg 1,200 mg 3 TIMES DAILY 1/8/2020     Class: E-Prescribe    Route: Oral    glucagon injection 1 mg 1 mg EVERY 15 MIN PRN 1/8/2020     Admin Instructions: May give SQ or IM. ONLY use glucagon IF patient has NO IV access AND is UNABLE to swallow AND blood glucose is LESS than or EQUAL to 50 mg/dL.If ordered IV, give IV Push over 1 minute. Reconstitute with 1mL sterile water.    Class: E-Prescribe    Route: Subcutaneous    Linked Group 1:  \"Or\" Linked Group Details        glucose gel 15-30 g 15-30 g EVERY " "15 MIN PRN 1/8/2020     Admin Instructions: Give first dose for initial blood glucose less than 70 mg/dL per the dosing instructions below. If blood glucose at 15 minute rechecks is still less than or equal to 100 mg/dL, continue to administer doses per blood glucose parameters every 15 minutes, as needed, until blood glucose level is above 100 mg/dL.Dosing Instructions:~If patient is conscious and able to swallow and NO enteral tubeFor initial BG 51-69mg/dL OR 15 minute recheck BG 51- 100 mg/dL - give 15 gFor BG less than or equal to 50 mg/dL - give 30 g~ If Enteral tubeFor initial BG 51-69mg/dL OR 15 minute recheck BG 51- 100 mg/dL - give apple juice 120 mL (4 oz or 15 g of CHO) via enteral tubeFor BG less than or equal to 50 mg/dL - Give apple juice 240 mL (8 oz or 30 g of CHO) via enteral tube~Oral gel is preferable for conscious and able to swallow patient. ~IF gel unavailable or patient refuses may provide apple juice per Enteral tube dosing instructions.Document juice on I and O flowsheet.    Class: E-Prescribe    Route: Oral    Linked Group 1:  \"Or\" Linked Group Details        hydrOXYzine (ATARAX) tablet  mg  mg EVERY 4 HOURS PRN 1/8/2020     Admin Instructions: Administer only if there is no other oral medication ordered prn for anxiety.    Class: E-Prescribe    Route: Oral    insulin aspart (NovoLOG) inj (RAPID ACTING) 8 Units 3 TIMES DAILY WITH MEALS 1/8/2020     Admin Instructions: Do not give if pre-prandial glucose is less than 60 mg/dL.If given at mealtime, administer within 30 minutes of start of meal    Class: E-Prescribe    Route: Subcutaneous    insulin aspart (NovoLOG) inj (RAPID ACTING) 1-7 Units 3 TIMES DAILY BEFORE MEALS 1/8/2020     Admin Instructions: Correction Scale - MEDIUM INSULIN RESISTANCE DOSING   Do Not give Correction Insulin if Pre-Meal BG less than 140. For Pre-Meal  - 189 give 1 unit. For Pre-Meal  - 239 give 2 units. For Pre-Meal  - 289 give 3 " units. For Pre-Meal  - 339 give 4 units. For Pre-Meal - 399 give 5 units. For Pre-Meal -449 give 6 unitsFor Pre-Meal BG greater than or equal to 450 give 7 units. To be given with prandial insulin, and based on pre-meal blood glucose.  Notify provider if glucose greater than or equal to 350 mg/dL after administration of correction dose.If given at mealtime, administer within 30 minutes of start of meal    Class: E-Prescribe    Route: Subcutaneous    insulin aspart (NovoLOG) inj (RAPID ACTING) 1-5 Units AT BEDTIME 1/8/2020     Admin Instructions: MEDIUM INSULIN RESISTANCE DOSING  Do Not give Bedtime Correction Insulin if BG less than  200. For  - 249 give 1 units. For  - 299 give 2 units. For  - 349 give 3 units. For  -399 give 4 units. For BG greater than or equal to 400 give 5 units.Notify provider if glucose greater than or equal to 350 mg/dL after administration of correction dose.If given at mealtime, administer within 30 minutes of start of meal    Class: E-Prescribe    Route: Subcutaneous    insulin glargine (LANTUS PEN) injection 40 Units 40 Units AT BEDTIME 1/8/2020     Class: E-Prescribe    Route: Subcutaneous    magnesium hydroxide (MILK OF MAGNESIA) suspension 30 mL 30 mL AT BEDTIME PRN 1/8/2020     Admin Instructions: Shake well.Hold for loose stools.    Class: E-Prescribe    Route: Oral    metFORMIN (GLUCOPHAGE) tablet 1,000 mg 1,000 mg 2 TIMES DAILY WITH MEALS 1/8/2020     Admin Instructions: If the patient receives intravenous, iodinated contrast and patient GFR is greater than 60 mL/min/1.7m2, continue metformin.  Contact provider for 'hold' or 'no hold' instructions if no GFR or if GFR is less than 60 mL/min/1.7m2.    Class: E-Prescribe    Route: Oral    nicotine (NICODERM CQ) 21 MG/24HR 24 hr patch 1 patch 1 patch DAILY 1/15/2020     Admin Instructions: Reminder: Remove previous patch before applying new patch.    Class: E-Prescribe    Route: Transdermal     "nicotine (NICORETTE) gum 2 mg 2 mg EVERY 2 HOURS PRN 1/8/2020     Admin Instructions: Gum should be chewed slowly until it tingles, then placed between cheek and gum:  when tingle gone, repeat process until tingle gone (about 30 minutes).    Class: E-Prescribe    Route: Buccal    nicotine Patch in Place  EVERY 8 HOURS 1/15/2020     Admin Instructions: Chart every shift, confirming that patch is still in place on patient (no barcode scan needed). See patch order for dose information.    Class: E-Prescribe    Route: Transdermal    OLANZapine (zyPREXA) injection 10 mg 10 mg 3 TIMES DAILY PRN 1/8/2020     Admin Instructions: Dissolve the contents of the 10 mg vial using 2.1 mL of Sterile Water for Injection to provide a solution containing 5 mg/mL of olanzapine. Withdraw the ordered dose from vial. Use immediately (within 1 hour) after reconstitution.  Discard any unused portion.    Class: E-Prescribe    Route: Intramuscular    Linked Group 2:  \"Or\" Linked Group Details        OLANZapine (zyPREXA) tablet 10 mg 10 mg 3 TIMES DAILY PRN 1/8/2020     Admin Instructions: Combined IM and PO doses may significantly increase the risk of orthostatic hypotension at 30 mg per day or higher.    Class: E-Prescribe    Route: Oral    Linked Group 2:  \"Or\" Linked Group Details        polyethylene glycol (MIRALAX/GLYCOLAX) Packet 17 g 17 g DAILY PRN 1/8/2020     Admin Instructions: 1 Packet = 17 grams. Mix each gram with at least 1/2 ounce (15 mL) of water - 8 ounces  for 17 g dose, 4 ounces for 8.5 g dose, 2 ounces for 4 g dose.Follow with the same volume of water.Hold for loose stools.    Class: E-Prescribe    Route: Oral    prazosin (MINIPRESS) capsule 1 mg 1 mg AT BEDTIME 1/8/2020     Class: E-Prescribe    Route: Oral    traZODone (DESYREL) tablet 50 mg 50 mg AT BEDTIME PRN 1/8/2020     Admin Instructions: May repeat x 1    Class: E-Prescribe    Route: Oral         10 point ROS positive for bruxism and trismus       Allergies: " "    Allergies   Allergen Reactions     Haloperidol Other (See Comments)     Other reaction(s): Seizures  Patient states, \"I fell down and wasn't able to get up.\"  Patient states, \"I fell down and wasn't able to get up.\"              Psychiatric Examination:   /69   Pulse 87   Temp 96.9  F (36.1  C) (Tympanic)   Resp 14   Ht 1.651 m (5' 5\")   Wt 84.7 kg (186 lb 12.8 oz)   SpO2 99%   BMI 31.09 kg/m    Weight is 186 lbs 12.8 oz  Body mass index is 31.09 kg/m .    Appearance:  awake, alert and unkempt  Attitude:  cooperative  Eye Contact:  Good  Mood:  better  Affect:  Less irritable  Speech:  Remains mumbled, slight bruxism  Psychomotor Behavior:  Slight bruxism  Thought Process:  Goal oriented, able to answer questions in a linear fashion; however, remains somewhat tangential   Associations:  JUSTA noted at times  Thought Content:  No SI/HI, Delusional content  Insight:  limited  Judgment:  limited  Oriented to:  time, person, and place  Attention Span and Concentration:  limited  Recent and Remote Memory:  fair - impacted by delusional beliefs and drug use  Fund of Knowledge: appropriate  Muscle Strength and Tone: normal  Gait and Station: Normal           Labs:     Results for orders placed or performed during the hospital encounter of 01/08/20 (from the past 48 hour(s))   Glucose by meter   Result Value Ref Range    Glucose 197 (H) 70 - 99 mg/dL   Glucose by meter   Result Value Ref Range    Glucose 112 (H) 70 - 99 mg/dL   Glucose by meter   Result Value Ref Range    Glucose 210 (H) 70 - 99 mg/dL   Glucose by meter   Result Value Ref Range    Glucose 199 (H) 70 - 99 mg/dL   Glucose by meter   Result Value Ref Range    Glucose 116 (H) 70 - 99 mg/dL   Glucose by meter   Result Value Ref Range    Glucose 194 (H) 70 - 99 mg/dL   Glucose by meter   Result Value Ref Range    Glucose 165 (H) 70 - 99 mg/dL   Glucose by meter   Result Value Ref Range    Glucose 130 (H) 70 - 99 mg/dL   Glucose by meter   Result " Value Ref Range    Glucose 115 (H) 70 - 99 mg/dL   Glucose by meter   Result Value Ref Range    Glucose 128 (H) 70 - 99 mg/dL          Plan:     Adding low dose thorazine prn for anxiety/sleep  ELOS: 2-3 days - Exploring safe discharge plans, further stabilization

## 2020-01-17 LAB
GLUCOSE BLDC GLUCOMTR-MCNC: 135 MG/DL (ref 70–99)
GLUCOSE BLDC GLUCOMTR-MCNC: 142 MG/DL (ref 70–99)
GLUCOSE BLDC GLUCOMTR-MCNC: 149 MG/DL (ref 70–99)
GLUCOSE BLDC GLUCOMTR-MCNC: 181 MG/DL (ref 70–99)
GLUCOSE BLDC GLUCOMTR-MCNC: 184 MG/DL (ref 70–99)

## 2020-01-17 PROCEDURE — 12400000 ZZH R&B MH

## 2020-01-17 PROCEDURE — 25000132 ZZH RX MED GY IP 250 OP 250 PS 637: Performed by: NURSE PRACTITIONER

## 2020-01-17 PROCEDURE — 00000146 ZZHCL STATISTIC GLUCOSE BY METER IP

## 2020-01-17 RX ADMIN — GABAPENTIN 1200 MG: 600 TABLET, FILM COATED ORAL at 13:09

## 2020-01-17 RX ADMIN — DIVALPROEX SODIUM 750 MG: 500 TABLET, EXTENDED RELEASE ORAL at 20:26

## 2020-01-17 RX ADMIN — BENZTROPINE MESYLATE 1 MG: 1 TABLET ORAL at 20:32

## 2020-01-17 RX ADMIN — GABAPENTIN 1200 MG: 600 TABLET, FILM COATED ORAL at 20:32

## 2020-01-17 RX ADMIN — NICOTINE 1 PATCH: 21 PATCH, EXTENDED RELEASE TRANSDERMAL at 08:24

## 2020-01-17 RX ADMIN — INSULIN ASPART 8 UNITS: 100 INJECTION, SOLUTION INTRAVENOUS; SUBCUTANEOUS at 17:30

## 2020-01-17 RX ADMIN — CHLORPROMAZINE HYDROCHLORIDE 100 MG: 100 TABLET, SUGAR COATED ORAL at 08:20

## 2020-01-17 RX ADMIN — BUSPIRONE HYDROCHLORIDE 15 MG: 10 TABLET ORAL at 08:19

## 2020-01-17 RX ADMIN — CLONAZEPAM 1 MG: 1 TABLET ORAL at 20:32

## 2020-01-17 RX ADMIN — METFORMIN HYDROCHLORIDE 1000 MG: 500 TABLET, FILM COATED ORAL at 08:19

## 2020-01-17 RX ADMIN — INSULIN ASPART 8 UNITS: 100 INJECTION, SOLUTION INTRAVENOUS; SUBCUTANEOUS at 12:27

## 2020-01-17 RX ADMIN — METFORMIN HYDROCHLORIDE 1000 MG: 500 TABLET, FILM COATED ORAL at 17:30

## 2020-01-17 RX ADMIN — PRAZOSIN HYDROCHLORIDE 1 MG: 1 CAPSULE ORAL at 20:32

## 2020-01-17 RX ADMIN — INSULIN ASPART 8 UNITS: 100 INJECTION, SOLUTION INTRAVENOUS; SUBCUTANEOUS at 08:18

## 2020-01-17 RX ADMIN — BUSPIRONE HYDROCHLORIDE 15 MG: 10 TABLET ORAL at 20:32

## 2020-01-17 RX ADMIN — CHLORPROMAZINE HYDROCHLORIDE 100 MG: 100 TABLET, SUGAR COATED ORAL at 20:32

## 2020-01-17 RX ADMIN — CHLORPROMAZINE HYDROCHLORIDE 100 MG: 100 TABLET, SUGAR COATED ORAL at 13:10

## 2020-01-17 RX ADMIN — BENZTROPINE MESYLATE 1 MG: 1 TABLET ORAL at 08:19

## 2020-01-17 RX ADMIN — NICOTINE 1 PATCH: 21 PATCH, EXTENDED RELEASE TRANSDERMAL at 08:20

## 2020-01-17 RX ADMIN — BUSPIRONE HYDROCHLORIDE 15 MG: 10 TABLET ORAL at 13:09

## 2020-01-17 RX ADMIN — GABAPENTIN 1200 MG: 600 TABLET, FILM COATED ORAL at 08:19

## 2020-01-17 RX ADMIN — INSULIN GLARGINE 40 UNITS: 100 INJECTION, SOLUTION SUBCUTANEOUS at 20:25

## 2020-01-17 NOTE — PLAN OF CARE
"[facility] staff stopped by and dropped off a \"termination of lease\" for pt stating her last day of residency will be March 1st no later than 5:00pm. Pt has been denied by [facility] as they do not believe \"she would be a good fit\" - message left at [facility] in [place] and referral faxed to [facility] in [place] via scanning and emailing. Referral also faxed to [facility] in [place]   "

## 2020-01-17 NOTE — PLAN OF CARE
Face to face end of shift report will be communicated to oncoming RN.     Problem: Adult Behavioral Health Plan of Care  Goal: Patient-Specific Goal (Individualization)  Description  Pt will follow recommendations of treatment team.  Pt will be compliant with medications.  Pt will attend 50 % of groups.  Pt will sleep 6-8 hours each night.  Pt will complete ADL's independently.   1/17/2020 0005 by Mandy Montoya RN  Outcome: No Change     Problem: Fall Injury Risk  Goal: Absence of Fall and Fall-Related Injury  Description  Pt will wear non skid slippers.  Pt will remain free from falls or injuries during hospitalization.   1/17/2020 0005 by Mandy Montoya RN  Outcome: Improving     Problem: Thought Process Alteration  Goal: Optimal Thought Clarity  Description  Patient will be able to hold reality based conversations prior to discharge.     1/17/2020 0005 by Mandy Montoya RN  Outcome: No Change   Face to face end of shift report obtained from OBDULIO Mandujano. Pt observed resting in bed.   0130-Pt appears to be sleeping in bed with eyes closed, having regular respirations and position changes. 15 minutes and PRN safety checks completed with no noted complains. No falls noted or reported so far this shift. Will continue to monitor.   0545- Pt woke up briefly for 30 minutes or so with no complains. Pt slept 6 hours.

## 2020-01-17 NOTE — PLAN OF CARE
"NATALIA WALKER RN  1/17/2020  7:44 AM  Face to face shift report received from OBDULIO Galvan. Rounding completed, pt observed.   Problem: Adult Behavioral Health Plan of Care  Goal: Patient-Specific Goal (Individualization)  Description  Pt will follow recommendations of treatment team.  Pt will be compliant with medications.  Pt will attend 50 % of groups.  Pt will sleep 6-8 hours each night.  Pt will complete ADL's independently.   1/17/2020 0743 by Natalia Walker, RN  Outcome: No Change  Note: 1003- Pt compliant with medications.  Appetite is good.  Reports sleeping well.     1232- Pt napped this morning.  Speech is rambled/slurred and at times hard to understand.  Did not attend morning group.       Problem: Thought Process Alteration  Goal: Optimal Thought Clarity  Description  Patient will be able to hold reality based conversations prior to discharge.     1/17/2020 0743 by Natalia Walker, RN  Outcome: No Change  1003-Pt is fixated on having stomach cancer over 15 years ago which her mom was stealing her percocet, oxy, and morphine. I do not see any record of pt having stomach cancer in history. Pt reports having stomach pain 10/10 but refuses PRN's stating \"Oh I've been dealing with this for over 15 years\".  Speech is rambling and hard to understand,  Pt talking about people stealing her DNA when she had a miscarriage, a drug dealer whom accused her of \"fucking up\" his drug deals, a girl who is upset at her for screwing her boyfriend.  Very delusional thinking.  Rates anxiety and depression 10/10 currently but declines PRN's at this time.  Pt denies SI. Pt in lounge at this time.             Problem: Fall Injury Risk  Goal: Absence of Fall and Fall-Related Injury  Description  Pt will wear non skid slippers.  Pt will remain free from falls or injuries during hospitalization.   1/17/2020 0743 by Natalia Walker, RN  Outcome: Improving  1018- Pt is wearing  socks.  Gait balanced and steady. "     Face to face end of shift report communicated to oncoming shift RN.

## 2020-01-18 LAB
GLUCOSE BLDC GLUCOMTR-MCNC: 130 MG/DL (ref 70–99)
GLUCOSE BLDC GLUCOMTR-MCNC: 134 MG/DL (ref 70–99)
GLUCOSE BLDC GLUCOMTR-MCNC: 142 MG/DL (ref 70–99)
GLUCOSE BLDC GLUCOMTR-MCNC: 142 MG/DL (ref 70–99)
GLUCOSE BLDC GLUCOMTR-MCNC: 166 MG/DL (ref 70–99)
GLUCOSE BLDC GLUCOMTR-MCNC: 218 MG/DL (ref 70–99)

## 2020-01-18 PROCEDURE — 25000132 ZZH RX MED GY IP 250 OP 250 PS 637: Performed by: NURSE PRACTITIONER

## 2020-01-18 PROCEDURE — 00000146 ZZHCL STATISTIC GLUCOSE BY METER IP

## 2020-01-18 PROCEDURE — 12400000 ZZH R&B MH

## 2020-01-18 PROCEDURE — 25000131 ZZH RX MED GY IP 250 OP 636 PS 637: Performed by: NURSE PRACTITIONER

## 2020-01-18 RX ADMIN — INSULIN ASPART 8 UNITS: 100 INJECTION, SOLUTION INTRAVENOUS; SUBCUTANEOUS at 08:15

## 2020-01-18 RX ADMIN — CHLORPROMAZINE HYDROCHLORIDE 100 MG: 100 TABLET, SUGAR COATED ORAL at 21:05

## 2020-01-18 RX ADMIN — BUSPIRONE HYDROCHLORIDE 15 MG: 10 TABLET ORAL at 21:04

## 2020-01-18 RX ADMIN — INSULIN GLARGINE 40 UNITS: 100 INJECTION, SOLUTION SUBCUTANEOUS at 21:04

## 2020-01-18 RX ADMIN — PRAZOSIN HYDROCHLORIDE 1 MG: 1 CAPSULE ORAL at 21:05

## 2020-01-18 RX ADMIN — GABAPENTIN 1200 MG: 600 TABLET, FILM COATED ORAL at 21:04

## 2020-01-18 RX ADMIN — GABAPENTIN 1200 MG: 600 TABLET, FILM COATED ORAL at 13:52

## 2020-01-18 RX ADMIN — BENZTROPINE MESYLATE 1 MG: 1 TABLET ORAL at 21:04

## 2020-01-18 RX ADMIN — BUSPIRONE HYDROCHLORIDE 15 MG: 10 TABLET ORAL at 13:52

## 2020-01-18 RX ADMIN — CHLORPROMAZINE HYDROCHLORIDE 100 MG: 100 TABLET, SUGAR COATED ORAL at 13:52

## 2020-01-18 RX ADMIN — GABAPENTIN 1200 MG: 600 TABLET, FILM COATED ORAL at 08:13

## 2020-01-18 RX ADMIN — CLONAZEPAM 1 MG: 1 TABLET ORAL at 21:04

## 2020-01-18 RX ADMIN — INSULIN ASPART 8 UNITS: 100 INJECTION, SOLUTION INTRAVENOUS; SUBCUTANEOUS at 17:15

## 2020-01-18 RX ADMIN — INSULIN ASPART 8 UNITS: 100 INJECTION, SOLUTION INTRAVENOUS; SUBCUTANEOUS at 12:26

## 2020-01-18 RX ADMIN — HYDROXYZINE HYDROCHLORIDE 100 MG: 25 TABLET, FILM COATED ORAL at 02:01

## 2020-01-18 RX ADMIN — METFORMIN HYDROCHLORIDE 1000 MG: 500 TABLET, FILM COATED ORAL at 17:15

## 2020-01-18 RX ADMIN — BUSPIRONE HYDROCHLORIDE 15 MG: 10 TABLET ORAL at 08:13

## 2020-01-18 RX ADMIN — CHLORPROMAZINE HYDROCHLORIDE 100 MG: 100 TABLET, SUGAR COATED ORAL at 08:13

## 2020-01-18 RX ADMIN — METFORMIN HYDROCHLORIDE 1000 MG: 500 TABLET, FILM COATED ORAL at 08:13

## 2020-01-18 RX ADMIN — NICOTINE 1 PATCH: 21 PATCH, EXTENDED RELEASE TRANSDERMAL at 08:13

## 2020-01-18 RX ADMIN — BENZTROPINE MESYLATE 1 MG: 1 TABLET ORAL at 08:13

## 2020-01-18 RX ADMIN — DIVALPROEX SODIUM 750 MG: 500 TABLET, EXTENDED RELEASE ORAL at 21:05

## 2020-01-18 ASSESSMENT — ACTIVITIES OF DAILY LIVING (ADL)
ORAL_HYGIENE: INDEPENDENT
HYGIENE/GROOMING: INDEPENDENT
DRESS: SCRUBS (BEHAVIORAL HEALTH)

## 2020-01-18 NOTE — PLAN OF CARE
Face to face shift report received from Luz SPRING RN.       Problem: Adult Behavioral Health Plan of Care  Goal: Patient-Specific Goal (Individualization)  Description  Pt will follow recommendations of treatment team.  Pt will be compliant with medications.  Pt will attend 50 % of groups.  Pt will sleep 6-8 hours each night.  Pt will complete ADL's independently.   1/18/2020 0112 by Becca Mcnulty RN  Outcome: No Change  Pt up at 0045 requesting orange juice and snack. 0200 accucheck completed at this time due to pt's request for snack, result 134.   0201: requested and received Hydroxyzine 100mg for anxiety.     Pt has been in bed with eyes closed and regular respirations for 4 hours this shift. 15 minute and PRN checks all night. No complaints offered. Will continue to monitor. AM accucheck 142    Problem: Thought Process Alteration  Goal: Optimal Thought Clarity  Description  Patient will be able to hold reality based conversations prior to discharge.     1/18/2020 0112 by Becca Mcnulty RN  Outcome: No Change       Problem: Fall Injury Risk  Goal: Absence of Fall and Fall-Related Injury  Description  Pt will wear non skid slippers.  Pt will remain free from falls or injuries during hospitalization.   1/18/2020 0112 by Becca Mcnulty RN  Outcome: Improving  Free from falls this shift.      Face to face end of shift report to be communicated to oncoming RN.

## 2020-01-18 NOTE — PLAN OF CARE
"  Problem: Adult Behavioral Health Plan of Care  Goal: Patient-Specific Goal (Individualization)  Description  Pt will follow recommendations of treatment team.  Pt will be compliant with medications.  Pt will attend 50 % of groups.  Pt will sleep 6-8 hours each night.  Pt will complete ADL's independently.   1/18/2020 1031 by Luz Amaya, RN  Outcome: Improving  Note:   Shift Summery:  Patient is up on the unit. Patient ate well for breakfast meal. Denies physical problems including pain, has had no dizziness or falls. Patient is often heard mumbling incoherently to self or to staff. Patient is generally pleasant but she does become irritable at times. Patient returned to bed after breakfast meal.    1440 Patient complained of feeling like her blood sugar was low, blood sugar 130. Given a small snack at patient request.    Patient's Stated Goal for Shift:  \"no stated goal\"    Goal Status:  In Process       Problem: Thought Process Alteration  Goal: Optimal Thought Clarity  Description  Patient will be able to hold reality based conversations prior to discharge.     1/18/2020 1031 by Luz Amaya, RN  Outcome: No Change     Problem: Fall Injury Risk  Goal: Absence of Fall and Fall-Related Injury  Description  Pt will wear non skid slippers.  Pt will remain free from falls or injuries during hospitalization.   1/18/2020 1031 by Luz Amaya, RN  Outcome: Improving     "

## 2020-01-18 NOTE — PLAN OF CARE
"  Problem: Adult Behavioral Health Plan of Care  Goal: Patient-Specific Goal (Individualization)  Description  Pt will follow recommendations of treatment team.  Pt will be compliant with medications.  Pt will attend 50 % of groups.  Pt will sleep 6-8 hours each night.  Pt will complete ADL's independently.   1/17/2020 1921 by Luz Amaya, RN  Outcome: Improving  Note:   Shift Summery:  Patient is alert and up on the unit. Patient states that she is doing ok but then begins rambling and is difficult to understand. Patient is calm and socially appropriate. Does not attend groups. Cooperative with cares. No dizziness or falls. Patient denies depression and thoughts of suicide. Patient is heard talking to herself at times. Requested that Thorazine be given with HS medications tonight.    Patient's Stated Goal for Shift:  \"no stated goals\"    Goal Status:  In Process       Problem: Fall Injury Risk  Goal: Absence of Fall and Fall-Related Injury  Description  Pt will wear non skid slippers.  Pt will remain free from falls or injuries during hospitalization.   1/17/2020 1921 by Luz Amaya, RN  Outcome: Improving     Problem: Thought Process Alteration  Goal: Optimal Thought Clarity  Description  Patient will be able to hold reality based conversations prior to discharge.     1/17/2020 1921 by Luz Amaya, RN  Outcome: Improving     "

## 2020-01-19 LAB
GLUCOSE BLDC GLUCOMTR-MCNC: 104 MG/DL (ref 70–99)
GLUCOSE BLDC GLUCOMTR-MCNC: 125 MG/DL (ref 70–99)
GLUCOSE BLDC GLUCOMTR-MCNC: 133 MG/DL (ref 70–99)
GLUCOSE BLDC GLUCOMTR-MCNC: 197 MG/DL (ref 70–99)
GLUCOSE BLDC GLUCOMTR-MCNC: 92 MG/DL (ref 70–99)

## 2020-01-19 PROCEDURE — 12400000 ZZH R&B MH

## 2020-01-19 PROCEDURE — 99232 SBSQ HOSP IP/OBS MODERATE 35: CPT | Performed by: NURSE PRACTITIONER

## 2020-01-19 PROCEDURE — 25000132 ZZH RX MED GY IP 250 OP 250 PS 637: Performed by: NURSE PRACTITIONER

## 2020-01-19 PROCEDURE — 00000146 ZZHCL STATISTIC GLUCOSE BY METER IP

## 2020-01-19 RX ADMIN — CLONAZEPAM 1 MG: 1 TABLET ORAL at 20:36

## 2020-01-19 RX ADMIN — NICOTINE 1 PATCH: 21 PATCH, EXTENDED RELEASE TRANSDERMAL at 08:43

## 2020-01-19 RX ADMIN — BUSPIRONE HYDROCHLORIDE 15 MG: 10 TABLET ORAL at 20:35

## 2020-01-19 RX ADMIN — ACETAMINOPHEN 650 MG: 325 TABLET, FILM COATED ORAL at 03:02

## 2020-01-19 RX ADMIN — BENZTROPINE MESYLATE 1 MG: 1 TABLET ORAL at 20:35

## 2020-01-19 RX ADMIN — CHLORPROMAZINE HYDROCHLORIDE 100 MG: 100 TABLET, SUGAR COATED ORAL at 08:43

## 2020-01-19 RX ADMIN — INSULIN ASPART 8 UNITS: 100 INJECTION, SOLUTION INTRAVENOUS; SUBCUTANEOUS at 12:28

## 2020-01-19 RX ADMIN — CHLORPROMAZINE HYDROCHLORIDE 100 MG: 100 TABLET, SUGAR COATED ORAL at 20:35

## 2020-01-19 RX ADMIN — CHLORPROMAZINE HYDROCHLORIDE 25 MG: 25 TABLET, SUGAR COATED ORAL at 11:29

## 2020-01-19 RX ADMIN — METFORMIN HYDROCHLORIDE 1000 MG: 500 TABLET, FILM COATED ORAL at 17:21

## 2020-01-19 RX ADMIN — BUSPIRONE HYDROCHLORIDE 15 MG: 10 TABLET ORAL at 08:43

## 2020-01-19 RX ADMIN — INSULIN ASPART 8 UNITS: 100 INJECTION, SOLUTION INTRAVENOUS; SUBCUTANEOUS at 08:41

## 2020-01-19 RX ADMIN — BUSPIRONE HYDROCHLORIDE 15 MG: 10 TABLET ORAL at 13:32

## 2020-01-19 RX ADMIN — GABAPENTIN 1200 MG: 600 TABLET, FILM COATED ORAL at 20:35

## 2020-01-19 RX ADMIN — PRAZOSIN HYDROCHLORIDE 1 MG: 1 CAPSULE ORAL at 20:36

## 2020-01-19 RX ADMIN — CHLORPROMAZINE HYDROCHLORIDE 100 MG: 100 TABLET, SUGAR COATED ORAL at 13:32

## 2020-01-19 RX ADMIN — GABAPENTIN 1200 MG: 600 TABLET, FILM COATED ORAL at 08:44

## 2020-01-19 RX ADMIN — INSULIN ASPART 8 UNITS: 100 INJECTION, SOLUTION INTRAVENOUS; SUBCUTANEOUS at 17:21

## 2020-01-19 RX ADMIN — METFORMIN HYDROCHLORIDE 1000 MG: 500 TABLET, FILM COATED ORAL at 08:44

## 2020-01-19 RX ADMIN — HYDROXYZINE HYDROCHLORIDE 100 MG: 25 TABLET, FILM COATED ORAL at 03:02

## 2020-01-19 RX ADMIN — GABAPENTIN 1200 MG: 600 TABLET, FILM COATED ORAL at 13:31

## 2020-01-19 RX ADMIN — DIVALPROEX SODIUM 750 MG: 500 TABLET, EXTENDED RELEASE ORAL at 20:35

## 2020-01-19 RX ADMIN — HYDROXYZINE HYDROCHLORIDE 100 MG: 25 TABLET, FILM COATED ORAL at 11:29

## 2020-01-19 RX ADMIN — INSULIN GLARGINE 40 UNITS: 100 INJECTION, SOLUTION SUBCUTANEOUS at 20:34

## 2020-01-19 RX ADMIN — BENZTROPINE MESYLATE 1 MG: 1 TABLET ORAL at 08:43

## 2020-01-19 ASSESSMENT — ACTIVITIES OF DAILY LIVING (ADL)
HYGIENE/GROOMING: INDEPENDENT
DRESS: SCRUBS (BEHAVIORAL HEALTH)
ORAL_HYGIENE: INDEPENDENT

## 2020-01-19 NOTE — PLAN OF CARE
Up on unit. Social with peers.States sleeping OK. Asking when she gets to leave. Informed that Monday she may hear something. Restless. Denies hallucination, Some depression and anxiety. Attended some groups.      Problem: Adult Behavioral Health Plan of Care  Goal: Patient-Specific Goal (Individualization)  Description  Pt will follow recommendations of treatment team.  Pt will be compliant with medications.  Pt will attend 50 % of groups.  Pt will sleep 6-8 hours each night.  Pt will complete ADL's independently.   1/18/2020 1829 by Mitra Devlin RN  Outcome: Improving   Complaint with medications. Sleeping well  Problem: Thought Process Alteration  Goal: Optimal Thought Clarity  Description  Patient will be able to hold reality based conversations prior to discharge.     1/18/2020 1829 by Mitra Devlin RN  Outcome: Improving   Conversation reality based and patient asked appropriate questions.    Face to face end of shift report communicated to night shift RN.     Mitra Devlin RN  1/18/2020  10:45 PM

## 2020-01-19 NOTE — PLAN OF CARE
"  Problem: Adult Behavioral Health Plan of Care  Goal: Patient-Specific Goal (Individualization)  Description  Pt will follow recommendations of treatment team.  Pt will be compliant with medications.  Pt will attend 50 % of groups.  Pt will sleep 6-8 hours each night.  Pt will complete ADL's independently.   1/19/2020 1200 by Luz Amaya, RN  Outcome: Improving  Note:   Shift Summery:  Patient has been up on the unit since breakfast meal. Patient complains of pain in her foot, on inspection of foot it moves readily and no discoloration of toes or foot. Patient states she has no idea what has caused the pain or problem but she feels it is keeping her from sleeping. In the morning she explained that she was responsible for \"The Color Purple\" movie and appeared in it as a little girl. Patient states she also found her  dead and foam coming from his mouth. Patient is steady on her feet and has had no falls. Sleep and appetite are good.    1129 Patient states she is feeling very anxious and requested and was given Hydroxyzine 100 mg po and also Thorazine 25 mg po for complaints.    Patient's Stated Goal for Shift:  \"no stated goal\"    Goal Status:  In Process       Problem: Fall Injury Risk  Goal: Absence of Fall and Fall-Related Injury  Description  Pt will wear non skid slippers.  Pt will remain free from falls or injuries during hospitalization.   1/19/2020 1200 by Luz Amaya, RN  Outcome: Improving     Problem: Thought Process Alteration  Goal: Optimal Thought Clarity  Description  Patient will be able to hold reality based conversations prior to discharge.     1/19/2020 1200 by Luz Amaya, RN  Outcome: Improving     "

## 2020-01-19 NOTE — PLAN OF CARE
Face to face shift report received from Mitra MAK.       Problem: Adult Behavioral Health Plan of Care  Goal: Patient-Specific Goal (Individualization)  Description  Pt will follow recommendations of treatment team.  Pt will be compliant with medications.  Pt will attend 50 % of groups.  Pt will sleep 6-8 hours each night.  Pt will complete ADL's independently.   1/19/2020 0311 by Becca Mcnulty RN  Outcome: No Change   0200 Accucheck: 134  Pt up in lounge at 0300 requesting to speak with writer. Pt reports 10/10 right foot pain in 3rd, 4th, and 5th toes. When questioned about any past foot pain pt reports that she had gotten this foot stuck in a bear trap and she was unable to break free from this trap for 21 days. Pt goes on to explain that while she was stuck in this bear trap, in the woods, for 21 days there were times when a bear would come and lay on her chest. Pt requested and received Tylenol 650mg  for foot pain and Hydroxyzine 100mg for anxiety at this time.   Pt has been in bed with eyes closed and regular respirations for 5 hours this shift. 15 minute and PRN checks all night. No complaints offered. Will continue to monitor.  AM Accucheck: 92    Problem: Thought Process Alteration  Goal: Optimal Thought Clarity  Description  Patient will be able to hold reality based conversations prior to discharge.     1/19/2020 0311 by Becca Mcnulty, RN  Outcome: No Change       Problem: Fall Injury Risk  Goal: Absence of Fall and Fall-Related Injury  Description  Pt will wear non skid slippers.  Pt will remain free from falls or injuries during hospitalization.   Outcome: Improving   Free from falls or injuries this shift.     Face to face end of shift report to be communicated to oncoming RN.

## 2020-01-19 NOTE — PROGRESS NOTES
"[facility]  Psychiatric Progress Note      Impression:     Patient is sitting at the table working on her charts again.  She presents fairly calm, states she slept ok, and states \"I'm doing good right now, no rage or anything\".  We discuss that she has been able to maintain calmness and has no rage since admission.  She reports the Thorazine is what helps with that.  She does begin to talk about where she will discharge to - remind patient they will be working on this again this week and encourage her to be patient - she says she will.   Patient has tendency to ruminate on a topic.  She then starts telling me stories about her past - which seemingly become construed into delusional statements, and she will become more and more animated if allowed to continue - almost like she is trying to impress as well.  There comes the time when walking away is needed, letting her know we will talk later - she then is able to calm and stay in the here and now.  Patient denies si/hi, denies a/v/h, and outside of being anxious about discharging, she is doing fairly well.    Previous medication trials documented:  Zyprexa - weight gain, not fully effective  Invega - ineffective  Tegretol - poorly tolerated  Lithium - poorly tolerated, severe sedation  Clozaril - irritability, hyperthermia, and persistent tachycardia  Depakote - Weight gain and poor mood control (this was still restarted during admission in 11/19)  Thorazine - notable improvement on 200mg tid, decreased in 11/19 on an outpatient visit, resulting in readmission.  Gabapentin   Buspar  Klonopin  Cogentin           Diagnoses:     Schizoaffective Disorder, Bipolar Type  PTSD, by history  R/O Other stimulant use disorder, methamphetamine, with induced perceptual disorder    Attestation:  Patient has been seen and evaluated by me,  Natalia Fabian, APRN CNP          Interim History:   The patient's care was discussed with the treatment team and chart notes " were reviewed.    BEHAVIORAL TEAM DISCUSSION    Progress: Good  Continued Stay Criteria/Rationale: Delusional, no safe discharge plan, medication adjustments  Medical/Physical: Diabetes Mellitus Type 2  Precautions: No   Falls precaution?: Yes  Behavioral Orders   Procedures     Code 1 - Restrict to Unit     Routine Programming     As clinically indicated     Status 15     Every 15 minutes.     Plan: Continue current medications - exploring safe discharge options - [facility] in [place]  Rationale for change in precautions or plan: None      Participants: Natalia Fabian, PRN,  Dennise Herrera LICSW, Angie Laws LSW,  Becca Chery LSW, Jim Barrios LSW, Mady Holman OT, Natalia Costello OT, Nursing            Medications:   I have reviewed this patient's current medications    Current Facility-Administered Medications   Medication     acetaminophen (TYLENOL) tablet 650 mg     alum & mag hydroxide-simethicone (MYLANTA ES/MAALOX  ES) suspension 30 mL     artificial saliva (BIOTENE MT) solution 1-2 spray     benztropine (COGENTIN) tablet 1 mg     bisacodyl (DULCOLAX) Suppository 10 mg     busPIRone (BUSPAR) tablet 15 mg     chlorproMAZINE (THORAZINE) tablet 100 mg     chlorproMAZINE (THORAZINE) tablet 25 mg     clonazePAM (klonoPIN) tablet 1 mg     glucose gel 15-30 g    Or     dextrose 50 % injection 25-50 mL    Or     glucagon injection 1 mg     divalproex sodium extended-release (DEPAKOTE ER) 24 hr tablet 750 mg     gabapentin (NEURONTIN) tablet 1,200 mg     hydrOXYzine (ATARAX) tablet  mg     insulin aspart (NovoLOG) inj (RAPID ACTING)     insulin aspart (NovoLOG) inj (RAPID ACTING)     insulin aspart (NovoLOG) inj (RAPID ACTING)     insulin glargine (LANTUS PEN) injection 40 Units     magnesium hydroxide (MILK OF MAGNESIA) suspension 30 mL     metFORMIN (GLUCOPHAGE) tablet 1,000 mg     nicotine (NICODERM CQ) 21 MG/24HR 24 hr patch 1 patch     nicotine (NICORETTE) gum 2 mg     nicotine Patch in Place      "OLANZapine (zyPREXA) tablet 10 mg    Or     OLANZapine (zyPREXA) injection 10 mg     polyethylene glycol (MIRALAX/GLYCOLAX) Packet 17 g     prazosin (MINIPRESS) capsule 1 mg     traZODone (DESYREL) tablet 50 mg        10 point ROS - see H&P       Allergies:     Allergies   Allergen Reactions     Haloperidol Other (See Comments)     Other reaction(s): Seizures  Patient states, \"I fell down and wasn't able to get up.\"  Patient states, \"I fell down and wasn't able to get up.\"              Psychiatric Examination:   /68   Pulse 97   Temp 97.7  F (36.5  C) (Tympanic)   Resp 16   Ht 1.651 m (5' 5\")   Wt 84.7 kg (186 lb 12.8 oz)   SpO2 99%   BMI 31.09 kg/m    Weight is 186 lbs 12.8 oz  Body mass index is 31.09 kg/m .    Appearance:  awake, alert  Attitude:  cooperative  Eye Contact:  Good  Mood:  better  Affect:  Less irritable  Speech:  Improved  Psychomotor Behavior:  appropriate  Thought Process:  Goal oriented, able to answer questions in a linear fashion; Some delusional statements  Associations:  JUSTA noted at times  Thought Content:  No SI/HI, Delusional content  Insight:  limited  Judgment:  limited  Oriented to:  time, person, and place  Attention Span and Concentration:  limited  Recent and Remote Memory:  fair - impacted by delusional beliefs and drug use  Fund of Knowledge: appropriate  Muscle Strength and Tone: normal  Gait and Station: Normal           Labs:     Results for orders placed or performed during the hospital encounter of 01/08/20 (from the past 48 hour(s))   Glucose by meter   Result Value Ref Range    Glucose 135 (H) 70 - 99 mg/dL   Glucose by meter   Result Value Ref Range    Glucose 184 (H) 70 - 99 mg/dL   Glucose by meter   Result Value Ref Range    Glucose 149 (H) 70 - 99 mg/dL   Glucose by meter   Result Value Ref Range    Glucose 134 (H) 70 - 99 mg/dL   Glucose by meter   Result Value Ref Range    Glucose 142 (H) 70 - 99 mg/dL   Glucose by meter   Result Value Ref Range    " Glucose 166 (H) 70 - 99 mg/dL   Glucose by meter   Result Value Ref Range    Glucose 130 (H) 70 - 99 mg/dL   Glucose by meter   Result Value Ref Range    Glucose 218 (H) 70 - 99 mg/dL   Glucose by meter   Result Value Ref Range    Glucose 142 (H) 70 - 99 mg/dL   Glucose by meter   Result Value Ref Range    Glucose 133 (H) 70 - 99 mg/dL   Glucose by meter   Result Value Ref Range    Glucose 92 70 - 99 mg/dL          Plan:     Continue medications as prescribed  ELOS: 2-3 days - Exploring safe discharge plans, further stabilization

## 2020-01-20 LAB
GLUCOSE BLDC GLUCOMTR-MCNC: 103 MG/DL (ref 70–99)
GLUCOSE BLDC GLUCOMTR-MCNC: 129 MG/DL (ref 70–99)
GLUCOSE BLDC GLUCOMTR-MCNC: 144 MG/DL (ref 70–99)
GLUCOSE BLDC GLUCOMTR-MCNC: 148 MG/DL (ref 70–99)
GLUCOSE BLDC GLUCOMTR-MCNC: 162 MG/DL (ref 70–99)
GLUCOSE BLDC GLUCOMTR-MCNC: 62 MG/DL (ref 70–99)
GLUCOSE BLDC GLUCOMTR-MCNC: 84 MG/DL (ref 70–99)

## 2020-01-20 PROCEDURE — 99231 SBSQ HOSP IP/OBS SF/LOW 25: CPT | Performed by: NURSE PRACTITIONER

## 2020-01-20 PROCEDURE — 12400000 ZZH R&B MH

## 2020-01-20 PROCEDURE — 25000132 ZZH RX MED GY IP 250 OP 250 PS 637: Performed by: NURSE PRACTITIONER

## 2020-01-20 PROCEDURE — 00000146 ZZHCL STATISTIC GLUCOSE BY METER IP

## 2020-01-20 RX ORDER — IBUPROFEN 600 MG/1
600 TABLET, FILM COATED ORAL EVERY 6 HOURS PRN
Status: DISCONTINUED | OUTPATIENT
Start: 2020-01-20 | End: 2020-02-15 | Stop reason: HOSPADM

## 2020-01-20 RX ADMIN — GABAPENTIN 1200 MG: 600 TABLET, FILM COATED ORAL at 08:30

## 2020-01-20 RX ADMIN — METFORMIN HYDROCHLORIDE 1000 MG: 500 TABLET, FILM COATED ORAL at 08:30

## 2020-01-20 RX ADMIN — CHLORPROMAZINE HYDROCHLORIDE 100 MG: 100 TABLET, SUGAR COATED ORAL at 13:38

## 2020-01-20 RX ADMIN — BUSPIRONE HYDROCHLORIDE 15 MG: 10 TABLET ORAL at 08:30

## 2020-01-20 RX ADMIN — BUSPIRONE HYDROCHLORIDE 15 MG: 10 TABLET ORAL at 20:44

## 2020-01-20 RX ADMIN — BUSPIRONE HYDROCHLORIDE 15 MG: 10 TABLET ORAL at 13:39

## 2020-01-20 RX ADMIN — METFORMIN HYDROCHLORIDE 1000 MG: 500 TABLET, FILM COATED ORAL at 16:31

## 2020-01-20 RX ADMIN — INSULIN ASPART 8 UNITS: 100 INJECTION, SOLUTION INTRAVENOUS; SUBCUTANEOUS at 17:17

## 2020-01-20 RX ADMIN — DIVALPROEX SODIUM 750 MG: 500 TABLET, EXTENDED RELEASE ORAL at 20:42

## 2020-01-20 RX ADMIN — GABAPENTIN 1200 MG: 600 TABLET, FILM COATED ORAL at 13:38

## 2020-01-20 RX ADMIN — BENZTROPINE MESYLATE 1 MG: 1 TABLET ORAL at 08:30

## 2020-01-20 RX ADMIN — INSULIN ASPART 8 UNITS: 100 INJECTION, SOLUTION INTRAVENOUS; SUBCUTANEOUS at 11:33

## 2020-01-20 RX ADMIN — HYDROXYZINE HYDROCHLORIDE 50 MG: 25 TABLET, FILM COATED ORAL at 13:41

## 2020-01-20 RX ADMIN — BENZTROPINE MESYLATE 1 MG: 1 TABLET ORAL at 20:44

## 2020-01-20 RX ADMIN — CHLORPROMAZINE HYDROCHLORIDE 100 MG: 100 TABLET, SUGAR COATED ORAL at 20:44

## 2020-01-20 RX ADMIN — NICOTINE 1 PATCH: 21 PATCH, EXTENDED RELEASE TRANSDERMAL at 08:29

## 2020-01-20 RX ADMIN — CLONAZEPAM 1 MG: 1 TABLET ORAL at 20:44

## 2020-01-20 RX ADMIN — INSULIN GLARGINE 40 UNITS: 100 INJECTION, SOLUTION SUBCUTANEOUS at 20:52

## 2020-01-20 RX ADMIN — PRAZOSIN HYDROCHLORIDE 1 MG: 1 CAPSULE ORAL at 20:44

## 2020-01-20 RX ADMIN — GABAPENTIN 1200 MG: 600 TABLET, FILM COATED ORAL at 20:42

## 2020-01-20 RX ADMIN — INSULIN ASPART 8 UNITS: 100 INJECTION, SOLUTION INTRAVENOUS; SUBCUTANEOUS at 07:55

## 2020-01-20 RX ADMIN — CHLORPROMAZINE HYDROCHLORIDE 100 MG: 100 TABLET, SUGAR COATED ORAL at 08:30

## 2020-01-20 ASSESSMENT — ACTIVITIES OF DAILY LIVING (ADL)
ORAL_HYGIENE: INDEPENDENT
DRESS: SCRUBS (BEHAVIORAL HEALTH)
LAUNDRY: UNABLE TO COMPLETE
ORAL_HYGIENE: INDEPENDENT
DRESS: SCRUBS (BEHAVIORAL HEALTH)
HYGIENE/GROOMING: INDEPENDENT
HYGIENE/GROOMING: INDEPENDENT

## 2020-01-20 NOTE — PLAN OF CARE
"Face to face shift report received from OBDULIO Manriquez.       Problem: Adult Behavioral Health Plan of Care  Goal: Patient-Specific Goal (Individualization)  Description  Pt will follow recommendations of treatment team.  Pt will be compliant with medications.  Pt will attend 50 % of groups.  Pt will sleep 6-8 hours each night.  Pt will complete ADL's independently.   1/20/2020 0216 by Becca Mcnulty RN  Outcome: No Change  Note: Pt has been in bed with eyes closed and regular respirations for 6 hours this shift. 15 minute and PRN checks all night. No complaints offered. Will continue to monitor.  0200 Accucheck: 103 pt received two orange juice and reported plan to eat snack on bed side table.   AM accucheck: 62 pt declined glucose gel and snack. Reported \"I will just eat breakfast.\" Provided with education, pt agreeable to drink two orange juice. Will recheck in 15 minutes. Recheck accucheck: 84. Pt provided with more orange juice   0715 recheck accucheck: 129    Problem: Thought Process Alteration  Goal: Optimal Thought Clarity  Description  Patient will be able to hold reality based conversations prior to discharge.     Outcome: No Change          Problem: Fall Injury Risk  Goal: Absence of Fall and Fall-Related Injury  Description  Pt will wear non skid slippers.  Pt will remain free from falls or injuries during hospitalization.   1/20/2020 0216 by Becca Mcnulty, RN  Outcome: Improving   Free from falls this shift.     Face to face end of shift report to be communicated to oncoming RN.     "

## 2020-01-20 NOTE — PLAN OF CARE
Problem: Adult Behavioral Health Plan of Care  Goal: Patient-Specific Goal (Individualization)  Description  Pt will follow recommendations of treatment team.  Pt will be compliant with medications.  Pt will attend 50 % of groups.  Pt will sleep 6-8 hours each night.  Pt will complete ADL's independently.   1/20/2020 1307 by Nancy Stern, RN  Outcome: Improving  Note:   Shift Summery:  VSS, denies pain this AM. Denies all criteria. Out in lounge most of shift making lists and graphs. Talkative with peers. Verbalizes frustration with being in the hospital and wanting to discharge. Discuss referrals that have been sent to facilities.   Answers assessment questions appropriately-able to maintain short conversation before conversation becomes delusional-per pt norm. Rambling, delusional conversations.  Behavior appropriate with staff and peers.   BG at lunchtime 148.     Problem: Fall Injury Risk  Goal: Absence of Fall and Fall-Related Injury  Description  Pt will wear non skid slippers.  Pt will remain free from falls or injuries during hospitalization.   1/20/2020 1307 by Nancy Stern, RN  Outcome: Improving     Problem: Thought Process Alteration  Goal: Optimal Thought Clarity  Description  Patient will be able to hold reality based conversations prior to discharge.     1/20/2020 1307 by Nancy Stern, RN  Outcome: Improving     Face to face end of shift report communicated to oncoming shift.     Nancy Stern RN  1/20/2020  1:13 PM

## 2020-01-20 NOTE — PROGRESS NOTES
[facility]  Psychiatric Progress Note      Impression:     Patient is sitting in commons, keeping busy with her charting.  She remains very calm and cooperative, she has not displayed any behavioral type issues during the admission, is attentive to her surrounding.  She talks today about her kids and her ex-, all in general stories.  She offers no complaints today other than her toe pain which the FNP has been consulted about.  Otherwise, denies mood changes, sleeping well, denies si/hi, denies a/v/h, and outside of being anxious about discharging, she continues doing fairly well.  No changes in medications today.    Previous medication trials documented:  Zyprexa - weight gain, not fully effective  Invega - ineffective  Tegretol - poorly tolerated  Lithium - poorly tolerated, severe sedation  Clozaril - irritability, hyperthermia, and persistent tachycardia  Depakote - Weight gain and poor mood control (this was still restarted during admission in 11/19)  Thorazine - notable improvement on 200mg tid, decreased in 11/19 on an outpatient visit, resulting in readmission.  Gabapentin   Buspar  Klonopin  Cogentin           Diagnoses:     Schizoaffective Disorder, Bipolar Type  PTSD, by history  R/O Other stimulant use disorder, methamphetamine, with induced perceptual disorder    Attestation:  Patient has been seen and evaluated by me,  LATRICE Du CNP          Interim History:   The patient's care was discussed with the treatment team and chart notes were reviewed.    BEHAVIORAL TEAM DISCUSSION    Progress: Good  Continued Stay Criteria/Rationale: Delusional, no safe discharge plan, medication adjustments  Medical/Physical: Diabetes Mellitus Type 2  Precautions: No   Falls precaution?: Yes  Behavioral Orders   Procedures     Code 1 - Restrict to Unit     Routine Programming     As clinically indicated     Status 15     Every 15 minutes.     Plan: Continue current medications -  "exploring safe discharge options - Guardian in [place]  Rationale for change in precautions or plan: None      Participants: Natalia Fabian, PRN,  Dennise Herrera LICSW, Angie Laws LSW,  Becca Chery LSW, Jim Barrios LSW, Mady Holman OT, Natalia Costello OT, Nursing            Medications:   I have reviewed this patient's current medications    Current Facility-Administered Medications   Medication     acetaminophen (TYLENOL) tablet 650 mg     alum & mag hydroxide-simethicone (MYLANTA ES/MAALOX  ES) suspension 30 mL     artificial saliva (BIOTENE MT) solution 1-2 spray     benztropine (COGENTIN) tablet 1 mg     bisacodyl (DULCOLAX) Suppository 10 mg     busPIRone (BUSPAR) tablet 15 mg     chlorproMAZINE (THORAZINE) tablet 100 mg     chlorproMAZINE (THORAZINE) tablet 25 mg     clonazePAM (klonoPIN) tablet 1 mg     glucose gel 15-30 g    Or     dextrose 50 % injection 25-50 mL    Or     glucagon injection 1 mg     divalproex sodium extended-release (DEPAKOTE ER) 24 hr tablet 750 mg     gabapentin (NEURONTIN) tablet 1,200 mg     hydrOXYzine (ATARAX) tablet  mg     insulin aspart (NovoLOG) inj (RAPID ACTING)     insulin aspart (NovoLOG) inj (RAPID ACTING)     insulin aspart (NovoLOG) inj (RAPID ACTING)     insulin glargine (LANTUS PEN) injection 40 Units     magnesium hydroxide (MILK OF MAGNESIA) suspension 30 mL     metFORMIN (GLUCOPHAGE) tablet 1,000 mg     nicotine (NICODERM CQ) 21 MG/24HR 24 hr patch 1 patch     nicotine (NICORETTE) gum 2 mg     nicotine Patch in Place     OLANZapine (zyPREXA) tablet 10 mg    Or     OLANZapine (zyPREXA) injection 10 mg     polyethylene glycol (MIRALAX/GLYCOLAX) Packet 17 g     prazosin (MINIPRESS) capsule 1 mg     traZODone (DESYREL) tablet 50 mg        10 point ROS - see H&P       Allergies:     Allergies   Allergen Reactions     Haloperidol Other (See Comments)     Other reaction(s): Seizures  Patient states, \"I fell down and wasn't able to get up.\"  Patient " "states, \"I fell down and wasn't able to get up.\"              Psychiatric Examination:   BP 96/63   Pulse 74   Temp 97  F (36.1  C) (Tympanic)   Resp 18   Ht 1.651 m (5' 5\")   Wt 84.7 kg (186 lb 12.8 oz)   SpO2 98%   BMI 31.09 kg/m    Weight is 186 lbs 12.8 oz  Body mass index is 31.09 kg/m .    Appearance:  awake, alert  Attitude:  cooperative  Eye Contact:  Good  Mood:  better  Affect:  Less irritable  Speech:  Improved  Psychomotor Behavior:  appropriate  Thought Process:  Goal oriented, able to answer questions in a linear fashion; Some delusional statements  Associations:  JUSTA noted at times  Thought Content:  No SI/HI, Delusional content  Insight:  limited  Judgment:  limited  Oriented to:  time, person, and place  Attention Span and Concentration:  limited  Recent and Remote Memory:  fair - impacted by delusional beliefs and drug use  Fund of Knowledge: appropriate  Muscle Strength and Tone: normal  Gait and Station: Normal           Labs:     Results for orders placed or performed during the hospital encounter of 01/08/20 (from the past 48 hour(s))   Glucose by meter   Result Value Ref Range    Glucose 130 (H) 70 - 99 mg/dL   Glucose by meter   Result Value Ref Range    Glucose 218 (H) 70 - 99 mg/dL   Glucose by meter   Result Value Ref Range    Glucose 142 (H) 70 - 99 mg/dL   Glucose by meter   Result Value Ref Range    Glucose 133 (H) 70 - 99 mg/dL   Glucose by meter   Result Value Ref Range    Glucose 92 70 - 99 mg/dL   Glucose by meter   Result Value Ref Range    Glucose 197 (H) 70 - 99 mg/dL   Glucose by meter   Result Value Ref Range    Glucose 125 (H) 70 - 99 mg/dL   Glucose by meter   Result Value Ref Range    Glucose 104 (H) 70 - 99 mg/dL   Glucose by meter   Result Value Ref Range    Glucose 103 (H) 70 - 99 mg/dL   Glucose by meter   Result Value Ref Range    Glucose 62 (L) 70 - 99 mg/dL   Glucose by meter   Result Value Ref Range    Glucose 84 70 - 99 mg/dL   Glucose by meter   Result Value " Ref Range    Glucose 129 (H) 70 - 99 mg/dL   Glucose by meter   Result Value Ref Range    Glucose 148 (H) 70 - 99 mg/dL          Plan:     Continue medications as prescribed  ELOS: >5 days - Exploring safe discharge plans, further stabilization

## 2020-01-20 NOTE — PLAN OF CARE
"Called and left a message for pt's guardian Coleen - spoke with Elena Bojorquez the director at [facility] in [place] - the facility pt's guardians recommended for placement. Elena stated they \"have gone over referral and we are unable to take her at this time due to her high behaviors.\" writer asked for clarification for \"high behaviors\" Elena stated \"the drug use, paraphernalia, seems like she needs more 1:1 management and the health issues she has. We can only take so many with high behaviors we are a 20 bed placement with 15 high behaviors right now with thin staff we just can't take her\"     Received an email from Josiah with [facility] in [place] - states she does not believe pt would be an appropriate fit for the opening they currently have right now as they \"already have one with ongoing issues similar to (pt) and it may be too disruptive to others in the house\"     Message left at [facility]. Will look into more referrals.   "

## 2020-01-20 NOTE — PROGRESS NOTES
[facility]    Medical Services Progress Note    Date of Service (when I saw the patient): 01/20/2020    Assessment & Plan     Principal Problem:    Psychosis (H)    Active Medical Problems:      Diabetes mellitus, type 2- continue home dose insulin and novolog, A1C pending, QID blood glucose checks, moderate constant carb diet, chart reviewed and blood sugars are well controlled with insulin.       Left foot toe pain- ibuprofen 600 mg po q 6hrs prn. No edema noted. Pedal pulse 3+      Chronic hepatitis C virus infection (H)      Tobacco abuse    Methamphetamine use disorder, severe (H)    Hx of substance abuse (H)    ADD (attention deficit disorder)  Code Status: Prior.    Cassy Sandhu, CNP    Interval History   Pt reports left foot second and third toe pain. Pt states it feels bruised only while walking. Denies any known injury. She reports it has done this in the past and ibuprofen helped.      -Data reviewed today: I reviewed all new labs and imaging results over the last 24 hours.     Physical Exam   Temp: 97  F (36.1  C) Temp src: Tympanic BP: 96/63 Pulse: 74   Resp: 18 SpO2: 98 % O2 Device: None (Room air)    Vitals:    01/08/20 1303 01/12/20 0905   Weight: 79.7 kg (175 lb 9.6 oz) 84.7 kg (186 lb 12.8 oz)     Vital Signs with Ranges  Temp:  [97  F (36.1  C)] 97  F (36.1  C)  Pulse:  [74] 74  Resp:  [18] 18  BP: (96)/(63) 96/63  SpO2:  [98 %] 98 %  No intake/output data recorded.    Constitutional: NAD, well-nourished, well-developed  Respiratory: CTA bilaterally   Cardiovascular: RRR, S1, S2, no extra heart sounds, no edema   Other: Foot exam- pedal pulses 3+, no edema, FROM, no bruising, wounds noted     Medications     benztropine  1 mg Oral BID     busPIRone  15 mg Oral TID     chlorproMAZINE  100 mg Oral TID     clonazePAM  1 mg Oral At Bedtime     divalproex sodium extended-release  750 mg Oral At Bedtime     gabapentin  1,200 mg Oral TID     insulin aspart  8 Units Subcutaneous TID w/meals      insulin aspart  1-7 Units Subcutaneous TID AC     insulin aspart  1-5 Units Subcutaneous At Bedtime     insulin glargine  40 Units Subcutaneous At Bedtime     metFORMIN  1,000 mg Oral BID w/meals     nicotine  1 patch Transdermal Daily     nicotine   Transdermal Q8H     prazosin  1 mg Oral At Bedtime       Data   No lab results found in last 7 days.    No results found for this or any previous visit (from the past 24 hour(s)).

## 2020-01-20 NOTE — PLAN OF CARE
Problem: Fall Injury Risk  Goal: Absence of Fall and Fall-Related Injury  Description  Pt will wear non skid slippers.  Pt will remain free from falls or injuries during hospitalization.   1/19/2020 1827 by Mitra Devlin RN  Outcome: No Change   Pt. Has been using non skid slippers . Encourage to sit up before standing up.  Problem: Adult Behavioral Health Plan of Care  Goal: Patient-Specific Goal (Individualization)  Description  Pt will follow recommendations of treatment team.  Pt will be compliant with medications.  Pt will attend 50 % of groups.  Pt will sleep 6-8 hours each night.  Pt will complete ADL's independently.   1/19/2020 1827 by Mitra Devlin RN  Outcome: Improving   Up and watched TV for awhile tonight. Participates very little in groups. Sleeping a lot. States is frustrated and bored. Wants to leave. Hopes for placement soon.    Face to face end of shift report communicated to night shift RN.     Mitra Devlin RN  1/19/2020  9:32 PM

## 2020-01-21 LAB
GLUCOSE BLDC GLUCOMTR-MCNC: 115 MG/DL (ref 70–99)
GLUCOSE BLDC GLUCOMTR-MCNC: 141 MG/DL (ref 70–99)
GLUCOSE BLDC GLUCOMTR-MCNC: 148 MG/DL (ref 70–99)
GLUCOSE BLDC GLUCOMTR-MCNC: 164 MG/DL (ref 70–99)
GLUCOSE BLDC GLUCOMTR-MCNC: 191 MG/DL (ref 70–99)

## 2020-01-21 PROCEDURE — 99231 SBSQ HOSP IP/OBS SF/LOW 25: CPT | Performed by: NURSE PRACTITIONER

## 2020-01-21 PROCEDURE — 00000146 ZZHCL STATISTIC GLUCOSE BY METER IP

## 2020-01-21 PROCEDURE — 25000132 ZZH RX MED GY IP 250 OP 250 PS 637: Performed by: NURSE PRACTITIONER

## 2020-01-21 PROCEDURE — 12400000 ZZH R&B MH

## 2020-01-21 RX ADMIN — ACETAMINOPHEN 650 MG: 325 TABLET, FILM COATED ORAL at 14:31

## 2020-01-21 RX ADMIN — BUSPIRONE HYDROCHLORIDE 15 MG: 10 TABLET ORAL at 14:05

## 2020-01-21 RX ADMIN — BENZTROPINE MESYLATE 1 MG: 1 TABLET ORAL at 20:50

## 2020-01-21 RX ADMIN — INSULIN ASPART 8 UNITS: 100 INJECTION, SOLUTION INTRAVENOUS; SUBCUTANEOUS at 17:27

## 2020-01-21 RX ADMIN — BENZTROPINE MESYLATE 1 MG: 1 TABLET ORAL at 08:26

## 2020-01-21 RX ADMIN — PRAZOSIN HYDROCHLORIDE 1 MG: 1 CAPSULE ORAL at 20:49

## 2020-01-21 RX ADMIN — BUSPIRONE HYDROCHLORIDE 15 MG: 10 TABLET ORAL at 08:26

## 2020-01-21 RX ADMIN — METFORMIN HYDROCHLORIDE 1000 MG: 500 TABLET, FILM COATED ORAL at 08:26

## 2020-01-21 RX ADMIN — CHLORPROMAZINE HYDROCHLORIDE 100 MG: 100 TABLET, SUGAR COATED ORAL at 08:26

## 2020-01-21 RX ADMIN — CHLORPROMAZINE HYDROCHLORIDE 100 MG: 100 TABLET, SUGAR COATED ORAL at 14:05

## 2020-01-21 RX ADMIN — INSULIN ASPART 8 UNITS: 100 INJECTION, SOLUTION INTRAVENOUS; SUBCUTANEOUS at 07:59

## 2020-01-21 RX ADMIN — METFORMIN HYDROCHLORIDE 1000 MG: 500 TABLET, FILM COATED ORAL at 17:27

## 2020-01-21 RX ADMIN — GABAPENTIN 1200 MG: 600 TABLET, FILM COATED ORAL at 20:49

## 2020-01-21 RX ADMIN — BUSPIRONE HYDROCHLORIDE 15 MG: 10 TABLET ORAL at 20:50

## 2020-01-21 RX ADMIN — CHLORPROMAZINE HYDROCHLORIDE 100 MG: 100 TABLET, SUGAR COATED ORAL at 20:50

## 2020-01-21 RX ADMIN — DIVALPROEX SODIUM 750 MG: 500 TABLET, EXTENDED RELEASE ORAL at 20:49

## 2020-01-21 RX ADMIN — GABAPENTIN 1200 MG: 600 TABLET, FILM COATED ORAL at 14:05

## 2020-01-21 RX ADMIN — INSULIN ASPART 8 UNITS: 100 INJECTION, SOLUTION INTRAVENOUS; SUBCUTANEOUS at 11:39

## 2020-01-21 RX ADMIN — GABAPENTIN 1200 MG: 600 TABLET, FILM COATED ORAL at 08:26

## 2020-01-21 RX ADMIN — INSULIN GLARGINE 40 UNITS: 100 INJECTION, SOLUTION SUBCUTANEOUS at 20:52

## 2020-01-21 RX ADMIN — CLONAZEPAM 1 MG: 1 TABLET ORAL at 20:50

## 2020-01-21 ASSESSMENT — ACTIVITIES OF DAILY LIVING (ADL)
DRESS: SCRUBS (BEHAVIORAL HEALTH)
LAUNDRY: UNABLE TO COMPLETE
ORAL_HYGIENE: INDEPENDENT
HYGIENE/GROOMING: INDEPENDENT
ORAL_HYGIENE: INDEPENDENT
DRESS: SCRUBS (BEHAVIORAL HEALTH);INDEPENDENT
HYGIENE/GROOMING: INDEPENDENT

## 2020-01-21 NOTE — PLAN OF CARE
"Spoke with Charisse from [facility] [employer] - states she is wanting to do a screening with pt. Charisse asked if hospital was able to provide a video access and if not then Charisse would be willing to come meet  with pt in person. Emailed Charisse to let her know hospital does not have the ability to provide video access for a screening. Updated pt on writer speaking with Charisse and pt is attempting to contact her guardian to \"help speed up the process\" pt has remained appropriate during process to finding new placement and behaviors have been appropriate as well.   "

## 2020-01-21 NOTE — PLAN OF CARE
"  Problem: Adult Behavioral Health Plan of Care  Goal: Patient-Specific Goal (Individualization)  Description  Pt will follow recommendations of treatment team.  Pt will be compliant with medications.  Pt will attend 50 % of groups.  Pt will sleep 6-8 hours each night.  Pt will complete ADL's independently.   1/21/2020 1612 by Nazia Mcmahon RN  Outcome: No Change  Pt visualized, appears resting in bed having regular respirations. Pt woken for accu check by writer, 148. Mumbles, rambles during conversation. Endorses pain at 10/10 left foot \"all day everyday\" - declines interventions at this time. Denies hallucinations, anxiety, depression. Excited about meeting with [facility]. HS .  Compliant with medications. Able to hold linear conversation for short time then distractible and not redirectable from delusions, becomes forced, pressured, fixated on delusions.  Face to face end of shift report communicated to oncoming RN.     Nazia Mcmahon RN  1/21/2020  9:12 PM        Problem: Thought Process Alteration  Goal: Optimal Thought Clarity  Description  Patient will be able to hold reality based conversations prior to discharge.     1/21/2020 1612 by Nazia Mcmahon RN  Outcome: No Change   See above.    Problem: Fall Injury Risk  Goal: Absence of Fall and Fall-Related Injury  Description  Pt will wear non skid slippers.  Pt will remain free from falls or injuries during hospitalization.   1/21/2020 1612 by Nazia Mcmahon RN  Outcome: Improving   Remains free from falls and harm this shift.  "

## 2020-01-21 NOTE — PLAN OF CARE
Problem: Adult Behavioral Health Plan of Care  Goal: Patient-Specific Goal (Individualization)  Description  Pt will follow recommendations of treatment team.  Pt will be compliant with medications.  Pt will attend 50 % of groups.  Pt will sleep 6-8 hours each night.  Pt will complete ADL's independently.   1/21/2020 0947 by Nancy Stern, RN  Outcome: Improving  Note:   Shift Summery: VSS, 1430-complaints of 8/10  Generalized pain-tylenol rec'd per request. described as  Denies SI, SIB, HI or hallucinations. Endorses depression and anxiety related to hospitalization. Pt at baseline-able to answer assessment questions an maintain short linear conversation before it becomes delusional. Out in lounge much of shift. Conversing with peers and making her lists/graphs. Awaiting word on referrals and hoping for discharge soon.   Cooperative with medications and nursing assessment. Behavior appropriate with staff and peers.       Problem: Fall Injury Risk  Goal: Absence of Fall and Fall-Related Injury  Description  Pt will wear non skid slippers.  Pt will remain free from falls or injuries during hospitalization.   1/21/2020 0947 by Nancy Stern, RN  Outcome: Improving     Problem: Thought Process Alteration  Goal: Optimal Thought Clarity  Description  Patient will be able to hold reality based conversations prior to discharge.     Outcome: Improving     Face to face end of shift report communicated to  oncoming shift.     Nancy Stern, RN  1/21/2020  9:54 AM

## 2020-01-21 NOTE — PLAN OF CARE
Face to face end of shift will be reported to night shift RN     Lisbet Donald, RN  1/20/2020  11:03 PM        Problem: Adult Behavioral Health Plan of Care  Goal: Patient-Specific Goal (Individualization)  Description  Pt will follow recommendations of treatment team.  Pt will be compliant with medications.  Pt will attend 50 % of groups.  Pt will sleep 6-8 hours each night.  Pt will complete ADL's independently.   1/20/2020 1843 by Lisbet Donald, RN  Outcome: No Change  Note: Pt writing lists of items to get at store and spends hours in lobby doing this  Pt does c/o chronic pain in feet and says has broken bones  Old scar on left foot but no sign of inflammation or sores  Pt cooperative and takes meds without problem  Sleeping and eating well

## 2020-01-21 NOTE — PROGRESS NOTES
[facility]  Psychiatric Progress Note      Impression:     Patient again is up at table drinking coffee.  She talks about [facility] is now looking to interview her, she wonders if they will let her smoke.  Advised her to ask those questions during her interview.  Otherwise, denies mood changes, sleeping well, denies si/hi, denies a/v/h, and outside of being anxious about discharging, she continues doing fairly well.  No changes in medications today.    Previous medication trials documented:  Zyprexa - weight gain, not fully effective  Invega - ineffective  Tegretol - poorly tolerated  Lithium - poorly tolerated, severe sedation  Clozaril - irritability, hyperthermia, and persistent tachycardia  Depakote - Weight gain and poor mood control (this was still restarted during admission in 11/19)  Thorazine - notable improvement on 200mg tid, decreased in 11/19 on an outpatient visit, resulting in readmission.  Gabapentin   Buspar  Klonopin  Cogentin           Diagnoses:     Schizoaffective Disorder, Bipolar Type  PTSD, by history  R/O Other stimulant use disorder, methamphetamine, with induced perceptual disorder    Attestation:  Patient has been seen and evaluated by me,  LATRICE Du CNP          Interim History:   The patient's care was discussed with the treatment team and chart notes were reviewed.    BEHAVIORAL TEAM DISCUSSION    Progress: Good  Continued Stay Criteria/Rationale: Delusional, no safe discharge plan, medication adjustments  Medical/Physical: Diabetes Mellitus Type 2  Precautions: No   Falls precaution?: Yes  Behavioral Orders   Procedures     Code 1 - Restrict to Unit     Routine Programming     As clinically indicated     Status 15     Every 15 minutes.     Plan: Continue current medications - exploring safe discharge options - Guardian in [place]  Rationale for change in precautions or plan: None      Participants: LITO Rizo,  Dennise Herrera [facility],  "Angie Laws LSW,  Becca Chery LSW, Jim Barrios LSW, Mady Holman OT, Natalia Costello OT, Nursing            Medications:   I have reviewed this patient's current medications    Current Facility-Administered Medications   Medication     acetaminophen (TYLENOL) tablet 650 mg     alum & mag hydroxide-simethicone (MYLANTA ES/MAALOX  ES) suspension 30 mL     artificial saliva (BIOTENE MT) solution 1-2 spray     benztropine (COGENTIN) tablet 1 mg     bisacodyl (DULCOLAX) Suppository 10 mg     busPIRone (BUSPAR) tablet 15 mg     chlorproMAZINE (THORAZINE) tablet 100 mg     chlorproMAZINE (THORAZINE) tablet 25 mg     clonazePAM (klonoPIN) tablet 1 mg     glucose gel 15-30 g    Or     dextrose 50 % injection 25-50 mL    Or     glucagon injection 1 mg     divalproex sodium extended-release (DEPAKOTE ER) 24 hr tablet 750 mg     gabapentin (NEURONTIN) tablet 1,200 mg     hydrOXYzine (ATARAX) tablet  mg     ibuprofen (ADVIL/MOTRIN) tablet 600 mg     insulin aspart (NovoLOG) inj (RAPID ACTING)     insulin aspart (NovoLOG) inj (RAPID ACTING)     insulin aspart (NovoLOG) inj (RAPID ACTING)     insulin glargine (LANTUS PEN) injection 40 Units     magnesium hydroxide (MILK OF MAGNESIA) suspension 30 mL     metFORMIN (GLUCOPHAGE) tablet 1,000 mg     nicotine (NICODERM CQ) 21 MG/24HR 24 hr patch 1 patch     nicotine (NICORETTE) gum 2 mg     nicotine Patch in Place     OLANZapine (zyPREXA) tablet 10 mg    Or     OLANZapine (zyPREXA) injection 10 mg     polyethylene glycol (MIRALAX/GLYCOLAX) Packet 17 g     prazosin (MINIPRESS) capsule 1 mg     traZODone (DESYREL) tablet 50 mg        10 point ROS - see H&P       Allergies:     Allergies   Allergen Reactions     Haloperidol Other (See Comments)     Other reaction(s): Seizures  Patient states, \"I fell down and wasn't able to get up.\"  Patient states, \"I fell down and wasn't able to get up.\"              Psychiatric Examination:   /66   Pulse 96   Temp 97.6  F (36.4 " " C) (Tympanic)   Resp 16   Ht 1.651 m (5' 5\")   Wt 84.7 kg (186 lb 12.8 oz)   SpO2 99%   BMI 31.09 kg/m    Weight is 186 lbs 12.8 oz  Body mass index is 31.09 kg/m .    Appearance:  awake, alert  Attitude:  cooperative  Eye Contact:  Good  Mood:  better  Affect:  Less irritable  Speech:  Improved  Psychomotor Behavior:  appropriate  Thought Process:  Goal oriented, able to answer questions in a linear fashion; Some delusional statements  Associations:  JUSTA noted at times  Thought Content:  No SI/HI, Delusional content  Insight:  limited  Judgment:  limited  Oriented to:  time, person, and place  Attention Span and Concentration:  limited  Recent and Remote Memory:  fair - impacted by delusional beliefs and drug use  Fund of Knowledge: appropriate  Muscle Strength and Tone: normal  Gait and Station: Normal           Labs:     Results for orders placed or performed during the hospital encounter of 01/08/20 (from the past 48 hour(s))   Glucose by meter   Result Value Ref Range    Glucose 125 (H) 70 - 99 mg/dL   Glucose by meter   Result Value Ref Range    Glucose 104 (H) 70 - 99 mg/dL   Glucose by meter   Result Value Ref Range    Glucose 103 (H) 70 - 99 mg/dL   Glucose by meter   Result Value Ref Range    Glucose 62 (L) 70 - 99 mg/dL   Glucose by meter   Result Value Ref Range    Glucose 84 70 - 99 mg/dL   Glucose by meter   Result Value Ref Range    Glucose 129 (H) 70 - 99 mg/dL   Glucose by meter   Result Value Ref Range    Glucose 148 (H) 70 - 99 mg/dL   Glucose by meter   Result Value Ref Range    Glucose 162 (H) 70 - 99 mg/dL   Glucose by meter   Result Value Ref Range    Glucose 144 (H) 70 - 99 mg/dL   Glucose by meter   Result Value Ref Range    Glucose 164 (H) 70 - 99 mg/dL   Glucose by meter   Result Value Ref Range    Glucose 141 (H) 70 - 99 mg/dL   Glucose by meter   Result Value Ref Range    Glucose 191 (H) 70 - 99 mg/dL          Plan:     Continue medications as prescribed  ELOS: >5 days - Exploring " safe discharge plans, further stabilization

## 2020-01-21 NOTE — PLAN OF CARE
Face to face report received from Lisbet MAK. Pt. Observed.     Problem: Adult Behavioral Health Plan of Care  Goal: Patient-Specific Goal (Individualization)  Description  Pt will follow recommendations of treatment team.  Pt will be compliant with medications.  Pt will attend 50 % of groups.  Pt will sleep 6-8 hours each night.  Pt will complete ADL's independently.   1/21/2020 0359 by Eliza Arndt RN  Outcome: No Change    Pt has been awake all noc shift. 15 minute and PRN checks all night. No complaints offered. Will continue to monitor.      Problem: Fall Injury Risk  Goal: Absence of Fall and Fall-Related Injury  Description  Pt will wear non skid slippers.  Pt will remain free from falls or injuries during hospitalization.   1/21/2020 0359 by Eliza Arndt RN  Outcome: Improving  Pt. Free from falls at this time.     Face to face end of shift report to be communicated to oncoming RN.     Eliza Arndt RN  1/21/2020

## 2020-01-22 LAB
GLUCOSE BLDC GLUCOMTR-MCNC: 110 MG/DL (ref 70–99)
GLUCOSE BLDC GLUCOMTR-MCNC: 128 MG/DL (ref 70–99)
GLUCOSE BLDC GLUCOMTR-MCNC: 163 MG/DL (ref 70–99)
GLUCOSE BLDC GLUCOMTR-MCNC: 91 MG/DL (ref 70–99)
GLUCOSE BLDC GLUCOMTR-MCNC: 98 MG/DL (ref 70–99)

## 2020-01-22 PROCEDURE — 12400000 ZZH R&B MH

## 2020-01-22 PROCEDURE — 99232 SBSQ HOSP IP/OBS MODERATE 35: CPT | Performed by: NURSE PRACTITIONER

## 2020-01-22 PROCEDURE — 25000132 ZZH RX MED GY IP 250 OP 250 PS 637: Performed by: NURSE PRACTITIONER

## 2020-01-22 PROCEDURE — 00000146 ZZHCL STATISTIC GLUCOSE BY METER IP

## 2020-01-22 RX ADMIN — BUSPIRONE HYDROCHLORIDE 15 MG: 10 TABLET ORAL at 13:06

## 2020-01-22 RX ADMIN — CHLORPROMAZINE HYDROCHLORIDE 100 MG: 100 TABLET, SUGAR COATED ORAL at 13:06

## 2020-01-22 RX ADMIN — GABAPENTIN 1200 MG: 600 TABLET, FILM COATED ORAL at 20:12

## 2020-01-22 RX ADMIN — BENZTROPINE MESYLATE 1 MG: 1 TABLET ORAL at 08:28

## 2020-01-22 RX ADMIN — PRAZOSIN HYDROCHLORIDE 1 MG: 1 CAPSULE ORAL at 20:12

## 2020-01-22 RX ADMIN — BUSPIRONE HYDROCHLORIDE 15 MG: 10 TABLET ORAL at 20:13

## 2020-01-22 RX ADMIN — GABAPENTIN 1200 MG: 600 TABLET, FILM COATED ORAL at 08:26

## 2020-01-22 RX ADMIN — BENZTROPINE MESYLATE 1 MG: 1 TABLET ORAL at 20:13

## 2020-01-22 RX ADMIN — INSULIN ASPART 8 UNITS: 100 INJECTION, SOLUTION INTRAVENOUS; SUBCUTANEOUS at 07:59

## 2020-01-22 RX ADMIN — INSULIN GLARGINE 40 UNITS: 100 INJECTION, SOLUTION SUBCUTANEOUS at 20:12

## 2020-01-22 RX ADMIN — INSULIN ASPART 8 UNITS: 100 INJECTION, SOLUTION INTRAVENOUS; SUBCUTANEOUS at 11:46

## 2020-01-22 RX ADMIN — GABAPENTIN 1200 MG: 600 TABLET, FILM COATED ORAL at 13:06

## 2020-01-22 RX ADMIN — NICOTINE 1 PATCH: 21 PATCH, EXTENDED RELEASE TRANSDERMAL at 08:37

## 2020-01-22 RX ADMIN — IBUPROFEN 600 MG: 600 TABLET ORAL at 02:13

## 2020-01-22 RX ADMIN — BUSPIRONE HYDROCHLORIDE 15 MG: 10 TABLET ORAL at 08:27

## 2020-01-22 RX ADMIN — CHLORPROMAZINE HYDROCHLORIDE 100 MG: 100 TABLET, SUGAR COATED ORAL at 20:13

## 2020-01-22 RX ADMIN — CHLORPROMAZINE HYDROCHLORIDE 100 MG: 100 TABLET, SUGAR COATED ORAL at 08:27

## 2020-01-22 RX ADMIN — HYDROXYZINE HYDROCHLORIDE 100 MG: 25 TABLET, FILM COATED ORAL at 20:13

## 2020-01-22 RX ADMIN — CLONAZEPAM 1 MG: 1 TABLET ORAL at 20:13

## 2020-01-22 RX ADMIN — DIVALPROEX SODIUM 750 MG: 500 TABLET, EXTENDED RELEASE ORAL at 20:13

## 2020-01-22 RX ADMIN — HYDROXYZINE HYDROCHLORIDE 100 MG: 25 TABLET, FILM COATED ORAL at 02:13

## 2020-01-22 RX ADMIN — INSULIN ASPART 8 UNITS: 100 INJECTION, SOLUTION INTRAVENOUS; SUBCUTANEOUS at 17:24

## 2020-01-22 RX ADMIN — METFORMIN HYDROCHLORIDE 1000 MG: 500 TABLET, FILM COATED ORAL at 08:27

## 2020-01-22 RX ADMIN — HYDROXYZINE HYDROCHLORIDE 50 MG: 25 TABLET, FILM COATED ORAL at 08:36

## 2020-01-22 RX ADMIN — METFORMIN HYDROCHLORIDE 1000 MG: 500 TABLET, FILM COATED ORAL at 17:22

## 2020-01-22 ASSESSMENT — ACTIVITIES OF DAILY LIVING (ADL)
LAUNDRY: UNABLE TO COMPLETE
ORAL_HYGIENE: INDEPENDENT
DRESS: SCRUBS (BEHAVIORAL HEALTH)
DRESS: SCRUBS (BEHAVIORAL HEALTH)
HYGIENE/GROOMING: INDEPENDENT
ORAL_HYGIENE: INDEPENDENT
HYGIENE/GROOMING: INDEPENDENT

## 2020-01-22 NOTE — PLAN OF CARE
Face to face report received from Nazia MAK. Pt. Observed.     Problem: Adult Behavioral Health Plan of Care  Goal: Patient-Specific Goal (Individualization)  Description  Pt will follow recommendations of treatment team.  Pt will be compliant with medications.  Pt will attend 50 % of groups.  Pt will sleep 6-8 hours each night.  Pt will complete ADL's independently.   1/22/2020 0054 by Eliza Arndt, RN  Outcome: No Change  Note:     Pt has been in bed with eyes closed and regular respirations for a total 1 hour this noc shift. Pt. Appears to be sleeping for 30 min x 2 this noc shift. Pt. Pt.  15 minute and PRN checks all night. No complaints offered. Will continue to monitor.    0213 Pt. Administered PRN Atarax 100 mg po per pt. Request for increasing anxiety with effective results.    0213 Pt. Administered PRN Ibuprofen 600 mg po per pt. Request for increasing anxiety with effective results.         Problem: Fall Injury Risk  Goal: Absence of Fall and Fall-Related Injury  Description  Pt will wear non skid slippers.  Pt will remain free from falls or injuries during hospitalization.   1/22/2020 0054 by Eliza Arndt, RN  Outcome: Improving   Pt. Free from falls at this time.     Face to face end of shift report to be communicated to oncoming RN.     Eliza Arndt, OBDULIO  1/22/2020

## 2020-01-22 NOTE — PLAN OF CARE
Problem: Adult Behavioral Health Plan of Care  Goal: Patient-Specific Goal (Individualization)  Description  Pt will follow recommendations of treatment team.  Pt will be compliant with medications.  Pt will attend 50 % of groups.  Pt will sleep 6-8 hours each night.  Pt will complete ADL's independently.   1/22/2020 0921 by Nancy Stern, RN  Outcome: Improving  Note:   Shift Summery:  VSS. No complaints of pain this shift. Denies SI, SIB, HI or hallucinations. Endorses moderate anxiety and excitement awaiting news of screening for possible placement with [facility].   Full range affect today-smiling and talking with peers in lounge.   Cooperative with nursing assessment and peers.   Short linear conversation then becomes delusional per pt norm. Attends group this afternoon.     Problem: Fall Injury Risk  Goal: Absence of Fall and Fall-Related Injury  Description  Pt will wear non skid slippers.  Pt will remain free from falls or injuries during hospitalization.   1/22/2020 0921 by Nancy Stern, RN  Outcome: Improving     Problem: Thought Process Alteration  Goal: Optimal Thought Clarity  Description  Patient will be able to hold reality based conversations prior to discharge.     Outcome: Improving     Face to face end of shift report communicated to oncoming shift.     Nancy Stern, RN  1/22/2020  9:26 AM

## 2020-01-22 NOTE — PROGRESS NOTES
"[facility]  Psychiatric Progress Note      Impression:   Jm is up in group. She is social and bright with staff and peers. She is overall pleasant and trying to keep a positive outlook. Jm is hoping for placement soon and would like to possibly consider [facility]. She reports she has been there before and did well there. Jm had a largely disorganized and bizarre conversation. She was talking about spending a lot of time in [place] where she learned martial arts. 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 Jm talks about her mother being Finnish royalphoebe and jm was able to travel through time and see Grayson and Kofi Sears. She also saw the [place] gods Eddie, the Anubis and John. This conversation was very bizarre and illogical in content. She also said she is very \"tight\" with Queen [place].     Previous medication trials documented:  Zyprexa - weight gain, not fully effective  Invega - ineffective  Tegretol - poorly tolerated  Lithium - poorly tolerated, severe sedation  Clozaril - irritability, hyperthermia, and persistent tachycardia  Depakote - Weight gain and poor mood control (this was still restarted during admission in 11/19)  Thorazine - notable improvement on 200mg tid, decreased in 11/19 on an outpatient visit, resulting in readmission.  Gabapentin   Buspar  Klonopin  Cogentin           Diagnoses:     Schizoaffective Disorder, Bipolar Type  PTSD, by history  R/O Other stimulant use disorder, methamphetamine, with induced perceptual disorder    Attestation:  Patient has been seen and evaluated by me,  Loren Zuleta NP          Interim History:   The patient's care was discussed with the treatment team and chart notes were reviewed.    BEHAVIORAL TEAM DISCUSSION    Progress: Good  Continued Stay Criteria/Rationale: Delusional, no safe discharge plan, medication adjustments  Medical/Physical: Diabetes Mellitus Type 2  Precautions: No   Falls precaution?: Yes  Behavioral Orders "   Procedures     Code 1 - Restrict to Unit     Routine Programming     As clinically indicated     Status 15     Every 15 minutes.     Plan: Continue current medications - exploring safe discharge options - [facility] [place]  Rationale for change in precautions or plan: None      Participants: LATRICE Bajwa-CNP,  Dennise Herrera [facility], Angie Laws LSW,  Becca Chery LSW, Jim Barrios LSW, Mady Holman OT, Natalia Costello OT, Nursing, [facility], Krys BRAGA supervisor, nursing            Medications:   I have reviewed this patient's current medications    Current Facility-Administered Medications   Medication     acetaminophen (TYLENOL) tablet 650 mg     alum & mag hydroxide-simethicone (MYLANTA ES/MAALOX  ES) suspension 30 mL     artificial saliva (BIOTENE MT) solution 1-2 spray     benztropine (COGENTIN) tablet 1 mg     bisacodyl (DULCOLAX) Suppository 10 mg     busPIRone (BUSPAR) tablet 15 mg     chlorproMAZINE (THORAZINE) tablet 100 mg     chlorproMAZINE (THORAZINE) tablet 25 mg     clonazePAM (klonoPIN) tablet 1 mg     glucose gel 15-30 g    Or     dextrose 50 % injection 25-50 mL    Or     glucagon injection 1 mg     divalproex sodium extended-release (DEPAKOTE ER) 24 hr tablet 750 mg     gabapentin (NEURONTIN) tablet 1,200 mg     hydrOXYzine (ATARAX) tablet  mg     ibuprofen (ADVIL/MOTRIN) tablet 600 mg     insulin aspart (NovoLOG) inj (RAPID ACTING)     insulin aspart (NovoLOG) inj (RAPID ACTING)     insulin aspart (NovoLOG) inj (RAPID ACTING)     insulin glargine (LANTUS PEN) injection 40 Units     magnesium hydroxide (MILK OF MAGNESIA) suspension 30 mL     metFORMIN (GLUCOPHAGE) tablet 1,000 mg     nicotine (NICODERM CQ) 21 MG/24HR 24 hr patch 1 patch     nicotine (NICORETTE) gum 2 mg     nicotine Patch in Place     polyethylene glycol (MIRALAX/GLYCOLAX) Packet 17 g     prazosin (MINIPRESS) capsule 1 mg     traZODone (DESYREL) tablet 50 mg        10 point ROS -  "see H&P       Allergies:     Allergies   Allergen Reactions     Haloperidol Other (See Comments)     Other reaction(s): Seizures  Patient states, \"I fell down and wasn't able to get up.\"  Patient states, \"I fell down and wasn't able to get up.\"              Psychiatric Examination:   /70   Pulse 98   Temp 97.6  F (36.4  C) (Tympanic)   Resp 14   Ht 1.651 m (5' 5\")   Wt 84.7 kg (186 lb 12.8 oz)   SpO2 98%   BMI 31.09 kg/m    Weight is 186 lbs 12.8 oz  Body mass index is 31.09 kg/m .    Appearance:  awake, alert  Attitude:  cooperative  Eye Contact:  Good  Mood:  better  Affect:  Less irritable  Speech:  Improved  Psychomotor Behavior:  appropriate  Thought Process:  Goal oriented, able to answer questions in a linear fashion; Some delusional statements  Associations:  JUSTA noted at times  Thought Content:  No SI/HI, Delusional content  Insight:  limited  Judgment:  limited  Oriented to:  time, person, and place  Attention Span and Concentration:  limited  Recent and Remote Memory:  fair - impacted by delusional beliefs and drug use  Fund of Knowledge: appropriate  Muscle Strength and Tone: normal  Gait and Station: Normal           Labs:     Results for orders placed or performed during the hospital encounter of 01/08/20 (from the past 48 hour(s))   Glucose by meter   Result Value Ref Range    Glucose 162 (H) 70 - 99 mg/dL   Glucose by meter   Result Value Ref Range    Glucose 144 (H) 70 - 99 mg/dL   Glucose by meter   Result Value Ref Range    Glucose 164 (H) 70 - 99 mg/dL   Glucose by meter   Result Value Ref Range    Glucose 141 (H) 70 - 99 mg/dL   Glucose by meter   Result Value Ref Range    Glucose 191 (H) 70 - 99 mg/dL   Glucose by meter   Result Value Ref Range    Glucose 148 (H) 70 - 99 mg/dL   Glucose by meter   Result Value Ref Range    Glucose 115 (H) 70 - 99 mg/dL   Glucose by meter   Result Value Ref Range    Glucose 110 (H) 70 - 99 mg/dL   Glucose by meter   Result Value Ref Range    Glucose " 91 70 - 99 mg/dL   Glucose by meter   Result Value Ref Range    Glucose 128 (H) 70 - 99 mg/dL          Plan:     Continue medications as prescribed  ELOS: >5 days - Exploring safe discharge plans, further stabilization

## 2020-01-23 LAB
GLUCOSE BLDC GLUCOMTR-MCNC: 122 MG/DL (ref 70–99)
GLUCOSE BLDC GLUCOMTR-MCNC: 131 MG/DL (ref 70–99)
GLUCOSE BLDC GLUCOMTR-MCNC: 144 MG/DL (ref 70–99)
GLUCOSE BLDC GLUCOMTR-MCNC: 168 MG/DL (ref 70–99)
GLUCOSE BLDC GLUCOMTR-MCNC: 170 MG/DL (ref 70–99)
GLUCOSE BLDC GLUCOMTR-MCNC: 81 MG/DL (ref 70–99)

## 2020-01-23 PROCEDURE — 12400000 ZZH R&B MH

## 2020-01-23 PROCEDURE — 25000132 ZZH RX MED GY IP 250 OP 250 PS 637: Performed by: NURSE PRACTITIONER

## 2020-01-23 PROCEDURE — 25000131 ZZH RX MED GY IP 250 OP 636 PS 637: Performed by: NURSE PRACTITIONER

## 2020-01-23 PROCEDURE — 00000146 ZZHCL STATISTIC GLUCOSE BY METER IP

## 2020-01-23 RX ADMIN — CLONAZEPAM 1 MG: 1 TABLET ORAL at 20:33

## 2020-01-23 RX ADMIN — INSULIN ASPART 8 UNITS: 100 INJECTION, SOLUTION INTRAVENOUS; SUBCUTANEOUS at 17:19

## 2020-01-23 RX ADMIN — NICOTINE 1 PATCH: 21 PATCH, EXTENDED RELEASE TRANSDERMAL at 08:24

## 2020-01-23 RX ADMIN — CHLORPROMAZINE HYDROCHLORIDE 100 MG: 100 TABLET, SUGAR COATED ORAL at 08:25

## 2020-01-23 RX ADMIN — HYDROXYZINE HYDROCHLORIDE 100 MG: 25 TABLET, FILM COATED ORAL at 16:34

## 2020-01-23 RX ADMIN — GABAPENTIN 1200 MG: 600 TABLET, FILM COATED ORAL at 08:25

## 2020-01-23 RX ADMIN — CHLORPROMAZINE HYDROCHLORIDE 100 MG: 100 TABLET, SUGAR COATED ORAL at 14:03

## 2020-01-23 RX ADMIN — BENZTROPINE MESYLATE 1 MG: 1 TABLET ORAL at 20:33

## 2020-01-23 RX ADMIN — BUSPIRONE HYDROCHLORIDE 15 MG: 10 TABLET ORAL at 14:03

## 2020-01-23 RX ADMIN — INSULIN ASPART 8 UNITS: 100 INJECTION, SOLUTION INTRAVENOUS; SUBCUTANEOUS at 11:51

## 2020-01-23 RX ADMIN — BENZTROPINE MESYLATE 1 MG: 1 TABLET ORAL at 08:25

## 2020-01-23 RX ADMIN — BUSPIRONE HYDROCHLORIDE 15 MG: 10 TABLET ORAL at 08:25

## 2020-01-23 RX ADMIN — METFORMIN HYDROCHLORIDE 1000 MG: 500 TABLET, FILM COATED ORAL at 17:19

## 2020-01-23 RX ADMIN — DIVALPROEX SODIUM 750 MG: 500 TABLET, EXTENDED RELEASE ORAL at 20:32

## 2020-01-23 RX ADMIN — CHLORPROMAZINE HYDROCHLORIDE 100 MG: 100 TABLET, SUGAR COATED ORAL at 20:33

## 2020-01-23 RX ADMIN — GABAPENTIN 1200 MG: 600 TABLET, FILM COATED ORAL at 14:03

## 2020-01-23 RX ADMIN — INSULIN GLARGINE 40 UNITS: 100 INJECTION, SOLUTION SUBCUTANEOUS at 20:32

## 2020-01-23 RX ADMIN — GABAPENTIN 1200 MG: 600 TABLET, FILM COATED ORAL at 20:33

## 2020-01-23 RX ADMIN — IBUPROFEN 600 MG: 600 TABLET ORAL at 16:34

## 2020-01-23 RX ADMIN — PRAZOSIN HYDROCHLORIDE 1 MG: 1 CAPSULE ORAL at 20:33

## 2020-01-23 RX ADMIN — BUSPIRONE HYDROCHLORIDE 15 MG: 10 TABLET ORAL at 20:33

## 2020-01-23 RX ADMIN — INSULIN ASPART 8 UNITS: 100 INJECTION, SOLUTION INTRAVENOUS; SUBCUTANEOUS at 08:23

## 2020-01-23 RX ADMIN — METFORMIN HYDROCHLORIDE 1000 MG: 500 TABLET, FILM COATED ORAL at 08:25

## 2020-01-23 ASSESSMENT — ACTIVITIES OF DAILY LIVING (ADL)
LAUNDRY: UNABLE TO COMPLETE
ORAL_HYGIENE: INDEPENDENT
HYGIENE/GROOMING: INDEPENDENT
HYGIENE/GROOMING: INDEPENDENT
ORAL_HYGIENE: INDEPENDENT
LAUNDRY: UNABLE TO COMPLETE
DRESS: SCRUBS (BEHAVIORAL HEALTH);INDEPENDENT
DRESS: SCRUBS (BEHAVIORAL HEALTH)

## 2020-01-23 NOTE — PLAN OF CARE
Problem: Fall Injury Risk  Goal: Absence of Fall and Fall-Related Injury  Description  Pt will wear non skid slippers.  Pt will remain free from falls or injuries during hospitalization.   1/22/2020 2234 by Diana Campos RN  Outcome: Improving   Patient is balanced and steady on feet.  Free from falls this shift.     Problem: Thought Process Alteration  Goal: Optimal Thought Clarity  Description  Patient will be able to hold reality based conversations prior to discharge.     1/22/2020 2234 by Diana Campos RN  Outcome: Improving   Patient able to have a reality based conversation with this writer.  She talked a lot about discharging soon and is hopeful to find placement soon.      Problem: Adult Behavioral Health Plan of Care  Goal: Patient-Specific Goal (Individualization)  Description  Pt will follow recommendations of treatment team.  Pt will be compliant with medications.  Pt will attend 50 % of groups.  Pt will sleep 6-8 hours each night.  Pt will complete ADL's independently.   1/22/2020 2234 by Diana Campos RN  Outcome: Improving  Note:     Patient has been calm, cooperative, and medication compliant this shift.  She was out in the unge and attending groups.  Reports some anxiety and received 100 mg hydroxyzine at 2013.  Denies all other mental health concerns.  BS were 98 and 163. No sliding scale coverage needed.  No complaints of pain.  VS WNL. Face to face end of shift report communicated to night shift RN.     Diana Campos RN  1/22/2020  10:38 PM

## 2020-01-23 NOTE — PROGRESS NOTES
CHW met with patient, patient is doing well. Patient stated that their blood sugars are doing well. Patient informed CHW that SW on the unit working with patient is helping patient look into being placed at [facility] in [place]. CHW encouraged patient to reach out if they need anything.    Jaay Winter, BSW  Community Health Worker, CHW  [facility]

## 2020-01-23 NOTE — PLAN OF CARE
"  Problem: Adult Behavioral Health Plan of Care  Goal: Patient-Specific Goal (Individualization)  Description  Pt will follow recommendations of treatment team.  Pt will be compliant with medications.  Pt will attend 50 % of groups.  Pt will sleep 6-8 hours each night.  Pt will complete ADL's independently.   1/23/2020 1433 by Jessica Bailey, RN  Outcome: Improving  Note:          Problem: Adult Behavioral Health Plan of Care  Goal: Adheres to Safety Considerations for Self and Others  Outcome: Improving     Problem: Adult Behavioral Health Plan of Care  Goal: Optimized Coping Skills in Response to Life Stressors  Outcome: Improving     Problem: Fall Injury Risk  Goal: Absence of Fall and Fall-Related Injury  Description  Pt will wear non skid slippers.  Pt will remain free from falls or injuries during hospitalization.   1/23/2020 0404 by Becca Mcnulty RN  Outcome: Improving     Problem: Thought Process Alteration  Goal: Optimal Thought Clarity  Description  Patient will be able to hold reality based conversations prior to discharge.     1/23/2020 1433 by Jessica Bailey RN  Outcome: Improving     Patient is seen this AM in the lounge, patient is eating breakfast and interacting with others. Patient is calm with nurse assess, compliant with medications as well as assess. Patient does eat breakfast, and stays out in the lounge the shift. Patient continues to speak about her family, her famous friends, claims she is a billionaire, does not wish to return to where he came from. Patient continues to ask if she has been doing well, wants to follow all the rules, and feels as though she is able to leave, if she does not have placement soon. Patient states she plans to remains calm, and not \" freak out \". Patient states she has been careful with her diet and her blood sugars have been doing well. Patient did not require coverage this shift. Patient denies anxiety, depression, SI, HI, hallucinations, pain. Patient is " reminded t/o the day of her current status and that she is waiting on [facility] to make a date and time to come and assess her.     End of shift hand off report to be given to oncoming RN

## 2020-01-23 NOTE — PLAN OF CARE
Pt asked for an update this morning - informed pt Charisse with [facility] has not emailed back yet and when she does writer will let pt know. Pt is accepting of this and is thankful. Pt continues to remain appropriate with lack of update and has no behaviors. Jaya with [facility] here to see pt this morning.

## 2020-01-23 NOTE — PLAN OF CARE
Face to face shift report received from OBDULIO Ortiz.       Problem: Adult Behavioral Health Plan of Care  Goal: Patient-Specific Goal (Individualization)  Description  Pt will follow recommendations of treatment team.  Pt will be compliant with medications.  Pt will attend 50 % of groups.  Pt will sleep 6-8 hours each night.  Pt will complete ADL's independently.   1/23/2020 0404 by Becca Mcnulty RN  Outcome: No Change  Note: 0200 Accucheck: 131. Pt up multiple times this shift, then returns to bed. Pt has been in bed with eyes closed and regular respirations for a total of 3 hours this shift. 15 minute and PRN checks all night. No complaints offered. Will continue to monitor.  AM Accucheck: 81. Pt declined snack. Agreeable to drink apple juice, pt was provided this and drank it.      Problem: Thought Process Alteration  Goal: Optimal Thought Clarity  Description  Patient will be able to hold reality based conversations prior to discharge.     1/23/2020 0404 by Becca Mcnulty RN  Outcome: No Change       Problem: Fall Injury Risk  Goal: Absence of Fall and Fall-Related Injury  Description  Pt will wear non skid slippers.  Pt will remain free from falls or injuries during hospitalization.   1/23/2020 0404 by Becca Mcnulty RN  Outcome: Improving   Free from falls or injuries this shift.       Face to face end of shift report to be communicated to oncoming RN.

## 2020-01-24 LAB
GLUCOSE BLDC GLUCOMTR-MCNC: 132 MG/DL (ref 70–99)
GLUCOSE BLDC GLUCOMTR-MCNC: 136 MG/DL (ref 70–99)
GLUCOSE BLDC GLUCOMTR-MCNC: 142 MG/DL (ref 70–99)
GLUCOSE BLDC GLUCOMTR-MCNC: 159 MG/DL (ref 70–99)
GLUCOSE BLDC GLUCOMTR-MCNC: 87 MG/DL (ref 70–99)

## 2020-01-24 PROCEDURE — 99232 SBSQ HOSP IP/OBS MODERATE 35: CPT | Performed by: NURSE PRACTITIONER

## 2020-01-24 PROCEDURE — 25000132 ZZH RX MED GY IP 250 OP 250 PS 637: Performed by: NURSE PRACTITIONER

## 2020-01-24 PROCEDURE — 00000146 ZZHCL STATISTIC GLUCOSE BY METER IP

## 2020-01-24 PROCEDURE — 12400000 ZZH R&B MH

## 2020-01-24 RX ADMIN — HYDROXYZINE HYDROCHLORIDE 100 MG: 25 TABLET, FILM COATED ORAL at 00:22

## 2020-01-24 RX ADMIN — METFORMIN HYDROCHLORIDE 1000 MG: 500 TABLET, FILM COATED ORAL at 18:54

## 2020-01-24 RX ADMIN — INSULIN GLARGINE 40 UNITS: 100 INJECTION, SOLUTION SUBCUTANEOUS at 20:33

## 2020-01-24 RX ADMIN — BENZTROPINE MESYLATE 1 MG: 1 TABLET ORAL at 20:33

## 2020-01-24 RX ADMIN — INSULIN ASPART 8 UNITS: 100 INJECTION, SOLUTION INTRAVENOUS; SUBCUTANEOUS at 08:09

## 2020-01-24 RX ADMIN — CLONAZEPAM 1 MG: 1 TABLET ORAL at 20:33

## 2020-01-24 RX ADMIN — NICOTINE 1 PATCH: 21 PATCH, EXTENDED RELEASE TRANSDERMAL at 08:13

## 2020-01-24 RX ADMIN — BUSPIRONE HYDROCHLORIDE 15 MG: 10 TABLET ORAL at 20:33

## 2020-01-24 RX ADMIN — DIVALPROEX SODIUM 750 MG: 500 TABLET, EXTENDED RELEASE ORAL at 20:33

## 2020-01-24 RX ADMIN — BUSPIRONE HYDROCHLORIDE 15 MG: 10 TABLET ORAL at 08:09

## 2020-01-24 RX ADMIN — CHLORPROMAZINE HYDROCHLORIDE 100 MG: 100 TABLET, SUGAR COATED ORAL at 08:10

## 2020-01-24 RX ADMIN — BENZTROPINE MESYLATE 1 MG: 1 TABLET ORAL at 08:09

## 2020-01-24 RX ADMIN — CHLORPROMAZINE HYDROCHLORIDE 100 MG: 100 TABLET, SUGAR COATED ORAL at 20:33

## 2020-01-24 RX ADMIN — GABAPENTIN 1200 MG: 600 TABLET, FILM COATED ORAL at 20:32

## 2020-01-24 RX ADMIN — BUSPIRONE HYDROCHLORIDE 15 MG: 10 TABLET ORAL at 13:16

## 2020-01-24 RX ADMIN — GABAPENTIN 1200 MG: 600 TABLET, FILM COATED ORAL at 08:09

## 2020-01-24 RX ADMIN — PRAZOSIN HYDROCHLORIDE 1 MG: 1 CAPSULE ORAL at 20:32

## 2020-01-24 RX ADMIN — CHLORPROMAZINE HYDROCHLORIDE 100 MG: 100 TABLET, SUGAR COATED ORAL at 13:16

## 2020-01-24 RX ADMIN — INSULIN ASPART 8 UNITS: 100 INJECTION, SOLUTION INTRAVENOUS; SUBCUTANEOUS at 12:03

## 2020-01-24 RX ADMIN — GABAPENTIN 1200 MG: 600 TABLET, FILM COATED ORAL at 13:16

## 2020-01-24 RX ADMIN — METFORMIN HYDROCHLORIDE 1000 MG: 500 TABLET, FILM COATED ORAL at 08:09

## 2020-01-24 ASSESSMENT — ACTIVITIES OF DAILY LIVING (ADL)
LAUNDRY: UNABLE TO COMPLETE
HYGIENE/GROOMING: INDEPENDENT
HYGIENE/GROOMING: INDEPENDENT
ORAL_HYGIENE: INDEPENDENT
ORAL_HYGIENE: INDEPENDENT
DRESS: SCRUBS (BEHAVIORAL HEALTH)
DRESS: SCRUBS (BEHAVIORAL HEALTH)

## 2020-01-24 NOTE — PLAN OF CARE
"Updated pt that writer has not received an email from Charisse with [facility] today and writer attempted to call, but Charisse was not in. Pt asked \"do I have to be here through the weekend?\" pt was informed she needed to stay - pt took news well and continues to sit at the table in the lounge.   "

## 2020-01-24 NOTE — PLAN OF CARE
Problem: Fall Injury Risk  Goal: Absence of Fall and Fall-Related Injury  Description  Pt will wear non skid slippers.  Pt will remain free from falls or injuries during hospitalization.   2020 by Diana Campos, RN  Outcome: Improving   Patient is balanced and steady on feet.  Free from falls this shift.     Problem: Thought Process Alteration  Goal: Optimal Thought Clarity  Description  Patient will be able to hold reality based conversations prior to discharge.     2020 by Diana Campos, RN  Outcome: Improving   Patient was able to have linear, reality based conversation with this writer at start of the shift.  She talked about going to an IRTS program and working with her .  At 2245 patient was talking about flying the plane that Keeley Birch  in.  She stated that she was able to jump from the plan but couldn't save anyone else.   She also said that she once got her foot caught in a tiger trap while trying to save a baby and was attacked by a bear.     Problem: Adult Behavioral Health Plan of Care  Goal: Patient-Specific Goal (Individualization)  Description  Pt will follow recommendations of treatment team.  Pt will be compliant with medications.  Pt will attend 50 % of groups.  Pt will sleep 6-8 hours each night.  Pt will complete ADL's independently.   2020 by Diana Campos, RN  Outcome: Improving  Note:     Patient has been calm, cooperative, and medication compliant this shift.  She was in the lounge much of the shift and attended groups.  Polite and friendly with peers and staff.  Denies mental health concerns except some anxiety.  Received 100 mg hydroxyzine at 1634 with good relief of symptoms.   at supper with 9 units coverage given 170 at HS with no sliding scale coverage needed.  VS WNL.  Patient reported pain and requested Ibuprofen. Took 600 mg at 1634 with good relief of pain. Face to face end of shift report communicated to night shift RN.      Diana Campos RN  1/23/2020  6:46 PM

## 2020-01-24 NOTE — PLAN OF CARE
"Face to face shift report received from OBDULIO Ortiz.       Problem: Adult Behavioral Health Plan of Care  Goal: Patient-Specific Goal (Individualization)  Description  Pt will follow recommendations of treatment team.  Pt will be compliant with medications.  Pt will attend 50 % of groups.  Pt will sleep 6-8 hours each night.  Pt will complete ADL's independently.   1/24/2020 0314 by Becca Mcnulty, RN  Outcome: No Change  Note: 0022 pt reports anxiety. Requested and received Hydroxyzine 100mg at this time.  0200 accucheck: 142. Up in [facility] area all shift. When questioned if she planned to sleep pt reports \"I'm gonna stay up until I get to talk to the  at noon. If I go to a facility where I need to watch myself I need to strengthen my back. So I'm just sitting here looking at my calendar and keeping my back straight.\" Pleasant during conversation. Behavior calm. Observed to be talking to self and gesturing as if towards others at times.   AM Accucheck: 132     Problem: Thought Process Alteration  Goal: Optimal Thought Clarity  Description  Patient will be able to hold reality based conversations prior to discharge.     1/24/2020 0314 by Becca Mcnulty, RN  Outcome: No Change       Problem: Fall Injury Risk  Goal: Absence of Fall and Fall-Related Injury  Description  Pt will wear non skid slippers.  Pt will remain free from falls or injuries during hospitalization.   1/24/2020 0314 by Becca Mcnulty, RN  Outcome: Improving   Free from falls this shift.       Face to face end of shift report to be communicated to oncoming RN.     "

## 2020-01-24 NOTE — PLAN OF CARE
"NATALIA WALKER, RN  1/24/2020  7:33 AM  Face to face shift report received from OBDULIO Hudson. Rounding completed, pt observed.  Problem: Adult Behavioral Health Plan of Care  Goal: Patient-Specific Goal (Individualization)  Description  Pt will follow recommendations of treatment team.  Pt will be compliant with medications.  Pt will attend 50 % of groups.  Pt will sleep 6-8 hours each night.  Pt will complete ADL's independently.   1/24/2020 0731 by Natalia Walker, RN  Outcome: No Change  Note: 1054- Pt did not sleep at all last night. She continues to sit in lounge drinking tea states she is \"feeling tired but I'll eat lunch first before I lay down\".  Denies hallucinations although appears to be responding, talking to herself.  Pain is 10/10 but denies prns \"I always have pain\".  Denies SI, anxiety and depression currently.  Appears delusional talking about her kids being dead and people attacking her and going to correction.  Taking meds without difficulty.     1327- Pt remains in lounge sitting at table  talking about how for years everyone has been stealing all of her pills.  Refusing to lay down, states \"I'll lay down at 5\".  Compliant with meds.  Refused groups.     Problem: Thought Process Alteration  Goal: Optimal Thought Clarity  Description  Patient will be able to hold reality based conversations prior to discharge.     1/24/2020 0731 by Natalia Walker, RN  Outcome: No Change     Problem: Fall Injury Risk  Goal: Absence of Fall and Fall-Related Injury  Description  Pt will wear non skid slippers.  Pt will remain free from falls or injuries during hospitalization.   1/24/2020 0731 by Natalia Walker, RN  Outcome: Improving  1032- Pt has  slippers on.  Gait steady and balanced.  Pt independent with no noted falls.      Face to face end of shift report communicated to oncoming shift RN.                "

## 2020-01-24 NOTE — PROGRESS NOTES
[facility]  Psychiatric Progress Note      Impression:   [facility] [place] [facility] [place] [facility] per her usual behavior. She rambles pleasantly about various delusional topics. These range from being a ballerina, an expert in fencing, inventing [employer], mining gemstones the size of a room and inventing a robot to cut them, to having over 1200 children and having all but 800 of them stomped to death by a creature with hooves, a tail, horns, and a wonton for a brain. Genie is smiling and rather animated during her rambling conversation. She also said her father bought an attack alligator that she had to destroy. Again, genie is not hostile or aggressive in her delusional thoughts. She is pleasant and has no urge to harm herself or others. She clearly needs a structured and closely monitored setting as she has bizarre fixed delusions.     Previous medication trials documented:  Zyprexa - weight gain, not fully effective  Invega - ineffective  Tegretol - poorly tolerated  Lithium - poorly tolerated, severe sedation  Clozaril - irritability, hyperthermia, and persistent tachycardia  Depakote - Weight gain and poor mood control (this was still restarted during admission in 11/19)  Thorazine - notable improvement on 200mg tid, decreased in 11/19 on an outpatient visit, resulting in readmission.  Gabapentin   Buspar  Klonopin  Cogentin           Diagnoses:     Schizoaffective Disorder, Bipolar Type  PTSD, by history  R/O Other stimulant use disorder, methamphetamine, with induced perceptual disorder    Attestation:  Patient has been seen and evaluated by me,  Loren Zuleta NP          Interim History:   The patient's care was discussed with the treatment team and chart notes were reviewed.    BEHAVIORAL TEAM DISCUSSION    Progress: Good  Continued Stay Criteria/Rationale: Delusional, no safe discharge plan, medication adjustments  Medical/Physical: Diabetes Mellitus Type 2  Precautions: No    Falls precaution?: Yes  Behavioral Orders   Procedures     Code 1 - Restrict to Unit     Routine Programming     As clinically indicated     Status 15     Every 15 minutes.     Plan: Continue current medications - exploring safe discharge options - Guardian in [place]  Rationale for change in precautions or plan: None      Participants: Loren Zuleta, LATRICE-CNP,  Dennise Herrera [facility], Angie Laws LSW,  Becca Chery LSW, Jim Barrios LSW, Mady Holman OT, Natalia Costello OT, Nursing, [facility], Krys BRAGA supervisor, nursing            Medications:   I have reviewed this patient's current medications    Current Facility-Administered Medications   Medication     acetaminophen (TYLENOL) tablet 650 mg     alum & mag hydroxide-simethicone (MYLANTA ES/MAALOX  ES) suspension 30 mL     artificial saliva (BIOTENE MT) solution 1-2 spray     benztropine (COGENTIN) tablet 1 mg     bisacodyl (DULCOLAX) Suppository 10 mg     busPIRone (BUSPAR) tablet 15 mg     chlorproMAZINE (THORAZINE) tablet 100 mg     chlorproMAZINE (THORAZINE) tablet 25 mg     clonazePAM (klonoPIN) tablet 1 mg     glucose gel 15-30 g    Or     dextrose 50 % injection 25-50 mL    Or     glucagon injection 1 mg     divalproex sodium extended-release (DEPAKOTE ER) 24 hr tablet 750 mg     gabapentin (NEURONTIN) tablet 1,200 mg     hydrOXYzine (ATARAX) tablet  mg     ibuprofen (ADVIL/MOTRIN) tablet 600 mg     insulin aspart (NovoLOG) inj (RAPID ACTING)     insulin aspart (NovoLOG) inj (RAPID ACTING)     insulin aspart (NovoLOG) inj (RAPID ACTING)     insulin glargine (LANTUS PEN) injection 40 Units     magnesium hydroxide (MILK OF MAGNESIA) suspension 30 mL     metFORMIN (GLUCOPHAGE) tablet 1,000 mg     nicotine (NICODERM CQ) 21 MG/24HR 24 hr patch 1 patch     nicotine (NICORETTE) gum 2 mg     nicotine Patch in Place     polyethylene glycol (MIRALAX/GLYCOLAX) Packet 17 g     prazosin (MINIPRESS) capsule 1 mg     traZODone  "(DESYREL) tablet 50 mg        10 point ROS - see H&P       Allergies:     Allergies   Allergen Reactions     Haloperidol Other (See Comments)     Other reaction(s): Seizures  Patient states, \"I fell down and wasn't able to get up.\"  Patient states, \"I fell down and wasn't able to get up.\"              Psychiatric Examination:   /84   Pulse 99   Temp 97.8  F (36.6  C) (Tympanic)   Resp 16   Ht 1.651 m (5' 5\")   Wt 84.7 kg (186 lb 12.8 oz)   SpO2 100%   BMI 31.09 kg/m    Weight is 186 lbs 12.8 oz  Body mass index is 31.09 kg/m .    Appearance:  awake, alert  Attitude:  cooperative  Eye Contact:  Good  Mood:  good  Affect:  Mood congruent  Speech:  Improved  Psychomotor Behavior:  appropriate  Thought Process:  Goal oriented, able to answer questions in a linear fashion; delusional statements  Associations:  JUSTA   Thought Content:  No SI/HI, Delusional content  Insight:  limited  Judgment:  limited  Oriented to:  time, person, and place  Attention Span and Concentration:  limited  Recent and Remote Memory:  fair - impacted by delusional beliefs and drug use  Fund of Knowledge: appropriate  Muscle Strength and Tone: normal  Gait and Station: Normal           Labs:     Results for orders placed or performed during the hospital encounter of 01/08/20 (from the past 48 hour(s))   Glucose by meter   Result Value Ref Range    Glucose 98 70 - 99 mg/dL   Glucose by meter   Result Value Ref Range    Glucose 163 (H) 70 - 99 mg/dL   Glucose by meter   Result Value Ref Range    Glucose 131 (H) 70 - 99 mg/dL   Glucose by meter   Result Value Ref Range    Glucose 81 70 - 99 mg/dL   Glucose by meter   Result Value Ref Range    Glucose 122 (H) 70 - 99 mg/dL   Glucose by meter   Result Value Ref Range    Glucose 144 (H) 70 - 99 mg/dL   Glucose by meter   Result Value Ref Range    Glucose 168 (H) 70 - 99 mg/dL   Glucose by meter   Result Value Ref Range    Glucose 170 (H) 70 - 99 mg/dL   Glucose by meter   Result Value Ref " Range    Glucose 142 (H) 70 - 99 mg/dL   Glucose by meter   Result Value Ref Range    Glucose 132 (H) 70 - 99 mg/dL   Glucose by meter   Result Value Ref Range    Glucose 159 (H) 70 - 99 mg/dL          Plan:     Continue medications as prescribed  ELOS: >5 days - Exploring safe discharge plans, further stabilization

## 2020-01-25 LAB
GLUCOSE BLDC GLUCOMTR-MCNC: 105 MG/DL (ref 70–99)
GLUCOSE BLDC GLUCOMTR-MCNC: 107 MG/DL (ref 70–99)
GLUCOSE BLDC GLUCOMTR-MCNC: 125 MG/DL (ref 70–99)
GLUCOSE BLDC GLUCOMTR-MCNC: 126 MG/DL (ref 70–99)
GLUCOSE BLDC GLUCOMTR-MCNC: 178 MG/DL (ref 70–99)
GLUCOSE BLDC GLUCOMTR-MCNC: 186 MG/DL (ref 70–99)
GLUCOSE BLDC GLUCOMTR-MCNC: 63 MG/DL (ref 70–99)
GLUCOSE BLDC GLUCOMTR-MCNC: 71 MG/DL (ref 70–99)
GLUCOSE BLDC GLUCOMTR-MCNC: 78 MG/DL (ref 70–99)
GLUCOSE BLDC GLUCOMTR-MCNC: 88 MG/DL (ref 70–99)

## 2020-01-25 PROCEDURE — 25000132 ZZH RX MED GY IP 250 OP 250 PS 637: Performed by: NURSE PRACTITIONER

## 2020-01-25 PROCEDURE — 00000146 ZZHCL STATISTIC GLUCOSE BY METER IP

## 2020-01-25 PROCEDURE — 12400000 ZZH R&B MH

## 2020-01-25 RX ADMIN — CHLORPROMAZINE HYDROCHLORIDE 100 MG: 100 TABLET, SUGAR COATED ORAL at 13:32

## 2020-01-25 RX ADMIN — CHLORPROMAZINE HYDROCHLORIDE 100 MG: 100 TABLET, SUGAR COATED ORAL at 20:53

## 2020-01-25 RX ADMIN — BUSPIRONE HYDROCHLORIDE 15 MG: 10 TABLET ORAL at 13:32

## 2020-01-25 RX ADMIN — DIVALPROEX SODIUM 750 MG: 500 TABLET, EXTENDED RELEASE ORAL at 20:53

## 2020-01-25 RX ADMIN — HYDROXYZINE HYDROCHLORIDE 50 MG: 25 TABLET, FILM COATED ORAL at 22:14

## 2020-01-25 RX ADMIN — GABAPENTIN 1200 MG: 600 TABLET, FILM COATED ORAL at 08:30

## 2020-01-25 RX ADMIN — GABAPENTIN 1200 MG: 600 TABLET, FILM COATED ORAL at 20:53

## 2020-01-25 RX ADMIN — GABAPENTIN 1200 MG: 600 TABLET, FILM COATED ORAL at 13:32

## 2020-01-25 RX ADMIN — INSULIN GLARGINE 40 UNITS: 100 INJECTION, SOLUTION SUBCUTANEOUS at 20:53

## 2020-01-25 RX ADMIN — METFORMIN HYDROCHLORIDE 1000 MG: 500 TABLET, FILM COATED ORAL at 08:30

## 2020-01-25 RX ADMIN — CLONAZEPAM 1 MG: 1 TABLET ORAL at 20:53

## 2020-01-25 RX ADMIN — BUSPIRONE HYDROCHLORIDE 15 MG: 10 TABLET ORAL at 08:30

## 2020-01-25 RX ADMIN — BUSPIRONE HYDROCHLORIDE 15 MG: 10 TABLET ORAL at 20:53

## 2020-01-25 RX ADMIN — IBUPROFEN 600 MG: 600 TABLET ORAL at 22:14

## 2020-01-25 RX ADMIN — PRAZOSIN HYDROCHLORIDE 1 MG: 1 CAPSULE ORAL at 20:57

## 2020-01-25 RX ADMIN — INSULIN ASPART 8 UNITS: 100 INJECTION, SOLUTION INTRAVENOUS; SUBCUTANEOUS at 17:29

## 2020-01-25 RX ADMIN — NICOTINE 1 PATCH: 21 PATCH, EXTENDED RELEASE TRANSDERMAL at 09:50

## 2020-01-25 RX ADMIN — CHLORPROMAZINE HYDROCHLORIDE 100 MG: 100 TABLET, SUGAR COATED ORAL at 08:30

## 2020-01-25 RX ADMIN — METFORMIN HYDROCHLORIDE 1000 MG: 500 TABLET, FILM COATED ORAL at 17:29

## 2020-01-25 RX ADMIN — BENZTROPINE MESYLATE 1 MG: 1 TABLET ORAL at 08:30

## 2020-01-25 RX ADMIN — BENZTROPINE MESYLATE 1 MG: 1 TABLET ORAL at 20:53

## 2020-01-25 RX ADMIN — INSULIN ASPART 8 UNITS: 100 INJECTION, SOLUTION INTRAVENOUS; SUBCUTANEOUS at 08:29

## 2020-01-25 ASSESSMENT — ACTIVITIES OF DAILY LIVING (ADL)
LAUNDRY: UNABLE TO COMPLETE
ORAL_HYGIENE: INDEPENDENT
HYGIENE/GROOMING: INDEPENDENT
HYGIENE/GROOMING: INDEPENDENT
DRESS: INDEPENDENT;SCRUBS (BEHAVIORAL HEALTH)

## 2020-01-25 NOTE — PLAN OF CARE
Problem: Adult Behavioral Health Plan of Care  Goal: Patient-Specific Goal (Individualization)  Description  Pt will follow recommendations of treatment team.  Pt will be compliant with medications.  Pt will attend 50 % of groups.  Pt will sleep 6-8 hours each night.  Pt will complete ADL's independently.     [facility]. Pt ate 75% of breakfast. Pt reports got menses this am and states is happy she is not happy as one time she had five babies at one time. Pt denies criteria this am. Denies pain. Pt continues to make delusional statements. Pt states she is going to rest all weekend as she doesn't have anything to do. Blood sugar this am 125. Pt did shower this am. Will continue to monitor.    1140 Blood sugar 107. Pt has been napping in bed majority of the morning. Pt ate less than 50% of lunch. Novolog insulin held due to poor oral intake.  1330 Pt appears to be sleeping in bed.  1430 Pt woke up for a snack of pudding and crackers at this time.    Outcome: No Change    Problem: Thought Process Alteration  Goal: Optimal Thought Clarity  Description  Patient will be able to hold reality based conversations prior to discharge.       Outcome: No Change     Problem: Fall Injury Risk  Goal: Absence of Fall and Fall-Related Injury  Description  Pt will wear non skid slippers.  Pt will remain free from falls or injuries during hospitalization.     Steady gait. Appropriate footwear while up. No falls this shift.     Outcome: Improving    Face to face end of shift report communicated to oncoming RN.     1/25/2020  1:54 PM

## 2020-01-25 NOTE — PLAN OF CARE
Problem: Fall Injury Risk  Goal: Absence of Fall and Fall-Related Injury  Description  Pt will wear non skid slippers.  Pt will remain free from falls or injuries during hospitalization.   Outcome: Improving   Patient is balanced and steady on her feet.  Free from falls this shift.   Problem: Thought Process Alteration  Goal: Optimal Thought Clarity  Description  Patient will be able to hold reality based conversations prior to discharge.     Outcome: No Change   Patient continues to make delusional, grandiose statements this shift about being a  and saving people.   Problem: Adult Behavioral Health Plan of Care  Goal: Patient-Specific Goal (Individualization)  Description  Pt will follow recommendations of treatment team.  Pt will be compliant with medications.  Pt will attend 50 % of groups.  Pt will sleep 6-8 hours each night.  Pt will complete ADL's independently.   Outcome: Improving  Note:     Patient has been calm, cooperative, and medication compliant this shift.  She slept at the beginning of shift and refused supper. BS was 89.  Novolog held.  Did get up and eat half a sandwich.  Took metformin.  Attended some groups and was appropriate.  No complaints of pain.  VS WNL.  PM blood sugar 136.  No sliding scale coverage required. Face to face end of shift report communicated to night shift RN.     Diana Campos, RN  1/24/2020  10:12 PM

## 2020-01-25 NOTE — PLAN OF CARE
Face to face end of shift report will be communicated to oncoming RN.     Problem: Adult Behavioral Health Plan of Care  Goal: Patient-Specific Goal (Individualization)  Description  Pt will follow recommendations of treatment team.  Pt will be compliant with medications.  Pt will attend 50 % of groups.  Pt will sleep 6-8 hours each night.  Pt will complete ADL's independently.   1/25/2020 0038 by Mandy Montoya RN  Outcome: No Change     Problem: Fall Injury Risk  Goal: Absence of Fall and Fall-Related Injury  Description  Pt will wear non skid slippers.  Pt will remain free from falls or injuries during hospitalization.   1/25/2020 0038 by Mandy Montoya RN  Outcome: Improving     Problem: Thought Process Alteration  Goal: Optimal Thought Clarity  Description  Patient will be able to hold reality based conversations prior to discharge.     1/25/2020 0038 by Mandy Montoya RN  Outcome: No Change   Face to face end of shift report obtained from OBDULIO Ortiz. Pt observed resting in bed.  0100-Pt appears to be sleeping in bed with eyes closed, having regular respirations and position changes. 15 minutes and PRN safety checks completed with no noted complains. No falls noted or reported so far this shift. Will continue to monitor.   0207- BS 63. Snack given per protocol.   0228- BS 88. Snack given per protocol.  0301- BS 71. Snack given per protocol.   0319- . Pt returned to bed.  0645- . Pt slept 6 hours. Sleep interrupted only for blood glucose monitoring. Pt pleasant.

## 2020-01-25 NOTE — PLAN OF CARE
Problem: Adult Behavioral Health Plan of Care  Goal: Patient-Specific Goal (Individualization)  Description  Pt will follow recommendations of treatment team.  Pt will be compliant with medications.  Pt will attend 50 % of groups.  Pt will sleep 6-8 hours each night.  Pt will complete ADL's independently.   1/25/2020 1743 by Viridiana Dotson RN  Note:   Pt was cooperative with nursing assessment and medications.  She ate 100% of dinner so she received her medication at 1700.  Pt denies all criteria.  She has full range affect.  Pt watched television and visited with peers.  She was in a joking mood.  Pt attended groups this evening.  She went on tangents tonight, rambled, mumbled, and had delusional stories.         Problem: Adult Behavioral Health Plan of Care  Goal: Patient-Specific Goal (Individualization)  Description  Pt will follow recommendations of treatment team.  Pt will be compliant with medications.  Pt will attend 50 % of groups.  Pt will sleep 6-8 hours each night.  Pt will complete ADL's independently.   1/25/2020 1743 by Viridiana Dotson RN  Note:   Pt was cooperative with nursing assessment and medications.  She ate 100% of dinner so she received her medication at 1700.  Pt denies all criteria.  She has full range affect.  Pt watched television and visited with peers.  She was in a joking mood.         Problem: Thought Process Alteration  Goal: Optimal Thought Clarity  Description  Patient will be able to hold reality based conversations prior to discharge.     1/25/2020 1753 by Viridiana Dotson, RN  Note:   Pt still has delusional stories, but able to have conversation.      Problem: Fall Injury Risk  Goal: Absence of Fall and Fall-Related Injury  Description  Pt will wear non skid slippers.  Pt will remain free from falls or injuries during hospitalization.   1/25/2020 1753 by Viridiana Dotson, RN  Note:   Pt did not fall this evening shift.

## 2020-01-26 LAB
GLUCOSE BLDC GLUCOMTR-MCNC: 106 MG/DL (ref 70–99)
GLUCOSE BLDC GLUCOMTR-MCNC: 112 MG/DL (ref 70–99)
GLUCOSE BLDC GLUCOMTR-MCNC: 119 MG/DL (ref 70–99)
GLUCOSE BLDC GLUCOMTR-MCNC: 122 MG/DL (ref 70–99)
GLUCOSE BLDC GLUCOMTR-MCNC: 172 MG/DL (ref 70–99)
GLUCOSE BLDC GLUCOMTR-MCNC: 60 MG/DL (ref 70–99)
GLUCOSE BLDC GLUCOMTR-MCNC: 62 MG/DL (ref 70–99)
GLUCOSE BLDC GLUCOMTR-MCNC: 86 MG/DL (ref 70–99)
GLUCOSE BLDC GLUCOMTR-MCNC: 92 MG/DL (ref 70–99)

## 2020-01-26 PROCEDURE — 25000132 ZZH RX MED GY IP 250 OP 250 PS 637: Performed by: NURSE PRACTITIONER

## 2020-01-26 PROCEDURE — 00000146 ZZHCL STATISTIC GLUCOSE BY METER IP

## 2020-01-26 PROCEDURE — 99231 SBSQ HOSP IP/OBS SF/LOW 25: CPT | Performed by: NURSE PRACTITIONER

## 2020-01-26 PROCEDURE — 12400000 ZZH R&B MH

## 2020-01-26 RX ADMIN — GABAPENTIN 1200 MG: 600 TABLET, FILM COATED ORAL at 13:46

## 2020-01-26 RX ADMIN — INSULIN ASPART 8 UNITS: 100 INJECTION, SOLUTION INTRAVENOUS; SUBCUTANEOUS at 12:38

## 2020-01-26 RX ADMIN — BUSPIRONE HYDROCHLORIDE 15 MG: 10 TABLET ORAL at 08:16

## 2020-01-26 RX ADMIN — METFORMIN HYDROCHLORIDE 1000 MG: 500 TABLET, FILM COATED ORAL at 17:46

## 2020-01-26 RX ADMIN — CLONAZEPAM 1 MG: 1 TABLET ORAL at 20:33

## 2020-01-26 RX ADMIN — NICOTINE 1 PATCH: 21 PATCH, EXTENDED RELEASE TRANSDERMAL at 08:19

## 2020-01-26 RX ADMIN — GABAPENTIN 1200 MG: 600 TABLET, FILM COATED ORAL at 08:16

## 2020-01-26 RX ADMIN — BUSPIRONE HYDROCHLORIDE 15 MG: 10 TABLET ORAL at 20:32

## 2020-01-26 RX ADMIN — DIVALPROEX SODIUM 750 MG: 500 TABLET, EXTENDED RELEASE ORAL at 20:33

## 2020-01-26 RX ADMIN — CHLORPROMAZINE HYDROCHLORIDE 100 MG: 100 TABLET, SUGAR COATED ORAL at 13:46

## 2020-01-26 RX ADMIN — BENZTROPINE MESYLATE 1 MG: 1 TABLET ORAL at 08:15

## 2020-01-26 RX ADMIN — INSULIN GLARGINE 30 UNITS: 100 INJECTION, SOLUTION SUBCUTANEOUS at 20:33

## 2020-01-26 RX ADMIN — CHLORPROMAZINE HYDROCHLORIDE 100 MG: 100 TABLET, SUGAR COATED ORAL at 08:16

## 2020-01-26 RX ADMIN — PRAZOSIN HYDROCHLORIDE 1 MG: 1 CAPSULE ORAL at 20:33

## 2020-01-26 RX ADMIN — GABAPENTIN 1200 MG: 600 TABLET, FILM COATED ORAL at 20:33

## 2020-01-26 RX ADMIN — INSULIN ASPART 8 UNITS: 100 INJECTION, SOLUTION INTRAVENOUS; SUBCUTANEOUS at 17:49

## 2020-01-26 RX ADMIN — METFORMIN HYDROCHLORIDE 1000 MG: 500 TABLET, FILM COATED ORAL at 08:15

## 2020-01-26 RX ADMIN — CHLORPROMAZINE HYDROCHLORIDE 100 MG: 100 TABLET, SUGAR COATED ORAL at 20:32

## 2020-01-26 RX ADMIN — BUSPIRONE HYDROCHLORIDE 15 MG: 10 TABLET ORAL at 13:46

## 2020-01-26 RX ADMIN — BENZTROPINE MESYLATE 1 MG: 1 TABLET ORAL at 20:33

## 2020-01-26 ASSESSMENT — ACTIVITIES OF DAILY LIVING (ADL)
LAUNDRY: UNABLE TO COMPLETE
HYGIENE/GROOMING: INDEPENDENT
DRESS: INDEPENDENT;SCRUBS (BEHAVIORAL HEALTH)
HYGIENE/GROOMING: INDEPENDENT
ORAL_HYGIENE: INDEPENDENT

## 2020-01-26 ASSESSMENT — MIFFLIN-ST. JEOR: SCORE: 1555.39

## 2020-01-26 NOTE — PLAN OF CARE
Face to face end of shift report will be communicated to oncoming RN.      Problem: Adult Behavioral Health Plan of Care  Goal: Patient-Specific Goal (Individualization)  Description  Pt will follow recommendations of treatment team.  Pt will be compliant with medications.  Pt will attend 50 % of groups.  Pt will sleep 6-8 hours each night.  Pt will complete ADL's independently.   1/25/2020 2358 by Mandy Montoya RN  Outcome: No Change     Problem: Fall Injury Risk  Goal: Absence of Fall and Fall-Related Injury  Description  Pt will wear non skid slippers.  Pt will remain free from falls or injuries during hospitalization.   1/25/2020 2358 by Mandy Montoya, RN  Outcome: Improving     Problem: Thought Process Alteration  Goal: Optimal Thought Clarity  Description  Patient will be able to hold reality based conversations prior to discharge.     1/25/2020 2358 by Mandy Montoya RN  Outcome: No Change   Face to face end of shift report obtained from OBDULIO Kemp. Pt observed resting in bed.  0130- . Small snack given. Pt states has menses. Hygiene products provided.  0300-Pt appears to be sleeping in bed with eyes closed, having regular respirations and position changes. 15 minutes and PRN safety checks completed with no noted complains. No falls noted or reported so far this shift. Will continue to monitor.    0550- Pt slept 5.5 hours.  0632- BS 62. Snack given per protocol.  0648- BS 60. Snack given per protocol. Sticky note written for providers to lower insuline scale.   0700- BS 86. Snack per protocol.  0715- .

## 2020-01-26 NOTE — PROGRESS NOTES
[facility]    Medical Services Progress Note    Date of Service (when I saw the patient): 01/26/2020    Assessment & Plan     Principal Problem:    Psychosis (H)    Active Medical Problems:      Diabetes mellitus, type 2-  Morning blood glucose levels have been in the low 60's for the past 2 mornings despite a bedtime snack. Blood sugars trend back up during the day. Decreased Lantus at bedtime to 30 units and see if this resolves the lower blood sugars in the am.       Left foot toe pain- resolved       Chronic hepatitis C virus infection (H)      Tobacco abuse    Methamphetamine use disorder, severe (H)    Hx of substance abuse (H)    ADD (attention deficit disorder)  Code Status: Prior.    Cassy Sandhu, THUY    Interval History   Morning blood sugars have been in the low 60's. Pt denies shakiness, lightheadedness.     -Data reviewed today: I reviewed all new labs and imaging results over the last 24 hours.     Physical Exam   Temp: 97.7  F (36.5  C) Temp src: Tympanic BP: 113/74 Pulse: 74   Resp: 16 SpO2: 95 % O2 Device: None (Room air)    Vitals:    01/08/20 1303 01/12/20 0905 01/26/20 0700   Weight: 79.7 kg (175 lb 9.6 oz) 84.7 kg (186 lb 12.8 oz) 88.5 kg (195 lb)     Vital Signs with Ranges  Temp:  [97.7  F (36.5  C)-98.4  F (36.9  C)] 97.7  F (36.5  C)  Pulse:  [74-99] 74  Resp:  [14-16] 16  BP: (113-114)/(74-78) 113/74  SpO2:  [95 %-99 %] 95 %  No intake/output data recorded.    Constitutional: NAD, well-nourished, well-developed  Respiratory: CTA bilaterally   Cardiovascular: RRR, S1, S2, no extra heart sounds, no edema        Medications     benztropine  1 mg Oral BID     busPIRone  15 mg Oral TID     chlorproMAZINE  100 mg Oral TID     clonazePAM  1 mg Oral At Bedtime     divalproex sodium extended-release  750 mg Oral At Bedtime     gabapentin  1,200 mg Oral TID     insulin aspart  8 Units Subcutaneous TID w/meals     insulin aspart  1-7 Units Subcutaneous TID AC     insulin aspart  1-5 Units  Subcutaneous At Bedtime     insulin glargine  30 Units Subcutaneous At Bedtime     metFORMIN  1,000 mg Oral BID w/meals     nicotine  1 patch Transdermal Daily     nicotine   Transdermal Q8H     prazosin  1 mg Oral At Bedtime       Data   No lab results found in last 7 days.    No results found for this or any previous visit (from the past 24 hour(s)).

## 2020-01-26 NOTE — PLAN OF CARE
Problem: Adult Behavioral Health Plan of Care  Goal: Patient-Specific Goal (Individualization)  Description  Pt will follow recommendations of treatment team.  Pt will be compliant with medications.  Pt will attend 50 % of groups.  Pt will sleep 6-8 hours each night.  Pt will complete ADL's independently.     Pt up in lounge at start of shift watching television. Pt compliant with medication and assessment. Pt denies all criteria this am. Denies pain. Pt does appear to be responding. No insulin administered this am due to low blood sugar. Pt did eat 100% of breakfast. Pt states she's only going to eat one thing off of her tray from now on. Pt also requesting to speak with a diabetic educator because she thinks she is on too much lantus due to her blood sugar dropping the past couple days. Will speak with NP today. Spoke with NP who informed writer that this will be passed along to medical NP. No further complaints. Will continue to monitor.    1010 up in lounge watching television  1200 Blood sugar 172. Pt ate 100%. Pt administered own insulin.  1310 Pt sitting in lounge all shift watching television with staff.    Outcome: Improving    Problem: Thought Process Alteration  Goal: Optimal Thought Clarity  Description  Patient will be able to hold reality based conversations prior to discharge.       Outcome: No Change     Problem: Fall Injury Risk  Goal: Absence of Fall and Fall-Related Injury  Description  Pt will wear non skid slippers.  Pt will remain free from falls or injuries during hospitalization.     Steady gait. Appropriate footwear. No falls this shift.    Outcome: Improving    Face to face end of shift report communicated to oncoming RN.     1/26/2020  1:12 PM

## 2020-01-27 DIAGNOSIS — E11.22 TYPE 2 DIABETES MELLITUS WITH DIABETIC CHRONIC KIDNEY DISEASE, UNSPECIFIED CKD STAGE, UNSPECIFIED WHETHER LONG TERM INSULIN USE (H): Primary | ICD-10-CM

## 2020-01-27 LAB
GLUCOSE BLDC GLUCOMTR-MCNC: 106 MG/DL (ref 70–99)
GLUCOSE BLDC GLUCOMTR-MCNC: 109 MG/DL (ref 70–99)
GLUCOSE BLDC GLUCOMTR-MCNC: 113 MG/DL (ref 70–99)
GLUCOSE BLDC GLUCOMTR-MCNC: 133 MG/DL (ref 70–99)
GLUCOSE BLDC GLUCOMTR-MCNC: 154 MG/DL (ref 70–99)
GLUCOSE BLDC GLUCOMTR-MCNC: 79 MG/DL (ref 70–99)

## 2020-01-27 PROCEDURE — 99232 SBSQ HOSP IP/OBS MODERATE 35: CPT | Performed by: NURSE PRACTITIONER

## 2020-01-27 PROCEDURE — 00000146 ZZHCL STATISTIC GLUCOSE BY METER IP

## 2020-01-27 PROCEDURE — 12400000 ZZH R&B MH

## 2020-01-27 PROCEDURE — 25000132 ZZH RX MED GY IP 250 OP 250 PS 637: Performed by: NURSE PRACTITIONER

## 2020-01-27 PROCEDURE — 99231 SBSQ HOSP IP/OBS SF/LOW 25: CPT | Performed by: NURSE PRACTITIONER

## 2020-01-27 RX ADMIN — GABAPENTIN 1200 MG: 600 TABLET, FILM COATED ORAL at 08:36

## 2020-01-27 RX ADMIN — GABAPENTIN 1200 MG: 600 TABLET, FILM COATED ORAL at 13:20

## 2020-01-27 RX ADMIN — CHLORPROMAZINE HYDROCHLORIDE 100 MG: 100 TABLET, SUGAR COATED ORAL at 08:36

## 2020-01-27 RX ADMIN — INSULIN ASPART 8 UNITS: 100 INJECTION, SOLUTION INTRAVENOUS; SUBCUTANEOUS at 16:52

## 2020-01-27 RX ADMIN — BUSPIRONE HYDROCHLORIDE 15 MG: 10 TABLET ORAL at 08:36

## 2020-01-27 RX ADMIN — GABAPENTIN 1200 MG: 600 TABLET, FILM COATED ORAL at 20:25

## 2020-01-27 RX ADMIN — METFORMIN HYDROCHLORIDE 1000 MG: 500 TABLET, FILM COATED ORAL at 08:36

## 2020-01-27 RX ADMIN — PRAZOSIN HYDROCHLORIDE 1 MG: 1 CAPSULE ORAL at 20:25

## 2020-01-27 RX ADMIN — INSULIN ASPART 8 UNITS: 100 INJECTION, SOLUTION INTRAVENOUS; SUBCUTANEOUS at 12:35

## 2020-01-27 RX ADMIN — METFORMIN HYDROCHLORIDE 1000 MG: 500 TABLET, FILM COATED ORAL at 16:52

## 2020-01-27 RX ADMIN — BUSPIRONE HYDROCHLORIDE 15 MG: 10 TABLET ORAL at 20:25

## 2020-01-27 RX ADMIN — BENZTROPINE MESYLATE 1 MG: 1 TABLET ORAL at 20:25

## 2020-01-27 RX ADMIN — INSULIN ASPART 8 UNITS: 100 INJECTION, SOLUTION INTRAVENOUS; SUBCUTANEOUS at 08:37

## 2020-01-27 RX ADMIN — INSULIN GLARGINE 30 UNITS: 100 INJECTION, SOLUTION SUBCUTANEOUS at 20:28

## 2020-01-27 RX ADMIN — CLONAZEPAM 1 MG: 1 TABLET ORAL at 20:24

## 2020-01-27 RX ADMIN — BUSPIRONE HYDROCHLORIDE 15 MG: 10 TABLET ORAL at 13:20

## 2020-01-27 RX ADMIN — NICOTINE 1 PATCH: 21 PATCH, EXTENDED RELEASE TRANSDERMAL at 08:35

## 2020-01-27 RX ADMIN — CHLORPROMAZINE HYDROCHLORIDE 100 MG: 100 TABLET, SUGAR COATED ORAL at 13:20

## 2020-01-27 RX ADMIN — DIVALPROEX SODIUM 750 MG: 500 TABLET, EXTENDED RELEASE ORAL at 20:25

## 2020-01-27 RX ADMIN — CHLORPROMAZINE HYDROCHLORIDE 100 MG: 100 TABLET, SUGAR COATED ORAL at 20:25

## 2020-01-27 RX ADMIN — BENZTROPINE MESYLATE 1 MG: 1 TABLET ORAL at 08:36

## 2020-01-27 ASSESSMENT — ACTIVITIES OF DAILY LIVING (ADL)
DRESS: SCRUBS (BEHAVIORAL HEALTH);INDEPENDENT
ORAL_HYGIENE: INDEPENDENT
ORAL_HYGIENE: INDEPENDENT
HYGIENE/GROOMING: INDEPENDENT
HYGIENE/GROOMING: INDEPENDENT
DRESS: SCRUBS (BEHAVIORAL HEALTH);INDEPENDENT
LAUNDRY: UNABLE TO COMPLETE

## 2020-01-27 NOTE — PLAN OF CARE
"Face to face end of shift report will be communicated to oncoming RN.     Problem: Adult Behavioral Health Plan of Care  Goal: Patient-Specific Goal (Individualization)  Description  Pt will follow recommendations of treatment team.  Pt will be compliant with medications.  Pt will attend 50 % of groups.  Pt will sleep 6-8 hours each night.  Pt will complete ADL's independently.   1/27/2020 0044 by Mandy Montoya RN  Outcome: No Change     Problem: Fall Injury Risk  Goal: Absence of Fall and Fall-Related Injury  Description  Pt will wear non skid slippers.  Pt will remain free from falls or injuries during hospitalization.   1/27/2020 0044 by Mandy Montoya RN  Outcome: Improving     Problem: Thought Process Alteration  Goal: Optimal Thought Clarity  Description  Patient will be able to hold reality based conversations prior to discharge.     1/27/2020 0044 by Mandy Montoya RN  Outcome: No Change   Face to face end of shift report obtained from OBDULIO Kemp. Pt observed resting in bed.  0100- Pt appears to be sleeping in bed with eyes closed, having regular respirations and position changes. 15 minutes and PRN safety checks completed with no noted complains. No falls noted or reported so far this shift. Will continue to monitor.   0149- BS 79. Snack given.   0429- Pt has remained in lobby since 0200 talking to peers and coloring. Pt requested BS to be tested as she states \"feels is getting low\". BS at this time 109. Will continue to monitor.   0643- .    "

## 2020-01-27 NOTE — PROGRESS NOTES
[facility]  Psychiatric Progress Note      Impression:   Genie is up in the lounge area. She is pleasant and social with peers and staff. She rambles about various odd topics that do not make much sense. They are all loosely associated. Content is delusional. She can answer questions appropriately. Genie is upset she cannot go to a treatment facility, however, she does not really have capacity to participate in that type of programming at this point in her illness. Referrals have been made for various structured living facilities. Alternatives such as adult foster care and groups homes may be other placement options. Will discuss that in treatment team tomorrow prior to discussing it with Genie.      Previous medication trials documented:  Zyprexa - weight gain, not fully effective  Invega - ineffective  Tegretol - poorly tolerated  Lithium - poorly tolerated, severe sedation  Clozaril - irritability, hyperthermia, and persistent tachycardia  Depakote - Weight gain and poor mood control (this was still restarted during admission in 11/19)  Thorazine - notable improvement on 200mg tid, decreased in 11/19 on an outpatient visit, resulting in readmission.  Gabapentin   Buspar  Klonopin  Cogentin           Diagnoses:     Schizoaffective Disorder, Bipolar Type  PTSD, by history  R/O Other stimulant use disorder, methamphetamine, with induced perceptual disorder    Attestation:  Patient has been seen and evaluated by me,  Loren Zuleta NP          Interim History:   The patient's care was discussed with the treatment team and chart notes were reviewed.    BEHAVIORAL TEAM DISCUSSION    Progress: Good  Continued Stay Criteria/Rationale: Delusional, no safe discharge plan, medication adjustments  Medical/Physical: Diabetes Mellitus Type 2  Precautions: No   Falls precaution?: Yes  Behavioral Orders   Procedures     Code 1 - Restrict to Unit     Routine Programming     As clinically indicated     Status 15      Every 15 minutes.     Plan: Continue current medications - exploring safe discharge options - Guardian in [place]  Rationale for change in precautions or plan: None      Participants: Loren Zuleta, LATRICE-CNP,  Dennise Herrera [facility], Angie Laws LSW,  Becca Chery LSW, Jim Barrios LSW, Mady Holman OT, Natalia Costello OT, Nursing, [facility], Krys BRAGA supervisor, nursing            Medications:   I have reviewed this patient's current medications    Current Facility-Administered Medications   Medication     acetaminophen (TYLENOL) tablet 650 mg     alum & mag hydroxide-simethicone (MYLANTA ES/MAALOX  ES) suspension 30 mL     artificial saliva (BIOTENE MT) solution 1-2 spray     benztropine (COGENTIN) tablet 1 mg     bisacodyl (DULCOLAX) Suppository 10 mg     busPIRone (BUSPAR) tablet 15 mg     chlorproMAZINE (THORAZINE) tablet 100 mg     chlorproMAZINE (THORAZINE) tablet 25 mg     clonazePAM (klonoPIN) tablet 1 mg     glucose gel 15-30 g    Or     dextrose 50 % injection 25-50 mL    Or     glucagon injection 1 mg     divalproex sodium extended-release (DEPAKOTE ER) 24 hr tablet 750 mg     gabapentin (NEURONTIN) tablet 1,200 mg     hydrOXYzine (ATARAX) tablet  mg     ibuprofen (ADVIL/MOTRIN) tablet 600 mg     insulin aspart (NovoLOG) inj (RAPID ACTING)     insulin aspart (NovoLOG) inj (RAPID ACTING)     insulin aspart (NovoLOG) inj (RAPID ACTING)     insulin glargine (LANTUS PEN) injection 30 Units     magnesium hydroxide (MILK OF MAGNESIA) suspension 30 mL     metFORMIN (GLUCOPHAGE) tablet 1,000 mg     nicotine (NICODERM CQ) 21 MG/24HR 24 hr patch 1 patch     nicotine (NICORETTE) gum 2 mg     nicotine Patch in Place     polyethylene glycol (MIRALAX/GLYCOLAX) Packet 17 g     prazosin (MINIPRESS) capsule 1 mg     traZODone (DESYREL) tablet 50 mg        10 point ROS - see H&P       Allergies:     Allergies   Allergen Reactions     Haloperidol Other (See Comments)     Other  "reaction(s): Seizures  Patient states, \"I fell down and wasn't able to get up.\"  Patient states, \"I fell down and wasn't able to get up.\"              Psychiatric Examination:   /96   Pulse 90   Temp 97.3  F (36.3  C) (Tympanic)   Resp 16   Ht 1.651 m (5' 5\")   Wt 88.5 kg (195 lb)   SpO2 99%   BMI 32.45 kg/m    Weight is 195 lbs 0 oz  Body mass index is 32.45 kg/m .    Appearance:  awake, alert  Attitude:  cooperative  Eye Contact:  Good  Mood:  good  Affect:  Mood congruent  Speech:  Improved  Psychomotor Behavior:  appropriate  Thought Process:  Goal oriented, able to answer questions in a linear fashion; delusional statements  Associations:  JUSTA   Thought Content:  No SI/HI, Delusional content  Insight:  limited  Judgment:  limited  Oriented to:  time, person, and place  Attention Span and Concentration:  limited  Recent and Remote Memory:  fair - impacted by delusional beliefs and drug use  Fund of Knowledge: appropriate  Muscle Strength and Tone: normal  Gait and Station: Normal           Labs:     Results for orders placed or performed during the hospital encounter of 01/08/20 (from the past 48 hour(s))   Glucose by meter   Result Value Ref Range    Glucose 107 (H) 70 - 99 mg/dL   Glucose by meter   Result Value Ref Range    Glucose 186 (H) 70 - 99 mg/dL   Glucose by meter   Result Value Ref Range    Glucose 178 (H) 70 - 99 mg/dL   Glucose by meter   Result Value Ref Range    Glucose 126 (H) 70 - 99 mg/dL   Glucose by meter   Result Value Ref Range    Glucose 106 (H) 70 - 99 mg/dL   Glucose by meter   Result Value Ref Range    Glucose 62 (L) 70 - 99 mg/dL   Glucose by meter   Result Value Ref Range    Glucose 60 (L) 70 - 99 mg/dL   Glucose by meter   Result Value Ref Range    Glucose 86 70 - 99 mg/dL   Glucose by meter   Result Value Ref Range    Glucose 119 (H) 70 - 99 mg/dL   Glucose by meter   Result Value Ref Range    Glucose 172 (H) 70 - 99 mg/dL   Glucose by meter   Result Value Ref Range    " Glucose 112 (H) 70 - 99 mg/dL   Glucose by meter   Result Value Ref Range    Glucose 92 70 - 99 mg/dL   Glucose by meter   Result Value Ref Range    Glucose 122 (H) 70 - 99 mg/dL   Glucose by meter   Result Value Ref Range    Glucose 79 70 - 99 mg/dL   Glucose by meter   Result Value Ref Range    Glucose 109 (H) 70 - 99 mg/dL   Glucose by meter   Result Value Ref Range    Glucose 113 (H) 70 - 99 mg/dL          Plan:     Continue medications as prescribed  ELOS: >5 days - Exploring safe discharge plans, further stabilization

## 2020-01-27 NOTE — PLAN OF CARE
Received an email from Charisse with [facility] - states she and a nurse will be coming to meet with pt around 10:30 Wednesday morning. Called and spoke with pt's guardians to let them know. Updated pt, NP and pt's nurse. Message was also left for Ruslan at [facility]

## 2020-01-27 NOTE — PLAN OF CARE
"  Problem: Thought Process Alteration  Goal: Optimal Thought Clarity  Description  Patient will be able to hold reality based conversations prior to discharge.     1/26/2020 2201 by Viridiana Dotson RN  Note:   Pt is able to hold reality based conversation, but goes on delusional tangents.      Problem: Adult Behavioral Health Plan of Care  Goal: Patient-Specific Goal (Individualization)  Description  Pt will follow recommendations of treatment team.  Pt will be compliant with medications.  Pt will attend 50 % of groups.  Pt will sleep 6-8 hours each night.  Pt will complete ADL's independently.   1/26/2020 2201 by Viridiana Dotson, RN  Note:   Pt had complained on day shift of not feeling well.  She woke up from a nap at dinner time and said she felt better.    Pt denies all criteria.  She was in a good mood.  She is pleasant to be around.  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  She will be on one of her tangents and then say \"here's a piece.\"        Problem: Fall Injury Risk  Goal: Absence of Fall and Fall-Related Injury  Description  Pt will wear non skid slippers.  Pt will remain free from falls or injuries during hospitalization.   1/26/2020 2201 by Viridiana Dotson, RN  Note:   Pt did not fall this evening shift.      "

## 2020-01-27 NOTE — PLAN OF CARE
Problem: Adult Behavioral Health Plan of Care  Goal: Patient-Specific Goal (Individualization)  Description  Pt will follow recommendations of treatment team.  Pt will be compliant with medications.  Pt will attend 50 % of groups.  Pt will sleep 6-8 hours each night.  Pt will complete ADL's independently.   1/27/2020 0755 by Melissa Flowers RN  Outcome: No Change     Problem: Thought Process Alteration  Goal: Optimal Thought Clarity  Description  Patient will be able to hold reality based conversations prior to discharge.     1/27/2020 0755 by Melissa Flowers RN  Outcome: No Change     Problem: Fall Injury Risk  Goal: Absence of Fall and Fall-Related Injury  Description  Pt will wear non skid slippers.  Pt will remain free from falls or injuries during hospitalization.   1/27/2020 0755 by Melissa Flowers RN  Outcome: Improving     Face to face shift report received from Mandy MAK. Rounding completed, pt observed.     Pt compliant with medications this shift. Pt encouraged to complete ADL's and attend groups. Pt continues with delusional flights of ideas. This morning pt was talking about how her umbilical cord wasn't removed until she was 1 and then she started having kids right away. Pt did not have any falls this shift.     Face to face report will be communicated to oncoming RN.    Melissa Flowers RN  1/27/2020  12:23 PM

## 2020-01-27 NOTE — PROGRESS NOTES
[facility]    Medical Services Progress Note    Date of Service (when I saw the patient): 01/27/2020    Assessment & Plan     Principal Problem:    Psychosis (H)    Active Medical Problems:      Diabetes mellitus, type 2-  Pt blood sugars did not drop lower than 79 after decreasing bedtime Lantus to 30 units. Encouraged pt to eat bedtime snack. Will keep Lantus at 30 units and monitor.       Left foot toe pain- resolved       Chronic hepatitis C virus infection (H)      Tobacco abuse    Methamphetamine use disorder, severe (H)    Hx of substance abuse (H)    ADD (attention deficit disorder)  Code Status: Prior.    Cassy Sandhu CNP    Interval History   Pt up and walking denies any acute complaints. Pt denies shakiness, lightheadedness.     -Data reviewed today: I reviewed all new labs and imaging results over the last 24 hours.     Physical Exam   Temp: 97.3  F (36.3  C) Temp src: Tympanic BP: 130/96 Pulse: 90   Resp: 16 SpO2: 99 % O2 Device: None (Room air)    Vitals:    01/08/20 1303 01/12/20 0905 01/26/20 0700   Weight: 79.7 kg (175 lb 9.6 oz) 84.7 kg (186 lb 12.8 oz) 88.5 kg (195 lb)     Vital Signs with Ranges  Temp:  [97.3  F (36.3  C)-98.2  F (36.8  C)] 97.3  F (36.3  C)  Pulse:  [83-90] 90  Resp:  [12-16] 16  BP: ()/(50-96) 130/96  SpO2:  [95 %-99 %] 99 %  No intake/output data recorded.    Constitutional: NAD, well-nourished, well-developed  Respiratory: CTA bilaterally   Cardiovascular: RRR, S1, S2, no extra heart sounds, no edema        Medications     benztropine  1 mg Oral BID     busPIRone  15 mg Oral TID     chlorproMAZINE  100 mg Oral TID     clonazePAM  1 mg Oral At Bedtime     divalproex sodium extended-release  750 mg Oral At Bedtime     gabapentin  1,200 mg Oral TID     insulin aspart  8 Units Subcutaneous TID w/meals     insulin aspart  1-7 Units Subcutaneous TID AC     insulin aspart  1-5 Units Subcutaneous At Bedtime     insulin glargine  30 Units Subcutaneous At Bedtime      metFORMIN  1,000 mg Oral BID w/meals     nicotine  1 patch Transdermal Daily     nicotine   Transdermal Q8H     prazosin  1 mg Oral At Bedtime       Data   No lab results found in last 7 days.    No results found for this or any previous visit (from the past 24 hour(s)).

## 2020-01-28 LAB
GLUCOSE BLDC GLUCOMTR-MCNC: 110 MG/DL (ref 70–99)
GLUCOSE BLDC GLUCOMTR-MCNC: 111 MG/DL (ref 70–99)
GLUCOSE BLDC GLUCOMTR-MCNC: 126 MG/DL (ref 70–99)
GLUCOSE BLDC GLUCOMTR-MCNC: 171 MG/DL (ref 70–99)
GLUCOSE BLDC GLUCOMTR-MCNC: 78 MG/DL (ref 70–99)

## 2020-01-28 PROCEDURE — 25000132 ZZH RX MED GY IP 250 OP 250 PS 637: Performed by: NURSE PRACTITIONER

## 2020-01-28 PROCEDURE — 12400000 ZZH R&B MH

## 2020-01-28 PROCEDURE — 99231 SBSQ HOSP IP/OBS SF/LOW 25: CPT | Performed by: NURSE PRACTITIONER

## 2020-01-28 PROCEDURE — 00000146 ZZHCL STATISTIC GLUCOSE BY METER IP

## 2020-01-28 RX ADMIN — INSULIN ASPART 8 UNITS: 100 INJECTION, SOLUTION INTRAVENOUS; SUBCUTANEOUS at 12:34

## 2020-01-28 RX ADMIN — GABAPENTIN 1200 MG: 600 TABLET, FILM COATED ORAL at 20:02

## 2020-01-28 RX ADMIN — ACETAMINOPHEN 650 MG: 325 TABLET, FILM COATED ORAL at 08:23

## 2020-01-28 RX ADMIN — METFORMIN HYDROCHLORIDE 1000 MG: 500 TABLET, FILM COATED ORAL at 08:23

## 2020-01-28 RX ADMIN — CLONAZEPAM 1 MG: 1 TABLET ORAL at 20:02

## 2020-01-28 RX ADMIN — INSULIN ASPART 8 UNITS: 100 INJECTION, SOLUTION INTRAVENOUS; SUBCUTANEOUS at 17:44

## 2020-01-28 RX ADMIN — ACETAMINOPHEN 650 MG: 325 TABLET, FILM COATED ORAL at 20:02

## 2020-01-28 RX ADMIN — INSULIN ASPART 8 UNITS: 100 INJECTION, SOLUTION INTRAVENOUS; SUBCUTANEOUS at 08:41

## 2020-01-28 RX ADMIN — INSULIN GLARGINE 30 UNITS: 100 INJECTION, SOLUTION SUBCUTANEOUS at 21:43

## 2020-01-28 RX ADMIN — BUSPIRONE HYDROCHLORIDE 15 MG: 10 TABLET ORAL at 08:24

## 2020-01-28 RX ADMIN — BUSPIRONE HYDROCHLORIDE 15 MG: 10 TABLET ORAL at 20:02

## 2020-01-28 RX ADMIN — GABAPENTIN 1200 MG: 600 TABLET, FILM COATED ORAL at 13:52

## 2020-01-28 RX ADMIN — NICOTINE 1 PATCH: 21 PATCH, EXTENDED RELEASE TRANSDERMAL at 08:27

## 2020-01-28 RX ADMIN — BENZTROPINE MESYLATE 1 MG: 1 TABLET ORAL at 20:01

## 2020-01-28 RX ADMIN — CHLORPROMAZINE HYDROCHLORIDE 100 MG: 100 TABLET, SUGAR COATED ORAL at 08:23

## 2020-01-28 RX ADMIN — CHLORPROMAZINE HYDROCHLORIDE 100 MG: 100 TABLET, SUGAR COATED ORAL at 20:02

## 2020-01-28 RX ADMIN — DIVALPROEX SODIUM 750 MG: 500 TABLET, EXTENDED RELEASE ORAL at 20:02

## 2020-01-28 RX ADMIN — GABAPENTIN 1200 MG: 600 TABLET, FILM COATED ORAL at 08:23

## 2020-01-28 RX ADMIN — CHLORPROMAZINE HYDROCHLORIDE 100 MG: 100 TABLET, SUGAR COATED ORAL at 13:52

## 2020-01-28 RX ADMIN — BENZTROPINE MESYLATE 1 MG: 1 TABLET ORAL at 08:24

## 2020-01-28 RX ADMIN — BUSPIRONE HYDROCHLORIDE 15 MG: 10 TABLET ORAL at 13:52

## 2020-01-28 RX ADMIN — METFORMIN HYDROCHLORIDE 1000 MG: 500 TABLET, FILM COATED ORAL at 17:44

## 2020-01-28 RX ADMIN — PRAZOSIN HYDROCHLORIDE 1 MG: 1 CAPSULE ORAL at 20:02

## 2020-01-28 ASSESSMENT — ACTIVITIES OF DAILY LIVING (ADL)
LAUNDRY: UNABLE TO COMPLETE
HYGIENE/GROOMING: INDEPENDENT
DRESS: SCRUBS (BEHAVIORAL HEALTH);INDEPENDENT
ORAL_HYGIENE: INDEPENDENT
DRESS: SCRUBS (BEHAVIORAL HEALTH);INDEPENDENT
ORAL_HYGIENE: INDEPENDENT
HYGIENE/GROOMING: INDEPENDENT

## 2020-01-28 NOTE — PLAN OF CARE
"  Problem: Adult Behavioral Health Plan of Care  Goal: Patient-Specific Goal (Individualization)  Description  Pt will follow recommendations of treatment team.  Pt will be compliant with medications.  Pt will attend 50 % of groups.  Pt will sleep 6-8 hours each night.  Pt will complete ADL's independently.   1/27/2020 1819 by Luz Verma, RN  Outcome: No Change  Note:   Pt had rambling, pressured speech and delusional thinking. Pt stated \"I have 800 kids, ever since I was 2 years old I've been having kids.\" Pt then went on a tangent about how she was in the movie \"[place] Color [place].\" Pt participated in some group programming. She was compliant with her scheduled medications.     Blood glucose prior to supper was 133. Bedtime blood glucose was 154.     Face to face end of shift report communicated to oncoming RN.      Problem: Fall Injury Risk  Goal: Absence of Fall and Fall-Related Injury  Description  Pt will wear non skid slippers.  Pt will remain free from falls or injuries during hospitalization.   1/27/2020 1819 by Luz Verma, RN  Outcome: Improving  Note:   Pt remained free from falls. Gait was balanced and steady.      Problem: Thought Process Alteration  Goal: Optimal Thought Clarity  Description  Patient will be able to hold reality based conversations prior to discharge.     1/27/2020 1819 by Luz Verma, RN  Outcome: Improving  Note:   Pt had delusional thinking.      "

## 2020-01-28 NOTE — PLAN OF CARE
"Problem: Adult Behavioral Health Plan of Care  Goal: Patient-Specific Goal (Individualization)  Description  Pt will follow recommendations of treatment team.  Pt will be compliant with medications.  Pt will attend 50 % of groups.  Pt will sleep 6-8 hours each night.  Pt will complete ADL's independently.   1/28/2020 1644 by Luz Verma, RN  Outcome: No Change  Note:   Pt has been up on the unit - pt able to have a short, reality based conversation. As conversation progressed, speech became rambling and pressured and thought process became delusional. Pt had flight of ideas - stated \"I have billions of dollars. I come from [place]\", \"I've had kids since the age of 2 years old\", \"hybrids and hermaphrodites killed my kids.\" While pt was talking about delusional content, pt had nystagmus where eyes were moving from side to side. Nystagmus not present during short, reality based conversation. Pt pleasant during interactions - behavior was controlled.    Blood glucose prior to supper was 110. Bedtime blood glucose was 171. Pt ate 100% of supper and snack tonight.     2002 - Pt was given 650 mg of PRN Tylenol for c/o back pain. Pt then began rambling about how pain is from being bit by a squirrel and being locked up in a tiger-banks trap. She stated \"I was found with 5% of life left after being circled by a bear that would eat people.\"     Face to face end of shift report communicated to oncoming RN.      Problem: Thought Process Alteration  Goal: Optimal Thought Clarity  Description  Patient will be able to hold reality based conversations prior to discharge.     1/28/2020 1644 by Luz Verma, RN  Outcome: No Change  Note:   Pt able to have a short, reality based conversation. Pt continues to have fixed delusions.      Problem: Fall Injury Risk  Goal: Absence of Fall and Fall-Related Injury  Description  Pt will wear non skid slippers.  Pt will remain free from falls or injuries during hospitalization.   1/28/2020 1644 " by Tonko, Luz, RN  Outcome: Improving  Note:   Pt remained free from falls. Gait was balanced and steady.

## 2020-01-28 NOTE — PROGRESS NOTES
[facility]    Medical Services Progress Note    Date of Service (when I saw the patient): 01/28/2020    Assessment & Plan     Principal Problem:    Psychosis (H)    Active Medical Problems:      Diabetes mellitus, type 2-  Lantus was decreased to 30 units 1/26/20. Blood sugar has been checked at 0200 and are remaining stable in the high 70's. Moved 0200 blood glucose monitoring to 0300. Will monitor 0300 blood sugar and determine if pt is remaining stable. Encourage complex carb snack before bedtime.       Left foot toe pain- resolved       Chronic hepatitis C virus infection (H)      Tobacco abuse    Methamphetamine use disorder, severe (H)    Hx of substance abuse (H)    ADD (attention deficit disorder)  Code Status: Prior.    Cassy Sandhu CNP    Interval History   Pt up and walking denies any acute complaints. Pt denies shakiness, lightheadedness.     -Data reviewed today: I reviewed all new labs and imaging results over the last 24 hours.     Physical Exam   Temp: 97.5  F (36.4  C) Temp src: Tympanic BP: 112/83 Pulse: 95   Resp: 16 SpO2: 98 % O2 Device: None (Room air)    Vitals:    01/08/20 1303 01/12/20 0905 01/26/20 0700   Weight: 79.7 kg (175 lb 9.6 oz) 84.7 kg (186 lb 12.8 oz) 88.5 kg (195 lb)     Vital Signs with Ranges  Temp:  [97.5  F (36.4  C)] 97.5  F (36.4  C)  Pulse:  [91-95] 95  Resp:  [14-16] 16  BP: (112-116)/(76-83) 112/83  SpO2:  [98 %] 98 %  No intake/output data recorded.    Constitutional: NAD, well-nourished, well-developed  Respiratory: CTA bilaterally   Cardiovascular: RRR, S1, S2, no extra heart sounds, no edema        Medications     benztropine  1 mg Oral BID     busPIRone  15 mg Oral TID     chlorproMAZINE  100 mg Oral TID     clonazePAM  1 mg Oral At Bedtime     divalproex sodium extended-release  750 mg Oral At Bedtime     gabapentin  1,200 mg Oral TID     insulin aspart  8 Units Subcutaneous TID w/meals     insulin aspart  1-7 Units Subcutaneous TID AC     insulin  aspart  1-5 Units Subcutaneous At Bedtime     insulin glargine  30 Units Subcutaneous At Bedtime     metFORMIN  1,000 mg Oral BID w/meals     nicotine  1 patch Transdermal Daily     nicotine   Transdermal Q8H     prazosin  1 mg Oral At Bedtime       Data   No lab results found in last 7 days.    No results found for this or any previous visit (from the past 24 hour(s)).

## 2020-01-28 NOTE — PLAN OF CARE
Face to face end of shift report will be communicated to oncoming RN.     Problem: Adult Behavioral Health Plan of Care  Goal: Patient-Specific Goal (Individualization)  Description  Pt will follow recommendations of treatment team.  Pt will be compliant with medications.  Pt will attend 50 % of groups.  Pt will sleep 6-8 hours each night.  Pt will complete ADL's independently.   1/28/2020 0103 by Mandy Montoya RN  Outcome: No Change     Problem: Fall Injury Risk  Goal: Absence of Fall and Fall-Related Injury  Description  Pt will wear non skid slippers.  Pt will remain free from falls or injuries during hospitalization.   1/28/2020 0103 by Mandy Montoya RN  Outcome: Improving     Problem: Thought Process Alteration  Goal: Optimal Thought Clarity  Description  Patient will be able to hold reality based conversations prior to discharge.     1/28/2020 0103 by Mandy Montoya RN  Outcome: No Change   Face to face end of shift report obtained from OBDULIO Escobar. Pt observed resting in bed.  Pt appears to be sleeping in bed with eyes closed, having regular respirations, and position changes. 15 minutes and PRN safety checks completed with no noted complains. No falls noted or reported so far this shift. Will continue to monitor.   0149- BS 78. Snack given.  0545- Pt slept 5 hours. Pt woken for an hour during 0200 BS monitoring and returned to bed.

## 2020-01-28 NOTE — PROGRESS NOTES
[facility]  Psychiatric Progress Note      Impression:   Genie is in bed this morning. Reports she is sleepy from being up for blood sugar checks last night. Denies any needs or concerns at this time. She is looking forward to her screening with [facility] tomorrow and she is hopeful to be discharged soon. Genie is pleasant and cooperative with assessment and unit programming. Genie remains fixed in her delusions and bizarre thoughts. She is however, pleasant. Genie is social with staff and peers. She spends much of her time making charts and graphs.     Previous medication trials documented:  Zyprexa - weight gain, not fully effective  Invega - ineffective  Tegretol - poorly tolerated  Lithium - poorly tolerated, severe sedation  Clozaril - irritability, hyperthermia, and persistent tachycardia  Depakote - Weight gain and poor mood control (this was still restarted during admission in 11/19)  Thorazine - notable improvement on 200mg tid, decreased in 11/19 on an outpatient visit, resulting in readmission.  Gabapentin   Buspar  Klonopin  Cogentin           Diagnoses:     Schizoaffective Disorder, Bipolar Type  PTSD, by history  R/O Other stimulant use disorder, methamphetamine, with induced perceptual disorder    Attestation:  Patient has been seen and evaluated by me,  Loren Zuleta NP          Interim History:   The patient's care was discussed with the treatment team and chart notes were reviewed.    BEHAVIORAL TEAM DISCUSSION    Progress: Good  Continued Stay Criteria/Rationale: Delusional, no safe discharge plan, medication adjustments  Medical/Physical: Diabetes Mellitus Type 2  Precautions: No   Falls precaution?: Yes  Behavioral Orders   Procedures     Code 1 - Restrict to Unit     Routine Programming     As clinically indicated     Status 15     Every 15 minutes.     Plan: Continue current medications - exploring safe discharge options - [facility] in [place]  Rationale for change in  "precautions or plan: None      Participants: Loren Zuleta, APRN-CNP,  [facility], Angie Laws LSW,  Becca Chery LSW, Jim Barrios LSW, Mady Holman OT, Natalia Costello OT, Nursing, [facility], Krys BRAGA supervisor, nursing            Medications:   I have reviewed this patient's current medications    Current Facility-Administered Medications   Medication     acetaminophen (TYLENOL) tablet 650 mg     alum & mag hydroxide-simethicone (MYLANTA ES/MAALOX  ES) suspension 30 mL     artificial saliva (BIOTENE MT) solution 1-2 spray     benztropine (COGENTIN) tablet 1 mg     bisacodyl (DULCOLAX) Suppository 10 mg     busPIRone (BUSPAR) tablet 15 mg     chlorproMAZINE (THORAZINE) tablet 100 mg     chlorproMAZINE (THORAZINE) tablet 25 mg     clonazePAM (klonoPIN) tablet 1 mg     glucose gel 15-30 g    Or     dextrose 50 % injection 25-50 mL    Or     glucagon injection 1 mg     divalproex sodium extended-release (DEPAKOTE ER) 24 hr tablet 750 mg     gabapentin (NEURONTIN) tablet 1,200 mg     hydrOXYzine (ATARAX) tablet  mg     ibuprofen (ADVIL/MOTRIN) tablet 600 mg     insulin aspart (NovoLOG) inj (RAPID ACTING)     insulin aspart (NovoLOG) inj (RAPID ACTING)     insulin aspart (NovoLOG) inj (RAPID ACTING)     insulin glargine (LANTUS PEN) injection 30 Units     magnesium hydroxide (MILK OF MAGNESIA) suspension 30 mL     metFORMIN (GLUCOPHAGE) tablet 1,000 mg     nicotine (NICODERM CQ) 21 MG/24HR 24 hr patch 1 patch     nicotine (NICORETTE) gum 2 mg     nicotine Patch in Place     polyethylene glycol (MIRALAX/GLYCOLAX) Packet 17 g     prazosin (MINIPRESS) capsule 1 mg     traZODone (DESYREL) tablet 50 mg        10 point ROS - see H&P       Allergies:     Allergies   Allergen Reactions     Haloperidol Other (See Comments)     Other reaction(s): Seizures  Patient states, \"I fell down and wasn't able to get up.\"  Patient states, \"I fell down and wasn't able to get up.\"            " "  Psychiatric Examination:   /83   Pulse 95   Temp 97.5  F (36.4  C) (Tympanic)   Resp 16   Ht 1.651 m (5' 5\")   Wt 88.5 kg (195 lb)   SpO2 98%   BMI 32.45 kg/m    Weight is 195 lbs 0 oz  Body mass index is 32.45 kg/m .    Appearance:  awake, alert  Attitude:  cooperative  Eye Contact:  Good  Mood:  good  Affect:  Mood congruent  Speech:  Improved  Psychomotor Behavior:  appropriate  Thought Process:  Goal oriented, able to answer questions in a linear fashion; delusional statements  Associations:  JUSTA   Thought Content:  No SI/HI, Delusional content  Insight:  limited  Judgment:  limited  Oriented to:  time, person, and place  Attention Span and Concentration:  limited  Recent and Remote Memory:  fair - impacted by delusional beliefs and drug use  Fund of Knowledge: appropriate  Muscle Strength and Tone: normal  Gait and Station: Normal           Labs:     Results for orders placed or performed during the hospital encounter of 01/08/20 (from the past 48 hour(s))   Glucose by meter   Result Value Ref Range    Glucose 112 (H) 70 - 99 mg/dL   Glucose by meter   Result Value Ref Range    Glucose 92 70 - 99 mg/dL   Glucose by meter   Result Value Ref Range    Glucose 122 (H) 70 - 99 mg/dL   Glucose by meter   Result Value Ref Range    Glucose 79 70 - 99 mg/dL   Glucose by meter   Result Value Ref Range    Glucose 109 (H) 70 - 99 mg/dL   Glucose by meter   Result Value Ref Range    Glucose 113 (H) 70 - 99 mg/dL   Glucose by meter   Result Value Ref Range    Glucose 106 (H) 70 - 99 mg/dL   Glucose by meter   Result Value Ref Range    Glucose 133 (H) 70 - 99 mg/dL   Glucose by meter   Result Value Ref Range    Glucose 154 (H) 70 - 99 mg/dL   Glucose by meter   Result Value Ref Range    Glucose 78 70 - 99 mg/dL   Glucose by meter   Result Value Ref Range    Glucose 126 (H) 70 - 99 mg/dL   Glucose by meter   Result Value Ref Range    Glucose 111 (H) 70 - 99 mg/dL          Plan:     Continue medications as " prescribed  ELOS: >5 days - Exploring safe discharge plans, further stabilization

## 2020-01-28 NOTE — PLAN OF CARE
"  Problem: Adult Behavioral Health Plan of Care  Goal: Patient-Specific Goal (Individualization)  Description  Pt will follow recommendations of treatment team.  Pt will be compliant with medications.  Pt will attend 50 % of groups.  Pt will sleep 6-8 hours each night.  Pt will complete ADL's independently.   1/28/2020 1245 by Nazia Mcmahon RN  Outcome: No Change  Note:     End of shift report received from previous shift RN. Pt observed to be in [facility] area. States she is in \"excruciating pain\" d/t having her arm broken. Pt shows writer arm and states \"see the pins\". Denies anxiety, depression, SI/HI, hallucinations. PRN tylenol 650mg administered and accepted with scheduled 8AM medications. No guarding, grimacing, bracing present. Pt pleasant, able to have conversation. It appears pt does become delusional after having brief reality based conversations.  1200 - .  Pt out to [facility] area to eat lunch and returned to room to rest. Writer encouraged group attendance at this time.    Face to face end of shift report communicated to oncoming RN.     Nazia Mcmahon RN  1/28/2020  3:03 PM        Problem: Thought Process Alteration  Goal: Optimal Thought Clarity  Description  Patient will be able to hold reality based conversations prior to discharge.     1/28/2020 1245 by Nazia Mcmahon RN  Outcome: No Change   Briefly able to hold reality based conversation then diverts to delusional thought and rambles.    Problem: Fall Injury Risk  Goal: Absence of Fall and Fall-Related Injury  Description  Pt will wear non skid slippers.  Pt will remain free from falls or injuries during hospitalization.   1/28/2020 1245 by Nazia Mcmahon RN  Outcome: Improving   Free from falls this shift.  "

## 2020-01-29 LAB
GLUCOSE BLDC GLUCOMTR-MCNC: 123 MG/DL (ref 70–99)
GLUCOSE BLDC GLUCOMTR-MCNC: 135 MG/DL (ref 70–99)
GLUCOSE BLDC GLUCOMTR-MCNC: 167 MG/DL (ref 70–99)
GLUCOSE BLDC GLUCOMTR-MCNC: 183 MG/DL (ref 70–99)
GLUCOSE BLDC GLUCOMTR-MCNC: 87 MG/DL (ref 70–99)

## 2020-01-29 PROCEDURE — 00000146 ZZHCL STATISTIC GLUCOSE BY METER IP

## 2020-01-29 PROCEDURE — 25000132 ZZH RX MED GY IP 250 OP 250 PS 637: Performed by: NURSE PRACTITIONER

## 2020-01-29 PROCEDURE — 99232 SBSQ HOSP IP/OBS MODERATE 35: CPT | Performed by: NURSE PRACTITIONER

## 2020-01-29 PROCEDURE — 12400000 ZZH R&B MH

## 2020-01-29 PROCEDURE — 25000131 ZZH RX MED GY IP 250 OP 636 PS 637: Performed by: NURSE PRACTITIONER

## 2020-01-29 RX ADMIN — GABAPENTIN 1200 MG: 600 TABLET, FILM COATED ORAL at 13:30

## 2020-01-29 RX ADMIN — INSULIN ASPART 8 UNITS: 100 INJECTION, SOLUTION INTRAVENOUS; SUBCUTANEOUS at 17:55

## 2020-01-29 RX ADMIN — CHLORPROMAZINE HYDROCHLORIDE 100 MG: 100 TABLET, SUGAR COATED ORAL at 13:30

## 2020-01-29 RX ADMIN — METFORMIN HYDROCHLORIDE 1000 MG: 500 TABLET, FILM COATED ORAL at 17:55

## 2020-01-29 RX ADMIN — INSULIN ASPART 8 UNITS: 100 INJECTION, SOLUTION INTRAVENOUS; SUBCUTANEOUS at 12:20

## 2020-01-29 RX ADMIN — BENZTROPINE MESYLATE 1 MG: 1 TABLET ORAL at 08:43

## 2020-01-29 RX ADMIN — BUSPIRONE HYDROCHLORIDE 15 MG: 10 TABLET ORAL at 08:42

## 2020-01-29 RX ADMIN — DIVALPROEX SODIUM 750 MG: 500 TABLET, EXTENDED RELEASE ORAL at 20:04

## 2020-01-29 RX ADMIN — BUSPIRONE HYDROCHLORIDE 15 MG: 10 TABLET ORAL at 20:02

## 2020-01-29 RX ADMIN — INSULIN ASPART 8 UNITS: 100 INJECTION, SOLUTION INTRAVENOUS; SUBCUTANEOUS at 08:41

## 2020-01-29 RX ADMIN — CHLORPROMAZINE HYDROCHLORIDE 100 MG: 100 TABLET, SUGAR COATED ORAL at 20:03

## 2020-01-29 RX ADMIN — INSULIN GLARGINE 30 UNITS: 100 INJECTION, SOLUTION SUBCUTANEOUS at 21:59

## 2020-01-29 RX ADMIN — GABAPENTIN 1200 MG: 600 TABLET, FILM COATED ORAL at 08:42

## 2020-01-29 RX ADMIN — CHLORPROMAZINE HYDROCHLORIDE 100 MG: 100 TABLET, SUGAR COATED ORAL at 08:42

## 2020-01-29 RX ADMIN — PRAZOSIN HYDROCHLORIDE 1 MG: 1 CAPSULE ORAL at 20:04

## 2020-01-29 RX ADMIN — GABAPENTIN 1200 MG: 600 TABLET, FILM COATED ORAL at 20:03

## 2020-01-29 RX ADMIN — BUSPIRONE HYDROCHLORIDE 15 MG: 10 TABLET ORAL at 13:30

## 2020-01-29 RX ADMIN — NICOTINE 1 PATCH: 21 PATCH, EXTENDED RELEASE TRANSDERMAL at 08:41

## 2020-01-29 RX ADMIN — CLONAZEPAM 1 MG: 1 TABLET ORAL at 20:05

## 2020-01-29 RX ADMIN — METFORMIN HYDROCHLORIDE 1000 MG: 500 TABLET, FILM COATED ORAL at 08:42

## 2020-01-29 RX ADMIN — BENZTROPINE MESYLATE 1 MG: 1 TABLET ORAL at 20:04

## 2020-01-29 NOTE — PROGRESS NOTES
[facility]    Medical Services Progress Note    Date of Service (when I saw the patient): 01/29/2020    Assessment & Plan     Principal Problem:    Psychosis (H)    Active Medical Problems:      Diabetes mellitus- type 2- blood glucose at 0300 was 167. Blood sugars remain stable. Pt reports she is eating a snack before bedtime.       Left foot toe pain- resolved       Chronic hepatitis C virus infection (H)      Tobacco abuse    Methamphetamine use disorder, severe (H)    Hx of substance abuse (H)    ADD (attention deficit disorder)    Pt medically stable, no acute medical concerns. Chronic medical problems stable. Will sign off. Please consult for any new medical issues or concerns.       Code Status: Prior.    Casys Sandhu CNP    Interval History   Pt up and walking denies any acute complaints. Pt denies shakiness, lightheadedness.     -Data reviewed today: I reviewed all new labs and imaging results over the last 24 hours.     Physical Exam   Temp: 97.9  F (36.6  C) Temp src: Tympanic BP: 118/78   Heart Rate: 89 Resp: 14 SpO2: 99 % O2 Device: None (Room air)    Vitals:    01/08/20 1303 01/12/20 0905 01/26/20 0700   Weight: 79.7 kg (175 lb 9.6 oz) 84.7 kg (186 lb 12.8 oz) 88.5 kg (195 lb)     Vital Signs with Ranges  Temp:  [97.9  F (36.6  C)-98.4  F (36.9  C)] 97.9  F (36.6  C)  Heart Rate:  [89-98] 89  Resp:  [14-16] 14  BP: (118-119)/(73-78) 118/78  SpO2:  [98 %-99 %] 99 %  No intake/output data recorded.    Constitutional: NAD, well-nourished, well-developed  Respiratory: CTA bilaterally   Cardiovascular: RRR, S1, S2, no extra heart sounds, no edema        Medications     benztropine  1 mg Oral BID     busPIRone  15 mg Oral TID     chlorproMAZINE  100 mg Oral TID     clonazePAM  1 mg Oral At Bedtime     divalproex sodium extended-release  750 mg Oral At Bedtime     gabapentin  1,200 mg Oral TID     insulin aspart  8 Units Subcutaneous TID w/meals     insulin aspart  1-7 Units Subcutaneous TID AC      insulin aspart  1-5 Units Subcutaneous At Bedtime     insulin glargine  30 Units Subcutaneous At Bedtime     metFORMIN  1,000 mg Oral BID w/meals     nicotine  1 patch Transdermal Daily     nicotine   Transdermal Q8H     prazosin  1 mg Oral At Bedtime       Data   No lab results found in last 7 days.    No results found for this or any previous visit (from the past 24 hour(s)).

## 2020-01-29 NOTE — PLAN OF CARE
" Face to Face report received from OBDULIO Galvan.  Rounding completed. Patient observed in [facility].   Problem: Adult Behavioral Health Plan of Care  Goal: Patient-Specific Goal (Individualization)  Description  Pt will follow recommendations of treatment team.  Pt will be compliant with medications.  Pt will attend 50 % of groups.  Pt will sleep 6-8 hours each night.  Pt will complete ADL's independently.   1/29/2020 0833 by Ion Morales, RN  Outcome: Improving  She is up on the unit for breakfast. She makes fair eye contact. She can be dismissive in conversation. She is encouraged to attend groups. She is dressed and groomed appropriately on the unit. She is compliant with her medications.     Problem: Fall Injury Risk  Goal: Absence of Fall and Fall-Related Injury  Description  Pt will wear non skid slippers.  Pt will remain free from falls or injuries during hospitalization.   1/29/2020 0833 by Ion Morales, RN  Outcome: Improving  She is steady on her feet.      Problem: Thought Process Alteration  Goal: Optimal Thought Clarity  Description  Patient will be able to hold reality based conversations prior to discharge.     1/29/2020 0833 by Ion Morales, RN  Outcome: Improving  She is preoccupied and responding to internal stimuli. She begins talking about having a \"queen cobra, that I brought on a plane and then they tried to kick me off the plane at 5000 feet\", \"lacy cobra was kicked out with a big parachute\", she talks of fangs going all the way through her leg, she laughs during conversation. She is animated in conversation when talking about \"lacy cobra\".         "

## 2020-01-29 NOTE — PLAN OF CARE
Face to face end of shift report will be communicated to oncoming RN.     Problem: Adult Behavioral Health Plan of Care  Goal: Patient-Specific Goal (Individualization)  Description  Pt will follow recommendations of treatment team.  Pt will be compliant with medications.  Pt will attend 50 % of groups.  Pt will sleep 6-8 hours each night.  Pt will complete ADL's independently.   1/29/2020 0146 by Mandy Montoya RN  Outcome: No Change     Problem: Fall Injury Risk  Goal: Absence of Fall and Fall-Related Injury  Description  Pt will wear non skid slippers.  Pt will remain free from falls or injuries during hospitalization.   1/29/2020 0146 by Mandy Montoya, RN  Outcome: Improving     Problem: Thought Process Alteration  Goal: Optimal Thought Clarity  Description  Patient will be able to hold reality based conversations prior to discharge.     1/29/2020 0146 by Mandy Montoya RN  Outcome: No Change   Face to face end of shift report obtained from OBDULIO Escobar. Pt observed resting in bed.  0030-Pt appears to be sleeping in bed with eyes closed, having regular respirations and position changes. 15 minutes and PRN safety checks completed with no noted complains. No falls noted or reported so far this shift. Will continue to monitor.    0256- Pt has been up in [facility]. . Pt denies pain or complains. Declines PRNs.  0600- Pt slept 2.5 hours. Pt calm and cooperative with staff.   0634- .

## 2020-01-29 NOTE — PROGRESS NOTES
[facility]  Psychiatric Progress Note      Impression:     Patient is up on the unit, wants to talk in private in her room.  She reports feeling like her interview with [facility] [employer] went well, however she feels like they might not let her go there.  She felt they were nice and the place is right by [employer] and she will be able to go shopping with staff present.  Patient otherwise no changes, she reports sleeping good, her blood sugar has been well managed and she has not had any outbursts or anger issues.  She does continue with fixed delusion, however easily re-directed and appropriate.  No medication changes today.      Previous medication trials documented:  Zyprexa - weight gain, not fully effective  Invega - ineffective  Tegretol - poorly tolerated  Lithium - poorly tolerated, severe sedation  Clozaril - irritability, hyperthermia, and persistent tachycardia  Depakote - Weight gain and poor mood control (this was still restarted during admission in 11/19)  Thorazine - notable improvement on 200mg tid, decreased in 11/19 on an outpatient visit, resulting in readmission.  Gabapentin   Buspar  Klonopin  Cogentin           Diagnoses:     Schizoaffective Disorder, Bipolar Type  PTSD, by history  R/O Other stimulant use disorder, methamphetamine, with induced perceptual disorder    Attestation:  Patient has been seen and evaluated by me,  LATRICE Du CNP          Interim History:   The patient's care was discussed with the treatment team and chart notes were reviewed.    BEHAVIORAL TEAM DISCUSSION    Progress: Good  Continued Stay Criteria/Rationale: Delusional, no safe discharge plan  Medical/Physical: Diabetes Mellitus Type 2  Precautions: No   Falls precaution?: Yes  Behavioral Orders   Procedures     Code 1 - Restrict to Unit     Routine Programming     As clinically indicated     Status 15     Every 15 minutes.     Plan: Continue current medications - exploring safe discharge  "options - [facility] in [place]  Rationale for change in precautions or plan: None      Participants: Natalia Fabian, APRN-CNP, Angie Laws LSW,  Becca Chery LSW, Jim Barrios LSW, Nursing, [facility], Krys BRAGA supervisor            Medications:   I have reviewed this patient's current medications    Current Facility-Administered Medications   Medication     acetaminophen (TYLENOL) tablet 650 mg     alum & mag hydroxide-simethicone (MYLANTA ES/MAALOX  ES) suspension 30 mL     artificial saliva (BIOTENE MT) solution 1-2 spray     benztropine (COGENTIN) tablet 1 mg     bisacodyl (DULCOLAX) Suppository 10 mg     busPIRone (BUSPAR) tablet 15 mg     chlorproMAZINE (THORAZINE) tablet 100 mg     chlorproMAZINE (THORAZINE) tablet 25 mg     clonazePAM (klonoPIN) tablet 1 mg     glucose gel 15-30 g    Or     dextrose 50 % injection 25-50 mL    Or     glucagon injection 1 mg     divalproex sodium extended-release (DEPAKOTE ER) 24 hr tablet 750 mg     gabapentin (NEURONTIN) tablet 1,200 mg     hydrOXYzine (ATARAX) tablet  mg     ibuprofen (ADVIL/MOTRIN) tablet 600 mg     insulin aspart (NovoLOG) inj (RAPID ACTING)     insulin aspart (NovoLOG) inj (RAPID ACTING)     insulin aspart (NovoLOG) inj (RAPID ACTING)     insulin glargine (LANTUS PEN) injection 30 Units     magnesium hydroxide (MILK OF MAGNESIA) suspension 30 mL     metFORMIN (GLUCOPHAGE) tablet 1,000 mg     nicotine (NICODERM CQ) 21 MG/24HR 24 hr patch 1 patch     nicotine (NICORETTE) gum 2 mg     nicotine Patch in Place     polyethylene glycol (MIRALAX/GLYCOLAX) Packet 17 g     prazosin (MINIPRESS) capsule 1 mg     traZODone (DESYREL) tablet 50 mg        10 point ROS - see H&P       Allergies:     Allergies   Allergen Reactions     Haloperidol Other (See Comments)     Other reaction(s): Seizures  Patient states, \"I fell down and wasn't able to get up.\"  Patient states, \"I fell down and wasn't able to get up.\"              Psychiatric " "Examination:   /78   Pulse 95   Temp 97.9  F (36.6  C) (Tympanic)   Resp 14   Ht 1.651 m (5' 5\")   Wt 88.5 kg (195 lb)   SpO2 99%   BMI 32.45 kg/m    Weight is 195 lbs 0 oz  Body mass index is 32.45 kg/m .    Appearance:  awake, alert  Attitude:  cooperative  Eye Contact:  Good  Mood:  good  Affect:  Mood congruent  Speech:  Improved  Psychomotor Behavior:  appropriate  Thought Process:  Goal oriented, able to answer questions in a linear fashion; delusional statements  Associations:  JUSTA   Thought Content:  No SI/HI, Delusional content  Insight:  limited  Judgment:  limited  Oriented to:  time, person, and place  Attention Span and Concentration:  limited  Recent and Remote Memory:  fair - impacted by delusional beliefs and drug use  Fund of Knowledge: appropriate  Muscle Strength and Tone: normal  Gait and Station: Normal           Labs:     Results for orders placed or performed during the hospital encounter of 01/08/20 (from the past 48 hour(s))   Glucose by meter   Result Value Ref Range    Glucose 133 (H) 70 - 99 mg/dL   Glucose by meter   Result Value Ref Range    Glucose 154 (H) 70 - 99 mg/dL   Glucose by meter   Result Value Ref Range    Glucose 78 70 - 99 mg/dL   Glucose by meter   Result Value Ref Range    Glucose 126 (H) 70 - 99 mg/dL   Glucose by meter   Result Value Ref Range    Glucose 111 (H) 70 - 99 mg/dL   Glucose by meter   Result Value Ref Range    Glucose 110 (H) 70 - 99 mg/dL   Glucose by meter   Result Value Ref Range    Glucose 171 (H) 70 - 99 mg/dL   Glucose by meter   Result Value Ref Range    Glucose 167 (H) 70 - 99 mg/dL   Glucose by meter   Result Value Ref Range    Glucose 123 (H) 70 - 99 mg/dL   Glucose by meter   Result Value Ref Range    Glucose 183 (H) 70 - 99 mg/dL          Plan:     Continue medications as prescribed  ELOS: >5 days - Exploring safe discharge plans, further stabilization  "

## 2020-01-30 LAB
GLUCOSE BLDC GLUCOMTR-MCNC: 121 MG/DL (ref 70–99)
GLUCOSE BLDC GLUCOMTR-MCNC: 161 MG/DL (ref 70–99)
GLUCOSE BLDC GLUCOMTR-MCNC: 80 MG/DL (ref 70–99)
GLUCOSE BLDC GLUCOMTR-MCNC: 85 MG/DL (ref 70–99)
GLUCOSE BLDC GLUCOMTR-MCNC: 97 MG/DL (ref 70–99)

## 2020-01-30 PROCEDURE — 25000132 ZZH RX MED GY IP 250 OP 250 PS 637: Performed by: NURSE PRACTITIONER

## 2020-01-30 PROCEDURE — 00000146 ZZHCL STATISTIC GLUCOSE BY METER IP

## 2020-01-30 PROCEDURE — 12400000 ZZH R&B MH

## 2020-01-30 RX ADMIN — BENZTROPINE MESYLATE 1 MG: 1 TABLET ORAL at 08:39

## 2020-01-30 RX ADMIN — PRAZOSIN HYDROCHLORIDE 1 MG: 1 CAPSULE ORAL at 20:15

## 2020-01-30 RX ADMIN — INSULIN ASPART 8 UNITS: 100 INJECTION, SOLUTION INTRAVENOUS; SUBCUTANEOUS at 08:40

## 2020-01-30 RX ADMIN — INSULIN ASPART 8 UNITS: 100 INJECTION, SOLUTION INTRAVENOUS; SUBCUTANEOUS at 17:14

## 2020-01-30 RX ADMIN — CHLORPROMAZINE HYDROCHLORIDE 25 MG: 25 TABLET, SUGAR COATED ORAL at 02:53

## 2020-01-30 RX ADMIN — METFORMIN HYDROCHLORIDE 1000 MG: 500 TABLET, FILM COATED ORAL at 08:39

## 2020-01-30 RX ADMIN — METFORMIN HYDROCHLORIDE 1000 MG: 500 TABLET, FILM COATED ORAL at 17:14

## 2020-01-30 RX ADMIN — BUSPIRONE HYDROCHLORIDE 15 MG: 10 TABLET ORAL at 20:14

## 2020-01-30 RX ADMIN — BUSPIRONE HYDROCHLORIDE 15 MG: 10 TABLET ORAL at 13:33

## 2020-01-30 RX ADMIN — CHLORPROMAZINE HYDROCHLORIDE 100 MG: 100 TABLET, SUGAR COATED ORAL at 13:34

## 2020-01-30 RX ADMIN — INSULIN ASPART 8 UNITS: 100 INJECTION, SOLUTION INTRAVENOUS; SUBCUTANEOUS at 12:11

## 2020-01-30 RX ADMIN — NICOTINE 1 PATCH: 21 PATCH, EXTENDED RELEASE TRANSDERMAL at 08:39

## 2020-01-30 RX ADMIN — CLONAZEPAM 1 MG: 1 TABLET ORAL at 20:14

## 2020-01-30 RX ADMIN — CHLORPROMAZINE HYDROCHLORIDE 25 MG: 25 TABLET, SUGAR COATED ORAL at 22:12

## 2020-01-30 RX ADMIN — GABAPENTIN 1200 MG: 600 TABLET, FILM COATED ORAL at 13:34

## 2020-01-30 RX ADMIN — CHLORPROMAZINE HYDROCHLORIDE 100 MG: 100 TABLET, SUGAR COATED ORAL at 20:13

## 2020-01-30 RX ADMIN — GABAPENTIN 1200 MG: 600 TABLET, FILM COATED ORAL at 08:38

## 2020-01-30 RX ADMIN — INSULIN GLARGINE 30 UNITS: 100 INJECTION, SOLUTION SUBCUTANEOUS at 20:13

## 2020-01-30 RX ADMIN — CHLORPROMAZINE HYDROCHLORIDE 100 MG: 100 TABLET, SUGAR COATED ORAL at 08:39

## 2020-01-30 RX ADMIN — BENZTROPINE MESYLATE 1 MG: 1 TABLET ORAL at 20:13

## 2020-01-30 RX ADMIN — GABAPENTIN 1200 MG: 600 TABLET, FILM COATED ORAL at 20:16

## 2020-01-30 RX ADMIN — BUSPIRONE HYDROCHLORIDE 15 MG: 10 TABLET ORAL at 08:39

## 2020-01-30 RX ADMIN — ACETAMINOPHEN 650 MG: 325 TABLET, FILM COATED ORAL at 02:53

## 2020-01-30 RX ADMIN — DIVALPROEX SODIUM 750 MG: 500 TABLET, EXTENDED RELEASE ORAL at 20:16

## 2020-01-30 NOTE — PLAN OF CARE
"Face to face end of shift report received from Sherry AQUINO RN. Rounding completed. Patient observed in bed, appears asleep, respirations observed.       Problem: Adult Behavioral Health Plan of Care  Goal: Patient-Specific Goal (Individualization)  Description  Pt will follow recommendations of treatment team.  Pt will be compliant with medications.  Pt will attend 50 % of groups.  Pt will sleep 6-8 hours each night.  Pt will complete ADL's independently.   1/30/2020 0022 by Radha Broderick  Outcome: Improving  Note:     Pt up at 0230 about stating she would like her blood sugar check as she woke up sweating. BG 97-- denies wanting any snack, but did request some juice. Pt telling this writer that she knows \"[place] Ann and had sex with Avi Darnell in a hotel room\". Pt later requests something for \"10/10 foot pain. That's what woke me up. And Thorazine\". Pt received Thorazine and Tylenol. Assessed pt L foot- no redness or edema noted, no open areas. Pt continues to sit in the lounge putting a puzzle together. Pt returned to her room about 0500. She appeared to get about 2 hours of sleep. BG at 0630- 80.     Problem: Fall Injury Risk  Goal: Absence of Fall and Fall-Related Injury  Description  Pt will wear non skid slippers.  Pt will remain free from falls or injuries during hospitalization.   Outcome: Improving      Problem: Thought Process Alteration  Goal: Optimal Thought Clarity  Description  Patient will be able to hold reality based conversations prior to discharge.     1/30/2020 0022 by Radha Broderick  Outcome: Improving    Face to face end of shift report will be given to AM shift RN.     Radha Broderick  1/30/2020  12:23 AM        "

## 2020-01-30 NOTE — PLAN OF CARE
"Face to face end of shift report communicated to oncoming shift.     Sherry Cano RN  1/30/2020  10:19 PM  Patient's conversation is scattered and off topic, relates back to \"what is the plan for me though?  Can you check, I wanted Alessandra but she must be gone\"    Explained to patient that social workers done at 1530 and would look over the notes to see if there was any information.  Patient does not remember eating lunch or taking her 1400 meds.  Some difficulty in rousing patient for her blood sugar and dinnertime.  Patient appears a bit confused and very lethargic.  Needed 2 more times with multiple prompting to come to the lounge for supper.   Patient denies SI, HI, and VH  AH \"yes\"  2210: \"what else can I have, I feel edgy and want to go back to sleep\"  \"I'm always so tired\"  Reported patient does not sleep  Many hours throughout the night.    2212:  PRN 25 mg Thorazine admin to aid in anxiety/sleep.      Face to face start of shift report received from Ion BRAMBILA RN  Patient sleeping at this time.    Problem: Thought Process Alteration  Goal: Optimal Thought Clarity  Description  Patient will be able to hold reality based conversations prior to discharge.     1/30/2020 1655 by Sherry Cano RN  Outcome: No Change     Problem: Adult Behavioral Health Plan of Care  Goal: Patient-Specific Goal (Individualization)  Description  Pt will follow recommendations of treatment team.  Pt will be compliant with medications.  Pt will attend 50 % of groups.  Pt will sleep 6-8 hours each night.  Pt will complete ADL's independently.   1/30/2020 1655 by Sherry Cano RN  Outcome: No Change  Note:          "

## 2020-01-30 NOTE — PLAN OF CARE
"Face to face end of shift report communicated to oncoming shift.     Sherry Cano RN  1/29/2020  11:12 PM    Patient's conversation is mumbled and does not pertain to reality other than the part about \"I have a really bad headache, but do not want anything for it because if I take it now when my head is hurting it won't work well for the tooth pain, I'll wait and see if it goes away\"   Educated patient on the importance of drinking enough fluids. \"I drink plenty, water is the only thing that seems to go through me\"   Patient attended nursing group this shift.      Patient denies SI and HI.   Endorses AH and VH \"at times\"  Face to face start of shift report received from Ion BRAMBILA RN  Patient in bed sleeping at this time.  Will continue to monitor.      Problem: Thought Process Alteration  Goal: Optimal Thought Clarity  Description  Patient will be able to hold reality based conversations prior to discharge.     1/29/2020 1826 by Sherry Cano RN  Outcome: No Change     Problem: Adult Behavioral Health Plan of Care  Goal: Patient-Specific Goal (Individualization)  Description  Pt will follow recommendations of treatment team.  Pt will be compliant with medications.  Pt will attend 50 % of groups.  Pt will sleep 6-8 hours each night.  Pt will complete ADL's independently.   1/29/2020 1826 by Sherry Cano, RN  Outcome: Improving  Note:          "

## 2020-01-30 NOTE — PLAN OF CARE
Face to Face report received from OBDULIO Garcia.  Rounding completed. Patient observed in [facility].   Problem: Adult Behavioral Health Plan of Care  Goal: Patient-Specific Goal (Individualization)  Description  Pt will follow recommendations of treatment team.  Pt will be compliant with medications.  Pt will attend 50 % of groups.  Pt will sleep 6-8 hours each night.  Pt will complete ADL's independently.   1/30/2020 0746 by Ion Morales RN  Outcome: Improving  She is up on the unit for breakfast. She makes fair eye contact. She is preoccupied and continues with delusional thinking. She is dressed and groomed appropriately on the unit. She is compliant with her medications. She is encouraged to attend groups. She is maintaining appropriate boundaries with staff and peers.     Problem: Fall Injury Risk  Goal: Absence of Fall and Fall-Related Injury  Description  Pt will wear non skid slippers.  Pt will remain free from falls or injuries during hospitalization.   1/30/2020 0746 by Ion Morales RN  Outcome: Improving  She is steady on her feet. She is not feeling weak or dizzy. She denies medication side effects.      Problem: Thought Process Alteration  Goal: Optimal Thought Clarity  Description  Patient will be able to hold reality based conversations prior to discharge.     1/30/2020 0746 by Ion Morales RN  Outcome: Improving  She continues with delusional thinking. She makes fair eye contact and is preoccupied. She can be irritable when approached but is redirectable.     Face to face end of shift report to be communicated to evening shift RN.     Ion Morales RN  1/30/2020  3:00 PM

## 2020-01-31 LAB
GLUCOSE BLDC GLUCOMTR-MCNC: 125 MG/DL (ref 70–99)
GLUCOSE BLDC GLUCOMTR-MCNC: 126 MG/DL (ref 70–99)
GLUCOSE BLDC GLUCOMTR-MCNC: 156 MG/DL (ref 70–99)
GLUCOSE BLDC GLUCOMTR-MCNC: 159 MG/DL (ref 70–99)
GLUCOSE BLDC GLUCOMTR-MCNC: 205 MG/DL (ref 70–99)
GLUCOSE BLDC GLUCOMTR-MCNC: 227 MG/DL (ref 70–99)
GLUCOSE BLDC GLUCOMTR-MCNC: 68 MG/DL (ref 70–99)

## 2020-01-31 PROCEDURE — 25000132 ZZH RX MED GY IP 250 OP 250 PS 637: Performed by: NURSE PRACTITIONER

## 2020-01-31 PROCEDURE — 12400000 ZZH R&B MH

## 2020-01-31 PROCEDURE — 25000131 ZZH RX MED GY IP 250 OP 636 PS 637: Performed by: NURSE PRACTITIONER

## 2020-01-31 PROCEDURE — 00000146 ZZHCL STATISTIC GLUCOSE BY METER IP

## 2020-01-31 PROCEDURE — 99232 SBSQ HOSP IP/OBS MODERATE 35: CPT | Performed by: NURSE PRACTITIONER

## 2020-01-31 RX ORDER — BLOOD-GLUCOSE METER
KIT MISCELLANEOUS
Qty: 1 KIT | Refills: 0 | Status: SHIPPED | OUTPATIENT
Start: 2020-01-31

## 2020-01-31 RX ADMIN — BUSPIRONE HYDROCHLORIDE 15 MG: 10 TABLET ORAL at 13:38

## 2020-01-31 RX ADMIN — METFORMIN HYDROCHLORIDE 1000 MG: 500 TABLET, FILM COATED ORAL at 08:37

## 2020-01-31 RX ADMIN — INSULIN ASPART 8 UNITS: 100 INJECTION, SOLUTION INTRAVENOUS; SUBCUTANEOUS at 18:00

## 2020-01-31 RX ADMIN — BUSPIRONE HYDROCHLORIDE 15 MG: 10 TABLET ORAL at 21:08

## 2020-01-31 RX ADMIN — BENZTROPINE MESYLATE 1 MG: 1 TABLET ORAL at 21:08

## 2020-01-31 RX ADMIN — CHLORPROMAZINE HYDROCHLORIDE 100 MG: 100 TABLET, SUGAR COATED ORAL at 08:37

## 2020-01-31 RX ADMIN — CHLORPROMAZINE HYDROCHLORIDE 125 MG: 100 TABLET, SUGAR COATED ORAL at 13:38

## 2020-01-31 RX ADMIN — CLONAZEPAM 1 MG: 1 TABLET ORAL at 21:08

## 2020-01-31 RX ADMIN — NICOTINE 1 PATCH: 21 PATCH, EXTENDED RELEASE TRANSDERMAL at 08:38

## 2020-01-31 RX ADMIN — DIVALPROEX SODIUM 750 MG: 500 TABLET, EXTENDED RELEASE ORAL at 21:08

## 2020-01-31 RX ADMIN — METFORMIN HYDROCHLORIDE 1000 MG: 500 TABLET, FILM COATED ORAL at 18:00

## 2020-01-31 RX ADMIN — BUSPIRONE HYDROCHLORIDE 15 MG: 10 TABLET ORAL at 08:36

## 2020-01-31 RX ADMIN — GABAPENTIN 1200 MG: 600 TABLET, FILM COATED ORAL at 13:38

## 2020-01-31 RX ADMIN — GABAPENTIN 1200 MG: 600 TABLET, FILM COATED ORAL at 21:08

## 2020-01-31 RX ADMIN — BENZTROPINE MESYLATE 1 MG: 1 TABLET ORAL at 08:37

## 2020-01-31 RX ADMIN — GABAPENTIN 1200 MG: 600 TABLET, FILM COATED ORAL at 08:36

## 2020-01-31 RX ADMIN — CHLORPROMAZINE HYDROCHLORIDE 125 MG: 100 TABLET, SUGAR COATED ORAL at 21:08

## 2020-01-31 RX ADMIN — INSULIN ASPART 8 UNITS: 100 INJECTION, SOLUTION INTRAVENOUS; SUBCUTANEOUS at 12:07

## 2020-01-31 RX ADMIN — INSULIN GLARGINE 30 UNITS: 100 INJECTION, SOLUTION SUBCUTANEOUS at 21:13

## 2020-01-31 RX ADMIN — PRAZOSIN HYDROCHLORIDE 1 MG: 1 CAPSULE ORAL at 21:08

## 2020-01-31 RX ADMIN — INSULIN ASPART 8 UNITS: 100 INJECTION, SOLUTION INTRAVENOUS; SUBCUTANEOUS at 08:37

## 2020-01-31 ASSESSMENT — ACTIVITIES OF DAILY LIVING (ADL)
ORAL_HYGIENE: INDEPENDENT
DRESS: SCRUBS (BEHAVIORAL HEALTH);INDEPENDENT
HYGIENE/GROOMING: INDEPENDENT

## 2020-01-31 NOTE — PLAN OF CARE
"Called and left a message for Charisse with [facility] to follow up on screening from Wednesday as Charisse did not respond to writers email yesterday. Messages left for pt's guardians - received a call back from Kiah who states she will contact [facility] again regarding eviction.     Received an email from Charisse stating the screening went well, but at this time they do not have an appropriate opening for pt. Updated pt who took denial well and has kept behaviors in control. Pt has requested writer contact Becca at [facility] - message left for Becca. Spoke with Kiah who requested writer fax updated progress notes to [facility] \"so they can see how she has improved\"     Referral was faxed to [facility] - pt was denied as they \"do not feel she would be a good fit\"   "

## 2020-01-31 NOTE — PROGRESS NOTES
"[facility]  Psychiatric Progress Note      Impression:     Patient is sitting at the table drinking coffee.  She tells me they did not accept her at [facility], stating \"no appropriate bed\" and adds further it may be because there were no available beds at the all female place - not sure.  Patient expresses that she is now worried she will find a good place to be, assured her SW are working on it and they will keep working on it until we find something.  We review symptoms, while patient is doing fairly well, she does report some irritation at still being here and the weekend is coming up so nothing else can be done.  We discuss medications and patient agrees to slight increase in Thorazine to help keep her more calm, we can always change it back if it is too much.  Otherwise, she remains on track with discussion today, less delusional statements, sleeping good at night - no other changes.      Previous medication trials documented:  Zyprexa - weight gain, not fully effective  Invega - ineffective  Tegretol - poorly tolerated  Lithium - poorly tolerated, severe sedation  Clozaril - irritability, hyperthermia, and persistent tachycardia  Depakote - Weight gain and poor mood control (this was still restarted during admission in 11/19)  Thorazine - notable improvement on 200mg tid, decreased in 11/19 on an outpatient visit, resulting in readmission.  Gabapentin   Buspar  Armanionopin  Cogentin           Diagnoses:     Schizoaffective Disorder, Bipolar Type  PTSD, by history  R/O Other stimulant use disorder, methamphetamine, with induced perceptual disorder    Attestation:  Patient has been seen and evaluated by me,  Natalia Fabian, LATRICE CNP          Interim History:   The patient's care was discussed with the treatment team and chart notes were reviewed.    BEHAVIORAL TEAM DISCUSSION    Progress: Good  Continued Stay Criteria/Rationale: Delusional, no safe discharge plan  Medical/Physical: Diabetes " Mellitus Type 2  Precautions: No   Falls precaution?: Yes  Behavioral Orders   Procedures     Code 1 - Restrict to Unit     Routine Programming     As clinically indicated     Status 15     Every 15 minutes.     Plan: Continue current medications - exploring safe discharge options - Guardian in [place]  Rationale for change in precautions or plan: None      Participants: Natalia Fabian, LATRICE-THUY, Angie Laws LSW,  Becca Chery LSW, Jim Barrios LSW, Nursing, [facility], Krys BRAGA supervisor            Medications:   I have reviewed this patient's current medications    Current Facility-Administered Medications   Medication     acetaminophen (TYLENOL) tablet 650 mg     alum & mag hydroxide-simethicone (MYLANTA ES/MAALOX  ES) suspension 30 mL     artificial saliva (BIOTENE MT) solution 1-2 spray     benztropine (COGENTIN) tablet 1 mg     bisacodyl (DULCOLAX) Suppository 10 mg     busPIRone (BUSPAR) tablet 15 mg     chlorproMAZINE (THORAZINE) tablet 100 mg     chlorproMAZINE (THORAZINE) tablet 25 mg     clonazePAM (klonoPIN) tablet 1 mg     glucose gel 15-30 g    Or     dextrose 50 % injection 25-50 mL    Or     glucagon injection 1 mg     divalproex sodium extended-release (DEPAKOTE ER) 24 hr tablet 750 mg     gabapentin (NEURONTIN) tablet 1,200 mg     hydrOXYzine (ATARAX) tablet  mg     ibuprofen (ADVIL/MOTRIN) tablet 600 mg     insulin aspart (NovoLOG) inj (RAPID ACTING)     insulin aspart (NovoLOG) inj (RAPID ACTING)     insulin aspart (NovoLOG) inj (RAPID ACTING)     insulin glargine (LANTUS PEN) injection 30 Units     magnesium hydroxide (MILK OF MAGNESIA) suspension 30 mL     metFORMIN (GLUCOPHAGE) tablet 1,000 mg     nicotine (NICODERM CQ) 21 MG/24HR 24 hr patch 1 patch     nicotine (NICORETTE) gum 2 mg     nicotine Patch in Place     polyethylene glycol (MIRALAX/GLYCOLAX) Packet 17 g     prazosin (MINIPRESS) capsule 1 mg     traZODone (DESYREL) tablet 50 mg        10 point  "ROS - see H&P       Allergies:     Allergies   Allergen Reactions     Haloperidol Other (See Comments)     Other reaction(s): Seizures  Patient states, \"I fell down and wasn't able to get up.\"  Patient states, \"I fell down and wasn't able to get up.\"              Psychiatric Examination:   /88   Pulse 80   Temp 97.9  F (36.6  C) (Tympanic)   Resp 14   Ht 1.651 m (5' 5\")   Wt 88.5 kg (195 lb)   SpO2 98%   BMI 32.45 kg/m    Weight is 195 lbs 0 oz  Body mass index is 32.45 kg/m .    Appearance:  awake, alert  Attitude:  cooperative  Eye Contact:  Good  Mood:  good  Affect:  Mood congruent  Speech:  Improved  Psychomotor Behavior:  appropriate  Thought Process:  Goal oriented, able to answer questions in a linear fashion; few delusional statements  Associations:  JUSTA   Thought Content:  No SI/HI, Delusional content  Insight:  limited  Judgment:  limited  Oriented to:  time, person, and place  Attention Span and Concentration:  limited  Recent and Remote Memory:  fair - impacted by delusional beliefs and drug use  Fund of Knowledge: appropriate  Muscle Strength and Tone: normal  Gait and Station: Normal           Labs:     Results for orders placed or performed during the hospital encounter of 01/08/20 (from the past 48 hour(s))   Glucose by meter   Result Value Ref Range    Glucose 87 70 - 99 mg/dL   Glucose by meter   Result Value Ref Range    Glucose 135 (H) 70 - 99 mg/dL   Glucose by meter   Result Value Ref Range    Glucose 97 70 - 99 mg/dL   Glucose by meter   Result Value Ref Range    Glucose 80 70 - 99 mg/dL   Glucose by meter   Result Value Ref Range    Glucose 161 (H) 70 - 99 mg/dL   Glucose by meter   Result Value Ref Range    Glucose 121 (H) 70 - 99 mg/dL   Glucose by meter   Result Value Ref Range    Glucose 85 70 - 99 mg/dL   Glucose by meter   Result Value Ref Range    Glucose 156 (H) 70 - 99 mg/dL   Glucose by meter   Result Value Ref Range    Glucose 126 (H) 70 - 99 mg/dL   Glucose by meter "   Result Value Ref Range    Glucose 125 (H) 70 - 99 mg/dL   Glucose by meter   Result Value Ref Range    Glucose 205 (H) 70 - 99 mg/dL          Plan:     Increase thorazine to 125mg for increased restlessness/anxiety secondary to not have placement yet  ELOS: >5 days - Exploring safe discharge plans, further stabilization

## 2020-01-31 NOTE — PLAN OF CARE
"Problem: Adult Behavioral Health Plan of Care  Goal: Patient-Specific Goal (Individualization)  Description  Pt will follow recommendations of treatment team.  Pt will be compliant with medications.  Pt will attend 50 % of groups.  Pt will sleep 6-8 hours each night.  Pt will complete ADL's independently.   1/31/2020 1616 by Luz Verma RN  Outcome: No Change  Note:   1610 - Pt came up to nurses station and requested for blood glucose to be taken due to c/o feeling hungry and faint. Blood glucose was 68. Pt was given a snack. Blood glucose rechecked at 1651 and was 159. After supper, pt took a nap until snack time at 2000. During HS medication pass, pt began rambling about how she was the \"little girl in the purple dress\" in the film The Color Purple, all of the celebrities she met, and the millions of dollars she made. Bedtime blood glucose was 227. Pt was compliant with her scheduled medications.      Face to face end of shift report communicated to oncoming RN.        Problem: Thought Process Alteration  Goal: Optimal Thought Clarity  Description  Patient will be able to hold reality based conversations prior to discharge.     1/31/2020 1616 by Luz Verma RN  Outcome: No Change  Note:   Pt was able to hold a partial reality based conversation. Speech rambling at times with delusional content.      Problem: Fall Injury Risk  Goal: Absence of Fall and Fall-Related Injury  Description  Pt will wear non skid slippers.  Pt will remain free from falls or injuries during hospitalization.   1/31/2020 1616 by Luz Verma RN  Outcome: Improving  Note:   Pt remained free from falls. Gait was balanced and steady.      "

## 2020-01-31 NOTE — PLAN OF CARE
Problem: Adult Behavioral Health Plan of Care  Goal: Patient-Specific Goal (Individualization)  Description  Pt will follow recommendations of treatment team.  Pt will be compliant with medications.  Pt will attend 50 % of groups.  Pt will sleep 6-8 hours each night.  Pt will complete ADL's independently.   1/31/2020 0354 by Ayah Rodriguez, RN  Outcome: Improving  Note:   Report received from Sherry. Rounding complete. Pt observed sleeping at shift change with regular and unlabored respirations.     Pt up and to the lounge at 0200 requesting BS be checked. BS was 156.   0300- Pt has scheduled BS = 126.   [id number] Pt sleeping again.  0400- Pt back up and to the lounge with no complaints offered.  0600- Pt returned to her room.  0653- Pt     Pt has been in bed with eyes closed and regular respirations off and on this NOC shift havig some difficulty staying asleep. Pt slept approx 4 hours this NOC shift. 15 minute and PRN checks all night. No complaints offered. Will continue to monitor.    Face to face end of shift report communicated to oncoming RN.    January 31, 2020  3:55 AM          Problem: Fall Injury Risk  Goal: Absence of Fall and Fall-Related Injury  Description  Pt will wear non skid slippers.  Pt will remain free from falls or injuries during hospitalization.   Outcome: Improving  Note:   Pt presents as balanced and steady. Pt has had no falls this NOC shift.     Problem: Thought Process Alteration  Goal: Optimal Thought Clarity  Description  Patient will be able to hold reality based conversations prior to discharge.     1/31/2020 0354 by Ayah Rodriguez RN  Outcome: Improving  Note:   Pt is still confabulating stories from her past and verbalizing things such as when she was in labor she squeezed her own hand in a fist so hard that her middle finger stabbed all the way thru and out of the top of the same hand and she had to have multiple surgeries to repair it. Pt is however pleasant,  calm, and cooperative with staff and peers.

## 2020-01-31 NOTE — PLAN OF CARE
Face to Face report received from OBDULIO Poon.  Rounding completed. Patient observed in [facility].   Problem: Adult Behavioral Health Plan of Care  Goal: Patient-Specific Goal (Individualization)  Description  Pt will follow recommendations of treatment team.  Pt will be compliant with medications.  Pt will attend 50 % of groups.  Pt will sleep 6-8 hours each night.  Pt will complete ADL's independently.   1/31/2020 0740 by Ion Morales RN  Outcome: Improving  She is up on the unit for breakfast. She has a flat affect and makes poor eye contact. She is irritable but cooperative. She is compliant with her medications. She is maintaining appropriate boundaries with staff and peers. She continually asks when she is going to be discharged. She is dressed and groomed appropriately.     Problem: Fall Injury Risk  Goal: Absence of Fall and Fall-Related Injury  Description  Pt will wear non skid slippers.  Pt will remain free from falls or injuries during hospitalization.   1/31/2020 0740 by Ion Morales RN  Outcome: Improving  She is steady on her feet.     Problem: Thought Process Alteration  Goal: Optimal Thought Clarity  Description  Patient will be able to hold reality based conversations prior to discharge.     1/31/2020 0740 by Ion Morales RN  Outcome: Improving  She continues to be preoccupied. She continues to have delusional thought and it comes out in conversation. She talks of snakes, airplanes, and is difficult to track her conversation.     Face to face end of shift report to be communicated to evening shift RN.     Ion Morales RN  1/31/2020  2:48 PM

## 2020-02-01 LAB
GLUCOSE BLDC GLUCOMTR-MCNC: 102 MG/DL (ref 70–99)
GLUCOSE BLDC GLUCOMTR-MCNC: 106 MG/DL (ref 70–99)
GLUCOSE BLDC GLUCOMTR-MCNC: 143 MG/DL (ref 70–99)
GLUCOSE BLDC GLUCOMTR-MCNC: 188 MG/DL (ref 70–99)
GLUCOSE BLDC GLUCOMTR-MCNC: 86 MG/DL (ref 70–99)
GLUCOSE BLDC GLUCOMTR-MCNC: 94 MG/DL (ref 70–99)

## 2020-02-01 PROCEDURE — 25000132 ZZH RX MED GY IP 250 OP 250 PS 637: Performed by: NURSE PRACTITIONER

## 2020-02-01 PROCEDURE — 12400000 ZZH R&B MH

## 2020-02-01 PROCEDURE — 00000146 ZZHCL STATISTIC GLUCOSE BY METER IP

## 2020-02-01 RX ADMIN — DIVALPROEX SODIUM 750 MG: 500 TABLET, EXTENDED RELEASE ORAL at 21:13

## 2020-02-01 RX ADMIN — GABAPENTIN 1200 MG: 600 TABLET, FILM COATED ORAL at 08:34

## 2020-02-01 RX ADMIN — CHLORPROMAZINE HYDROCHLORIDE 150 MG: 25 TABLET, FILM COATED ORAL at 14:37

## 2020-02-01 RX ADMIN — BENZTROPINE MESYLATE 1 MG: 1 TABLET ORAL at 08:35

## 2020-02-01 RX ADMIN — INSULIN ASPART 8 UNITS: 100 INJECTION, SOLUTION INTRAVENOUS; SUBCUTANEOUS at 08:36

## 2020-02-01 RX ADMIN — METFORMIN HYDROCHLORIDE 1000 MG: 500 TABLET, FILM COATED ORAL at 17:27

## 2020-02-01 RX ADMIN — BUSPIRONE HYDROCHLORIDE 15 MG: 10 TABLET ORAL at 21:13

## 2020-02-01 RX ADMIN — GABAPENTIN 1200 MG: 600 TABLET, FILM COATED ORAL at 14:38

## 2020-02-01 RX ADMIN — CHLORPROMAZINE HYDROCHLORIDE 150 MG: 25 TABLET, FILM COATED ORAL at 21:13

## 2020-02-01 RX ADMIN — NICOTINE 1 PATCH: 21 PATCH, EXTENDED RELEASE TRANSDERMAL at 08:39

## 2020-02-01 RX ADMIN — BENZTROPINE MESYLATE 1 MG: 1 TABLET ORAL at 21:13

## 2020-02-01 RX ADMIN — METFORMIN HYDROCHLORIDE 1000 MG: 500 TABLET, FILM COATED ORAL at 08:34

## 2020-02-01 RX ADMIN — CLONAZEPAM 1 MG: 1 TABLET ORAL at 21:13

## 2020-02-01 RX ADMIN — INSULIN ASPART 8 UNITS: 100 INJECTION, SOLUTION INTRAVENOUS; SUBCUTANEOUS at 12:24

## 2020-02-01 RX ADMIN — PRAZOSIN HYDROCHLORIDE 1 MG: 1 CAPSULE ORAL at 21:13

## 2020-02-01 RX ADMIN — INSULIN GLARGINE 30 UNITS: 100 INJECTION, SOLUTION SUBCUTANEOUS at 21:12

## 2020-02-01 RX ADMIN — GABAPENTIN 1200 MG: 600 TABLET, FILM COATED ORAL at 21:14

## 2020-02-01 RX ADMIN — BUSPIRONE HYDROCHLORIDE 15 MG: 10 TABLET ORAL at 14:38

## 2020-02-01 RX ADMIN — BUSPIRONE HYDROCHLORIDE 15 MG: 10 TABLET ORAL at 08:34

## 2020-02-01 RX ADMIN — CHLORPROMAZINE HYDROCHLORIDE 125 MG: 100 TABLET, SUGAR COATED ORAL at 08:35

## 2020-02-01 ASSESSMENT — ACTIVITIES OF DAILY LIVING (ADL)
ORAL_HYGIENE: INDEPENDENT
HYGIENE/GROOMING: INDEPENDENT
HYGIENE/GROOMING: INDEPENDENT
DRESS: SCRUBS (BEHAVIORAL HEALTH);INDEPENDENT
DRESS: SCRUBS (BEHAVIORAL HEALTH);INDEPENDENT
ORAL_HYGIENE: INDEPENDENT

## 2020-02-01 ASSESSMENT — MIFFLIN-ST. JEOR: SCORE: 1554.94

## 2020-02-01 NOTE — PLAN OF CARE
Problem: Adult Behavioral Health Plan of Care  Goal: Patient-Specific Goal (Individualization)  Description  Pt will follow recommendations of treatment team.  Pt will be compliant with medications.  Pt will attend 50 % of groups.  Pt will sleep 6-8 hours each night.  Pt will complete ADL's independently.   2/1/2020 1203 by Edgar Escalante, RN  Outcome: No Change  Pt has been up on the unit much of the shift so far. Social with peers and is attending groups. Pt pleasant and cooperative on assessment. Denied pain. Denied depression, anxiety and/or SI. Compliant with medications as prescribed. Ate 100% of breakfast meal. Slept approximately 7 hours last night. Remains independent with ADLs and presents as neat and well-groomed. Preprandial blood sugar 188 mg/dl this shift.    Problem: Thought Process Alteration  Goal: Optimal Thought Clarity  Description  Patient will be able to hold reality based conversations prior to discharge.     2/1/2020 1203 by Edgar Escalante, RN  Outcome: No Change  Pt remains alert and oriented to person, place, time and situation. Cooperative on assessment. Answered questions appropriately and engaged in appropriate and reality-based conversation with this nurse. No overt delusional statements noted.    Problem: Fall Injury Risk  Goal: Absence of Fall and Fall-Related Injury  Description  Pt will wear non skid slippers.  Pt will remain free from falls or injuries during hospitalization.   2/1/2020 1203 by Edgar Escalante RN  Outcome: No Change  Pt's gait is steady and balanced. Pt has remained free from fall(s) and/or injury this shift.

## 2020-02-01 NOTE — PLAN OF CARE
Problem: Adult Behavioral Health Plan of Care  Goal: Patient-Specific Goal (Individualization)  Description  Pt will follow recommendations of treatment team.  Pt will be compliant with medications.  Pt will attend 50 % of groups.  Pt will sleep 6-8 hours each night.  Pt will complete ADL's independently.   2/1/2020 0203 by Ayah Rodriguez, RN  Outcome: Improving  Note:   Report received from Luz lama. Pt observed sleeping with regular and unlabored respirations.    Pt has been in bed with eyes closed and regular respirations. 15 minute and PRN checks all night. No complaints offered. Will continue to monitor.    0345- Pt BS 94    0648     Pt slept approx 6.75 hours    Face to face end of shift report communicated to oncoming RN.    February 1, 2020  2:03 AM          Problem: Fall Injury Risk  Goal: Absence of Fall and Fall-Related Injury  Description  Pt will wear non skid slippers.  Pt will remain free from falls or injuries during hospitalization.   2/1/2020 0203 by Ayah Rodriguez RN  Outcome: Improving  Note:   Unable to assess due to pt sleeping. Pt has remained free of falls and injury this NOC shift.       Problem: Thought Process Alteration  Goal: Optimal Thought Clarity  Description  Patient will be able to hold reality based conversations prior to discharge.     2/1/2020 0203 by Ayah Rodriguez RN  Outcome: Improving  Note:   Unable to assess due to pt sleeping. No issues or concerns noted at this time.

## 2020-02-01 NOTE — PLAN OF CARE
Problem: Adult Behavioral Health Plan of Care  Goal: Patient-Specific Goal (Individualization)  Description  Pt will follow recommendations of treatment team.  Pt will be compliant with medications.  Pt will attend 50 % of groups.  Pt will sleep 6-8 hours each night.  Pt will complete ADL's independently.   2/1/2020 1720 by Luz Verma RN  Outcome: No Change  Note:   Pt in bed at the start of the shift - very difficult to rouse for preprandial blood glucose. Pt presented as irritable - blood glucose 86. Order to hold 8 units of scheduled Novolog per on-call provider Raven CHOUDHURY Pt ate 75% of supper and then returned to bed. 1947 - blood glucose taken and was 143. Pt had pressured speech and was rambling about delusional content during this encounter.     Face to face end of shift report communicated to oncoming RN.      Problem: Thought Process Alteration  Goal: Optimal Thought Clarity  Description  Patient will be able to hold reality based conversations prior to discharge.     2/1/2020 1720 by Luz Verma RN  Outcome: No Change  Note:   Pt able to have a partial reality based conversation. She continues to ramble about delusional content.      Problem: Fall Injury Risk  Goal: Absence of Fall and Fall-Related Injury  Description  Pt will wear non skid slippers.  Pt will remain free from falls or injuries during hospitalization.   2/1/2020 1720 by Luz Verma RN  Outcome: Improving  Note:   Pt remained free from falls. Gait was balanced and steady.

## 2020-02-02 LAB
GLUCOSE BLDC GLUCOMTR-MCNC: 154 MG/DL (ref 70–99)
GLUCOSE BLDC GLUCOMTR-MCNC: 159 MG/DL (ref 70–99)
GLUCOSE BLDC GLUCOMTR-MCNC: 185 MG/DL (ref 70–99)
GLUCOSE BLDC GLUCOMTR-MCNC: 190 MG/DL (ref 70–99)
GLUCOSE BLDC GLUCOMTR-MCNC: 81 MG/DL (ref 70–99)
GLUCOSE BLDC GLUCOMTR-MCNC: 99 MG/DL (ref 70–99)

## 2020-02-02 PROCEDURE — 25000132 ZZH RX MED GY IP 250 OP 250 PS 637: Performed by: NURSE PRACTITIONER

## 2020-02-02 PROCEDURE — 12400000 ZZH R&B MH

## 2020-02-02 PROCEDURE — 99232 SBSQ HOSP IP/OBS MODERATE 35: CPT | Performed by: NURSE PRACTITIONER

## 2020-02-02 PROCEDURE — 00000146 ZZHCL STATISTIC GLUCOSE BY METER IP

## 2020-02-02 RX ADMIN — CLONAZEPAM 1 MG: 1 TABLET ORAL at 20:15

## 2020-02-02 RX ADMIN — GABAPENTIN 1200 MG: 600 TABLET, FILM COATED ORAL at 13:38

## 2020-02-02 RX ADMIN — INSULIN GLARGINE 30 UNITS: 100 INJECTION, SOLUTION SUBCUTANEOUS at 20:15

## 2020-02-02 RX ADMIN — INSULIN ASPART 8 UNITS: 100 INJECTION, SOLUTION INTRAVENOUS; SUBCUTANEOUS at 12:37

## 2020-02-02 RX ADMIN — BUSPIRONE HYDROCHLORIDE 15 MG: 10 TABLET ORAL at 20:15

## 2020-02-02 RX ADMIN — GABAPENTIN 1200 MG: 600 TABLET, FILM COATED ORAL at 20:14

## 2020-02-02 RX ADMIN — NICOTINE 1 PATCH: 21 PATCH, EXTENDED RELEASE TRANSDERMAL at 08:26

## 2020-02-02 RX ADMIN — BENZTROPINE MESYLATE 1 MG: 1 TABLET ORAL at 08:23

## 2020-02-02 RX ADMIN — BUSPIRONE HYDROCHLORIDE 15 MG: 10 TABLET ORAL at 08:23

## 2020-02-02 RX ADMIN — CHLORPROMAZINE HYDROCHLORIDE 150 MG: 25 TABLET, FILM COATED ORAL at 13:38

## 2020-02-02 RX ADMIN — PRAZOSIN HYDROCHLORIDE 1 MG: 1 CAPSULE ORAL at 20:15

## 2020-02-02 RX ADMIN — INSULIN ASPART 8 UNITS: 100 INJECTION, SOLUTION INTRAVENOUS; SUBCUTANEOUS at 17:03

## 2020-02-02 RX ADMIN — METFORMIN HYDROCHLORIDE 1000 MG: 500 TABLET, FILM COATED ORAL at 17:00

## 2020-02-02 RX ADMIN — DIVALPROEX SODIUM 750 MG: 500 TABLET, EXTENDED RELEASE ORAL at 20:15

## 2020-02-02 RX ADMIN — GABAPENTIN 1200 MG: 600 TABLET, FILM COATED ORAL at 08:22

## 2020-02-02 RX ADMIN — CHLORPROMAZINE HYDROCHLORIDE 150 MG: 25 TABLET, FILM COATED ORAL at 08:23

## 2020-02-02 RX ADMIN — INSULIN ASPART 8 UNITS: 100 INJECTION, SOLUTION INTRAVENOUS; SUBCUTANEOUS at 09:12

## 2020-02-02 RX ADMIN — METFORMIN HYDROCHLORIDE 1000 MG: 500 TABLET, FILM COATED ORAL at 08:22

## 2020-02-02 RX ADMIN — CHLORPROMAZINE HYDROCHLORIDE 150 MG: 25 TABLET, FILM COATED ORAL at 20:15

## 2020-02-02 RX ADMIN — BENZTROPINE MESYLATE 1 MG: 1 TABLET ORAL at 20:15

## 2020-02-02 RX ADMIN — BUSPIRONE HYDROCHLORIDE 15 MG: 10 TABLET ORAL at 13:39

## 2020-02-02 ASSESSMENT — ACTIVITIES OF DAILY LIVING (ADL)
DRESS: SCRUBS (BEHAVIORAL HEALTH);INDEPENDENT
HYGIENE/GROOMING: INDEPENDENT
ORAL_HYGIENE: INDEPENDENT
HYGIENE/GROOMING: INDEPENDENT
ORAL_HYGIENE: INDEPENDENT
DRESS: SCRUBS (BEHAVIORAL HEALTH)

## 2020-02-02 NOTE — PROGRESS NOTES
[facility]  Psychiatric Progress Note      Impression:     Patient resting in bed, she was just up attending groups this morning.  She tells me she feels very relaxed and likes the increase in thorazine from yesterday, would like to keep it at this dose for now anyway.  We discuss her blood sugars from this morning, they dropped a bit although now on track again.  She denies eating any differently or doing anything differently, will continue to monitor.  Otherwise, she remains on track with discussion today, less delusional statements, sleeping good at night - no other changes.      Previous medication trials documented:  Zyprexa - weight gain, not fully effective  Invega - ineffective  Tegretol - poorly tolerated  Lithium - poorly tolerated, severe sedation  Clozaril - irritability, hyperthermia, and persistent tachycardia  Depakote - Weight gain and poor mood control (this was still restarted during admission in 11/19)  Thorazine - notable improvement on 200mg tid, decreased in 11/19 on an outpatient visit, resulting in readmission.  Gabapentin   Buspar  Armanionopin  Cogentin           Diagnoses:     Schizoaffective Disorder, Bipolar Type  PTSD, by history  R/O Other stimulant use disorder, methamphetamine, with induced perceptual disorder    Attestation:  Patient has been seen and evaluated by me,  LATRICE Du CNP          Interim History:   The patient's care was discussed with the treatment team and chart notes were reviewed.    BEHAVIORAL TEAM DISCUSSION    Progress: Good  Continued Stay Criteria/Rationale: Delusional, no safe discharge plan  Medical/Physical: Diabetes Mellitus Type 2  Precautions: No   Falls precaution?: Yes  Behavioral Orders   Procedures     Code 1 - Restrict to Unit     Routine Programming     As clinically indicated     Status 15     Every 15 minutes.     Plan: Continue current medications - exploring safe discharge options - Guardian in [place]  Rationale for  "change in precautions or plan: None      Participants: Natalia Fabian, APRN-CNP, Angie Laws LSW,  Becca Chery LSW, Jim Barrios LSW, Nursing, [facility], Krys BRAGA supervisor            Medications:   I have reviewed this patient's current medications    Current Facility-Administered Medications   Medication     acetaminophen (TYLENOL) tablet 650 mg     alum & mag hydroxide-simethicone (MYLANTA ES/MAALOX  ES) suspension 30 mL     artificial saliva (BIOTENE MT) solution 1-2 spray     benztropine (COGENTIN) tablet 1 mg     bisacodyl (DULCOLAX) Suppository 10 mg     busPIRone (BUSPAR) tablet 15 mg     chlorproMAZINE (THORAZINE) tablet 150 mg     chlorproMAZINE (THORAZINE) tablet 25 mg     clonazePAM (klonoPIN) tablet 1 mg     glucose gel 15-30 g    Or     dextrose 50 % injection 25-50 mL    Or     glucagon injection 1 mg     divalproex sodium extended-release (DEPAKOTE ER) 24 hr tablet 750 mg     gabapentin (NEURONTIN) tablet 1,200 mg     hydrOXYzine (ATARAX) tablet  mg     ibuprofen (ADVIL/MOTRIN) tablet 600 mg     insulin aspart (NovoLOG) inj (RAPID ACTING)     insulin aspart (NovoLOG) inj (RAPID ACTING)     insulin aspart (NovoLOG) inj (RAPID ACTING)     insulin glargine (LANTUS PEN) injection 30 Units     magnesium hydroxide (MILK OF MAGNESIA) suspension 30 mL     metFORMIN (GLUCOPHAGE) tablet 1,000 mg     nicotine (NICODERM CQ) 21 MG/24HR 24 hr patch 1 patch     nicotine (NICORETTE) gum 2 mg     nicotine Patch in Place     polyethylene glycol (MIRALAX/GLYCOLAX) Packet 17 g     prazosin (MINIPRESS) capsule 1 mg     traZODone (DESYREL) tablet 50 mg        10 point ROS - see H&P       Allergies:     Allergies   Allergen Reactions     Haloperidol Other (See Comments)     Other reaction(s): Seizures  Patient states, \"I fell down and wasn't able to get up.\"  Patient states, \"I fell down and wasn't able to get up.\"              Psychiatric Examination:   /71   Pulse 80   Temp " "98.5  F (36.9  C) (Tympanic)   Resp 14   Ht 1.651 m (5' 5\")   Wt 88.4 kg (194 lb 14.4 oz)   SpO2 97%   BMI 32.43 kg/m    Weight is 194 lbs 14.4 oz  Body mass index is 32.43 kg/m .    Appearance:  awake, alert  Attitude:  cooperative  Eye Contact:  Good  Mood:  good  Affect:  Mood congruent  Speech:  Improved  Psychomotor Behavior:  appropriate  Thought Process:  Goal oriented, able to answer questions in a linear fashion; few delusional statements  Associations:  JUSTA   Thought Content:  No SI/HI, Delusional content  Insight:  limited  Judgment:  limited  Oriented to:  time, person, and place  Attention Span and Concentration:  limited  Recent and Remote Memory:  fair - impacted by delusional beliefs and drug use  Fund of Knowledge: appropriate  Muscle Strength and Tone: normal  Gait and Station: Normal           Labs:     Results for orders placed or performed during the hospital encounter of 01/08/20 (from the past 48 hour(s))   Glucose by meter   Result Value Ref Range    Glucose 205 (H) 70 - 99 mg/dL   Glucose by meter   Result Value Ref Range    Glucose 68 (L) 70 - 99 mg/dL   Glucose by meter   Result Value Ref Range    Glucose 159 (H) 70 - 99 mg/dL   Glucose by meter   Result Value Ref Range    Glucose 227 (H) 70 - 99 mg/dL   Glucose by meter   Result Value Ref Range    Glucose 94 70 - 99 mg/dL   Glucose by meter   Result Value Ref Range    Glucose 102 (H) 70 - 99 mg/dL   Glucose by meter   Result Value Ref Range    Glucose 106 (H) 70 - 99 mg/dL   Glucose by meter   Result Value Ref Range    Glucose 188 (H) 70 - 99 mg/dL   Glucose by meter   Result Value Ref Range    Glucose 86 70 - 99 mg/dL   Glucose by meter   Result Value Ref Range    Glucose 143 (H) 70 - 99 mg/dL   Glucose by meter   Result Value Ref Range    Glucose 99 70 - 99 mg/dL   Glucose by meter   Result Value Ref Range    Glucose 81 70 - 99 mg/dL   Glucose by meter   Result Value Ref Range    Glucose 190 (H) 70 - 99 mg/dL          Plan: "     Increase thorazine to 125mg for increased restlessness/anxiety secondary to not have placement yet  ELOS: >5 days - Exploring safe discharge plans, further stabilization

## 2020-02-02 NOTE — PLAN OF CARE
"  Problem: Adult Behavioral Health Plan of Care  Goal: Patient-Specific Goal (Individualization)  Description  Pt will follow recommendations of treatment team.  Pt will be compliant with medications.  Pt will attend 50 % of groups.  Pt will sleep 6-8 hours each night.  Pt will complete ADL's independently.   2/2/2020 0928 by Ayah Matos RN  Outcome: No Change  Note:   Pt. Willing to follow treatment team recommendations, slept 6.75 hours last night, is independent with ADL's,  Spending time in bed this morning, states \"I feel tired today\", will continue to monitor.       Problem: Thought Process Alteration  Goal: Optimal Thought Clarity  Description  Patient will be able to hold reality based conversations prior to discharge.     2/2/2020 0928 by Ayah Matos RN  Outcome: No Change   Pt. Continues to have delusional conversations  Problem: Fall Injury Risk  Goal: Absence of Fall and Fall-Related Injury  Description  Pt will wear non skid slippers.  Pt will remain free from falls or injuries during hospitalization.   2/2/2020 0928 by Ayah Matos RN  Outcome: Improving   Pt. Has remained free from falls, gait is balanced and steady.    Face to face end of shift report will be communicated to oncoming afternoon shift OBDULIO.     Ayah Matos RN  2/2/2020  10:19 AM        "

## 2020-02-02 NOTE — PLAN OF CARE
Problem: Adult Behavioral Health Plan of Care  Goal: Patient-Specific Goal (Individualization)  Description  Pt will follow recommendations of treatment team.  Pt will be compliant with medications.  Pt will attend 50 % of groups.  Pt will sleep 6-8 hours each night.  Pt will complete ADL's independently.   2/2/2020 0051 by Ayah Rodriguez RN  Outcome: Improving  Note:   Report received from Luz lama. Pt observed sleeping in supine position with regular and unlabored respirations.    Pt has been in bed with eyes closed and regular respirations. 15 minute and PRN checks all night. No complaints offered. Will continue to monitor.    0315- BS 99    0600- 81    SW On call NP and she ok'ed holding AM insulin until after BS can be rechecked after pt is up and about and shown to be a little higher.    Pt slept approx 6.5 hours    Face to face end of shift report communicated to oncoming RN.    February 2, 2020  12:51 AM          Problem: Fall Injury Risk  Goal: Absence of Fall and Fall-Related Injury  Description  Pt will wear non skid slippers.  Pt will remain free from falls or injuries during hospitalization.   2/2/2020 0051 by Ayah Rodriguez, RN  Outcome: Improving  Note:   Unable to assess due to pt sleeping. Pt has remained free of falls and injury this NOC shift.       Problem: Thought Process Alteration  Goal: Optimal Thought Clarity  Description  Patient will be able to hold reality based conversations prior to discharge.     2/2/2020 0051 by Ayah Rodriguez, RN  Outcome: Improving  Note:   Unable to assess due to pt sleeping. No issues or concerns noted at this time.

## 2020-02-03 LAB
GLUCOSE BLDC GLUCOMTR-MCNC: 105 MG/DL (ref 70–99)
GLUCOSE BLDC GLUCOMTR-MCNC: 141 MG/DL (ref 70–99)
GLUCOSE BLDC GLUCOMTR-MCNC: 143 MG/DL (ref 70–99)
GLUCOSE BLDC GLUCOMTR-MCNC: 154 MG/DL (ref 70–99)
GLUCOSE BLDC GLUCOMTR-MCNC: 167 MG/DL (ref 70–99)
GLUCOSE BLDC GLUCOMTR-MCNC: 174 MG/DL (ref 70–99)

## 2020-02-03 PROCEDURE — 00000146 ZZHCL STATISTIC GLUCOSE BY METER IP

## 2020-02-03 PROCEDURE — 25000132 ZZH RX MED GY IP 250 OP 250 PS 637: Performed by: NURSE PRACTITIONER

## 2020-02-03 PROCEDURE — 93005 ELECTROCARDIOGRAM TRACING: CPT

## 2020-02-03 PROCEDURE — 12400000 ZZH R&B MH

## 2020-02-03 PROCEDURE — 93010 ELECTROCARDIOGRAM REPORT: CPT | Performed by: INTERNAL MEDICINE

## 2020-02-03 RX ORDER — CHLORPROMAZINE HYDROCHLORIDE 100 MG/1
100 TABLET, FILM COATED ORAL 3 TIMES DAILY
Status: DISCONTINUED | OUTPATIENT
Start: 2020-02-03 | End: 2020-02-15 | Stop reason: HOSPADM

## 2020-02-03 RX ADMIN — BUSPIRONE HYDROCHLORIDE 15 MG: 10 TABLET ORAL at 20:24

## 2020-02-03 RX ADMIN — INSULIN ASPART 8 UNITS: 100 INJECTION, SOLUTION INTRAVENOUS; SUBCUTANEOUS at 12:33

## 2020-02-03 RX ADMIN — BUSPIRONE HYDROCHLORIDE 15 MG: 10 TABLET ORAL at 13:17

## 2020-02-03 RX ADMIN — CHLORPROMAZINE HYDROCHLORIDE 150 MG: 25 TABLET, FILM COATED ORAL at 08:26

## 2020-02-03 RX ADMIN — BENZTROPINE MESYLATE 1 MG: 1 TABLET ORAL at 08:26

## 2020-02-03 RX ADMIN — CHLORPROMAZINE HYDROCHLORIDE 150 MG: 25 TABLET, FILM COATED ORAL at 13:17

## 2020-02-03 RX ADMIN — METFORMIN HYDROCHLORIDE 1000 MG: 500 TABLET, FILM COATED ORAL at 17:46

## 2020-02-03 RX ADMIN — INSULIN GLARGINE 30 UNITS: 100 INJECTION, SOLUTION SUBCUTANEOUS at 20:26

## 2020-02-03 RX ADMIN — BUSPIRONE HYDROCHLORIDE 15 MG: 10 TABLET ORAL at 08:26

## 2020-02-03 RX ADMIN — BENZTROPINE MESYLATE 1 MG: 1 TABLET ORAL at 20:25

## 2020-02-03 RX ADMIN — PRAZOSIN HYDROCHLORIDE 1 MG: 1 CAPSULE ORAL at 20:24

## 2020-02-03 RX ADMIN — CHLORPROMAZINE HYDROCHLORIDE 100 MG: 100 TABLET, SUGAR COATED ORAL at 20:24

## 2020-02-03 RX ADMIN — GABAPENTIN 1200 MG: 600 TABLET, FILM COATED ORAL at 20:25

## 2020-02-03 RX ADMIN — INSULIN ASPART 8 UNITS: 100 INJECTION, SOLUTION INTRAVENOUS; SUBCUTANEOUS at 17:46

## 2020-02-03 RX ADMIN — GABAPENTIN 1200 MG: 600 TABLET, FILM COATED ORAL at 13:17

## 2020-02-03 RX ADMIN — DIVALPROEX SODIUM 750 MG: 500 TABLET, EXTENDED RELEASE ORAL at 20:24

## 2020-02-03 RX ADMIN — INSULIN ASPART 8 UNITS: 100 INJECTION, SOLUTION INTRAVENOUS; SUBCUTANEOUS at 08:27

## 2020-02-03 RX ADMIN — NICOTINE 1 PATCH: 21 PATCH, EXTENDED RELEASE TRANSDERMAL at 08:25

## 2020-02-03 RX ADMIN — METFORMIN HYDROCHLORIDE 1000 MG: 500 TABLET, FILM COATED ORAL at 08:26

## 2020-02-03 RX ADMIN — GABAPENTIN 1200 MG: 600 TABLET, FILM COATED ORAL at 08:26

## 2020-02-03 RX ADMIN — CLONAZEPAM 1 MG: 1 TABLET ORAL at 20:25

## 2020-02-03 ASSESSMENT — ACTIVITIES OF DAILY LIVING (ADL)
ORAL_HYGIENE: INDEPENDENT
HYGIENE/GROOMING: INDEPENDENT
ORAL_HYGIENE: INDEPENDENT
HYGIENE/GROOMING: INDEPENDENT
DRESS: SCRUBS (BEHAVIORAL HEALTH)
DRESS: SCRUBS (BEHAVIORAL HEALTH);INDEPENDENT

## 2020-02-03 NOTE — PLAN OF CARE
"Left a message for Kiah this morning to come up with a plan for discharge if pt is denied by [facility]. Pt has been to the nurses station door several times this morning stating she has spoken with Becca and she is going to \"plead her case\" called and spoke with Becca who stated pt has called her multiple times this morning and stated \"I'm not sure why we have already denied her.\" Writer did leave a message with Ananda [facility]  who pt's guardian stated last week has not contacted anyone from [place] about pt's eviction or termination of services.   "

## 2020-02-03 NOTE — PLAN OF CARE
Problem: Adult Behavioral Health Plan of Care  Goal: Patient-Specific Goal (Individualization)  Description  Pt will follow recommendations of treatment team.  Pt will be compliant with medications.  Pt will attend 50 % of groups.  Pt will sleep 6-8 hours each night.  Pt will complete ADL's independently.   2/3/2020 0117 by Ayah Rodriguez RN  Outcome: Improving  Note:   Report received from Luz lama. Pt observed sleeping in supine position with regular and unlabored respirations.    Pt has been in and out of bed this NOC shift primarily spending most of the night in the lounge writing. She slept approx 2.75 hours. 15 minute and PRN checks all night. No complaints offered. Will continue to monitor.    0215- Pt up and to lounge writing and wanted a cup of apple juice which was provided.  0300- BS was 154   0700-     Face to face end of shift report communicated to oncoming RN.    February 3, 2020  1:17 AM          Problem: Fall Injury Risk  Goal: Absence of Fall and Fall-Related Injury  Description  Pt will wear non skid slippers.  Pt will remain free from falls or injuries during hospitalization.   2/3/2020 0117 by Ayah Rodriguez RN  Outcome: Improving  Note:   Unable to assess due to pt sleeping. Pt has remained free of falls and injury this NOC shift.       Problem: Thought Process Alteration  Goal: Optimal Thought Clarity  Description  Patient will be able to hold reality based conversations prior to discharge.     2/3/2020 0117 by Ayah Rodriguez, RN  Outcome: Improving  Note:   Unable to assess due to pt sleeping. No issues or concerns noted at this time.

## 2020-02-03 NOTE — PLAN OF CARE
Problem: Adult Behavioral Health Plan of Care  Goal: Patient-Specific Goal (Individualization)  Description  Pt will follow recommendations of treatment team.  Pt will be compliant with medications.  Pt will attend 50 % of groups.  Pt will sleep 6-8 hours each night.  Pt will complete ADL's independently.   2/3/2020 0845 by Ayah aMtos RN  Outcome: No Change  Note:     Pt. Willing to follow treatment team recommendations, has not attended group therapy yet this shift, is independent with ADL's. Sitting in dayroom listening to music.      Problem: Thought Process Alteration  Goal: Optimal Thought Clarity  Description  Patient will be able to hold reality based conversations prior to discharge.     2/3/2020 0845 by Ayah Matos RN  Outcome: No Change   Pt. Is able to let needs be known, but continues to have delusional thoughts with flight of ideas during conversation.  Problem: Fall Injury Risk  Goal: Absence of Fall and Fall-Related Injury  Description  Pt will wear non skid slippers.  Pt will remain free from falls or injuries during hospitalization.   2/3/2020 0845 by Ayah Matos RN  Outcome: Improving   Pt. Has remained free from falls, gait is balanced and steady.    Face to face end of shift report will be communicated to oncoming afternoon shift FRANCIA Matos RN  2/3/2020  10:09 AM

## 2020-02-03 NOTE — PLAN OF CARE
Problem: Adult Behavioral Health Plan of Care  Goal: Patient-Specific Goal (Individualization)  Description  Pt will follow recommendations of treatment team.  Pt will be compliant with medications.  Pt will attend 50 % of groups.  Pt will sleep 6-8 hours each night.  Pt will complete ADL's independently.   2/2/2020 1820 by Luz Verma, RN  Outcome: Improving  Note:   Pt had a blunted, flat affect and calm mood. Preprandial blood glucose was 154 - pt given 9 units of Novolog. She ate 100% of supper. Pt verbalized that she would like to discharge to [facility]. She had less delusional statements tonight. She participated in group programming and milieu activities.     Face to face end of shift report communicated to oncoming RN.        Problem: Fall Injury Risk  Goal: Absence of Fall and Fall-Related Injury  Description  Pt will wear non skid slippers.  Pt will remain free from falls or injuries during hospitalization.   2/2/2020 1820 by Luz Verma, RN  Outcome: Improving  Note:   Pt remained free from falls. Gait was balanced and steady.      Problem: Thought Process Alteration  Goal: Optimal Thought Clarity  Description  Patient will be able to hold reality based conversations prior to discharge.     2/2/2020 1820 by Luz Verma, RN  Outcome: Improving  Note:   Pt was able to have a partial reality based conversation tonight - less delusional statements noted.

## 2020-02-04 DIAGNOSIS — F25.0 SCHIZOAFFECTIVE DISORDER, BIPOLAR TYPE (H): ICD-10-CM

## 2020-02-04 LAB
GLUCOSE BLDC GLUCOMTR-MCNC: 125 MG/DL (ref 70–99)
GLUCOSE BLDC GLUCOMTR-MCNC: 128 MG/DL (ref 70–99)
GLUCOSE BLDC GLUCOMTR-MCNC: 129 MG/DL (ref 70–99)
GLUCOSE BLDC GLUCOMTR-MCNC: 155 MG/DL (ref 70–99)
GLUCOSE BLDC GLUCOMTR-MCNC: 184 MG/DL (ref 70–99)
GLUCOSE BLDC GLUCOMTR-MCNC: 257 MG/DL (ref 70–99)

## 2020-02-04 PROCEDURE — 00000146 ZZHCL STATISTIC GLUCOSE BY METER IP

## 2020-02-04 PROCEDURE — 12400000 ZZH R&B MH

## 2020-02-04 PROCEDURE — 99232 SBSQ HOSP IP/OBS MODERATE 35: CPT | Performed by: NURSE PRACTITIONER

## 2020-02-04 PROCEDURE — 25000132 ZZH RX MED GY IP 250 OP 250 PS 637: Performed by: NURSE PRACTITIONER

## 2020-02-04 RX ORDER — CLONAZEPAM 1 MG/1
1 TABLET ORAL DAILY PRN
Status: DISCONTINUED | OUTPATIENT
Start: 2020-02-04 | End: 2020-02-15 | Stop reason: HOSPADM

## 2020-02-04 RX ORDER — CLONAZEPAM 1 MG/1
1 TABLET ORAL 2 TIMES DAILY PRN
Status: DISCONTINUED | OUTPATIENT
Start: 2020-02-04 | End: 2020-02-04

## 2020-02-04 RX ADMIN — INSULIN GLARGINE 30 UNITS: 100 INJECTION, SOLUTION SUBCUTANEOUS at 20:16

## 2020-02-04 RX ADMIN — BUSPIRONE HYDROCHLORIDE 15 MG: 10 TABLET ORAL at 08:12

## 2020-02-04 RX ADMIN — INSULIN ASPART 8 UNITS: 100 INJECTION, SOLUTION INTRAVENOUS; SUBCUTANEOUS at 16:57

## 2020-02-04 RX ADMIN — GABAPENTIN 1200 MG: 600 TABLET, FILM COATED ORAL at 20:14

## 2020-02-04 RX ADMIN — GABAPENTIN 1200 MG: 600 TABLET, FILM COATED ORAL at 08:11

## 2020-02-04 RX ADMIN — CHLORPROMAZINE HYDROCHLORIDE 100 MG: 100 TABLET, SUGAR COATED ORAL at 20:14

## 2020-02-04 RX ADMIN — BUSPIRONE HYDROCHLORIDE 15 MG: 10 TABLET ORAL at 13:03

## 2020-02-04 RX ADMIN — INSULIN ASPART 8 UNITS: 100 INJECTION, SOLUTION INTRAVENOUS; SUBCUTANEOUS at 12:23

## 2020-02-04 RX ADMIN — HYDROXYZINE HYDROCHLORIDE 100 MG: 25 TABLET, FILM COATED ORAL at 08:48

## 2020-02-04 RX ADMIN — NICOTINE 1 PATCH: 21 PATCH, EXTENDED RELEASE TRANSDERMAL at 08:12

## 2020-02-04 RX ADMIN — BUSPIRONE HYDROCHLORIDE 15 MG: 10 TABLET ORAL at 20:14

## 2020-02-04 RX ADMIN — GABAPENTIN 1200 MG: 600 TABLET, FILM COATED ORAL at 13:03

## 2020-02-04 RX ADMIN — CHLORPROMAZINE HYDROCHLORIDE 100 MG: 100 TABLET, SUGAR COATED ORAL at 08:12

## 2020-02-04 RX ADMIN — BENZTROPINE MESYLATE 1 MG: 1 TABLET ORAL at 08:12

## 2020-02-04 RX ADMIN — IBUPROFEN 600 MG: 600 TABLET ORAL at 03:27

## 2020-02-04 RX ADMIN — BENZTROPINE MESYLATE 1 MG: 1 TABLET ORAL at 20:14

## 2020-02-04 RX ADMIN — CLONAZEPAM 1 MG: 1 TABLET ORAL at 20:15

## 2020-02-04 RX ADMIN — INSULIN ASPART 8 UNITS: 100 INJECTION, SOLUTION INTRAVENOUS; SUBCUTANEOUS at 08:11

## 2020-02-04 RX ADMIN — PRAZOSIN HYDROCHLORIDE 1 MG: 1 CAPSULE ORAL at 20:14

## 2020-02-04 RX ADMIN — METFORMIN HYDROCHLORIDE 1000 MG: 500 TABLET, FILM COATED ORAL at 08:11

## 2020-02-04 RX ADMIN — IBUPROFEN 600 MG: 600 TABLET ORAL at 20:14

## 2020-02-04 RX ADMIN — METFORMIN HYDROCHLORIDE 1000 MG: 500 TABLET, FILM COATED ORAL at 16:57

## 2020-02-04 RX ADMIN — CHLORPROMAZINE HYDROCHLORIDE 100 MG: 100 TABLET, SUGAR COATED ORAL at 13:03

## 2020-02-04 RX ADMIN — DIVALPROEX SODIUM 750 MG: 500 TABLET, EXTENDED RELEASE ORAL at 20:14

## 2020-02-04 ASSESSMENT — ACTIVITIES OF DAILY LIVING (ADL)
ORAL_HYGIENE: INDEPENDENT
DRESS: SCRUBS (BEHAVIORAL HEALTH)
HYGIENE/GROOMING: INDEPENDENT

## 2020-02-04 NOTE — PLAN OF CARE
Problem: Adult Behavioral Health Plan of Care  Goal: Patient-Specific Goal (Individualization)  Description  Pt will follow recommendations of treatment team.  Pt will be compliant with medications.  Pt will attend 50 % of groups.  Pt will sleep 6-8 hours each night.  Pt will complete ADL's independently.   2/3/2020 1900 by Luz Verma, RN  Outcome: Improving  Note:   Pt has been in bed with eyes closed and regular respirations most of the night. Per charting, pt only slept 2.75 hours last night. Pt did eat 100% of her supper in the lounge tonight. She was compliant with her scheduled medications. She did not make any delusional statements to this writer during interactions. She asked appropriate questions about her discharge plans.      Face to face end of shift report communicated to oncoming RN.      Problem: Fall Injury Risk  Goal: Absence of Fall and Fall-Related Injury  Description  Pt will wear non skid slippers.  Pt will remain free from falls or injuries during hospitalization.   2/3/2020 1900 by Luz Verma, RN  Outcome: Improving  Note:   Pt remained free from falls. Gait was balanced and steady.      Problem: Thought Process Alteration  Goal: Optimal Thought Clarity  Description  Patient will be able to hold reality based conversations prior to discharge.     2/3/2020 1900 by Luz Verma, RN  Outcome: Improving  Note:   Thought process was logical tonight.

## 2020-02-04 NOTE — PROGRESS NOTES
[facility]  Psychiatric Progress Note      Impression:     Patient up on unit with her charts.  She wants to talk about going to the shelter [facility] in [place] and tells me she would be close to her family.  She tells me they monitor and manage her medications.  Unclear if this would be an appropriate place for her, SW has been leaving messages with patient's guardian and is awaiting call backs.  Otherwise, patient has remained calm for the most part, she remains on track with discussion today, less delusional statements, sleeping good at night.  Added prn Clonazepam for daytime in the event her anxiety is too high, otherwise no changes.    Previous medication trials documented:  Zyprexa - weight gain, not fully effective  Invega - ineffective  Tegretol - poorly tolerated  Lithium - poorly tolerated, severe sedation  Clozaril - irritability, hyperthermia, and persistent tachycardia  Depakote - Weight gain and poor mood control (this was still restarted during admission in 11/19)  Thorazine - notable improvement on 200mg tid, decreased in 11/19 on an outpatient visit, resulting in readmission.  Gabapentin   Buspar  Klonopin  Cogentin           Diagnoses:     Schizoaffective Disorder, Bipolar Type  PTSD, by history  R/O Other stimulant use disorder, methamphetamine, with induced perceptual disorder    Attestation:  Patient has been seen and evaluated by me,  LATRICE Du CNP          Interim History:   The patient's care was discussed with the treatment team and chart notes were reviewed.    BEHAVIORAL TEAM DISCUSSION    Progress: Good  Continued Stay Criteria/Rationale: Delusional, no safe discharge plan  Medical/Physical: Diabetes Mellitus Type 2  Precautions: No   Falls precaution?: Yes  Behavioral Orders   Procedures     Code 1 - Restrict to Unit     Routine Programming     As clinically indicated     Status 15     Every 15 minutes.     Plan: Continue current medications - exploring safe  "discharge options - Guardian in [place]  Rationale for change in precautions or plan: None      Participants: Natalia Fabian, APRN-CNP, Angie Laws LSW,  Becca Chery LSW, Jim Barrios LSW, Nursing, [facility], Krys BRAGA supervisor            Medications:   I have reviewed this patient's current medications    Current Facility-Administered Medications   Medication     acetaminophen (TYLENOL) tablet 650 mg     alum & mag hydroxide-simethicone (MYLANTA ES/MAALOX  ES) suspension 30 mL     artificial saliva (BIOTENE MT) solution 1-2 spray     benztropine (COGENTIN) tablet 1 mg     bisacodyl (DULCOLAX) Suppository 10 mg     busPIRone (BUSPAR) tablet 15 mg     chlorproMAZINE (THORAZINE) tablet 100 mg     clonazePAM (klonoPIN) tablet 1 mg     clonazePAM (klonoPIN) tablet 1 mg     glucose gel 15-30 g    Or     dextrose 50 % injection 25-50 mL    Or     glucagon injection 1 mg     divalproex sodium extended-release (DEPAKOTE ER) 24 hr tablet 750 mg     gabapentin (NEURONTIN) tablet 1,200 mg     hydrOXYzine (ATARAX) tablet  mg     ibuprofen (ADVIL/MOTRIN) tablet 600 mg     insulin aspart (NovoLOG) inj (RAPID ACTING)     insulin aspart (NovoLOG) inj (RAPID ACTING)     insulin aspart (NovoLOG) inj (RAPID ACTING)     insulin glargine (LANTUS PEN) injection 30 Units     magnesium hydroxide (MILK OF MAGNESIA) suspension 30 mL     metFORMIN (GLUCOPHAGE) tablet 1,000 mg     nicotine (NICODERM CQ) 21 MG/24HR 24 hr patch 1 patch     nicotine (NICORETTE) gum 2 mg     nicotine Patch in Place     polyethylene glycol (MIRALAX/GLYCOLAX) Packet 17 g     prazosin (MINIPRESS) capsule 1 mg     traZODone (DESYREL) tablet 50 mg        10 point ROS - see H&P       Allergies:     Allergies   Allergen Reactions     Haloperidol Other (See Comments)     Other reaction(s): Seizures  Patient states, \"I fell down and wasn't able to get up.\"  Patient states, \"I fell down and wasn't able to get up.\"              " "Psychiatric Examination:   /76   Pulse 95   Temp 98.5  F (36.9  C) (Tympanic)   Resp 16   Ht 1.651 m (5' 5\")   Wt 88.4 kg (194 lb 14.4 oz)   SpO2 99%   BMI 32.43 kg/m    Weight is 194 lbs 14.4 oz  Body mass index is 32.43 kg/m .    Appearance:  awake, alert  Attitude:  cooperative  Eye Contact:  Good  Mood:  good  Affect:  Mood congruent  Speech:  Improved  Psychomotor Behavior:  appropriate  Thought Process:  Goal oriented, able to answer questions in a linear fashion; few delusional statements  Associations:  JUSTA   Thought Content:  No SI/HI, Delusional content  Insight:  limited  Judgment:  limited  Oriented to:  time, person, and place  Attention Span and Concentration:  limited  Recent and Remote Memory:  fair - impacted by delusional beliefs and drug use  Fund of Knowledge: appropriate  Muscle Strength and Tone: normal  Gait and Station: Normal           Labs:     Results for orders placed or performed during the hospital encounter of 01/08/20 (from the past 48 hour(s))   Glucose by meter   Result Value Ref Range    Glucose 154 (H) 70 - 99 mg/dL   Glucose by meter   Result Value Ref Range    Glucose 159 (H) 70 - 99 mg/dL   Glucose by meter   Result Value Ref Range    Glucose 154 (H) 70 - 99 mg/dL   Glucose by meter   Result Value Ref Range    Glucose 174 (H) 70 - 99 mg/dL   Glucose by meter   Result Value Ref Range    Glucose 167 (H) 70 - 99 mg/dL   Glucose by meter   Result Value Ref Range    Glucose 105 (H) 70 - 99 mg/dL   Glucose by meter   Result Value Ref Range    Glucose 143 (H) 70 - 99 mg/dL   Glucose by meter   Result Value Ref Range    Glucose 141 (H) 70 - 99 mg/dL   Glucose by meter   Result Value Ref Range    Glucose 129 (H) 70 - 99 mg/dL   Glucose by meter   Result Value Ref Range    Glucose 184 (H) 70 - 99 mg/dL   Glucose by meter   Result Value Ref Range    Glucose 128 (H) 70 - 99 mg/dL   Glucose by meter   Result Value Ref Range    Glucose 257 (H) 70 - 99 mg/dL          Plan: "     Continue thorazine 100mg tid  Adding daytime clonazepam prn for anxiety, short-term  ELOS: >5 days - Exploring safe discharge plans, further stabilization

## 2020-02-04 NOTE — PLAN OF CARE
Face to face report received from Luz MAK. Pt. Observed.       Problem: Adult Behavioral Health Plan of Care  Goal: Patient-Specific Goal (Individualization)  Description  Pt will follow recommendations of treatment team.  Pt will be compliant with medications.  Pt will attend 50 % of groups.  Pt will sleep 6-8 hours each night.  Pt will complete ADL's independently.   2/4/2020 0411 by Eliza Arndt, RN  Outcome: No Change     Pt has been in bed with eyes closed and regular respirations x 3 hours this noc shift. Pt. Sleep broken.Pt. to n uses station requesting BG be checked as she felt her BS was low. @ 0127 pt. . Pt. requesting and administered snack. Pt. Administered PRN Ibuprofen 600 mg po for c/o bilateral foot pain @ 0327. 15 minute and PRN checks all night. Will continue to monitor.    Problem: Fall Injury Risk  Goal: Absence of Fall and Fall-Related Injury  Description  Pt will wear non skid slippers.  Pt will remain free from falls or injuries during hospitalization.   2/4/2020 0411 by Eliza Arndt, RN  Outcome: Improving     Face to face end of shift report to be communicated to oncoming RN.     Eliza Arndt, OBDULIO  2/4/2020

## 2020-02-04 NOTE — PLAN OF CARE
Pt up to the door right away this morning asking if writer has heard any update - informed pt writer has not had any calls back from guardians or Anadna as of this morning. Writer called and left a message for Raven Dupont and Sally all from Health system and also called and left a message for clinical manager at [facility].     Received a call from Ruslan with [facility] stating they do not have opening at this time, but they do have one person looking for another placement in [place] and they will not hold her bed - will fax referral for pt.      Kiah states she spoke with Ananda yesterday and he has agreed that a nurse from [facility] will come and re-screen pt and Kiah stated she told Ananda that the contracts should be voided as pt signed all contracts and guardians were not involved at all. Kiah states the nurse was suppose to come and see pt today. Received a call from Kerry with [facility] stating that all of pt's criminal charges have been dropped and guardianship hearing is not until June - Kerry stated she is unsure of what hearing pt is referring to on Feb 13th. Pt is currently being screened by [facility].

## 2020-02-04 NOTE — PLAN OF CARE
Problem: Adult Behavioral Health Plan of Care  Goal: Patient-Specific Goal (Individualization)  Description  Pt will follow recommendations of treatment team.  Pt will be compliant with medications.  Pt will attend 50 % of groups.  Pt will sleep 6-8 hours each night.  Pt will complete ADL's independently.   2/4/2020 0920 by Ayah Matos RN  Outcome: No Change  Note:          Problem: Thought Process Alteration  Goal: Optimal Thought Clarity  Description  Patient will be able to hold reality based conversations prior to discharge.     Outcome: No Change   Pt. Becoming frustrated with no set discharge plan, does not hold linear conversation,  Makes delusional statements frequently throughout conversation.  Problem: Fall Injury Risk  Goal: Absence of Fall and Fall-Related Injury  Description  Pt will wear non skid slippers.  Pt will remain free from falls or injuries during hospitalization.   2/4/2020 0920 by Ayah Matos, RN  Outcome: Improving   Pt. Has been free from falls, gait is balanced and steady.    Face to face end of shift report will be communicated to oncoming afternoon shift RN.     Ayah Matos RN  2/4/2020  12:52 PM

## 2020-02-05 LAB
GLUCOSE BLDC GLUCOMTR-MCNC: 118 MG/DL (ref 70–99)
GLUCOSE BLDC GLUCOMTR-MCNC: 127 MG/DL (ref 70–99)
GLUCOSE BLDC GLUCOMTR-MCNC: 241 MG/DL (ref 70–99)
GLUCOSE BLDC GLUCOMTR-MCNC: 91 MG/DL (ref 70–99)
GLUCOSE BLDC GLUCOMTR-MCNC: 95 MG/DL (ref 70–99)

## 2020-02-05 PROCEDURE — 25000132 ZZH RX MED GY IP 250 OP 250 PS 637: Performed by: NURSE PRACTITIONER

## 2020-02-05 PROCEDURE — 12400000 ZZH R&B MH

## 2020-02-05 PROCEDURE — 00000146 ZZHCL STATISTIC GLUCOSE BY METER IP

## 2020-02-05 RX ADMIN — CHLORPROMAZINE HYDROCHLORIDE 100 MG: 100 TABLET, SUGAR COATED ORAL at 20:45

## 2020-02-05 RX ADMIN — BENZTROPINE MESYLATE 1 MG: 1 TABLET ORAL at 08:06

## 2020-02-05 RX ADMIN — BUSPIRONE HYDROCHLORIDE 15 MG: 10 TABLET ORAL at 20:45

## 2020-02-05 RX ADMIN — DIVALPROEX SODIUM 750 MG: 500 TABLET, EXTENDED RELEASE ORAL at 20:44

## 2020-02-05 RX ADMIN — INSULIN ASPART 8 UNITS: 100 INJECTION, SOLUTION INTRAVENOUS; SUBCUTANEOUS at 16:47

## 2020-02-05 RX ADMIN — CLONAZEPAM 1 MG: 1 TABLET ORAL at 20:45

## 2020-02-05 RX ADMIN — INSULIN ASPART 8 UNITS: 100 INJECTION, SOLUTION INTRAVENOUS; SUBCUTANEOUS at 12:22

## 2020-02-05 RX ADMIN — CHLORPROMAZINE HYDROCHLORIDE 100 MG: 100 TABLET, SUGAR COATED ORAL at 13:06

## 2020-02-05 RX ADMIN — METFORMIN HYDROCHLORIDE 1000 MG: 500 TABLET, FILM COATED ORAL at 08:05

## 2020-02-05 RX ADMIN — BENZTROPINE MESYLATE 1 MG: 1 TABLET ORAL at 20:45

## 2020-02-05 RX ADMIN — INSULIN ASPART 8 UNITS: 100 INJECTION, SOLUTION INTRAVENOUS; SUBCUTANEOUS at 08:06

## 2020-02-05 RX ADMIN — GABAPENTIN 1200 MG: 600 TABLET, FILM COATED ORAL at 08:06

## 2020-02-05 RX ADMIN — BUSPIRONE HYDROCHLORIDE 15 MG: 10 TABLET ORAL at 08:06

## 2020-02-05 RX ADMIN — INSULIN GLARGINE 30 UNITS: 100 INJECTION, SOLUTION SUBCUTANEOUS at 20:58

## 2020-02-05 RX ADMIN — GABAPENTIN 1200 MG: 600 TABLET, FILM COATED ORAL at 13:05

## 2020-02-05 RX ADMIN — PRAZOSIN HYDROCHLORIDE 1 MG: 1 CAPSULE ORAL at 20:45

## 2020-02-05 RX ADMIN — GABAPENTIN 1200 MG: 600 TABLET, FILM COATED ORAL at 20:44

## 2020-02-05 RX ADMIN — CHLORPROMAZINE HYDROCHLORIDE 100 MG: 100 TABLET, SUGAR COATED ORAL at 08:06

## 2020-02-05 RX ADMIN — NICOTINE 1 PATCH: 21 PATCH, EXTENDED RELEASE TRANSDERMAL at 08:05

## 2020-02-05 RX ADMIN — BUSPIRONE HYDROCHLORIDE 15 MG: 10 TABLET ORAL at 13:06

## 2020-02-05 RX ADMIN — METFORMIN HYDROCHLORIDE 1000 MG: 500 TABLET, FILM COATED ORAL at 16:47

## 2020-02-05 ASSESSMENT — ACTIVITIES OF DAILY LIVING (ADL)
DRESS: SCRUBS (BEHAVIORAL HEALTH)
ORAL_HYGIENE: INDEPENDENT
HYGIENE/GROOMING: INDEPENDENT
DRESS: SCRUBS (BEHAVIORAL HEALTH)
HYGIENE/GROOMING: INDEPENDENT
ORAL_HYGIENE: INDEPENDENT

## 2020-02-05 NOTE — PLAN OF CARE
Face to face report received from Nancy MAK. Pt. Observed.       Problem: Adult Behavioral Health Plan of Care  Goal: Patient-Specific Goal (Individualization)  Description  Pt will follow recommendations of treatment team.  Pt will be compliant with medications.  Pt will attend 50 % of groups.  Pt will sleep 6-8 hours each night.  Pt will complete ADL's independently.   2/5/2020 0251 by Eliza Arndt RN  Outcome: No Change  Note:     Pt has been in bed with eyes closed and regular respirations 3 hours this noc shift. 15 minute and PRN checks all night. No complaints offered. Will continue to monitor.       Problem: Fall Injury Risk  Goal: Absence of Fall and Fall-Related Injury  Description  Pt will wear non skid slippers.  Pt will remain free from falls or injuries during hospitalization.   2/5/2020 0251 by Eliza Arndt, RN  Outcome: Improving    Face to face end of shift report to be communicated to oncoming RN.     Eliza Arndt RN  2/5/2020

## 2020-02-05 NOTE — PLAN OF CARE
Problem: Adult Behavioral Health Plan of Care  Goal: Patient-Specific Goal (Individualization)  Description  Pt will follow recommendations of treatment team.  Pt will be compliant with medications.  Pt will attend 50 % of groups.  Pt will sleep 6-8 hours each night.  Pt will complete ADL's independently.   2/5/2020 1613 by Nancy Stern, RN  Outcome: Improving  Note:   Shift Summery:  VSS. Affect flat/depressed today.  Pt sits in lounge much of afternoon making charts and lists. Withdrawn, not as talkative this evening. Denies all criteria including: SI, SIB, HI or hallucinations.   Sleeps much of the evening after dinner.   Cooperative with assessment and medications. BG at dinner 118 and 95 at HS-pt encouraged to eat snacks before Lantus 30 units rec'd. Eats vj crackers with pudding, string cheese and juice.     Problem: Fall Injury Risk  Goal: Absence of Fall and Fall-Related Injury  Description  Pt will wear non skid slippers.  Pt will remain free from falls or injuries during hospitalization.   2/5/2020 1613 by Nancy Stern, RN  Outcome: Improving     Problem: Thought Process Alteration  Goal: Optimal Thought Clarity  Description  Patient will be able to hold reality based conversations prior to discharge.     2/5/2020 1613 by Nancy Stern, RN  Outcome: Improving     Face to face end of shift report communicated to oncoming shift.     Nancy Stern, RN  2/5/2020  4:18 PM

## 2020-02-05 NOTE — PLAN OF CARE
Problem: Thought Process Alteration  Goal: Optimal Thought Clarity  Description  Patient will be able to hold reality based conversations prior to discharge.     2/5/2020 0855 by Ayah Matos RN  Outcome: No Change   Pt. Does not hold reality based conversation, has delusional content with conversation.  Problem: Adult Behavioral Health Plan of Care  Goal: Patient-Specific Goal (Individualization)  Description  Pt will follow recommendations of treatment team.  Pt will be compliant with medications.  Pt will attend 50 % of groups.  Pt will sleep 6-8 hours each night.  Pt will complete ADL's independently.   2/5/2020 0855 by Ayah Matos RN  Outcome: Improving  Note:     Pt. Willing to follow treatment team recommendations, compliant taking prescribed medications, slept 3 hours last night, is independent with ADL's, spending most of time sitting in dayroom working on her paperwork.     Problem: Fall Injury Risk  Goal: Absence of Fall and Fall-Related Injury  Description  Pt will wear non skid slippers.  Pt will remain free from falls or injuries during hospitalization.   2/5/2020 0855 by Ayah Matos RN  Outcome: Improving   Pt. Has remained free from falls, gait is balanced and steady.    Face to face end of shift report will be communicated to oncoming afternoon shift OBDULIO.     Ayah Matos RN  2/5/2020  12:29 PM
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
## 2020-02-05 NOTE — PLAN OF CARE
Problem: Adult Behavioral Health Plan of Care  Goal: Patient-Specific Goal (Individualization)  Description  Pt will follow recommendations of treatment team.  Pt will be compliant with medications.  Pt will attend 50 % of groups.  Pt will sleep 6-8 hours each night.  Pt will complete ADL's independently.   2/4/2020 2058 by Nancy Stern, RN  Outcome: Improving  Note:   Shift Summery:  VSS, pain 6/10 to R-wrist-Ibuprofen rec'd with pt sleeping on recheck. Denies all criteria including: SI, SIB, HI or hallucinations.   Pt has interview with staff from [facility]. Pt is hopeful and excited that they will accept her for placement.  Full range affect, happy and joking with this writer. Cooperative with nursing assessment and medications. Behavior appropriate with staff and peers.          Problem: Fall Injury Risk  Goal: Absence of Fall and Fall-Related Injury  Description  Pt will wear non skid slippers.  Pt will remain free from falls or injuries during hospitalization.   2/4/2020 2058 by Nancy Stern, RN  Outcome: Improving     Problem: Thought Process Alteration  Goal: Optimal Thought Clarity  Description  Patient will be able to hold reality based conversations prior to discharge.     2/4/2020 2058 by Nancy Stern, RN  Outcome: Improving    Face to face end of shift report communicated to oncoming shift.     Nancy Stern, RN  2/4/2020  9:16 PM

## 2020-02-06 LAB
GLUCOSE BLDC GLUCOMTR-MCNC: 112 MG/DL (ref 70–99)
GLUCOSE BLDC GLUCOMTR-MCNC: 173 MG/DL (ref 70–99)
GLUCOSE BLDC GLUCOMTR-MCNC: 177 MG/DL (ref 70–99)
GLUCOSE BLDC GLUCOMTR-MCNC: 91 MG/DL (ref 70–99)
GLUCOSE BLDC GLUCOMTR-MCNC: 96 MG/DL (ref 70–99)

## 2020-02-06 PROCEDURE — 25000132 ZZH RX MED GY IP 250 OP 250 PS 637: Performed by: NURSE PRACTITIONER

## 2020-02-06 PROCEDURE — 12400000 ZZH R&B MH

## 2020-02-06 PROCEDURE — 00000146 ZZHCL STATISTIC GLUCOSE BY METER IP

## 2020-02-06 PROCEDURE — 99231 SBSQ HOSP IP/OBS SF/LOW 25: CPT | Performed by: NURSE PRACTITIONER

## 2020-02-06 RX ORDER — CHLORPROMAZINE HYDROCHLORIDE 100 MG/1
TABLET, FILM COATED ORAL
Qty: 84 TABLET | Refills: 0 | OUTPATIENT
Start: 2020-02-06

## 2020-02-06 RX ADMIN — CHLORPROMAZINE HYDROCHLORIDE 100 MG: 100 TABLET, SUGAR COATED ORAL at 13:17

## 2020-02-06 RX ADMIN — INSULIN ASPART 8 UNITS: 100 INJECTION, SOLUTION INTRAVENOUS; SUBCUTANEOUS at 16:54

## 2020-02-06 RX ADMIN — GABAPENTIN 1200 MG: 600 TABLET, FILM COATED ORAL at 08:20

## 2020-02-06 RX ADMIN — GABAPENTIN 1200 MG: 600 TABLET, FILM COATED ORAL at 13:17

## 2020-02-06 RX ADMIN — IBUPROFEN 600 MG: 600 TABLET ORAL at 09:30

## 2020-02-06 RX ADMIN — CHLORPROMAZINE HYDROCHLORIDE 100 MG: 100 TABLET, SUGAR COATED ORAL at 08:20

## 2020-02-06 RX ADMIN — DIVALPROEX SODIUM 750 MG: 500 TABLET, EXTENDED RELEASE ORAL at 21:09

## 2020-02-06 RX ADMIN — METFORMIN HYDROCHLORIDE 1000 MG: 500 TABLET, FILM COATED ORAL at 08:20

## 2020-02-06 RX ADMIN — BUSPIRONE HYDROCHLORIDE 15 MG: 10 TABLET ORAL at 21:09

## 2020-02-06 RX ADMIN — BUSPIRONE HYDROCHLORIDE 15 MG: 10 TABLET ORAL at 08:20

## 2020-02-06 RX ADMIN — GABAPENTIN 1200 MG: 600 TABLET, FILM COATED ORAL at 21:10

## 2020-02-06 RX ADMIN — BUSPIRONE HYDROCHLORIDE 15 MG: 10 TABLET ORAL at 13:18

## 2020-02-06 RX ADMIN — BENZTROPINE MESYLATE 1 MG: 1 TABLET ORAL at 21:10

## 2020-02-06 RX ADMIN — NICOTINE 1 PATCH: 21 PATCH, EXTENDED RELEASE TRANSDERMAL at 08:21

## 2020-02-06 RX ADMIN — INSULIN ASPART 8 UNITS: 100 INJECTION, SOLUTION INTRAVENOUS; SUBCUTANEOUS at 12:20

## 2020-02-06 RX ADMIN — INSULIN ASPART 8 UNITS: 100 INJECTION, SOLUTION INTRAVENOUS; SUBCUTANEOUS at 08:21

## 2020-02-06 RX ADMIN — CHLORPROMAZINE HYDROCHLORIDE 100 MG: 100 TABLET, SUGAR COATED ORAL at 21:10

## 2020-02-06 RX ADMIN — INSULIN GLARGINE 30 UNITS: 100 INJECTION, SOLUTION SUBCUTANEOUS at 21:06

## 2020-02-06 RX ADMIN — PRAZOSIN HYDROCHLORIDE 1 MG: 1 CAPSULE ORAL at 21:10

## 2020-02-06 RX ADMIN — METFORMIN HYDROCHLORIDE 1000 MG: 500 TABLET, FILM COATED ORAL at 16:55

## 2020-02-06 RX ADMIN — BENZTROPINE MESYLATE 1 MG: 1 TABLET ORAL at 08:20

## 2020-02-06 RX ADMIN — CLONAZEPAM 1 MG: 1 TABLET ORAL at 21:11

## 2020-02-06 ASSESSMENT — ACTIVITIES OF DAILY LIVING (ADL)
ORAL_HYGIENE: INDEPENDENT
DRESS: SCRUBS (BEHAVIORAL HEALTH);INDEPENDENT
DRESS: SCRUBS (BEHAVIORAL HEALTH)
ORAL_HYGIENE: INDEPENDENT
LAUNDRY: UNABLE TO COMPLETE
HYGIENE/GROOMING: INDEPENDENT
HYGIENE/GROOMING: INDEPENDENT

## 2020-02-06 NOTE — PROGRESS NOTES
[facility]  Psychiatric Progress Note      Impression:   Genie remains cooperative with waiting for placement. She is frustrated with the process but is understanding that staff are working hard to find a place. Genie is not participating in groups much but is social with staff and peers. She [place] much of the [place] [facility] [place] working on her charts and graphs. She talks about her guardianship hearing on the 13 of February and feels if this is dropped she may be able to get her own apartment. Did discuss with Genie that the court date may have been moved.     Previous medication trials documented:  Zyprexa - weight gain, not fully effective  Invega - ineffective  Tegretol - poorly tolerated  Lithium - poorly tolerated, severe sedation  Clozaril - irritability, hyperthermia, and persistent tachycardia  Depakote - Weight gain and poor mood control (this was still restarted during admission in 11/19)  Thorazine - notable improvement on 200mg tid, decreased in 11/19 on an outpatient visit, resulting in readmission.  Gabapentin   Buspar  Klonopin  Cogentin           Diagnoses:     Schizoaffective Disorder, Bipolar Type  PTSD, by history  R/O Other stimulant use disorder, methamphetamine, with induced perceptual disorder    Attestation:  Patient has been seen and evaluated by me,  Loren Zuleta NP          Interim History:   The patient's care was discussed with the treatment team and chart notes were reviewed.    BEHAVIORAL TEAM DISCUSSION    Progress: Good  Continued Stay Criteria/Rationale: Delusional, no safe discharge plan  Medical/Physical: Diabetes Mellitus Type 2  Precautions: No   Falls precaution?: Yes  Behavioral Orders   Procedures     Code 1 - Restrict to Unit     Routine Programming     As clinically indicated     Status 15     Every 15 minutes.     Plan: Continue current medications - exploring safe discharge options - Guardian in [place]  Rationale for change in precautions or plan:  "None      Participants: Loren Zuleta, APRN-CNP, Angie Laws LSW,  Becca Chery LSW, Jim Barrios LSW, Nursing, katerina OT, Alberta OT            Medications:   I have reviewed this patient's current medications    Current Facility-Administered Medications   Medication     acetaminophen (TYLENOL) tablet 650 mg     alum & mag hydroxide-simethicone (MYLANTA ES/MAALOX  ES) suspension 30 mL     artificial saliva (BIOTENE MT) solution 1-2 spray     benztropine (COGENTIN) tablet 1 mg     bisacodyl (DULCOLAX) Suppository 10 mg     busPIRone (BUSPAR) tablet 15 mg     chlorproMAZINE (THORAZINE) tablet 100 mg     clonazePAM (klonoPIN) tablet 1 mg     clonazePAM (klonoPIN) tablet 1 mg     glucose gel 15-30 g    Or     dextrose 50 % injection 25-50 mL    Or     glucagon injection 1 mg     divalproex sodium extended-release (DEPAKOTE ER) 24 hr tablet 750 mg     gabapentin (NEURONTIN) tablet 1,200 mg     hydrOXYzine (ATARAX) tablet  mg     ibuprofen (ADVIL/MOTRIN) tablet 600 mg     insulin aspart (NovoLOG) inj (RAPID ACTING)     insulin aspart (NovoLOG) inj (RAPID ACTING)     insulin aspart (NovoLOG) inj (RAPID ACTING)     insulin glargine (LANTUS PEN) injection 30 Units     magnesium hydroxide (MILK OF MAGNESIA) suspension 30 mL     metFORMIN (GLUCOPHAGE) tablet 1,000 mg     nicotine (NICODERM CQ) 21 MG/24HR 24 hr patch 1 patch     nicotine (NICORETTE) gum 2 mg     nicotine Patch in Place     polyethylene glycol (MIRALAX/GLYCOLAX) Packet 17 g     prazosin (MINIPRESS) capsule 1 mg     traZODone (DESYREL) tablet 50 mg        10 point ROS - see H&P       Allergies:     Allergies   Allergen Reactions     Haloperidol Other (See Comments)     Other reaction(s): Seizures  Patient states, \"I fell down and wasn't able to get up.\"  Patient states, \"I fell down and wasn't able to get up.\"              Psychiatric Examination:   /73   Pulse 94   Temp 97.5  F (36.4  C) (Tympanic)   Resp 16   Ht 1.651 m (5' 5\")   Wt " 88.4 kg (194 lb 14.4 oz)   SpO2 99%   BMI 32.43 kg/m    Weight is 194 lbs 14.4 oz  Body mass index is 32.43 kg/m .    Appearance:  awake, alert  Attitude:  cooperative  Eye Contact:  Good  Mood:  good  Affect:  Mood congruent  Speech:  Improved  Psychomotor Behavior:  appropriate  Thought Process:  Goal oriented, able to answer questions in a linear fashion; few delusional statements  Associations:  JUSTA   Thought Content:  No SI/HI, Delusional content  Insight:  limited  Judgment:  limited  Oriented to:  time, person, and place  Attention Span and Concentration:  limited  Recent and Remote Memory:  fair - impacted by delusional beliefs and drug use  Fund of Knowledge: appropriate  Muscle Strength and Tone: normal  Gait and Station: Normal           Labs:     Results for orders placed or performed during the hospital encounter of 01/08/20 (from the past 48 hour(s))   Glucose by meter   Result Value Ref Range    Glucose 125 (H) 70 - 99 mg/dL   Glucose by meter   Result Value Ref Range    Glucose 155 (H) 70 - 99 mg/dL   Glucose by meter   Result Value Ref Range    Glucose 127 (H) 70 - 99 mg/dL   Glucose by meter   Result Value Ref Range    Glucose 91 70 - 99 mg/dL   Glucose by meter   Result Value Ref Range    Glucose 241 (H) 70 - 99 mg/dL   Glucose by meter   Result Value Ref Range    Glucose 118 (H) 70 - 99 mg/dL   Glucose by meter   Result Value Ref Range    Glucose 95 70 - 99 mg/dL   Glucose by meter   Result Value Ref Range    Glucose 96 70 - 99 mg/dL   Glucose by meter   Result Value Ref Range    Glucose 112 (H) 70 - 99 mg/dL   Glucose by meter   Result Value Ref Range    Glucose 177 (H) 70 - 99 mg/dL          Plan:     Continue thorazine 100mg tid  Adding daytime clonazepam prn for anxiety, short-term  ELOS: >5 days - Exploring safe discharge plans, further stabilization

## 2020-02-06 NOTE — PROGRESS NOTES
CHW met with patient, patient is doing well. Patient stated that they want to go back to [facility] however there was no updated news. Patient stated that they wanted to keep CHW updated on what was going on. CHW encouraged patient to reach out if they need anything.    Jaya Winter, BSW  Community Health Worker, CHW  [facility]

## 2020-02-06 NOTE — PLAN OF CARE
Problem: Adult Behavioral Health Plan of Care  Goal: Patient-Specific Goal (Individualization)  Description  Pt will follow recommendations of treatment team.  Pt will be compliant with medications.  Pt will attend 50 % of groups.  Pt will sleep 6-8 hours each night.  Pt will complete ADL's independently.   2/6/2020 0907 by Ayah Matos RN  Outcome: No Change  Note:     Pt. Becoming irritable with no set discharge date yet, has been cooperative with taking prescribed medications, slept 3 hours last night, is independent with ADL's, sitting in dayroom, ate breakfast in lounge, appetite good.     Problem: Thought Process Alteration  Goal: Optimal Thought Clarity  Description  Patient will be able to hold reality based conversations prior to discharge.     2/6/2020 0907 by Ayah Matos RN  Outcome: No Change   Pt. Does not hold reality based conversation, has delusional content during conversation.  Problem: Fall Injury Risk  Goal: Absence of Fall and Fall-Related Injury  Description  Pt will wear non skid slippers.  Pt will remain free from falls or injuries during hospitalization.   2/6/2020 0907 by Ayah Matos, RN  Outcome: Improving   Pt. Has been free from falls, gait is balanced and steady.    Face to face end of shift report will be communicated to oncoming afternoon shift RN.     Ayah Matos RN  2/6/2020  11:17 AM

## 2020-02-06 NOTE — PLAN OF CARE
"Spoke with Sahra - who states that pt's insurance does not cover a crisis bed. [place] also stated that they do not have a contract with [facility] or [facility] and without insurance coverage or a contract through the [facility], [facility] they are unable to accept a pt. Called and left a message with one of pt's guardian's, Sally this morning. Pt asked for an update and stated \"I will go to any of the [facility] doesn't matter which one\" reminded pt she was denied by [facility] which would mean all of them.     Called and followed up with [facility] for home care services - clinical manager, Sherry, stated they are unable to \"take that one\" due to \"inappropriate services\" as \"being that she doesn't know where she is going and being evicted due to history of behaviors we are not able to serve her and take that liability on. Biggest reason is that fact that there is so much unknown of where she will end up.\" Attempted to explain to Sherry that pt has been completely appropriate on the unit in the time she has been admitted and has had no behaviors, has not been aggressive with staff or other patient's, explained to Sherry writer has sent referrals to multiple places, also explained to Sherry that [facility]'s eviction and termination of services for pt were inappropriate and pt's guardian's are working on the legal part of this and that will be between [facility] and [facility] - explained to Sherry pt is doing everything right at this time. Sherry stated she will \"take it back to my director and discuss this\"       Had a message from Vero, , with [facility] - called and spoke with Vero - she stated \"we are not going to be able to accept her at this time she is too much of a risk for our agency at this time\" stated  \"behaviors and drug use\" and \"I don't want to put my staff at risk\" called and spoke with [facility] - pt would need to be independent on blood sugars and will be able to " "do med set up - will send referral.     Called and spoke with Elena at [facility] as pt's guardian, Kiah, requested writer send updated information to Elena. Elena stated they reviewed the information and at this time they are unable to take pt as \"we just reviewed her 15 days ago and we don't feel like that is enough time for a change in behavior.\" Called and spoke with Sally - gave an update and she states she will contact Kiah as she is \"more knowledgeable in this area than I am\"     Received a call back from Landen with [facility] stating they are unable to take pt on at this time due to \"her current meth use\" and \"if she lives at [facility] they can provide her with services\" explained that [facility] has terminated pt's current home care services with them and that pt continues to deny she was using meth prior to being admitted and states that \"there was some residue left in the pipe when it broke\" although pt's utox was positive for meth pt continues to states she had not used meth before coming to the hospital.     Called and spoke with Gena with [facility] foster care who gave writer the number to the adult foster care worker, Latanya Fields - message was left this afternoon for Latanya.     Received a call from Sally - who requested to have information sent to a place in [facility] where they have another client - information scanned and emailed called and confirmed with Jeramie it was received and he will review and get back to writer. Pt asked for an update- updated pt on referral sent to Jeramie.         "

## 2020-02-06 NOTE — PLAN OF CARE
Problem: Adult Behavioral Health Plan of Care  Goal: Patient-Specific Goal (Individualization)  Description  Pt will follow recommendations of treatment team.  Pt will be compliant with medications.  Pt will attend 50 % of groups.  Pt will sleep 6-8 hours each night.  Pt will complete ADL's independently.     Pt appears to be sleeping comfortably with regular respirations. Pt slept approx. 3 hours this shift. No complaints at this time. Will continue to monitor.    0214 blood sugar checked at this time due to lower HS blood sugar and eating 50% of dinner. Blood sugar 96.  0620 Blood sugar 112. Pt states she had drank a juice prior to this. Pt states none of the items on the menu taste good anymore other than the pancakes and brownies.    Outcome: Improving    Problem: Thought Process Alteration  Goal: Optimal Thought Clarity  Description  Patient will be able to hold reality based conversations prior to discharge.       Outcome: Improving     Problem: Fall Injury Risk  Goal: Absence of Fall and Fall-Related Injury  Description  Pt will wear non skid slippers.  Pt will remain free from falls or injuries during hospitalization.     Pt appears to be sleeping. No falls.    Outcome: Improving    Face to face end of shift report communicated to oncoming RN.     2/6/2020  6:10 AM

## 2020-02-07 LAB
GLUCOSE BLDC GLUCOMTR-MCNC: 118 MG/DL (ref 70–99)
GLUCOSE BLDC GLUCOMTR-MCNC: 126 MG/DL (ref 70–99)
GLUCOSE BLDC GLUCOMTR-MCNC: 168 MG/DL (ref 70–99)
GLUCOSE BLDC GLUCOMTR-MCNC: 187 MG/DL (ref 70–99)

## 2020-02-07 PROCEDURE — 25000132 ZZH RX MED GY IP 250 OP 250 PS 637: Performed by: NURSE PRACTITIONER

## 2020-02-07 PROCEDURE — 25000131 ZZH RX MED GY IP 250 OP 636 PS 637: Performed by: NURSE PRACTITIONER

## 2020-02-07 PROCEDURE — 00000146 ZZHCL STATISTIC GLUCOSE BY METER IP

## 2020-02-07 PROCEDURE — 12400000 ZZH R&B MH

## 2020-02-07 PROCEDURE — 99232 SBSQ HOSP IP/OBS MODERATE 35: CPT | Performed by: NURSE PRACTITIONER

## 2020-02-07 RX ADMIN — GABAPENTIN 1200 MG: 600 TABLET, FILM COATED ORAL at 14:28

## 2020-02-07 RX ADMIN — PRAZOSIN HYDROCHLORIDE 1 MG: 1 CAPSULE ORAL at 21:44

## 2020-02-07 RX ADMIN — BUSPIRONE HYDROCHLORIDE 15 MG: 10 TABLET ORAL at 14:29

## 2020-02-07 RX ADMIN — CHLORPROMAZINE HYDROCHLORIDE 100 MG: 100 TABLET, SUGAR COATED ORAL at 21:44

## 2020-02-07 RX ADMIN — INSULIN GLARGINE 30 UNITS: 100 INJECTION, SOLUTION SUBCUTANEOUS at 21:44

## 2020-02-07 RX ADMIN — CLONAZEPAM 1 MG: 1 TABLET ORAL at 21:44

## 2020-02-07 RX ADMIN — GABAPENTIN 1200 MG: 600 TABLET, FILM COATED ORAL at 21:44

## 2020-02-07 RX ADMIN — NICOTINE 1 PATCH: 21 PATCH, EXTENDED RELEASE TRANSDERMAL at 08:09

## 2020-02-07 RX ADMIN — DIVALPROEX SODIUM 750 MG: 500 TABLET, EXTENDED RELEASE ORAL at 21:44

## 2020-02-07 RX ADMIN — CHLORPROMAZINE HYDROCHLORIDE 100 MG: 100 TABLET, SUGAR COATED ORAL at 08:04

## 2020-02-07 RX ADMIN — BUSPIRONE HYDROCHLORIDE 15 MG: 10 TABLET ORAL at 21:44

## 2020-02-07 RX ADMIN — CHLORPROMAZINE HYDROCHLORIDE 100 MG: 100 TABLET, SUGAR COATED ORAL at 14:29

## 2020-02-07 RX ADMIN — METFORMIN HYDROCHLORIDE 1000 MG: 500 TABLET, FILM COATED ORAL at 08:01

## 2020-02-07 RX ADMIN — BENZTROPINE MESYLATE 1 MG: 1 TABLET ORAL at 21:44

## 2020-02-07 RX ADMIN — BUSPIRONE HYDROCHLORIDE 15 MG: 10 TABLET ORAL at 08:05

## 2020-02-07 RX ADMIN — BENZTROPINE MESYLATE 1 MG: 1 TABLET ORAL at 08:01

## 2020-02-07 RX ADMIN — INSULIN ASPART 8 UNITS: 100 INJECTION, SOLUTION INTRAVENOUS; SUBCUTANEOUS at 09:00

## 2020-02-07 RX ADMIN — GABAPENTIN 1200 MG: 600 TABLET, FILM COATED ORAL at 08:04

## 2020-02-07 RX ADMIN — INSULIN ASPART 8 UNITS: 100 INJECTION, SOLUTION INTRAVENOUS; SUBCUTANEOUS at 12:43

## 2020-02-07 RX ADMIN — METFORMIN HYDROCHLORIDE 1000 MG: 500 TABLET, FILM COATED ORAL at 17:00

## 2020-02-07 RX ADMIN — INSULIN ASPART 8 UNITS: 100 INJECTION, SOLUTION INTRAVENOUS; SUBCUTANEOUS at 17:01

## 2020-02-07 ASSESSMENT — ACTIVITIES OF DAILY LIVING (ADL)
LAUNDRY: UNABLE TO COMPLETE
DRESS: SCRUBS (BEHAVIORAL HEALTH);INDEPENDENT
HYGIENE/GROOMING: INDEPENDENT
ORAL_HYGIENE: INDEPENDENT
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
## 2020-02-07 NOTE — PROGRESS NOTES
[facility]  Psychiatric Progress Note      Impression:   Genie is cooperative with programming. She is tired of being here and is hoping to hear back about some placement options. Did attempt to contact guardian Kerry, a message was left indicating placement for Genie is a paramount concern. Genie really is not benefiting from further hospitalization. However, due to inability for her to provide for her own safety needs, she is unable to be discharged on her own accord. Genie works on charts and graphs. She does not participate in groups but is social with staff and peers. She is able to answer questions logically. She does tend to ramble illogically if given the opportunity.     Left a message for Kayla Perry-supervisor at [facility] and [facility]. 1:37 pm    Previous medication trials documented:  Zyprexa - weight gain, not fully effective  Invega - ineffective  Tegretol - poorly tolerated  Lithium - poorly tolerated, severe sedation  Clozaril - irritability, hyperthermia, and persistent tachycardia  Depakote - Weight gain and poor mood control (this was still restarted during admission in 11/19)  Thorazine - notable improvement on 200mg tid, decreased in 11/19 on an outpatient visit, resulting in readmission.  Gabapentin   Buspar  Klonopin  Cogentin           Diagnoses:     Schizoaffective Disorder, Bipolar Type  PTSD, by history  R/O Other stimulant use disorder, methamphetamine, with induced perceptual disorder    Attestation:  Patient has been seen and evaluated by me,  Loren Zuleta NP          Interim History:   The patient's care was discussed with the treatment team and chart notes were reviewed.    BEHAVIORAL TEAM DISCUSSION    Progress: Good  Continued Stay Criteria/Rationale: Delusional, no safe discharge plan  Medical/Physical: Diabetes Mellitus Type 2  Precautions: No   Falls precaution?: Yes  Behavioral Orders   Procedures     Code 1 - Restrict to Unit     Routine  Programming     As clinically indicated     Status 15     Every 15 minutes.     Plan: Continue current medications - exploring safe discharge options - Guardian in [place], Message left with Crystal  Rationale for change in precautions or plan: None      Participants: LATRICE Bajwa-THUY, Angie Laws LSW,  Becca Chery LSW, Jim Barrios LSW, Nursing, katerina OT, Alberta OT            Medications:   I have reviewed this patient's current medications    Current Facility-Administered Medications   Medication     acetaminophen (TYLENOL) tablet 650 mg     alum & mag hydroxide-simethicone (MYLANTA ES/MAALOX  ES) suspension 30 mL     artificial saliva (BIOTENE MT) solution 1-2 spray     benztropine (COGENTIN) tablet 1 mg     bisacodyl (DULCOLAX) Suppository 10 mg     busPIRone (BUSPAR) tablet 15 mg     chlorproMAZINE (THORAZINE) tablet 100 mg     clonazePAM (klonoPIN) tablet 1 mg     clonazePAM (klonoPIN) tablet 1 mg     glucose gel 15-30 g    Or     dextrose 50 % injection 25-50 mL    Or     glucagon injection 1 mg     divalproex sodium extended-release (DEPAKOTE ER) 24 hr tablet 750 mg     gabapentin (NEURONTIN) tablet 1,200 mg     hydrOXYzine (ATARAX) tablet  mg     ibuprofen (ADVIL/MOTRIN) tablet 600 mg     insulin aspart (NovoLOG) inj (RAPID ACTING)     insulin aspart (NovoLOG) inj (RAPID ACTING)     insulin aspart (NovoLOG) inj (RAPID ACTING)     insulin glargine (LANTUS PEN) injection 30 Units     magnesium hydroxide (MILK OF MAGNESIA) suspension 30 mL     metFORMIN (GLUCOPHAGE) tablet 1,000 mg     nicotine (NICODERM CQ) 21 MG/24HR 24 hr patch 1 patch     nicotine (NICORETTE) gum 2 mg     nicotine Patch in Place     polyethylene glycol (MIRALAX/GLYCOLAX) Packet 17 g     prazosin (MINIPRESS) capsule 1 mg     traZODone (DESYREL) tablet 50 mg        10 point ROS - see H&P       Allergies:     Allergies   Allergen Reactions     Haloperidol Other (See Comments)     Other reaction(s): Seizures  Patient  "states, \"I fell down and wasn't able to get up.\"  Patient states, \"I fell down and wasn't able to get up.\"              Psychiatric Examination:   /70   Pulse 88   Temp 97.3  F (36.3  C) (Tympanic)   Resp 14   Ht 1.651 m (5' 5\")   Wt 88.4 kg (194 lb 14.4 oz)   SpO2 99%   BMI 32.43 kg/m    Weight is 194 lbs 14.4 oz  Body mass index is 32.43 kg/m .    Appearance:  awake, alert  Attitude:  cooperative  Eye Contact:  Good  Mood:  good  Affect:  Mood congruent  Speech:  Improved  Psychomotor Behavior:  appropriate  Thought Process:  Goal oriented, able to answer questions in a linear fashion; few delusional statements  Associations:  JUSTA   Thought Content:  No SI/HI, Delusional content  Insight:  limited  Judgment:  limited  Oriented to:  time, person, and place  Attention Span and Concentration:  limited  Recent and Remote Memory:  fair - impacted by delusional beliefs and drug use  Fund of Knowledge: appropriate  Muscle Strength and Tone: normal  Gait and Station: Normal           Labs:     Results for orders placed or performed during the hospital encounter of 01/08/20 (from the past 48 hour(s))   Glucose by meter   Result Value Ref Range    Glucose 241 (H) 70 - 99 mg/dL   Glucose by meter   Result Value Ref Range    Glucose 118 (H) 70 - 99 mg/dL   Glucose by meter   Result Value Ref Range    Glucose 95 70 - 99 mg/dL   Glucose by meter   Result Value Ref Range    Glucose 96 70 - 99 mg/dL   Glucose by meter   Result Value Ref Range    Glucose 112 (H) 70 - 99 mg/dL   Glucose by meter   Result Value Ref Range    Glucose 177 (H) 70 - 99 mg/dL   Glucose by meter   Result Value Ref Range    Glucose 173 (H) 70 - 99 mg/dL   Glucose by meter   Result Value Ref Range    Glucose 91 70 - 99 mg/dL   Glucose by meter   Result Value Ref Range    Glucose 126 (H) 70 - 99 mg/dL          Plan:   Continue current medications    ELOS: >5 days - Exploring safe discharge plans, further stabilization  "

## 2020-02-07 NOTE — PLAN OF CARE
Problem: Adult Behavioral Health Plan of Care  Goal: Patient-Specific Goal (Individualization)  Description  Pt will follow recommendations of treatment team.  Pt will be compliant with medications.  Pt will attend 50 % of groups.  Pt will sleep 6-8 hours each night.  Pt will complete ADL's independently.     Pt appears to be sleeping comfortably with regular respirations. Pt slept approx. 6.5 hours this shift. No complaints at this time. Will continue to monitor.    Pt requested blood sugar be checked at 0530. Blood sugar 126.    Outcome: Improving    Problem: Thought Process Alteration  Goal: Optimal Thought Clarity  Description  Patient will be able to hold reality based conversations prior to discharge.       Outcome: Improving     Problem: Fall Injury Risk  Goal: Absence of Fall and Fall-Related Injury  Description  Pt will wear non skid slippers.  Pt will remain free from falls or injuries during hospitalization.     Pt appears to be sleeping. No falls this shift.    Outcome: Improving    Face to face end of shift report communicated to oncoming RN.     2/7/2020  6:14 AM

## 2020-02-07 NOTE — PLAN OF CARE
"Called and left message for Jenny at [facility] in [place].      Called and spoke with Arlyn Rothman [facility] in [place]- [facility] states they currently do not have any openings     Spoke with [facility]- they state they are not currently in business.     Spoke with Sia Rothman [facility] in [place]- [facility] states they currently do not have any openings    Called and left message for Marcella at [facility] in [place].    Called and left message for Yu at [facility] in [place].     Spoke with Vero at [facility] in [place]- she states they have 4 facilities in [place], they do have current openings and she will have Sally give staff a call back when she gets in at 2:30pm.     Received call back from Yu- [facility] states they have a current openings in [place] and [place]. She requests to have staff fax over the referral packet. Faxed requested info.     Received call back from Yu who states pt looks like she would be a \"good fit.\" She asks for some updated nursing notes an a copy of [place] assessment.       "

## 2020-02-07 NOTE — PLAN OF CARE
Received a call from Josiah stated they have a bed for pt at [facility] in [facility]. Josiah states he tried to call Sally, but she must be out for the day. Writer left both Sally and Raven messages today - have not heard back. Updated pt on call from Josiah and possibly intake date of next week. Will follow up on Monday.

## 2020-02-07 NOTE — PLAN OF CARE
"  Problem: Thought Process Alteration  Goal: Optimal Thought Clarity  Description  Patient will be able to hold reality based conversations prior to discharge.     2/6/2020 2227 by Jessica Bailey RN  Outcome: No Change     Problem: Adult Behavioral Health Plan of Care  Goal: Plan of Care Review  Outcome: Improving     Problem: Adult Behavioral Health Plan of Care  Goal: Patient-Specific Goal (Individualization)  Description  Pt will follow recommendations of treatment team.  Pt will be compliant with medications.  Pt will attend 50 % of groups.  Pt will sleep 6-8 hours each night.  Pt will complete ADL's independently.   2/6/2020 2227 by Jessica Bailey, RN  Outcome: Improving  Note:          Problem: Adult Behavioral Health Plan of Care  Goal: Adheres to Safety Considerations for Self and Others  Outcome: Improving     Problem: Fall Injury Risk  Goal: Absence of Fall and Fall-Related Injury  Description  Pt will wear non skid slippers.  Pt will remain free from falls or injuries during hospitalization.   2/6/2020 2227 by Jessica Bailey RN  Outcome: Improving    Patient spends approx half the shift in the lounge and then goes to lie down in her bed, and does fall asleep. Patient is pleasant, she is asking about placement and states that she thought she heard she was going to [facility] \" [facility] \" [facility]. This was clarified with patient and she will be talking with SW in the AM. Patient is very anxious to discharge. Patient is woke for HS medications, she states she is tired, however she does take her medications and has a snack. Patient lies back down and goes to sleep for the night.     End of shift hand off report to be given to oncoming RN      "

## 2020-02-08 LAB
GLUCOSE BLDC GLUCOMTR-MCNC: 111 MG/DL (ref 70–99)
GLUCOSE BLDC GLUCOMTR-MCNC: 122 MG/DL (ref 70–99)
GLUCOSE BLDC GLUCOMTR-MCNC: 124 MG/DL (ref 70–99)
GLUCOSE BLDC GLUCOMTR-MCNC: 128 MG/DL (ref 70–99)
GLUCOSE BLDC GLUCOMTR-MCNC: 146 MG/DL (ref 70–99)

## 2020-02-08 PROCEDURE — 25000132 ZZH RX MED GY IP 250 OP 250 PS 637: Performed by: NURSE PRACTITIONER

## 2020-02-08 PROCEDURE — 99231 SBSQ HOSP IP/OBS SF/LOW 25: CPT | Performed by: NURSE PRACTITIONER

## 2020-02-08 PROCEDURE — 12400000 ZZH R&B MH

## 2020-02-08 PROCEDURE — 00000146 ZZHCL STATISTIC GLUCOSE BY METER IP

## 2020-02-08 RX ADMIN — INSULIN ASPART 8 UNITS: 100 INJECTION, SOLUTION INTRAVENOUS; SUBCUTANEOUS at 12:45

## 2020-02-08 RX ADMIN — BUSPIRONE HYDROCHLORIDE 15 MG: 10 TABLET ORAL at 08:09

## 2020-02-08 RX ADMIN — CHLORPROMAZINE HYDROCHLORIDE 100 MG: 100 TABLET, SUGAR COATED ORAL at 08:09

## 2020-02-08 RX ADMIN — METFORMIN HYDROCHLORIDE 1000 MG: 500 TABLET, FILM COATED ORAL at 16:45

## 2020-02-08 RX ADMIN — HYDROXYZINE HYDROCHLORIDE 100 MG: 25 TABLET, FILM COATED ORAL at 16:44

## 2020-02-08 RX ADMIN — ACETAMINOPHEN 650 MG: 325 TABLET, FILM COATED ORAL at 04:37

## 2020-02-08 RX ADMIN — CHLORPROMAZINE HYDROCHLORIDE 100 MG: 100 TABLET, SUGAR COATED ORAL at 20:27

## 2020-02-08 RX ADMIN — IBUPROFEN 600 MG: 600 TABLET ORAL at 16:44

## 2020-02-08 RX ADMIN — GABAPENTIN 1200 MG: 600 TABLET, FILM COATED ORAL at 13:09

## 2020-02-08 RX ADMIN — DIVALPROEX SODIUM 750 MG: 500 TABLET, EXTENDED RELEASE ORAL at 20:27

## 2020-02-08 RX ADMIN — CHLORPROMAZINE HYDROCHLORIDE 100 MG: 100 TABLET, SUGAR COATED ORAL at 13:10

## 2020-02-08 RX ADMIN — METFORMIN HYDROCHLORIDE 1000 MG: 500 TABLET, FILM COATED ORAL at 08:09

## 2020-02-08 RX ADMIN — GABAPENTIN 1200 MG: 600 TABLET, FILM COATED ORAL at 20:27

## 2020-02-08 RX ADMIN — PRAZOSIN HYDROCHLORIDE 1 MG: 1 CAPSULE ORAL at 20:27

## 2020-02-08 RX ADMIN — NICOTINE 1 PATCH: 21 PATCH, EXTENDED RELEASE TRANSDERMAL at 08:18

## 2020-02-08 RX ADMIN — INSULIN ASPART 8 UNITS: 100 INJECTION, SOLUTION INTRAVENOUS; SUBCUTANEOUS at 16:45

## 2020-02-08 RX ADMIN — INSULIN ASPART 8 UNITS: 100 INJECTION, SOLUTION INTRAVENOUS; SUBCUTANEOUS at 08:12

## 2020-02-08 RX ADMIN — HYDROXYZINE HYDROCHLORIDE 100 MG: 25 TABLET, FILM COATED ORAL at 04:37

## 2020-02-08 RX ADMIN — BENZTROPINE MESYLATE 1 MG: 1 TABLET ORAL at 08:09

## 2020-02-08 RX ADMIN — BUSPIRONE HYDROCHLORIDE 15 MG: 10 TABLET ORAL at 20:27

## 2020-02-08 RX ADMIN — INSULIN GLARGINE 30 UNITS: 100 INJECTION, SOLUTION SUBCUTANEOUS at 20:31

## 2020-02-08 RX ADMIN — GABAPENTIN 1200 MG: 600 TABLET, FILM COATED ORAL at 08:09

## 2020-02-08 RX ADMIN — CLONAZEPAM 1 MG: 1 TABLET ORAL at 20:27

## 2020-02-08 RX ADMIN — BUSPIRONE HYDROCHLORIDE 15 MG: 10 TABLET ORAL at 13:10

## 2020-02-08 RX ADMIN — BENZTROPINE MESYLATE 1 MG: 1 TABLET ORAL at 20:27

## 2020-02-08 ASSESSMENT — MIFFLIN-ST. JEOR: SCORE: 1554.94

## 2020-02-08 NOTE — PLAN OF CARE
Problem: Adult Behavioral Health Plan of Care  Goal: Patient-Specific Goal (Individualization)  Description  Pt will follow recommendations of treatment team.  Pt will be compliant with medications.  Pt will attend 50 % of groups.  Pt will sleep 6-8 hours each night.  Pt will complete ADL's independently.   2/7/2020 1917 by Brook Altman, RN  Outcome: No Change  Note:   Patient in the lounge writing in journal and coloring pictures most of the shift.  Compliant with medications, insulin administration, and accuchecks.  Patient's affect is flat.  Mood appears calm.    Accucheck prior to dinner 187.  HS accucheck  118.       Problem: Fall Injury Risk  Goal: Absence of Fall and Fall-Related Injury  Description  Pt will wear non skid slippers.  Pt will remain free from falls or injuries during hospitalization.   Outcome: Improving  Note:   Patient had no noted falls this shift.  Her gait is balanced and steady.     Problem: Thought Process Alteration  Goal: Optimal Thought Clarity  Description  Patient will be able to hold reality based conversations prior to discharge.     2/7/2020 1917 by Brook Altman RN  Outcome: No Change  Note:   Patient able to hold reality based conversation.     Will continue to monitor.      Face to face end of shift report communicated to Mandy CHOUDHURY RN.     Brook Altman RN  2/7/2020  11:32 PM

## 2020-02-08 NOTE — PLAN OF CARE
Face to face end of shift report will be communicated to oncoming RN.     Problem: Adult Behavioral Health Plan of Care  Goal: Patient-Specific Goal (Individualization)  Description  Pt will follow recommendations of treatment team.  Pt will be compliant with medications.  Pt will attend 50 % of groups.  Pt will sleep 6-8 hours each night.  Pt will complete ADL's independently.   2/8/2020 0019 by Mandy Montoya RN  Outcome: No Change     Problem: Fall Injury Risk  Goal: Absence of Fall and Fall-Related Injury  Description  Pt will wear non skid slippers.  Pt will remain free from falls or injuries during hospitalization.   2/8/2020 0019 by Mandy Montoya, RN  Outcome: Improving     Problem: Thought Process Alteration  Goal: Optimal Thought Clarity  Description  Patient will be able to hold reality based conversations prior to discharge.     2/8/2020 0019 by Mandy Montoya RN  Outcome: No Change   Face to face end of shift report obtained from OBDULIO Doe. Pt observed resting in bed.   Pt appears to be sleeping in bed with eyes closed, having regular respirations, and position changes. 15 minutes and PRN safety checks completed with no noted complains. No falls noted or reported so far this shift. Will continue to monitor.   0430- Pt woke up, requested blood sugar to be checked. Pt states feels BS is low. . Pt also c/o having anxiety and 10/10 pain to left foot. Atarax 100 mg and acetaminophen 650 mg given. Will continue to monitor.   0530- No further c/o pain or anxiety. Pt resting in bed.  0600- Pt slept 5.5 hours.

## 2020-02-08 NOTE — PLAN OF CARE
"Face to face end of shift report communicated to oncoming shift.    Sherry Cano RN  2/8/2020  10:31 AM      Patient compliant with treatment team recommendations, takes all meds as ordered.    Patient does not attend groups this shift.   Patient completes ADL's independently.  Reports to have interrupted sleep, approx 5 1/2 hours sleep.      Patient denies SI, HI, AH and VH  Patient's affect is bright in conversation with this writer, relaying information she received from her NP \"I was accepted in [place], now I just have to wait until Monday\"  \"I really hope I'm not getting my hopes up for nothing again, I know Alessandra told me there's also other options that may become available.\"  \"I guess we will find out on Monday\"  Patient continues to make notes and write phone numbers in her notebook, delusional statements made regarding \"the guys all fighting for me, the three of them, and all there friends and just not sure who I'd pick, but probably none of them\"      Face to face start of shift report received from Mandy CHOUDHURY RN  [place] [facility] [place].    Will continue to monitor.     Problem: Adult Behavioral Health Plan of Care  Goal: Patient-Specific Goal (Individualization)  Description  Pt will follow recommendations of treatment team.  Pt will be compliant with medications.  Pt will attend 50 % of groups.  Pt will sleep 6-8 hours each night.  Pt will complete ADL's independently.   2/8/2020 0800 by Sherry Cano RN  Outcome: Improving  Note:   Problem: Fall Injury Risk  Goal: Absence of Fall and Fall-Related Injury  Description  Pt will wear non skid slippers.  Pt will remain free from falls or injuries during hospitalization.   2/8/2020 0800 by Sherry Cano, RN  Outcome: Improving     "

## 2020-02-08 NOTE — PROGRESS NOTES
[facility]  Psychiatric Progress Note      Impression:   Genie is happy she has heard about possible discharge options for next week. She is happy with any of them and she just wants to get out of the hospital. Genie has been cooperative with all of the process and is compliant with assessment. Genie does not participate in groups but she is social with peers and staff. She does still have delusional content to her conversation, this is a fixed trait. She is able to answer questions with a fair amount of logic.     Previous medication trials documented:  Zyprexa - weight gain, not fully effective  Invega - ineffective  Tegretol - poorly tolerated  Lithium - poorly tolerated, severe sedation  Clozaril - irritability, hyperthermia, and persistent tachycardia  Depakote - Weight gain and poor mood control (this was still restarted during admission in 11/19)  Thorazine - notable improvement on 200mg tid, decreased in 11/19 on an outpatient visit, resulting in readmission.  Gabapentin   Buspar  Klonopin  Cogentin           Diagnoses:     Schizoaffective Disorder, Bipolar Type  PTSD, by history  R/O Other stimulant use disorder, methamphetamine, with induced perceptual disorder    Attestation:  Patient has been seen and evaluated by me,  Loren Zuleta NP          Interim History:   The patient's care was discussed with the treatment team and chart notes were reviewed.    BEHAVIORAL TEAM DISCUSSION    Progress: Good  Continued Stay Criteria/Rationale: Delusional, no safe discharge plan  Medical/Physical: Diabetes Mellitus Type 2  Precautions: No   Falls precaution?: Yes  Behavioral Orders   Procedures     Code 1 - Restrict to Unit     Routine Programming     As clinically indicated     Status 15     Every 15 minutes.     Plan: Continue current medications - exploring safe discharge options - Guardian in [place], Message left with Crystal  Rationale for change in precautions or plan: None      Participants:  "Loren Zuleta, APRN-CNP, Angie Laws LSW,  Becca Chery LSW, Jim Barrios LSW, Nursing, katerina OT, Alberta OT            Medications:   I have reviewed this patient's current medications    Current Facility-Administered Medications   Medication     acetaminophen (TYLENOL) tablet 650 mg     alum & mag hydroxide-simethicone (MYLANTA ES/MAALOX  ES) suspension 30 mL     artificial saliva (BIOTENE MT) solution 1-2 spray     benztropine (COGENTIN) tablet 1 mg     bisacodyl (DULCOLAX) Suppository 10 mg     busPIRone (BUSPAR) tablet 15 mg     chlorproMAZINE (THORAZINE) tablet 100 mg     clonazePAM (klonoPIN) tablet 1 mg     clonazePAM (klonoPIN) tablet 1 mg     glucose gel 15-30 g    Or     dextrose 50 % injection 25-50 mL    Or     glucagon injection 1 mg     divalproex sodium extended-release (DEPAKOTE ER) 24 hr tablet 750 mg     gabapentin (NEURONTIN) tablet 1,200 mg     hydrOXYzine (ATARAX) tablet  mg     ibuprofen (ADVIL/MOTRIN) tablet 600 mg     insulin aspart (NovoLOG) inj (RAPID ACTING)     insulin aspart (NovoLOG) inj (RAPID ACTING)     insulin aspart (NovoLOG) inj (RAPID ACTING)     insulin glargine (LANTUS PEN) injection 30 Units     magnesium hydroxide (MILK OF MAGNESIA) suspension 30 mL     metFORMIN (GLUCOPHAGE) tablet 1,000 mg     nicotine (NICODERM CQ) 21 MG/24HR 24 hr patch 1 patch     nicotine (NICORETTE) gum 2 mg     nicotine Patch in Place     polyethylene glycol (MIRALAX/GLYCOLAX) Packet 17 g     prazosin (MINIPRESS) capsule 1 mg     traZODone (DESYREL) tablet 50 mg        10 point ROS - see H&P       Allergies:     Allergies   Allergen Reactions     Haloperidol Other (See Comments)     Other reaction(s): Seizures  Patient states, \"I fell down and wasn't able to get up.\"  Patient states, \"I fell down and wasn't able to get up.\"              Psychiatric Examination:   /70   Pulse 79   Temp 98.2  F (36.8  C) (Tympanic)   Resp 16   Ht 1.651 m (5' 5\")   Wt 88.4 kg (194 lb 14.4 oz) "   SpO2 99%   BMI 32.43 kg/m    Weight is 194 lbs 14.4 oz  Body mass index is 32.43 kg/m .    Appearance:  awake, alert  Attitude:  cooperative  Eye Contact:  Good  Mood:  good  Affect:  Mood congruent  Speech:  Improved  Psychomotor Behavior:  appropriate  Thought Process:  Goal oriented, able to answer questions in a linear fashion; few delusional statements  Associations:  JUSTA   Thought Content:  No SI/HI, Delusional content  Insight:  limited  Judgment:  limited  Oriented to:  time, person, and place  Attention Span and Concentration:  limited  Recent and Remote Memory:  fair - impacted by delusional beliefs and drug use  Fund of Knowledge: appropriate  Muscle Strength and Tone: normal  Gait and Station: Normal           Labs:     Results for orders placed or performed during the hospital encounter of 01/08/20 (from the past 48 hour(s))   Glucose by meter   Result Value Ref Range    Glucose 177 (H) 70 - 99 mg/dL   Glucose by meter   Result Value Ref Range    Glucose 173 (H) 70 - 99 mg/dL   Glucose by meter   Result Value Ref Range    Glucose 91 70 - 99 mg/dL   Glucose by meter   Result Value Ref Range    Glucose 126 (H) 70 - 99 mg/dL   Glucose by meter   Result Value Ref Range    Glucose 168 (H) 70 - 99 mg/dL   Glucose by meter   Result Value Ref Range    Glucose 187 (H) 70 - 99 mg/dL   Glucose by meter   Result Value Ref Range    Glucose 118 (H) 70 - 99 mg/dL   Glucose by meter   Result Value Ref Range    Glucose 124 (H) 70 - 99 mg/dL   Glucose by meter   Result Value Ref Range    Glucose 122 (H) 70 - 99 mg/dL          Plan:   Continue current medications    ELOS: >5 days - Exploring safe discharge plans, further stabilization

## 2020-02-09 LAB
GLUCOSE BLDC GLUCOMTR-MCNC: 170 MG/DL (ref 70–99)
GLUCOSE BLDC GLUCOMTR-MCNC: 233 MG/DL (ref 70–99)
GLUCOSE BLDC GLUCOMTR-MCNC: 91 MG/DL (ref 70–99)
GLUCOSE BLDC GLUCOMTR-MCNC: 97 MG/DL (ref 70–99)

## 2020-02-09 PROCEDURE — 25000132 ZZH RX MED GY IP 250 OP 250 PS 637: Performed by: NURSE PRACTITIONER

## 2020-02-09 PROCEDURE — 12400000 ZZH R&B MH

## 2020-02-09 PROCEDURE — 00000146 ZZHCL STATISTIC GLUCOSE BY METER IP

## 2020-02-09 RX ADMIN — BENZTROPINE MESYLATE 1 MG: 1 TABLET ORAL at 20:13

## 2020-02-09 RX ADMIN — METFORMIN HYDROCHLORIDE 1000 MG: 500 TABLET, FILM COATED ORAL at 17:01

## 2020-02-09 RX ADMIN — GABAPENTIN 1200 MG: 600 TABLET, FILM COATED ORAL at 20:13

## 2020-02-09 RX ADMIN — METFORMIN HYDROCHLORIDE 1000 MG: 500 TABLET, FILM COATED ORAL at 08:15

## 2020-02-09 RX ADMIN — IBUPROFEN 600 MG: 600 TABLET ORAL at 17:05

## 2020-02-09 RX ADMIN — NICOTINE 1 PATCH: 21 PATCH, EXTENDED RELEASE TRANSDERMAL at 08:19

## 2020-02-09 RX ADMIN — BUSPIRONE HYDROCHLORIDE 15 MG: 10 TABLET ORAL at 20:13

## 2020-02-09 RX ADMIN — INSULIN ASPART 8 UNITS: 100 INJECTION, SOLUTION INTRAVENOUS; SUBCUTANEOUS at 08:17

## 2020-02-09 RX ADMIN — CLONAZEPAM 1 MG: 1 TABLET ORAL at 20:13

## 2020-02-09 RX ADMIN — ACETAMINOPHEN 650 MG: 325 TABLET, FILM COATED ORAL at 17:05

## 2020-02-09 RX ADMIN — GABAPENTIN 1200 MG: 600 TABLET, FILM COATED ORAL at 08:15

## 2020-02-09 RX ADMIN — INSULIN ASPART 8 UNITS: 100 INJECTION, SOLUTION INTRAVENOUS; SUBCUTANEOUS at 17:02

## 2020-02-09 RX ADMIN — CHLORPROMAZINE HYDROCHLORIDE 100 MG: 100 TABLET, SUGAR COATED ORAL at 08:15

## 2020-02-09 RX ADMIN — BUSPIRONE HYDROCHLORIDE 15 MG: 10 TABLET ORAL at 13:17

## 2020-02-09 RX ADMIN — GABAPENTIN 1200 MG: 600 TABLET, FILM COATED ORAL at 13:17

## 2020-02-09 RX ADMIN — INSULIN GLARGINE 30 UNITS: 100 INJECTION, SOLUTION SUBCUTANEOUS at 20:12

## 2020-02-09 RX ADMIN — DIVALPROEX SODIUM 750 MG: 500 TABLET, EXTENDED RELEASE ORAL at 20:13

## 2020-02-09 RX ADMIN — BUSPIRONE HYDROCHLORIDE 15 MG: 10 TABLET ORAL at 08:15

## 2020-02-09 RX ADMIN — CHLORPROMAZINE HYDROCHLORIDE 100 MG: 100 TABLET, SUGAR COATED ORAL at 13:17

## 2020-02-09 RX ADMIN — CHLORPROMAZINE HYDROCHLORIDE 100 MG: 100 TABLET, SUGAR COATED ORAL at 20:13

## 2020-02-09 RX ADMIN — INSULIN ASPART 8 UNITS: 100 INJECTION, SOLUTION INTRAVENOUS; SUBCUTANEOUS at 11:28

## 2020-02-09 RX ADMIN — BENZTROPINE MESYLATE 1 MG: 1 TABLET ORAL at 08:15

## 2020-02-09 RX ADMIN — PRAZOSIN HYDROCHLORIDE 1 MG: 1 CAPSULE ORAL at 20:13

## 2020-02-09 ASSESSMENT — ACTIVITIES OF DAILY LIVING (ADL)
ORAL_HYGIENE: INDEPENDENT
ORAL_HYGIENE: INDEPENDENT
DRESS: SCRUBS (BEHAVIORAL HEALTH);INDEPENDENT
DRESS: INDEPENDENT;SCRUBS (BEHAVIORAL HEALTH)
HYGIENE/GROOMING: INDEPENDENT
HYGIENE/GROOMING: INDEPENDENT
LAUNDRY: UNABLE TO COMPLETE

## 2020-02-09 NOTE — PLAN OF CARE
"Face to face end of shift report received from Sherry AQUINO RN. Rounding completed and patient observed in the lounge sitting alone at the table. No requests at this time.     21:00 Update: Patient endorsed high anxiety at 15:41 and received 100mg Atarax. She also reported pain in her right foot she rated at 10 of 10 and received 600mg Ibuprofen for this . She stated this was from her foot being \"caught in a bear trap.\" She did not show any outward signs of pain. She did report this was helpful. She has calm and cooperative. She denied HI/SI and hallucinations.     Face to face end of shift report communicated to oncoming RN. Will verbalize that patient continues to be a high fall risk.       Problem: Adult Behavioral Health Plan of Care  Goal: Patient-Specific Goal (Individualization)  Description  Pt will follow recommendations of treatment team.  Pt will be compliant with medications.  Pt will attend 50 % of groups.  Pt will sleep 6-8 hours each night.  Pt will complete ADL's independently.   2/8/2020 2137 by Lorna Carrington, RN  Outcome: No Change     Problem: Thought Process Alteration  Goal: Optimal Thought Clarity  Description  Patient will be able to hold reality based conversations prior to discharge.     Outcome: No Change     Problem: Fall Injury Risk  Goal: Absence of Fall and Fall-Related Injury  Description  Pt will wear non skid slippers.  Pt will remain free from falls or injuries during hospitalization.   2/8/2020 2137 by Lorna Carrington, RN  Outcome: Improving     "

## 2020-02-09 NOTE — PLAN OF CARE
Problem: Adult Behavioral Health Plan of Care  Goal: Patient-Specific Goal (Individualization)  Description  Pt will follow recommendations of treatment team.  Pt will be compliant with medications.  Pt will attend 50 % of groups.  Pt will sleep 6-8 hours each night.  Pt will complete ADL's independently.     Pt appears to be sleeping comfortably with regular respirations. Pt slept approx. 6.5 hours this shift. No complaints at this time. Will continue to monitor.    Blood sugar 91    Outcome: Improving    Problem: Thought Process Alteration  Goal: Optimal Thought Clarity  Description  Patient will be able to hold reality based conversations prior to discharge.       Outcome: No Change     Problem: Fall Injury Risk  Goal: Absence of Fall and Fall-Related Injury  Description  Pt will wear non skid slippers.  Pt will remain free from falls or injuries during hospitalization.     Pt appears to be sleeping. Steady while up. No falls this shift.    Outcome: Improving    Face to face end of shift report communicated to oncoming RN.     2/9/2020  6:13 AM

## 2020-02-09 NOTE — PLAN OF CARE
"NATALIA WALKER RN  2/9/2020  7:29 AM  Face to face shift report received from OBDULIO Zavala. Rounding completed, pt observed.   Problem: Adult Behavioral Health Plan of Care  Goal: Patient-Specific Goal (Individualization)  Description  Pt will follow recommendations of treatment team.  Pt will be compliant with medications.  Pt will attend 50 % of groups.  Pt will sleep 6-8 hours each night.  Pt will complete ADL's independently.   2/9/2020 0727 by Natalia Walker, RN  Outcome: No Change  Note: 1006- Compliant with meds, administered her own insulin.  Reports eating and sleeping well.    1328- Pt performed her own accucheck and administered her own insulin with supervision.  Cooperative and remains in lounge.      Problem: Thought Process Alteration  Goal: Optimal Thought Clarity  Description  Patient will be able to hold reality based conversations prior to discharge.     2/9/2020 0727 by Natalia Walker, RN  Outcome: No Change  1006- Pt has delusional thinking.  States \"I was a P[employer] and in the [employer], did my part and left\".  Speech rambling and confabulated.  Talks about pain in her left foot due to it being stuck in a bear trap when she [place] the woods [place].  Out in lounge cooperative with staff.       Problem: Fall Injury Risk  Goal: Absence of Fall and Fall-Related Injury  Description  Pt will wear non skid slippers.  Pt will remain free from falls or injuries during hospitalization.   2/9/2020 0727 by Natalia Walker, RN  Outcome: Improving  1006-Pt continues remains free from falls.  Non skid slippers on and gait steady.      Face to face end of shift report [facility].               "

## 2020-02-10 LAB
GLUCOSE BLDC GLUCOMTR-MCNC: 102 MG/DL (ref 70–99)
GLUCOSE BLDC GLUCOMTR-MCNC: 102 MG/DL (ref 70–99)
GLUCOSE BLDC GLUCOMTR-MCNC: 167 MG/DL (ref 70–99)
GLUCOSE BLDC GLUCOMTR-MCNC: 241 MG/DL (ref 70–99)

## 2020-02-10 PROCEDURE — 25000132 ZZH RX MED GY IP 250 OP 250 PS 637: Performed by: NURSE PRACTITIONER

## 2020-02-10 PROCEDURE — 25000131 ZZH RX MED GY IP 250 OP 636 PS 637: Performed by: NURSE PRACTITIONER

## 2020-02-10 PROCEDURE — 99231 SBSQ HOSP IP/OBS SF/LOW 25: CPT | Performed by: NURSE PRACTITIONER

## 2020-02-10 PROCEDURE — 00000146 ZZHCL STATISTIC GLUCOSE BY METER IP

## 2020-02-10 PROCEDURE — 12400000 ZZH R&B MH

## 2020-02-10 RX ADMIN — BUSPIRONE HYDROCHLORIDE 15 MG: 10 TABLET ORAL at 20:49

## 2020-02-10 RX ADMIN — CLONAZEPAM 1 MG: 1 TABLET ORAL at 20:49

## 2020-02-10 RX ADMIN — GABAPENTIN 1200 MG: 600 TABLET, FILM COATED ORAL at 20:47

## 2020-02-10 RX ADMIN — NICOTINE 1 PATCH: 21 PATCH, EXTENDED RELEASE TRANSDERMAL at 08:22

## 2020-02-10 RX ADMIN — CHLORPROMAZINE HYDROCHLORIDE 100 MG: 100 TABLET, SUGAR COATED ORAL at 20:47

## 2020-02-10 RX ADMIN — BUSPIRONE HYDROCHLORIDE 15 MG: 10 TABLET ORAL at 08:19

## 2020-02-10 RX ADMIN — HYDROXYZINE HYDROCHLORIDE 100 MG: 25 TABLET, FILM COATED ORAL at 16:23

## 2020-02-10 RX ADMIN — GABAPENTIN 1200 MG: 600 TABLET, FILM COATED ORAL at 13:00

## 2020-02-10 RX ADMIN — BUSPIRONE HYDROCHLORIDE 15 MG: 10 TABLET ORAL at 13:00

## 2020-02-10 RX ADMIN — BENZTROPINE MESYLATE 1 MG: 1 TABLET ORAL at 20:48

## 2020-02-10 RX ADMIN — METFORMIN HYDROCHLORIDE 1000 MG: 500 TABLET, FILM COATED ORAL at 08:20

## 2020-02-10 RX ADMIN — PRAZOSIN HYDROCHLORIDE 1 MG: 1 CAPSULE ORAL at 20:48

## 2020-02-10 RX ADMIN — INSULIN ASPART 8 UNITS: 100 INJECTION, SOLUTION INTRAVENOUS; SUBCUTANEOUS at 17:15

## 2020-02-10 RX ADMIN — CHLORPROMAZINE HYDROCHLORIDE 100 MG: 100 TABLET, SUGAR COATED ORAL at 13:00

## 2020-02-10 RX ADMIN — INSULIN GLARGINE 30 UNITS: 100 INJECTION, SOLUTION SUBCUTANEOUS at 21:03

## 2020-02-10 RX ADMIN — INSULIN ASPART 8 UNITS: 100 INJECTION, SOLUTION INTRAVENOUS; SUBCUTANEOUS at 08:21

## 2020-02-10 RX ADMIN — CHLORPROMAZINE HYDROCHLORIDE 100 MG: 100 TABLET, SUGAR COATED ORAL at 08:18

## 2020-02-10 RX ADMIN — IBUPROFEN 600 MG: 600 TABLET ORAL at 16:23

## 2020-02-10 RX ADMIN — GABAPENTIN 1200 MG: 600 TABLET, FILM COATED ORAL at 08:21

## 2020-02-10 RX ADMIN — INSULIN ASPART 8 UNITS: 100 INJECTION, SOLUTION INTRAVENOUS; SUBCUTANEOUS at 12:52

## 2020-02-10 RX ADMIN — ALUMINUM HYDROXIDE, MAGNESIUM HYDROXIDE, AND DIMETHICONE 30 ML: 400; 400; 40 SUSPENSION ORAL at 04:40

## 2020-02-10 RX ADMIN — METFORMIN HYDROCHLORIDE 1000 MG: 500 TABLET, FILM COATED ORAL at 17:14

## 2020-02-10 RX ADMIN — BENZTROPINE MESYLATE 1 MG: 1 TABLET ORAL at 08:20

## 2020-02-10 RX ADMIN — DIVALPROEX SODIUM 750 MG: 500 TABLET, EXTENDED RELEASE ORAL at 20:47

## 2020-02-10 ASSESSMENT — ACTIVITIES OF DAILY LIVING (ADL)
DRESS: SCRUBS (BEHAVIORAL HEALTH);INDEPENDENT
HYGIENE/GROOMING: INDEPENDENT
ORAL_HYGIENE: INDEPENDENT
LAUNDRY: UNABLE TO COMPLETE

## 2020-02-10 NOTE — PROGRESS NOTES
[facility]  Psychiatric Progress Note      Impression:   Genie is cooperative but voices frustration with not having a discharge date as of yet. She feels her workers at [facility] are against her somehow. Discussed with Genie that sometimes things take a longer process and it may just mean it will be a better fir for her. Encouraged her to be patient and positive. Genie is still talking about some court date on 2/13. From there she talked about a drug bust and how she an another person avoided it because she had an old truck and saw the signs of a drug bust about to take place.      Previous medication trials documented:  Zyprexa - weight gain, not fully effective  Invega - ineffective  Tegretol - poorly tolerated  Lithium - poorly tolerated, severe sedation  Clozaril - irritability, hyperthermia, and persistent tachycardia  Depakote - Weight gain and poor mood control (this was still restarted during admission in 11/19)  Thorazine - notable improvement on 200mg tid, decreased in 11/19 on an outpatient visit, resulting in readmission.  Gabapentin   Buspar  Klonopin  Cogentin           Diagnoses:     Schizoaffective Disorder, Bipolar Type  PTSD, by history  R/O Other stimulant use disorder, methamphetamine, with induced perceptual disorder    Attestation:  Patient has been seen and evaluated by me,  Loren Zuleta NP          Interim History:   The patient's care was discussed with the treatment team and chart notes were reviewed.    BEHAVIORAL TEAM DISCUSSION    Progress: Good  Continued Stay Criteria/Rationale: Delusional, no safe discharge plan  Medical/Physical: Diabetes Mellitus Type 2  Precautions: No   Falls precaution?: Yes  Behavioral Orders   Procedures     Code 1 - Restrict to Unit     Routine Programming     As clinically indicated     Status 15     Every 15 minutes.     Plan: Continue current medications - exploring safe discharge options - Guardian in [place], Message left with  "Crystal  Rationale for change in precautions or plan: None      Participants: Loren Zuleta, EVENS, haider MSW,  Becca Chery LSW, Jim Barrios LSW, Nursing, katerina OT, [place] OT, Gena OT, [place] Rec Therapist            Medications:   I have reviewed this patient's current medications    Current Facility-Administered Medications   Medication     acetaminophen (TYLENOL) tablet 650 mg     alum & mag hydroxide-simethicone (MYLANTA ES/MAALOX  ES) suspension 30 mL     artificial saliva (BIOTENE MT) solution 1-2 spray     benztropine (COGENTIN) tablet 1 mg     bisacodyl (DULCOLAX) Suppository 10 mg     busPIRone (BUSPAR) tablet 15 mg     chlorproMAZINE (THORAZINE) tablet 100 mg     clonazePAM (klonoPIN) tablet 1 mg     clonazePAM (klonoPIN) tablet 1 mg     glucose gel 15-30 g    Or     dextrose 50 % injection 25-50 mL    Or     glucagon injection 1 mg     divalproex sodium extended-release (DEPAKOTE ER) 24 hr tablet 750 mg     gabapentin (NEURONTIN) tablet 1,200 mg     hydrOXYzine (ATARAX) tablet  mg     ibuprofen (ADVIL/MOTRIN) tablet 600 mg     insulin aspart (NovoLOG) inj (RAPID ACTING)     insulin aspart (NovoLOG) inj (RAPID ACTING)     insulin aspart (NovoLOG) inj (RAPID ACTING)     insulin glargine (LANTUS PEN) injection 30 Units     magnesium hydroxide (MILK OF MAGNESIA) suspension 30 mL     metFORMIN (GLUCOPHAGE) tablet 1,000 mg     nicotine (NICODERM CQ) 21 MG/24HR 24 hr patch 1 patch     nicotine (NICORETTE) gum 2 mg     nicotine Patch in Place     polyethylene glycol (MIRALAX/GLYCOLAX) Packet 17 g     prazosin (MINIPRESS) capsule 1 mg     traZODone (DESYREL) tablet 50 mg        10 point ROS - see H&P       Allergies:     Allergies   Allergen Reactions     Haloperidol Other (See Comments)     Other reaction(s): Seizures  Patient states, \"I fell down and wasn't able to get up.\"  Patient states, \"I fell down and wasn't able to get up.\"              Psychiatric Examination:   /56   Pulse 94   " "Temp 97.1  F (36.2  C) (Tympanic)   Resp 18   Ht 1.651 m (5' 5\")   Wt 88.4 kg (194 lb 14.4 oz)   SpO2 98%   BMI 32.43 kg/m    Weight is 194 lbs 14.4 oz  Body mass index is 32.43 kg/m .    Appearance:  awake, alert  Attitude:  cooperative  Eye Contact:  Good  Mood:  good  Affect:  Mood congruent  Speech:  Improved  Psychomotor Behavior:  appropriate  Thought Process:  Goal oriented, able to answer questions in a linear fashion; few delusional statements  Associations:  JUSTA   Thought Content:  No SI/HI, Delusional content  Insight:  limited  Judgment:  limited  Oriented to:  time, person, and place  Attention Span and Concentration:  limited  Recent and Remote Memory:  fair - impacted by delusional beliefs and drug use  Fund of Knowledge: appropriate  Muscle Strength and Tone: normal  Gait and Station: Normal           Labs:     Results for orders placed or performed during the hospital encounter of 01/08/20 (from the past 48 hour(s))   Glucose by meter   Result Value Ref Range    Glucose 128 (H) 70 - 99 mg/dL   Glucose by meter   Result Value Ref Range    Glucose 111 (H) 70 - 99 mg/dL   Glucose by meter   Result Value Ref Range    Glucose 91 70 - 99 mg/dL   Glucose by meter   Result Value Ref Range    Glucose 233 (H) 70 - 99 mg/dL   Glucose by meter   Result Value Ref Range    Glucose 97 70 - 99 mg/dL   Glucose by meter   Result Value Ref Range    Glucose 170 (H) 70 - 99 mg/dL   Glucose by meter   Result Value Ref Range    Glucose 102 (H) 70 - 99 mg/dL   Glucose by meter   Result Value Ref Range    Glucose 241 (H) 70 - 99 mg/dL          Plan:   Continue current medications    ELOS: >5 days - Exploring safe discharge plans, further stabilization  "

## 2020-02-10 NOTE — PLAN OF CARE
"  Problem: Adult Behavioral Health Plan of Care  Goal: Patient-Specific Goal (Individualization)  Description  Pt will follow recommendations of treatment team.  Pt will be compliant with medications.  Pt will attend 50 % of groups.  Pt will sleep 6-8 hours each night.  Pt will complete ADL's independently.   2/9/2020 1913 by Brook Altman, RN  Outcome: No Change  Note:   Patient denies SI, HI.  She made multiple delusional statements this shift.  Patient was talking about being a POW in war.  She states she knows Quoc Estrella well and plays \"slap gregoria\" with him \"but there were so many cockroaches crawling on the tables. Patient has flight of ideas, affect is flat, and speech is rambling.   Accucheck at dinner 97. Patient took a few bites of the white rice she ordered for dinner.  She felt as though she was not able to swallow it all the way.  She was able to breath and was not choking.  She attempted to take a drink of water, bent forward and started to spit on the floor.  She then had emesis of rice and clear fluid.  She felt like this resolve the issue.  Ordered patient new dinner try r/t inability to eat food she received.    1705:  PRN tylenol 650 mg and ibuprofen 600 mg po administered for 8/10 left foot and 2/10 left elbow pain.    Accucheck at .     Problem: Fall Injury Risk  Goal: Absence of Fall and Fall-Related Injury  Description  Pt will wear non skid slippers.  Pt will remain free from falls or injuries during hospitalization.   2/9/2020 1913 by Brook Altman, RN  Outcome: Improving  Note:   Patient's gait is steady.    Will continue to monitor.      "

## 2020-02-10 NOTE — PLAN OF CARE
Problem: Adult Behavioral Health Plan of Care  Goal: Patient-Specific Goal (Individualization)  Description  Pt will follow recommendations of treatment team.  Pt will be compliant with medications.  Pt will attend 50 % of groups.  Pt will sleep 6-8 hours each night.  Pt will complete ADL's independently.     Pt appears to be sleeping comfortably with regular respirations. Pt awake on and off. Pt slept approx. 6 hours this shift. No complaints at this time. Will continue to monitor.    0440 Prn maalox administered per pt request for heartburn.  0630 Blood sugar 102.    Outcome: Improving    Problem: Fall Injury Risk  Goal: Absence of Fall and Fall-Related Injury  Description  Pt will wear non skid slippers.  Pt will remain free from falls or injuries during hospitalization.     Pt appears to be sleeping. Steady gait while up. Appropriate footwear. No falls this shift.    Outcome: Improving     Problem: Thought Process Alteration  Goal: Optimal Thought Clarity  Description  Patient will be able to hold reality based conversations prior to discharge.     Outcome: Improving    Face to face end of shift report communicated to oncoming RN.     2/10/2020  6:05 AM

## 2020-02-10 NOTE — PLAN OF CARE
"Called and spoke with Pascual from [facility] to discuss pt discharging this week to [facility] as last week on Friday Josiah (who accepted pt for [facility]) stated he needed to discuss the \"rates\" with [facility] before scheduling an intake date for pt - Pascual stated that Kiah won't be back up until next Monday and \"she is our only CADI worker\" explained to Pascual they needed to speak with pt and inform pt of her needing to wait another 7 days before anything could be discuss about discharge - Raven asked writer why it needed to come from them and writer suggested they get a back up CADI worker so this doesn't happen in the future as pt is ready to discharge and does not need to wait another week and that is why the information needed to come from Pascual - phone call was transferred out and pt is currently speaking with Pascual.   "
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
## 2020-02-10 NOTE — PLAN OF CARE
"Face to face end of shift report communicated to oncoming shift.     Sherry Cano RN  2/10/2020  2:21 PM    Patient compliant with treatment team recommendations and all medication admin this shift.   Patient does not attend groups, \"I've been to enough groups, I'm trying to figure out where I'm going\"    Patient reports no difficulties with sleep.  ADL's are completed independently.    Patient's conversation is reality based related to her situation and where she will be going.  Patient recalls multiple people she has seen in the past that may have the needed information on her to facilitate her discharge.      Patient repeatedly asks multiple staff questions regarding her discharge and place she will be going to.  Staff educates patient that nothing is set.  [facility] is patient's guardian, patient spoke with someone at [employer] regarding the situation this shift, patient remained calm on the phone.  Patient was upset after, voiced her concerns to staff, when questioned if she was angry patient responded \"yes, but what can you do\"      Face to face start of shift report received from Sally BRAMBILA RN  Patient in [facility] at this time.  Will continue to monitor.   Problem: Adult Behavioral Health Plan of Care  Goal: Patient-Specific Goal (Individualization)  Description  Pt will follow recommendations of treatment team.  Pt will be compliant with medications.  Pt will attend 50 % of groups.  Pt will sleep 6-8 hours each night.  Pt will complete ADL's independently.   2/10/2020 1117 by Sherry Cano RN  Outcome: Improving  Note:   Problem: Thought Process Alteration  Goal: Optimal Thought Clarity  Description  Patient will be able to hold reality based conversations prior to discharge.     2/10/2020 1117 by Sherry Cano RN  Outcome: Improving     "

## 2020-02-11 LAB
GLUCOSE BLDC GLUCOMTR-MCNC: 121 MG/DL (ref 70–99)
GLUCOSE BLDC GLUCOMTR-MCNC: 137 MG/DL (ref 70–99)
GLUCOSE BLDC GLUCOMTR-MCNC: 146 MG/DL (ref 70–99)
GLUCOSE BLDC GLUCOMTR-MCNC: 75 MG/DL (ref 70–99)
GLUCOSE BLDC GLUCOMTR-MCNC: 89 MG/DL (ref 70–99)

## 2020-02-11 PROCEDURE — 00000146 ZZHCL STATISTIC GLUCOSE BY METER IP

## 2020-02-11 PROCEDURE — 12400000 ZZH R&B MH

## 2020-02-11 PROCEDURE — 99231 SBSQ HOSP IP/OBS SF/LOW 25: CPT | Performed by: NURSE PRACTITIONER

## 2020-02-11 PROCEDURE — 25000132 ZZH RX MED GY IP 250 OP 250 PS 637: Performed by: NURSE PRACTITIONER

## 2020-02-11 RX ADMIN — GABAPENTIN 1200 MG: 600 TABLET, FILM COATED ORAL at 13:13

## 2020-02-11 RX ADMIN — METFORMIN HYDROCHLORIDE 1000 MG: 500 TABLET, FILM COATED ORAL at 08:10

## 2020-02-11 RX ADMIN — BENZTROPINE MESYLATE 1 MG: 1 TABLET ORAL at 08:10

## 2020-02-11 RX ADMIN — INSULIN ASPART 8 UNITS: 100 INJECTION, SOLUTION INTRAVENOUS; SUBCUTANEOUS at 17:28

## 2020-02-11 RX ADMIN — METFORMIN HYDROCHLORIDE 1000 MG: 500 TABLET, FILM COATED ORAL at 17:28

## 2020-02-11 RX ADMIN — CLONAZEPAM 1 MG: 1 TABLET ORAL at 20:51

## 2020-02-11 RX ADMIN — BUSPIRONE HYDROCHLORIDE 15 MG: 10 TABLET ORAL at 13:13

## 2020-02-11 RX ADMIN — NICOTINE 1 PATCH: 21 PATCH, EXTENDED RELEASE TRANSDERMAL at 08:12

## 2020-02-11 RX ADMIN — BENZTROPINE MESYLATE 1 MG: 1 TABLET ORAL at 20:52

## 2020-02-11 RX ADMIN — DIVALPROEX SODIUM 750 MG: 500 TABLET, EXTENDED RELEASE ORAL at 20:52

## 2020-02-11 RX ADMIN — INSULIN ASPART 8 UNITS: 100 INJECTION, SOLUTION INTRAVENOUS; SUBCUTANEOUS at 12:10

## 2020-02-11 RX ADMIN — IBUPROFEN 600 MG: 600 TABLET ORAL at 08:10

## 2020-02-11 RX ADMIN — GABAPENTIN 1200 MG: 600 TABLET, FILM COATED ORAL at 20:52

## 2020-02-11 RX ADMIN — INSULIN ASPART 8 UNITS: 100 INJECTION, SOLUTION INTRAVENOUS; SUBCUTANEOUS at 08:12

## 2020-02-11 RX ADMIN — GABAPENTIN 1200 MG: 600 TABLET, FILM COATED ORAL at 08:10

## 2020-02-11 RX ADMIN — INSULIN GLARGINE 30 UNITS: 100 INJECTION, SOLUTION SUBCUTANEOUS at 20:49

## 2020-02-11 RX ADMIN — CHLORPROMAZINE HYDROCHLORIDE 100 MG: 100 TABLET, SUGAR COATED ORAL at 20:51

## 2020-02-11 RX ADMIN — CHLORPROMAZINE HYDROCHLORIDE 100 MG: 100 TABLET, SUGAR COATED ORAL at 08:10

## 2020-02-11 RX ADMIN — CHLORPROMAZINE HYDROCHLORIDE 100 MG: 100 TABLET, SUGAR COATED ORAL at 13:13

## 2020-02-11 RX ADMIN — HYDROXYZINE HYDROCHLORIDE 50 MG: 25 TABLET, FILM COATED ORAL at 08:10

## 2020-02-11 RX ADMIN — BUSPIRONE HYDROCHLORIDE 15 MG: 10 TABLET ORAL at 08:10

## 2020-02-11 RX ADMIN — PRAZOSIN HYDROCHLORIDE 1 MG: 1 CAPSULE ORAL at 20:51

## 2020-02-11 RX ADMIN — BUSPIRONE HYDROCHLORIDE 15 MG: 10 TABLET ORAL at 20:54

## 2020-02-11 ASSESSMENT — ACTIVITIES OF DAILY LIVING (ADL)
LAUNDRY: UNABLE TO COMPLETE
ORAL_HYGIENE: INDEPENDENT
DRESS: SCRUBS (BEHAVIORAL HEALTH);INDEPENDENT
HYGIENE/GROOMING: INDEPENDENT
DRESS: SCRUBS (BEHAVIORAL HEALTH);INDEPENDENT
ORAL_HYGIENE: INDEPENDENT
LAUNDRY: UNABLE TO COMPLETE
HYGIENE/GROOMING: INDEPENDENT

## 2020-02-11 NOTE — PLAN OF CARE
Problem: Adult Behavioral Health Plan of Care  Goal: Patient-Specific Goal (Individualization)  Description  Pt will follow recommendations of treatment team.  Pt will be compliant with medications.  Pt will attend 50 % of groups.  Pt will sleep 6-8 hours each night.  Pt will complete ADL's independently.   2/11/2020 0530 by Diana Campos, RN  Outcome: Improving  Note:        Patient in bed asleep most of the shift.  Came out to [facility] at 0515 and visited with peer.  Blood sugar this morning was 137.  No complaints of pain.  Face to face end of shift report communicated to day shift RN.     Diana Campos, RN  2/11/2020  5:31 AM

## 2020-02-11 NOTE — PROGRESS NOTES
[facility]  Psychiatric Progress Note      Impression:   Genie is up in the lounge area this morning. She is upset that the [employer] court does not have her on the schedule for Thursday but she is adamant that she is supposed to be there. Genie rambles with names of people and places she has been. All of it is very loosely connected with no real content. Genie does also have some delusional content as well, talking about her 1200 and 800 children and how much money she has. Genie is otherwise pleasant but does not participate in unit programming.      Previous medication trials documented:  Zyprexa - weight gain, not fully effective  Invega - ineffective  Tegretol - poorly tolerated  Lithium - poorly tolerated, severe sedation  Clozaril - irritability, hyperthermia, and persistent tachycardia  Depakote - Weight gain and poor mood control (this was still restarted during admission in 11/19)  Thorazine - notable improvement on 200mg tid, decreased in 11/19 on an outpatient visit, resulting in readmission.  Gabapentin   Buspar  Klonopin  Cogentin           Diagnoses:     Schizoaffective Disorder, Bipolar Type  PTSD, by history  R/O Other stimulant use disorder, methamphetamine, with induced perceptual disorder    Attestation:  Patient has been seen and evaluated by me,  Loren Zuleta NP          Interim History:   The patient's care was discussed with the treatment team and chart notes were reviewed.    BEHAVIORAL TEAM DISCUSSION    Progress: Good  Continued Stay Criteria/Rationale: Delusional, no safe discharge plan  Medical/Physical: Diabetes Mellitus Type 2  Precautions: No   Falls precaution?: Yes  Behavioral Orders   Procedures     Code 1 - Restrict to Unit     Routine Programming     As clinically indicated     Status 15     Every 15 minutes.     Plan: Continue current medications - exploring safe discharge options - Guardian in [place], Message left with Crystal  Rationale for change in precautions  "or plan: None      Participants: Loren Zuleta, APRN-THUY, Salud BENNETT,  Becca Chery LSW, Jim Barrios LSW, Nursing, katerina LIU, Jabari GARCIA            Medications:   I have reviewed this patient's current medications    Current Facility-Administered Medications   Medication     acetaminophen (TYLENOL) tablet 650 mg     alum & mag hydroxide-simethicone (MYLANTA ES/MAALOX  ES) suspension 30 mL     artificial saliva (BIOTENE MT) solution 1-2 spray     benztropine (COGENTIN) tablet 1 mg     bisacodyl (DULCOLAX) Suppository 10 mg     busPIRone (BUSPAR) tablet 15 mg     chlorproMAZINE (THORAZINE) tablet 100 mg     clonazePAM (klonoPIN) tablet 1 mg     clonazePAM (klonoPIN) tablet 1 mg     glucose gel 15-30 g    Or     dextrose 50 % injection 25-50 mL    Or     glucagon injection 1 mg     divalproex sodium extended-release (DEPAKOTE ER) 24 hr tablet 750 mg     gabapentin (NEURONTIN) tablet 1,200 mg     hydrOXYzine (ATARAX) tablet  mg     ibuprofen (ADVIL/MOTRIN) tablet 600 mg     insulin aspart (NovoLOG) inj (RAPID ACTING)     insulin aspart (NovoLOG) inj (RAPID ACTING)     insulin aspart (NovoLOG) inj (RAPID ACTING)     insulin glargine (LANTUS PEN) injection 30 Units     magnesium hydroxide (MILK OF MAGNESIA) suspension 30 mL     metFORMIN (GLUCOPHAGE) tablet 1,000 mg     nicotine (NICODERM CQ) 21 MG/24HR 24 hr patch 1 patch     nicotine (NICORETTE) gum 2 mg     nicotine Patch in Place     polyethylene glycol (MIRALAX/GLYCOLAX) Packet 17 g     prazosin (MINIPRESS) capsule 1 mg     traZODone (DESYREL) tablet 50 mg        10 point ROS - see H&P       Allergies:     Allergies   Allergen Reactions     Haloperidol Other (See Comments)     Other reaction(s): Seizures  Patient states, \"I fell down and wasn't able to get up.\"  Patient states, \"I fell down and wasn't able to get up.\"              Psychiatric Examination:   /78   Pulse 94   Temp 97.6  F (36.4  C) (Tympanic)   Resp 18   Ht 1.651 m (5' 5\")   Wt 88.4 " kg (194 lb 14.4 oz)   SpO2 99%   BMI 32.43 kg/m    Weight is 194 lbs 14.4 oz  Body mass index is 32.43 kg/m .    Appearance:  awake, alert  Attitude:  cooperative  Eye Contact:  Good  Mood:  good  Affect:  Mood congruent  Speech:  Clear and coherent  Psychomotor Behavior:  appropriate  Thought Process:  Goal oriented, able to answer questions in a linear fashion; few delusional statements  Associations:  JUSTA   Thought Content:  No SI/HI, Delusional content  Insight:  limited  Judgment:  limited  Oriented to:  time, person, and place  Attention Span and Concentration:  limited  Recent and Remote Memory:  fair - impacted by delusional beliefs and drug use  Fund of Knowledge: appropriate  Muscle Strength and Tone: normal  Gait and Station: Normal           Labs:     Results for orders placed or performed during the hospital encounter of 01/08/20 (from the past 48 hour(s))   Glucose by meter   Result Value Ref Range    Glucose 97 70 - 99 mg/dL   Glucose by meter   Result Value Ref Range    Glucose 170 (H) 70 - 99 mg/dL   Glucose by meter   Result Value Ref Range    Glucose 102 (H) 70 - 99 mg/dL   Glucose by meter   Result Value Ref Range    Glucose 241 (H) 70 - 99 mg/dL   Glucose by meter   Result Value Ref Range    Glucose 102 (H) 70 - 99 mg/dL   Glucose by meter   Result Value Ref Range    Glucose 167 (H) 70 - 99 mg/dL   Glucose by meter   Result Value Ref Range    Glucose 137 (H) 70 - 99 mg/dL   Glucose by meter   Result Value Ref Range    Glucose 89 70 - 99 mg/dL   Glucose by meter   Result Value Ref Range    Glucose 75 70 - 99 mg/dL          Plan:   Continue current medications    ELOS: >5 days - Exploring safe discharge plans, further stabilization
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
## 2020-02-12 LAB
GLUCOSE BLDC GLUCOMTR-MCNC: 102 MG/DL (ref 70–99)
GLUCOSE BLDC GLUCOMTR-MCNC: 116 MG/DL (ref 70–99)
GLUCOSE BLDC GLUCOMTR-MCNC: 123 MG/DL (ref 70–99)
GLUCOSE BLDC GLUCOMTR-MCNC: 148 MG/DL (ref 70–99)
GLUCOSE BLDC GLUCOMTR-MCNC: 159 MG/DL (ref 70–99)

## 2020-02-12 PROCEDURE — 00000146 ZZHCL STATISTIC GLUCOSE BY METER IP

## 2020-02-12 PROCEDURE — 12400000 ZZH R&B MH

## 2020-02-12 PROCEDURE — 25000132 ZZH RX MED GY IP 250 OP 250 PS 637: Performed by: NURSE PRACTITIONER

## 2020-02-12 PROCEDURE — 99231 SBSQ HOSP IP/OBS SF/LOW 25: CPT | Performed by: NURSE PRACTITIONER

## 2020-02-12 RX ADMIN — IBUPROFEN 600 MG: 600 TABLET ORAL at 21:08

## 2020-02-12 RX ADMIN — CLONAZEPAM 1 MG: 1 TABLET ORAL at 21:09

## 2020-02-12 RX ADMIN — INSULIN ASPART 8 UNITS: 100 INJECTION, SOLUTION INTRAVENOUS; SUBCUTANEOUS at 12:22

## 2020-02-12 RX ADMIN — GABAPENTIN 1200 MG: 600 TABLET, FILM COATED ORAL at 08:31

## 2020-02-12 RX ADMIN — METFORMIN HYDROCHLORIDE 1000 MG: 500 TABLET, FILM COATED ORAL at 08:31

## 2020-02-12 RX ADMIN — DIVALPROEX SODIUM 750 MG: 500 TABLET, EXTENDED RELEASE ORAL at 21:08

## 2020-02-12 RX ADMIN — INSULIN GLARGINE 30 UNITS: 100 INJECTION, SOLUTION SUBCUTANEOUS at 21:06

## 2020-02-12 RX ADMIN — GABAPENTIN 1200 MG: 600 TABLET, FILM COATED ORAL at 21:07

## 2020-02-12 RX ADMIN — CHLORPROMAZINE HYDROCHLORIDE 100 MG: 100 TABLET, SUGAR COATED ORAL at 21:08

## 2020-02-12 RX ADMIN — BUSPIRONE HYDROCHLORIDE 15 MG: 10 TABLET ORAL at 14:02

## 2020-02-12 RX ADMIN — PRAZOSIN HYDROCHLORIDE 1 MG: 1 CAPSULE ORAL at 21:09

## 2020-02-12 RX ADMIN — CHLORPROMAZINE HYDROCHLORIDE 100 MG: 100 TABLET, SUGAR COATED ORAL at 08:31

## 2020-02-12 RX ADMIN — BUSPIRONE HYDROCHLORIDE 15 MG: 10 TABLET ORAL at 08:31

## 2020-02-12 RX ADMIN — GABAPENTIN 1200 MG: 600 TABLET, FILM COATED ORAL at 14:02

## 2020-02-12 RX ADMIN — BENZTROPINE MESYLATE 1 MG: 1 TABLET ORAL at 21:09

## 2020-02-12 RX ADMIN — INSULIN ASPART 8 UNITS: 100 INJECTION, SOLUTION INTRAVENOUS; SUBCUTANEOUS at 08:32

## 2020-02-12 RX ADMIN — BENZTROPINE MESYLATE 1 MG: 1 TABLET ORAL at 08:31

## 2020-02-12 RX ADMIN — INSULIN ASPART 8 UNITS: 100 INJECTION, SOLUTION INTRAVENOUS; SUBCUTANEOUS at 17:36

## 2020-02-12 RX ADMIN — BUSPIRONE HYDROCHLORIDE 15 MG: 10 TABLET ORAL at 21:07

## 2020-02-12 RX ADMIN — METFORMIN HYDROCHLORIDE 1000 MG: 500 TABLET, FILM COATED ORAL at 17:34

## 2020-02-12 RX ADMIN — NICOTINE 1 PATCH: 21 PATCH, EXTENDED RELEASE TRANSDERMAL at 08:32

## 2020-02-12 RX ADMIN — CHLORPROMAZINE HYDROCHLORIDE 100 MG: 100 TABLET, SUGAR COATED ORAL at 14:02

## 2020-02-12 ASSESSMENT — ACTIVITIES OF DAILY LIVING (ADL)
HYGIENE/GROOMING: INDEPENDENT
DRESS: SCRUBS (BEHAVIORAL HEALTH);INDEPENDENT
LAUNDRY: UNABLE TO COMPLETE
ORAL_HYGIENE: INDEPENDENT
HYGIENE/GROOMING: INDEPENDENT

## 2020-02-12 NOTE — PLAN OF CARE
"  Problem: Adult Behavioral Health Plan of Care  Goal: Patient-Specific Goal (Individualization)  Description  Pt will follow recommendations of treatment team.  Pt will be compliant with medications.  Pt will attend 50 % of groups.  Pt will sleep 6-8 hours each night.  Pt will complete ADL's independently.   2/12/2020 1548 by Arlyn Lind, RN  Outcome: Improving  Note:   1535: Received end of shift report from OBDULIO Zavala. Pt lying in bed, eyes closed, even, non-labored respirations noted.     1645: Accucheck of 102; Roused to voice, states, \"oh I was dreaming et returned back to sleep.    1730: Pt awoken for dinner, states, \"oh I'm just so tired, I'm catching up for the last two days.\" Delusional thinking continues, flight of ideas et rambling consisting of having a kangaroo, ostrich rides, father et family being pirates et finding the best treasures imaginable.     2110: Pt out in lounge area approximately 1930, returned to room post snack,  Accucheck of 159; Held sliding scale Novolog d/t parameters not met; Compliant with HS medications, PRN ibuprofen given for c/o \"My foot hurts a little bit, I need some ibuprofen.\"     Face to face end of shift report to be communicated to on-coming staff.     Arlyn Lind, RN  2/12/2020  9:46 PM                       Problem: Adult Behavioral Health Plan of Care  Goal: Adheres to Safety Considerations for Self and Others  Outcome: Improving  Note:   Appropriate behavior with staff et peers.      Problem: Fall Injury Risk  Goal: Absence of Fall and Fall-Related Injury  Description  Pt will wear non skid slippers.  Pt will remain free from falls or injuries during hospitalization.   2/12/2020 1548 by Arlyn Lind, RN  Outcome: Improving  Note:   Pt remains free from injury et falls this shift.      Problem: Thought Process Alteration  Goal: Optimal Thought Clarity  Description  Patient will be able to hold reality based conversations prior to discharge.     2/12/2020 " 1548 by Arlyn Lind, RN  Outcome: No Change

## 2020-02-12 NOTE — PROGRESS NOTES
[facility]  Psychiatric Progress Note      Impression:   \  Patient is sitting at the table, has lists and phone numbers of a variety of professional people who she has seen in the past.  She tells me that she still has a court date for tomorrow, despite her  have told her she does not - patient plans to meet by phone and will call [place] tomorrow.  Patient remains fairly calm, initially remains on track with reasonable discussion, she does per her baseline, tend to ramble on eventually with delusional content.  She reports stable mood, denies any issue needing medication changes, and despite delusion, remains very calm and pleasant throughout.  No changes in medications today.      Previous medication trials documented:  Zyprexa - weight gain, not fully effective  Invega - ineffective  Tegretol - poorly tolerated  Lithium - poorly tolerated, severe sedation  Clozaril - irritability, hyperthermia, and persistent tachycardia  Depakote - Weight gain and poor mood control (this was still restarted during admission in 11/19)  Thorazine - notable improvement on 200mg tid, decreased in 11/19 on an outpatient visit, resulting in readmission.  Gabapentin   Franc Lo           Diagnoses:     Schizoaffective Disorder, Bipolar Type  PTSD, by history  R/O Other stimulant use disorder, methamphetamine, with induced perceptual disorder    Attestation:  Patient has been seen and evaluated by me,  LATRICE Du CNP          Interim History:   The patient's care was discussed with the treatment team and chart notes were reviewed.    BEHAVIORAL TEAM DISCUSSION    Progress: Good  Continued Stay Criteria/Rationale: Delusional, no safe discharge plan  Medical/Physical: Diabetes Mellitus Type 2  Precautions: No   Falls precaution?: Yes  Behavioral Orders   Procedures     Code 1 - Restrict to Unit     Routine Programming     As clinically indicated     Status 15     Every 15  "minutes.     Plan: Continue current medications - exploring safe discharge options - Guardian in [place], Message left with Crystal  Rationale for change in precautions or plan: None      Participants: Katerina Fabian, APRN-THUY, Salud BENNETT,  Becca Chery LSW, Jim Barrios LSW, Nursing, katerina OT,           Medications:   I have reviewed this patient's current medications    Current Facility-Administered Medications   Medication     acetaminophen (TYLENOL) tablet 650 mg     alum & mag hydroxide-simethicone (MYLANTA ES/MAALOX  ES) suspension 30 mL     artificial saliva (BIOTENE MT) solution 1-2 spray     benztropine (COGENTIN) tablet 1 mg     bisacodyl (DULCOLAX) Suppository 10 mg     busPIRone (BUSPAR) tablet 15 mg     chlorproMAZINE (THORAZINE) tablet 100 mg     clonazePAM (klonoPIN) tablet 1 mg     clonazePAM (klonoPIN) tablet 1 mg     glucose gel 15-30 g    Or     dextrose 50 % injection 25-50 mL    Or     glucagon injection 1 mg     divalproex sodium extended-release (DEPAKOTE ER) 24 hr tablet 750 mg     gabapentin (NEURONTIN) tablet 1,200 mg     hydrOXYzine (ATARAX) tablet  mg     ibuprofen (ADVIL/MOTRIN) tablet 600 mg     insulin aspart (NovoLOG) inj (RAPID ACTING)     insulin aspart (NovoLOG) inj (RAPID ACTING)     insulin aspart (NovoLOG) inj (RAPID ACTING)     insulin glargine (LANTUS PEN) injection 30 Units     magnesium hydroxide (MILK OF MAGNESIA) suspension 30 mL     metFORMIN (GLUCOPHAGE) tablet 1,000 mg     nicotine (NICODERM CQ) 21 MG/24HR 24 hr patch 1 patch     nicotine (NICORETTE) gum 2 mg     nicotine Patch in Place     polyethylene glycol (MIRALAX/GLYCOLAX) Packet 17 g     prazosin (MINIPRESS) capsule 1 mg     traZODone (DESYREL) tablet 50 mg        10 point ROS - see H&P       Allergies:     Allergies   Allergen Reactions     Haloperidol Other (See Comments)     Other reaction(s): Seizures  Patient states, \"I fell down and wasn't able to get up.\"  Patient states, \"I fell down and wasn't " "able to get up.\"              Psychiatric Examination:   /73   Pulse 88   Temp 98.3  F (36.8  C) (Tympanic)   Resp 18   Ht 1.651 m (5' 5\")   Wt 88.4 kg (194 lb 14.4 oz)   SpO2 98%   BMI 32.43 kg/m    Weight is 194 lbs 14.4 oz  Body mass index is 32.43 kg/m .    Appearance:  awake, alert  Attitude:  cooperative  Eye Contact:  Good  Mood:  good  Affect:  Mood congruent  Speech:  Clear and coherent  Psychomotor Behavior:  appropriate  Thought Process:  Goal oriented, able to answer questions in a linear fashion; few delusional statements  Associations:  JUSTA   Thought Content:  No SI/HI, Delusional content  Insight:  limited  Judgment:  limited  Oriented to:  time, person, and place  Attention Span and Concentration:  limited  Recent and Remote Memory:  fair - impacted by delusional beliefs and drug use  Fund of Knowledge: appropriate  Muscle Strength and Tone: normal  Gait and Station: Normal           Labs:     Results for orders placed or performed during the hospital encounter of 01/08/20 (from the past 48 hour(s))   Glucose by meter   Result Value Ref Range    Glucose 102 (H) 70 - 99 mg/dL   Glucose by meter   Result Value Ref Range    Glucose 167 (H) 70 - 99 mg/dL   Glucose by meter   Result Value Ref Range    Glucose 137 (H) 70 - 99 mg/dL   Glucose by meter   Result Value Ref Range    Glucose 89 70 - 99 mg/dL   Glucose by meter   Result Value Ref Range    Glucose 75 70 - 99 mg/dL   Glucose by meter   Result Value Ref Range    Glucose 146 (H) 70 - 99 mg/dL   Glucose by meter   Result Value Ref Range    Glucose 121 (H) 70 - 99 mg/dL   Glucose by meter   Result Value Ref Range    Glucose 116 (H) 70 - 99 mg/dL   Glucose by meter   Result Value Ref Range    Glucose 148 (H) 70 - 99 mg/dL          Plan:   Continue current medications    ELOS: >5 days - Exploring safe discharge plans, further stabilization  "

## 2020-02-12 NOTE — PLAN OF CARE
"Message left this morning for [employer].    Late entry: message left for Hunter Turner on Monday and writer sent an email as well to follow up regarding phone conference that was discussed on Friday 2/7/2020 - received an email from Hunter on Tuesday morning stating he had the phone conference with Lauro Lauren on Monday as planned and stated the guardians told him they found a place for pt and are working with that placemant to develop a \"short term agreement\" so pt is not waiting another week for the [employer] worker, Kiah, to return back to the office. Informed Hunter that writer would have liked to been a part of the phone conference and to please include writer next time - Hunter stated that he asked that guardians if writer was to be involved and guardians stated no.     Called and spoke with Yu who states she will need to speak with the CADI worker prior to an admission date as they will need to discuss the \"rates\" - Yu states that if she speaks with the CADI worker on Monday she will know by Tuesday. Provided guardian contact to Yu.   "

## 2020-02-12 NOTE — PLAN OF CARE
Problem: Adult Behavioral Health Plan of Care  Goal: Patient-Specific Goal (Individualization)  Description  Pt will follow recommendations of treatment team.  Pt will be compliant with medications.  Pt will attend 50 % of groups.  Pt will sleep 6-8 hours each night.  Pt will complete ADL's independently.     According to charting, pt slept approx. 7 hours last night. Pt ate 100% of breakfast. Pt compliant with medication and assessment. Pt did administer own insulin without difficulty. Pt continues to make delusional comments; pt agitated this am about her children and people hurting them which quickly resolved when pt started talking about how her leg got caught in a bear trap. Will continue to monitor.    1100 Pt sat out in lounge with her notebooks.   1200 Pt ate 100% of lunch.  1300 Pt resting in bed.  1450 Pt c/o dizziness, lightheadedness. Blood sugar 123. 122/81 pulse 109. Juice, water and small snack provided.    Outcome: Improving    Problem: Thought Process Alteration  Goal: Optimal Thought Clarity  Description  Patient will be able to hold reality based conversations prior to discharge.       Outcome: Improving     Problem: Fall Injury Risk  Goal: Absence of Fall and Fall-Related Injury  Description  Pt will wear non skid slippers.  Pt will remain free from falls or injuries during hospitalization.     Steady gait. Appropriate footwear. No falls this shift.    Outcome: Improving    Face to face end of shift report communicated to oncdouglas RN.     2/12/2020  12:56 PM

## 2020-02-12 NOTE — PLAN OF CARE
Problem: Adult Behavioral Health Plan of Care  Goal: Patient-Specific Goal (Individualization)  Description  Pt will follow recommendations of treatment team.  Pt will be compliant with medications.  Pt will attend 50 % of groups.  Pt will sleep 6-8 hours each night.  Pt will complete ADL's independently.     Pt spent most of the shift in her room on her bed writing a letter that she plans on faxing to [place] regarding her complaints of her treatment. Pt does have a 2 page letter but did not share the letter with nursing. Pt denies pain, depression and SI/HI. Pt denies hallucination but is actively delusional regarding court dates and wrongs done to her by the court system.       Outcome: Improving  Note:          Problem: Fall Injury Risk  Goal: Absence of Fall and Fall-Related Injury  Description  Pt will wear non skid slippers.  Pt will remain free from falls or injuries during hospitalization.     Pt free from falls this shift.   Outcome: Improving     Problem: Thought Process Alteration  Goal: Optimal Thought Clarity  Description  Patient will be able to hold reality based conversations prior to discharge.     Outcome: Improving    Face to face end of shift report communicated to oncoming RN. Reported that pt is a fall risk.     Julia Franco, RN  2/11/2020  11:49 PM

## 2020-02-12 NOTE — PLAN OF CARE
Problem: Adult Behavioral Health Plan of Care  Goal: Patient-Specific Goal (Individualization)  Description  Pt will follow recommendations of treatment team.  Pt will be compliant with medications.  Pt will attend 50 % of groups.  Pt will sleep 6-8 hours each night.  Pt will complete ADL's independently.     Pt in room laying on bed with eyes closed with regular respirations and position changes.    2/12/2020 0609 by Julia Franco, RN  Outcome: Improving  Note:          Problem: Fall Injury Risk  Goal: Absence of Fall and Fall-Related Injury  Description  Pt will wear non skid slippers.  Pt will remain free from falls or injuries during hospitalization.   2/12/2020 0609 by Julia Franco, RN  Outcome: Improving     Problem: Thought Process Alteration  Goal: Optimal Thought Clarity  Description  Patient will be able to hold reality based conversations prior to discharge.     2/12/2020 0609 by Julia Franco, RN  Outcome: Improving    Face to face end of shift report communicated to oncoming RN. Reported that pt is a fall risk.     Julia Franco RN  2/12/2020  6:10 AM

## 2020-02-13 LAB
GLUCOSE BLDC GLUCOMTR-MCNC: 105 MG/DL (ref 70–99)
GLUCOSE BLDC GLUCOMTR-MCNC: 113 MG/DL (ref 70–99)
GLUCOSE BLDC GLUCOMTR-MCNC: 115 MG/DL (ref 70–99)
GLUCOSE BLDC GLUCOMTR-MCNC: 197 MG/DL (ref 70–99)

## 2020-02-13 PROCEDURE — 25000132 ZZH RX MED GY IP 250 OP 250 PS 637: Performed by: NURSE PRACTITIONER

## 2020-02-13 PROCEDURE — 12400000 ZZH R&B MH

## 2020-02-13 PROCEDURE — 00000146 ZZHCL STATISTIC GLUCOSE BY METER IP

## 2020-02-13 PROCEDURE — 99232 SBSQ HOSP IP/OBS MODERATE 35: CPT | Performed by: NURSE PRACTITIONER

## 2020-02-13 RX ORDER — DIVALPROEX SODIUM 500 MG/1
1000 TABLET, EXTENDED RELEASE ORAL AT BEDTIME
Status: DISCONTINUED | OUTPATIENT
Start: 2020-02-13 | End: 2020-02-15 | Stop reason: HOSPADM

## 2020-02-13 RX ADMIN — PRAZOSIN HYDROCHLORIDE 1 MG: 1 CAPSULE ORAL at 21:14

## 2020-02-13 RX ADMIN — CHLORPROMAZINE HYDROCHLORIDE 100 MG: 100 TABLET, SUGAR COATED ORAL at 14:27

## 2020-02-13 RX ADMIN — METFORMIN HYDROCHLORIDE 1000 MG: 500 TABLET, FILM COATED ORAL at 08:21

## 2020-02-13 RX ADMIN — CLONAZEPAM 1 MG: 1 TABLET ORAL at 14:30

## 2020-02-13 RX ADMIN — BUSPIRONE HYDROCHLORIDE 15 MG: 10 TABLET ORAL at 08:21

## 2020-02-13 RX ADMIN — CLONAZEPAM 1 MG: 1 TABLET ORAL at 21:16

## 2020-02-13 RX ADMIN — CHLORPROMAZINE HYDROCHLORIDE 100 MG: 100 TABLET, SUGAR COATED ORAL at 21:15

## 2020-02-13 RX ADMIN — CHLORPROMAZINE HYDROCHLORIDE 100 MG: 100 TABLET, SUGAR COATED ORAL at 08:21

## 2020-02-13 RX ADMIN — INSULIN GLARGINE 30 UNITS: 100 INJECTION, SOLUTION SUBCUTANEOUS at 21:13

## 2020-02-13 RX ADMIN — GABAPENTIN 1200 MG: 600 TABLET, FILM COATED ORAL at 14:27

## 2020-02-13 RX ADMIN — BUSPIRONE HYDROCHLORIDE 15 MG: 10 TABLET ORAL at 14:27

## 2020-02-13 RX ADMIN — INSULIN ASPART 8 UNITS: 100 INJECTION, SOLUTION INTRAVENOUS; SUBCUTANEOUS at 12:34

## 2020-02-13 RX ADMIN — INSULIN ASPART 8 UNITS: 100 INJECTION, SOLUTION INTRAVENOUS; SUBCUTANEOUS at 16:35

## 2020-02-13 RX ADMIN — NICOTINE 1 PATCH: 21 PATCH, EXTENDED RELEASE TRANSDERMAL at 08:22

## 2020-02-13 RX ADMIN — BENZTROPINE MESYLATE 1 MG: 1 TABLET ORAL at 21:15

## 2020-02-13 RX ADMIN — BENZTROPINE MESYLATE 1 MG: 1 TABLET ORAL at 08:21

## 2020-02-13 RX ADMIN — BUSPIRONE HYDROCHLORIDE 15 MG: 10 TABLET ORAL at 21:15

## 2020-02-13 RX ADMIN — METFORMIN HYDROCHLORIDE 1000 MG: 500 TABLET, FILM COATED ORAL at 16:36

## 2020-02-13 RX ADMIN — GABAPENTIN 1200 MG: 600 TABLET, FILM COATED ORAL at 21:15

## 2020-02-13 RX ADMIN — DIVALPROEX SODIUM 1000 MG: 500 TABLET, EXTENDED RELEASE ORAL at 21:15

## 2020-02-13 RX ADMIN — GABAPENTIN 1200 MG: 600 TABLET, FILM COATED ORAL at 08:21

## 2020-02-13 RX ADMIN — INSULIN ASPART 8 UNITS: 100 INJECTION, SOLUTION INTRAVENOUS; SUBCUTANEOUS at 08:21

## 2020-02-13 ASSESSMENT — ACTIVITIES OF DAILY LIVING (ADL)
LAUNDRY: UNABLE TO COMPLETE
HYGIENE/GROOMING: INDEPENDENT
DRESS: SCRUBS (BEHAVIORAL HEALTH);INDEPENDENT
ORAL_HYGIENE: INDEPENDENT
HYGIENE/GROOMING: INDEPENDENT

## 2020-02-13 NOTE — PLAN OF CARE
Received a voice message from pt's guardian, Sally, this morning - called and left a message this morning. Pt came to nurses station to remind writer that she has court at 10:00 this morning - attempted to explain to pt writer has not heard anything regarding a court hearing and guardians told writer pt does not have court that the next hearing is not until this summer. Pt gave writer court admin number in [place] - writer called and it was confirmed pt has court at 10:00 for guardianship, message left with . Called court admin back to make sure pt is able to attend via phone - requested court admin to contact nurses station at 10:00 and writer will sit with pt in report room with an ascom phone to attend hearing via phone.     Pt attended court hearing via phone in report room - pt's guardianship was extended and set for another review in August which pt requested to have it in three months instead. Pt was told by Kerry that she would be leaving on Monday to the foster care in [facility] - writer has not been told this by the guardians, but pt is set and focused on leaving Monday. Pt was able to keep behaviors in control during court and was redirectable when told by writer to allow others to talk as well.

## 2020-02-13 NOTE — PLAN OF CARE
Problem: Adult Behavioral Health Plan of Care  Goal: Patient-Specific Goal (Individualization)  Description  Pt will follow recommendations of treatment team.  Pt will be compliant with medications.  Pt will attend 50 % of groups.  Pt will sleep 6-8 hours each night.  Pt will complete ADL's independently.   Note:   Patient laying in bed with eyes closed, non-labored breathing and position changes noted for majority of shift. Blood sugar this morning was 105. Continue to monitor at this time.      Face to face end of shift report communicated to oncoming RN.     Jenny Cardenas RN  2/13/2020  6:43 AM

## 2020-02-13 NOTE — PLAN OF CARE
Problem: Adult Behavioral Health Plan of Care  Goal: Patient-Specific Goal (Individualization)  Description  Pt will follow recommendations of treatment team.  Pt will be compliant with medications.  Pt will attend 50 % of groups.  Pt will sleep 6-8 hours each night.  Pt will complete ADL's independently.     Pt compliant with medication and assessment. According to charting, pt slept approx. 7 hours last night. Pt ate 100% of breakfast. Pt administered own insulin. Pt denies all criteria. Pt states she has court in [place] for guardianship today; verified by . Pt has court at 1000; see  note for details. Will continue to monitor.    1200 Pt ate 100% of lunch. Administered own insulin.   1320 Pt participating in groups.   1430 Prn klonopin administered per pt request.    Outcome: Improving    Problem: Thought Process Alteration  Goal: Optimal Thought Clarity  Description  Patient will be able to hold reality based conversations prior to discharge.     Outcome: Improving     Problem: Fall Injury Risk  Goal: Absence of Fall and Fall-Related Injury  Description  Pt will wear non skid slippers.  Pt will remain free from falls or injuries during hospitalization.     Appropriate footwear. Steady gait. No falls this shift.  Outcome: Improving    Face to face end of shift report communicated to oncoming RN.     Sally Sumner RN  2/13/2020  1:21 PM

## 2020-02-13 NOTE — PROGRESS NOTES
"[facility]  Psychiatric Progress Note      Impression:   \  Patient wants to meet in private office. She tells me her court did not go as well as she hoped, and they told her they would \"put me away forever\".  She explains they found a place in [facility] and will review again in 1 month, then 3 months, and she has her next guardianship hearing in June.  Patient does remain fairly calm, although does get worked up a couple times in talking about it - although try and reassure patient it may not have gone as bad as she thinks and will touch base with SW.  Patient reports she is having a [facility] assessment on Monday and may be discharging as soon as next week.  We discuss medications and patient would like her Depakote increased, she is only at 750mg and last level low - however we did not increase prior to this as she has had elevated liver enzymes in the past, as well as weight gain - and the need to consider her diabetes.  Current labs look good so will increase to 1000mg and draw lab in 3 days.  Patient continues to have rational, linear, and logical discussion and appears very organized in her lists she has made showing professional people who she has worked with.  The more time that is spend in discussion however, she will start to ramble about her past, which includes fixed delusions and random stories, which is typical and baseline for this patient.  She denies si/hi, sleep is good, with some labile mood currently, likely due to her meeting today.      Additional medication trials this hospital stay has been an increase in thorazine, however patient has elongated QT interval, which was further elongated secondary to dose increase, therefore we adjusted back to tolerable, fairly effective and safer dose.    Previous medication trials documented:  Zyprexa - weight gain, not fully effective  Invega - ineffective  Tegretol - poorly tolerated  Lithium - poorly tolerated, severe sedation  Clozaril - " irritability, hyperthermia, and persistent tachycardia  Depakote - Weight gain and poor mood control (this was still restarted during admission in 11/19)  Thorazine - notable improvement on 200mg tid, decreased in 11/19 on an outpatient visit, resulting in readmission.  Gabapentin   Buspar  Klonopin  Cogentin           Diagnoses:     Schizoaffective Disorder, Bipolar Type  PTSD, by history  R/O Other stimulant use disorder, methamphetamine, with induced perceptual disorder    Attestation:  Patient has been seen and evaluated by me,  LATRICE Du CNP          Interim History:   The patient's care was discussed with the treatment team and chart notes were reviewed.    BEHAVIORAL TEAM DISCUSSION    Progress: Good  Continued Stay Criteria/Rationale: Delusional, no safe discharge plan  Medical/Physical: Diabetes Mellitus Type 2  Precautions: No   Falls precaution?: Yes  Behavioral Orders   Procedures     Code 1 - Restrict to Unit     Routine Programming     As clinically indicated     Status 15     Every 15 minutes.     Plan: increasing Depakote - monitor liver enzymes/weight/glucose - exploring safe discharge options - Guardian in [place], Message left with Crystal  Rationale for change in precautions or plan: None      Participants: LATRICE Rizo-THUY, Salud BENNETT,  Becca Chery LSW, Jim Barrios LSW, Nursing, katerina OT,           Medications:   I have reviewed this patient's current medications    Current Facility-Administered Medications   Medication     acetaminophen (TYLENOL) tablet 650 mg     alum & mag hydroxide-simethicone (MYLANTA ES/MAALOX  ES) suspension 30 mL     artificial saliva (BIOTENE MT) solution 1-2 spray     benztropine (COGENTIN) tablet 1 mg     bisacodyl (DULCOLAX) Suppository 10 mg     busPIRone (BUSPAR) tablet 15 mg     chlorproMAZINE (THORAZINE) tablet 100 mg     clonazePAM (klonoPIN) tablet 1 mg     clonazePAM (klonoPIN) tablet 1 mg     glucose gel 15-30 g    Or     dextrose  "50 % injection 25-50 mL    Or     glucagon injection 1 mg     divalproex sodium extended-release (DEPAKOTE ER) 24 hr tablet 1,000 mg     gabapentin (NEURONTIN) tablet 1,200 mg     hydrOXYzine (ATARAX) tablet  mg     ibuprofen (ADVIL/MOTRIN) tablet 600 mg     insulin aspart (NovoLOG) inj (RAPID ACTING)     insulin aspart (NovoLOG) inj (RAPID ACTING)     insulin aspart (NovoLOG) inj (RAPID ACTING)     insulin glargine (LANTUS PEN) injection 30 Units     magnesium hydroxide (MILK OF MAGNESIA) suspension 30 mL     metFORMIN (GLUCOPHAGE) tablet 1,000 mg     nicotine (NICODERM CQ) 21 MG/24HR 24 hr patch 1 patch     nicotine (NICORETTE) gum 2 mg     nicotine Patch in Place     polyethylene glycol (MIRALAX/GLYCOLAX) Packet 17 g     prazosin (MINIPRESS) capsule 1 mg     traZODone (DESYREL) tablet 50 mg        10 point ROS - see H&P       Allergies:     Allergies   Allergen Reactions     Haloperidol Other (See Comments)     Other reaction(s): Seizures  Patient states, \"I fell down and wasn't able to get up.\"  Patient states, \"I fell down and wasn't able to get up.\"              Psychiatric Examination:   /68   Pulse 109   Temp 97.1  F (36.2  C) (Tympanic)   Resp 16   Ht 1.651 m (5' 5\")   Wt 88.4 kg (194 lb 14.4 oz)   SpO2 98%   BMI 32.43 kg/m    Weight is 194 lbs 14.4 oz  Body mass index is 32.43 kg/m .    Appearance:  awake, alert  Attitude:  cooperative  Eye Contact:  Good  Mood:  Good, a bit labile today  Affect:  Mood congruent  Speech:  Clear and coherent  Psychomotor Behavior:  appropriate  Thought Process:  Goal oriented, able to have linear discussion, ends in delusional statements  Associations:  JUSTA   Thought Content:  No SI/HI, Delusional content  Insight:  limited  Judgment:  limited  Oriented to:  time, person, and place  Attention Span and Concentration:  limited  Recent and Remote Memory:  fair - impacted by delusional beliefs and drug use  Fund of Knowledge: appropriate  Muscle Strength and " Tone: normal  Gait and Station: Normal           Labs:     Results for orders placed or performed during the hospital encounter of 01/08/20 (from the past 48 hour(s))   Glucose by meter   Result Value Ref Range    Glucose 89 70 - 99 mg/dL   Glucose by meter   Result Value Ref Range    Glucose 75 70 - 99 mg/dL   Glucose by meter   Result Value Ref Range    Glucose 146 (H) 70 - 99 mg/dL   Glucose by meter   Result Value Ref Range    Glucose 121 (H) 70 - 99 mg/dL   Glucose by meter   Result Value Ref Range    Glucose 116 (H) 70 - 99 mg/dL   Glucose by meter   Result Value Ref Range    Glucose 148 (H) 70 - 99 mg/dL   Glucose by meter   Result Value Ref Range    Glucose 123 (H) 70 - 99 mg/dL   Glucose by meter   Result Value Ref Range    Glucose 102 (H) 70 - 99 mg/dL   Glucose by meter   Result Value Ref Range    Glucose 159 (H) 70 - 99 mg/dL   Glucose by meter   Result Value Ref Range    Glucose 105 (H) 70 - 99 mg/dL          Plan:   Continue current medications    ELOS: >5 days - Exploring safe discharge plans, further stabilization

## 2020-02-14 LAB
GLUCOSE BLDC GLUCOMTR-MCNC: 138 MG/DL (ref 70–99)
GLUCOSE BLDC GLUCOMTR-MCNC: 141 MG/DL (ref 70–99)
GLUCOSE BLDC GLUCOMTR-MCNC: 210 MG/DL (ref 70–99)
GLUCOSE BLDC GLUCOMTR-MCNC: 97 MG/DL (ref 70–99)

## 2020-02-14 PROCEDURE — 25000132 ZZH RX MED GY IP 250 OP 250 PS 637: Performed by: NURSE PRACTITIONER

## 2020-02-14 PROCEDURE — 12400000 ZZH R&B MH

## 2020-02-14 PROCEDURE — 00000146 ZZHCL STATISTIC GLUCOSE BY METER IP

## 2020-02-14 RX ORDER — CLONAZEPAM 1 MG/1
1 TABLET ORAL AT BEDTIME
Qty: 30 TABLET | Refills: 0 | Status: SHIPPED | OUTPATIENT
Start: 2020-02-14

## 2020-02-14 RX ORDER — BUSPIRONE HYDROCHLORIDE 15 MG/1
15 TABLET ORAL 3 TIMES DAILY
Qty: 90 TABLET | Refills: 0 | Status: SHIPPED | OUTPATIENT
Start: 2020-02-14

## 2020-02-14 RX ORDER — BENZTROPINE MESYLATE 1 MG/1
1 TABLET ORAL 2 TIMES DAILY
Qty: 60 TABLET | Refills: 0 | Status: SHIPPED | OUTPATIENT
Start: 2020-02-14

## 2020-02-14 RX ORDER — DIVALPROEX SODIUM 500 MG/1
1000 TABLET, EXTENDED RELEASE ORAL AT BEDTIME
Qty: 60 TABLET | Refills: 0 | Status: SHIPPED | OUTPATIENT
Start: 2020-02-14

## 2020-02-14 RX ORDER — GABAPENTIN 600 MG/1
1200 TABLET ORAL 3 TIMES DAILY
Qty: 168 TABLET | Refills: 0 | Status: SHIPPED | OUTPATIENT
Start: 2020-02-14

## 2020-02-14 RX ORDER — CHLORPROMAZINE HYDROCHLORIDE 100 MG/1
100 TABLET, FILM COATED ORAL 3 TIMES DAILY
Qty: 90 TABLET | Refills: 0 | Status: SHIPPED | OUTPATIENT
Start: 2020-02-14 | End: 2020-03-15

## 2020-02-14 RX ORDER — SALIVA STIMULANT COMB. NO.3
1-2 SPRAY, NON-AEROSOL (ML) MUCOUS MEMBRANE 4 TIMES DAILY PRN
Qty: 1 BOTTLE | Refills: 0 | Status: SHIPPED | OUTPATIENT
Start: 2020-02-14

## 2020-02-14 RX ORDER — POLYETHYLENE GLYCOL 3350 17 G/17G
1 POWDER, FOR SOLUTION ORAL DAILY PRN
Qty: 30 PACKET | Refills: 0 | Status: SHIPPED | OUTPATIENT
Start: 2020-02-14

## 2020-02-14 RX ORDER — PRAZOSIN HYDROCHLORIDE 1 MG/1
1 CAPSULE ORAL AT BEDTIME
Qty: 28 CAPSULE | Refills: 0 | Status: SHIPPED | OUTPATIENT
Start: 2020-02-14

## 2020-02-14 RX ADMIN — BUSPIRONE HYDROCHLORIDE 15 MG: 10 TABLET ORAL at 13:23

## 2020-02-14 RX ADMIN — METFORMIN HYDROCHLORIDE 1000 MG: 500 TABLET, FILM COATED ORAL at 08:35

## 2020-02-14 RX ADMIN — GABAPENTIN 1200 MG: 600 TABLET, FILM COATED ORAL at 08:35

## 2020-02-14 RX ADMIN — CHLORPROMAZINE HYDROCHLORIDE 100 MG: 100 TABLET, SUGAR COATED ORAL at 20:41

## 2020-02-14 RX ADMIN — CLONAZEPAM 1 MG: 1 TABLET ORAL at 13:28

## 2020-02-14 RX ADMIN — METFORMIN HYDROCHLORIDE 1000 MG: 500 TABLET, FILM COATED ORAL at 17:50

## 2020-02-14 RX ADMIN — BUSPIRONE HYDROCHLORIDE 15 MG: 10 TABLET ORAL at 20:41

## 2020-02-14 RX ADMIN — INSULIN GLARGINE 30 UNITS: 100 INJECTION, SOLUTION SUBCUTANEOUS at 20:41

## 2020-02-14 RX ADMIN — BUSPIRONE HYDROCHLORIDE 15 MG: 10 TABLET ORAL at 08:35

## 2020-02-14 RX ADMIN — BENZTROPINE MESYLATE 1 MG: 1 TABLET ORAL at 20:41

## 2020-02-14 RX ADMIN — BENZTROPINE MESYLATE 1 MG: 1 TABLET ORAL at 08:35

## 2020-02-14 RX ADMIN — CHLORPROMAZINE HYDROCHLORIDE 100 MG: 100 TABLET, SUGAR COATED ORAL at 13:23

## 2020-02-14 RX ADMIN — PRAZOSIN HYDROCHLORIDE 1 MG: 1 CAPSULE ORAL at 20:40

## 2020-02-14 RX ADMIN — INSULIN ASPART 8 UNITS: 100 INJECTION, SOLUTION INTRAVENOUS; SUBCUTANEOUS at 17:51

## 2020-02-14 RX ADMIN — CLONAZEPAM 1 MG: 1 TABLET ORAL at 20:41

## 2020-02-14 RX ADMIN — DIVALPROEX SODIUM 1000 MG: 500 TABLET, EXTENDED RELEASE ORAL at 20:41

## 2020-02-14 RX ADMIN — INSULIN ASPART 8 UNITS: 100 INJECTION, SOLUTION INTRAVENOUS; SUBCUTANEOUS at 08:36

## 2020-02-14 RX ADMIN — CHLORPROMAZINE HYDROCHLORIDE 100 MG: 100 TABLET, SUGAR COATED ORAL at 08:35

## 2020-02-14 RX ADMIN — GABAPENTIN 1200 MG: 600 TABLET, FILM COATED ORAL at 20:41

## 2020-02-14 RX ADMIN — NICOTINE 1 PATCH: 21 PATCH, EXTENDED RELEASE TRANSDERMAL at 08:35

## 2020-02-14 RX ADMIN — GABAPENTIN 1200 MG: 600 TABLET, FILM COATED ORAL at 13:23

## 2020-02-14 RX ADMIN — INSULIN ASPART 8 UNITS: 100 INJECTION, SOLUTION INTRAVENOUS; SUBCUTANEOUS at 12:47

## 2020-02-14 ASSESSMENT — ACTIVITIES OF DAILY LIVING (ADL)
ORAL_HYGIENE: INDEPENDENT
HYGIENE/GROOMING: INDEPENDENT
DRESS: SCRUBS (BEHAVIORAL HEALTH);INDEPENDENT
HYGIENE/GROOMING: INDEPENDENT
ORAL_HYGIENE: INDEPENDENT
DRESS: SCRUBS (BEHAVIORAL HEALTH)

## 2020-02-14 NOTE — PLAN OF CARE
Problem: Adult Behavioral Health Plan of Care  Goal: Patient-Specific Goal (Individualization)  Description  Pt will follow recommendations of treatment team.  Pt will be compliant with medications.  Pt will attend 50 % of groups.  Pt will sleep 6-8 hours each night.  Pt will complete ADL's independently.   2/14/2020 1043 by Ayah Matos, RN  Outcome: Improving  Note:     Pt. Willing to follow treatment team recommendations, compliant with taking prescribed medications, has not attended any group therapy sessions yet this shift, slept 7 hours last night, is independent with ADL's, has spent all morning in bed, did get up and ate breakfast in dayroom.  1328-  Pt. Has been up and sitting in dayroom, requested/received klonopin 1 mg po for anxiety.     Problem: Fall Injury Risk  Goal: Absence of Fall and Fall-Related Injury  Description  Pt will wear non skid slippers.  Pt will remain free from falls or injuries during hospitalization.   2/14/2020 1043 by Ayah Matos RN  Outcome: Improving   Pt. Has remained free from falls, gait is balanced and steady.  Problem: Thought Process Alteration  Goal: Optimal Thought Clarity  Description  Patient will be able to hold reality based conversations prior to discharge.     2/14/2020 1043 by Ayah Matos, RN  Outcome: Improving   Pt. Has delusional statements, does not hold reality based conversation.    Face to face end of shift report will be communicated to oncoming afternoon shift RN.     Ayah Matos RN  2/14/2020  11:55 AM

## 2020-02-14 NOTE — PLAN OF CARE
Face to face report received from Jessica MAK. Pt. Observed.     Problem: Adult Behavioral Health Plan of Care  Goal: Patient-Specific Goal (Individualization)  Description  Pt will follow recommendations of treatment team.  Pt will be compliant with medications.  Pt will attend 50 % of groups.  Pt will sleep 6-8 hours each night.  Pt will complete ADL's independently.   2/14/2020 0448 by Eliza Arndt RN  Outcome: No Change  Note:     Pt has been in bed with eyes closed and regular respirations x 2 hours. 15 minute and PRN checks all night. No complaints offered. Will continue to monitor.       Problem: Fall Injury Risk  Goal: Absence of Fall and Fall-Related Injury  Description  Pt will wear non skid slippers.  Pt will remain free from falls or injuries during hospitalization.   2/14/2020 0448 by Eliza Arndt RN  Outcome: Improving   Pt. Free from falls this noc shift.    Face to face end of shift report to be communicated to oncoming RN.

## 2020-02-14 NOTE — PLAN OF CARE
Problem: Adult Behavioral Health Plan of Care  Goal: Patient-Specific Goal (Individualization)  Description  Pt will follow recommendations of treatment team.  Pt will be compliant with medications.  Pt will attend 50 % of groups.  Pt will sleep 6-8 hours each night.  Pt will complete ADL's independently.   Outcome: Improving  Note:          Problem: Adult Behavioral Health Plan of Care  Goal: Adheres to Safety Considerations for Self and Others  Outcome: Improving     Problem: Adult Behavioral Health Plan of Care  Goal: Optimized Coping Skills in Response to Life Stressors  Outcome: Improving     Problem: Fall Injury Risk  Goal: Absence of Fall and Fall-Related Injury  Description  Pt will wear non skid slippers.  Pt will remain free from falls or injuries during hospitalization.   Outcome: Improving     Problem: Thought Process Alteration  Goal: Optimal Thought Clarity  Description  Patient will be able to hold reality based conversations prior to discharge.     Outcome: Improving     Patient is seen the majority of the shift in the lounge, patient is pleasant and interacts with staff. Patient is writing down things she needs to follow up on when she is discharged. Patient does make some phone calls also. Patient eats her meal, and then goes to lie down for a while gets up and eats a snack, takes her HS medications, and lies back down. Patient does talk about a bear that had her trapped for days, and states that is why she is here, for fear of a bear attacking her. Patients mood is pleasant. Denies SI, HI, pain.     End of shift hand off report to be given to oncoming RN

## 2020-02-14 NOTE — PLAN OF CARE
"Did not meet with pt today. No change to discharge plan at this time. Will speak with guardian's on Monday to discuss discharge to [facility].     1100 - Received a call from Sally with [facility] - she stated Kiah will not be back until Tuesday, but stated \"they will take her anytime she just needs a ride\" '    1448 - called and set up follow up a PCP appointment for pt and left several messages for [facility] to set up a med management appointment. Guardian gave writer the approval for pt to discharge anytime.     1530 - writer was able to get pt's follow up appointments arranged - taxi has been set up for Saturday morning at 8:30 - taxi slip is on the front of pt's paper chart and is filled out. Called and asked Krystal if pt was able to stop and gather her belongings with taxi as pt has requested and stated she needs her belongings - Krystal is checking with their manager and will call the unit back. Spoke with NP and updated NP on discharge plan and pharmacy.   "

## 2020-02-14 NOTE — DISCHARGE INSTRUCTIONS
Behavioral Discharge Planning and Instructions    Summary: Genie was admitted to  with increased mental health symptoms and meth use      Main Diagnosis: Schizoaffective Disorder, Bipolar Type, PTSD, by history, R/O Other stimulant use disorder, methamphetamine, with induced perceptual disorder    Major Treatments, Procedures and Findings: Stabilize with medications, connect with community programs.    Symptoms to Report: feeling more aggressive, increased confusion, losing more sleep, mood getting worse or thoughts of suicide    Lifestyle Adjustment: Take all medications as prescribed, meet with doctor/ medication provider, out patient therapist, , and [facility] worker as scheduled. Abstain from alcohol or any unprescribed drugs.    Psychiatry Follow-up:     [facility]  [address]   [place] [address]  Phone: [phone]    [facility]  Med management - Sandra Hidalgo - April 7th @ 10:00   Therapy - Diagnostic Assessment - Mitra Yang - Wilfredory 26th @ 10:15   [address] [place] [address]   Phone: [phone]  Fax: [phone]    [facility]   PCP - establish care - Emily Oropeza - February 18th @ 11:00  *please bring insurance card and photo I.D.   [address] [place] [address]  Phone: [phone]  Fax: [phone]    [facility]/Guardianship   Contact any of the following regarding pt   Sally Perry- supervisor  [phone]  Raven- nurse [phone]  Kiah Casanova [phone]    [facility]  Behavioral Health Home   Marcelal -  As arranged   Jaya - As arranged   [address] [place] [address]   Marcella: [phone]    [facility]  PCP - Guillermina Rincon - as needed   [phone] Fax: [phone]    Resources:   Crisis Intervention: 929.308.2785 or 142-210-3916 (TTY: 502.248.5410).  Call anytime for help.  [place] on Mental Illness (www.[place].paramjit.org): 216.683.5885 or  296.781.1651.  Alcoholics Anonymous (www.alcoholics-anonymous.org): Check your phone book for your local chapter.  Suicide Awareness Voices of Education (SAVE) (www.save.org): 552-015-TPLS (7447)  National Suicide Prevention Line (www.mentalhealthmn.org): 752-700-HCLI (8111)  Mental Health Consumer/Survivor Network of [place] (www.mhcsn.net): 984.546.1929 or 573-185-2424  Mental Health Association of [place] (www.mentalhealth.org): 103.319.5897 or 819-018-9311    General Medication Instructions:   See your medication sheet(s) for instructions.   Take all medicines as directed.  Make no changes unless your doctor suggests them.   Go to all your doctor visits.  Be sure to have all your required lab tests. This way, your medicines can be refilled on time.  Do not use any drugs not prescribed by your doctor.  Avoid alcohol.

## 2020-02-15 VITALS
OXYGEN SATURATION: 99 % | HEART RATE: 100 BPM | SYSTOLIC BLOOD PRESSURE: 117 MMHG | RESPIRATION RATE: 16 BRPM | DIASTOLIC BLOOD PRESSURE: 67 MMHG | BODY MASS INDEX: 32.47 KG/M2 | HEIGHT: 65 IN | TEMPERATURE: 98.7 F | WEIGHT: 194.9 LBS

## 2020-02-15 LAB
GLUCOSE BLDC GLUCOMTR-MCNC: 101 MG/DL (ref 70–99)
GLUCOSE BLDC GLUCOMTR-MCNC: 145 MG/DL (ref 70–99)
GLUCOSE BLDC GLUCOMTR-MCNC: 93 MG/DL (ref 70–99)

## 2020-02-15 PROCEDURE — 00000146 ZZHCL STATISTIC GLUCOSE BY METER IP

## 2020-02-15 PROCEDURE — 25000132 ZZH RX MED GY IP 250 OP 250 PS 637: Performed by: NURSE PRACTITIONER

## 2020-02-15 PROCEDURE — 99239 HOSP IP/OBS DSCHRG MGMT >30: CPT | Performed by: NURSE PRACTITIONER

## 2020-02-15 RX ORDER — HYDROXYZINE HYDROCHLORIDE 50 MG/1
50 TABLET, FILM COATED ORAL EVERY 6 HOURS PRN
Qty: 120 TABLET | Refills: 0 | Status: SHIPPED | OUTPATIENT
Start: 2020-02-15

## 2020-02-15 RX ADMIN — BUSPIRONE HYDROCHLORIDE 15 MG: 10 TABLET ORAL at 08:03

## 2020-02-15 RX ADMIN — GABAPENTIN 1200 MG: 600 TABLET, FILM COATED ORAL at 08:03

## 2020-02-15 RX ADMIN — CHLORPROMAZINE HYDROCHLORIDE 100 MG: 100 TABLET, SUGAR COATED ORAL at 08:03

## 2020-02-15 RX ADMIN — INSULIN ASPART 8 UNITS: 100 INJECTION, SOLUTION INTRAVENOUS; SUBCUTANEOUS at 07:50

## 2020-02-15 RX ADMIN — METFORMIN HYDROCHLORIDE 1000 MG: 500 TABLET, FILM COATED ORAL at 07:52

## 2020-02-15 RX ADMIN — BENZTROPINE MESYLATE 1 MG: 1 TABLET ORAL at 08:03

## 2020-02-15 RX ADMIN — NICOTINE 1 PATCH: 21 PATCH, EXTENDED RELEASE TRANSDERMAL at 08:06

## 2020-02-15 NOTE — PLAN OF CARE
Discharge Note    Patient Discharged to [facility] on 2/15/2020 08:30 AM via Taxi accompanied by unit staff     Patient informed of discharge instructions in AVS. patient verbalizes understanding and denies having any questions pertaining to AVS. Patient stable at time of discharge. Patient denies SI, HI, and thoughts of self harm at time of discharge. All personal belongings returned to patient. [facility] [place] [facility] [address] [place] [address] via electronic communication. Psych evaluation, history and physical, AVS, and discharge summary faxed to next level of care.            PT calm and cooperative throughout AM medication pass.   Taxi called and said they would be stopping by [facility] to have PT  her belongs. Writer spoke with Teresa at [facility] to verify this and was told that PT is not allowed to  belongings without [place] or , [facility] was reported to have been addressing the issue on Tuesday 02/18/2020.    Luz Elena Chaudhary RN  2/15/2020  3:25 PM   Problem: Adult Behavioral Health Plan of Care  Goal: Patient-Specific Goal (Individualization)  Description  Pt will follow recommendations of treatment team.  Pt will be compliant with medications.  Pt will attend 50 % of groups.  Pt will sleep 6-8 hours each night.  Pt will complete ADL's independently.   2/15/2020 1524 by Luz Elena Chaudhary, RN  Outcome: Adequate for Discharge  Note:          Problem: Fall Injury Risk  Goal: Absence of Fall and Fall-Related Injury  Description  Pt will wear non skid slippers.  Pt will remain free from falls or injuries during hospitalization.   2/15/2020 1524 by Luz Elena Chaudhary, RN  Outcome: Adequate for Discharge     Problem: Thought Process Alteration  Goal: Optimal Thought Clarity  Description  Patient will be able to hold reality based conversations prior to discharge.     2/15/2020 1524 by Luz Elena Chaudhary, RN  Outcome:  Adequate for Discharge

## 2020-02-15 NOTE — DISCHARGE SUMMARY
"[facility]    Discharge Summary  Adult Psychiatry    Date of Admission:  1/8/2020  Date of Discharge:  2/15/2020  Discharging Provider: Natalia Fabian    Discharge Diagnoses   Principal Discharge Diagnosis: Schizoaffective Disorder, Bipolar Type  Secondary Discharge Diagnosis: Posttraumatic Stress Disorder, Chronic  Medical Diagnoses addressed this admission: Diabetes    History of Present Illness   (per H&P) THUY Palmer  Ginette Wood is a 39 year old female who presented via [facility] ED brought in by ambulance after they received a call from [facility] tthe patient resides at [facility] and has been abusing methamphetamine. Paraphernalia was found in her room. Some eivdience of delusional behaviors and slurred speech. Raven, who is one of her guardians from [place], requested an evaluation for concerns that she is endangering other residents at [place] by smoking meth in the facility and has not been taking her medications. She also expressed having a list of people whose \"asses\" she wants to \"kick.\" Cooperative in the ED. She did refuse to do a urine drug screen so a serum toxicology was ordered. No aggression. Admitted voluntary via Guardian. Note that Raven Juarez is not specifically assigned to Genie as her guardian. Anyone at [place] may give consent for Genie.      Reportedly presented with delusional thinking, paranoia, auditory  Hallucinations, agitation, rambling and slurred speech, and lack of insight. During this provider's assessment, Genie was calm, cooperative, answered questions appropriately and demonstrated insight that was greater than we typically see at baseline. Requested a Rule 25 and admission to [facility] in [place] for CD treatment and possibly half-way house placement. She did have some slurred speech which was clearly related to her clenching jaw and and dry mouth, which she attributed to a \"new brand of metformin.\" Suspect that it is more likely " "the combination of her psychiatric medications and methamphetamine use. When asked if she is using meth, she stated, \"no;\" however, when asked about her last use, she replied, \"about three days ago, but I don't like that shit.\" In addition, it was reported that she has not been taking her medications, but the supplied medication administration records were signed off as her having taken them on a regular basis. She reported that she takes all of her medications and was able to list them off. [facility] is reportedly unwilling to take her back.      Denied SI/HI, anxiety, depression, visual and auditory hallucinations, and sleep or appetite issues. She denied having said that she has stomach cancer but did say that people at [place] often steal from her. She is not the first person to have said this. She did talk about her feet being hurt, something to do with the meth pipe that she claims someone else left in her room. Ginette indicated that the pipe was brought to the police yesterday and tested negative for any drugs. Stated she was cleaning the pipe so that she could keep it but should didn't know why. Unclear if any of this is true.     Hospital Course   Patient does well during hospital with relatively minor changes in medications.  We increased her thorazine at a couple different points in time due to increased anxiety secondary to being on the unit for extended period of time.  However, patient does have elongated QT Interval on EKG, which further elongated with these increases, so readjusted back to taking 100 mg tid.  Patient also had increase in Depakote to 1,000mg at bedtime and will need lab draw follow up.  Overall, patient remained fairly calm while dealing with  being so far away and not always available, as well as not knowing placement dates.  Patient remains cooperative, pleasant and she had no outbursts or episodes of inappropriateness throughout her stay here.  She denies mood " changes recently, denies suicidal or homicidal ideation, plan or intent and denies auditory or visual hallucination.  Patient does however have fixed delusion regarding her past, likely trauma related and this is her baseline.  At the time of discharge, there is no evidence this patient is in imminent danger of self harm or harming others and is transported to [place], [place].         LATRICE Du CNP    Significant Results and Procedures   None    Pending Results   None  Unresulted Labs Ordered in the Past 30 Days of this Admission     Date and Time Order Name Status Description    1/8/2020 1121 Estimated Average Glucose In process         Code Status   Full Code    Primary Care Physician   Guillermina Rincon    Physical Exam   Appearance:  awake, alert  Attitude:  cooperative  Eye Contact:  good  Mood:  good  Affect:  appropriate and in normal range  Speech:  clear, coherent  Psychomotor Behavior:  no evidence of tardive dyskinesia, dystonia, or tics  Thought Process:  logical and linear  Associations:  loosening of associations present  Thought Content:  no evidence of suicidal ideation or homicidal ideation and no evidence of psychotic thought  Insight:  fair  Judgment:  fair  Oriented to:  time, person, and place  Attention Span and Concentration:  intact  Recent and Remote Memory:  intact  Language: Able to name objects, Able to repeat phrases and Able to read and write  Fund of Knowledge: appropriate  Muscle Strength and Tone: normal  Gait and Station: Normal    Time Spent on this Encounter   I, LATRICE Du CNP, personally saw the patient today and spent greater than 30 minutes discharging this patient.    Discharge Disposition   Discharged to [place], [place] - Foster Care  Condition at discharge: Stable    Consultations This Hospital Stay   None    Discharge Orders   No discharge procedures on file.  Discharge Medications   Current Discharge Medication List      CONTINUE these  medications which have CHANGED    Details   artificial saliva (BIOTENE MT) SOLN solution Swish and spit 1-2 mLs (1-2 sprays) in mouth 4 times daily as needed for dry mouth  Qty: 1 Bottle, Refills: 0    Associated Diagnoses: Type 2 diabetes mellitus with diabetic chronic kidney disease, unspecified CKD stage, unspecified whether long term insulin use (H)      benztropine (COGENTIN) 1 MG tablet Take 1 tablet (1 mg) by mouth 2 times daily  Qty: 60 tablet, Refills: 0    Associated Diagnoses: Schizoaffective disorder, bipolar type (H)      busPIRone (BUSPAR) 15 MG tablet Take 1 tablet (15 mg) by mouth 3 times daily  Qty: 90 tablet, Refills: 0    Associated Diagnoses: Schizoaffective disorder, bipolar type (H)      chlorproMAZINE (THORAZINE) 100 MG tablet Take 1 tablet (100 mg) by mouth 3 times daily  Qty: 90 tablet, Refills: 0    Associated Diagnoses: Schizoaffective disorder, bipolar type (H)      clonazePAM (KLONOPIN) 1 MG tablet Take 1 tablet (1 mg) by mouth At Bedtime  Qty: 30 tablet, Refills: 0    Associated Diagnoses: Schizoaffective disorder, bipolar type (H)      divalproex sodium extended-release (DEPAKOTE ER) 500 MG 24 hr tablet Take 2 tablets (1,000 mg) by mouth At Bedtime  Qty: 60 tablet, Refills: 0    Associated Diagnoses: Schizoaffective disorder, bipolar type (H)      gabapentin (NEURONTIN) 600 MG tablet Take 2 tablets (1,200 mg) by mouth 3 times daily  Qty: 168 tablet, Refills: 0    Associated Diagnoses: Schizoaffective disorder, bipolar type (H)      hydrOXYzine (ATARAX) 50 MG tablet Take 1 tablet (50 mg) by mouth every 6 hours as needed for anxiety  Qty: 120 tablet, Refills: 0    Associated Diagnoses: Schizoaffective disorder, bipolar type (H)      !! insulin aspart (NOVOLOG PEN) 100 UNIT/ML pen Inject 8 Units Subcutaneous 3 times daily (with meals)  Qty: 9 mL, Refills: 0    Associated Diagnoses: Type 2 diabetes mellitus with diabetic chronic kidney disease, unspecified CKD stage, unspecified whether  long term insulin use (H)      !! insulin aspart (NOVOLOG PEN) 100 UNIT/ML pen Inject 1-7 Units Subcutaneous 3 times daily (before meals)  Qty: 9 mL, Refills: 0    Associated Diagnoses: Type 2 diabetes mellitus with diabetic chronic kidney disease, unspecified CKD stage, unspecified whether long term insulin use (H)      !! insulin aspart (NOVOLOG PEN) 100 UNIT/ML pen Inject 1-5 Units Subcutaneous At Bedtime  Qty: 3 mL, Refills: 0    Associated Diagnoses: Type 2 diabetes mellitus with diabetic chronic kidney disease, unspecified CKD stage, unspecified whether long term insulin use (H)      insulin glargine (LANTUS PEN) 100 UNIT/ML pen Inject 30 Units Subcutaneous At Bedtime  Qty: 9 mL, Refills: 0    Comments: If Lantus is not covered by insurance, may substitute Basaglar at same dose and frequency.    Associated Diagnoses: Type 2 diabetes mellitus with diabetic chronic kidney disease, unspecified CKD stage, unspecified whether long term insulin use (H)      metFORMIN (GLUCOPHAGE) 1000 MG tablet Take 1 tablet (1,000 mg) by mouth 2 times daily (with meals)  Qty: 30 tablet, Refills: 0    Associated Diagnoses: Type 2 diabetes mellitus with diabetic chronic kidney disease, unspecified CKD stage, unspecified whether long term insulin use (H)      nicotine (NICORETTE) 2 MG gum Place 1 each (2 mg) inside cheek every 2 hours as needed for smoking cessation  Qty: 100 each, Refills: 0    Associated Diagnoses: Tobacco abuse      polyethylene glycol (MIRALAX) packet Take 17 g by mouth daily as needed for constipation  Qty: 30 packet, Refills: 0    Associated Diagnoses: Type 2 diabetes mellitus with diabetic chronic kidney disease, unspecified CKD stage, unspecified whether long term insulin use (H)      prazosin (MINIPRESS) 1 MG capsule Take 1 capsule (1 mg) by mouth At Bedtime  Qty: 28 capsule, Refills: 0    Associated Diagnoses: Schizoaffective disorder, bipolar type (H)       !! - Potential duplicate medications found. Please  "discuss with provider.      CONTINUE these medications which have NOT CHANGED    Details   !! ACCU-CHEK ABDIRAHMAN PLUS test strip TEST BEFORE MEALS AND AT BEDTIME AS DIRECTED  Qty: 100 strip, Refills: 3    Associated Diagnoses: Type 2 diabetes mellitus without complication, unspecified whether long term insulin use (H)      !! blood glucose (GLUCOCARD VITAL TEST) test strip Use to test blood sugar before meals and at bedtime as directed.  Qty: 100 strip, Refills: 1    Associated Diagnoses: Type 2 diabetes mellitus with diabetic chronic kidney disease, unspecified CKD stage, unspecified whether long term insulin use (H)      blood glucose monitoring (NO BRAND SPECIFIED) meter device kit Use to test blood sugar as directed.  Qty: 1 kit, Refills: 0    Associated Diagnoses: Type 2 diabetes mellitus with diabetic chronic kidney disease, unspecified CKD stage, unspecified whether long term insulin use (H)      blood glucose monitoring (SOFTCLIX) lancets TEST BEFORE MEALS AND AT BEDTIME AS DIRECTED  Qty: 100 each, Refills: 11    Associated Diagnoses: Type 2 diabetes mellitus without complication, unspecified whether long term insulin use (H)      Blood Glucose Monitoring Suppl (GLUCOCARD VITAL MONITOR) w/Device KIT Use to test blood sugar before meals and at bedtime as directed  Qty: 1 kit, Refills: 0    Associated Diagnoses: Type 2 diabetes mellitus with diabetic chronic kidney disease, unspecified CKD stage, unspecified whether long term insulin use (H)       !! - Potential duplicate medications found. Please discuss with provider.        Allergies   Allergies   Allergen Reactions     Haloperidol Other (See Comments)     Other reaction(s): Seizures  Patient states, \"I fell down and wasn't able to get up.\"  Patient states, \"I fell down and wasn't able to get up.\"       Data   Results for orders placed or performed during the hospital encounter of 01/08/20   CBC with platelets differential     Status: None   Result Value Ref " Range    WBC 7.4 4.0 - 11.0 10e9/L    RBC Count 4.55 3.8 - 5.2 10e12/L    Hemoglobin 13.2 11.7 - 15.7 g/dL    Hematocrit 39.3 35.0 - 47.0 %    MCV 86 78 - 100 fl    MCH 29.0 26.5 - 33.0 pg    MCHC 33.6 31.5 - 36.5 g/dL    RDW 13.4 10.0 - 15.0 %    Platelet Count 281 150 - 450 10e9/L    Diff Method Automated Method     % Neutrophils 65.4 %    % Lymphocytes 24.7 %    % Monocytes 7.7 %    % Eosinophils 1.4 %    % Basophils 0.4 %    % Immature Granulocytes 0.4 %    Nucleated RBCs 0 0 /100    Absolute Neutrophil 4.8 1.6 - 8.3 10e9/L    Absolute Lymphocytes 1.8 0.8 - 5.3 10e9/L    Absolute Monocytes 0.6 0.0 - 1.3 10e9/L    Absolute Eosinophils 0.1 0.0 - 0.7 10e9/L    Absolute Basophils 0.0 0.0 - 0.2 10e9/L    Abs Immature Granulocytes 0.0 0 - 0.4 10e9/L    Absolute Nucleated RBC 0.0    Basic metabolic panel     Status: None   Result Value Ref Range    Sodium 143 133 - 144 mmol/L    Potassium 3.5 3.4 - 5.3 mmol/L    Chloride 109 94 - 109 mmol/L    Carbon Dioxide 29 20 - 32 mmol/L    Anion Gap 5 3 - 14 mmol/L    Glucose 83 70 - 99 mg/dL    Urea Nitrogen 12 7 - 30 mg/dL    Creatinine 0.52 0.52 - 1.04 mg/dL    GFR Estimate >90 >60 mL/min/[1.73_m2]    GFR Estimate If Black >90 >60 mL/min/[1.73_m2]    Calcium 8.8 8.5 - 10.1 mg/dL   UA reflex to Microscopic and Culture     Status: Abnormal   Result Value Ref Range    Color Urine Yellow     Appearance Urine Slightly Cloudy     Glucose Urine Negative NEG^Negative mg/dL    Bilirubin Urine Negative NEG^Negative    Ketones Urine 10 (A) NEG^Negative mg/dL    Specific Gravity Urine 1.031 1.003 - 1.035    Blood Urine Negative NEG^Negative    pH Urine 6.0 4.7 - 8.0 pH    Protein Albumin Urine 30 (A) NEG^Negative mg/dL    Urobilinogen mg/dL Normal 0.0 - 2.0 mg/dL    Nitrite Urine Negative NEG^Negative    Leukocyte Esterase Urine Negative NEG^Negative    Source Midstream Urine     RBC Urine 2 0 - 2 /HPF    WBC Urine 7 (H) 0 - 5 /HPF    Bacteria Urine None (A) NEG^Negative /HPF    Squamous  Epithelial /HPF Urine 3 (H) 0 - 1 /HPF    Mucous Urine Present (A) NEG^Negative /LPF   Urine Drugs of Abuse Screen Panel 13     Status: Abnormal   Result Value Ref Range    Cannabinoids (98-gqw-3-carboxy-9-THC) Not Detected NDET^Not Detected ng/mL    Phencyclidine (Phencyclidine) Not Detected NDET^Not Detected ng/mL    Cocaine (Benzoylecgonine) Not Detected NDET^Not Detected ng/mL    Methamphetamine (d-Methamphetamine) Detected, Abnormal Result (A) NDET^Not Detected ng/mL    Opiates (Morphine) Not Detected NDET^Not Detected ng/mL    Amphetamine (d-Amphetamine) Detected, Abnormal Result (A) NDET^Not Detected ng/mL    Benzodiazepines (Nordiazepam) Not Detected NDET^Not Detected ng/mL    Tricyclic Antidepressants (Desipramine) Not Detected NDET^Not Detected ng/mL    Methadone (Methadone) Not Detected NDET^Not Detected ng/mL    Barbiturates (Butalbital) Not Detected NDET^Not Detected ng/mL    Oxycodone (Oxycodone) Not Detected NDET^Not Detected ng/mL    Propoxyphene (Norpropoxyphene) Not Detected NDET^Not Detected ng/mL    Buprenorphine (Buprenorphine) Not Detected NDET^Not Detected ng/mL   Drug Screen Comp Med Report WB     Status: Abnormal   Result Value Ref Range    Ethyl Alcohol Enz Negative Cutoff:0.020 gm/dL    Amphetamines IA Positive (A) Cutoff:50 ng/mL    Bartiburates IA Negative Cutoff:0.1 ug/mL    Benzodiazepines IA Negative Cutoff:20 ng/mL    Cocaine Metabolite IA Negative Cutoff:25 ng/mL    Phencyclidine IA Negative Cutoff:8 ng/mL    THC Metabolite IA Negative Cutoff:5 ng/mL    Opiates IA Negative Cutoff:5 ng/mL    Oxycodones IA Negative Cutoff:5 ng/mL    Methadone IA Negative Cutoff:25 ng/mL    Fentanyl IA Negative Cutoff:1.0 ng/mL    Buprenoophine IA Negative Cutoff:1.0 ng/mL    Proproxphine IA Negative Cutoff:50 ng/mL    Meperidine IA Negative Cutoff:100 ng/mL    Tramadol IA Negative Cutoff:50 ng/mL    Gabapentin IA Positive (A) Cutoff:1.0 ug/mL    Carisoprodol IA Negative Cutoff:0.5 ug/mL     Acetaminophen IA Negative Cutoff:5.0 ug/mL    Other Drugs Detected None     Declared Medications Not Provided    Hemoglobin A1c     Status: Abnormal   Result Value Ref Range    Hemoglobin A1C 5.8 (H) 0 - 5.6 %   Estimated Average Glucose     Status: None   Result Value Ref Range    Estimated Average Glucose 120 mg/dL   Glucose by meter     Status: Abnormal   Result Value Ref Range    Glucose 103 (H) 70 - 99 mg/dL   Glucose by meter     Status: Abnormal   Result Value Ref Range    Glucose 127 (H) 70 - 99 mg/dL   Glucose by meter     Status: None   Result Value Ref Range    Glucose 85 70 - 99 mg/dL   Glucose by meter     Status: None   Result Value Ref Range    Glucose 95 70 - 99 mg/dL   Glucose by meter     Status: Abnormal   Result Value Ref Range    Glucose 212 (H) 70 - 99 mg/dL   Valproic acid     Status: Abnormal   Result Value Ref Range    Valproic Acid Level 44 (L) 50 - 100 mg/L   Hepatic panel     Status: Abnormal   Result Value Ref Range    Bilirubin Direct <0.1 0.0 - 0.2 mg/dL    Bilirubin Total 0.2 0.2 - 1.3 mg/dL    Albumin 3.3 (L) 3.4 - 5.0 g/dL    Protein Total 6.9 6.8 - 8.8 g/dL    Alkaline Phosphatase 65 40 - 150 U/L    ALT 25 0 - 50 U/L    AST 8 0 - 45 U/L   Glucose by meter     Status: Abnormal   Result Value Ref Range    Glucose 189 (H) 70 - 99 mg/dL   Glucose by meter     Status: Abnormal   Result Value Ref Range    Glucose 200 (H) 70 - 99 mg/dL   Glucose by meter     Status: Abnormal   Result Value Ref Range    Glucose 168 (H) 70 - 99 mg/dL   Glucose by meter     Status: Abnormal   Result Value Ref Range    Glucose 119 (H) 70 - 99 mg/dL   Glucose by meter     Status: None   Result Value Ref Range    Glucose 96 70 - 99 mg/dL   Glucose by meter     Status: Abnormal   Result Value Ref Range    Glucose 164 (H) 70 - 99 mg/dL   Glucose by meter     Status: Abnormal   Result Value Ref Range    Glucose 137 (H) 70 - 99 mg/dL   Glucose by meter     Status: Abnormal   Result Value Ref Range    Glucose 145  (H) 70 - 99 mg/dL   Glucose by meter     Status: Abnormal   Result Value Ref Range    Glucose 172 (H) 70 - 99 mg/dL   Glucose by meter     Status: Abnormal   Result Value Ref Range    Glucose 175 (H) 70 - 99 mg/dL   Glucose by meter     Status: Abnormal   Result Value Ref Range    Glucose 102 (H) 70 - 99 mg/dL   Glucose by meter     Status: Abnormal   Result Value Ref Range    Glucose 163 (H) 70 - 99 mg/dL   Glucose by meter     Status: Abnormal   Result Value Ref Range    Glucose 210 (H) 70 - 99 mg/dL   Glucose by meter     Status: Abnormal   Result Value Ref Range    Glucose 148 (H) 70 - 99 mg/dL   Glucose by meter     Status: Abnormal   Result Value Ref Range    Glucose 108 (H) 70 - 99 mg/dL   Glucose by meter     Status: Abnormal   Result Value Ref Range    Glucose 106 (H) 70 - 99 mg/dL   Glucose by meter     Status: None   Result Value Ref Range    Glucose 73 70 - 99 mg/dL   Glucose by meter     Status: Abnormal   Result Value Ref Range    Glucose 143 (H) 70 - 99 mg/dL   Glucose by meter     Status: Abnormal   Result Value Ref Range    Glucose 192 (H) 70 - 99 mg/dL   Glucose by meter     Status: Abnormal   Result Value Ref Range    Glucose 142 (H) 70 - 99 mg/dL   Glucose by meter     Status: Abnormal   Result Value Ref Range    Glucose 162 (H) 70 - 99 mg/dL   Glucose by meter     Status: Abnormal   Result Value Ref Range    Glucose 133 (H) 70 - 99 mg/dL   Glucose by meter     Status: Abnormal   Result Value Ref Range    Glucose 213 (H) 70 - 99 mg/dL   Glucose by meter     Status: Abnormal   Result Value Ref Range    Glucose 139 (H) 70 - 99 mg/dL   Glucose by meter     Status: Abnormal   Result Value Ref Range    Glucose 100 (H) 70 - 99 mg/dL   Glucose by meter     Status: Abnormal   Result Value Ref Range    Glucose 197 (H) 70 - 99 mg/dL   Glucose by meter     Status: Abnormal   Result Value Ref Range    Glucose 112 (H) 70 - 99 mg/dL   Glucose by meter     Status: Abnormal   Result Value Ref Range    Glucose  210 (H) 70 - 99 mg/dL   Glucose by meter     Status: Abnormal   Result Value Ref Range    Glucose 199 (H) 70 - 99 mg/dL   Gabapentin Reflex     Status: None   Result Value Ref Range    Gabapentin Confirm POSITIVE     Gabapentin 9.8 ug/mL   Sympathomimetics Rfx     Status: None   Result Value Ref Range    Sympathomimetics Cf POSITIVE     Methamphetamine Confirm 417 ng/mL    Amphetamine 57 ng/mL    MDMA Negative ng/mL    MDA Negative ng/mL    MDEA Negative ng/mL    Phentermine Negative ng/mL    Ephedrine Negative ng/mL    Pseudoephedrine Negative ng/mL    Phenylpropanolamine Negative ng/mL    Phendimetrazine Negative ng/mL    Diethylpropion Negative ng/mL   Glucose by meter     Status: Abnormal   Result Value Ref Range    Glucose 116 (H) 70 - 99 mg/dL   Glucose by meter     Status: Abnormal   Result Value Ref Range    Glucose 194 (H) 70 - 99 mg/dL   Glucose by meter     Status: Abnormal   Result Value Ref Range    Glucose 165 (H) 70 - 99 mg/dL   Glucose by meter     Status: Abnormal   Result Value Ref Range    Glucose 130 (H) 70 - 99 mg/dL   Glucose by meter     Status: Abnormal   Result Value Ref Range    Glucose 115 (H) 70 - 99 mg/dL   Glucose by meter     Status: Abnormal   Result Value Ref Range    Glucose 128 (H) 70 - 99 mg/dL   Glucose by meter     Status: Abnormal   Result Value Ref Range    Glucose 125 (H) 70 - 99 mg/dL   Glucose by meter     Status: Abnormal   Result Value Ref Range    Glucose 113 (H) 70 - 99 mg/dL   Glucose by meter     Status: Abnormal   Result Value Ref Range    Glucose 181 (H) 70 - 99 mg/dL   Glucose by meter     Status: Abnormal   Result Value Ref Range    Glucose 142 (H) 70 - 99 mg/dL   Glucose by meter     Status: Abnormal   Result Value Ref Range    Glucose 135 (H) 70 - 99 mg/dL   Glucose by meter     Status: Abnormal   Result Value Ref Range    Glucose 184 (H) 70 - 99 mg/dL   Glucose by meter     Status: Abnormal   Result Value Ref Range    Glucose 149 (H) 70 - 99 mg/dL   Glucose by  meter     Status: Abnormal   Result Value Ref Range    Glucose 134 (H) 70 - 99 mg/dL   Glucose by meter     Status: Abnormal   Result Value Ref Range    Glucose 142 (H) 70 - 99 mg/dL   Glucose by meter     Status: Abnormal   Result Value Ref Range    Glucose 166 (H) 70 - 99 mg/dL   Glucose by meter     Status: Abnormal   Result Value Ref Range    Glucose 130 (H) 70 - 99 mg/dL   Glucose by meter     Status: Abnormal   Result Value Ref Range    Glucose 218 (H) 70 - 99 mg/dL   Glucose by meter     Status: Abnormal   Result Value Ref Range    Glucose 142 (H) 70 - 99 mg/dL   Glucose by meter     Status: Abnormal   Result Value Ref Range    Glucose 133 (H) 70 - 99 mg/dL   Glucose by meter     Status: None   Result Value Ref Range    Glucose 92 70 - 99 mg/dL   Glucose by meter     Status: Abnormal   Result Value Ref Range    Glucose 197 (H) 70 - 99 mg/dL   Glucose by meter     Status: Abnormal   Result Value Ref Range    Glucose 125 (H) 70 - 99 mg/dL   Glucose by meter     Status: Abnormal   Result Value Ref Range    Glucose 104 (H) 70 - 99 mg/dL   Glucose by meter     Status: Abnormal   Result Value Ref Range    Glucose 103 (H) 70 - 99 mg/dL   Glucose by meter     Status: Abnormal   Result Value Ref Range    Glucose 62 (L) 70 - 99 mg/dL   Glucose by meter     Status: None   Result Value Ref Range    Glucose 84 70 - 99 mg/dL   Glucose by meter     Status: Abnormal   Result Value Ref Range    Glucose 129 (H) 70 - 99 mg/dL   Glucose by meter     Status: Abnormal   Result Value Ref Range    Glucose 148 (H) 70 - 99 mg/dL   Glucose by meter     Status: Abnormal   Result Value Ref Range    Glucose 162 (H) 70 - 99 mg/dL   Glucose by meter     Status: Abnormal   Result Value Ref Range    Glucose 144 (H) 70 - 99 mg/dL   Glucose by meter     Status: Abnormal   Result Value Ref Range    Glucose 164 (H) 70 - 99 mg/dL   Glucose by meter     Status: Abnormal   Result Value Ref Range    Glucose 141 (H) 70 - 99 mg/dL   Glucose by meter      Status: Abnormal   Result Value Ref Range    Glucose 191 (H) 70 - 99 mg/dL   Glucose by meter     Status: Abnormal   Result Value Ref Range    Glucose 148 (H) 70 - 99 mg/dL   Glucose by meter     Status: Abnormal   Result Value Ref Range    Glucose 115 (H) 70 - 99 mg/dL   Glucose by meter     Status: Abnormal   Result Value Ref Range    Glucose 110 (H) 70 - 99 mg/dL   Glucose by meter     Status: None   Result Value Ref Range    Glucose 91 70 - 99 mg/dL   Glucose by meter     Status: Abnormal   Result Value Ref Range    Glucose 128 (H) 70 - 99 mg/dL   Glucose by meter     Status: None   Result Value Ref Range    Glucose 98 70 - 99 mg/dL   Glucose by meter     Status: Abnormal   Result Value Ref Range    Glucose 163 (H) 70 - 99 mg/dL   Glucose by meter     Status: Abnormal   Result Value Ref Range    Glucose 131 (H) 70 - 99 mg/dL   Glucose by meter     Status: None   Result Value Ref Range    Glucose 81 70 - 99 mg/dL   Glucose by meter     Status: Abnormal   Result Value Ref Range    Glucose 122 (H) 70 - 99 mg/dL   Glucose by meter     Status: Abnormal   Result Value Ref Range    Glucose 144 (H) 70 - 99 mg/dL   Glucose by meter     Status: Abnormal   Result Value Ref Range    Glucose 168 (H) 70 - 99 mg/dL   Glucose by meter     Status: Abnormal   Result Value Ref Range    Glucose 170 (H) 70 - 99 mg/dL   Glucose by meter     Status: Abnormal   Result Value Ref Range    Glucose 142 (H) 70 - 99 mg/dL   Glucose by meter     Status: Abnormal   Result Value Ref Range    Glucose 132 (H) 70 - 99 mg/dL   Glucose by meter     Status: Abnormal   Result Value Ref Range    Glucose 159 (H) 70 - 99 mg/dL   Glucose by meter     Status: None   Result Value Ref Range    Glucose 87 70 - 99 mg/dL   Glucose by meter     Status: Abnormal   Result Value Ref Range    Glucose 136 (H) 70 - 99 mg/dL   Glucose by meter     Status: Abnormal   Result Value Ref Range    Glucose 63 (L) 70 - 99 mg/dL   Glucose by meter     Status: None   Result  Value Ref Range    Glucose 88 70 - 99 mg/dL   Glucose by meter     Status: None   Result Value Ref Range    Glucose 71 70 - 99 mg/dL   Glucose by meter     Status: None   Result Value Ref Range    Glucose 78 70 - 99 mg/dL   Glucose by meter     Status: Abnormal   Result Value Ref Range    Glucose 105 (H) 70 - 99 mg/dL   Glucose by meter     Status: Abnormal   Result Value Ref Range    Glucose 125 (H) 70 - 99 mg/dL   Glucose by meter     Status: Abnormal   Result Value Ref Range    Glucose 107 (H) 70 - 99 mg/dL   Glucose by meter     Status: Abnormal   Result Value Ref Range    Glucose 186 (H) 70 - 99 mg/dL   Glucose by meter     Status: Abnormal   Result Value Ref Range    Glucose 178 (H) 70 - 99 mg/dL   Glucose by meter     Status: Abnormal   Result Value Ref Range    Glucose 126 (H) 70 - 99 mg/dL   Glucose by meter     Status: Abnormal   Result Value Ref Range    Glucose 106 (H) 70 - 99 mg/dL   Glucose by meter     Status: Abnormal   Result Value Ref Range    Glucose 62 (L) 70 - 99 mg/dL   Glucose by meter     Status: Abnormal   Result Value Ref Range    Glucose 60 (L) 70 - 99 mg/dL   Glucose by meter     Status: None   Result Value Ref Range    Glucose 86 70 - 99 mg/dL   Glucose by meter     Status: Abnormal   Result Value Ref Range    Glucose 119 (H) 70 - 99 mg/dL   Glucose by meter     Status: Abnormal   Result Value Ref Range    Glucose 172 (H) 70 - 99 mg/dL   Glucose by meter     Status: Abnormal   Result Value Ref Range    Glucose 112 (H) 70 - 99 mg/dL   Glucose by meter     Status: None   Result Value Ref Range    Glucose 92 70 - 99 mg/dL   Glucose by meter     Status: Abnormal   Result Value Ref Range    Glucose 122 (H) 70 - 99 mg/dL   Glucose by meter     Status: None   Result Value Ref Range    Glucose 79 70 - 99 mg/dL   Glucose by meter     Status: Abnormal   Result Value Ref Range    Glucose 109 (H) 70 - 99 mg/dL   Glucose by meter     Status: Abnormal   Result Value Ref Range    Glucose 113 (H) 70 -  99 mg/dL   Glucose by meter     Status: Abnormal   Result Value Ref Range    Glucose 106 (H) 70 - 99 mg/dL   Glucose by meter     Status: Abnormal   Result Value Ref Range    Glucose 133 (H) 70 - 99 mg/dL   Glucose by meter     Status: Abnormal   Result Value Ref Range    Glucose 154 (H) 70 - 99 mg/dL   Glucose by meter     Status: None   Result Value Ref Range    Glucose 78 70 - 99 mg/dL   Glucose by meter     Status: Abnormal   Result Value Ref Range    Glucose 126 (H) 70 - 99 mg/dL   Glucose by meter     Status: Abnormal   Result Value Ref Range    Glucose 111 (H) 70 - 99 mg/dL   Glucose by meter     Status: Abnormal   Result Value Ref Range    Glucose 110 (H) 70 - 99 mg/dL   Glucose by meter     Status: Abnormal   Result Value Ref Range    Glucose 171 (H) 70 - 99 mg/dL   Glucose by meter     Status: Abnormal   Result Value Ref Range    Glucose 167 (H) 70 - 99 mg/dL   Glucose by meter     Status: Abnormal   Result Value Ref Range    Glucose 123 (H) 70 - 99 mg/dL   Glucose by meter     Status: Abnormal   Result Value Ref Range    Glucose 183 (H) 70 - 99 mg/dL   Glucose by meter     Status: None   Result Value Ref Range    Glucose 87 70 - 99 mg/dL   Glucose by meter     Status: Abnormal   Result Value Ref Range    Glucose 135 (H) 70 - 99 mg/dL   Glucose by meter     Status: None   Result Value Ref Range    Glucose 97 70 - 99 mg/dL   Glucose by meter     Status: None   Result Value Ref Range    Glucose 80 70 - 99 mg/dL   Glucose by meter     Status: Abnormal   Result Value Ref Range    Glucose 161 (H) 70 - 99 mg/dL   Glucose by meter     Status: Abnormal   Result Value Ref Range    Glucose 121 (H) 70 - 99 mg/dL   Glucose by meter     Status: None   Result Value Ref Range    Glucose 85 70 - 99 mg/dL   Glucose by meter     Status: Abnormal   Result Value Ref Range    Glucose 156 (H) 70 - 99 mg/dL   Glucose by meter     Status: Abnormal   Result Value Ref Range    Glucose 126 (H) 70 - 99 mg/dL   Glucose by meter      Status: Abnormal   Result Value Ref Range    Glucose 125 (H) 70 - 99 mg/dL   Glucose by meter     Status: Abnormal   Result Value Ref Range    Glucose 205 (H) 70 - 99 mg/dL   Glucose by meter     Status: Abnormal   Result Value Ref Range    Glucose 68 (L) 70 - 99 mg/dL   Glucose by meter     Status: Abnormal   Result Value Ref Range    Glucose 159 (H) 70 - 99 mg/dL   Glucose by meter     Status: Abnormal   Result Value Ref Range    Glucose 227 (H) 70 - 99 mg/dL   Glucose by meter     Status: None   Result Value Ref Range    Glucose 94 70 - 99 mg/dL   Glucose by meter     Status: Abnormal   Result Value Ref Range    Glucose 102 (H) 70 - 99 mg/dL   Glucose by meter     Status: Abnormal   Result Value Ref Range    Glucose 106 (H) 70 - 99 mg/dL   Glucose by meter     Status: Abnormal   Result Value Ref Range    Glucose 188 (H) 70 - 99 mg/dL   Glucose by meter     Status: None   Result Value Ref Range    Glucose 86 70 - 99 mg/dL   Glucose by meter     Status: Abnormal   Result Value Ref Range    Glucose 143 (H) 70 - 99 mg/dL   Glucose by meter     Status: None   Result Value Ref Range    Glucose 99 70 - 99 mg/dL   Glucose by meter     Status: None   Result Value Ref Range    Glucose 81 70 - 99 mg/dL   Glucose by meter     Status: Abnormal   Result Value Ref Range    Glucose 190 (H) 70 - 99 mg/dL   Glucose by meter     Status: Abnormal   Result Value Ref Range    Glucose 185 (H) 70 - 99 mg/dL   Glucose by meter     Status: Abnormal   Result Value Ref Range    Glucose 154 (H) 70 - 99 mg/dL   Glucose by meter     Status: Abnormal   Result Value Ref Range    Glucose 159 (H) 70 - 99 mg/dL   Glucose by meter     Status: Abnormal   Result Value Ref Range    Glucose 154 (H) 70 - 99 mg/dL   Glucose by meter     Status: Abnormal   Result Value Ref Range    Glucose 174 (H) 70 - 99 mg/dL   Glucose by meter     Status: Abnormal   Result Value Ref Range    Glucose 167 (H) 70 - 99 mg/dL   Glucose by meter     Status: Abnormal   Result  Value Ref Range    Glucose 105 (H) 70 - 99 mg/dL   Glucose by meter     Status: Abnormal   Result Value Ref Range    Glucose 143 (H) 70 - 99 mg/dL   Glucose by meter     Status: Abnormal   Result Value Ref Range    Glucose 141 (H) 70 - 99 mg/dL   Glucose by meter     Status: Abnormal   Result Value Ref Range    Glucose 129 (H) 70 - 99 mg/dL   Glucose by meter     Status: Abnormal   Result Value Ref Range    Glucose 184 (H) 70 - 99 mg/dL   Glucose by meter     Status: Abnormal   Result Value Ref Range    Glucose 128 (H) 70 - 99 mg/dL   Glucose by meter     Status: Abnormal   Result Value Ref Range    Glucose 257 (H) 70 - 99 mg/dL   Glucose by meter     Status: Abnormal   Result Value Ref Range    Glucose 125 (H) 70 - 99 mg/dL   Glucose by meter     Status: Abnormal   Result Value Ref Range    Glucose 155 (H) 70 - 99 mg/dL   Glucose by meter     Status: Abnormal   Result Value Ref Range    Glucose 127 (H) 70 - 99 mg/dL   Glucose by meter     Status: None   Result Value Ref Range    Glucose 91 70 - 99 mg/dL   Glucose by meter     Status: Abnormal   Result Value Ref Range    Glucose 241 (H) 70 - 99 mg/dL   Glucose by meter     Status: Abnormal   Result Value Ref Range    Glucose 118 (H) 70 - 99 mg/dL   Glucose by meter     Status: None   Result Value Ref Range    Glucose 95 70 - 99 mg/dL   Glucose by meter     Status: None   Result Value Ref Range    Glucose 96 70 - 99 mg/dL   Glucose by meter     Status: Abnormal   Result Value Ref Range    Glucose 112 (H) 70 - 99 mg/dL   Glucose by meter     Status: Abnormal   Result Value Ref Range    Glucose 177 (H) 70 - 99 mg/dL   Glucose by meter     Status: Abnormal   Result Value Ref Range    Glucose 173 (H) 70 - 99 mg/dL   Glucose by meter     Status: None   Result Value Ref Range    Glucose 91 70 - 99 mg/dL   Glucose by meter     Status: Abnormal   Result Value Ref Range    Glucose 126 (H) 70 - 99 mg/dL   Glucose by meter     Status: Abnormal   Result Value Ref Range     Glucose 168 (H) 70 - 99 mg/dL   Glucose by meter     Status: Abnormal   Result Value Ref Range    Glucose 187 (H) 70 - 99 mg/dL   Glucose by meter     Status: Abnormal   Result Value Ref Range    Glucose 118 (H) 70 - 99 mg/dL   Glucose by meter     Status: Abnormal   Result Value Ref Range    Glucose 124 (H) 70 - 99 mg/dL   Glucose by meter     Status: Abnormal   Result Value Ref Range    Glucose 122 (H) 70 - 99 mg/dL   Glucose by meter     Status: Abnormal   Result Value Ref Range    Glucose 146 (H) 70 - 99 mg/dL   Glucose by meter     Status: Abnormal   Result Value Ref Range    Glucose 128 (H) 70 - 99 mg/dL   Glucose by meter     Status: Abnormal   Result Value Ref Range    Glucose 111 (H) 70 - 99 mg/dL   Glucose by meter     Status: None   Result Value Ref Range    Glucose 91 70 - 99 mg/dL   Glucose by meter     Status: Abnormal   Result Value Ref Range    Glucose 233 (H) 70 - 99 mg/dL   Glucose by meter     Status: None   Result Value Ref Range    Glucose 97 70 - 99 mg/dL   Glucose by meter     Status: Abnormal   Result Value Ref Range    Glucose 170 (H) 70 - 99 mg/dL   Glucose by meter     Status: Abnormal   Result Value Ref Range    Glucose 102 (H) 70 - 99 mg/dL   Glucose by meter     Status: Abnormal   Result Value Ref Range    Glucose 241 (H) 70 - 99 mg/dL   Glucose by meter     Status: Abnormal   Result Value Ref Range    Glucose 102 (H) 70 - 99 mg/dL   Glucose by meter     Status: Abnormal   Result Value Ref Range    Glucose 167 (H) 70 - 99 mg/dL   Glucose by meter     Status: Abnormal   Result Value Ref Range    Glucose 137 (H) 70 - 99 mg/dL   Glucose by meter     Status: None   Result Value Ref Range    Glucose 89 70 - 99 mg/dL   Glucose by meter     Status: None   Result Value Ref Range    Glucose 75 70 - 99 mg/dL   Glucose by meter     Status: Abnormal   Result Value Ref Range    Glucose 146 (H) 70 - 99 mg/dL   Glucose by meter     Status: Abnormal   Result Value Ref Range    Glucose 121 (H) 70 - 99  mg/dL   Glucose by meter     Status: Abnormal   Result Value Ref Range    Glucose 116 (H) 70 - 99 mg/dL   Glucose by meter     Status: Abnormal   Result Value Ref Range    Glucose 148 (H) 70 - 99 mg/dL   Glucose by meter     Status: Abnormal   Result Value Ref Range    Glucose 123 (H) 70 - 99 mg/dL   Glucose by meter     Status: Abnormal   Result Value Ref Range    Glucose 102 (H) 70 - 99 mg/dL   Glucose by meter     Status: Abnormal   Result Value Ref Range    Glucose 159 (H) 70 - 99 mg/dL   Glucose by meter     Status: Abnormal   Result Value Ref Range    Glucose 105 (H) 70 - 99 mg/dL   Glucose by meter     Status: Abnormal   Result Value Ref Range    Glucose 197 (H) 70 - 99 mg/dL   Glucose by meter     Status: Abnormal   Result Value Ref Range    Glucose 113 (H) 70 - 99 mg/dL   Glucose by meter     Status: Abnormal   Result Value Ref Range    Glucose 115 (H) 70 - 99 mg/dL   Glucose by meter     Status: None   Result Value Ref Range    Glucose 97 70 - 99 mg/dL   Glucose by meter     Status: Abnormal   Result Value Ref Range    Glucose 210 (H) 70 - 99 mg/dL   Glucose by meter     Status: Abnormal   Result Value Ref Range    Glucose 141 (H) 70 - 99 mg/dL   Glucose by meter     Status: Abnormal   Result Value Ref Range    Glucose 138 (H) 70 - 99 mg/dL   Glucose by meter     Status: None   Result Value Ref Range    Glucose 93 70 - 99 mg/dL   Glucose by meter     Status: Abnormal   Result Value Ref Range    Glucose 101 (H) 70 - 99 mg/dL   Glucose by meter     Status: Abnormal   Result Value Ref Range    Glucose 145 (H) 70 - 99 mg/dL

## 2020-02-15 NOTE — PLAN OF CARE
"  Problem: Adult Behavioral Health Plan of Care  Goal: Patient-Specific Goal (Individualization)  Description  Pt will follow recommendations of treatment team.  Pt will be compliant with medications.  Pt will attend 50 % of groups.  Pt will sleep 6-8 hours each night.  Pt will complete ADL's independently.   2/15/2020 0319 by Ayah Rodriguez RN  Outcome: Improving  Note:   Report received from Luz lama. Pt observed sleeping in supine position with regular and unlabored respirations.    0230- Pt up and requesting accucheck. BS was 93 and pt asymptomatic. Pt had some questions about belongings and writer advised per notes and report they are working on finding a way to pick them up for her but that it may be as late Tuesday if her  with [facility] gets them as she is out until then.    0315- Pt sleeping again.    0437- Pt requested accucheck again stating she felt it needed to be done again due to only being 93 at 0230. Pt was advised that this is a normal BS and she is asymptomatic. Pt stated she is concerned because sometimes it drops fast and she thinks she \"might be getting dizzy\". Writer rechecked BS and it was 101 at this time.     0645- Pt AM . Pt unsure why she went up. Pt advised the creamer she is putting in her coffee (2 cups so far) have sugar in them. Pt verbalized understanding.    Pt has been in bed with eyes closed and regular respirations off and on all NOC shift. Pt slept approx 4.25 hours. 15 minute and PRN checks all night. No complaints offered. Will continue to monitor.    Writer went over AVS with pt and pt was concerned that her PRN hydroxyzine and clonazepam were not on her discharge MAR. Dayshift advised. Pt meds were not given early due to discharge not being until 0830 and normal med pass being at approx 0800. Early breakfast and bag lunches were ordered from kitchen and back was also requested from security as pt was advised [employer] will not allow " her to pickup her belongings on her way to her new placement. Pt denies suicidal ideation, access or means and is able to identify coping skills and supports. Education tab was resolved and completed and pt belongings were done this NOC shift as well as pt being dressed and ready to go for day shift.     Face to face end of shift report communicated to oncoming RN.    February 15, 2020  3:19 AM          Problem: Fall Injury Risk  Goal: Absence of Fall and Fall-Related Injury  Description  Pt will wear non skid slippers.  Pt will remain free from falls or injuries during hospitalization.   2/15/2020 0319 by Ayah Rodriguez, RN  Outcome: Improving  Note:   Pt presents as balanced and steady on her feet. Pt has had no falls or injury this shift.     Problem: Thought Process Alteration  Goal: Optimal Thought Clarity  Description  Patient will be able to hold reality based conversations prior to discharge.     2/15/2020 0319 by Ayah Rodriguez, RN  Outcome: Improving  Note:   Pt was able to  makes her needs known and coherently ask questions about discharge, but still makes delusional statements about having given birth to millions of babies and then living in [place] and having 11 sons and 4 daughters and winning a bunch of spins in a casino there and winning millions of dollars and thousands of free spins.

## 2020-02-17 ENCOUNTER — TELEPHONE (OUTPATIENT)
Dept: FAMILY MEDICINE | Facility: OTHER | Age: 40
End: 2020-02-17

## 2020-02-17 DIAGNOSIS — E11.22 TYPE 2 DIABETES MELLITUS WITH DIABETIC CHRONIC KIDNEY DISEASE, UNSPECIFIED CKD STAGE, UNSPECIFIED WHETHER LONG TERM INSULIN USE (H): Chronic | ICD-10-CM

## 2020-02-17 DIAGNOSIS — E11.9 TYPE 2 DIABETES MELLITUS WITHOUT COMPLICATION, UNSPECIFIED WHETHER LONG TERM INSULIN USE (H): ICD-10-CM

## 2020-02-19 RX ORDER — LANCETS
EACH MISCELLANEOUS
Qty: 100 EACH | Refills: 11 | Status: SHIPPED | OUTPATIENT
Start: 2020-02-19

## 2020-02-20 ENCOUNTER — DOCUMENTATION ONLY (OUTPATIENT)
Dept: OTHER | Facility: CLINIC | Age: 40
End: 2020-02-20

## 2020-02-24 ENCOUNTER — DOCUMENTATION ONLY (OUTPATIENT)
Dept: BEHAVIORAL HEALTH | Facility: OTHER | Age: 40
End: 2020-02-24

## 2020-02-24 NOTE — PROGRESS NOTES
Patient recently moved out of [facility], [facility] will discharge patient from [facility] services today's date.    NORMA Nichole  Social Work Care Coordinator  [facility]   [phone] opt 1

## 2020-03-02 NOTE — PLAN OF CARE
Problem: Adult Behavioral Health Plan of Care  Goal: Patient-Specific Goal (Individualization)  Description  Patient will attend at least 50% of groups while on unit.   Patient will report at least 6-8 hours of sleep at night.  Patient will be compliant with treatment team recommendations.    4/9/2019 1852 by Radha Bills RN  Outcome: Improving   Patient was in bed with her eyes closed at the start of this shift.  Patient awakened easily when nurse in for assessment.  Patient remains disorganized and able to answer questions but still making delusional statements.  Patient stated she was tired and allowed to rest until evening meal.    and 195.  Patient was encouraged to remain up on the unit.  Problem: Suicide Risk  Goal: Absence of Self-Harm  Description  Patient will contract for safety if having thoughts of self harm while on unit.   Patient will report absence of suicide ideation prior to discharge.      4/9/2019 1852 by Radha Bills, RN  Outcome: Improving   Patient denies current suicidal ideation or intent.   Patient Education     Adult Self-Care for Colds    Colds are caused by viruses. They can't be cured with antibiotics. However, you can ease symptoms and support your body's efforts to heal itself. No matter which symptoms you have, be sure to:  · Drink plenty of fluids (water or clear soup)  · Stop smoking and drinking alcohol  · Get plenty of rest  Understand a fever  · Take your temperature several times a day. If your fever is 100.4°F (38.0°C) for more than a day, call your healthcare provider.  · Relax, lie down. Go to bed if you want. Just get off your feet and rest. Also, drink plenty of fluids to avoid dehydration.  · Take acetaminophen or a nonsteroidal anti-inflammatory agent (NSAID), such as ibuprofen.  Treat a troubled nose kindly  · Breathe steam or heated humidified air to open blocked nasal passages.  a hot shower or use a vaporizer. Be careful not to get burned by the steam.  · Saline nasal sprays and decongestant tablets help open a stuffy nose. Antihistamines can also help, but they can cause side effects such as drowsiness and drying of the eyes, nose, and mouth.  Soothe a sore throat and cough  · Gargle every 2 hours with 1/4 teaspoon of salt dissolved in 1/2 cup of warm water. Suck on throat lozenges and cough drops to moisten your throat.  · Cough medicines are available but it is unclear how well they actually work.  · Take acetaminophen or an NSAID, such as ibuprofen, to ease throat pain  Ease digestive problems  · Put fluids back into your body. Take frequent sips of clear liquids such as water or broth. Avoid drinks that have a lot of sugar in them, such as juices and sodas. These can make diarrhea worse. Older children and adults can drink sports drinks.  · As your appetite returns, you can resume your normal diet. Ask your healthcare provider if there are any foods you should avoid.  When to seek medical care  When you first notice symptoms, ask your healthcare provider if  antiviral medicines are appropriate. Antibiotics should not be taken for colds or flu. Also, call your healthcare provider if you have any of the following symptoms or if you aren't feeling better after 7 days:  · Shortness of breath  · Pain or pressure in the chest or belly (abdomen)  · Worsening symptoms, especially after a period of improvement  · Fever of 100.4°F  (38.0°C) or higher, or fever that doesn't go down with medicine  · Sudden dizziness or confusion  · Severe or continued vomiting  · Signs of dehydration, including extreme thirst, dark urine, infrequent urination, dry mouth  · Spotted, red, or very sore throat   Date Last Reviewed: 12/1/2016  © 2516-6913 [employer]. [address] [place] [address], [place] [address]. All rights reserved. This information is not intended as a substitute for professional medical care. Always follow your healthcare professional's instructions.

## 2020-03-14 ENCOUNTER — DOCUMENTATION ONLY (OUTPATIENT)
Dept: OTHER | Facility: CLINIC | Age: 40
End: 2020-03-14

## 2021-01-15 NOTE — PROGRESS NOTES
SUBJECTIVE:   CC: Ginette Wood is an 39 year old woman who presents for preventive health visit.     Healthy Habits:    Do you get at least three servings of calcium containing foods daily (dairy, green leafy vegetables, etc.)? yes    Amount of exercise or daily activities, outside of work: 5 day(s) per week    Problems taking medications regularly No    Medication side effects: Yes dry mouth    Have you had an eye exam in the past two years? Yes- [facility]    Do you see a dentist twice per year? no    Do you have sleep apnea, excessive snoring or daytime drowsiness?pt states she is suppose to be taking zolpidem for sleep but is not      Diabetes Follow-up    How often are you checking your blood sugar? Four or more times daily  Blood sugar testing frequency justification:  Adjustment of medication(s)  What time of day are you checking your blood sugars (select all that apply)?  Before meals, Before and after meals and At bedtime  Have you had any blood sugars above 200?  No  Have you had any blood sugars below 70?  No    What symptoms do you notice when your blood sugar is low?  Shaky and Weak    What concerns do you have today about your diabetes? None     Do you have any of these symptoms? (Select all that apply)  No numbness or tingling in feet.  No redness, sores or blisters on feet.  No complaints of excessive thirst.  No reports of blurry vision.  No significant changes to weight.     Have you had a diabetic eye exam in the last 12 months? Yes- Date of last eye exam: 2019    BP Readings from Last 2 Encounters:   11/06/19 104/72   08/28/19 112/78     Hemoglobin A1C (%)   Date Value   08/28/2019 6.5 (H)   04/30/2019 8.5 (H)     LDL Cholesterol Calculated (mg/dL)   Date Value   10/25/2019 98   05/21/2019 98       Diabetes Management Resources      Today's PHQ-2 Score: No flowsheet data found.    PHQ-9 SCORE 7/23/2019 8/21/2019   PHQ-9 Total Score 0 0     KOKI-7 SCORE 7/23/2019 8/21/2019  Patient states the pharmacy won't fill Rx oxycodone until they get fax from Dr. Gonzales stating that he is aware she is taking alprazolam. - Lala Fernando     Total Score 21 21       Abuse: Current or Past(Physical, Sexual or Emotional)- unknown, manic today   Do you feel safe in your environment? unknown        Social History     Tobacco Use     Smoking status: Former Smoker     Packs/day: 0.50     Years: 15.00     Pack years: 7.50     Types: Cigarettes     Smokeless tobacco: Never Used   Substance Use Topics     Alcohol use: No     If you drink alcohol do you typically have >3 drinks per day or >7 drinks per week? Denies alcohol                     Reviewed orders with patient.  Reviewed health maintenance and updated orders accordingly - Yes  Labs reviewed in EPIC  BP Readings from Last 3 Encounters:   19 102/72   19 104/72   19 113/63    Wt Readings from Last 3 Encounters:   19 86.2 kg (190 lb)   19 86.5 kg (190 lb 9.6 oz)   19 87.4 kg (192 lb 11.2 oz)                  Patient Active Problem List   Diagnosis     Mixed hyperlipidemia     Diabetes mellitus, type 2     Schizoaffective disorder, bipolar type (H)     Chronic hepatitis C virus infection (H)     Major depressive disorder, recurrent episode, severe with mood-congruent psychotic features (H)     Tobacco abuse     Methamphetamine use disorder, severe (H)     Dental caries     Hypoglycemia     Overweight (BMI 25.0-29.9)     Pain in joint, upper arm     Primary insomnia     Schizoaffective disorder, chronic condition (H)     Suicidal ideation     Psychosis (H)     Past Surgical History:   Procedure Laterality Date      SECTION       x 3     FOOT SURGERY      Right and left foot fracture repair       Social History     Tobacco Use     Smoking status: Former Smoker     Packs/day: 0.50     Years: 15.00     Pack years: 7.50     Types: Cigarettes     Smokeless tobacco: Never Used   Substance Use Topics     Alcohol use: No     Family History   Problem Relation Age of Onset     C.A.D. Mother      Diabetes Mother      C.A.D. Father      Diabetes Father          Current  Outpatient Medications   Medication Sig Dispense Refill     ACCU-CHEK ABDIRAHMAN PLUS test strip TEST BEFORE MEALS AND AT BEDTIME AS DIRECTED 100 strip 3     Alcohol Swabs (ALCOHOL PREP) PADS 1 Application 4 times daily 100 each 11     benztropine (COGENTIN) 1 MG tablet Take 1 tablet (1 mg) by mouth 2 times daily 60 tablet 0     blood glucose monitoring (SOFTCLIX) lancets TEST BEFORE MEALS AND AT BEDTIME AS DIRECTED 100 each 11     busPIRone (BUSPAR) 15 MG tablet Take 1 tablet (15 mg) by mouth 3 times daily 90 tablet 0     chlorproMAZINE (THORAZINE) 100 MG tablet Take 1 tablet (100 mg) by mouth 3 times daily 90 tablet 1     clonazePAM (KLONOPIN) 1 MG tablet Take 1 tablet (1 mg) by mouth At Bedtime 30 tablet 0     divalproex sodium extended-release (DEPAKOTE ER) 250 MG 24 hr tablet Take 3 tablets (750 mg) by mouth At Bedtime 90 tablet 0     gabapentin (NEURONTIN) 600 MG tablet Take 2 tablets (1,200 mg) by mouth 3 times daily 180 tablet 1     hydrOXYzine (ATARAX) 50 MG tablet Take 1 tablet (50 mg) by mouth 3 times daily as needed for anxiety 90 tablet 0     insulin aspart (NOVOLOG FLEXPEN) 100 UNIT/ML pen Inject 8 Units Subcutaneous 3 times daily (with meals) Plus sliding scale    BG <140 No coverage  141-189 1 unit  190-239 2 units  240-289 3 units  290-339 4 units  340-399 5 units  400-449 6 units  BG > 450 7 units       insulin glargine (LANTUS PEN) 100 UNIT/ML pen Inject 40 Units Subcutaneous At Bedtime       metFORMIN (GLUCOPHAGE) 1000 MG tablet Take 1 tablet (1,000 mg) by mouth 2 times daily (with meals) 60 tablet 3     prazosin (MINIPRESS) 1 MG capsule Take 1 capsule (1 mg) by mouth At Bedtime 30 capsule 1     artificial saliva (BIOTENE MT) SOLN solution Swish and spit 2 mLs (2 sprays) in mouth 4 times daily as needed for dry mouth (Patient not taking: Reported on 11/27/2019) 1 Bottle 0     nicotine (NICORETTE) 2 MG gum Place 2 mg inside cheek every 2 hours as needed for smoking cessation       polyethylene glycol  "(MIRALAX/GLYCOLAX) packet Take 1 packet by mouth daily as needed for constipation       Allergies   Allergen Reactions     Haloperidol Other (See Comments)     Other reaction(s): Seizures  Patient states, \"I fell down and wasn't able to get up.\"  Patient states, \"I fell down and wasn't able to get up.\"       Recent Labs   Lab Test 10/31/19  1954 10/25/19  0631 10/22/19  0926 08/28/19  1245  05/21/19  0541  04/30/19  0524  04/05/19 11/09/16  0602  10/26/16  0652   A1C  --   --   --  6.5*  --   --   --  8.5*  --   --   --   --   --  9.7*   LDL  --  98  --   --   --  98  --   --   --   --   --  78  --   --    HDL  --  30*  --   --   --  39*  --   --   --   --   --  26*  --   --    TRIG  --  281*  --   --   --  137  --   --   --   --   --  296*  --   --    ALT 21  --  23 22   < >  --    < >  --    < > 27   < >  --    < > 28   CR 0.60  --  0.56 0.53   < >  --    < >  --    < > 0.4*  --   --    < > 0.51*   GFRESTIMATED >90  --  >90 >90   < >  --    < >  --    < > >60  --   --   --  >90  Non  GFR Calc     GFRESTBLACK >90  --  >90 >90   < >  --    < >  --    < >  --   --   --   --  >90  African American GFR Calc     POTASSIUM 4.2  --  3.9 4.6   < >  --    < >  --    < > 4.8  --   --    < >  --    TSH  --   --   --   --   --   --   --   --   --  1.36  --   --   --  1.12    < > = values in this interval not displayed.        Mammogram Screening: Patient under age 50, mutual decision reflected in health maintenance.      Pertinent mammograms are reviewed under the imaging tab.  History of abnormal Pap smear: Last 3 Pap and HPV Results:       Reviewed and updated as needed this visit by clinical staff         Reviewed and updated as needed this visit by Provider            ROS:  CONSTITUTIONAL: NEGATIVE for fever, chills, change in weight  INTEGUMENTARY/SKIN: NEGATIVE for worrisome rashes, moles or lesions  EYES: NEGATIVE for vision changes or irritation  ENT: NEGATIVE for ear, mouth and throat " problems  RESP:short breath, smoker  CV: NEGATIVE for chest pain, palpitations or peripheral edema  GI: over scar on abdomen   menopausal female: denies dysuria   MUSCULOSKELETAL:NEGATIVE for significant arthralgias or myalgia  NEURO: NEGATIVE for weakness, dizziness or paresthesias  ENDOCRINE: NEGATIVE for temperature intolerance, skin/hair changes  PSYCHIATRIC: confusion today, manic difficult to stay focused      OBJECTIVE:   /72   Pulse 105   Temp 98.1  F (36.7  C) (Tympanic)   Wt 86.2 kg (190 lb)   SpO2 99%   BMI 31.62 kg/m    EXAM:  GENERAL: alert and no distress  EYES: Eyes grossly normal to inspection, PERRL and conjunctivae and sclerae normal  HENT: ear canals and TM's normal, nose and mouth without ulcers or lesions  NECK: no adenopathy, no asymmetry, masses, or scars and thyroid normal to palpation  RESP: lungs clear to auscultation - no rales, rhonchi or wheezes  CV: regular rate and rhythm, normal S1 S2, no S3 or S4, no murmur, click or rub, no peripheral edema and peripheral pulses strong  ABDOMEN: soft, nontender, no hepatosplenomegaly, no masses and bowel sounds normal   (female): normal female external genitalia, normal urethral meatus , vaginal mucosa pink, moist, well rugated and normal cervix, adnexae, and uterus without masses.  MS: no gross musculoskeletal defects noted, no edema  NEURO: slurred speech, confusion/delutional  PSYCH: concentration poor and disoriented  LYMPH: normal ant/post cervical, supraclavicular nodes          Diagnostic Test Results:  Labs reviewed in Epic  PAP pending    ASSESSMENT/PLAN:   1. Routine general medical examination at a health care facility  Pap and mammogram pending  - A pap thin layer screen with  HPV - recommended age 30 - 65 years (select HPV order below)  - MA SCREENING DIGITAL BILATERAL ([place]); Future    2. Dry mouth  Noted increased dry mouth since change in medications when admitted to the mental health unit. Can try biotene  -  "artificial saliva (BIOTENE MT) SOLN solution; Swish and spit 1-2 mLs (1-2 sprays) in mouth as needed for dry mouth  Dispense: 44.3 mL; Refill: 3    3. Schizoaffective disorder, bipolar type (H)  Should have medications managed with [facility]    4. Type 2 diabetes mellitus with diabetic chronic kidney disease, unspecified CKD stage, unspecified whether long term insulin use (H)  Continue current plan of care.   Plan to check  A1c at next visit      COUNSELING:   Reviewed preventive health counseling, as reflected in patient instructions       medication management with [facility]       Regular exercise       Healthy diet/nutrition    Estimated body mass index is 31.72 kg/m  as calculated from the following:    Height as of 11/6/19: 1.651 m (5' 5\").    Weight as of 11/6/19: 86.5 kg (190 lb 9.6 oz).         reports that she has quit smoking. Her smoking use included cigarettes. She has a 7.50 pack-year smoking history. She has never used smokeless tobacco.  Tobacco cessation was strongly encouraged. Every year of smoking cessation offer health benefits.       Counseling Resources:  ATP IV Guidelines  Pooled Cohorts Equation Calculator  Breast Cancer Risk Calculator  FRAX Risk Assessment  ICSI Preventive Guidelines  Dietary Guidelines for Americans, 2010  USDA's MyPlate  ASA Prophylaxis  Lung CA Screening    LATRICE Johnson CNP  [facility] - [place]  "

## 2021-06-23 NOTE — PROGRESS NOTES
[facility]  Psychiatric Progress Note      Impression:   Ginette Wood is a 39 year old female re-admitted on 5/10/19, nine days after discharge from this hospital, with decompensation and increased agitation, disorganization and increase in delusions.  Genie is cooperative with assessment. She reports the seroquel really helps her relax and not feel anxious. She talks about how it keeps hr from thinking about when her head got bashed in so many times and her brains fell out. She denies that the dose makes her sleepy. Will continue with a the limited PRN dose due to metabolic side effects. Genie continues to be delusional and grandiose. Discussed medication changes with her and she is in agreement with them. She does complain of constipation, will order dulcolax per her request         Diagnoses:      Schizoaffective disorder, bipolar type   Methamphetamine use disorder, moderate to severe            Plan:   Continue Depakote taper - 1000mg  Increase Clozaril to 150mg    Hospitalist following diabetes  [place] is going forward with petition for commitment    ELOS:  > 5 days due to symptoms of psychosis and delusions along with Civil Commitment process      Attestation:  Patient has been seen and evaluated by me,  Loren Zuleta NP          Interim History:   The patient's care was discussed with the treatment team and chart notes were reviewed.          Medications:     Current Facility-Administered Medications Ordered in Epic   Medication Dose Route Frequency Last Rate Last Dose     acetaminophen (TYLENOL) tablet 975 mg  975 mg Oral Q6H PRN         atorvastatin (LIPITOR) tablet 20 mg  20 mg Oral At Bedtime   20 mg at 05/11/19 2108     benztropine (COGENTIN) tablet 1 mg  1 mg Oral BID   1 mg at 05/12/19 0812     busPIRone (BUSPAR) tablet 5 mg  5 mg Oral TID   5 mg at 05/12/19 1316     carBAMazepine (TEGretol XR) 12 hr tablet 100 mg  100 mg Oral BID         clonazePAM (klonoPIN) tablet 0.5 mg  0.5 mg  Patient Returning Call  Reason for call:  Patient is returning call  Information relayed to patient:   Liver function tests are fine.     Cholesterol is a bit high, but not too bad.  I'd just work on diet a bit, and try to get regular exercise, and we can recheck in a year.     TS  Patient has additional questions:  No  If YES, what are your questions/concerns:   none  Okay to leave a detailed message?: No call back needed   "Oral BID   0.5 mg at 05/12/19 0811     glucose gel 15-30 g  15-30 g Oral Q15 Min PRN        Or     dextrose 50 % injection 25-50 mL  25-50 mL Intravenous Q15 Min PRN        Or     glucagon injection 1 mg  1 mg Subcutaneous Q15 Min PRN         glucose gel 15-30 g  15-30 g Oral Q15 Min PRN        Or     dextrose 50 % injection 25-50 mL  25-50 mL Intravenous Q15 Min PRN        Or     glucagon injection 1 mg  1 mg Subcutaneous Q15 Min PRN         divalproex sodium extended-release (DEPAKOTE ER) 24 hr tablet 2,000 mg  2,000 mg Oral At Bedtime   2,000 mg at 05/11/19 2109     gabapentin (NEURONTIN) capsule 1,200 mg  1,200 mg Oral TID   1,200 mg at 05/12/19 1316     hydrOXYzine (ATARAX) tablet 25-50 mg  25-50 mg Oral Q4H PRN   50 mg at 05/12/19 1151     ibuprofen (ADVIL/MOTRIN) tablet 800 mg  800 mg Oral Q6H PRN         insulin aspart (NovoLOG) inj (RAPID ACTING)  1-7 Units Subcutaneous TID AC   3 Units at 05/12/19 1151     insulin aspart (NovoLOG) inj (RAPID ACTING)  1-5 Units Subcutaneous At Bedtime   2 Units at 05/11/19 2116     insulin glargine (LANTUS PEN) injection 10 Units  10 Units Subcutaneous At Bedtime   10 Units at 05/11/19 2111     metFORMIN (GLUCOPHAGE) tablet 1,000 mg  1,000 mg Oral BID w/meals   1,000 mg at 05/12/19 0810     nicotine (NICORETTE) gum 2-4 mg  2-4 mg Buccal Q1H PRN   4 mg at 05/12/19 1052     OLANZapine (zyPREXA) tablet 10 mg  10 mg Oral TID PRN   10 mg at 05/11/19 1800    Or     OLANZapine (zyPREXA) injection 10 mg  10 mg Intramuscular TID PRN         [START ON 5/13/2019] paliperidone ER (INVEGA) 24 hr tablet 6 mg  6 mg Oral Daily         prazosin (MINIPRESS) capsule 1 mg  1 mg Oral At Bedtime   1 mg at 05/11/19 2108     No current Epic-ordered outpatient medications on file.              10 point ROS negative \"pain everywhere\"       Allergies:     Allergies   Allergen Reactions     Haloperidol Other (See Comments)     Other reaction(s): Seizures  Patient states, \"I fell down and wasn't able " "to get up.\"  Patient states, \"I fell down and wasn't able to get up.\"              Psychiatric Examination:   /75   Pulse 86   Temp 99  F (37.2  C) (Tympanic)   Resp 18   Ht 1.626 m (5' 4\")   Wt 82.5 kg (181 lb 12.8 oz)   SpO2 99%   BMI 31.21 kg/m    Weight is 181 lbs 12.8 oz  Body mass index is 31.21 kg/m .    Appearance:  awake, alert   Attitude:  cooperative  Eye Contact:  fair  Mood:  slightly irritable  Affect: mood congruent  Speech:  pressured speech  Psychomotor Behavior:  no evidence of tardive dyskinesia, dystonia, or tics  Thought Process:  disorganized  Associations:  loosening of associations present   Thought Content:  auditory hallucinations present  Insight:  limited  Judgment:  poor  Oriented to:  time, person, and place  Attention Span and Concentration:  limited  Recent and Remote Memory:  fair  Fund of Knowledge: low-normal  Muscle Strength and Tone: normal  Gait and Station: Normal         Labs:     Results for orders placed or performed during the hospital encounter of 05/10/19 (from the past 24 hour(s))   Glucose by meter   Result Value Ref Range    Glucose 287 (H) 70 - 99 mg/dL   Glucose by meter   Result Value Ref Range    Glucose 144 (H) 70 - 99 mg/dL   Glucose by meter   Result Value Ref Range    Glucose 273 (H) 70 - 99 mg/dL   Glucose by meter   Result Value Ref Range    Glucose 176 (H) 70 - 99 mg/dL   Basic metabolic panel   Result Value Ref Range    Sodium 144 133 - 144 mmol/L    Potassium 4.6 3.4 - 5.3 mmol/L    Chloride 113 (H) 94 - 109 mmol/L    Carbon Dioxide 24 20 - 32 mmol/L    Anion Gap 7 3 - 14 mmol/L    Glucose 183 (H) 70 - 99 mg/dL    Urea Nitrogen 16 7 - 30 mg/dL    Creatinine 0.53 0.52 - 1.04 mg/dL    GFR Estimate >90 >60 mL/min/[1.73_m2]    GFR Estimate If Black >90 >60 mL/min/[1.73_m2]    Calcium 8.6 8.5 - 10.1 mg/dL   Glucose by meter   Result Value Ref Range    Glucose 179 (H) 70 - 99 mg/dL       "

## 2023-06-23 NOTE — PLAN OF CARE
Called patient to let her know that Keflex was sent to her pharmacy due to the upcoming weekend  I informed her that we will call her if anything needs to be changed based on her C&S results  Pt was thankful for the call  Pt cooperative with plan of care, takes meds as ordered, went to group this am. Out in lobby listening to music on headphones. Denies hallucinations denies si or hi. States she will do whatever it takes to get back home. Denies illness. Had complaints of pain in rt arm upon awakening this am, pain was gone after having scheduled meds and pt states pain gets better after she has been up awhile.

## 2024-12-18 NOTE — PLAN OF CARE
Review of ct shows a few small scars and an area of swelling. Will repeat CT in one year to further monitor.     Continue current COPD medication regiment.    Spoke with Chilo from [facility] - they do not currently have any openings but he would be glad to look at her referral information in case something changes - faxed him referral information.    Spoke with Josiah from [facility] - they may be having some female beds available soon - she would be happy to review referral information to see if the patient would be appropriate for any of their homes.  Emailed a referral packet to her.

## 2025-07-07 NOTE — ED NOTES
"Refusing to give urine sample. \"I agreed to give a blood sample.\"   " 07/07/25    Patient called office today to schedule a f/u appt with Dr. Vieyra.    LOV: 07/18/22 (Return in about 6 months (around 1/18/2023).     Offer Next available and Patient happily accepted.    Per Patient OK, ADD-ON Appt scheduled for 07/08/25, 10:00 AM With Dr. Vieyra at the [facility] Location.      Any questions, please contact Patient.  Thank You.